# Patient Record
Sex: MALE | Race: WHITE | NOT HISPANIC OR LATINO | URBAN - METROPOLITAN AREA
[De-identification: names, ages, dates, MRNs, and addresses within clinical notes are randomized per-mention and may not be internally consistent; named-entity substitution may affect disease eponyms.]

---

## 2017-01-19 ENCOUNTER — TELEPHONE (OUTPATIENT)
Dept: NEUROLOGY | Facility: MEDICAL CENTER | Age: 77
End: 2017-01-19

## 2017-01-19 ENCOUNTER — PATIENT MESSAGE (OUTPATIENT)
Dept: NEUROLOGY | Facility: MEDICAL CENTER | Age: 77
End: 2017-01-19

## 2017-01-19 NOTE — TELEPHONE ENCOUNTER
Pt is sending this message via [employer] e-mail. Please advise. Thank you. CHRISTIN Ferguson - You have mentioned that a deep brain stimulation procedure might improve my decreased mobility due to Parkinsons. I know that [facility] does this procedure.  Do I need a referral? If so, would you provide that? If I don't need one, who should I contact?  Thank you.

## 2017-01-19 NOTE — TELEPHONE ENCOUNTER
We could discuss this in next visit   Deep brain stimulator is good for sheainosn   But typically, we prefer to perform DBS on patients who have no major memory problems . YP    E-mail sent to velia WALLER

## 2017-01-31 DIAGNOSIS — G20.A1 PARKINSON'S DISEASE: ICD-10-CM

## 2017-01-31 RX ORDER — CARBIDOPA AND LEVODOPA 50; 200 MG/1; MG/1
1 TABLET, EXTENDED RELEASE ORAL
Qty: 60 TAB | Refills: 5 | Status: SHIPPED | OUTPATIENT
Start: 2017-01-31 | End: 2018-01-26 | Stop reason: SDUPTHER

## 2017-02-01 ENCOUNTER — OFFICE VISIT (OUTPATIENT)
Dept: NEUROLOGY | Facility: MEDICAL CENTER | Age: 77
End: 2017-02-01
Payer: MEDICARE

## 2017-02-01 VITALS
BODY MASS INDEX: 24.12 KG/M2 | TEMPERATURE: 98.4 F | OXYGEN SATURATION: 96 % | DIASTOLIC BLOOD PRESSURE: 74 MMHG | SYSTOLIC BLOOD PRESSURE: 132 MMHG | HEART RATE: 74 BPM | HEIGHT: 75 IN | RESPIRATION RATE: 18 BRPM | WEIGHT: 194 LBS

## 2017-02-01 DIAGNOSIS — D46.9 MYELODYSPLASTIC SYNDROME (HCC): ICD-10-CM

## 2017-02-01 DIAGNOSIS — R53.83 LACK OF ENERGY: ICD-10-CM

## 2017-02-01 DIAGNOSIS — G20.A1 PARKINSON'S DISEASE: ICD-10-CM

## 2017-02-01 PROCEDURE — 0518F FALL PLAN OF CARE DOCD: CPT | Mod: 8P | Performed by: PSYCHIATRY & NEUROLOGY

## 2017-02-01 PROCEDURE — 1036F TOBACCO NON-USER: CPT | Performed by: PSYCHIATRY & NEUROLOGY

## 2017-02-01 PROCEDURE — 4040F PNEUMOC VAC/ADMIN/RCVD: CPT | Mod: 8P | Performed by: PSYCHIATRY & NEUROLOGY

## 2017-02-01 PROCEDURE — 1100F PTFALLS ASSESS-DOCD GE2>/YR: CPT | Performed by: PSYCHIATRY & NEUROLOGY

## 2017-02-01 PROCEDURE — G8420 CALC BMI NORM PARAMETERS: HCPCS | Performed by: PSYCHIATRY & NEUROLOGY

## 2017-02-01 PROCEDURE — 99214 OFFICE O/P EST MOD 30 MIN: CPT | Performed by: PSYCHIATRY & NEUROLOGY

## 2017-02-01 PROCEDURE — G8432 DEP SCR NOT DOC, RNG: HCPCS | Performed by: PSYCHIATRY & NEUROLOGY

## 2017-02-01 PROCEDURE — 3288F FALL RISK ASSESSMENT DOCD: CPT | Performed by: PSYCHIATRY & NEUROLOGY

## 2017-02-01 PROCEDURE — G8482 FLU IMMUNIZE ORDER/ADMIN: HCPCS | Performed by: PSYCHIATRY & NEUROLOGY

## 2017-02-01 RX ORDER — LEVETIRACETAM 250 MG/1
125 TABLET ORAL 2 TIMES DAILY
Qty: 30 TAB | Refills: 4 | Status: SHIPPED | OUTPATIENT
Start: 2017-02-01 | End: 2017-06-13

## 2017-02-01 NOTE — PATIENT INSTRUCTIONS
IMPRESSION:    1. Parkinson Disease Stage III-- treated in [facility]  2. Dementia-- deterioration   3. Myelodysplastic syndrome  4. Lack of energy for 3 years-- good days and bad days-- worsened in the last year-- in fact, patient developed one episode of lack of energy in front of me    PLAN/RECOMMENDATIONS:        ________________________________________________________________________        Continue  Namenda 5mg two times per day for memory problems      ________________________________________________________________________    Regarding medication for idiopathic lack of energy  The following are some medicine that could be used   1. Coenzyme Q10   2. Amantadine   3. Provigil   4. Medication for ADHD(  I do not recommend though)       However, lack of energy could be due to many other causes-- such as sleep apnea, sleep deprivation, subtle seizures, multiple sclerosis, depression, heart failure etc ( parkinson disease does not commonly causes pronounced lack of energy though)     It is fine to try Keppra 125mg before sleep for one week, then 125mg two times per day  Subtle seizures could produce lack of energy, bad days and good days  If any side effects, please call us and stop Keppra      For reduction of inflammation , the followings are fine with me  ________________________________________________________________________    Fish Oil -- Omega 3 1000mg 3# daily   Daily Probiotic too  Continue Vit D-3 2000 unit daily or above  ________________________________________________________________________     Expand All Collapse All    We could discuss this in next visit   Deep brain stimulator is good for parksinosn   But typically, we prefer to perform DBS on patients who have no major memory problems . YP    E-mail sent to pt. CHRISTIN          ________________________________________________________________________  RERERENCE    Seizures, Early Alzheimer's May Go Hand in Hand  Seizure disorders of all etiologies  "increase in the second half of life, beginning around the age of 50. The incidence ratio of epilepsy in individuals diagnosed with AD is elevated nearly 87 fold at this age. Seizure incidence at ages beyond 70 remains elevated three-fold over patients of a similar age without AD. Modified from Tammy et al (2006), and Savlador et al (2007)    A Perfect Storm: Converging Paths of Epilepsy and Alzheimer's Dementia Intersect in the Hippocampal Formation  Cornelio Douglas MD, PhD  http://www.ncbi.nlm.nih.gov/pmc/articles/UKO8041615/  Http://www.medpagetoday.com/theguptaguide/neurology/22002    ________________________________________________________________________      ________________________________________________________________________      MRI of brain         SIGNATURE:  Louie Cox      CC:  MERI Casarez    INTERPRETATION:  This EEG denotes of potential focal cortical irritability    more over the left frontal temporal region with potential focal irritability    over the left hemisphere as well.       ____________________________________     LOUIE COX MD    YP / NTS    DD:  12/07/2016 09:49:54  DT:  12/07/2016 10:01:27    D#:  [id number]  Job#:  [id number]        ________________________________________________________________________    Guidelines for Referring Neurologists: Deep Brain Stimulation for Parkinson's Disease    Who is a good candidate for deep brain stimulation for Parkinson's Disease? Guidelines for referring neurologists:     The criteria that we use for offering patients DBS surgery for Parkinson's disease are as follows:    1.      Clear diagnosis of idiopathic PD. Patients with atypical parkinsonism or \"parkinson's plus\" syndromes do not respond to DBS. If there are features in the history and physical that are suggestive of atypical parkinsonism (such as very rapid progression of symptoms, autonomic failure or postural instability as early features of the disease, signs of cerebellar " "or pyramidal dysfunction) or an MRI suggesting an atypical syndrome, surgery is contraindicated.     2.      Intact cognitive function. A good screening test is the mini-mental status test. A score of >26 is ideal, < 24 an absolute contraindication. Patients with cognitive dysfunction have difficulty tolerating awake surgery, may have permanent worsening of cognitive function postoperatively, deal poorly with the intrinsic complexity of DBS therapy, and realize little overall functional gain even if motor performance is improved. In borderline cases, we obtain formal neuropsychological evaluation.     3.     Clear evidence of motor improvement with sinemet, with good motor function in the best on-medication state. A good screening test is the Unified Parkinson's Disease Rating Scale (UPDRS) part III, performed in 12 hours off of medication and repeated following a supratherapeutic sinemet dose. We require at least a 30% improvement in this score with sinemet. The patient should be ambulatory in the best on state without much assistance. In general surgery makes the \"off\" states more like the \"on\" states but rarely does better than the best \"on\" state, so a patient with poor function in best \"on\" (for example, nonambulatory in best \"on\") is a poor surgical candidate. Patients who fluctuate between good motor function while \"on\" and poor motor function while \"off\" are usually good surgical candidates.      4.     Lack of comorbidity. Serious cardiac disease, uncontrolled hypertension, or any major other chronic systemic illness increases the risk and decreases the benefit of surgery.      5.     Realistic expectations. People who expect a sudden miracle are disappointed with the results, and become frustrated with the complexity of the therapy.     6.     Patient age. The benefits of DBS for PD decline with advancing age, and the risks go up. Patients over 75 are informed that their benefits are likely to be modest. " "We have rarely implanted PD patients who are over 80.     7.     Screening MRI of the brain should be free of severe vascular disease, atrophy that is out of proportion to age, or signs of atypical parkinsonism.     8.     Degree of disability. DBS is a poor procedure to rescue someone with end stage PD, although these are the most desperate patients. It is also not appropriate for early PD when the symptoms are very well controlled on medical therapy. Patients should have an off-medication UPDRS-III score of > 25. The best time to intervene surgically is when the patient is just beginning to lose the ability to perform activities meaningful to him/her, in spite of optimal medical therapy. Often, this is associated with the development of significant motor fluctuations, dyskinesias, or both. In a patient who is still working, the time to intervene is before the patient is forced to retire on disability.      9.     Ability to remain calm and cooperative during awake neurosurgery lasting about 2-3 hours per side of brain. A helpful \"screening test\" for this is how well the patient tolerates an MRI scan. For patients who are otherwise excellent candidates but could not tolerate being awake for part of the surgery, it is possible to have the DBS implantation under general anesthesia in our interventional MRI suite.      10.  Willingness and ability to be seen for follow-up visits. Programming the DBS to find the optimal stimulation settings is very much a trial-and-error process, and the patient will need to be seen approximately once a month for at least the first few months after surgery.      [email or web address]  ________________________________________________________________________      "

## 2017-02-01 NOTE — PROGRESS NOTES
NEUROLOGY NOTE    Referring Physician  Wild Sherman      CHIEF COMPLAINT:  Retired at the age of 63YO  Noticed to have cognitive problems for the 5 years  Chief Complaint   Patient presents with   • Follow-Up     parkinson's       PRESENT ILLNESS:   Retired at the age of 63YO  Noticed to have cognitive problems for the 5 years    Used to teach in college     He was diagnosed as parkinson disease in [facility]-- diagnosed 5 years ago-- initially in [place]--- was put on Sinemet for 5 years      PAST MEDICAL HISTORY:  No past medical history on file.    PAST SURGICAL HISTORY:  Past Surgical History   Procedure Laterality Date   • Splenectomy     • Knee arthroscopy     • Prostatectomy, radial       subtotal   • Rotator cuff repair       right   • Cataract extraction with iol         FAMILY HISTORY:  Family History   Problem Relation Age of Onset   • Heart Disease Mother    • Lung Disease Mother    • Cancer Father      pancreatic       SOCIAL HISTORY:  Social History     Social History   • Marital Status: Unknown     Spouse Name: N/A   • Number of Children: N/A   • Years of Education: N/A     Occupational History   • Not on file.     Social History Main Topics   • Smoking status: Never Smoker    • Smokeless tobacco: Never Used   • Alcohol Use: Yes      Comment: rare   • Drug Use: No   • Sexual Activity: Not on file     Other Topics Concern   • Not on file     Social History Narrative     ALLERGIES:  No Known Allergies  TOBHX  History   Smoking status   • Never Smoker    Smokeless tobacco   • Never Used     ALCHX  History   Alcohol Use   • Yes     Comment: rare     DRUGHX  History   Drug Use No           MEDICATIONS:  Current Outpatient Prescriptions   Medication   • carbidopa-levodopa SR (SINEMET CR)  MG per tablet   • Carbidopa-Levodopa ER 36. MG Cap CR   • memantine (NAMENDA) 5 MG Tab   • levetiracetam (KEPPRA) 250 MG tablet   • triazolam (HALCION) 0.125 MG tablet   • Rasagiline Mesylate (AZILECT) 0.5 MG  "Tab   • citalopram (CELEXA) 20 MG Tab     No current facility-administered medications for this visit.       REVIEW OF SYSTEM:    Constitutional: Denies fevers, Denies weight changes   Eyes: Denies changes in vision, no eye pain   Ears/Nose/Throat/Mouth: Denies nasal congestion or sore throat   Cardiovascular: Denies chest pain or palpitations   Respiratory: Denies SOB.   Gastrointestinal/Hepatic: Denies abdominal pain, nausea, vomiting, diarrhea, constipation or GI bleeding   Genitourinary: Denies bladder dysfunction, dysuria or frequency   Musculoskeletal/Rheum: Denies joint pain and swelling   Skin/Breast: Denies rash, denies breast lumps or discharge   Neurological: Parkinson Disease, Dementia  Psychiatric: denies mood disorder   Endocrine: denies hx of diabetes or thyroid dysfunction   Heme/Oncology/Lymph Nodes: Denies enlarged lymph nodes, denies brusing or known bleeding disorder   Allergic/Immunologic: Denies hx of allergies         PHYSICAL AND NEUROLOGICAL EXMAINATIONS:  VITAL SIGNS: /74 mmHg  Pulse 74  Temp(Src) 36.9 °C (98.4 °F)  Resp 18  Ht 1.905 m (6' 3\")  Wt 87.998 kg (194 lb)  BMI 24.25 kg/m2  SpO2 96%  CURRENT WEIGHT:   BMI: Body mass index is 24.25 kg/(m^2).  PREVIOUS WEIGHTS:  Wt Readings from Last 25 Encounters:   02/01/17 87.998 kg (194 lb)   12/14/16 89.177 kg (196 lb 9.6 oz)   09/28/16 88.905 kg (196 lb)   07/20/16 85.73 kg (189 lb)       General appearance of patient: WDWN(+) NAD(+)    EYES  o Fundus : Papilledem(-) Exudates(-) Hemorrhage(-)  Nervous System  Orientation to time, place and person(+)  Memory normal(-)  Language: aphasia(-)  Knowledge: past(+) Current(+)  Attention(+)  Cranial Nerves  • Nerve 2: intact  • Nerve 3,4,6: intact  • Nerve 5 : intact  • Nerve 7: intact  • Nerve 8: intact  • Nerve 9 & 10: intact  • Nerve 11: intact  • Nerve 12: intact  Muscle Power and muscle tone: symmetric, normal in upper and lower  Sensory System: Pin sensation intact(+)  Reflexes: " symmetric throughout  Cerebellar Function FNP normal   Gait : Steady(-) walked with cane  Heart and Vascular  Peripheral Vasucular system : Edema (-) Swelling(-)  RHB, Breathing sound clear  abdomen bowel sound normoactive  Extremities freely moveable  Joints no contracture       NEUROIMAGING: I reviewed the MRI/CT of brain     Registration 3/3  Recall 3/3       Pt is sending this message via [employer] e-mail. Please advise. Thank you. KA      Dr. Ferguson - I have been taking the increased strength of Rytary since you prescribed it but feel worse than ever. I have no energy and my balance is not good. I think I should revert to the original strength but the literature from the pharmacy indicates that lowering the dosage should be done slowly. What do you recommend?  I've discontinued Azilect as it doesn't seem to help at all.     Jamie Barneyer              CARBIDOPA-LEVODOPA ER 48. MG PO CPCR three times per day did not make him feel good  Now he is on Carbidopa-Levodopa ER 36. MG Cap CR    Recall 2/3    IMPRESSION:    1. Parkinson Disease Stage III-- treated in [facility]  2. Dementia-- deterioration   3. Myelodysplastic syndrome  4. Lack of energy for 3 years-- good days and bad days-- worsened in the last year-- in fact, patient developed one episode of lack of energy in front of me    PLAN/RECOMMENDATIONS:        ________________________________________________________________________        Continue  Namenda 5mg two times per day for memory problems      ________________________________________________________________________    Regarding medication for idiopathic lack of energy  The following are some medicine that could be used   1. Coenzyme Q10   2. Amantadine   3. Provigil   4. Medication for ADHD(  I do not recommend though)       However, lack of energy could be due to many other causes-- such as sleep apnea, sleep deprivation, subtle seizures, multiple sclerosis, depression, heart failure etc (  parkinson disease does not commonly causes pronounced lack of energy though)     It is fine to try Keppra 125mg before sleep for one week, then 125mg two times per day  Subtle seizures could produce lack of energy, bad days and good days  If any side effects, please call us and stop Keppra      For reduction of inflammation , the followings are fine with me  ________________________________________________________________________    Fish Oil -- Omega 3 1000mg 3# daily   Daily Probiotic too  Continue Vit D-3 2000 unit daily or above  ________________________________________________________________________     Expand All Collapse All    We could discuss this in next visit   Deep brain stimulator is good for parksinosn   But typically, we prefer to perform DBS on patients who have no major memory problems . YP    E-mail sent to velia WALLER          ________________________________________________________________________  RERERENCE    Seizures, Early Alzheimer's May Go Hand in Hand  Seizure disorders of all etiologies increase in the second half of life, beginning around the age of 50. The incidence ratio of epilepsy in individuals diagnosed with AD is elevated nearly 87 fold at this age. Seizure incidence at ages beyond 70 remains elevated three-fold over patients of a similar age without AD. Modified from Tammy et al (2006), and Salvador et al (2007)    A Perfect Storm: Converging Paths of Epilepsy and Alzheimer's Dementia Intersect in the Hippocampal Formation  Cornelio Douglas MD, PhD  http://www.ncbi.nlm.nih.gov/pmc/articles/YLD7000018/  Http://www.medpagetoday.com/theemerald/neurology/54967    ________________________________________________________________________      ________________________________________________________________________      MRI of brain         SIGNATURE:  Popeye Ferguson      CC:  MERI Casarez    INTERPRETATION:  This EEG denotes of potential focal cortical irritability    more over  "the left frontal temporal region with potential focal irritability    over the left hemisphere as well.       ____________________________________     MD FER STUBBS / NTS    DD:  12/07/2016 09:49:54  DT:  12/07/2016 10:01:27    D#:  [id number]  Job#:  [id number]        ________________________________________________________________________    Guidelines for Referring Neurologists: Deep Brain Stimulation for Parkinson's Disease    Who is a good candidate for deep brain stimulation for Parkinson's Disease? Guidelines for referring neurologists:     The criteria that we use for offering patients DBS surgery for Parkinson's disease are as follows:    1.      Clear diagnosis of idiopathic PD. Patients with atypical parkinsonism or \"parkinson's plus\" syndromes do not respond to DBS. If there are features in the history and physical that are suggestive of atypical parkinsonism (such as very rapid progression of symptoms, autonomic failure or postural instability as early features of the disease, signs of cerebellar or pyramidal dysfunction) or an MRI suggesting an atypical syndrome, surgery is contraindicated.     2.      Intact cognitive function. A good screening test is the mini-mental status test. A score of >26 is ideal, < 24 an absolute contraindication. Patients with cognitive dysfunction have difficulty tolerating awake surgery, may have permanent worsening of cognitive function postoperatively, deal poorly with the intrinsic complexity of DBS therapy, and realize little overall functional gain even if motor performance is improved. In borderline cases, we obtain formal neuropsychological evaluation.     3.     Clear evidence of motor improvement with sinemet, with good motor function in the best on-medication state. A good screening test is the Unified Parkinson's Disease Rating Scale (UPDRS) part III, performed in 12 hours off of medication and repeated following a supratherapeutic sinemet dose. We require at " "least a 30% improvement in this score with sinemet. The patient should be ambulatory in the best on state without much assistance. In general surgery makes the \"off\" states more like the \"on\" states but rarely does better than the best \"on\" state, so a patient with poor function in best \"on\" (for example, nonambulatory in best \"on\") is a poor surgical candidate. Patients who fluctuate between good motor function while \"on\" and poor motor function while \"off\" are usually good surgical candidates.      4.     Lack of comorbidity. Serious cardiac disease, uncontrolled hypertension, or any major other chronic systemic illness increases the risk and decreases the benefit of surgery.      5.     Realistic expectations. People who expect a sudden miracle are disappointed with the results, and become frustrated with the complexity of the therapy.     6.     Patient age. The benefits of DBS for PD decline with advancing age, and the risks go up. Patients over 75 are informed that their benefits are likely to be modest. We have rarely implanted PD patients who are over 80.     7.     Screening MRI of the brain should be free of severe vascular disease, atrophy that is out of proportion to age, or signs of atypical parkinsonism.     8.     Degree of disability. DBS is a poor procedure to rescue someone with end stage PD, although these are the most desperate patients. It is also not appropriate for early PD when the symptoms are very well controlled on medical therapy. Patients should have an off-medication UPDRS-III score of > 25. The best time to intervene surgically is when the patient is just beginning to lose the ability to perform activities meaningful to him/her, in spite of optimal medical therapy. Often, this is associated with the development of significant motor fluctuations, dyskinesias, or both. In a patient who is still working, the time to intervene is before the patient is forced to retire on disability.    " "  9.     Ability to remain calm and cooperative during awake neurosurgery lasting about 2-3 hours per side of brain. A helpful \"screening test\" for this is how well the patient tolerates an MRI scan. For patients who are otherwise excellent candidates but could not tolerate being awake for part of the surgery, it is possible to have the DBS implantation under general anesthesia in our interventional MRI suite.      10.  Willingness and ability to be seen for follow-up visits. Programming the DBS to find the optimal stimulation settings is very much a trial-and-error process, and the patient will need to be seen approximately once a month for at least the first few months after surgery.      [email or web address]  ________________________________________________________________________            "

## 2017-02-01 NOTE — MR AVS SNAPSHOT
"        Rafael Felix   2017 11:00 AM   Office Visit   MRN: [id number]    Department:  [facility]   Dept Phone:  [phone]    Description:  Male : 1940   Provider:  Popeye Ferguson M.D.           Reason for Visit     Follow-Up parkinson's      Allergies as of 2017     No Known Allergies      You were diagnosed with     Parkinson's disease (CMS-HCC)   [2287022]       Myelodysplastic syndrome (CMS-HCC)   [510883]       Lack of energy   [655761]       Spells   [663307]         Vital Signs     Blood Pressure Pulse Temperature Respirations Height Weight    132/74 mmHg 74 36.9 °C (98.4 °F) 18 1.905 m (6' 3\") 87.998 kg (194 lb)    Body Mass Index Oxygen Saturation Smoking Status             24.25 kg/m2 96% Never Smoker          Basic Information     Date Of Birth Sex Race Ethnicity Preferred Language    1940 Male Unknown Unknown English      Problem List              ICD-10-CM Priority Class Noted - Resolved    Parkinson's disease (CMS-HCC) G20   2016 - Present    Myelodysplastic syndrome (CMS-HCC) D46.9   2016 - Present    Insomnia G47.00   2016 - Present    Depression F32.9   2016 - Present    S/P splenectomy Z90.81   2016 - Present    Lack of energy R53.83   2016 - Present    Spells R68.89   2016 - Present      Health Maintenance        Date Due Completion Dates    IMM DTaP/Tdap/Td Vaccine (1 - Tdap) 10/21/1958 ---    IMM ZOSTER VACCINE 10/21/1999 ---    IMM PNEUMOCOCCAL 65+ (ADULT) HIGH/HIGHEST RISK SERIES (1 of 2 - PCV13) 10/21/2004 ---    COLONOSCOPY 2015 (Done)    Override on 2005: Done            Current Immunizations     Influenza Vaccine Adult HD 2016      Below and/or attached are the medications your provider expects you to take. Review all of your home medications and newly ordered medications with your provider and/or pharmacist. Follow medication instructions as directed by your provider and/or pharmacist. Please keep " your medication list with you and share with your provider. Update the information when medications are discontinued, doses are changed, or new medications (including over-the-counter products) are added; and carry medication information at all times in the event of emergency situations     Allergies:  No Known Allergies          Medications  Valid as of: February 01, 2017 - 11:59 AM    Generic Name Brand Name Tablet Size Instructions for use    Carbidopa-Levodopa (Cap CR) Carbidopa-Levodopa ER 36. MG Take 1 Tab by mouth 3 times a day.        Carbidopa-Levodopa (Tab CR) SINEMET CR  MG Take 1 Tab by mouth every bedtime.        Carbidopa-Levodopa (Cap CR) Carbidopa-Levodopa ER 48. MG Take 1 Tab by mouth 3 times a day.        Citalopram Hydrobromide (Tab) CELEXA 20 MG Take 20 mg by mouth every day.        LevETIRAcetam (Tab) KEPPRA 250 MG Take 0.5 Tabs by mouth 2 times a day.        Memantine HCl (Tab) NAMENDA 5 MG Take 1 Tab by mouth 2 times a day.        Triazolam (Tab) HALCION 0.125 MG Take 1 Tab by mouth at bedtime as needed.        .                 Medicines prescribed today were sent to:     [facility] - [place], [place] - [address]    [address] [facility] [address]    Phone: [phone] Fax: [phone]    Open 24 Hours?: No      Medication refill instructions:       If your prescription bottle indicates you have medication refills left, it is not necessary to call your provider’s office. Please contact your pharmacy and they will refill your medication.    If your prescription bottle indicates you do not have any refills left, you may request refills at any time through one of the following ways: The online [employer] system (except Urgent Care), by calling your provider’s office, or by asking your pharmacy to contact your provider’s office with a refill request. Medication refills are processed only during regular business hours and may not be available until the next business day.  Your provider may request additional information or to have a follow-up visit with you prior to refilling your medication.   *Please Note: Medication refills are assigned a new Rx number when refilled electronically. Your pharmacy may indicate that no refills were authorized even though a new prescription for the same medication is available at the pharmacy. Please request the medicine by name with the pharmacy before contacting your provider for a refill.        Instructions        IMPRESSION:    1. Parkinson Disease Stage III-- treated in [facility]  2. Dementia-- deterioration   3. Myelodysplastic syndrome  4. Lack of energy for 3 years-- good days and bad days-- worsened in the last year-- in fact, patient developed one episode of lack of energy in front of me    PLAN/RECOMMENDATIONS:        ________________________________________________________________________        Continue  Namenda 5mg two times per day for memory problems      ________________________________________________________________________    Regarding medication for idiopathic lack of energy  The following are some medicine that could be used   1. Coenzyme Q10   2. Amantadine   3. Provigil   4. Medication for ADHD(  I do not recommend though)       However, lack of energy could be due to many other causes-- such as sleep apnea, sleep deprivation, subtle seizures, multiple sclerosis, depression, heart failure etc ( parkinson disease does not commonly causes pronounced lack of energy though)     It is fine to try Keppra 125mg before sleep for one week, then 125mg two times per day  Subtle seizures could produce lack of energy, bad days and good days  If any side effects, please call us and stop Keppra      For reduction of inflammation , the followings are fine with me  ________________________________________________________________________    Fish Oil -- Omega 3 1000mg 3# daily   Daily Probiotic too  Continue Vit D-3 2000 unit daily or  above  ________________________________________________________________________     Expand All Collapse All    We could discuss this in next visit   Deep brain stimulator is good for parksinosn   But typically, we prefer to perform DBS on patients who have no major memory problems . YP    E-mail sent to pt. KA          ________________________________________________________________________  RERERENCE    Seizures, Early Alzheimer's May Go Hand in Hand  Seizure disorders of all etiologies increase in the second half of life, beginning around the age of 50. The incidence ratio of epilepsy in individuals diagnosed with AD is elevated nearly 87 fold at this age. Seizure incidence at ages beyond 70 remains elevated three-fold over patients of a similar age without AD. Modified from Tammy et al (2006), and Salvador et al (2007)    A Perfect Storm: Converging Paths of Epilepsy and Alzheimer's Dementia Intersect in the Hippocampal Formation  Cornelio Douglas MD, PhD  http://www.ncbi.nlm.nih.gov/pmc/articles/NHO5148294/  Http://www.medpagetoday.com/theguphuonggusandeep/neurology/38375    ________________________________________________________________________      ________________________________________________________________________      MRI of brain         SIGNATURE:  Popeye Ferguson      CC:  MERI Casarez    INTERPRETATION:  This EEG denotes of potential focal cortical irritability    more over the left frontal temporal region with potential focal irritability    over the left hemisphere as well.       ____________________________________     MD FER [employer]UBBS / ABISAI    DD:  12/07/2016 09:49:54  DT:  12/07/2016 10:01:27    D#:  [id number]  Job#:  [id number]        ________________________________________________________________________    Guidelines for Referring Neurologists: Deep Brain Stimulation for Parkinson's Disease    Who is a good candidate for deep brain stimulation for Parkinson's Disease? Guidelines for  "referring neurologists:     The criteria that we use for offering patients DBS surgery for Parkinson's disease are as follows:    1.      Clear diagnosis of idiopathic PD. Patients with atypical parkinsonism or \"parkinson's plus\" syndromes do not respond to DBS. If there are features in the history and physical that are suggestive of atypical parkinsonism (such as very rapid progression of symptoms, autonomic failure or postural instability as early features of the disease, signs of cerebellar or pyramidal dysfunction) or an MRI suggesting an atypical syndrome, surgery is contraindicated.     2.      Intact cognitive function. A good screening test is the mini-mental status test. A score of >26 is ideal, < 24 an absolute contraindication. Patients with cognitive dysfunction have difficulty tolerating awake surgery, may have permanent worsening of cognitive function postoperatively, deal poorly with the intrinsic complexity of DBS therapy, and realize little overall functional gain even if motor performance is improved. In borderline cases, we obtain formal neuropsychological evaluation.     3.     Clear evidence of motor improvement with sinemet, with good motor function in the best on-medication state. A good screening test is the Unified Parkinson's Disease Rating Scale (UPDRS) part III, performed in 12 hours off of medication and repeated following a supratherapeutic sinemet dose. We require at least a 30% improvement in this score with sinemet. The patient should be ambulatory in the best on state without much assistance. In general surgery makes the \"off\" states more like the \"on\" states but rarely does better than the best \"on\" state, so a patient with poor function in best \"on\" (for example, nonambulatory in best \"on\") is a poor surgical candidate. Patients who fluctuate between good motor function while \"on\" and poor motor function while \"off\" are usually good surgical candidates.      4.     Lack of " "comorbidity. Serious cardiac disease, uncontrolled hypertension, or any major other chronic systemic illness increases the risk and decreases the benefit of surgery.      5.     Realistic expectations. People who expect a sudden miracle are disappointed with the results, and become frustrated with the complexity of the therapy.     6.     Patient age. The benefits of DBS for PD decline with advancing age, and the risks go up. Patients over 75 are informed that their benefits are likely to be modest. We have rarely implanted PD patients who are over 80.     7.     Screening MRI of the brain should be free of severe vascular disease, atrophy that is out of proportion to age, or signs of atypical parkinsonism.     8.     Degree of disability. DBS is a poor procedure to rescue someone with end stage PD, although these are the most desperate patients. It is also not appropriate for early PD when the symptoms are very well controlled on medical therapy. Patients should have an off-medication UPDRS-III score of > 25. The best time to intervene surgically is when the patient is just beginning to lose the ability to perform activities meaningful to him/her, in spite of optimal medical therapy. Often, this is associated with the development of significant motor fluctuations, dyskinesias, or both. In a patient who is still working, the time to intervene is before the patient is forced to retire on disability.      9.     Ability to remain calm and cooperative during awake neurosurgery lasting about 2-3 hours per side of brain. A helpful \"screening test\" for this is how well the patient tolerates an MRI scan. For patients who are otherwise excellent candidates but could not tolerate being awake for part of the surgery, it is possible to have the DBS implantation under general anesthesia in our interventional MRI suite.      10.  Willingness and ability to be seen for follow-up visits. Programming the DBS to find the optimal " stimulation settings is very much a trial-and-error process, and the patient will need to be seen approximately once a month for at least the first few months after surgery.      [email or web address].html  ________________________________________________________________________              MyChart Access Code: Activation code not generated  Current MyChart Status: Active

## 2017-02-15 ENCOUNTER — RX ONLY (OUTPATIENT)
Age: 77
Setting detail: RX ONLY
End: 2017-02-15

## 2017-02-15 PROBLEM — D49.2 NEOPLASM OF UNSPECIFIED BEHAVIOR OF BONE, SOFT TISSUE, AND SKIN: Status: ACTIVE | Noted: 2017-02-15

## 2017-02-15 PROBLEM — C44.319 BASAL CELL CARCINOMA OF SKIN OF OTHER PARTS OF FACE: Status: ACTIVE | Noted: 2017-02-15

## 2017-04-15 DIAGNOSIS — F02.80 DEMENTIA ASSOCIATED WITH OTHER UNDERLYING DISEASE WITHOUT BEHAVIORAL DISTURBANCE (HCC): ICD-10-CM

## 2017-04-17 DIAGNOSIS — F02.80 DEMENTIA ASSOCIATED WITH OTHER UNDERLYING DISEASE WITHOUT BEHAVIORAL DISTURBANCE (HCC): ICD-10-CM

## 2017-04-17 RX ORDER — MEMANTINE HYDROCHLORIDE 5 MG/1
TABLET ORAL
Qty: 60 TAB | Refills: 3 | Status: SHIPPED | OUTPATIENT
Start: 2017-04-17 | End: 2017-06-13

## 2017-04-18 RX ORDER — MEMANTINE HYDROCHLORIDE 5 MG/1
TABLET ORAL
Qty: 180 TAB | Refills: 1 | Status: SHIPPED | OUTPATIENT
Start: 2017-04-18 | End: 2017-06-13

## 2017-05-02 ENCOUNTER — TELEPHONE (OUTPATIENT)
Dept: MEDICAL GROUP | Facility: MEDICAL CENTER | Age: 77
End: 2017-05-02

## 2017-05-02 NOTE — TELEPHONE ENCOUNTER
Future Appointments       Provider Department Center    5/3/2017 1:00 PM Popeye Ferguson M.D. [facility] Neurology     5/3/2017 3:00 PM MERI Casarez [facility] [address]        ESTABLISHED PATIENT PRE-VISIT PLANNING     Note: Patient will not be contacted if there is no indication to call.     1.  Reviewed note from last office visit with PCP and/or other med group provider: Yes    2.  If any orders were placed at last visit, do we have Results/Consult Notes?        •  Labs - Labs were not ordered at last office visit.       •  Imaging - Imaging was not ordered at last office visit.       •  Referrals - No referrals were ordered at last office visit.    3.  Immunizations were updated in [facility] using [employer]?: Yes       •  [employer] Recommendations: HEPATITIS A  HEPATITIS B PREVNAR (PCV13)  TDAP ZOSTAVAX (Shingles)    4.  Patient is due for the following Health Maintenance Topics:   Health Maintenance Due   Topic Date Due   • IMM DTaP/Tdap/Td Vaccine (1 - Tdap) 10/21/1958   • IMM ZOSTER VACCINE  10/21/1999   • IMM PNEUMOCOCCAL 65+ (ADULT) HIGH/HIGHEST RISK SERIES (1 of 2 - PCV13) 10/21/2004   • COLONOSCOPY  02/17/2015           5.  Patient was not informed to arrive 15 min prior to their scheduled appointment and bring in their medication bottles.

## 2017-05-03 ENCOUNTER — OFFICE VISIT (OUTPATIENT)
Dept: MEDICAL GROUP | Facility: MEDICAL CENTER | Age: 77
End: 2017-05-03
Payer: MEDICARE

## 2017-05-03 ENCOUNTER — APPOINTMENT (OUTPATIENT)
Dept: NEUROLOGY | Facility: MEDICAL CENTER | Age: 77
End: 2017-05-03
Payer: MEDICARE

## 2017-05-03 ENCOUNTER — APPOINTMENT (OUTPATIENT)
Dept: MEDICAL GROUP | Facility: MEDICAL CENTER | Age: 77
End: 2017-05-03
Payer: MEDICARE

## 2017-05-03 VITALS
OXYGEN SATURATION: 98 % | BODY MASS INDEX: 24.12 KG/M2 | DIASTOLIC BLOOD PRESSURE: 78 MMHG | WEIGHT: 194 LBS | HEIGHT: 75 IN | TEMPERATURE: 98.2 F | RESPIRATION RATE: 16 BRPM | SYSTOLIC BLOOD PRESSURE: 132 MMHG | HEART RATE: 77 BPM

## 2017-05-03 DIAGNOSIS — E55.9 VITAMIN D DEFICIENCY DISEASE: ICD-10-CM

## 2017-05-03 DIAGNOSIS — F33.42 RECURRENT MAJOR DEPRESSIVE DISORDER, IN FULL REMISSION (HCC): ICD-10-CM

## 2017-05-03 DIAGNOSIS — G20.A1 PARKINSON'S DISEASE: ICD-10-CM

## 2017-05-03 DIAGNOSIS — R53.83 LACK OF ENERGY: ICD-10-CM

## 2017-05-03 DIAGNOSIS — S22.000S COMPRESSION FRACTURE OF THORACIC VERTEBRA, SEQUELA: ICD-10-CM

## 2017-05-03 DIAGNOSIS — G47.00 INSOMNIA, UNSPECIFIED TYPE: ICD-10-CM

## 2017-05-03 DIAGNOSIS — Z12.11 SCREEN FOR COLON CANCER: ICD-10-CM

## 2017-05-03 DIAGNOSIS — D46.9 MYELODYSPLASTIC SYNDROME (HCC): ICD-10-CM

## 2017-05-03 PROBLEM — S22.000A COMPRESSION FRACTURE OF THORACIC VERTEBRA (HCC): Status: ACTIVE | Noted: 2017-05-03

## 2017-05-03 PROCEDURE — 1100F PTFALLS ASSESS-DOCD GE2>/YR: CPT | Performed by: NURSE PRACTITIONER

## 2017-05-03 PROCEDURE — G8432 DEP SCR NOT DOC, RNG: HCPCS | Performed by: NURSE PRACTITIONER

## 2017-05-03 PROCEDURE — 99214 OFFICE O/P EST MOD 30 MIN: CPT | Performed by: NURSE PRACTITIONER

## 2017-05-03 PROCEDURE — G8420 CALC BMI NORM PARAMETERS: HCPCS | Performed by: NURSE PRACTITIONER

## 2017-05-03 PROCEDURE — 1036F TOBACCO NON-USER: CPT | Performed by: NURSE PRACTITIONER

## 2017-05-03 PROCEDURE — 3288F FALL RISK ASSESSMENT DOCD: CPT | Performed by: NURSE PRACTITIONER

## 2017-05-03 PROCEDURE — 0518F FALL PLAN OF CARE DOCD: CPT | Mod: 8P | Performed by: NURSE PRACTITIONER

## 2017-05-03 PROCEDURE — 4040F PNEUMOC VAC/ADMIN/RCVD: CPT | Mod: 8P | Performed by: NURSE PRACTITIONER

## 2017-05-03 RX ORDER — CITALOPRAM 20 MG/1
20 TABLET ORAL DAILY
Qty: 30 TAB | Refills: 11 | Status: SHIPPED | OUTPATIENT
Start: 2017-05-03 | End: 2018-08-17 | Stop reason: SDUPTHER

## 2017-05-03 RX ORDER — TRIAZOLAM 0.12 MG/1
0.12 TABLET ORAL NIGHTLY PRN
Qty: 30 TAB | Refills: 5 | Status: SHIPPED | OUTPATIENT
Start: 2017-05-03 | End: 2018-01-26 | Stop reason: SDUPTHER

## 2017-05-03 ASSESSMENT — ENCOUNTER SYMPTOMS: BACK PAIN: 1

## 2017-05-03 NOTE — MR AVS SNAPSHOT
"        Rafael Felix   5/3/2017 1:40 PM   Office Visit   MRN: [id number]    Department:  [address] [place] [address]   Dept Phone:  [phone]    Description:  Male : 1940   Provider:  MERI Casarez           Reason for Visit     Follow-Up recent fall    Medication Management           Allergies as of 5/3/2017     No Known Allergies      You were diagnosed with     Compression fracture of thoracic vertebra, sequela   [328162]       Vitamin D deficiency disease   [900410]       Insomnia, unspecified type   [9461397]       Recurrent major depressive disorder, in full remission (CMS-HCC)   [5707512]       Parkinson's disease (CMS-HCC)   [7409231]       Myelodysplastic syndrome (CMS-HCC)   [851760]       Lack of energy   [564864]       Screen for colon cancer   [218462]         Vital Signs     Blood Pressure Pulse Temperature Respirations Height Weight    132/78 mmHg 77 36.8 °C (98.2 °F) 16 1.905 m (6' 3\") 87.998 kg (194 lb)    Body Mass Index Oxygen Saturation Smoking Status             24.25 kg/m2 98% Never Smoker          Basic Information     Date Of Birth Sex Race Ethnicity Preferred Language    1940 Male White Non- English      Your appointments     May 08, 2017  7:15 PM   MR SP WO 30 with [address] [facility] [address]   [facility] - MRI - [address]    [address] [facility] [id number] [phone]            2017 12:00 PM   Follow Up Visit with Popeye Ferguson M.D.   [facility] (--)    [address] [facility]  [facility] [address] [phone]           You will be receiving a confirmation call a few days before your appointment from our automated call confirmation system.            2017  2:20 PM   Established Patient with MERI Casarez   [facility] [address]    [address] [facility]  [address]  [facility] [id number] [phone]           You will be receiving a confirmation call a few days before your appointment from " our automated call confirmation system.              Problem List              ICD-10-CM Priority Class Noted - Resolved    Parkinson's disease (CMS-HCC) G20   2/24/2016 - Present    Myelodysplastic syndrome (CMS-HCC) D46.9   2/24/2016 - Present    Insomnia G47.00   2/24/2016 - Present    S/P splenectomy Z90.81   2/24/2016 - Present    Lack of energy R53.83   12/14/2016 - Present    Compression fracture of thoracic vertebra (CMS-HCC) S22.000A   5/3/2017 - Present    Vitamin D deficiency disease E55.9   5/3/2017 - Present    Recurrent major depressive disorder, in full remission (CMS-HCC) F33.42   5/3/2017 - Present      Health Maintenance        Date Due Completion Dates    IMM DTaP/Tdap/Td Vaccine (1 - Tdap) 10/21/1958 ---    IMM PNEUMOCOCCAL 65+ (ADULT) HIGH/HIGHEST RISK SERIES (1 of 2 - PCV13) 10/21/2004 ---    IMM ZOSTER VACCINE 5/27/2020 (Originally 10/21/1999) ---            Current Immunizations     Influenza Vaccine Adult HD 11/1/2016      Below and/or attached are the medications your provider expects you to take. Review all of your home medications and newly ordered medications with your provider and/or pharmacist. Follow medication instructions as directed by your provider and/or pharmacist. Please keep your medication list with you and share with your provider. Update the information when medications are discontinued, doses are changed, or new medications (including over-the-counter products) are added; and carry medication information at all times in the event of emergency situations     Allergies:  No Known Allergies          Medications  Valid as of: May 03, 2017 -  2:04 PM    Generic Name Brand Name Tablet Size Instructions for use    Carbidopa-Levodopa (Cap CR) Carbidopa-Levodopa ER 36. MG Take 1 Tab by mouth 3 times a day.        Carbidopa-Levodopa (Tab CR) SINEMET CR  MG Take 1 Tab by mouth every bedtime.        Carbidopa-Levodopa (Cap CR) Carbidopa-Levodopa ER 48. MG Take 1 Tab  by mouth 3 times a day.        Citalopram Hydrobromide (Tab) CELEXA 20 MG Take 1 Tab by mouth every day.        LevETIRAcetam (Tab) KEPPRA 250 MG Take 0.5 Tabs by mouth 2 times a day.        Memantine HCl (Tab) NAMENDA 5 MG Take 1 Tab by mouth 2 times a day.        Memantine HCl (Tab) NAMENDA 5 MG TAKE 1 TAB BY MOUTH 2 TIMES A DAY.        Memantine HCl (Tab) NAMENDA 5 MG TAKE 1 TAB BY MOUTH 2 TIMES A DAY.        Triazolam (Tab) HALCION 0.125 MG Take 1 Tab by mouth at bedtime as needed.        .                 Medicines prescribed today were sent to:     [facility] - [place], [place] - [address]    [address] [facility] [facility] [place] [address]    Phone: [phone] Fax: [phone]    Open 24 Hours?: No      Medication refill instructions:       If your prescription bottle indicates you have medication refills left, it is not necessary to call your provider’s office. Please contact your pharmacy and they will refill your medication.    If your prescription bottle indicates you do not have any refills left, you may request refills at any time through one of the following ways: The online [employer] system (except Urgent Care), by calling your provider’s office, or by asking your pharmacy to contact your provider’s office with a refill request. Medication refills are processed only during regular business hours and may not be available until the next business day. Your provider may request additional information or to have a follow-up visit with you prior to refilling your medication.   *Please Note: Medication refills are assigned a new Rx number when refilled electronically. Your pharmacy may indicate that no refills were authorized even though a new prescription for the same medication is available at the pharmacy. Please request the medicine by name with the pharmacy before contacting your provider for a refill.        Your To Do List     Future Labs/Procedures Complete By Expires    COMP METABOLIC PANEL  As directed  5/4/2018    OCCULT BLOOD FECES IMMUNOASSAY  As directed 5/3/2018    TSH  As directed 5/4/2018    VITAMIN 1,25 DIHYDROXY  As directed 5/4/2018         MyChart Access Code: Activation code not generated  Current VideoNot.es Status: Active

## 2017-05-03 NOTE — PROGRESS NOTES
Subjective:      Rafael Felix is a 76 y.o. male who presents with Follow-Up and Medication Management            HPI Rafael Felix is brought in today by his wife after a fall in January as well as numerous other issues.      1. Compression fracture of thoracic vertebra, sequela  Patient had a fall in January for which he went to a hospital in [place] and they do bring records showing severe compression fracture of T11 and moderate fracture of T12. These were considered stable as compared to previous exams. He recently saw Dr. Lezama, local pain management, for this and apparently a MRI of the spine has been ordered and they will be following back with him for treatment. He is not complaining of back pain in the office but apparently it occurs mostly with bending and prolonged sitting.    2. Vitamin D deficiency disease  Patient has history of vitamin D deficiency for which he is on over-the-counter vitamin D.    3. Insomnia, unspecified type  Patient and his wife are requesting refills on Halcion which he has been using for years for sleep. He cannot sleep without the medicine and does not want to wean off it.    4. Recurrent major depressive disorder, in full remission (CMS-HCC)  Patient is currently on Celexa through his previous PCP and now needs a refill from me. His wife feels it helps keep his mood stable.    5. Parkinson's disease (CMS-[facility])  Patient follows with neurology who has been providing his Sinemet.    6. Myelodysplastic syndrome (CMS-[facility])  Patient continues to follow with neurology.    7. Lack of energy  This is a recurring complaint of patient and he had an echocardiogram last year which showed no decreased ejection fraction or severe heart disease. Carotid ultrasound showed no blockage in flow. His most recent TSH and CMP was normal. His wife does not think he has sleep apnea symptoms.        Current Outpatient Prescriptions   Medication Sig Dispense Refill   • triazolam (HALCION) 0.125 MG  "tablet Take 1 Tab by mouth at bedtime as needed. 30 Tab 5   • citalopram (CELEXA) 20 MG Tab Take 1 Tab by mouth every day. 30 Tab 11   • memantine (NAMENDA) 5 MG Tab TAKE 1 TAB BY MOUTH 2 TIMES A DAY. 180 Tab 1   • memantine (NAMENDA) 5 MG Tab TAKE 1 TAB BY MOUTH 2 TIMES A DAY. 60 Tab 3   • Carbidopa-Levodopa ER 48. MG Cap CR Take 1 Tab by mouth 3 times a day. 90 Cap 6   • levetiracetam (KEPPRA) 250 MG tablet Take 0.5 Tabs by mouth 2 times a day. 30 Tab 4   • carbidopa-levodopa SR (SINEMET CR)  MG per tablet Take 1 Tab by mouth every bedtime. 60 Tab 5   • Carbidopa-Levodopa ER 36. MG Cap CR Take 1 Tab by mouth 3 times a day. 90 Cap 6   • memantine (NAMENDA) 5 MG Tab Take 1 Tab by mouth 2 times a day. 60 Tab 6     No current facility-administered medications for this visit.     Social History   Substance Use Topics   • Smoking status: Never Smoker    • Smokeless tobacco: Never Used   • Alcohol Use: Yes      Comment: rare     Family History   Problem Relation Age of Onset   • Heart Disease Mother    • Lung Disease Mother    • Cancer Father      pancreatic   History reviewed. No pertinent past medical history.    Review of Systems   Constitutional: Positive for malaise/fatigue.   Musculoskeletal: Positive for back pain.   All other systems reviewed and are negative.         Objective:     /78 mmHg  Pulse 77  Temp(Src) 36.8 °C (98.2 °F)  Resp 16  Ht 1.905 m (6' 3\")  Wt 87.998 kg (194 lb)  BMI 24.25 kg/m2  SpO2 98%     Physical Exam   Constitutional: He is oriented to person, place, and time. He appears well-developed and well-nourished. No distress.   HENT:   Head: Normocephalic and atraumatic.   Right Ear: External ear normal.   Left Ear: External ear normal.   Nose: Nose normal.   Mouth/Throat: Oropharynx is clear and moist.   Eyes: Conjunctivae are normal. Right eye exhibits no discharge. Left eye exhibits no discharge.   Neck: Normal range of motion. Neck supple. No tracheal deviation " present. No thyromegaly present.   Cardiovascular: Normal rate, regular rhythm and normal heart sounds.    No murmur heard.  Pulmonary/Chest: Effort normal and breath sounds normal. No respiratory distress. He has no wheezes. He has no rales.   Musculoskeletal:   Patient is stiff with standing and walking and uses a quad cane for this.   Lymphadenopathy:     He has no cervical adenopathy.   Neurological: He is alert and oriented to person, place, and time. Coordination normal.   Skin: Skin is warm and dry. No rash noted. He is not diaphoretic. No erythema.   Psychiatric: He has a normal mood and affect. His behavior is normal. Judgment and thought content normal.   Patient's wife is providing most of the information in the office today because of patient's decreased memory   Nursing note and vitals reviewed.              Assessment/Plan:     1. Compression fracture of thoracic vertebra, sequela  Patient will be following with Dr. Lezama after he does his MRI of the spine and I requested that she have him send me copies of consult notes. We also are trying to get his records from his previous PCP.    2. Vitamin D deficiency disease  I will check to see if he is getting adequate vitamin D replacement. Previous B12 levels were good.  - VITAMIN 1,25 DIHYDROXY; Future    3. Insomnia, unspecified type  Patient and his wife wish him to continue on this medication and do not wish to wean off it.  - triazolam (HALCION) 0.125 MG tablet; Take 1 Tab by mouth at bedtime as needed.  Dispense: 30 Tab; Refill: 5    4. Recurrent major depressive disorder, in full remission (CMS-HCC)  I will take over prescribing his Celexa.  - citalopram (CELEXA) 20 MG Tab; Take 1 Tab by mouth every day.  Dispense: 30 Tab; Refill: 11    5. Parkinson's disease (CMS-[facility])  Patient will continue to follow with neurology.    6. Myelodysplastic syndrome (CMS-[facility])  Patient follows with neurology.    7. Lack of energy  Patient does not want to do a sleep  study and I will again check for hypothyroidism, diabetes, anemia, kidney or liver disease.  - TSH; Future  - COMP METABOLIC PANEL; Future  - CBC WITHOUT DIFFERENTIAL    8. Screen for colon cancer    - OCCULT BLOOD FECES IMMUNOASSAY; Future

## 2017-05-03 NOTE — Clinical Note
[facility]  MERI Casarez  [address] [facility] [address]  [place] [address]  Fax: [phone]   Authorization for Release/Disclosure of   Protected Health Information   Name: RAFAEL GAUTAM : 1940 SSN: [id number]   Address: [place] [address] Phone:    [phone] (home)    I authorize the entity listed below to release/disclose the PHI below to:   [facility]/MERI Casarez and MERI Casarez   Provider or Entity Name:     Address   City, State, [facility]   Phone:      Fax:     Reason for request: continuity of care   Information to be released:    [  ] LAST COLONOSCOPY,  including any PATH REPORT and follow-up  [  ] LAST FIT/COLOGUARD RESULT [  ] LAST DEXA  [  ] LAST MAMMOGRAM  [  ] LAST PAP  [  ] LAST LABS [  ] RETINA EXAM REPORT  [  ] IMMUNIZATION RECORDS  [  ] Release all info      [  ] Check here and initial the line next to each item to release ALL health information INCLUDING  _____ Care and treatment for drug and / or alcohol abuse  _____ HIV testing, infection status, or AIDS  _____ Genetic Testing    DATES OF SERVICE OR TIME PERIOD TO BE DISCLOSED: _____________  I understand and acknowledge that:  * This Authorization may be revoked at any time by you in writing, except if your health information has already been used or disclosed.  * Your health information that will be used or disclosed as a result of you signing this authorization could be re-disclosed by the recipient. If this occurs, your re-disclosed health information may no longer be protected by State or Federal laws.  * You may refuse to sign this Authorization. Your refusal will not affect your ability to obtain treatment.  * This Authorization becomes effective upon signing and will  on (date) __________.      If no date is indicated, this Authorization will  one (1) year from the signature date.    Name: Rafael Gautam    Signature:   Date:     5/3/2017       PLEASE FAX REQUESTED RECORDS  BACK TO: [phone]

## 2017-05-08 ENCOUNTER — TELEPHONE (OUTPATIENT)
Dept: MEDICAL GROUP | Age: 77
End: 2017-05-08

## 2017-05-08 ENCOUNTER — HOSPITAL ENCOUNTER (OUTPATIENT)
Dept: LAB | Facility: MEDICAL CENTER | Age: 77
End: 2017-05-08
Attending: NURSE PRACTITIONER
Payer: MEDICARE

## 2017-05-08 ENCOUNTER — HOSPITAL ENCOUNTER (OUTPATIENT)
Dept: RADIOLOGY | Facility: MEDICAL CENTER | Age: 77
End: 2017-05-08
Attending: SPECIALIST
Payer: MEDICARE

## 2017-05-08 DIAGNOSIS — G20.A1 PARKINSON'S DISEASE: ICD-10-CM

## 2017-05-08 DIAGNOSIS — E55.9 VITAMIN D DEFICIENCY DISEASE: ICD-10-CM

## 2017-05-08 DIAGNOSIS — R53.83 LACK OF ENERGY: ICD-10-CM

## 2017-05-08 DIAGNOSIS — D46.9 MYELODYSPLASTIC SYNDROME (HCC): ICD-10-CM

## 2017-05-08 DIAGNOSIS — M48.56XA COLLAPSE OF LUMBAR VERTEBRA, INITIAL ENCOUNTER (HCC): ICD-10-CM

## 2017-05-08 LAB
ALBUMIN SERPL BCP-MCNC: 4.6 G/DL (ref 3.2–4.9)
ALBUMIN/GLOB SERPL: 1.4 G/DL
ALP SERPL-CCNC: 77 U/L (ref 30–99)
ALT SERPL-CCNC: 7 U/L (ref 2–50)
ANION GAP SERPL CALC-SCNC: 7 MMOL/L (ref 0–11.9)
AST SERPL-CCNC: 23 U/L (ref 12–45)
BASOPHILS # BLD AUTO: 1.5 % (ref 0–1.8)
BASOPHILS # BLD: 0.05 K/UL (ref 0–0.12)
BILIRUB SERPL-MCNC: 0.8 MG/DL (ref 0.1–1.5)
BUN SERPL-MCNC: 27 MG/DL (ref 8–22)
CALCIUM SERPL-MCNC: 10.2 MG/DL (ref 8.5–10.5)
CHLORIDE SERPL-SCNC: 106 MMOL/L (ref 96–112)
CO2 SERPL-SCNC: 28 MMOL/L (ref 20–33)
CREAT SERPL-MCNC: 0.99 MG/DL (ref 0.5–1.4)
EOSINOPHIL # BLD AUTO: 0.05 K/UL (ref 0–0.51)
EOSINOPHIL NFR BLD: 1.5 % (ref 0–6.9)
ERYTHROCYTE [DISTWIDTH] IN BLOOD BY AUTOMATED COUNT: 47.4 FL (ref 35.9–50)
GFR SERPL CREATININE-BSD FRML MDRD: >60 ML/MIN/1.73 M 2
GLOBULIN SER CALC-MCNC: 3.4 G/DL (ref 1.9–3.5)
GLUCOSE SERPL-MCNC: 127 MG/DL (ref 65–99)
HCT VFR BLD AUTO: 46.4 % (ref 42–52)
HGB BLD-MCNC: 15.3 G/DL (ref 14–18)
IMM GRANULOCYTES # BLD AUTO: 0.01 K/UL (ref 0–0.11)
IMM GRANULOCYTES NFR BLD AUTO: 0.3 % (ref 0–0.9)
LYMPHOCYTES # BLD AUTO: 1.05 K/UL (ref 1–4.8)
LYMPHOCYTES NFR BLD: 32.2 % (ref 22–41)
MCH RBC QN AUTO: 28.5 PG (ref 27–33)
MCHC RBC AUTO-ENTMCNC: 33 G/DL (ref 33.7–35.3)
MCV RBC AUTO: 86.4 FL (ref 81.4–97.8)
MONOCYTES # BLD AUTO: 0.76 K/UL (ref 0–0.85)
MONOCYTES NFR BLD AUTO: 23.3 % (ref 0–13.4)
NEUTROPHILS # BLD AUTO: 1.34 K/UL (ref 1.82–7.42)
NEUTROPHILS NFR BLD: 41.2 % (ref 44–72)
NRBC # BLD AUTO: 0.02 K/UL
NRBC BLD AUTO-RTO: 0.6 /100 WBC
PLATELET # BLD AUTO: 48 K/UL (ref 164–446)
POTASSIUM SERPL-SCNC: 4.2 MMOL/L (ref 3.6–5.5)
PROT SERPL-MCNC: 8 G/DL (ref 6–8.2)
RBC # BLD AUTO: 5.37 M/UL (ref 4.7–6.1)
SODIUM SERPL-SCNC: 141 MMOL/L (ref 135–145)
TSH SERPL DL<=0.005 MIU/L-ACNC: 2.59 UIU/ML (ref 0.3–3.7)
VIT B12 SERPL-MCNC: >1500 PG/ML (ref 211–911)
WBC # BLD AUTO: 3.3 K/UL (ref 4.8–10.8)

## 2017-05-08 PROCEDURE — 72148 MRI LUMBAR SPINE W/O DYE: CPT

## 2017-05-09 NOTE — TELEPHONE ENCOUNTER
Received critical call from lab as patient's platelet count was 48 that decreased from 72 on 8/1/16. He has known myelodysplastic syndrome. I called and discussed with patient and wife regarding critical lab. Patient has severe hearing loss. So patient requested to talk to his wife for his medical condition. He is not having any active bleeding currently. Patient follows with Dr Jacobsen, hematologist-oncologist for that. He has last visit with Dr Jacobsen in December 2016. Patient's wife stated that patient has appointment with dermatologist next week for basal cell remove. I advised patient and wife to follow up with Dr. Jacobsen for myelodysplastic syndrome and possible treatment option with platelet count drop from baseline. They are advised to call PCP office to repeat CBC in 2 weeks for follow up on platelet count. It is advised to reschedule basal cell removal with dermatologist until platelet count improve and steady around 50 K or above. He is okay to do minial procedure if platelet count does not drop. Patient and wife agree with the plan.    Richelle Adamson M.D.

## 2017-05-10 LAB — 1,25(OH)2D3 SERPL-MCNC: 59.5 PG/ML (ref 19.9–79.3)

## 2017-06-13 ENCOUNTER — OFFICE VISIT (OUTPATIENT)
Dept: NEUROLOGY | Facility: MEDICAL CENTER | Age: 77
End: 2017-06-13
Payer: MEDICARE

## 2017-06-13 ENCOUNTER — HOSPITAL ENCOUNTER (OUTPATIENT)
Dept: LAB | Facility: MEDICAL CENTER | Age: 77
End: 2017-06-13
Attending: NURSE PRACTITIONER
Payer: MEDICARE

## 2017-06-13 VITALS
OXYGEN SATURATION: 97 % | DIASTOLIC BLOOD PRESSURE: 72 MMHG | WEIGHT: 191 LBS | HEIGHT: 75 IN | BODY MASS INDEX: 23.75 KG/M2 | TEMPERATURE: 98.1 F | SYSTOLIC BLOOD PRESSURE: 114 MMHG | HEART RATE: 71 BPM

## 2017-06-13 DIAGNOSIS — D46.9 MYELODYSPLASTIC SYNDROME (HCC): ICD-10-CM

## 2017-06-13 DIAGNOSIS — R53.83 LACK OF ENERGY: ICD-10-CM

## 2017-06-13 DIAGNOSIS — G20.A1 PARKINSON'S DISEASE: ICD-10-CM

## 2017-06-13 DIAGNOSIS — F02.80 DEMENTIA ASSOCIATED WITH OTHER UNDERLYING DISEASE WITHOUT BEHAVIORAL DISTURBANCE (HCC): ICD-10-CM

## 2017-06-13 LAB
BASOPHILS # BLD AUTO: 0.5 % (ref 0–1.8)
BASOPHILS # BLD: 0.03 K/UL (ref 0–0.12)
COMMENT 1642: NORMAL
EOSINOPHIL # BLD AUTO: 0.12 K/UL (ref 0–0.51)
EOSINOPHIL NFR BLD: 2 % (ref 0–6.9)
ERYTHROCYTE [DISTWIDTH] IN BLOOD BY AUTOMATED COUNT: 47.7 FL (ref 35.9–50)
GIANT PLATELETS BLD QL SMEAR: NORMAL
HCT VFR BLD AUTO: 44.6 % (ref 42–52)
HGB BLD-MCNC: 14.6 G/DL (ref 14–18)
HYPOCHROMIA BLD QL SMEAR: ABNORMAL
IMM GRANULOCYTES # BLD AUTO: 0.03 K/UL (ref 0–0.11)
IMM GRANULOCYTES NFR BLD AUTO: 0.5 % (ref 0–0.9)
LYMPHOCYTES # BLD AUTO: 0.98 K/UL (ref 1–4.8)
LYMPHOCYTES NFR BLD: 16.1 % (ref 22–41)
MCH RBC QN AUTO: 28.1 PG (ref 27–33)
MCHC RBC AUTO-ENTMCNC: 32.7 G/DL (ref 33.7–35.3)
MCV RBC AUTO: 85.8 FL (ref 81.4–97.8)
MONOCYTES # BLD AUTO: 1.36 K/UL (ref 0–0.85)
MONOCYTES NFR BLD AUTO: 22.4 % (ref 0–13.4)
MORPHOLOGY BLD-IMP: NORMAL
NEUTROPHILS # BLD AUTO: 3.55 K/UL (ref 1.82–7.42)
NEUTROPHILS NFR BLD: 58.5 % (ref 44–72)
NRBC # BLD AUTO: 0 K/UL
NRBC BLD AUTO-RTO: 0 /100 WBC
PLATELET # BLD AUTO: 61 K/UL (ref 164–446)
PLATELET BLD QL SMEAR: NORMAL
POIKILOCYTOSIS BLD QL SMEAR: NORMAL
RBC # BLD AUTO: 5.2 M/UL (ref 4.7–6.1)
RBC BLD AUTO: PRESENT
SCHISTOCYTES BLD QL SMEAR: NORMAL
WBC # BLD AUTO: 6.1 K/UL (ref 4.8–10.8)

## 2017-06-13 PROCEDURE — 36415 COLL VENOUS BLD VENIPUNCTURE: CPT

## 2017-06-13 PROCEDURE — 99214 OFFICE O/P EST MOD 30 MIN: CPT | Performed by: PSYCHIATRY & NEUROLOGY

## 2017-06-13 PROCEDURE — 85025 COMPLETE CBC W/AUTO DIFF WBC: CPT

## 2017-06-13 RX ORDER — MODAFINIL 100 MG/1
100 TABLET ORAL DAILY
Qty: 30 TAB | Refills: 2 | Status: SHIPPED | OUTPATIENT
Start: 2017-06-13 | End: 2017-07-05 | Stop reason: SDUPTHER

## 2017-06-13 RX ORDER — MEMANTINE HYDROCHLORIDE 10 MG/1
10 TABLET ORAL 2 TIMES DAILY
Qty: 60 TAB | Refills: 6 | Status: SHIPPED | OUTPATIENT
Start: 2017-06-13 | End: 2018-01-26 | Stop reason: SDUPTHER

## 2017-06-13 RX ORDER — AMANTADINE HYDROCHLORIDE 100 MG/1
100 CAPSULE, GELATIN COATED ORAL 2 TIMES DAILY
Qty: 60 CAP | Refills: 3 | Status: SHIPPED | OUTPATIENT
Start: 2017-06-13 | End: 2018-01-26 | Stop reason: SDUPTHER

## 2017-06-13 NOTE — PATIENT INSTRUCTIONS
IMPRESSION:    1. Parkinson Disease Stage III-- treated in [facility]  2. Dementia-- deterioration   3. Myelodysplastic syndrome  4. Lack of energy for 3 years-- good days and bad days-- worsened in the last year-- in fact, patient developed one episode of lack of energy in front of me    PLAN/RECOMMENDATIONS:        ________________________________________________________________________        Will up Namenda 10mg two times per day for memory problems  Will up Carbidopa tp  CARBIDOPA-LEVODOPA ER 61. MG PO CPCR         ________________________________________________________________________    Regarding medication for idiopathic lack of energy  The following are some medicine that could be used   1. Coenzyme Q10   2. Amantadine   3. Provigil   4. Medication for ADHD(  I do not recommend though)       This time, we will give prescription for Amantadine 100mg two times per day, Provigil 100mg daily ( please try these separately)    However, lack of energy could be due to many other causes-- such as sleep apnea, sleep deprivation, subtle seizures, multiple sclerosis, depression, heart failure etc ( parkinson disease does not commonly causes pronounced lack of energy though)       Subtle seizures could produce lack of energy, bad days and good days        For reduction of inflammation , the followings are fine with me  ________________________________________________________________________    Fish Oil -- Omega 3 1000mg 3# daily   Daily Probiotic too  Continue Vit D-3 4000 unit daily or above  ________________________________________________________________________     Expand All Collapse All    We could discuss this in next visit   Deep brain stimulator is good for parksinosn   But typically, we prefer to perform DBS on patients who have no major memory problems . YP    E-mail sent to pt. CHRISTIN        ________________________________________________________________________

## 2017-06-13 NOTE — MR AVS SNAPSHOT
"        Rafael Felix   2017 12:00 PM   Office Visit   MRN: [id number]    Department:  Neurology [facility]   Dept Phone:  [phone]    Description:  Male : 1940   Provider:  Popeye Ferguson M.D.           Reason for Visit     Follow-Up Parkinson's      Allergies as of 2017     No Known Allergies      You were diagnosed with     Dementia associated with other underlying disease without behavioral disturbance   [9622830]       Parkinson's disease (CMS-HCC)   [8994243]       Lack of energy   [450586]         Vital Signs     Blood Pressure Pulse Temperature Height Weight Body Mass Index    114/72 mmHg 71 36.7 °C (98.1 °F) 1.905 m (6' 3\") 86.637 kg (191 lb) 23.87 kg/m2    Oxygen Saturation Smoking Status                97% Never Smoker           Basic Information     Date Of Birth Sex Race Ethnicity Preferred Language    1940 Male White Non- English      Your appointments     Oct 31, 2017 11:40 AM   Follow Up Visit with Popeye Ferguson M.D.   [facility] (--)    [address], [address]  [facility] [id number] [phone]           You will be receiving a confirmation call a few days before your appointment from our automated call confirmation system.            2017  2:20 PM   Established Patient with MERI Casarez   [facility] [address]    [address] [facility]  [address]  [facility] [id number] [phone]           You will be receiving a confirmation call a few days before your appointment from our automated call confirmation system.              Problem List              ICD-10-CM Priority Class Noted - Resolved    Parkinson's disease (CMS-HCC) G20   2016 - Present    Myelodysplastic syndrome (CMS-HCC) D46.9   2016 - Present    Insomnia G47.00   2016 - Present    S/P splenectomy Z90.81   2016 - Present    Lack of energy R53.83   2016 - Present    Compression fracture of thoracic vertebra (CMS-[facility]) S22.000A   " 5/3/2017 - Present    Vitamin D deficiency disease E55.9   5/3/2017 - Present    Recurrent major depressive disorder, in full remission (CMS-[facility]) F33.42   5/3/2017 - Present      Health Maintenance        Date Due Completion Dates    IMM DTaP/Tdap/Td Vaccine (1 - Tdap) 10/21/1958 ---    IMM PNEUMOCOCCAL 65+ (ADULT) HIGH/HIGHEST RISK SERIES (1 of 2 - PCV13) 10/21/2004 ---    IMM ZOSTER VACCINE 5/27/2020 (Originally 10/21/1999) ---            Current Immunizations     Influenza Vaccine Adult HD 11/1/2016      Below and/or attached are the medications your provider expects you to take. Review all of your home medications and newly ordered medications with your provider and/or pharmacist. Follow medication instructions as directed by your provider and/or pharmacist. Please keep your medication list with you and share with your provider. Update the information when medications are discontinued, doses are changed, or new medications (including over-the-counter products) are added; and carry medication information at all times in the event of emergency situations     Allergies:  No Known Allergies          Medications  Valid as of: June 13, 2017 - 12:33 PM    Generic Name Brand Name Tablet Size Instructions for use    Amantadine HCl (Cap) SYMMETREL 100 MG Take 1 Cap by mouth 2 times a day.        Carbidopa-Levodopa (Tab CR) SINEMET CR  MG Take 1 Tab by mouth every bedtime.        Carbidopa-Levodopa (Cap CR) Carbidopa-Levodopa ER 48. MG Take 1 Tab by mouth 3 times a day.        Carbidopa-Levodopa (Cap CR) Carbidopa-Levodopa ER 61. MG Take 1 Tab by mouth 3 times a day.        Citalopram Hydrobromide (Tab) CELEXA 20 MG Take 1 Tab by mouth every day.        Memantine HCl (Tab) NAMENDA 5 MG Take 1 Tab by mouth 2 times a day.        Memantine HCl (Tab) NAMENDA 10 MG Take 1 Tab by mouth 2 times a day.        Modafinil (Tab) PROVIGIL 100 MG Take 1 Tab by mouth every day.        Triazolam (Tab) HALCION 0.125 MG  Take 1 Tab by mouth at bedtime as needed.        .                 Medicines prescribed today were sent to:     [id number] - [place], [place] - [address]    [address] [place] [address]    Phone: [phone] Fax: [phone]    Open 24 Hours?: No      Medication refill instructions:       If your prescription bottle indicates you have medication refills left, it is not necessary to call your provider’s office. Please contact your pharmacy and they will refill your medication.    If your prescription bottle indicates you do not have any refills left, you may request refills at any time through one of the following ways: The online [employer] system (except Urgent Care), by calling your provider’s office, or by asking your pharmacy to contact your provider’s office with a refill request. Medication refills are processed only during regular business hours and may not be available until the next business day. Your provider may request additional information or to have a follow-up visit with you prior to refilling your medication.   *Please Note: Medication refills are assigned a new Rx number when refilled electronically. Your pharmacy may indicate that no refills were authorized even though a new prescription for the same medication is available at the pharmacy. Please request the medicine by name with the pharmacy before contacting your provider for a refill.        Instructions      IMPRESSION:    1. Parkinson Disease Stage III-- treated in [facility]  2. Dementia-- deterioration   3. Myelodysplastic syndrome  4. Lack of energy for 3 years-- good days and bad days-- worsened in the last year-- in fact, patient developed one episode of lack of energy in front of me    PLAN/RECOMMENDATIONS:        ________________________________________________________________________        Will up Namenda 10mg two times per day for memory problems  Will up Carbidopa tp  CARBIDOPA-LEVODOPA ER 61. MG PO CPCR                  ________________________________________________________________________    Regarding medication for idiopathic lack of energy  The following are some medicine that could be used   1. Coenzyme Q10   2. Amantadine   3. Provigil   4. Medication for ADHD(  I do not recommend though)       This time, we will give prescription for Amantadine 100mg two times per day, Provigil 100mg daily ( please try these separately)    However, lack of energy could be due to many other causes-- such as sleep apnea, sleep deprivation, subtle seizures, multiple sclerosis, depression, heart failure etc ( parkinson disease does not commonly causes pronounced lack of energy though)       Subtle seizures could produce lack of energy, bad days and good days        For reduction of inflammation , the followings are fine with me  ________________________________________________________________________    Fish Oil -- Omega 3 1000mg 3# daily   Daily Probiotic too  Continue Vit D-3 4000 unit daily or above  ________________________________________________________________________     Expand All Collapse All    We could discuss this in next visit   Deep brain stimulator is good for parksinosn   But typically, we prefer to perform DBS on patients who have no major memory problems . YP    E-mail sent to velia WALLER        ________________________________________________________________________            MyChart Access Code: Activation code not generated  Current Backup Circlet Status: Active

## 2017-06-13 NOTE — PROGRESS NOTES
NEUROLOGY NOTE    Referring Physician  Wild Sherman      CHIEF COMPLAINT:  Retired at the age of 63YO  Noticed to have cognitive problems for the 5 years  Chief Complaint   Patient presents with   • Follow-Up     Parkinson's       PRESENT ILLNESS:   Retired at the age of 63YO  Noticed to have cognitive problems for the 5 years    Used to teach in college     He was diagnosed as parkinson disease in [facility]-- diagnosed 5 years ago-- initially in [place]--- was put on Sinemet for 5 years      PAST MEDICAL HISTORY:  No past medical history on file.    PAST SURGICAL HISTORY:  Past Surgical History   Procedure Laterality Date   • Splenectomy     • Knee arthroscopy     • Prostatectomy, radial       subtotal   • Rotator cuff repair       right   • Cataract extraction with iol         FAMILY HISTORY:  Family History   Problem Relation Age of Onset   • Heart Disease Mother    • Lung Disease Mother    • Cancer Father      pancreatic       SOCIAL HISTORY:  Social History     Social History   • Marital Status:      Spouse Name: N/A   • Number of Children: N/A   • Years of Education: N/A     Occupational History   • Not on file.     Social History Main Topics   • Smoking status: Never Smoker    • Smokeless tobacco: Never Used   • Alcohol Use: Yes      Comment: rare   • Drug Use: No   • Sexual Activity: Not on file     Other Topics Concern   • Not on file     Social History Narrative     ALLERGIES:  No Known Allergies  TOBHX  History   Smoking status   • Never Smoker    Smokeless tobacco   • Never Used     ALCHX  History   Alcohol Use   • Yes     Comment: rare     DRUGHX  History   Drug Use No           MEDICATIONS:  Current Outpatient Prescriptions   Medication   • triazolam (HALCION) 0.125 MG tablet   • citalopram (CELEXA) 20 MG Tab   • memantine (NAMENDA) 5 MG Tab   • memantine (NAMENDA) 5 MG Tab   • Carbidopa-Levodopa ER 48. MG Cap CR   • levetiracetam (KEPPRA) 250 MG tablet   • carbidopa-levodopa SR  "(SINEMET CR)  MG per tablet   • Carbidopa-Levodopa ER 36. MG Cap CR   • memantine (NAMENDA) 5 MG Tab     No current facility-administered medications for this visit.       REVIEW OF SYSTEM:    Constitutional: Denies fevers, Denies weight changes   Eyes: Denies changes in vision, no eye pain   Ears/Nose/Throat/Mouth: Denies nasal congestion or sore throat   Cardiovascular: Denies chest pain or palpitations   Respiratory: Denies SOB.   Gastrointestinal/Hepatic: Denies abdominal pain, nausea, vomiting, diarrhea, constipation or GI bleeding   Genitourinary: Denies bladder dysfunction, dysuria or frequency   Musculoskeletal/Rheum: Denies joint pain and swelling   Skin/Breast: Denies rash, denies breast lumps or discharge   Neurological: Parkinson Disease, Dementia  Psychiatric: denies mood disorder   Endocrine: denies hx of diabetes or thyroid dysfunction   Heme/Oncology/Lymph Nodes: Denies enlarged lymph nodes, denies brusing or known bleeding disorder   Allergic/Immunologic: Denies hx of allergies         PHYSICAL AND NEUROLOGICAL EXMAINATIONS:  VITAL SIGNS: Ht 1.905 m (6' 3\")  CURRENT WEIGHT:   BMI: There is no weight on file to calculate BMI.  PREVIOUS WEIGHTS:  Wt Readings from Last 25 Encounters:   05/03/17 87.998 kg (194 lb)   02/01/17 87.998 kg (194 lb)   12/14/16 89.177 kg (196 lb 9.6 oz)   09/28/16 88.905 kg (196 lb)   07/20/16 85.73 kg (189 lb)       General appearance of patient: WDWN(+) NAD(+)    EYES  o Fundus : Papilledem(-) Exudates(-) Hemorrhage(-)  Nervous System  Orientation to time, place and person(+)  Memory normal(-)  Language: aphasia(-)  Knowledge: past(+) Current(+)  Attention(+)  Cranial Nerves  • Nerve 2: intact  • Nerve 3,4,6: intact  • Nerve 5 : intact  • Nerve 7: intact  • Nerve 8: intact  • Nerve 9 & 10: intact  • Nerve 11: intact  • Nerve 12: intact  Muscle Power and muscle tone: symmetric, normal in upper and lower  Sensory System: Pin sensation intact(+)  Reflexes: symmetric " throughout  Cerebellar Function FNP normal   Gait : Steady(-) walked with cane  Heart and Vascular  Peripheral Vasucular system : Edema (-) Swelling(-)  RHB, Breathing sound clear  abdomen bowel sound normoactive  Extremities freely moveable  Joints no contracture       NEUROIMAGING: I reviewed the MRI/CT of brain     Registration 3/3  Recall 3/3       Pt is sending this message via [facility] e-mail. Please advise. Thank you. KA      Dr. Ferguson - I have been taking the increased strength of Rytary since you prescribed it but feel worse than ever. I have no energy and my balance is not good. I think I should revert to the original strength but the literature from the pharmacy indicates that lowering the dosage should be done slowly. What do you recommend?  I've discontinued Azilect as it doesn't seem to help at all.     Jamie Felix              In the past, CARBIDOPA-LEVODOPA ER 48. MG PO CPCR three times per day did not make him feel good  Now he is on Carbidopa-Levodopa ER 36. MG Cap CR  But now he is OK with Carbidopa 48.75    Recall 2/3    Could not tolerate low dose Keppra   Keppra 125mg before sleep for one week, then 125mg two times per day    IMPRESSION:    1. Parkinson Disease Stage III-- treated in [facility]  2. Dementia-- deterioration   3. Myelodysplastic syndrome  4. Lack of energy for 3 years-- good days and bad days-- worsened in the last year-- in fact, patient developed one episode of lack of energy in front of me    PLAN/RECOMMENDATIONS:        ________________________________________________________________________        Will up Namenda 10mg two times per day for memory problems  Will up Carbidopa tp  CARBIDOPA-LEVODOPA ER 61. MG PO CPCR         ________________________________________________________________________    Regarding medication for idiopathic lack of energy  The following are some medicine that could be used   1. Coenzyme Q10   2. Amantadine   3. Provigil   4. Medication  for ADHD(  I do not recommend though)       This time, we will give prescription for Amantadine 100mg two times per day, Provigil 100mg daily ( please try these separately)    However, lack of energy could be due to many other causes-- such as sleep apnea, sleep deprivation, subtle seizures, multiple sclerosis, depression, heart failure etc ( parkinson disease does not commonly causes pronounced lack of energy though)       Subtle seizures could produce lack of energy, bad days and good days        For reduction of inflammation , the followings are fine with me  ________________________________________________________________________    Fish Oil -- Omega 3 1000mg 3# daily   Daily Probiotic too  Continue Vit D-3 4000 unit daily or above  ________________________________________________________________________     Expand All Collapse All    We could discuss this in next visit   Deep brain stimulator is good for parksinosn   But typically, we prefer to perform DBS on patients who have no major memory problems . [facility]    E-mail sent to velia WALLER        ________________________________________________________________________      MRI of brain         SIGNATURE:  Popeye Ferguson      CC:  Wild Sherman, A.P.N.    INTERPRETATION:  This EEG denotes of potential focal cortical irritability    more over the left frontal temporal region with potential focal irritability    over the left hemisphere as well.       ____________________________________     MD [facility] STUBBS / ABISAI    DD:  12/07/2016 09:49:54  DT:  12/07/2016 10:01:27    D#:  [id number]  Job#:  [id number]        ________________________________________________________________________    Guidelines for Referring Neurologists: Deep Brain Stimulation for Parkinson's Disease    Who is a good candidate for deep brain stimulation for Parkinson's Disease? Guidelines for referring neurologists:     The criteria that we use for offering patients DBS surgery for Parkinson's disease are as  "follows:    1.      Clear diagnosis of idiopathic PD. Patients with atypical parkinsonism or \"parkinson's plus\" syndromes do not respond to DBS. If there are features in the history and physical that are suggestive of atypical parkinsonism (such as very rapid progression of symptoms, autonomic failure or postural instability as early features of the disease, signs of cerebellar or pyramidal dysfunction) or an MRI suggesting an atypical syndrome, surgery is contraindicated.     2.      Intact cognitive function. A good screening test is the mini-mental status test. A score of >26 is ideal, < 24 an absolute contraindication. Patients with cognitive dysfunction have difficulty tolerating awake surgery, may have permanent worsening of cognitive function postoperatively, deal poorly with the intrinsic complexity of DBS therapy, and realize little overall functional gain even if motor performance is improved. In borderline cases, we obtain formal neuropsychological evaluation.     3.     Clear evidence of motor improvement with sinemet, with good motor function in the best on-medication state. A good screening test is the Unified Parkinson's Disease Rating Scale (UPDRS) part III, performed in 12 hours off of medication and repeated following a supratherapeutic sinemet dose. We require at least a 30% improvement in this score with sinemet. The patient should be ambulatory in the best on state without much assistance. In general surgery makes the \"off\" states more like the \"on\" states but rarely does better than the best \"on\" state, so a patient with poor function in best \"on\" (for example, nonambulatory in best \"on\") is a poor surgical candidate. Patients who fluctuate between good motor function while \"on\" and poor motor function while \"off\" are usually good surgical candidates.      4.     Lack of comorbidity. Serious cardiac disease, uncontrolled hypertension, or any major other chronic systemic illness increases the " "risk and decreases the benefit of surgery.      5.     Realistic expectations. People who expect a sudden miracle are disappointed with the results, and become frustrated with the complexity of the therapy.     6.     Patient age. The benefits of DBS for PD decline with advancing age, and the risks go up. Patients over 75 are informed that their benefits are likely to be modest. We have rarely implanted PD patients who are over 80.     7.     Screening MRI of the brain should be free of severe vascular disease, atrophy that is out of proportion to age, or signs of atypical parkinsonism.     8.     Degree of disability. DBS is a poor procedure to rescue someone with end stage PD, although these are the most desperate patients. It is also not appropriate for early PD when the symptoms are very well controlled on medical therapy. Patients should have an off-medication UPDRS-III score of > 25. The best time to intervene surgically is when the patient is just beginning to lose the ability to perform activities meaningful to him/her, in spite of optimal medical therapy. Often, this is associated with the development of significant motor fluctuations, dyskinesias, or both. In a patient who is still working, the time to intervene is before the patient is forced to retire on disability.      9.     Ability to remain calm and cooperative during awake neurosurgery lasting about 2-3 hours per side of brain. A helpful \"screening test\" for this is how well the patient tolerates an MRI scan. For patients who are otherwise excellent candidates but could not tolerate being awake for part of the surgery, it is possible to have the DBS implantation under general anesthesia in our interventional MRI suite.      10.  Willingness and ability to be seen for follow-up visits. Programming the DBS to find the optimal stimulation settings is very much a trial-and-error process, and the patient will need to be seen approximately once a month " for at least the first few months after surgery.      [email or web address]  ________________________________________________________________________

## 2017-06-14 ENCOUNTER — TELEPHONE (OUTPATIENT)
Dept: HEMATOLOGY ONCOLOGY | Facility: MEDICAL CENTER | Age: 77
End: 2017-06-14

## 2017-06-14 NOTE — TELEPHONE ENCOUNTER
Spoke to wife and she will call back to schedule for a day they were in [place]. Oncology: ref by Wild Sherman, Dx: Myelodysplastic Syndrome

## 2017-06-28 ENCOUNTER — TELEPHONE (OUTPATIENT)
Dept: NEUROLOGY | Facility: MEDICAL CENTER | Age: 77
End: 2017-06-28

## 2017-07-05 ENCOUNTER — TELEPHONE (OUTPATIENT)
Dept: NEUROLOGY | Facility: MEDICAL CENTER | Age: 77
End: 2017-07-05

## 2017-07-05 DIAGNOSIS — F02.80 DEMENTIA ASSOCIATED WITH OTHER UNDERLYING DISEASE WITHOUT BEHAVIORAL DISTURBANCE (HCC): ICD-10-CM

## 2017-07-05 DIAGNOSIS — G20.A1 PARKINSON'S DISEASE: ICD-10-CM

## 2017-07-05 DIAGNOSIS — R53.83 LACK OF ENERGY: ICD-10-CM

## 2017-07-05 RX ORDER — MODAFINIL 200 MG/1
200 TABLET ORAL DAILY
Qty: 30 TAB | Refills: 5 | Status: SHIPPED | OUTPATIENT
Start: 2017-07-05 | End: 2018-01-26 | Stop reason: SDUPTHER

## 2017-07-05 NOTE — TELEPHONE ENCOUNTER
Pt is sending this message via [employer] e-mail. Please advise. Thank you.    Prescription Question        From     Rafael Felix      To     Neurology [facility]      Sent     7/5/2017  9:50 AM            Dr. Ferguson - I have been taking Modafinil 100 mg, which you prescribed for me last month, and find it has helped my level of energy. However, the effect of it subsides after about four hours. I am only taking it once a day. Can I take it twice a day, or should the strength of it be increased? ThanksJamie

## 2017-07-05 NOTE — TELEPHONE ENCOUNTER
Popeye Ferguson M.D.  Rona Nicholas, Med Ass't        Caller: Unspecified (Today, 9:58 AM)                     Ok, up to 200mg daily provigil

## 2017-07-06 ENCOUNTER — HOSPITAL ENCOUNTER (OUTPATIENT)
Facility: MEDICAL CENTER | Age: 77
End: 2017-07-06
Attending: NURSE PRACTITIONER
Payer: MEDICARE

## 2017-07-06 PROCEDURE — 82274 ASSAY TEST FOR BLOOD FECAL: CPT

## 2017-07-07 DIAGNOSIS — Z12.11 SCREEN FOR COLON CANCER: ICD-10-CM

## 2017-07-07 LAB — HEMOCCULT STL QL IA: NEGATIVE

## 2017-08-06 ENCOUNTER — APPOINTMENT (OUTPATIENT)
Dept: RADIOLOGY | Facility: MEDICAL CENTER | Age: 77
DRG: 908 | End: 2017-08-06
Attending: INTERNAL MEDICINE
Payer: MEDICARE

## 2017-08-06 ENCOUNTER — HOSPITAL ENCOUNTER (INPATIENT)
Facility: MEDICAL CENTER | Age: 77
LOS: 3 days | DRG: 908 | End: 2017-08-11
Attending: PHYSICAL MEDICINE & REHABILITATION | Admitting: PHYSICAL MEDICINE & REHABILITATION
Payer: MEDICARE

## 2017-08-06 ENCOUNTER — APPOINTMENT (OUTPATIENT)
Dept: RADIOLOGY | Facility: MEDICAL CENTER | Age: 77
DRG: 908 | End: 2017-08-06
Attending: PHYSICAL MEDICINE & REHABILITATION
Payer: MEDICARE

## 2017-08-06 ENCOUNTER — RESOLUTE PROFESSIONAL BILLING HOSPITAL PROF FEE (OUTPATIENT)
Dept: HOSPITALIST | Facility: MEDICAL CENTER | Age: 77
End: 2017-08-06
Payer: MEDICARE

## 2017-08-06 DIAGNOSIS — R53.81 DEBILITY: ICD-10-CM

## 2017-08-06 DIAGNOSIS — G20.A1 PARKINSON'S DISEASE: ICD-10-CM

## 2017-08-06 DIAGNOSIS — C79.9 METASTATIC CANCER (HCC): ICD-10-CM

## 2017-08-06 DIAGNOSIS — D46.9 MYELODYSPLASTIC SYNDROME (HCC): ICD-10-CM

## 2017-08-06 DIAGNOSIS — S22.000S COMPRESSION FRACTURE OF THORACIC VERTEBRA, SEQUELA: ICD-10-CM

## 2017-08-06 PROBLEM — S32.000A COMPRESSION OF LUMBAR VERTEBRA (HCC): Status: ACTIVE | Noted: 2017-08-06

## 2017-08-06 LAB
ALBUMIN SERPL BCP-MCNC: 3.6 G/DL (ref 3.2–4.9)
ALBUMIN/GLOB SERPL: 1.1 G/DL
ALP SERPL-CCNC: 80 U/L (ref 30–99)
ALT SERPL-CCNC: 5 U/L (ref 2–50)
ANION GAP SERPL CALC-SCNC: 7 MMOL/L (ref 0–11.9)
AST SERPL-CCNC: 30 U/L (ref 12–45)
BASOPHILS # BLD AUTO: 0.2 % (ref 0–1.8)
BASOPHILS # BLD: 0.02 K/UL (ref 0–0.12)
BILIRUB SERPL-MCNC: 1 MG/DL (ref 0.1–1.5)
BUN SERPL-MCNC: 18 MG/DL (ref 8–22)
CALCIUM SERPL-MCNC: 8.9 MG/DL (ref 8.5–10.5)
CHLORIDE SERPL-SCNC: 105 MMOL/L (ref 96–112)
CO2 SERPL-SCNC: 25 MMOL/L (ref 20–33)
CREAT SERPL-MCNC: 0.76 MG/DL (ref 0.5–1.4)
EOSINOPHIL # BLD AUTO: 0.02 K/UL (ref 0–0.51)
EOSINOPHIL NFR BLD: 0.2 % (ref 0–6.9)
ERYTHROCYTE [DISTWIDTH] IN BLOOD BY AUTOMATED COUNT: 51.1 FL (ref 35.9–50)
GFR SERPL CREATININE-BSD FRML MDRD: >60 ML/MIN/1.73 M 2
GLOBULIN SER CALC-MCNC: 3.4 G/DL (ref 1.9–3.5)
GLUCOSE SERPL-MCNC: 130 MG/DL (ref 65–99)
HCT VFR BLD AUTO: 41.1 % (ref 42–52)
HGB BLD-MCNC: 13.7 G/DL (ref 14–18)
IMM GRANULOCYTES # BLD AUTO: 0.2 K/UL (ref 0–0.11)
IMM GRANULOCYTES NFR BLD AUTO: 1.7 % (ref 0–0.9)
LYMPHOCYTES # BLD AUTO: 0.31 K/UL (ref 1–4.8)
LYMPHOCYTES NFR BLD: 2.6 % (ref 22–41)
MCH RBC QN AUTO: 29 PG (ref 27–33)
MCHC RBC AUTO-ENTMCNC: 33.3 G/DL (ref 33.7–35.3)
MCV RBC AUTO: 87.1 FL (ref 81.4–97.8)
MONOCYTES # BLD AUTO: 0.88 K/UL (ref 0–0.85)
MONOCYTES NFR BLD AUTO: 7.4 % (ref 0–13.4)
NEUTROPHILS # BLD AUTO: 10.42 K/UL (ref 1.82–7.42)
NEUTROPHILS NFR BLD: 87.9 % (ref 44–72)
NRBC # BLD AUTO: 0 K/UL
NRBC BLD AUTO-RTO: 0 /100 WBC
PLATELET # BLD AUTO: 76 K/UL (ref 164–446)
PMV BLD AUTO: 13.6 FL (ref 9–12.9)
POTASSIUM SERPL-SCNC: 4.5 MMOL/L (ref 3.6–5.5)
PROT SERPL-MCNC: 7 G/DL (ref 6–8.2)
RBC # BLD AUTO: 4.72 M/UL (ref 4.7–6.1)
SODIUM SERPL-SCNC: 137 MMOL/L (ref 135–145)
TSH SERPL DL<=0.005 MIU/L-ACNC: 1.07 UIU/ML (ref 0.3–3.7)
WBC # BLD AUTO: 11.9 K/UL (ref 4.8–10.8)

## 2017-08-06 PROCEDURE — G0378 HOSPITAL OBSERVATION PER HR: HCPCS

## 2017-08-06 PROCEDURE — 0QU03JZ SUPPLEMENT LUMBAR VERTEBRA WITH SYNTHETIC SUBSTITUTE, PERCUTANEOUS APPROACH: ICD-10-PCS | Performed by: PHYSICAL MEDICINE & REHABILITATION

## 2017-08-06 PROCEDURE — 160028 HCHG SURGERY MINUTES - 1ST 30 MINS LEVEL 3: Performed by: PHYSICAL MEDICINE & REHABILITATION

## 2017-08-06 PROCEDURE — 72080 X-RAY EXAM THORACOLMB 2/> VW: CPT

## 2017-08-06 PROCEDURE — 160039 HCHG SURGERY MINUTES - EA ADDL 1 MIN LEVEL 3: Performed by: PHYSICAL MEDICINE & REHABILITATION

## 2017-08-06 PROCEDURE — 500448 HCHG DRESSING, TELFA 3X4: Performed by: PHYSICAL MEDICINE & REHABILITATION

## 2017-08-06 PROCEDURE — 160035 HCHG PACU - 1ST 60 MINS PHASE I: Performed by: PHYSICAL MEDICINE & REHABILITATION

## 2017-08-06 PROCEDURE — 85025 COMPLETE CBC W/AUTO DIFF WBC: CPT

## 2017-08-06 PROCEDURE — 84443 ASSAY THYROID STIM HORMONE: CPT

## 2017-08-06 PROCEDURE — 0PS43ZZ REPOSITION THORACIC VERTEBRA, PERCUTANEOUS APPROACH: ICD-10-PCS | Performed by: PHYSICAL MEDICINE & REHABILITATION

## 2017-08-06 PROCEDURE — 160036 HCHG PACU - EA ADDL 30 MINS PHASE I: Performed by: PHYSICAL MEDICINE & REHABILITATION

## 2017-08-06 PROCEDURE — 502240 HCHG MISC OR SUPPLY RC 0272: Performed by: PHYSICAL MEDICINE & REHABILITATION

## 2017-08-06 PROCEDURE — 500864 HCHG NEEDLE, SPINAL 18G: Performed by: PHYSICAL MEDICINE & REHABILITATION

## 2017-08-06 PROCEDURE — 88307 TISSUE EXAM BY PATHOLOGIST: CPT

## 2017-08-06 PROCEDURE — 500092 HCHG BONE CEMENT & MIXER FOR KYPHO: Performed by: PHYSICAL MEDICINE & REHABILITATION

## 2017-08-06 PROCEDURE — 88311 DECALCIFY TISSUE: CPT | Mod: 91

## 2017-08-06 PROCEDURE — 88305 TISSUE EXAM BY PATHOLOGIST: CPT

## 2017-08-06 PROCEDURE — 700101 HCHG RX REV CODE 250

## 2017-08-06 PROCEDURE — A9270 NON-COVERED ITEM OR SERVICE: HCPCS | Performed by: INTERNAL MEDICINE

## 2017-08-06 PROCEDURE — 501528 HCHG SYRINGE, INFLATION FOR KYPHO: Performed by: PHYSICAL MEDICINE & REHABILITATION

## 2017-08-06 PROCEDURE — 160002 HCHG RECOVERY MINUTES (STAT): Performed by: PHYSICAL MEDICINE & REHABILITATION

## 2017-08-06 PROCEDURE — 99220 PR INITIAL OBSERVATION CARE,LEVL III: CPT | Performed by: INTERNAL MEDICINE

## 2017-08-06 PROCEDURE — 500091 HCHG BONE BIOPSY DEVICE: Performed by: PHYSICAL MEDICINE & REHABILITATION

## 2017-08-06 PROCEDURE — 80053 COMPREHEN METABOLIC PANEL: CPT

## 2017-08-06 PROCEDURE — 700111 HCHG RX REV CODE 636 W/ 250 OVERRIDE (IP)

## 2017-08-06 PROCEDURE — 700105 HCHG RX REV CODE 258

## 2017-08-06 PROCEDURE — 500768 HCHG KIT, FIRST FX: Performed by: PHYSICAL MEDICINE & REHABILITATION

## 2017-08-06 PROCEDURE — 160009 HCHG ANES TIME/MIN: Performed by: PHYSICAL MEDICINE & REHABILITATION

## 2017-08-06 PROCEDURE — 71260 CT THORAX DX C+: CPT

## 2017-08-06 PROCEDURE — 36415 COLL VENOUS BLD VENIPUNCTURE: CPT

## 2017-08-06 PROCEDURE — 700102 HCHG RX REV CODE 250 W/ 637 OVERRIDE(OP): Performed by: INTERNAL MEDICINE

## 2017-08-06 PROCEDURE — 0PU43JZ SUPPLEMENT THORACIC VERTEBRA WITH SYNTHETIC SUBSTITUTE, PERCUTANEOUS APPROACH: ICD-10-PCS | Performed by: PHYSICAL MEDICINE & REHABILITATION

## 2017-08-06 PROCEDURE — 0QS03ZZ REPOSITION LUMBAR VERTEBRA, PERCUTANEOUS APPROACH: ICD-10-PCS | Performed by: PHYSICAL MEDICINE & REHABILITATION

## 2017-08-06 PROCEDURE — 160048 HCHG OR STATISTICAL LEVEL 1-5: Performed by: PHYSICAL MEDICINE & REHABILITATION

## 2017-08-06 DEVICE — BONE CEMENT & MIXER FOR KYPHO: Type: IMPLANTABLE DEVICE | Site: SPINE LUMBAR | Status: FUNCTIONAL

## 2017-08-06 RX ORDER — AMANTADINE HYDROCHLORIDE 100 MG/1
100 CAPSULE, GELATIN COATED ORAL 2 TIMES DAILY
Status: DISCONTINUED | OUTPATIENT
Start: 2017-08-06 | End: 2017-08-11 | Stop reason: HOSPADM

## 2017-08-06 RX ORDER — OXYCODONE HYDROCHLORIDE 5 MG/1
5 TABLET ORAL
Status: DISCONTINUED | OUTPATIENT
Start: 2017-08-06 | End: 2017-08-10

## 2017-08-06 RX ORDER — SODIUM CHLORIDE 9 MG/ML
INJECTION, SOLUTION INTRAVENOUS
Status: COMPLETED
Start: 2017-08-06 | End: 2017-08-06

## 2017-08-06 RX ORDER — ONDANSETRON 4 MG/1
4 TABLET, ORALLY DISINTEGRATING ORAL EVERY 4 HOURS PRN
Status: DISCONTINUED | OUTPATIENT
Start: 2017-08-06 | End: 2017-08-11 | Stop reason: HOSPADM

## 2017-08-06 RX ORDER — MAGNESIUM HYDROXIDE 1200 MG/15ML
LIQUID ORAL
Status: DISCONTINUED | OUTPATIENT
Start: 2017-08-06 | End: 2017-08-06 | Stop reason: HOSPADM

## 2017-08-06 RX ORDER — MORPHINE SULFATE 4 MG/ML
4 INJECTION, SOLUTION INTRAMUSCULAR; INTRAVENOUS
Status: DISCONTINUED | OUTPATIENT
Start: 2017-08-06 | End: 2017-08-11 | Stop reason: HOSPADM

## 2017-08-06 RX ORDER — SODIUM CHLORIDE, SODIUM LACTATE, POTASSIUM CHLORIDE, CALCIUM CHLORIDE 600; 310; 30; 20 MG/100ML; MG/100ML; MG/100ML; MG/100ML
1000 INJECTION, SOLUTION INTRAVENOUS
Status: COMPLETED | OUTPATIENT
Start: 2017-08-06 | End: 2017-08-06

## 2017-08-06 RX ORDER — CARBIDOPA AND LEVODOPA 50; 200 MG/1; MG/1
1 TABLET, EXTENDED RELEASE ORAL 3 TIMES DAILY
Status: DISCONTINUED | OUTPATIENT
Start: 2017-08-06 | End: 2017-08-11 | Stop reason: HOSPADM

## 2017-08-06 RX ORDER — OXYCODONE HYDROCHLORIDE 10 MG/1
10 TABLET ORAL
Status: DISCONTINUED | OUTPATIENT
Start: 2017-08-06 | End: 2017-08-11 | Stop reason: HOSPADM

## 2017-08-06 RX ORDER — LIDOCAINE HYDROCHLORIDE 10 MG/ML
0.5 INJECTION, SOLUTION INFILTRATION; PERINEURAL
Status: DISCONTINUED | OUTPATIENT
Start: 2017-08-06 | End: 2017-08-11 | Stop reason: HOSPADM

## 2017-08-06 RX ORDER — SODIUM CHLORIDE 9 MG/ML
INJECTION, SOLUTION INTRAVENOUS CONTINUOUS
Status: DISCONTINUED | OUTPATIENT
Start: 2017-08-06 | End: 2017-08-11 | Stop reason: HOSPADM

## 2017-08-06 RX ORDER — LIDOCAINE HYDROCHLORIDE 10 MG/ML
INJECTION, SOLUTION INFILTRATION; PERINEURAL
Status: DISCONTINUED | OUTPATIENT
Start: 2017-08-06 | End: 2017-08-06 | Stop reason: HOSPADM

## 2017-08-06 RX ORDER — MODAFINIL 100 MG/1
200 TABLET ORAL DAILY
Status: DISCONTINUED | OUTPATIENT
Start: 2017-08-07 | End: 2017-08-11 | Stop reason: HOSPADM

## 2017-08-06 RX ORDER — HYDRALAZINE HYDROCHLORIDE 20 MG/ML
INJECTION INTRAMUSCULAR; INTRAVENOUS
Status: DISPENSED
Start: 2017-08-06 | End: 2017-08-07

## 2017-08-06 RX ORDER — BUPIVACAINE HYDROCHLORIDE AND EPINEPHRINE 5; 5 MG/ML; UG/ML
INJECTION, SOLUTION EPIDURAL; INTRACAUDAL; PERINEURAL
Status: DISCONTINUED | OUTPATIENT
Start: 2017-08-06 | End: 2017-08-06 | Stop reason: HOSPADM

## 2017-08-06 RX ORDER — LABETALOL HYDROCHLORIDE 5 MG/ML
10 INJECTION, SOLUTION INTRAVENOUS EVERY 4 HOURS PRN
Status: DISCONTINUED | OUTPATIENT
Start: 2017-08-06 | End: 2017-08-11 | Stop reason: HOSPADM

## 2017-08-06 RX ORDER — POLYETHYLENE GLYCOL 3350 17 G/17G
1 POWDER, FOR SOLUTION ORAL
Status: DISCONTINUED | OUTPATIENT
Start: 2017-08-06 | End: 2017-08-11 | Stop reason: HOSPADM

## 2017-08-06 RX ORDER — BISACODYL 10 MG
10 SUPPOSITORY, RECTAL RECTAL
Status: DISCONTINUED | OUTPATIENT
Start: 2017-08-06 | End: 2017-08-11 | Stop reason: HOSPADM

## 2017-08-06 RX ORDER — LIDOCAINE AND PRILOCAINE 25; 25 MG/G; MG/G
1 CREAM TOPICAL
Status: DISCONTINUED | OUTPATIENT
Start: 2017-08-06 | End: 2017-08-11 | Stop reason: HOSPADM

## 2017-08-06 RX ORDER — MEMANTINE HYDROCHLORIDE 10 MG/1
10 TABLET ORAL 2 TIMES DAILY
Status: DISCONTINUED | OUTPATIENT
Start: 2017-08-06 | End: 2017-08-11 | Stop reason: HOSPADM

## 2017-08-06 RX ORDER — ACETAMINOPHEN 325 MG/1
650 TABLET ORAL EVERY 6 HOURS PRN
Status: DISCONTINUED | OUTPATIENT
Start: 2017-08-06 | End: 2017-08-10

## 2017-08-06 RX ORDER — AMOXICILLIN 250 MG
2 CAPSULE ORAL 2 TIMES DAILY
Status: DISCONTINUED | OUTPATIENT
Start: 2017-08-06 | End: 2017-08-11 | Stop reason: HOSPADM

## 2017-08-06 RX ORDER — ONDANSETRON 2 MG/ML
4 INJECTION INTRAMUSCULAR; INTRAVENOUS EVERY 4 HOURS PRN
Status: DISCONTINUED | OUTPATIENT
Start: 2017-08-06 | End: 2017-08-11 | Stop reason: HOSPADM

## 2017-08-06 RX ORDER — CITALOPRAM 20 MG/1
20 TABLET ORAL DAILY
Status: DISCONTINUED | OUTPATIENT
Start: 2017-08-06 | End: 2017-08-11 | Stop reason: HOSPADM

## 2017-08-06 RX ADMIN — AMANTADINE HYDROCHLORIDE 100 MG: 100 CAPSULE ORAL at 22:38

## 2017-08-06 RX ADMIN — MEMANTINE HYDROCHLORIDE 10 MG: 10 TABLET, FILM COATED ORAL at 21:46

## 2017-08-06 RX ADMIN — CITALOPRAM HYDROBROMIDE 20 MG: 20 TABLET ORAL at 18:20

## 2017-08-06 RX ADMIN — STANDARDIZED SENNA CONCENTRATE AND DOCUSATE SODIUM 2 TABLET: 8.6; 5 TABLET, FILM COATED ORAL at 21:46

## 2017-08-06 RX ADMIN — SODIUM CHLORIDE, SODIUM LACTATE, POTASSIUM CHLORIDE, CALCIUM CHLORIDE 1000 ML: 600; 310; 30; 20 INJECTION, SOLUTION INTRAVENOUS at 09:53

## 2017-08-06 RX ADMIN — SODIUM CHLORIDE: 9 INJECTION, SOLUTION INTRAVENOUS at 16:30

## 2017-08-06 RX ADMIN — CARBIDOPA AND LEVODOPA 1 TABLET: 50; 200 TABLET, EXTENDED RELEASE ORAL at 21:46

## 2017-08-06 ASSESSMENT — PAIN SCALES - GENERAL
PAINLEVEL_OUTOF10: 0
PAINLEVEL_OUTOF10: 3
PAINLEVEL_OUTOF10: 0

## 2017-08-06 ASSESSMENT — ENCOUNTER SYMPTOMS
FOCAL WEAKNESS: 0
FLANK PAIN: 0
TREMORS: 1
COUGH: 0
NAUSEA: 0
FEVER: 0
SENSORY CHANGE: 0
MEMORY LOSS: 0
DEPRESSION: 0
WEAKNESS: 1
VOMITING: 0
PALPITATIONS: 0
CHILLS: 0
ABDOMINAL PAIN: 0
BACK PAIN: 1
NERVOUS/ANXIOUS: 0
DIZZINESS: 0
SPEECH CHANGE: 0
MYALGIAS: 1
SHORTNESS OF BREATH: 0
HEADACHES: 0

## 2017-08-06 ASSESSMENT — LIFESTYLE VARIABLES
EVER_SMOKED: NEVER
ALCOHOL_USE: YES
CONSUMPTION TOTAL: INCOMPLETE
TOTAL SCORE: 0
HAVE YOU EVER FELT YOU SHOULD CUT DOWN ON YOUR DRINKING: NO
EVER HAD A DRINK FIRST THING IN THE MORNING TO STEADY YOUR NERVES TO GET RID OF A HANGOVER: NO
EVER_SMOKED: NEVER
TOTAL SCORE: 0
HOW MANY TIMES IN THE PAST YEAR HAVE YOU HAD 5 OR MORE DRINKS IN A DAY: 0
HAVE PEOPLE ANNOYED YOU BY CRITICIZING YOUR DRINKING: NO
EVER FELT BAD OR GUILTY ABOUT YOUR DRINKING: NO
TOTAL SCORE: 0

## 2017-08-06 ASSESSMENT — COPD QUESTIONNAIRES
DO YOU EVER COUGH UP ANY MUCUS OR PHLEGM?: NO/ONLY WITH OCCASIONAL COLDS OR INFECTIONS
DURING THE PAST 4 WEEKS HOW MUCH DID YOU FEEL SHORT OF BREATH: NONE/LITTLE OF THE TIME
COPD SCREENING SCORE: 3
HAVE YOU SMOKED AT LEAST 100 CIGARETTES IN YOUR ENTIRE LIFE: NO/DON'T KNOW

## 2017-08-06 NOTE — ASSESSMENT & PLAN NOTE
Status post kyphoplasty of T11, T12 and L1.  Continue with pain management  Started with physical therapy and occupational therapy today.   Will need skilled placement.

## 2017-08-06 NOTE — ASSESSMENT & PLAN NOTE
Patient's platelet counts were low at 48.   Patient continued to have bleeding post procedure.  S/p transfusion of plt.  Plt count currently normalized.  Cont to monitor.  Heme-onc was consulted. F/u as outpt.

## 2017-08-06 NOTE — IP AVS SNAPSHOT
" Home Care Instructions                                                                                                                  Name:Rafael Felix  Medical Record Number:[id number]  CSN: [id number]    YOB: 1940   Age: 76 y.o.  Sex: male  HT:1.88 m (6' 2\") WT: 87.2 kg (192 lb 3.9 oz)          Admit Date: 8/6/2017     Discharge Date:   Today's Date: 8/11/2017  Attending Doctor:  Keyla Mehta D.O.                  Allergies:  Review of patient's allergies indicates no known allergies.            Discharge Instructions       Discharge Instructions    Discharged to other by medical transportation with escort. Discharged via wheelchair, hospital escort: Yes.  Special equipment needed: Not Applicable    Be sure to schedule a follow-up appointment with your primary care doctor or any specialists as instructed.     Discharge Plan:   Diet Plan: Discussed  Activity Level: Discussed  Confirmed Follow up Appointment: Patient to Call and Schedule Appointment  Confirmed Symptoms Management: Discussed  Medication Reconciliation Updated: Yes  Influenza Vaccine Indication: Indicated: Not available from distributor/    I understand that a diet low in cholesterol, fat, and sodium is recommended for good health. Unless I have been given specific instructions below for another diet, I accept this instruction as my diet prescription.   Other diet: 2 gram sodium     Special Instructions: None    · Is patient discharged on Warfarin / Coumadin?   No     · Is patient Post Blood Transfusion?  Yes  POST BLOOD TRANSFUSION INFORMATION (ADULT)    The purpose of blood transfusion is to correct anemia, low levels of blood clotting factors or to correct acute blood loss.   Blood transfusion is very safe but occasionally unexpected adverse reactions do occur. Most adverse reactions occur during transfusion, within one to two days following transfusion or one to two weeks following transfusion. Some adverse " reactions can occur one to six months after transfusion and some even years later.             If any of the symptoms listed below happen to you during your transfusion,                                 please notify your nurse immediately.   · Fever or Chills  · Flushing of the Face  · Hives, rash or itching  · Difficulty in breathing or shortness of breath  · Pain or oozing of blood from the IV needle site  · Low back pain  · Nausea or vomiting  · Weakness or fainting  · Chest pain  · Blood in the urine  · Decreased frequency of urination    The above symptoms may also occur within 24 to 48 after transfusion; if so, notify your physician.     · Yellowing of the skin    This can happen one to six months after transfusion; if so, notify your physician    Depression / Suicide Risk    As you are discharged from this [facility] facility, it is important to learn how to keep safe from harming yourself.    Recognize the warning signs:  · Abrupt changes in personality, positive or negative- including increase in energy   · Giving away possessions  · Change in eating patterns- significant weight changes-  positive or negative  · Change in sleeping patterns- unable to sleep or sleeping all the time   · Unwillingness or inability to communicate  · Depression  · Unusual sadness, discouragement and loneliness  · Talk of wanting to die  · Neglect of personal appearance   · Rebelliousness- reckless behavior  · Withdrawal from people/activities they love  · Confusion- inability to concentrate     If you or a loved one observes any of these behaviors or has concerns about self-harm, here's what you can do:  · Talk about it- your feelings and reasons for harming yourself  · Remove any means that you might use to hurt yourself (examples: pills, rope, extension cords, firearm)  · Get professional help from the community (Mental Health, Substance Abuse, psychological counseling)  · Do not be alone:Call your Safe Contact- someone  whom you trust who will be there for you.  · Call your local CRISIS HOTLINE 033-9881 or 605-867-1443  · Call your local Children's Mobile Crisis Response Team [place] (601) 202-7217 or www.Kno  · Call the toll free National Suicide Prevention Hotlines   · National Suicide Prevention Lifeline 830-974-FMBN (2982)  · [place] [facility] Line Network 800-SUICIDE (339-4856)        Your appointments     Oct 31, 2017 11:40 AM   Follow Up Visit with Popeye Ferguson M.D.   [facility] Neurology (--)    [address], [address]  [facility] [address] [phone]           You will be receiving a confirmation call a few days before your appointment from our automated call confirmation system.            Nov 03, 2017  2:20 PM   Established Patient with MERI Casarez   [facility] [address]    [address] [facility]  [address]  [facility] [address] [phone]           You will be receiving a confirmation call a few days before your appointment from our automated call confirmation system.              Follow-up Information     1. Follow up with [facility] DP[facility] ([facility] POS).    Specialty:  Skilled Nursing Facility    Contact information    [address] [place] [address]  [phone]        2. Follow up with MERI Casarez In 1 week.    Specialty:  Family Medicine    Contact information    [address] [facility] [place]  [address]  [facility] [address] [phone]          3. Follow up with [facility], Emergency Dept.    Specialty:  Emergency Medicine    Why:  If symptoms worsen    Contact information    [address] [facility] [address] [phone]        4. Schedule an appointment as soon as possible for a visit with SEAN Ribeiro M.D..    Specialty:  Oncology    Contact information    [address] [facility] [address] [phone]           Discharge Medication Instructions:    Below are the medications your physician  expects you to take upon discharge:    Review all your home medications and newly ordered medications with your doctor and/or pharmacist. Follow medication instructions as directed by your doctor and/or pharmacist.    Please keep your medication list with you and share with your physician.               Medication List      START taking these medications        Instructions    Morning Afternoon Evening Bedtime    tramadol 50 MG Tabs   Last time this was given:  50 mg on 8/11/2017  7:46 AM   Commonly known as:  ULTRAM        Take 1 Tab by mouth every 6 hours as needed for Moderate Pain or Severe Pain.   Dose:  50 mg                          CHANGE how you take these medications        Instructions    Morning Afternoon Evening Bedtime    carbidopa-levodopa SR  MG per tablet   What changed:  when to take this   Last time this was given:  1 Tab on 8/11/2017  7:54 AM   Commonly known as:  SINEMET CR        Take 1 Tab by mouth every bedtime.   Dose:  1 Tab                          CONTINUE taking these medications        Instructions    Morning Afternoon Evening Bedtime    amantadine 100 MG Caps   Last time this was given:  100 mg on 8/11/2017  7:54 AM   Commonly known as:  SYMMETREL        Take 1 Cap by mouth 2 times a day.   Dose:  100 mg                        citalopram 20 MG Tabs   Last time this was given:  20 mg on 8/11/2017  7:54 AM   Commonly known as:  CELEXA        Take 1 Tab by mouth every day.   Dose:  20 mg                        memantine 10 MG Tabs   Last time this was given:  10 mg on 8/11/2017  7:54 AM   Commonly known as:  NAMENDA        Take 1 Tab by mouth 2 times a day.   Dose:  10 mg                        modafinil 200 MG Tabs   Last time this was given:  200 mg on 8/11/2017  7:54 AM   Commonly known as:  PROVIGIL        Take 1 Tab by mouth every day.   Dose:  200 mg                        triazolam 0.125 MG tablet   Commonly known as:  HALCION        Take 1 Tab by mouth at bedtime as needed.     Dose:  0.125 mg                             Where to Get Your Medications      Information about where to get these medications is not yet available     ! Ask your nurse or doctor about these medications    - tramadol 50 MG Tabs            Orders for after discharge     REFERRAL TO SKILLED NURSING FACILITY    Complete by:  As directed    Wife prefers [facility] if patient eligible.  Family knows SNF director Lary Mcclendon at [phone] there.             Instructions           Diet / Nutrition:    Follow any diet instructions given to you by your doctor or the dietician, including how much salt (sodium) you are allowed each day.    If you are overweight, talk to your doctor about a weight reduction plan.    Activity:    Remain physically active following your doctor's instructions about exercise and activity.    Rest often.     Any time you become even a little tired or short of breath, SIT DOWN and rest.    Worsening Symptoms:    Report any of the following signs and symptoms to the doctor's office immediately:    *Pain of jaw, arm, or neck  *Chest pain not relieved by medication                               *Dizziness or loss of consciousness  *Difficulty breathing even when at rest   *More tired than usual                                       *Bleeding drainage or swelling of surgical site  *Swelling of feet, ankles, legs or stomach                 *Fever (>100ºF)  *Pink or blood tinged sputum  *Weight gain (3lbs/day or 5lbs /week)           *Shock from internal defibrillator (if applicable)  *Palpitations or irregular heartbeats                *Cool and/or numb extremities    Stroke Awareness    Common Risk Factors for Stroke include:    Age  Atrial Fibrillation  Carotid Artery Stenosis  Diabetes Mellitus  Excessive alcohol consumption  High blood pressure  Overweight   Physical inactivity  Smoking    Warning signs and symptoms of a stroke include:    *Sudden numbness or weakness of the face, arm or leg  (especially on one side of the body).  *Sudden confusion, trouble speaking or understanding.  *Sudden trouble seeing in one or both eyes.  *Sudden trouble walking, dizziness, loss of balance or coordination.Sudden severe headache with no known cause.    It is very important to get treatment quickly when a stroke occurs. If you experience any of the above warning signs, call 911 immediately.                   Disclaimer         Quit Smoking / Tobacco Use:    I understand the use of any tobacco products increases my chance of suffering from future heart disease or stroke and could cause other illnesses which may shorten my life. Quitting the use of tobacco products is the single most important thing I can do to improve my health. For further information on smoking / tobacco cessation call a Toll Free Quit Line at 1-948.497.3906 (*National Cancer [place]) or 1-974.473.2489 (American Lung Association) or you can access the web based program at www.lungusa.org.    [place] Tobacco Users Help Line:  (315) 803-8882       Toll Free: 1-824.767.8269  Quit Tobacco Program [facility] (158)172-7119    Crisis Hotline:    [place] Crisis Hotline:  1-903-CDFZNZI or 1-917.729.1800    [place] Crisis Hotline:    1-171.458.5494 or 605-573-9623    Discharge Survey:   Thank you for choosing [facility]. We hope we did everything we could to make your hospital stay a pleasant one. You may be receiving a phone survey and we would appreciate your time and participation in answering the questions. Your input is very valuable to us in our efforts to improve our service to our patients and their families.        My signature on this form indicates that:    1. I have reviewed and understand the above information.  2. My questions regarding this information have been answered to my satisfaction.  3. I have formulated a plan with my discharge nurse to obtain my prescribed medications for home.                  Disclaimer           __________________________________                     __________       ________                       Patient Signature                                                 Date                    Time

## 2017-08-06 NOTE — ASSESSMENT & PLAN NOTE
Medical management, continue with short-acting and long-acting Sinemet, continue with Namenda, continue with amantadine and Provigil.  Patient's Parkinson's disease is currently under control.  Confirmed dosing of carbi-leva with wife.

## 2017-08-06 NOTE — ASSESSMENT & PLAN NOTE
Status post L1 kyphoplasty continue with pain management.  All path report negative for acute leukemia, myeloma, lymphoma or  metastatic tumor  CT scan showed cystic lesion of the kidney and liver. Discussed with patient and wife.  Per oncology pt needs follow up imaging in 6 months.

## 2017-08-06 NOTE — IP AVS SNAPSHOT
" <p align=\"LEFT\"><IMG SRC=\"//EMRWB/blob$/Images/[email or web address]\" alt=\"Image\" WIDTH=\"50%\" HEIGHT=\"200\" BORDER=\"\"></p>                   Name:Rafael Felix  Medical Record Number:[id number]  CSN: [id number]    YOB: 1940   Age: 76 y.o.  Sex: male  HT:1.88 m (6' 2\") WT: 87.2 kg (192 lb 3.9 oz)          Admit Date: 8/6/2017     Discharge Date:   Today's Date: 8/11/2017  Attending Doctor:  Keyla Mehta D.O.                  Allergies:  Review of patient's allergies indicates no known allergies.          Your appointments     Oct 31, 2017 11:40 AM   Follow Up Visit with Popeye Ferguson M.D.   [facility] Neurology (--)    [address] [place] [address], [facility] [address]  [facility] [address] [phone]           You will be receiving a confirmation call a few days before your appointment from our automated call confirmation system.            Nov 03, 2017  2:20 PM   Established Patient with MERI Casarez   [address]    [address] [place] [address] [address] [place] [address] [phone]           You will be receiving a confirmation call a few days before your appointment from our automated call confirmation system.              Follow-up Information     1. Follow up with [facility] DP/SNF ([facility] POS).    Specialty:  Skilled Nursing Facility    Contact information    [address] [facility] [address]  [phone]        2. Follow up with MERI Casarez In 1 week.    Specialty:  Family Medicine    Contact information    [address] [place] [address] [phone]          3. Follow up with [facility], Emergency Dept.    Specialty:  Emergency Medicine    Why:  If symptoms worsen    Contact information    [address] [facility] [address] [phone]        4. Schedule an appointment as soon as possible for a visit with SEAN Ribeiro M.D..    Specialty:  Oncology    Contact information    [address] [facility] [place] [address]  [phone]           Medication List      Take these Medications        Instructions    amantadine 100 MG Caps   Commonly known as:  SYMMETREL    Take 1 Cap by mouth 2 times a day.   Dose:  100 mg       carbidopa-levodopa SR  MG per tablet   What changed:  when to take this   Commonly known as:  SINEMET CR    Take 1 Tab by mouth every bedtime.   Dose:  1 Tab       citalopram 20 MG Tabs   Commonly known as:  CELEXA    Take 1 Tab by mouth every day.   Dose:  20 mg       memantine 10 MG Tabs   Commonly known as:  NAMENDA    Take 1 Tab by mouth 2 times a day.   Dose:  10 mg       modafinil 200 MG Tabs   Commonly known as:  PROVIGIL    Take 1 Tab by mouth every day.   Dose:  200 mg       tramadol 50 MG Tabs   Commonly known as:  ULTRAM    Take 1 Tab by mouth every 6 hours as needed for Moderate Pain or Severe Pain.   Dose:  50 mg       triazolam 0.125 MG tablet   Commonly known as:  HALCION    Take 1 Tab by mouth at bedtime as needed.   Dose:  0.125 mg

## 2017-08-06 NOTE — IP AVS SNAPSHOT
8/11/2017    Rafael Felix  [address]  [place] [address]    Dear Rafael:    [facility] wants to ensure your discharge home is safe and you or your loved ones have had all of your questions answered regarding your care after you leave the hospital.    Below is a list of resources and contact information should you have any questions regarding your hospital stay, follow-up instructions, or active medical symptoms.    Questions or Concerns Regarding… Contact   Medical Questions Related to Your Discharge  (7 days a week, 8am-5pm) Contact a Nurse Care Coordinator   [phone]   Medical Questions Not Related to Your Discharge  (24 hours a day / 7 days a week)  Contact the Nurse Health Line   [phone]    Medications or Discharge Instructions Refer to your discharge packet   or contact your [facility] Primary Care Provider   [phone]   Follow-up Appointment(s) Schedule your appointment via Driverdo   or contact Scheduling [phone]   Billing Review your statement via Driverdo  or contact Billing [phone]   Medical Records Review your records via Driverdo   or contact Medical Records [phone]     You may receive a telephone call within two days of discharge. This call is to make certain you understand your discharge instructions and have the opportunity to have any questions answered. You can also easily access your medical information, test results and upcoming appointments via the Driverdo free online health management tool. You can learn more and sign up at [employer]q/Driverdo. For assistance setting up your Driverdo account, please call [phone].    Once again, we want to ensure your discharge home is safe and that you have a clear understanding of any next steps in your care. If you have any questions or concerns, please do not hesitate to contact us, we are here for you. Thank you for choosing [facility] for your healthcare needs.    Sincerely,    Your [facility] Healthcare Team

## 2017-08-06 NOTE — H&P
Hospital Medicine History and Physical    Date of Service  8/6/2017    Chief Complaint  No chief complaint on file.      History of Presenting Illness  76 y.o. male who presented 8/6/2017 with history of myelodysplastic syndrome Parkinson's disease, ambulates with a walker with history of falls presents for osteocool procedure with kyphoplasty with known history of lumbar and thoracic impression fracture.  Patient is debilitated at baseline and ambulates with a walker however has increasing weakness and falls associated with low back pain.    On my evaluation patient denies any pain no recent illnesses no chest pain shortness of breath fevers chills nausea vomiting. Negative diarrhea or dysuria.    Patient will be admitted to the oncology floor for further therapy evaluation and possible placement.  Concern is for possible metastatic disease will follow up biopsy.    Primary Care Physician  NOLA Casarez.    Consultants  IR Dr. Samuel CASTANO  oncology will consult in the a.m. as seen by Dr. Araujo as an outpatient    Code Status  Full    Review of Systems  Review of Systems   Constitutional: Negative for fever and chills.   HENT: Negative for congestion and hearing loss.    Respiratory: Negative for cough and shortness of breath.    Cardiovascular: Negative for chest pain, palpitations and leg swelling.   Gastrointestinal: Negative for nausea, vomiting and abdominal pain.   Genitourinary: Negative for dysuria and flank pain.   Musculoskeletal: Positive for myalgias and back pain. Negative for joint pain.   Neurological: Positive for tremors and weakness. Negative for dizziness, sensory change, speech change, focal weakness and headaches.   Psychiatric/Behavioral: Negative for depression and memory loss. The patient is not nervous/anxious.           Past Medical History  Past Medical History   Diagnosis Date   • Parkinson's disease (CMS-HCC)    • MDS (myelodysplastic syndrome) (CMS-[facility])    • Hemorrhagic  disorder (CMS-[facility]) 2017     bruises easily       Surgical History  Past Surgical History   Procedure Laterality Date   • Splenectomy     • Knee arthroscopy     • Prostatectomy, radial       subtotal   • Rotator cuff repair       right   • Cataract extraction with iol         Medications  No current facility-administered medications on file prior to encounter.     Current Outpatient Prescriptions on File Prior to Encounter   Medication Sig Dispense Refill   • modafinil (PROVIGIL) 200 MG Tab Take 1 Tab by mouth every day. 30 Tab 5   • memantine (NAMENDA) 10 MG Tab Take 1 Tab by mouth 2 times a day. 60 Tab 6   • amantadine (SYMMETREL) 100 MG Cap Take 1 Cap by mouth 2 times a day. 60 Cap 3   • Carbidopa-Levodopa ER (RYTARY) 61. MG Cap CR Take 1 Tab by mouth 3 times a day. (Patient taking differently: Take 1 Tab by mouth every day.) 90 Cap 6   • triazolam (HALCION) 0.125 MG tablet Take 1 Tab by mouth at bedtime as needed. 30 Tab 5   • citalopram (CELEXA) 20 MG Tab Take 1 Tab by mouth every day. 30 Tab 11   • carbidopa-levodopa SR (SINEMET CR)  MG per tablet Take 1 Tab by mouth every bedtime. (Patient taking differently: Take 1 Tab by mouth 3 times a day.) 60 Tab 5       Family History  Family History   Problem Relation Age of Onset   • Heart Disease Mother    • Lung Disease Mother    • Cancer Father      pancreatic       Social History  Social History   Substance Use Topics   • Smoking status: Never Smoker    • Smokeless tobacco: Never Used   • Alcohol Use: Yes      Comment: 1 per day       Allergies  No Known Allergies     Physical Exam  Laboratory   Hemodynamics  Temp (24hrs), Av.7 °C (98.1 °F), Min:36.7 °C (98 °F), Max:36.7 °C (98.1 °F)   Temperature: 36.7 °C (98 °F)  Pulse  Av.4  Min: 46  Max: 67 Heart Rate (Monitored): 63  Blood Pressure : 149/87 mmHg, NIBP: (!) 161/90 mmHg      Respiratory      Respiration: 13, Pulse Oximetry: 96 %             Physical Exam   Constitutional: He is oriented  to person, place, and time. He appears well-nourished. No distress.   Elderly, frail   HENT:   Head: Normocephalic and atraumatic.   Nose: Nose normal.   Eyes: EOM are normal. Pupils are equal, round, and reactive to light. Right eye exhibits no discharge. Left eye exhibits no discharge.   Neck: Neck supple. No JVD present. No thyromegaly present.   Cardiovascular: Normal rate and intact distal pulses.    No murmur heard.  Pulmonary/Chest: Effort normal and breath sounds normal. No respiratory distress. He has no wheezes.   Abdominal: Soft. Bowel sounds are normal. He exhibits no distension. There is no tenderness.   Musculoskeletal: He exhibits no edema or tenderness.   Neurological: He is alert and oriented to person, place, and time. No cranial nerve deficit. He exhibits normal muscle tone. Coordination normal.   Baseline tremors   Skin: Skin is warm and dry. No rash noted. He is not diaphoretic. No erythema.   Psychiatric: He has a normal mood and affect. His behavior is normal. Judgment and thought content normal.   Nursing note and vitals reviewed.              No results for input(s): ALTSGPT, ASTSGOT, ALKPHOSPHAT, TBILIRUBIN, DBILIRUBIN, GAMMAGT, AMYLASE, LIPASE, ALB, PREALBUMIN, GLUCOSE in the last 72 hours.              No results found for: TROPONINI    Imaging  None  CT chest abdomen pelvis pending    Assessment/Plan     I anticipate this patient is appropriate for observation status at this time.    * Compression of lumbar vertebra (CMS-HCC)  Assessment & Plan  S/p Osteocool procedure per IR. Dr. Gallardo, POD #0  Patient will be admitted to the oncology floor on observation status  PT OT evaluation  History of myelodysplastic syndrome with possible metastatic disease  Was seen by oncology Dr. Jacobsen over a year ago  We will consult oncology in the a.m.  We'll follow up with CT chest abdomen pelvis rule out metastatic disease  Pain control as needed   follow-up biopsy    Myelodysplastic syndrome  (CMS-HCC) (present on admission)  Assessment & Plan  History of being followed by wound, hematologist oncologist Dr. Jacobsen   last seen ×1 year ago  Follow-up labs    Compression fracture of thoracic vertebra (CMS-HCC)  Assessment & Plan  As above    Parkinson's disease (CMS-HCC)  Assessment & Plan  History of continue   home medications  Seen by neurologist Dr. Ferguson as an outpatient consult as needed    Debility  Assessment & Plan  Walker bound  at baseline  Lives with his wife  May need rehab        VTE prophylaxis: SCD .

## 2017-08-06 NOTE — OR NURSING
Dr Gallardo informed of bleeding from wound site at left lower portion, per MD this is anticipated and is not concerned.

## 2017-08-06 NOTE — IP AVS SNAPSHOT
[email or web address] Access Code: Activation code not generated  Current [email or web address] Status: Active    [employer]  A secure, online tool to manage your health information     [employer]’s Optimenga777® is a secure, online tool that connects you to your personalized health information from the privacy of your home -- day or night - making it very easy for you to manage your healthcare. Once the activation process is completed, you can even access your medical information using the [email or web address] medina, which is available for free in the [employer] Medina store or Google Play store.     [email or web address] provides the following levels of access (as shown below):   My Chart Features   [facility] Primary Care Doctor [facility]  Specialists [facility]  Urgent  Care Non-[facility]  Primary Care  Doctor   Email your healthcare team securely and privately 24/7 X X X X   Manage appointments: schedule your next appointment; view details of past/upcoming appointments X      Request prescription refills. X      View recent personal medical records, including lab and immunizations X X X X   View health record, including health history, allergies, medications X X X X   Read reports about your outpatient visits, procedures, consult and ER notes X X X X   See your discharge summary, which is a recap of your hospital and/or ER visit that includes your diagnosis, lab results, and care plan. X X       How to register for [email or web address]:  1. Go to  [email or web address].  2. Click on the Sign Up Now box, which takes you to the New Member Sign Up page. You will need to provide the following information:  a. Enter your [email or web address] Access Code exactly as it appears at the top of this page. (You will not need to use this code after you’ve completed the sign-up process. If you do not sign up before the expiration date, you must request a new code.)   b. Enter your date of birth.   c. Enter your home email address.   d. Click Submit, and follow the next screen’s instructions.  3. Create a [email or web address] ID. This will  be your [employer] login ID and cannot be changed, so think of one that is secure and easy to remember.  4. Create a [employer] password. You can change your password at any time.  5. Enter your Password Reset Question and Answer. This can be used at a later time if you forget your password.   6. Enter your e-mail address. This allows you to receive e-mail notifications when new information is available in [employer].  7. Click Sign Up. You can now view your health information.    For assistance activating your [employer] account, call [phone]

## 2017-08-06 NOTE — PROCEDURES
Date of Service: 8/6/2017    Physician/s: Niels Gallardo MD    Pre-operative Diagnosis: Myelodysplastic Syndrome, Thoracic and Lumbar compression fracture, severe back pain    Post-operative Diagnosis: Myelodysplastic Syndrome, Thoracic and Lumbar compression fracture, severe back pain     Procedure: T11, T12, L1 Osteocool RFN with Kyphoplasty with Medtronic Kyphon    Description of procedure:  The risks, benefits, and alternatives of the procedure were reviewed and discussed with the patient.  Written informed consent was freely obtained. A pre-procedural time-out was conducted by the physician verifying patient’s identity, procedure to be performed, procedure site and side, and allergy verification. Appropriate equipment was determined to be in place for the procedure.     Patient received general anesthesia per anesthesiology. An IV was placed peripherally, and Ancef IV was given for infection prophylaxis. The patient's vital signs were carefully monitored before, throughout, and after the procedure.     In the fluoroscopy OR suite the patient was placed in a prone position. The fluoroscope was placed over the THORACIC LUMBAR spine, obtaining a true AP view. Another fluoroscope was placed over the THORACIC LUMBAR spine in a true LATERAL view. The T11 T12 L1 pedicles were identified on both sides, and a marked was made at a good entry site. A 25g needle was placed into each of the markings, and approx 5cc of 1% Lidocaine mixed with Marcaine 0.5%/epinephrine was injected subcutaneously into the epidermal and dermal layers. An 18g needle was then advanced down to the level of the pedicle, and 5cc of 1% Lidocaine mixed with Marcaine 0.5%/epinephrine was used to anesthetize the track as the needle was retracted.     A scalpel was used to make a small incision over the entry sites. The vertebral body trocar was then inserted down to the level of the pedicle and slowly advanced into the vertebral body. The AP and  LATERAL views were used to help direct the angle of the trocar. A mallet was then used to slowly further advance the trocar into the vertebral body. Once in the appropriate location as viewed on the above fluoroscopy views, a drill trocar was then placed into the original trocar to create more space for entry into the vertebral body, and to help align the RFN probes. The drill was removed. Two 20mm RFN probes were placed into the trocars and advanced in as completely as possible. The positions were checked on AP and Lateral Fluoroscopy. Once in the correct locations, the cooled RFN machine was turned on for 15 minutes, reaching up to 70 degrees celsius.     Once RFN was completed, a tamper tool was then advanced through the original trocar, and then removed. A balloon catheter attached to a pressure gauge was then advanced into each of the trocars, and the balloons were opened slowly and visualized on the fluoroscopy screen.     A total of 4.0 mL of cavity space was then created with each trocar and balloon at the T11 vertebral body. A total of 5.0 mL of cavity space was then created with each trocar and balloon at the T12 vertebral body. A total of 5.0 mL of cavity space was then created with each trocar and balloon at the L1 vertebral body.  Once an appropriate cavity was created, the polymethylmethacrylate cement was made. The cement trocars were then placed into the cavity, and cement was slowly filled into the vertebral body cavity outlined by the balloon. A tamper was left in place into the trocar until the cement hardened, and then the original trocar and tamper were both removed.     T11 Total Cement placed into vertebral body: 3.5 mL placed on each side    T12 Total Cement placed into vertebral body: 5.5 mL placed on each side    L1 Total Cement placed into vertebral body: 4.5 mL placed on each side    The trocar sites were then cleansed with sterile normal saline, the area was dried, covered with 4x4  gauze, and a dressing was applied. There were no complications noted.     Niels Gallardo MD  PM&R/Pain Mgmt

## 2017-08-07 LAB
ABO GROUP BLD: NORMAL
ANION GAP SERPL CALC-SCNC: 7 MMOL/L (ref 0–11.9)
ANISOCYTOSIS BLD QL SMEAR: ABNORMAL
BARCODED ABORH UBTYP: 6200
BARCODED ABORH UBTYP: 6200
BARCODED PRD CODE UBPRD: NORMAL
BARCODED PRD CODE UBPRD: NORMAL
BARCODED UNIT NUM UBUNT: NORMAL
BARCODED UNIT NUM UBUNT: NORMAL
BASOPHILS # BLD AUTO: 0 % (ref 0–1.8)
BASOPHILS # BLD: 0 K/UL (ref 0–0.12)
BUN SERPL-MCNC: 19 MG/DL (ref 8–22)
CALCIUM SERPL-MCNC: 8.6 MG/DL (ref 8.5–10.5)
CFT BLD TEG: 4.9 MIN (ref 5–10)
CHLORIDE SERPL-SCNC: 105 MMOL/L (ref 96–112)
CLOT ANGLE BLD TEG: 69.8 DEGREES (ref 53–72)
CLOT LYSIS 30M P MA LENFR BLD TEG: 0 % (ref 0–8)
CO2 SERPL-SCNC: 25 MMOL/L (ref 20–33)
COMPONENT P 8504P: NORMAL
COMPONENT P 8504P: NORMAL
CREAT SERPL-MCNC: 0.66 MG/DL (ref 0.5–1.4)
CT.EXTRINSIC BLD ROTEM: 1.4 MIN (ref 1–3)
EOSINOPHIL # BLD AUTO: 0.09 K/UL (ref 0–0.51)
EOSINOPHIL NFR BLD: 0.9 % (ref 0–6.9)
ERYTHROCYTE [DISTWIDTH] IN BLOOD BY AUTOMATED COUNT: 51.1 FL (ref 35.9–50)
GFR SERPL CREATININE-BSD FRML MDRD: >60 ML/MIN/1.73 M 2
GIANT PLATELETS BLD QL SMEAR: NORMAL
GLUCOSE SERPL-MCNC: 125 MG/DL (ref 65–99)
HCT VFR BLD AUTO: 36.6 % (ref 42–52)
HGB BLD-MCNC: 12 G/DL (ref 14–18)
LYMPHOCYTES # BLD AUTO: 0.64 K/UL (ref 1–4.8)
LYMPHOCYTES NFR BLD: 6.3 % (ref 22–41)
MACROCYTES BLD QL SMEAR: ABNORMAL
MANUAL DIFF BLD: NORMAL
MCF BLD TEG: 70.1 MM (ref 50–70)
MCH RBC QN AUTO: 28.5 PG (ref 27–33)
MCHC RBC AUTO-ENTMCNC: 32.8 G/DL (ref 33.7–35.3)
MCV RBC AUTO: 86.9 FL (ref 81.4–97.8)
MONOCYTES # BLD AUTO: 0.72 K/UL (ref 0–0.85)
MONOCYTES NFR BLD AUTO: 7.1 % (ref 0–13.4)
MORPHOLOGY BLD-IMP: NORMAL
NEUTROPHILS # BLD AUTO: 8.74 K/UL (ref 1.82–7.42)
NEUTROPHILS NFR BLD: 85.7 % (ref 44–72)
NRBC # BLD AUTO: 0.02 K/UL
NRBC BLD AUTO-RTO: 0.2 /100 WBC
PA AA BLD-ACNC: 21.5 %
PA ADP BLD-ACNC: 66.1 %
PLATELET # BLD AUTO: 72 K/UL (ref 164–446)
PMV BLD AUTO: 14.4 FL (ref 9–12.9)
POIKILOCYTOSIS BLD QL SMEAR: NORMAL
POTASSIUM SERPL-SCNC: 4.4 MMOL/L (ref 3.6–5.5)
PRODUCT TYPE UPROD: NORMAL
PRODUCT TYPE UPROD: NORMAL
RBC # BLD AUTO: 4.21 M/UL (ref 4.7–6.1)
RBC BLD AUTO: PRESENT
RH BLD: NORMAL
SODIUM SERPL-SCNC: 137 MMOL/L (ref 135–145)
SPHEROCYTES BLD QL SMEAR: NORMAL
TEG ALGORITHM TGALG: ABNORMAL
UNIT STATUS USTAT: NORMAL
UNIT STATUS USTAT: NORMAL
WBC # BLD AUTO: 10.2 K/UL (ref 4.8–10.8)

## 2017-08-07 PROCEDURE — 85384 FIBRINOGEN ACTIVITY: CPT

## 2017-08-07 PROCEDURE — 36430 TRANSFUSION BLD/BLD COMPNT: CPT

## 2017-08-07 PROCEDURE — 30233R1 TRANSFUSION OF NONAUTOLOGOUS PLATELETS INTO PERIPHERAL VEIN, PERCUTANEOUS APPROACH: ICD-10-PCS | Performed by: HOSPITALIST

## 2017-08-07 PROCEDURE — G0378 HOSPITAL OBSERVATION PER HR: HCPCS

## 2017-08-07 PROCEDURE — 86900 BLOOD TYPING SEROLOGIC ABO: CPT

## 2017-08-07 PROCEDURE — A9270 NON-COVERED ITEM OR SERVICE: HCPCS | Performed by: INTERNAL MEDICINE

## 2017-08-07 PROCEDURE — 85576 BLOOD PLATELET AGGREGATION: CPT | Mod: 91

## 2017-08-07 PROCEDURE — 700102 HCHG RX REV CODE 250 W/ 637 OVERRIDE(OP): Performed by: INTERNAL MEDICINE

## 2017-08-07 PROCEDURE — 80048 BASIC METABOLIC PNL TOTAL CA: CPT

## 2017-08-07 PROCEDURE — 99226 PR SUBSEQUENT OBSERVATION CARE,LEVEL III: CPT | Performed by: HOSPITALIST

## 2017-08-07 PROCEDURE — 36415 COLL VENOUS BLD VENIPUNCTURE: CPT

## 2017-08-07 PROCEDURE — 85347 COAGULATION TIME ACTIVATED: CPT

## 2017-08-07 PROCEDURE — 85027 COMPLETE CBC AUTOMATED: CPT

## 2017-08-07 PROCEDURE — 700105 HCHG RX REV CODE 258: Performed by: INTERNAL MEDICINE

## 2017-08-07 PROCEDURE — 86901 BLOOD TYPING SEROLOGIC RH(D): CPT

## 2017-08-07 PROCEDURE — P9034 PLATELETS, PHERESIS: HCPCS

## 2017-08-07 PROCEDURE — 85007 BL SMEAR W/DIFF WBC COUNT: CPT

## 2017-08-07 RX ADMIN — CARBIDOPA AND LEVODOPA 1 TABLET: 50; 200 TABLET, EXTENDED RELEASE ORAL at 14:44

## 2017-08-07 RX ADMIN — MODAFINIL 200 MG: 100 TABLET ORAL at 08:33

## 2017-08-07 RX ADMIN — STANDARDIZED SENNA CONCENTRATE AND DOCUSATE SODIUM 2 TABLET: 8.6; 5 TABLET, FILM COATED ORAL at 08:33

## 2017-08-07 RX ADMIN — SODIUM CHLORIDE: 9 INJECTION, SOLUTION INTRAVENOUS at 14:44

## 2017-08-07 RX ADMIN — CARBIDOPA AND LEVODOPA 1 TABLET: 50; 200 TABLET, EXTENDED RELEASE ORAL at 08:33

## 2017-08-07 RX ADMIN — CITALOPRAM HYDROBROMIDE 20 MG: 20 TABLET ORAL at 08:33

## 2017-08-07 RX ADMIN — MEMANTINE HYDROCHLORIDE 10 MG: 10 TABLET, FILM COATED ORAL at 19:47

## 2017-08-07 RX ADMIN — SODIUM CHLORIDE: 9 INJECTION, SOLUTION INTRAVENOUS at 06:10

## 2017-08-07 RX ADMIN — CARBIDOPA AND LEVODOPA 1 TABLET: 50; 200 TABLET, EXTENDED RELEASE ORAL at 19:43

## 2017-08-07 RX ADMIN — MEMANTINE HYDROCHLORIDE 10 MG: 10 TABLET, FILM COATED ORAL at 08:33

## 2017-08-07 RX ADMIN — AMANTADINE HYDROCHLORIDE 100 MG: 100 CAPSULE ORAL at 08:33

## 2017-08-07 RX ADMIN — AMANTADINE HYDROCHLORIDE 100 MG: 100 CAPSULE ORAL at 19:42

## 2017-08-07 RX ADMIN — STANDARDIZED SENNA CONCENTRATE AND DOCUSATE SODIUM 2 TABLET: 8.6; 5 TABLET, FILM COATED ORAL at 19:48

## 2017-08-07 ASSESSMENT — ENCOUNTER SYMPTOMS
HEARTBURN: 0
FEVER: 0
FOCAL WEAKNESS: 0
VOMITING: 0
PSYCHIATRIC NEGATIVE: 1
NAUSEA: 0
BLOOD IN STOOL: 0
WHEEZING: 0
CHILLS: 0
DOUBLE VISION: 0
HEADACHES: 0
CONSTIPATION: 0
SEIZURES: 0
GASTROINTESTINAL NEGATIVE: 1
BACK PAIN: 1
PALPITATIONS: 0
RESPIRATORY NEGATIVE: 1
DIARRHEA: 0
LOSS OF CONSCIOUSNESS: 0
DEPRESSION: 0
WEAKNESS: 1
ABDOMINAL PAIN: 0
BRUISES/BLEEDS EASILY: 0
EYES NEGATIVE: 1
HEMOPTYSIS: 0
DIZZINESS: 0
DIAPHORESIS: 0
CARDIOVASCULAR NEGATIVE: 1
COUGH: 0

## 2017-08-07 ASSESSMENT — PAIN SCALES - GENERAL
PAINLEVEL_OUTOF10: 0

## 2017-08-07 ASSESSMENT — LIFESTYLE VARIABLES: SUBSTANCE_ABUSE: 0

## 2017-08-07 NOTE — CARE PLAN
Problem: Safety  Goal: Will remain free from falls  Room/floor clear. Non skid socks on. Proper signs up. Bed alarm on. Bed low and locked. Call light and belonging within reach. Hourly rounding in place to make sure needs are met.    Problem: Respiratory:  Goal: Respiratory status will improve  Patient initially was on 2L oxygen via NC. Weaning off the O2 throughout the shift. Lung sounds are clear to upper lobes and diminished to lower lobes. Encouraged pt to utilize IS x10 times per hour or while awake. Pt pulled between 500~800ml. Pt currently on RA, sating >92%. Respiration even and non labored. Denies SOB.

## 2017-08-07 NOTE — CARE PLAN
Problem: Communication  Goal: The ability to communicate needs accurately and effectively will improve  POc discussed w pt and wife, verbalize understanding    Problem: Safety  Goal: Will remain free from injury  Bed alarm on, treaded socks on, hourly rounding, call light within reach     Problem: Venous Thromboembolism (VTW)/Deep Vein Thrombosis (DVT) Prevention:  Goal: Patient will participate in Venous Thrombosis (VTE)/Deep Vein Thrombosis (DVT)Prevention Measures  SCDs in place

## 2017-08-07 NOTE — PROGRESS NOTES
[facility] Hospitalist Progress Note    Date of Service: 2017    Chief Complaint  76 y.o. male admitted 2017 with low back pain.    Interval Problem Update  Mr. Felix is a 76-year-old gentleman who has Parkinson's disease, osteoporosis, has suffered a T11-12 and lumbar 1 acute compression fracture. He has chronic MDS syndrome. And has now continued to bleed despite of our efforts to transfuse him with platelets initially he was thrombocytopenic. Hematology was consulted. He in the past did see Dr. Sierra of hematology Dr. Cordell Ribeiro has been counseled. Patient at this point we'll continue with physical therapy occupational therapy evaluation, he will be referred to skilled versus rehab. Continue with pain management.    Consultants/Specialty  Pain management, hematology.    Disposition  Skilled versus rehab.        Review of Systems   Constitutional: Negative for fever, chills and diaphoresis.   HENT: Negative.    Eyes: Negative.  Negative for double vision.   Respiratory: Negative.  Negative for cough, hemoptysis and wheezing.    Cardiovascular: Negative.  Negative for chest pain, palpitations and leg swelling.   Gastrointestinal: Negative.  Negative for heartburn, nausea, vomiting, abdominal pain, diarrhea, constipation and blood in stool.   Genitourinary: Negative.  Negative for urgency, frequency and hematuria.   Musculoskeletal: Positive for back pain (bleeding from the back where he had surgical site.). Negative for joint pain.   Skin: Negative.  Negative for itching and rash.   Neurological: Positive for weakness. Negative for dizziness, focal weakness, seizures, loss of consciousness and headaches.   Endo/Heme/Allergies: Negative.  Does not bruise/bleed easily.   Psychiatric/Behavioral: Negative.  Negative for depression, suicidal ideas and substance abuse.      Physical Exam  Laboratory/Imaging   Hemodynamics  Temp (24hrs), Av.6 °C (97.8 °F), Min:36.2 °C (97.1 °F), Max:37 °C (98.6 °F)    Temperature: 36.7 °C (98 °F)  Pulse  Av.2  Min: 46  Max: 71 Heart Rate (Monitored): 62  Blood Pressure : 105/64 mmHg, NIBP: 135/79 mmHg      Respiratory      Respiration: 16, Pulse Oximetry: 94 %     Work Of Breathing / Effort: Mild  RUL Breath Sounds: Clear, RML Breath Sounds: Diminished, RLL Breath Sounds: Diminished, AQUILINO Breath Sounds: Clear, LLL Breath Sounds: Diminished    Fluids    Intake/Output Summary (Last 24 hours) at 17 1453  Last data filed at 17 1430   Gross per 24 hour   Intake   2189 ml   Output   1120 ml   Net   1069 ml       Nutrition  Orders Placed This Encounter   Procedures   • Diet Order     Standing Status: Standing      Number of Occurrences: 1      Standing Expiration Date:      Order Specific Question:  Diet:     Answer:  2 Gram Sodium [7]     Physical Exam   Constitutional: He is oriented to person, place, and time. He appears well-developed and well-nourished.   HENT:   Head: Normocephalic and atraumatic.   Right Ear: External ear normal.   Left Ear: External ear normal.   Nose: Nose normal.   Mouth/Throat: Oropharynx is clear and moist.   Eyes: Conjunctivae and EOM are normal. Pupils are equal, round, and reactive to light.   Neck: Normal range of motion. Neck supple. No JVD present. No thyromegaly present.   Cardiovascular: Normal rate and regular rhythm.    No murmur heard.  Pulmonary/Chest: He has no wheezes. He has no rales. He exhibits no tenderness.   Abdominal: He exhibits no distension and no mass. There is no tenderness. There is no rebound and no guarding.   Genitourinary:   Hurst catheter   Musculoskeletal: Normal range of motion. He exhibits tenderness (mid back.). He exhibits no edema.   Lymphadenopathy:     He has no cervical adenopathy.   Neurological: He is alert and oriented to person, place, and time. No cranial nerve deficit. Coordination abnormal.   Skin: Skin is warm and dry. No rash noted. No erythema.   Patient continues to lose with blood from the  mid back. The blood has a hard time coagulating becomes very gelatinous. So far she's lost about 2 units.   Psychiatric: He has a normal mood and affect.   Nursing note and vitals reviewed.      Recent Labs      08/06/17   1616  08/07/17   0150   WBC  11.9*  10.2   RBC  4.72  4.21*   HEMOGLOBIN  13.7*  12.0*   HEMATOCRIT  41.1*  36.6*   MCV  87.1  86.9   MCH  29.0  28.5   MCHC  33.3*  32.8*   RDW  51.1*  51.1*   PLATELETCT  76*  72*   MPV  13.6*  14.4*     Recent Labs      08/06/17   1616  08/07/17   0150   SODIUM  137  137   POTASSIUM  4.5  4.4   CHLORIDE  105  105   CO2  25  25   GLUCOSE  130*  125*   BUN  18  19   CREATININE  0.76  0.66   CALCIUM  8.9  8.6                      Assessment/Plan     * Compression of lumbar vertebra (CMS-HCC)  Assessment & Plan  Status post L1 kyphoplasty continue with pain management continue with biopsy follow-ups.    Myelodysplastic syndrome (CMS-HCC) (present on admission)  Assessment & Plan  Patient's platelet counts were low at 48. And the patient continued to bleed those we had to give platelets this point to units of platelets were transfused subsequently counts have come up now to 72. Patient however continues to bleed. At this point patient will need surgical seal and wound care consult stat. I spoke with the surgeon he feels that if the bleeding cannot be controlled he will take the patient back to the OR.    Compression fracture of thoracic vertebra (CMS-HCC)  Assessment & Plan  Status post kyphoplasty of T11, T12 and L1.  Continue with pain management  Continue with physical therapy and occupational therapy.  Will need skilled placement.    Parkinson's disease (CMS-[facility])  Assessment & Plan  Medical management, continue with short-acting and long-acting Sinemet, continue with Namenda, continue with amantadine and Provigil.  Patient's Parkinson's disease is currently needs seems to be under control.    Debility  Assessment & Plan  PT OT, patient will need rehab evaluation  and skilled placement most likely. Currently patient may have a rehab old diagnosis well most acute rehab may benefit if he qualifies.    EKG reviewed, Radiology images reviewed, Labs reviewed and Medications reviewed  Hurst catheter: No Hurst      DVT Prophylaxis: Contraindicated - High bleeding risk    Ulcer prophylaxis: Not indicated    Assessed for rehab: Patient was assess for and/or received rehabilitation services during this hospitalization

## 2017-08-07 NOTE — PROGRESS NOTES
Noticed surgical dressing to lower back very saturated. Pt denies any dizziness or light headed. VSS. Called Dr. Gallardo and she told RN okay to change dressing with compressive tape. Dr. Gallardo told this RN no need stat CBC at this time. Routine CBC, BMP lab scheduled at 0300 am at 08/07/17. Will continue to monitor for any further  bleeding from the surgical site.

## 2017-08-07 NOTE — PROGRESS NOTES
Noticed very saturated lower back dressing again. Changed dressing with absorbent abdominal pad and compressive wrap. New abdominal binder ordered. Pt denies dizziness or light headed. VSS. Dr. Gallardo informed of continuous bleeding from surgical site. Dr. Gallardo told this RN to use the surgical cell dressing. Got the dressing from the [facility] OR. However, Dr Estrada was rounding and looked at the pt's surgical site and told day shift RN, Domenica, that it is okay to leave current dressing at this time. Left the surgical cell dressing at the pt's bedside for further bleeding.

## 2017-08-07 NOTE — PROGRESS NOTES
Dr. Estrada notified of another saturated back dressing with large jelly like clots. Per Dr. Estrada replace dressing with surgical cell dressing that Dr Gallardo requested and notify Dr. Gallardo. When dressing was changed, already became immediately saturated. Abdominal binder placed.  left for Dr. Gallardo's MA. Awaiting call back

## 2017-08-07 NOTE — PROGRESS NOTES
AAOX4, PEREZ   Denies N/T  Scab to REY Estrada showed dressing removed by night shift at 0600, orders received to keep dressing in place   Bed rest per hospitalist ROSA Merritt at this time until platelets transfused     Denies pain at this time  Pressure Dressing clean, dry and intact  Voiding without difficulty  Bed alarm on. Call light within reach. Treaded socks and SCDs in place.   Slurs at times, baseline, parkinsons

## 2017-08-07 NOTE — PROGRESS NOTES
Pt arrived onto unit at 1700. Pt A&O*4, denies pain. VSS. Assessment complete. Abdominal binder in place, dressing with moderate amount bleeding. Family at bedside. All needs met. Oriented to unit. Fall precautions in place, call light in reach, white board updated, hourly rounding in place. Will continue to monitor.

## 2017-08-08 LAB
APTT PPP: 35.9 SEC (ref 24.7–36)
BASOPHILS # BLD AUTO: 0 % (ref 0–1.8)
BASOPHILS # BLD: 0 K/UL (ref 0–0.12)
EOSINOPHIL # BLD AUTO: 0.29 K/UL (ref 0–0.51)
EOSINOPHIL NFR BLD: 2.7 % (ref 0–6.9)
ERYTHROCYTE [DISTWIDTH] IN BLOOD BY AUTOMATED COUNT: 50.4 FL (ref 35.9–50)
HCT VFR BLD AUTO: 30.4 % (ref 42–52)
HGB BLD-MCNC: 10 G/DL (ref 14–18)
INR PPP: 1.27 (ref 0.87–1.13)
LYMPHOCYTES # BLD AUTO: 0.98 K/UL (ref 1–4.8)
LYMPHOCYTES NFR BLD: 9.2 % (ref 22–41)
MANUAL DIFF BLD: NORMAL
MCH RBC QN AUTO: 28.2 PG (ref 27–33)
MCHC RBC AUTO-ENTMCNC: 32.9 G/DL (ref 33.7–35.3)
MCV RBC AUTO: 85.9 FL (ref 81.4–97.8)
MONOCYTES # BLD AUTO: 3.12 K/UL (ref 0–0.85)
MONOCYTES NFR BLD AUTO: 29.4 % (ref 0–13.4)
MORPHOLOGY BLD-IMP: NORMAL
NEUTROPHILS # BLD AUTO: 6.22 K/UL (ref 1.82–7.42)
NEUTROPHILS NFR BLD: 58.7 % (ref 44–72)
NRBC # BLD AUTO: 0 K/UL
NRBC BLD AUTO-RTO: 0 /100 WBC
PLATELET # BLD AUTO: 132 K/UL (ref 164–446)
PMV BLD AUTO: 11.8 FL (ref 9–12.9)
PROTHROMBIN TIME: 16.3 SEC (ref 12–14.6)
RBC # BLD AUTO: 3.54 M/UL (ref 4.7–6.1)
WBC # BLD AUTO: 10.6 K/UL (ref 4.8–10.8)

## 2017-08-08 PROCEDURE — G8979 MOBILITY GOAL STATUS: HCPCS | Mod: CI

## 2017-08-08 PROCEDURE — G8987 SELF CARE CURRENT STATUS: HCPCS | Mod: CI

## 2017-08-08 PROCEDURE — G8978 MOBILITY CURRENT STATUS: HCPCS | Mod: CJ

## 2017-08-08 PROCEDURE — 85027 COMPLETE CBC AUTOMATED: CPT

## 2017-08-08 PROCEDURE — 97162 PT EVAL MOD COMPLEX 30 MIN: CPT

## 2017-08-08 PROCEDURE — G8988 SELF CARE GOAL STATUS: HCPCS | Mod: CI

## 2017-08-08 PROCEDURE — 700105 HCHG RX REV CODE 258: Performed by: INTERNAL MEDICINE

## 2017-08-08 PROCEDURE — 85610 PROTHROMBIN TIME: CPT

## 2017-08-08 PROCEDURE — 36415 COLL VENOUS BLD VENIPUNCTURE: CPT

## 2017-08-08 PROCEDURE — 700101 HCHG RX REV CODE 250: Performed by: INTERNAL MEDICINE

## 2017-08-08 PROCEDURE — A9270 NON-COVERED ITEM OR SERVICE: HCPCS | Performed by: INTERNAL MEDICINE

## 2017-08-08 PROCEDURE — 85007 BL SMEAR W/DIFF WBC COUNT: CPT

## 2017-08-08 PROCEDURE — 700102 HCHG RX REV CODE 250 W/ 637 OVERRIDE(OP): Performed by: INTERNAL MEDICINE

## 2017-08-08 PROCEDURE — 97166 OT EVAL MOD COMPLEX 45 MIN: CPT

## 2017-08-08 PROCEDURE — 99232 SBSQ HOSP IP/OBS MODERATE 35: CPT | Performed by: INTERNAL MEDICINE

## 2017-08-08 PROCEDURE — 85730 THROMBOPLASTIN TIME PARTIAL: CPT

## 2017-08-08 PROCEDURE — 770001 HCHG ROOM/CARE - MED/SURG/GYN PRIV*

## 2017-08-08 RX ADMIN — STANDARDIZED SENNA CONCENTRATE AND DOCUSATE SODIUM 2 TABLET: 8.6; 5 TABLET, FILM COATED ORAL at 07:41

## 2017-08-08 RX ADMIN — CARBIDOPA AND LEVODOPA 1 TABLET: 50; 200 TABLET, EXTENDED RELEASE ORAL at 21:41

## 2017-08-08 RX ADMIN — MEMANTINE HYDROCHLORIDE 10 MG: 10 TABLET, FILM COATED ORAL at 21:41

## 2017-08-08 RX ADMIN — MEMANTINE HYDROCHLORIDE 10 MG: 10 TABLET, FILM COATED ORAL at 07:42

## 2017-08-08 RX ADMIN — LABETALOL HYDROCHLORIDE 10 MG: 5 INJECTION INTRAVENOUS at 05:34

## 2017-08-08 RX ADMIN — LABETALOL HYDROCHLORIDE 10 MG: 5 INJECTION INTRAVENOUS at 11:14

## 2017-08-08 RX ADMIN — MODAFINIL 200 MG: 100 TABLET ORAL at 07:42

## 2017-08-08 RX ADMIN — AMANTADINE HYDROCHLORIDE 100 MG: 100 CAPSULE ORAL at 07:41

## 2017-08-08 RX ADMIN — CARBIDOPA AND LEVODOPA 1 TABLET: 50; 200 TABLET, EXTENDED RELEASE ORAL at 07:41

## 2017-08-08 RX ADMIN — CARBIDOPA AND LEVODOPA 1 TABLET: 50; 200 TABLET, EXTENDED RELEASE ORAL at 15:56

## 2017-08-08 RX ADMIN — STANDARDIZED SENNA CONCENTRATE AND DOCUSATE SODIUM 2 TABLET: 8.6; 5 TABLET, FILM COATED ORAL at 21:41

## 2017-08-08 RX ADMIN — SODIUM CHLORIDE: 9 INJECTION, SOLUTION INTRAVENOUS at 04:33

## 2017-08-08 RX ADMIN — CITALOPRAM HYDROBROMIDE 20 MG: 20 TABLET ORAL at 07:42

## 2017-08-08 RX ADMIN — AMANTADINE HYDROCHLORIDE 100 MG: 100 CAPSULE ORAL at 21:41

## 2017-08-08 ASSESSMENT — ENCOUNTER SYMPTOMS
HEADACHES: 0
COUGH: 0
SEIZURES: 0
VOMITING: 0
RESPIRATORY NEGATIVE: 1
HEARTBURN: 0
LOSS OF CONSCIOUSNESS: 0
DOUBLE VISION: 0
CARDIOVASCULAR NEGATIVE: 1
NAUSEA: 0
BACK PAIN: 1
FEVER: 0
ABDOMINAL PAIN: 0
WEAKNESS: 1
HEMOPTYSIS: 0
CHILLS: 0
CONSTIPATION: 0
EYES NEGATIVE: 1
DIZZINESS: 0
PSYCHIATRIC NEGATIVE: 1
FOCAL WEAKNESS: 0
DIAPHORESIS: 0
BLOOD IN STOOL: 0
PALPITATIONS: 0
WHEEZING: 0
DIARRHEA: 0
GASTROINTESTINAL NEGATIVE: 1
BRUISES/BLEEDS EASILY: 0
DEPRESSION: 0

## 2017-08-08 ASSESSMENT — COGNITIVE AND FUNCTIONAL STATUS - GENERAL
SUGGESTED CMS G CODE MODIFIER MOBILITY: CK
CLIMB 3 TO 5 STEPS WITH RAILING: A LOT
MOVING TO AND FROM BED TO CHAIR: A LITTLE
EATING MEALS: A LITTLE
WALKING IN HOSPITAL ROOM: A LITTLE
PERSONAL GROOMING: A LITTLE
TOILETING: A LITTLE
TURNING FROM BACK TO SIDE WHILE IN FLAT BAD: A LOT
DRESSING REGULAR LOWER BODY CLOTHING: A LOT
HELP NEEDED FOR BATHING: A LOT
MOVING FROM LYING ON BACK TO SITTING ON SIDE OF FLAT BED: A LITTLE
MOBILITY SCORE: 16
STANDING UP FROM CHAIR USING ARMS: A LITTLE
DAILY ACTIVITIY SCORE: 16
DRESSING REGULAR UPPER BODY CLOTHING: A LITTLE
SUGGESTED CMS G CODE MODIFIER DAILY ACTIVITY: CK

## 2017-08-08 ASSESSMENT — PAIN SCALES - GENERAL
PAINLEVEL_OUTOF10: 0

## 2017-08-08 ASSESSMENT — GAIT ASSESSMENTS
GAIT LEVEL OF ASSIST: CONTACT GUARD ASSIST
DISTANCE (FEET): 150
ASSISTIVE DEVICE: FRONT WHEEL WALKER
DEVIATION: BRADYKINETIC;SHUFFLED GAIT;OTHER (COMMENT)

## 2017-08-08 ASSESSMENT — ACTIVITIES OF DAILY LIVING (ADL): TOILETING: INDEPENDENT

## 2017-08-08 ASSESSMENT — LIFESTYLE VARIABLES: SUBSTANCE_ABUSE: 0

## 2017-08-08 NOTE — WOUND TEAM
In to see pt for packing of hypoplasty wound with bleeding. With assistance of RN, pt rolled to L side lying, removed drsg, cleaned wound with NS, no bleeding or large clots present. Placed unsoiled pieces of surgicel over wound, covered with Gauze and secured with tensoblast, then hypafix tape stretched over wound for compression drsg. Abdominal binder replaced.

## 2017-08-08 NOTE — DISCHARGE PLANNING
Referral: SNF VS Rehab    Intervention: VALERIO spoke with ROSA Metzger in reference to obtaining Rehab Order.    Plan: SNF VS Rehab.

## 2017-08-08 NOTE — CARE PLAN
Problem: Safety  Goal: Will remain free from injury  Outcome: PROGRESSING AS EXPECTED  Pt on bedrest secondary to incisional bleeding. Pt awake alert and oriented, instructed to not get out of bed alone. Bed alarm on. Call bell in reach, abd binder on.

## 2017-08-08 NOTE — CONSULTS
DATE OF SERVICE:  08/07/2017    REFERRING PHYSICIAN:  Dr. Katarina Estrada.    HISTORY OF PRESENT ILLNESS:  The patient is a 76-year-old gentleman who has   been seen in the past by Dr. Jacobsen for a myelodysplastic syndrome.  The   patient has also had low platelets.  He has had at least 2 bone marrow   biopsies.  I do note a bone marrow biopsy in 2010 and from reading that was   somewhat hypercellular consistent with MDS.  He had another one in 2011 that   was also a hypercellular with hyperplastic hematopoiesis with no increase in   blasts and normal cytogenetics studies.  The patient has never had any   definitive treatment for his MDS.  Apparently, he has bad Parkinson's.  He   fell earlier this year and suffered a lower thoracic upper lumbar compression   fracture.  He has had pain.  Yesterday, the patient was taken to surgery by   Dr. Gallardo and had a T11, T12 L1 kyphoplasty.  I have been asked to see the   patient because last night and this morning, he had significant bleeding at   his wound sites.  The patient has been transfused platelets.  I see a platelet   count at 01:00 a.m. that was up to 72,000, white count was excellent at   10,000, hemoglobin was 12.  The patient is status post splenectomy, which   certainly does not hurt _____ are boosting up his blood counts.  I did talk to   the nurse, she tells me that with the most recent dressing change, the   patient's wound site looks much better.  He has a compression dressing.  He   has been seeing wound care.    PAST MEDICAL HISTORY:  Pertinent for Parkinson's disease and the   above-mentioned MDS.    PAST SURGICAL HISTORY:  The patient is status post the splenectomy and knee   arthroscopy, a subtotal prostatectomy, a rotator cuff repair and a cataract   extraction.    RISK FACTORS:  The patient does not smoke.  Alcohol intake is rare.    REVIEW OF SYSTEMS:  The patient has had no fevers or chills.  He does have   easy bruising.  He has had no recent  nosebleeds.  He has no shortness of   breath.  He has no chest pain, no abdominal pain.  He has had myalgias.  He   has had back pain.  He has had tremors.  He has had lower extremity weakness.    ALLERGIES:  None known.    MEDICATIONS:  Medicines have included carbidopa, levodopa, as well as Celexa,   Namenda, Symmetrel, and Provigil.    PHYSICAL EXAMINATION:  VITAL SIGNS:  Currently on physical, the patient's recent temperature is 36.5,   pulse 54, respirations 16, blood pressure 105/64, sats 94% on room air.  GENERAL:  The patient is awake and alert.  He is a little bit slow to respond,   which I think goes hand-in-hand with his Parkinson's.  HEENT:  He has no scleral icterus.  He has no oral lesions.  I did not attempt   to sit the patient up to listen to his lungs.   He has a compression dressing   across his abdomen around to the back, I did not want to disturb that and   this makes examining the abdomen impossible.  LOWER EXTREMITIES:  Benign.  The patient can move both lower extremities.  He   can move his upper extremities.    IMPRESSION:  Hopefully, the patient's bleeding has stopped, although the   patient had a TEG.  He has not had a conventional coags and I am going order   those.  We will make sure he has a CBC tomorrow.  I am hopeful that if we can   keep his platelets in the 70,000 range for a day or two, his bleeding will   stop.    I did point out to the patient's wife that a CAT scan obtained last evening of   the chest, abdomen and pelvis did demonstrate a 1.5 hypodense mass in the mid   right kidney, which is possibly just a complex cyst.  The same with a 1.7 cm   mass at the hepatic dome, also possibly a complex cyst.  These will need to be   followed up by the patient's primary care physician, in 6 months or so.       ____________________________________     F MD KATHERIN CARABALLO / ABISAI    DD:  08/07/2017 16:18:30  DT:  08/07/2017 17:52:32    D#:  [id number]  Job#:  [id number]

## 2017-08-08 NOTE — DISCHARGE PLANNING
Referral: Follow Up    Intervention: Per RN, MD provided SNF Order as pt is not a candidate for Acute Inpatient Rehab.  SW met with pt and Elizabeth, pt's wife.  Per Elizabeth's request, referral sent to [facility] in [place], [place].    Plan: SNF, pending acceptance and bed availability.

## 2017-08-08 NOTE — THERAPY
"Physical Therapy Evaluation completed.   Bed Mobility:  Supine to Sit: Moderate Assist  Transfers: Sit to Stand: Contact Guard Assist  Gait: Level Of Assist: Contact Guard Assist with Front-Wheel Walker       Plan of Care: Will benefit from Physical Therapy 4 times per week  Discharge Recommendations: Equipment: Will Continue to Assess for Equipment Needs. Post-acute therapy: see below.    Pt presented s/p T11-L1 kyphoplasty with chronic Parkinson's Disease and myelodysplastic syndrome. Pt ambulated ~150ft in unit with FWW with CGA, required mod A for bed mobility. Pt currently limited 2' to spinal precautions, deconditioning, and impaired motor control. Pt will benefit from continued skilled acute PT services. Pt's spouse expressed concern regarding ability to provide current level of physical assist required. Pt currently appears capable of tolerating 3 hours of therapy/day; recommend continued skilled PT services/placement (inpatient rehab or SNF) prior to returning home. Will continue to visit.    See \"Rehab Therapy-Acute\" Patient Summary Report for complete documentation.     "

## 2017-08-08 NOTE — PROGRESS NOTES
AAOX4, PEREZ   Denies N/T  Bed rest     Denies pain   Dressing to back clean, dry and intact, abd binder in place  Voiding without difficulty, LBM 8/5 POC discussed, stool softener given, will try laxative when mobilizing  Bed alarm on. Call light within reach. SCDs and treaded socks on. Q2H turns in place.

## 2017-08-08 NOTE — CARE PLAN
Problem: Safety  Goal: Will remain free from injury  Bed alarm on, treaded socks on, call light within reach, hourly rounding, family at bedside    Problem: Venous Thromboembolism (VTW)/Deep Vein Thrombosis (DVT) Prevention:  Goal: Patient will participate in Venous Thrombosis (VTE)/Deep Vein Thrombosis (DVT)Prevention Measures  SCDs in place

## 2017-08-08 NOTE — PROGRESS NOTES
Spoke w Dr. Mehta regarding pt's bedrest order post kyphoplasty and difficulty getting bleeding to stop 8/7. Orders received to DC bed rest. Rosi peguero OT notified, will be in to work w pt

## 2017-08-08 NOTE — PROGRESS NOTES
Attempted 2 IV sticks on pt, charge RN attempted 1. Pt's wife requesting we take a break and attempt later. Will pass on to night shift.

## 2017-08-08 NOTE — DISCHARGE PLANNING
Received choice form from [employer]). Referral sent to [facility]. SNF order pending.

## 2017-08-08 NOTE — THERAPY
"Occupational Therapy Evaluation completed.   Functional Status: Min/mod A with ADLs and txfs  Plan of Care: Will benefit from Occupational Therapy 4 times per week  Discharge Recommendations:  Equipment: Will Continue to Assess for Equipment Needs. Post-acute therapy Discharge to a transitional care facility for continued skilled therapy services.    See \"Rehab Therapy-Acute\" Patient Summary Report for complete documentation.    "

## 2017-08-08 NOTE — PROGRESS NOTES
[facility] Hospitalist Progress Note    Date of Service: 2017    Chief Complaint  76 y.o. male admitted 2017 with low back pain.    Interval Problem Update  Mr. Felix is a 76-year-old gentleman who has Parkinson's disease, osteoporosis, has suffered a T11-12 and lumbar 1 acute compression fracture. He has chronic MDS syndrome. Experienced bleeding post kypho requiring plt transfusion. Hematology was consulted. He has previously seen Dr. Sierra with hematology. Dr. Cordell Ribeiro was consulted.   He is no longer bleeding. He would like to get out of bed and attempt to walk.   Spoke to wife at bedside who prefers for patient to go to SNF at [facility] as it's closer to where they live.   Also, discussed CT scan results with her.      Consultants/Specialty  Pain management, hematology.    Disposition  Likely SNF, pending PT recommendation.         Review of Systems   Constitutional: Negative for fever, chills and diaphoresis.   HENT: Negative.    Eyes: Negative.  Negative for double vision.   Respiratory: Negative.  Negative for cough, hemoptysis and wheezing.    Cardiovascular: Negative.  Negative for chest pain, palpitations and leg swelling.   Gastrointestinal: Negative.  Negative for heartburn, nausea, vomiting, abdominal pain, diarrhea, constipation and blood in stool.   Genitourinary: Negative.  Negative for urgency, frequency and hematuria.   Musculoskeletal: Positive for back pain (bleeding from the back where he had surgical site.). Negative for joint pain.   Skin: Negative.  Negative for itching and rash.   Neurological: Positive for weakness. Negative for dizziness, focal weakness, seizures, loss of consciousness and headaches.   Endo/Heme/Allergies: Negative.  Does not bruise/bleed easily.   Psychiatric/Behavioral: Negative.  Negative for depression, suicidal ideas and substance abuse.      Physical Exam  Laboratory/Imaging   Hemodynamics  Temp (24hrs), Av.7 °C (98.1 °F), Min:36.5 °C (97.7 °F),  Max:36.9 °C (98.5 °F)   Temperature: 36.8 °C (98.2 °F)  Pulse  Av.3  Min: 46  Max: 71    Blood Pressure : 130/60 mmHg      Respiratory      Respiration: 17, Pulse Oximetry: 96 %     Work Of Breathing / Effort: Mild  RUL Breath Sounds: Clear, RML Breath Sounds: Clear, RLL Breath Sounds: Diminished, AQUILINO Breath Sounds: Clear, LLL Breath Sounds: Diminished    Fluids    Intake/Output Summary (Last 24 hours) at 17 1428  Last data filed at 17 1430   Gross per 24 hour   Intake      0 ml   Output      0 ml   Net      0 ml       Nutrition  Orders Placed This Encounter   Procedures   • Diet Order     Standing Status: Standing      Number of Occurrences: 1      Standing Expiration Date:      Order Specific Question:  Diet:     Answer:  2 Gram Sodium [7]     Physical Exam   Constitutional: He is oriented to person, place, and time. He appears well-developed and well-nourished.   HENT:   Head: Normocephalic and atraumatic.   Right Ear: External ear normal.   Left Ear: External ear normal.   Nose: Nose normal.   Mouth/Throat: Oropharynx is clear and moist.   Eyes: Conjunctivae and EOM are normal. Pupils are equal, round, and reactive to light.   Neck: Normal range of motion. Neck supple. No JVD present. No thyromegaly present.   Cardiovascular: Normal rate and regular rhythm.    No murmur heard.  Pulmonary/Chest: He has no wheezes. He has no rales. He exhibits no tenderness.   Abdominal: He exhibits no distension and no mass. There is no tenderness. There is no rebound and no guarding.   Musculoskeletal: Normal range of motion. He exhibits tenderness (mid back.). He exhibits no edema.   Lymphadenopathy:     He has no cervical adenopathy.   Neurological: He is alert and oriented to person, place, and time. No cranial nerve deficit. Coordination abnormal.   Skin: Skin is warm and dry. No rash noted. No erythema.   Dressing over the lumbar spine appears to be dry and intact     Psychiatric: He has a normal mood and  affect.   Nursing note and vitals reviewed.      Recent Labs      08/06/17   1616  08/07/17   0150  08/08/17   0306   WBC  11.9*  10.2  10.6   RBC  4.72  4.21*  3.54*   HEMOGLOBIN  13.7*  12.0*  10.0*   HEMATOCRIT  41.1*  36.6*  30.4*   MCV  87.1  86.9  85.9   MCH  29.0  28.5  28.2   MCHC  33.3*  32.8*  32.9*   RDW  51.1*  51.1*  50.4*   PLATELETCT  76*  72*  132*   MPV  13.6*  14.4*  11.8     Recent Labs      08/06/17   1616  08/07/17   0150   SODIUM  137  137   POTASSIUM  4.5  4.4   CHLORIDE  105  105   CO2  25  25   GLUCOSE  130*  125*   BUN  18  19   CREATININE  0.76  0.66   CALCIUM  8.9  8.6     Recent Labs      08/08/17   0306   APTT  35.9   INR  1.27*                  Assessment/Plan     * Compression of lumbar vertebra (CMS-HCC)  Assessment & Plan  Status post L1 kyphoplasty continue with pain management.  Pending pathology report.   CT scan showed cystic lesion of the kidney and liver. Discussed with patient and wife.  Per oncology pt needs follow up imaging in 6 months.       Myelodysplastic syndrome (CMS-HCC) (present on admission)  Assessment & Plan  Patient's platelet counts were low at 48.   Patient continued to have bleeding post procedure.  S/p transfusion of plt.  Plt 132 today and bleeding has stopped.  Cont to monitor.  Heme-onc was consulted.       Compression fracture of thoracic vertebra (CMS-HCC)  Assessment & Plan  Status post kyphoplasty of T11, T12 and L1.  Continue with pain management  Started with physical therapy and occupational therapy today.   Will need skilled placement.    Parkinson's disease (CMS-HCC)  Assessment & Plan  Medical management, continue with short-acting and long-acting Sinemet, continue with Namenda, continue with amantadine and Provigil.  Patient's Parkinson's disease is currently under control.  Confirmed dosing of carbi-leva with wife.    Debility  Assessment & Plan  PT OT, patient will need rehab evaluation and skilled placement most likely.       EKG reviewed,  Radiology images reviewed, Labs reviewed and Medications reviewed  Hurst catheter: No Hurst      DVT Prophylaxis: Contraindicated - High bleeding risk    Ulcer prophylaxis: Not indicated    Assessed for rehab: Patient was assess for and/or received rehabilitation services during this hospitalization

## 2017-08-09 ENCOUNTER — APPOINTMENT (OUTPATIENT)
Dept: RADIOLOGY | Facility: MEDICAL CENTER | Age: 77
DRG: 908 | End: 2017-08-09
Attending: INTERNAL MEDICINE
Payer: MEDICARE

## 2017-08-09 LAB
ALBUMIN SERPL BCP-MCNC: 3.3 G/DL (ref 3.2–4.9)
BASOPHILS # BLD AUTO: 0.9 % (ref 0–1.8)
BASOPHILS # BLD: 0.08 K/UL (ref 0–0.12)
BUN SERPL-MCNC: 12 MG/DL (ref 8–22)
CALCIUM SERPL-MCNC: 8.8 MG/DL (ref 8.5–10.5)
CHLORIDE SERPL-SCNC: 106 MMOL/L (ref 96–112)
CO2 SERPL-SCNC: 25 MMOL/L (ref 20–33)
CREAT SERPL-MCNC: 0.7 MG/DL (ref 0.5–1.4)
EOSINOPHIL # BLD AUTO: 0 K/UL (ref 0–0.51)
EOSINOPHIL NFR BLD: 0 % (ref 0–6.9)
ERYTHROCYTE [DISTWIDTH] IN BLOOD BY AUTOMATED COUNT: 49.9 FL (ref 35.9–50)
GFR SERPL CREATININE-BSD FRML MDRD: >60 ML/MIN/1.73 M 2
GIANT PLATELETS BLD QL SMEAR: NORMAL
GLUCOSE SERPL-MCNC: 96 MG/DL (ref 65–99)
HCT VFR BLD AUTO: 30.2 % (ref 42–52)
HGB BLD-MCNC: 9.9 G/DL (ref 14–18)
LYMPHOCYTES # BLD AUTO: 0.8 K/UL (ref 1–4.8)
LYMPHOCYTES NFR BLD: 9.1 % (ref 22–41)
MANUAL DIFF BLD: NORMAL
MCH RBC QN AUTO: 28.3 PG (ref 27–33)
MCHC RBC AUTO-ENTMCNC: 32.8 G/DL (ref 33.7–35.3)
MCV RBC AUTO: 86.3 FL (ref 81.4–97.8)
MONOCYTES # BLD AUTO: 1.68 K/UL (ref 0–0.85)
MONOCYTES NFR BLD AUTO: 19.1 % (ref 0–13.4)
MORPHOLOGY BLD-IMP: NORMAL
NEUTROPHILS # BLD AUTO: 6.24 K/UL (ref 1.82–7.42)
NEUTROPHILS NFR BLD: 70.9 % (ref 44–72)
NRBC # BLD AUTO: 0.02 K/UL
NRBC BLD AUTO-RTO: 0.2 /100 WBC
PHOSPHATE SERPL-MCNC: 2.7 MG/DL (ref 2.5–4.5)
PLATELET # BLD AUTO: 159 K/UL (ref 164–446)
PLATELET BLD QL SMEAR: NORMAL
PMV BLD AUTO: 12.2 FL (ref 9–12.9)
POIKILOCYTOSIS BLD QL SMEAR: NORMAL
POTASSIUM SERPL-SCNC: 3.7 MMOL/L (ref 3.6–5.5)
RBC # BLD AUTO: 3.5 M/UL (ref 4.7–6.1)
RBC BLD AUTO: PRESENT
SODIUM SERPL-SCNC: 136 MMOL/L (ref 135–145)
SPHEROCYTES BLD QL SMEAR: NORMAL
WBC # BLD AUTO: 8.8 K/UL (ref 4.8–10.8)

## 2017-08-09 PROCEDURE — 85007 BL SMEAR W/DIFF WBC COUNT: CPT

## 2017-08-09 PROCEDURE — 99232 SBSQ HOSP IP/OBS MODERATE 35: CPT | Performed by: INTERNAL MEDICINE

## 2017-08-09 PROCEDURE — 71010 DX-CHEST-PORTABLE (1 VIEW): CPT

## 2017-08-09 PROCEDURE — 80069 RENAL FUNCTION PANEL: CPT

## 2017-08-09 PROCEDURE — 700102 HCHG RX REV CODE 250 W/ 637 OVERRIDE(OP): Performed by: INTERNAL MEDICINE

## 2017-08-09 PROCEDURE — 770001 HCHG ROOM/CARE - MED/SURG/GYN PRIV*

## 2017-08-09 PROCEDURE — A9270 NON-COVERED ITEM OR SERVICE: HCPCS | Performed by: INTERNAL MEDICINE

## 2017-08-09 PROCEDURE — 97112 NEUROMUSCULAR REEDUCATION: CPT

## 2017-08-09 PROCEDURE — 97116 GAIT TRAINING THERAPY: CPT

## 2017-08-09 PROCEDURE — 85027 COMPLETE CBC AUTOMATED: CPT

## 2017-08-09 PROCEDURE — 36415 COLL VENOUS BLD VENIPUNCTURE: CPT

## 2017-08-09 PROCEDURE — 97530 THERAPEUTIC ACTIVITIES: CPT

## 2017-08-09 PROCEDURE — 97535 SELF CARE MNGMENT TRAINING: CPT

## 2017-08-09 RX ORDER — METHOCARBAMOL 500 MG/1
500 TABLET, FILM COATED ORAL 3 TIMES DAILY PRN
Status: DISCONTINUED | OUTPATIENT
Start: 2017-08-09 | End: 2017-08-09 | Stop reason: CLARIF

## 2017-08-09 RX ADMIN — CARBIDOPA AND LEVODOPA 1 TABLET: 50; 200 TABLET, EXTENDED RELEASE ORAL at 14:57

## 2017-08-09 RX ADMIN — MEMANTINE HYDROCHLORIDE 10 MG: 10 TABLET, FILM COATED ORAL at 09:26

## 2017-08-09 RX ADMIN — CARBIDOPA AND LEVODOPA 1 TABLET: 50; 200 TABLET, EXTENDED RELEASE ORAL at 09:26

## 2017-08-09 RX ADMIN — AMANTADINE HYDROCHLORIDE 100 MG: 100 CAPSULE ORAL at 09:26

## 2017-08-09 RX ADMIN — MEMANTINE HYDROCHLORIDE 10 MG: 10 TABLET, FILM COATED ORAL at 20:43

## 2017-08-09 RX ADMIN — STANDARDIZED SENNA CONCENTRATE AND DOCUSATE SODIUM 2 TABLET: 8.6; 5 TABLET, FILM COATED ORAL at 09:26

## 2017-08-09 RX ADMIN — CARBIDOPA AND LEVODOPA 1 TABLET: 50; 200 TABLET, EXTENDED RELEASE ORAL at 20:43

## 2017-08-09 RX ADMIN — AMANTADINE HYDROCHLORIDE 100 MG: 100 CAPSULE ORAL at 20:43

## 2017-08-09 RX ADMIN — MODAFINIL 200 MG: 100 TABLET ORAL at 09:26

## 2017-08-09 RX ADMIN — CITALOPRAM HYDROBROMIDE 20 MG: 20 TABLET ORAL at 09:26

## 2017-08-09 ASSESSMENT — COGNITIVE AND FUNCTIONAL STATUS - GENERAL
CLIMB 3 TO 5 STEPS WITH RAILING: A LOT
MOVING TO AND FROM BED TO CHAIR: A LOT
DRESSING REGULAR LOWER BODY CLOTHING: A LOT
TOILETING: A LOT
MOVING FROM LYING ON BACK TO SITTING ON SIDE OF FLAT BED: A LITTLE
PERSONAL GROOMING: A LOT
EATING MEALS: A LITTLE
SUGGESTED CMS G CODE MODIFIER MOBILITY: CK
HELP NEEDED FOR BATHING: A LOT
TURNING FROM BACK TO SIDE WHILE IN FLAT BAD: A LOT
SUGGESTED CMS G CODE MODIFIER DAILY ACTIVITY: CK
DRESSING REGULAR UPPER BODY CLOTHING: A LITTLE
WALKING IN HOSPITAL ROOM: A LITTLE
DAILY ACTIVITIY SCORE: 14
MOBILITY SCORE: 15
STANDING UP FROM CHAIR USING ARMS: A LITTLE

## 2017-08-09 ASSESSMENT — ENCOUNTER SYMPTOMS
EYES NEGATIVE: 1
COUGH: 0
WEAKNESS: 1
DOUBLE VISION: 0
DIZZINESS: 0
FOCAL WEAKNESS: 0
BLOOD IN STOOL: 0
ABDOMINAL PAIN: 0
HEMOPTYSIS: 0
PALPITATIONS: 0
WHEEZING: 0
FEVER: 0
CARDIOVASCULAR NEGATIVE: 1
HEARTBURN: 0
PSYCHIATRIC NEGATIVE: 1
DIARRHEA: 0
CHILLS: 0
SEIZURES: 0
NAUSEA: 0
DIAPHORESIS: 0
LOSS OF CONSCIOUSNESS: 0
DEPRESSION: 0
BACK PAIN: 1
BRUISES/BLEEDS EASILY: 0
HEADACHES: 0
RESPIRATORY NEGATIVE: 1
VOMITING: 0
GASTROINTESTINAL NEGATIVE: 1
CONSTIPATION: 0
WEIGHT LOSS: 1

## 2017-08-09 ASSESSMENT — LIFESTYLE VARIABLES: SUBSTANCE_ABUSE: 0

## 2017-08-09 ASSESSMENT — GAIT ASSESSMENTS
DEVIATION: BRADYKINETIC;SHUFFLED GAIT
ASSISTIVE DEVICE: FRONT WHEEL WALKER
GAIT LEVEL OF ASSIST: CONTACT GUARD ASSIST
DISTANCE (FEET): 20

## 2017-08-09 ASSESSMENT — PAIN SCALES - GENERAL
PAINLEVEL_OUTOF10: 0
PAINLEVEL_OUTOF10: 0

## 2017-08-09 NOTE — THERAPY
"Physical Therapy Treatment completed.   Bed Mobility:  Supine to Sit: Moderate Assist  Transfers: Sit to Stand: Contact Guard Assist  Gait: Level Of Assist: Contact Guard Assist with Front-Wheel Walker       Plan of Care: Will benefit from Physical Therapy 4 times per week  Discharge Recommendations: Equipment: Will Continue to Assess for Equipment Needs. Post-acute therapy Discharge to a transitional care facility for continued skilled therapy services.     See \"Rehab Therapy-Acute\" Patient Summary Report for complete documentation.     Pt requiring a lot of cues for initation w/ functional activities. Needing assist w/ sequencing for bed mobility. Very slow gait pattern w/ pt fatiguing quickly. Attempted to find bedside commode but pt unable to hold it so pt requiring Elizabeth for toilet transfer to help w/ descent on very low toilet seat. Pt would benefit from post acute SNF to work on endurance and sequencing w/ functional activites for safe discharge to home w/ wife.  "

## 2017-08-09 NOTE — PROGRESS NOTES
"Pt refused pain medication all shift. Reported \"discomfort only, not pain\".  Wife stated that he could not take Oxycodone as he hallucinates with it.  Tylenol offered, pt declined. Wife requested that he be allowed to sleep overnight with minimal disruptions.   "

## 2017-08-09 NOTE — CARE PLAN
Problem: Safety  Goal: Will remain free from injury  Outcome: PROGRESSING AS EXPECTED  Pt wearing treaded slipper socks, bed alarm on, bed in low, locked position, and call light within reach. Hourly rounding in place.     Problem: Skin Integrity  Goal: Risk for impaired skin integrity will decrease  Outcome: PROGRESSING AS EXPECTED  Pt repositioned for comfort and to protect skin integrity. Assessed skin on pt's back and it is intact.

## 2017-08-09 NOTE — PROGRESS NOTES
Received report from night shift RN. Assumed care of patient. Pt assessed and stable. VSS. Patient reports 0/10 pain at this time. Discussed plan of care for day with patient and received verbal understanding. Wife at bedside. Call light within reach, strip alarm active, bed in low position.

## 2017-08-09 NOTE — DISCHARGE PLANNING
Received call from María Bhakta at  [facility] [phone], states they will not be able to accept patient until August 17,2017 as the will not have a MD to admit until then.  Dr. Grey  Is only there 1x per month.  Message left for COY [place].

## 2017-08-09 NOTE — DISCHARGE PLANNING
Medical Social Work     SW spoke with pt and wife at bedside, and pt is willing to do SNF choice forms for [facility] if [facility] in [place], [place] does not accept the pt. SW requested choice form from pt and wife. Wife would like to wait to do choice from for [facility] when they here back from [facility].

## 2017-08-09 NOTE — THERAPY
"Occupational Therapy Treatment completed with focus on ADLs, ADL transfers, patient education and caregiver training.  Functional Status:  Pt seen for OT tx. Pt up in BR at beginning of session. Pt required cueing and education about hand placement for toilet transfer using grab bars. Pt was min A for toilet transfer w/ low height of seat. Pt required max A for pericare. Pt and spouse asking about seated sponge bath since pt is unable to get incision wet. Pt setup w/ seated sponge bath. Pt required mod A w/ washing, Min A transfer from chair BTB. Min A for log roll back to supine. Pt and spouse pleasant and accepting to education during session.   Plan of Care: Will benefit from Occupational Therapy 3 times per week  Discharge Recommendations:  Equipment Will Continue to Assess for Equipment Needs. Post-acute therapy Discharge to a transitional care facility for continued skilled therapy services.    See \"Rehab Therapy-Acute\" Patient Summary Report for complete documentation.   "

## 2017-08-10 LAB
ALBUMIN SERPL BCP-MCNC: 3.1 G/DL (ref 3.2–4.9)
ANISOCYTOSIS BLD QL SMEAR: ABNORMAL
BASOPHILS # BLD AUTO: 2.8 % (ref 0–1.8)
BASOPHILS # BLD: 0.23 K/UL (ref 0–0.12)
BUN SERPL-MCNC: 14 MG/DL (ref 8–22)
BURR CELLS BLD QL SMEAR: NORMAL
CALCIUM SERPL-MCNC: 8.8 MG/DL (ref 8.5–10.5)
CHLORIDE SERPL-SCNC: 106 MMOL/L (ref 96–112)
CO2 SERPL-SCNC: 24 MMOL/L (ref 20–33)
CREAT SERPL-MCNC: 0.77 MG/DL (ref 0.5–1.4)
EOSINOPHIL # BLD AUTO: 0.07 K/UL (ref 0–0.51)
EOSINOPHIL NFR BLD: 0.9 % (ref 0–6.9)
ERYTHROCYTE [DISTWIDTH] IN BLOOD BY AUTOMATED COUNT: 50.2 FL (ref 35.9–50)
GFR SERPL CREATININE-BSD FRML MDRD: >60 ML/MIN/1.73 M 2
GIANT PLATELETS BLD QL SMEAR: NORMAL
GLUCOSE SERPL-MCNC: 98 MG/DL (ref 65–99)
HCT VFR BLD AUTO: 30.1 % (ref 42–52)
HGB BLD-MCNC: 9.9 G/DL (ref 14–18)
LYMPHOCYTES # BLD AUTO: 0.92 K/UL (ref 1–4.8)
LYMPHOCYTES NFR BLD: 11.1 % (ref 22–41)
MACROCYTES BLD QL SMEAR: ABNORMAL
MANUAL DIFF BLD: NORMAL
MCH RBC QN AUTO: 28.7 PG (ref 27–33)
MCHC RBC AUTO-ENTMCNC: 32.9 G/DL (ref 33.7–35.3)
MCV RBC AUTO: 87.2 FL (ref 81.4–97.8)
MONOCYTES # BLD AUTO: 1.69 K/UL (ref 0–0.85)
MONOCYTES NFR BLD AUTO: 20.4 % (ref 0–13.4)
MORPHOLOGY BLD-IMP: NORMAL
NEUTROPHILS # BLD AUTO: 5.38 K/UL (ref 1.82–7.42)
NEUTROPHILS NFR BLD: 64.8 % (ref 44–72)
NRBC # BLD AUTO: 0.02 K/UL
NRBC BLD AUTO-RTO: 0.2 /100 WBC
PHOSPHATE SERPL-MCNC: 2.9 MG/DL (ref 2.5–4.5)
PLATELET # BLD AUTO: 176 K/UL (ref 164–446)
PMV BLD AUTO: 12.7 FL (ref 9–12.9)
POIKILOCYTOSIS BLD QL SMEAR: NORMAL
POTASSIUM SERPL-SCNC: 3.8 MMOL/L (ref 3.6–5.5)
RBC # BLD AUTO: 3.45 M/UL (ref 4.7–6.1)
RBC BLD AUTO: PRESENT
SCHISTOCYTES BLD QL SMEAR: NORMAL
SODIUM SERPL-SCNC: 137 MMOL/L (ref 135–145)
WBC # BLD AUTO: 8.3 K/UL (ref 4.8–10.8)

## 2017-08-10 PROCEDURE — 85027 COMPLETE CBC AUTOMATED: CPT

## 2017-08-10 PROCEDURE — 700102 HCHG RX REV CODE 250 W/ 637 OVERRIDE(OP): Performed by: INTERNAL MEDICINE

## 2017-08-10 PROCEDURE — 99232 SBSQ HOSP IP/OBS MODERATE 35: CPT | Performed by: INTERNAL MEDICINE

## 2017-08-10 PROCEDURE — 36415 COLL VENOUS BLD VENIPUNCTURE: CPT

## 2017-08-10 PROCEDURE — A9270 NON-COVERED ITEM OR SERVICE: HCPCS | Performed by: INTERNAL MEDICINE

## 2017-08-10 PROCEDURE — 97116 GAIT TRAINING THERAPY: CPT

## 2017-08-10 PROCEDURE — 80069 RENAL FUNCTION PANEL: CPT

## 2017-08-10 PROCEDURE — 770001 HCHG ROOM/CARE - MED/SURG/GYN PRIV*

## 2017-08-10 PROCEDURE — 85007 BL SMEAR W/DIFF WBC COUNT: CPT

## 2017-08-10 PROCEDURE — 97530 THERAPEUTIC ACTIVITIES: CPT

## 2017-08-10 RX ORDER — ACETAMINOPHEN 325 MG/1
325 TABLET ORAL EVERY 6 HOURS PRN
Status: DISCONTINUED | OUTPATIENT
Start: 2017-08-10 | End: 2017-08-11 | Stop reason: HOSPADM

## 2017-08-10 RX ORDER — TRAMADOL HYDROCHLORIDE 50 MG/1
50 TABLET ORAL EVERY 6 HOURS PRN
Status: DISCONTINUED | OUTPATIENT
Start: 2017-08-10 | End: 2017-08-11 | Stop reason: HOSPADM

## 2017-08-10 RX ADMIN — MEMANTINE HYDROCHLORIDE 10 MG: 10 TABLET, FILM COATED ORAL at 09:04

## 2017-08-10 RX ADMIN — CITALOPRAM HYDROBROMIDE 20 MG: 20 TABLET ORAL at 09:04

## 2017-08-10 RX ADMIN — MEMANTINE HYDROCHLORIDE 10 MG: 10 TABLET, FILM COATED ORAL at 22:03

## 2017-08-10 RX ADMIN — TRAMADOL HYDROCHLORIDE 50 MG: 50 TABLET, COATED ORAL at 13:06

## 2017-08-10 RX ADMIN — CARBIDOPA AND LEVODOPA 1 TABLET: 50; 200 TABLET, EXTENDED RELEASE ORAL at 22:03

## 2017-08-10 RX ADMIN — MODAFINIL 200 MG: 100 TABLET ORAL at 09:04

## 2017-08-10 RX ADMIN — CARBIDOPA AND LEVODOPA 1 TABLET: 50; 200 TABLET, EXTENDED RELEASE ORAL at 16:20

## 2017-08-10 RX ADMIN — AMANTADINE HYDROCHLORIDE 100 MG: 100 CAPSULE ORAL at 22:03

## 2017-08-10 RX ADMIN — AMANTADINE HYDROCHLORIDE 100 MG: 100 CAPSULE ORAL at 09:31

## 2017-08-10 RX ADMIN — CARBIDOPA AND LEVODOPA 1 TABLET: 50; 200 TABLET, EXTENDED RELEASE ORAL at 09:31

## 2017-08-10 RX ADMIN — STANDARDIZED SENNA CONCENTRATE AND DOCUSATE SODIUM 2 TABLET: 8.6; 5 TABLET, FILM COATED ORAL at 22:03

## 2017-08-10 ASSESSMENT — COGNITIVE AND FUNCTIONAL STATUS - GENERAL
MOVING FROM LYING ON BACK TO SITTING ON SIDE OF FLAT BED: A LITTLE
STANDING UP FROM CHAIR USING ARMS: A LITTLE
WALKING IN HOSPITAL ROOM: A LITTLE
MOVING TO AND FROM BED TO CHAIR: A LOT
TURNING FROM BACK TO SIDE WHILE IN FLAT BAD: A LOT
CLIMB 3 TO 5 STEPS WITH RAILING: A LOT
SUGGESTED CMS G CODE MODIFIER MOBILITY: CK
MOBILITY SCORE: 15

## 2017-08-10 ASSESSMENT — ENCOUNTER SYMPTOMS
FEVER: 0
HEMOPTYSIS: 0
FOCAL WEAKNESS: 0
CONSTIPATION: 0
BACK PAIN: 1
PALPITATIONS: 0
WHEEZING: 0
BLOOD IN STOOL: 0
NAUSEA: 0
WEAKNESS: 1
WEIGHT LOSS: 1
BRUISES/BLEEDS EASILY: 0
HEADACHES: 0
DIARRHEA: 0
DIAPHORESIS: 0
PSYCHIATRIC NEGATIVE: 1
CHILLS: 0
EYES NEGATIVE: 1
DEPRESSION: 0
VOMITING: 0
HEARTBURN: 0
RESPIRATORY NEGATIVE: 1
LOSS OF CONSCIOUSNESS: 0
GASTROINTESTINAL NEGATIVE: 1
ABDOMINAL PAIN: 0
COUGH: 0
DIZZINESS: 0
DOUBLE VISION: 0
CARDIOVASCULAR NEGATIVE: 1
SEIZURES: 0

## 2017-08-10 ASSESSMENT — GAIT ASSESSMENTS
DEVIATION: BRADYKINETIC;OTHER (COMMENT)
ASSISTIVE DEVICE: FRONT WHEEL WALKER
GAIT LEVEL OF ASSIST: MINIMAL ASSIST
DISTANCE (FEET): 10

## 2017-08-10 ASSESSMENT — PAIN SCALES - GENERAL
PAINLEVEL_OUTOF10: 0
PAINLEVEL_OUTOF10: 7

## 2017-08-10 ASSESSMENT — LIFESTYLE VARIABLES: SUBSTANCE_ABUSE: 0

## 2017-08-10 NOTE — CARE PLAN
Problem: Venous Thromboembolism (VTW)/Deep Vein Thrombosis (DVT) Prevention:  Goal: Patient will participate in Venous Thrombosis (VTE)/Deep Vein Thrombosis (DVT)Prevention Measures  Outcome: PROGRESSING AS EXPECTED  Pt encouraged to wear SCDs and perform range of motion exercises.     Problem: Skin Integrity  Goal: Risk for impaired skin integrity will decrease  Outcome: PROGRESSING AS EXPECTED  Pt encouraged to change position and making hourly rounds on pt to encourage movement and change of position.

## 2017-08-10 NOTE — DISCHARGE PLANNING
Referral: SNF    Intervention: Per [facility], [facility]F in [place] has agree to admit pt.  RN indicates pt is able to transfer via Wheelchair Van, transfer form provided to [facility]. Per [facility], transfer to [place] is scheduled for tomorrow 8/11/17 at 10:00am.  SW notified RN and Elizabeth, pt's wife via voicemail.    Plan: SNF, pending discharge summary.

## 2017-08-10 NOTE — PROGRESS NOTES
[facility] Hospitalist Progress Note    Date of Service: 8/10/2017    Chief Complaint  76 y.o. male admitted 2017 with low back pain.    Interval Problem Update  Mr. Felix is a 76-year-old gentleman who has Parkinson's disease, osteoporosis, has suffered a T11-12 and lumbar 1 acute compression fracture. He has chronic MDS syndrome. Experienced bleeding post kypho requiring plt transfusion. Hematology was consulted. He has previously seen Dr. Sierra with hematology. Dr. Cordell Ribeiro was consulted.     Patient is sitting up in the chair this am. He states that  he is feeling better today. C/o mild back pain.      Consultants/Specialty  Pain management, hematology.    Disposition  Pending [facility] approval         Review of Systems   Constitutional: Positive for weight loss and malaise/fatigue. Negative for fever, chills and diaphoresis.        Dec appetite   HENT: Negative.    Eyes: Negative.  Negative for double vision.   Respiratory: Negative.  Negative for cough, hemoptysis and wheezing.    Cardiovascular: Negative.  Negative for chest pain, palpitations and leg swelling.   Gastrointestinal: Negative.  Negative for heartburn, nausea, vomiting, abdominal pain, diarrhea, constipation and blood in stool.   Genitourinary: Negative.  Negative for urgency, frequency and hematuria.   Musculoskeletal: Positive for back pain (bleeding from the back where he had surgical site.). Negative for joint pain.   Skin: Negative.  Negative for itching and rash.   Neurological: Positive for weakness. Negative for dizziness, focal weakness, seizures, loss of consciousness and headaches.   Endo/Heme/Allergies: Negative.  Does not bruise/bleed easily.   Psychiatric/Behavioral: Negative.  Negative for depression, suicidal ideas and substance abuse.      Physical Exam  Laboratory/Imaging   Hemodynamics  Temp (24hrs), Av.6 °C (97.9 °F), Min:36 °C (96.8 °F), Max:37 °C (98.6 °F)   Temperature: 36.4 °C (97.5 °F)  Pulse  Av.8  Min: 46   Max: 71    Blood Pressure : 152/88 mmHg      Respiratory      Respiration: 16, Pulse Oximetry: 92 %     Work Of Breathing / Effort: Mild  RUL Breath Sounds: Clear, RML Breath Sounds: Clear, RLL Breath Sounds: Diminished, AQUILINO Breath Sounds: Clear, LLL Breath Sounds: Diminished    Fluids  No intake or output data in the 24 hours ending 08/10/17 1310    Nutrition  Orders Placed This Encounter   Procedures   • Diet Order     Standing Status: Standing      Number of Occurrences: 1      Standing Expiration Date:      Order Specific Question:  Diet:     Answer:  2 Gram Sodium [7]     Physical Exam   Constitutional: He is oriented to person, place, and time. He appears well-developed and well-nourished.   Appears frail and weak.    HENT:   Head: Normocephalic and atraumatic.   Right Ear: External ear normal.   Left Ear: External ear normal.   Nose: Nose normal.   Mouth/Throat: Oropharynx is clear and moist.   Eyes: Conjunctivae and EOM are normal. Pupils are equal, round, and reactive to light.   Neck: Normal range of motion. Neck supple. No JVD present. No thyromegaly present.   Cardiovascular: Normal rate and regular rhythm.    No murmur heard.  Pulmonary/Chest: He has no wheezes. He has no rales. He exhibits no tenderness.   Abdominal: He exhibits no distension and no mass. There is no tenderness. There is no rebound and no guarding.   Musculoskeletal: Normal range of motion. He exhibits tenderness (mid back.). He exhibits no edema.   Lymphadenopathy:     He has no cervical adenopathy.   Neurological: He is alert and oriented to person, place, and time. No cranial nerve deficit. Coordination abnormal.   Skin: Skin is warm and dry. No rash noted. No erythema.   Dressing over the lumbar spine appears to be dry and intact     Psychiatric: He has a normal mood and affect.   Nursing note and vitals reviewed.      Recent Labs      08/08/17   0306  08/09/17   0245  08/10/17   0219   WBC  10.6  8.8  8.3   RBC  3.54*  3.50*   3.45*   HEMOGLOBIN  10.0*  9.9*  9.9*   HEMATOCRIT  30.4*  30.2*  30.1*   MCV  85.9  86.3  87.2   MCH  28.2  28.3  28.7   MCHC  32.9*  32.8*  32.9*   RDW  50.4*  49.9  50.2*   PLATELETCT  132*  159*  176   MPV  11.8  12.2  12.7     Recent Labs      08/09/17   0245  08/10/17   0219   SODIUM  136  137   POTASSIUM  3.7  3.8   CHLORIDE  106  106   CO2  25  24   GLUCOSE  96  98   BUN  12  14   CREATININE  0.70  0.77   CALCIUM  8.8  8.8     Recent Labs      08/08/17   0306   APTT  35.9   INR  1.27*                  Assessment/Plan     * Compression of lumbar vertebra (CMS-HCC)  Assessment & Plan  Status post L1 kyphoplasty continue with pain management.  All path report negative for acute leukemia, myeloma, lymphoma or  metastatic tumor  CT scan showed cystic lesion of the kidney and liver. Discussed with patient and wife.  Per oncology pt needs follow up imaging in 6 months.       Myelodysplastic syndrome (CMS-HCC) (present on admission)  Assessment & Plan  Patient's platelet counts were low at 48.   Patient continued to have bleeding post procedure.  S/p transfusion of plt.  Plt count currently normalized.  Cont to monitor.  Heme-onc was consulted. F/u as outpt.      Compression fracture of thoracic vertebra (CMS-HCC)  Assessment & Plan  Status post kyphoplasty of T11, T12 and L1.  Continue with pain management  Started with physical therapy and occupational therapy today.   Will need skilled placement.    Parkinson's disease (CMS-[facility])  Assessment & Plan  Medical management, continue with short-acting and long-acting Sinemet, continue with Namenda, continue with amantadine and Provigil.  Patient's Parkinson's disease is currently under control.  Confirmed dosing of carbi-leva with wife.    Debility  Assessment & Plan  PT OT, patient will need rehab evaluation and skilled placement most likely.       Radiology images reviewed, Labs reviewed and Medications reviewed  Hurst catheter: No Hurst      DVT Prophylaxis:  Contraindicated - High bleeding risk    Ulcer prophylaxis: Not indicated

## 2017-08-10 NOTE — CARE PLAN
Problem: Communication  Goal: The ability to communicate needs accurately and effectively will improve  Intervention: Reorient patient to environment as needed  Pt. And pt.'s wife reoriented to the call light      Problem: Venous Thromboembolism (VTW)/Deep Vein Thrombosis (DVT) Prevention:  Goal: Patient will participate in Venous Thrombosis (VTE)/Deep Vein Thrombosis (DVT)Prevention Measures  Intervention: Ensure patient wears graduated elastic stockings (NIKOLAY hose) and/or SCDs, if ordered, when in bed or chair (Remove at least once per shift for skin check)  Pt. Agreeable to wearing SCD's

## 2017-08-10 NOTE — THERAPY
"Physical Therapy Treatment completed.   Bed Mobility:  Supine to Sit: Minimal Assist  Transfers: Sit to Stand: Minimal Assist  Gait: Level Of Assist: Minimal Assist with Front-Wheel Walker       Plan of Care: Will benefit from Physical Therapy 4 times per week  Discharge Recommendations: Equipment: Will Continue to Assess for Equipment Needs. Post-acute therapy Discharge to a transitional care facility for continued skilled therapy services.     See \"Rehab Therapy-Acute\" Patient Summary Report for complete documentation.     Pt more fatigued today w/ increased tremors from yesterday. Pt requiring extra time for all activities w/ complaints of pain in the left lower quadrant. Pt demonstrating increased parkinsonian gait today w/ more frequent rest breaks required. Pt needing max cues for safety w/ transfers, reinforcement needed. Spoke w/ pt about getting up and walking to the BR now as well as meals in the chair. Will continue to follow pt while in acute care hospital w/ the recommendation of post acute rehab to help w/ functional defecits.  "

## 2017-08-10 NOTE — DISCHARGE PLANNING
Current documentation reveals a potential for acute inpatient rehabilitation, please consider a rehab consult to assist with discharge planning. Charo, VALERIO 2403, is aware.

## 2017-08-10 NOTE — PROGRESS NOTES
Received report from ROSA Youssef. Pt sleeping at this time. Pain is 0/10. Wife at bedside. Pt medicated per MAR and does not have any needs at this time. Bed in low, locked position. Clean treaded slipper socks provided, and SCDs on.

## 2017-08-10 NOTE — PROGRESS NOTES
Pt. Is AAOx4  Pt. Moves all extremeties,  Denies numbness and tingling  Denies pain but reports pain when attempting to mobilize  Bed alarm on  SCD's on  Treaded socks in place  Call light within reach  Pt.'s wife at bedside

## 2017-08-10 NOTE — PROGRESS NOTES
[facility] Hospitalist Progress Note    Date of Service: 2017    Chief Complaint  76 y.o. male admitted 2017 with low back pain.    Interval Problem Update  Mr. Felix is a 76-year-old gentleman who has Parkinson's disease, osteoporosis, has suffered a T11-12 and lumbar 1 acute compression fracture. He has chronic MDS syndrome. Experienced bleeding post kypho requiring plt transfusion. Hematology was consulted. He has previously seen Dr. Sierra with hematology. Dr. Cordell Ribeiro was consulted.   Today, patient more lethargic than usual. Wife states that patient has been coughing. She appears to worry about him and would like for him to stay one more day.      Consultants/Specialty  Pain management, hematology.    Disposition  Pending [facility] approval         Review of Systems   Constitutional: Positive for weight loss and malaise/fatigue. Negative for fever, chills and diaphoresis.        Dec appetite   HENT: Negative.    Eyes: Negative.  Negative for double vision.   Respiratory: Negative.  Negative for cough, hemoptysis and wheezing.    Cardiovascular: Negative.  Negative for chest pain, palpitations and leg swelling.   Gastrointestinal: Negative.  Negative for heartburn, nausea, vomiting, abdominal pain, diarrhea, constipation and blood in stool.   Genitourinary: Negative.  Negative for urgency, frequency and hematuria.   Musculoskeletal: Positive for back pain (bleeding from the back where he had surgical site.). Negative for joint pain.   Skin: Negative.  Negative for itching and rash.   Neurological: Positive for weakness. Negative for dizziness, focal weakness, seizures, loss of consciousness and headaches.   Endo/Heme/Allergies: Negative.  Does not bruise/bleed easily.   Psychiatric/Behavioral: Negative.  Negative for depression, suicidal ideas and substance abuse.      Physical Exam  Laboratory/Imaging   Hemodynamics  Temp (24hrs), Av.4 °C (97.5 °F), Min:36 °C (96.8 °F), Max:36.8 °C (98.2 °F)    Temperature: 36 °C (96.8 °F)  Pulse  Av.7  Min: 46  Max: 71    Blood Pressure : 138/67 mmHg      Respiratory      Respiration: 18, Pulse Oximetry: 92 %     Work Of Breathing / Effort: Mild  RUL Breath Sounds: Clear, RML Breath Sounds: Clear, RLL Breath Sounds: Diminished, AQUILINO Breath Sounds: Clear, LLL Breath Sounds: Diminished    Fluids  No intake or output data in the 24 hours ending 17    Nutrition  Orders Placed This Encounter   Procedures   • Diet Order     Standing Status: Standing      Number of Occurrences: 1      Standing Expiration Date:      Order Specific Question:  Diet:     Answer:  2 Gram Sodium [7]     Physical Exam   Constitutional: He is oriented to person, place, and time. He appears well-developed and well-nourished.   HENT:   Head: Normocephalic and atraumatic.   Right Ear: External ear normal.   Left Ear: External ear normal.   Nose: Nose normal.   Mouth/Throat: Oropharynx is clear and moist.   Eyes: Conjunctivae and EOM are normal. Pupils are equal, round, and reactive to light.   Neck: Normal range of motion. Neck supple. No JVD present. No thyromegaly present.   Cardiovascular: Normal rate and regular rhythm.    No murmur heard.  Pulmonary/Chest: He has no wheezes. He has no rales. He exhibits no tenderness.   Abdominal: He exhibits no distension and no mass. There is no tenderness. There is no rebound and no guarding.   Musculoskeletal: Normal range of motion. He exhibits tenderness (mid back.). He exhibits no edema.   Lymphadenopathy:     He has no cervical adenopathy.   Neurological: He is alert and oriented to person, place, and time. No cranial nerve deficit. Coordination abnormal.   Skin: Skin is warm and dry. No rash noted. No erythema.   Dressing over the lumbar spine appears to be dry and intact     Psychiatric: He has a normal mood and affect.   Nursing note and vitals reviewed.      Recent Labs      17   0150  17   0306  17   0245   WBC  10.2  10.6   8.8   RBC  4.21*  3.54*  3.50*   HEMOGLOBIN  12.0*  10.0*  9.9*   HEMATOCRIT  36.6*  30.4*  30.2*   MCV  86.9  85.9  86.3   MCH  28.5  28.2  28.3   MCHC  32.8*  32.9*  32.8*   RDW  51.1*  50.4*  49.9   PLATELETCT  72*  132*  159*   MPV  14.4*  11.8  12.2     Recent Labs      08/07/17   0150  08/09/17   0245   SODIUM  137  136   POTASSIUM  4.4  3.7   CHLORIDE  105  106   CO2  25  25   GLUCOSE  125*  96   BUN  19  12   CREATININE  0.66  0.70   CALCIUM  8.6  8.8     Recent Labs      08/08/17   0306   APTT  35.9   INR  1.27*                  Assessment/Plan     * Compression of lumbar vertebra (CMS-HCC)  Assessment & Plan  Status post L1 kyphoplasty continue with pain management.  Pending pathology report.   CT scan showed cystic lesion of the kidney and liver. Discussed with patient and wife.  Per oncology pt needs follow up imaging in 6 months.       Myelodysplastic syndrome (CMS-HCC) (present on admission)  Assessment & Plan  Patient's platelet counts were low at 48.   Patient continued to have bleeding post procedure.  S/p transfusion of plt.  Plt 132 today and bleeding has stopped.  Cont to monitor.  Heme-onc was consulted.       Compression fracture of thoracic vertebra (CMS-HCC)  Assessment & Plan  Status post kyphoplasty of T11, T12 and L1.  Continue with pain management  Started with physical therapy and occupational therapy today.   Will need skilled placement.    Parkinson's disease (CMS-HCC)  Assessment & Plan  Medical management, continue with short-acting and long-acting Sinemet, continue with Namenda, continue with amantadine and Provigil.  Patient's Parkinson's disease is currently under control.  Confirmed dosing of carbi-leva with wife.    Debility  Assessment & Plan  PT OT, patient will need rehab evaluation and skilled placement most likely.       Radiology images reviewed, Labs reviewed and Medications reviewed  Hurst catheter: No Hurst      DVT Prophylaxis: Contraindicated - High bleeding  risk    Ulcer prophylaxis: Not indicated

## 2017-08-10 NOTE — DISCHARGE PLANNING
Followed up with [facility]. Spoke to Morena who confirmed they can accept patient tomorrow. Received transport form from Charo(VALERIO). Arranged patient's transport to [facility] tomorrow, 8/11 at 1000 via [facility]. Charo(VALERIO) notified of transport time. Morena([facility]) notified of transport time via phone.

## 2017-08-10 NOTE — DIETARY
Nutrition Services: Consult for Poor PO  76 year old male with admit dx of wedge compression fracture, compression fracture of thoracic vertebra, compression of lumbar vertebra, metastatic cancer, Parkinson's disease, debility  Past Pertinent Med Hx: Parkinson's disease, MDS (myelopdysplastic syndrome), hemorrhagic disorder    Pt states his appetite has been pretty good. Pt denies wt loss and reports his UBW in the last year was 190-195 lbs (86.4-88.6 kg). Current wt stable with UBW. Pt requested snacks, will add per pt preferences. Per chart pt PO % of meals documented.     Ht: 188 cm  Wt 8/6: 87.2 kg via stand up scale   BMI: 24.68  Diet: 2 Gram Sodium, Boost Plus TID  Pertinent Labs: Reviewed.   Pertinent Meds: symmetrel, sinemet cr, celexa, namenda, provigil, pericolace, ultram (prn)  Fluids: NS infusion @ 75 ml/hr  Skin: surgical incision back  GI: Last BM 8/10    PLAN/RECOMMEND -   1) Nutrition rep to see patient daily for meal and snack preferences.  2) Encourage PO  3) Weekly weights to monitor fluid and nutrition status  4) Please document PO intake for each meal as percentage of meals consumed    RD following

## 2017-08-11 VITALS
HEIGHT: 74 IN | WEIGHT: 192.24 LBS | HEART RATE: 73 BPM | BODY MASS INDEX: 24.67 KG/M2 | RESPIRATION RATE: 18 BRPM | TEMPERATURE: 98.8 F | SYSTOLIC BLOOD PRESSURE: 132 MMHG | DIASTOLIC BLOOD PRESSURE: 61 MMHG | OXYGEN SATURATION: 92 %

## 2017-08-11 LAB
BASOPHILS # BLD AUTO: 0 % (ref 0–1.8)
BASOPHILS # BLD: 0 K/UL (ref 0–0.12)
EOSINOPHIL # BLD AUTO: 0 K/UL (ref 0–0.51)
EOSINOPHIL NFR BLD: 0 % (ref 0–6.9)
ERYTHROCYTE [DISTWIDTH] IN BLOOD BY AUTOMATED COUNT: 50.1 FL (ref 35.9–50)
GIANT PLATELETS BLD QL SMEAR: NORMAL
HCT VFR BLD AUTO: 31.6 % (ref 42–52)
HGB BLD-MCNC: 10.3 G/DL (ref 14–18)
LYMPHOCYTES # BLD AUTO: 1.05 K/UL (ref 1–4.8)
LYMPHOCYTES NFR BLD: 11.5 % (ref 22–41)
MANUAL DIFF BLD: NORMAL
MCH RBC QN AUTO: 28.2 PG (ref 27–33)
MCHC RBC AUTO-ENTMCNC: 32.6 G/DL (ref 33.7–35.3)
MCV RBC AUTO: 86.6 FL (ref 81.4–97.8)
MONOCYTES # BLD AUTO: 3.22 K/UL (ref 0–0.85)
MONOCYTES NFR BLD AUTO: 35.4 % (ref 0–13.4)
MORPHOLOGY BLD-IMP: NORMAL
NEUTROPHILS # BLD AUTO: 4.83 K/UL (ref 1.82–7.42)
NEUTROPHILS NFR BLD: 53.1 % (ref 44–72)
NRBC # BLD AUTO: 0.02 K/UL
NRBC BLD AUTO-RTO: 0.2 /100 WBC
PATH REV: NORMAL
PATH REV: NORMAL
PLATELET # BLD AUTO: 181 K/UL (ref 164–446)
PLATELET BLD QL SMEAR: NORMAL
PMV BLD AUTO: 12.5 FL (ref 9–12.9)
RBC # BLD AUTO: 3.65 M/UL (ref 4.7–6.1)
RBC BLD AUTO: PRESENT
WBC # BLD AUTO: 9.1 K/UL (ref 4.8–10.8)

## 2017-08-11 PROCEDURE — 80500 HCHG CLINICAL PATH CONSULT-LIMITED: CPT

## 2017-08-11 PROCEDURE — 700102 HCHG RX REV CODE 250 W/ 637 OVERRIDE(OP): Performed by: INTERNAL MEDICINE

## 2017-08-11 PROCEDURE — A9270 NON-COVERED ITEM OR SERVICE: HCPCS | Performed by: INTERNAL MEDICINE

## 2017-08-11 PROCEDURE — 99239 HOSP IP/OBS DSCHRG MGMT >30: CPT | Performed by: INTERNAL MEDICINE

## 2017-08-11 PROCEDURE — 36415 COLL VENOUS BLD VENIPUNCTURE: CPT

## 2017-08-11 PROCEDURE — 85027 COMPLETE CBC AUTOMATED: CPT

## 2017-08-11 PROCEDURE — 85007 BL SMEAR W/DIFF WBC COUNT: CPT

## 2017-08-11 RX ORDER — TRAMADOL HYDROCHLORIDE 50 MG/1
50 TABLET ORAL EVERY 6 HOURS PRN
Qty: 30 TAB | Refills: 0
Start: 2017-08-11 | End: 2018-01-26

## 2017-08-11 RX ADMIN — CARBIDOPA AND LEVODOPA 1 TABLET: 50; 200 TABLET, EXTENDED RELEASE ORAL at 07:54

## 2017-08-11 RX ADMIN — AMANTADINE HYDROCHLORIDE 100 MG: 100 CAPSULE ORAL at 07:54

## 2017-08-11 RX ADMIN — TRAMADOL HYDROCHLORIDE 50 MG: 50 TABLET, COATED ORAL at 07:46

## 2017-08-11 RX ADMIN — STANDARDIZED SENNA CONCENTRATE AND DOCUSATE SODIUM 2 TABLET: 8.6; 5 TABLET, FILM COATED ORAL at 07:53

## 2017-08-11 RX ADMIN — MODAFINIL 200 MG: 100 TABLET ORAL at 07:54

## 2017-08-11 RX ADMIN — MEMANTINE HYDROCHLORIDE 10 MG: 10 TABLET, FILM COATED ORAL at 07:54

## 2017-08-11 RX ADMIN — CITALOPRAM HYDROBROMIDE 20 MG: 20 TABLET ORAL at 07:54

## 2017-08-11 ASSESSMENT — LIFESTYLE VARIABLES: EVER_SMOKED: NEVER

## 2017-08-11 ASSESSMENT — PAIN SCALES - GENERAL: PAINLEVEL_OUTOF10: 7

## 2017-08-11 NOTE — PROGRESS NOTES
Pt. Is AAOx4  Pt. Moves all extremeties,  Denies numbness and tingling  Pt. Is denying pain   Bed alarm on  SCD's on  Treaded socks in place  Call light within reach  Wife at bedside

## 2017-08-11 NOTE — DISCHARGE SUMMARY
CHIEF COMPLAINT ON ADMISSION  Compression fracture    CODE STATUS  Full Code    HPI & HOSPITAL COURSE  Mr. Felix is a 76-year-old gentleman who has Parkinson's disease, osteoporosis, and chronic MDS has suffered a T11-12 and lumbar 1 acute compression fracture. S/p Kyphoplasty on 08/06/2017. Experienced bleeding post kypho. Hematology Dr. Cordell Ribeiro was consulted. Patient was transfused with platelets. CT of the chest and abdomen showed a 1.7 cm hypodense mass in the hepatic dome is indeterminant, but may represent a hemangioma or complex cyst. Also, a 1.5 cm hypodense mass in the mid right kidney, likely a complex cyst. Follow-up ultrasound or renal protocol CT or MRI was recommended by radiology for both findings. Hematology recommended 6 months outpatient follow up for these findings.   Pathology result of T11, 12 and L1 biopsy returned as negative for any leukemia , lymphoma, myeloma or metastatic tumor.     Eventually, bleeding subsided post transfusion and platelets uptrend to normal levels of 181 and H&H up trended to 10.3.    He was able to move out of bed and work with physical therapy and occupational therapy. He appeared to be weak but able to ambulate with walker and assistant. Skilled nursing services was recommended by PT. In addition, as they were concerns for weight loss and decreased PO intake, nutrition was consulted and he was started on nutritional supplements(Boost).  He was complaining of cough with clear phlegm production. CXR negative for any PNA and likely LL atelectasis. His symptoms improved after he was given an incentive spirometer.   As a note, RN reported that patient was complaining of pain in his RLE this am. Patient has occasionally been complaining of pain in his abd and lower back. He has been on mechanical DVT prophylaxis with SCD post-op given anticoagulation was contraindicated due to bleed. Based on my evaluation I do not appreciate any erythema or pain to palpation. He  has negative Arnie's sign as well. If this pain and discomfort persists consider an US of LE.     On the day of discharge patient was seen and examined by me. Patient is stable with stable vitals. Patient denies any symptoms of abdominal pain, nausea, vomiting, HA or dizziness. Patient ambulating with walker and assistant. Patient eating, drinking, urinating, and having normal bowel movements. Patient agrees to go to Skilled nursing facility.    Therefore, he is discharged in fair and stable condition with close outpatient follow-up.    SPECIFIC OUTPATIENT FOLLOW-UP  -Follow up with primary care doctor as soon as able to make an appointment after discharge from [facility].  -Follow up with oncology, Dr. Ribeiro or his own oncologist Dr. Sierra when able to make an appointment.   -He needs a follow up CT/MRI within 6 months to evaluate for the findings on the CT chest and abdomen.   -Return to the Emergency Department if any of the symptoms worsens or any new symptoms.       DISCHARGE PROBLEM LIST  Principal Problem:    Compression of lumbar vertebra (CMS-HCC) POA: Unknown  Active Problems:    Myelodysplastic syndrome (CMS-HCC) POA: Yes    Compression fracture of thoracic vertebra (CMS-HCC) POA: Unknown    Parkinson's disease (CMS-HCC) POA: Unknown    Debility POA: Unknown  Resolved Problems:    * No resolved hospital problems. *      FOLLOW UP  Future Appointments  Date Time Provider Department Center   10/31/2017 11:40 AM Popeye Ferguson M.D. [facility] None   11/3/2017 2:20 PM Wild Sherman A.P.N. [facility] DP/SNF ([facility] POS)  [address]  [facility] [address]  [phone]        SHERRIE Casarez.P.N.  [address]  [facility] [address]  [phone]    In 1 week      [facility], Emergency Dept  [address] [facility] [address] [phone]    If symptoms worsen    SEAN Ribeiro M.D.  [address]  [address]  [phone]    Schedule an appointment as soon as possible for a visit        MEDICATIONS ON DISCHARGE   Rafael Felix   Home Medication Instructions EAMON:[id number]    Printed on:08/11/17 0841   Medication Information                      amantadine (SYMMETREL) 100 MG Cap  Take 1 Cap by mouth 2 times a day.             carbidopa-levodopa SR (SINEMET CR)  MG per tablet  Take 1 Tab by mouth every bedtime.             citalopram (CELEXA) 20 MG Tab  Take 1 Tab by mouth every day.             memantine (NAMENDA) 10 MG Tab  Take 1 Tab by mouth 2 times a day.             modafinil (PROVIGIL) 200 MG Tab  Take 1 Tab by mouth every day.             tramadol (ULTRAM) 50 MG Tab  Take 1 Tab by mouth every 6 hours as needed for Moderate Pain or Severe Pain.             triazolam (HALCION) 0.125 MG tablet  Take 1 Tab by mouth at bedtime as needed.                 DIET  Orders Placed This Encounter   Procedures   • Diet Order     Standing Status: Standing      Number of Occurrences: 1      Standing Expiration Date:      Order Specific Question:  Diet:     Answer:  2 Gram Sodium [7]       ACTIVITY  As tolerated and directed by skilled nursing.  Weight bearing as tolerated      CONSULTATIONS  Interventional radiology  Dr. [facility]iro[facility]-oncology  Physical therapy  Occupational therapy  Nutrition     PROCEDURES  T11, T12, L1 Osteocool RFN with Kyphoplasty with Medtr[employer] Kyphon on 08/06/2017.     PATHOLOGY  FINAL DIAGNOSIS:  A. T11 bone biopsy:         Hypercellular bone marrow (cellularity about 80-90%) with          granulocytic hyperplasia, megakaryocytic hyperplasia and          megakaryocytic dyspoiesis, see comment         There is no evidence of acute leukemia, myeloma, lymphoma or          metastatic tumor.  B. T12 bone biopsy:         Hypercellular bone marrow (cellularity about 80-90%) with          granulocytic hyperplasia, megakaryocytic hyperplasia and          megakaryocytic dyspoiesis, see  comment         There is no evidence of acute leukemia, myeloma, lymphoma or          metastatic tumor  C. L1 aspirate:         Bone marrow elements are present; no acute leukemia, myeloma,          lymphoma or metastatic tumor seen.    Comment: Apparently the patient has a prior history of myelodysplastic  syndrome (according the computerized record). The bone marrow findings  in the current case including marked hypercellularity, megakaryocytic  hyperplasia, granulocytic hyperplasia and megakaryocytic dyspoiesis are  suggestive of a primary bone marrow hematopoietic disorders such as a  myelodysplastic or myeloproliferative disorder although a bone marrow  procedure including Bertrand-Giemsa stained aspirate smears would be  required to further categorize the bone marrow findings.    LABORATORY  Lab Results   Component Value Date/Time    SODIUM 137 08/10/2017 02:19 AM    POTASSIUM 3.8 08/10/2017 02:19 AM    CHLORIDE 106 08/10/2017 02:19 AM    CO2 24 08/10/2017 02:19 AM    GLUCOSE 98 08/10/2017 02:19 AM    BUN 14 08/10/2017 02:19 AM    CREATININE 0.77 08/10/2017 02:19 AM        Lab Results   Component Value Date/Time    WBC 9.1 08/11/2017 02:43 AM    HEMOGLOBIN 10.3* 08/11/2017 02:43 AM    HEMATOCRIT 31.6* 08/11/2017 02:43 AM    PLATELET COUNT 181 08/11/2017 02:43 AM      IMAGING:  CT CHEST/ABD 08/06/2017:  1.  1.7 cm hypodense mass in the hepatic dome is indeterminant, but may represent a hemangioma or complex cyst. Nonemergent hepatic protocol MRI could be performed for further evaluation.  2.  1.5 cm hypodense mass in the mid right kidney, likely a complex cyst. Follow-up ultrasound or renal protocol CT or MRI could be performed for confirmation.  3.  Multiple splenules in the abdomen.  4.  Cholelithiasis  5.  Spinal compression deformities status post vertebral augmentation at T11, T12, and L1.    Total time of the discharge process exceeds 31 minutes.    At the time of discharge, patient is alert and oriented to  self and place. He has a reasonable understanding of diagnosis and plan of care. Above was also discussed with wife who is a care giver. She is able to repeat the instructions that was given to her back to me. All questions were answered.

## 2017-08-11 NOTE — PROGRESS NOTES
Discharge instructions given to pt and wife. All questions answered. Pt left via wheelchair with hospital escort (Ashtyn Rivas from [employer]) and wife. Ashtyn's identity was confirmed with [facility] badge. All belongings taken.

## 2017-08-11 NOTE — CARE PLAN
Problem: Communication  Goal: The ability to communicate needs accurately and effectively will improve  Intervention: Use communication aids and/or /Language Line as appropriate  Hearing aids assist in communication with patient      Problem: Skin Integrity  Goal: Risk for impaired skin integrity will decrease  Intervention: Implement precautions to protect skin integrity in collaboration with the interdisciplinary team  Pt.'s linens were changed and the pt. Was given a partial bed bath.      Problem: Pain Management  Goal: Pain level will decrease to patient’s comfort goal  Intervention: Follow pain managment plan developed in collaboration with patient and Interdisciplinary Team  Pt. Reports that his pain levels are acceptable when not mobilizing.

## 2017-08-11 NOTE — DISCHARGE INSTRUCTIONS
Discharge Instructions    Discharged to other by medical transportation with escort. Discharged via wheelchair, hospital escort: Yes.  Special equipment needed: Not Applicable    Be sure to schedule a follow-up appointment with your primary care doctor or any specialists as instructed.     Discharge Plan:   Diet Plan: Discussed  Activity Level: Discussed  Confirmed Follow up Appointment: Patient to Call and Schedule Appointment  Confirmed Symptoms Management: Discussed  Medication Reconciliation Updated: Yes  Influenza Vaccine Indication: Indicated: Not available from distributor/    I understand that a diet low in cholesterol, fat, and sodium is recommended for good health. Unless I have been given specific instructions below for another diet, I accept this instruction as my diet prescription.   Other diet: 2 gram sodium     Special Instructions:     -Follow up with primary care doctor as soon as able to make an appointment after discharge from SNF.  -Follow up with oncology, Dr. Ribeiro or his own oncologist Dr. Sierra when able to make an appointment.    -He needs a follow up CT/MRI within 6 months to evaluate for the findings on the CT chest and abdomen.    -Return to the Emergency Department if any of the symptoms worsens or any new symptoms.     · Is patient discharged on Warfarin / Coumadin?   No     · Is patient Post Blood Transfusion?  Yes  POST BLOOD TRANSFUSION INFORMATION (ADULT)    The purpose of blood transfusion is to correct anemia, low levels of blood clotting factors or to correct acute blood loss.   Blood transfusion is very safe but occasionally unexpected adverse reactions do occur. Most adverse reactions occur during transfusion, within one to two days following transfusion or one to two weeks following transfusion. Some adverse reactions can occur one to six months after transfusion and some even years later.             If any of the symptoms listed below happen to you during your  transfusion,                                 please notify your nurse immediately.   · Fever or Chills  · Flushing of the Face  · Hives, rash or itching  · Difficulty in breathing or shortness of breath  · Pain or oozing of blood from the IV needle site  · Low back pain  · Nausea or vomiting  · Weakness or fainting  · Chest pain  · Blood in the urine  · Decreased frequency of urination    The above symptoms may also occur within 24 to 48 after transfusion; if so, notify your physician.     · Yellowing of the skin    This can happen one to six months after transfusion; if so, notify your physician    Depression / Suicide Risk    As you are discharged from this [facility], it is important to learn how to keep safe from harming yourself.    Recognize the warning signs:  · Abrupt changes in personality, positive or negative- including increase in energy   · Giving away possessions  · Change in eating patterns- significant weight changes-  positive or negative  · Change in sleeping patterns- unable to sleep or sleeping all the time   · Unwillingness or inability to communicate  · Depression  · Unusual sadness, discouragement and loneliness  · Talk of wanting to die  · Neglect of personal appearance   · Rebelliousness- reckless behavior  · Withdrawal from people/activities they love  · Confusion- inability to concentrate     If you or a loved one observes any of these behaviors or has concerns about self-harm, here's what you can do:  · Talk about it- your feelings and reasons for harming yourself  · Remove any means that you might use to hurt yourself (examples: pills, rope, extension cords, firearm)  · Get professional help from the community (Mental Health, Substance Abuse, psychological counseling)  · Do not be alone:Call your Safe Contact- someone whom you trust who will be there for you.  · Call your local CRISIS HOTLINE 378-0702 or 353-812-4315  · Call your local Children's Mobile Crisis Response Team  [facility] (221) 060-8232 or www.[place].co.Interface21  · Call the toll free National Suicide Prevention Hotlines   · National Suicide Prevention Lifeline 177-633-KFOU (6263)  · National Hope Line Network 800-SUICIDE (203-9503)

## 2017-08-11 NOTE — CARE PLAN
Problem: Safety  Goal: Will remain free from injury  Outcome: PROGRESSING AS EXPECTED  Pt resting in bed with bed alarm on, treaded slipper socks in place, and bed in low, locked position. Call light within reach.     Problem: Skin Integrity  Goal: Risk for impaired skin integrity will decrease  Outcome: PROGRESSING AS EXPECTED    Problem: Mobility  Goal: Risk for activity intolerance will decrease  Outcome: PROGRESSING SLOWER THAN EXPECTED  Encouraged pt to sit up for meals in chair and to use IS.

## 2017-08-12 ENCOUNTER — RESOLUTE PROFESSIONAL BILLING HOSPITAL PROF FEE (OUTPATIENT)
Dept: HOSPITALIST | Facility: MEDICAL CENTER | Age: 77
End: 2017-08-12
Payer: MEDICARE

## 2017-08-12 ENCOUNTER — APPOINTMENT (OUTPATIENT)
Dept: RADIOLOGY | Facility: MEDICAL CENTER | Age: 77
DRG: 853 | End: 2017-08-12
Attending: EMERGENCY MEDICINE
Payer: MEDICARE

## 2017-08-12 ENCOUNTER — HOSPITAL ENCOUNTER (INPATIENT)
Facility: MEDICAL CENTER | Age: 77
LOS: 18 days | DRG: 853 | End: 2017-08-30
Attending: EMERGENCY MEDICINE | Admitting: FAMILY MEDICINE
Payer: MEDICARE

## 2017-08-12 DIAGNOSIS — Z90.81 S/P SPLENECTOMY: ICD-10-CM

## 2017-08-12 DIAGNOSIS — D46.9 MYELODYSPLASTIC SYNDROME (HCC): ICD-10-CM

## 2017-08-12 DIAGNOSIS — M46.24 ACUTE OSTEOMYELITIS OF THORACIC SPINE (HCC): ICD-10-CM

## 2017-08-12 DIAGNOSIS — R50.9 FEVER, UNSPECIFIED FEVER CAUSE: ICD-10-CM

## 2017-08-12 DIAGNOSIS — M54.50 ACUTE BILATERAL LOW BACK PAIN WITHOUT SCIATICA: ICD-10-CM

## 2017-08-12 DIAGNOSIS — A41.9 SEPSIS, DUE TO UNSPECIFIED ORGANISM: ICD-10-CM

## 2017-08-12 DIAGNOSIS — G20.A1 PARKINSON'S DISEASE: Primary | ICD-10-CM

## 2017-08-12 DIAGNOSIS — R41.82 ALTERED MENTAL STATUS, UNSPECIFIED ALTERED MENTAL STATUS TYPE: ICD-10-CM

## 2017-08-12 DIAGNOSIS — R41.0 ACUTE DELIRIUM: ICD-10-CM

## 2017-08-12 DIAGNOSIS — R31.9 HEMATURIA: ICD-10-CM

## 2017-08-12 PROBLEM — J18.9 PNEUMONIA: Status: ACTIVE | Noted: 2017-08-12

## 2017-08-12 LAB
ALBUMIN SERPL BCP-MCNC: 3.3 G/DL (ref 3.2–4.9)
ALBUMIN/GLOB SERPL: 1 G/DL
ALP SERPL-CCNC: 83 U/L (ref 30–99)
ALT SERPL-CCNC: 5 U/L (ref 2–50)
AMORPH CRY #/AREA URNS HPF: PRESENT /HPF
ANION GAP SERPL CALC-SCNC: 6 MMOL/L (ref 0–11.9)
ANISOCYTOSIS BLD QL SMEAR: ABNORMAL
APPEARANCE UR: ABNORMAL
APTT PPP: 41.1 SEC (ref 24.7–36)
AST SERPL-CCNC: 18 U/L (ref 12–45)
BACTERIA #/AREA URNS HPF: ABNORMAL /HPF
BASOPHILS # BLD AUTO: 0.9 % (ref 0–1.8)
BASOPHILS # BLD: 0.15 K/UL (ref 0–0.12)
BILIRUB SERPL-MCNC: 1 MG/DL (ref 0.1–1.5)
BILIRUB UR QL STRIP.AUTO: NEGATIVE
BNP SERPL-MCNC: 36 PG/ML (ref 0–100)
BUN SERPL-MCNC: 19 MG/DL (ref 8–22)
CALCIUM SERPL-MCNC: 8.8 MG/DL (ref 8.5–10.5)
CHLORIDE SERPL-SCNC: 103 MMOL/L (ref 96–112)
CO2 SERPL-SCNC: 24 MMOL/L (ref 20–33)
COLOR UR: ABNORMAL
CREAT SERPL-MCNC: 0.69 MG/DL (ref 0.5–1.4)
EOSINOPHIL # BLD AUTO: 0.15 K/UL (ref 0–0.51)
EOSINOPHIL NFR BLD: 0.9 % (ref 0–6.9)
ERYTHROCYTE [DISTWIDTH] IN BLOOD BY AUTOMATED COUNT: 50.6 FL (ref 35.9–50)
GFR SERPL CREATININE-BSD FRML MDRD: >60 ML/MIN/1.73 M 2
GLOBULIN SER CALC-MCNC: 3.4 G/DL (ref 1.9–3.5)
GLUCOSE SERPL-MCNC: 125 MG/DL (ref 65–99)
GLUCOSE UR STRIP.AUTO-MCNC: NEGATIVE MG/DL
HCT VFR BLD AUTO: 31.2 % (ref 42–52)
HGB BLD-MCNC: 10.3 G/DL (ref 14–18)
INR PPP: 1.31 (ref 0.87–1.13)
KETONES UR STRIP.AUTO-MCNC: NEGATIVE MG/DL
LACTATE BLD-SCNC: 1 MMOL/L (ref 0.5–2)
LACTATE BLD-SCNC: 1.5 MMOL/L (ref 0.5–2)
LEUKOCYTE ESTERASE UR QL STRIP.AUTO: NEGATIVE
LG PLATELETS BLD QL SMEAR: NORMAL
LYMPHOCYTES # BLD AUTO: 1.65 K/UL (ref 1–4.8)
LYMPHOCYTES NFR BLD: 9.9 % (ref 22–41)
MANUAL DIFF BLD: NORMAL
MCH RBC QN AUTO: 28.5 PG (ref 27–33)
MCHC RBC AUTO-ENTMCNC: 33 G/DL (ref 33.7–35.3)
MCV RBC AUTO: 86.2 FL (ref 81.4–97.8)
MICRO URNS: ABNORMAL
MONOCYTES # BLD AUTO: 4.36 K/UL (ref 0–0.85)
MONOCYTES NFR BLD AUTO: 26.1 % (ref 0–13.4)
MORPHOLOGY BLD-IMP: NORMAL
NEUTROPHILS # BLD AUTO: 10.39 K/UL (ref 1.82–7.42)
NEUTROPHILS NFR BLD: 62.2 % (ref 44–72)
NITRITE UR QL STRIP.AUTO: NEGATIVE
NRBC # BLD AUTO: 0 K/UL
NRBC BLD AUTO-RTO: 0 /100 WBC
PH UR STRIP.AUTO: 6 [PH]
PLATELET # BLD AUTO: 207 K/UL (ref 164–446)
PLATELET BLD QL SMEAR: NORMAL
PMV BLD AUTO: 12.4 FL (ref 9–12.9)
POTASSIUM SERPL-SCNC: 4.1 MMOL/L (ref 3.6–5.5)
PROT SERPL-MCNC: 6.7 G/DL (ref 6–8.2)
PROT UR QL STRIP: 100 MG/DL
PROTHROMBIN TIME: 16.7 SEC (ref 12–14.6)
RBC # BLD AUTO: 3.62 M/UL (ref 4.7–6.1)
RBC # URNS HPF: ABNORMAL /HPF
RBC BLD AUTO: PRESENT
RBC UR QL AUTO: ABNORMAL
SODIUM SERPL-SCNC: 133 MMOL/L (ref 135–145)
SP GR UR STRIP.AUTO: 1.02
TROPONIN I SERPL-MCNC: <0.01 NG/ML (ref 0–0.04)
TSH SERPL DL<=0.005 MIU/L-ACNC: 1.71 UIU/ML (ref 0.3–3.7)
WBC # BLD AUTO: 16.7 K/UL (ref 4.8–10.8)

## 2017-08-12 PROCEDURE — 80053 COMPREHEN METABOLIC PANEL: CPT

## 2017-08-12 PROCEDURE — A9270 NON-COVERED ITEM OR SERVICE: HCPCS | Performed by: FAMILY MEDICINE

## 2017-08-12 PROCEDURE — 96367 TX/PROPH/DG ADDL SEQ IV INF: CPT

## 2017-08-12 PROCEDURE — 87086 URINE CULTURE/COLONY COUNT: CPT

## 2017-08-12 PROCEDURE — 81001 URINALYSIS AUTO W/SCOPE: CPT

## 2017-08-12 PROCEDURE — 99223 1ST HOSP IP/OBS HIGH 75: CPT | Performed by: FAMILY MEDICINE

## 2017-08-12 PROCEDURE — 303105 HCHG CATHETER EXTRA

## 2017-08-12 PROCEDURE — 51702 INSERT TEMP BLADDER CATH: CPT

## 2017-08-12 PROCEDURE — 700102 HCHG RX REV CODE 250 W/ 637 OVERRIDE(OP): Performed by: FAMILY MEDICINE

## 2017-08-12 PROCEDURE — 85027 COMPLETE CBC AUTOMATED: CPT

## 2017-08-12 PROCEDURE — 700105 HCHG RX REV CODE 258: Performed by: EMERGENCY MEDICINE

## 2017-08-12 PROCEDURE — 302128 INFUSION PUMP: Performed by: EMERGENCY MEDICINE

## 2017-08-12 PROCEDURE — 700111 HCHG RX REV CODE 636 W/ 250 OVERRIDE (IP): Performed by: FAMILY MEDICINE

## 2017-08-12 PROCEDURE — 96365 THER/PROPH/DIAG IV INF INIT: CPT

## 2017-08-12 PROCEDURE — 87040 BLOOD CULTURE FOR BACTERIA: CPT | Mod: 91

## 2017-08-12 PROCEDURE — 700101 HCHG RX REV CODE 250: Performed by: EMERGENCY MEDICINE

## 2017-08-12 PROCEDURE — 99285 EMERGENCY DEPT VISIT HI MDM: CPT

## 2017-08-12 PROCEDURE — 72128 CT CHEST SPINE W/O DYE: CPT

## 2017-08-12 PROCEDURE — 85730 THROMBOPLASTIN TIME PARTIAL: CPT

## 2017-08-12 PROCEDURE — 83605 ASSAY OF LACTIC ACID: CPT | Mod: 91

## 2017-08-12 PROCEDURE — 700105 HCHG RX REV CODE 258: Performed by: FAMILY MEDICINE

## 2017-08-12 PROCEDURE — 83880 ASSAY OF NATRIURETIC PEPTIDE: CPT

## 2017-08-12 PROCEDURE — 700111 HCHG RX REV CODE 636 W/ 250 OVERRIDE (IP): Performed by: EMERGENCY MEDICINE

## 2017-08-12 PROCEDURE — 36415 COLL VENOUS BLD VENIPUNCTURE: CPT

## 2017-08-12 PROCEDURE — 71010 DX-CHEST-PORTABLE (1 VIEW): CPT

## 2017-08-12 PROCEDURE — 84443 ASSAY THYROID STIM HORMONE: CPT

## 2017-08-12 PROCEDURE — 70450 CT HEAD/BRAIN W/O DYE: CPT

## 2017-08-12 PROCEDURE — 96361 HYDRATE IV INFUSION ADD-ON: CPT

## 2017-08-12 PROCEDURE — 85007 BL SMEAR W/DIFF WBC COUNT: CPT

## 2017-08-12 PROCEDURE — 96366 THER/PROPH/DIAG IV INF ADDON: CPT

## 2017-08-12 PROCEDURE — 84484 ASSAY OF TROPONIN QUANT: CPT

## 2017-08-12 PROCEDURE — A9270 NON-COVERED ITEM OR SERVICE: HCPCS | Performed by: EMERGENCY MEDICINE

## 2017-08-12 PROCEDURE — 85610 PROTHROMBIN TIME: CPT

## 2017-08-12 PROCEDURE — 700102 HCHG RX REV CODE 250 W/ 637 OVERRIDE(OP): Performed by: EMERGENCY MEDICINE

## 2017-08-12 PROCEDURE — 770020 HCHG ROOM/CARE - TELE (206)

## 2017-08-12 RX ORDER — SODIUM CHLORIDE 9 MG/ML
30 INJECTION, SOLUTION INTRAVENOUS
Status: DISCONTINUED | OUTPATIENT
Start: 2017-08-12 | End: 2017-08-12

## 2017-08-12 RX ORDER — BISACODYL 10 MG
10 SUPPOSITORY, RECTAL RECTAL
Status: DISCONTINUED | OUTPATIENT
Start: 2017-08-12 | End: 2017-08-30 | Stop reason: HOSPADM

## 2017-08-12 RX ORDER — ONDANSETRON 2 MG/ML
4 INJECTION INTRAMUSCULAR; INTRAVENOUS EVERY 4 HOURS PRN
Status: DISCONTINUED | OUTPATIENT
Start: 2017-08-12 | End: 2017-08-30 | Stop reason: HOSPADM

## 2017-08-12 RX ORDER — AMOXICILLIN 250 MG
2 CAPSULE ORAL 2 TIMES DAILY
Status: DISCONTINUED | OUTPATIENT
Start: 2017-08-13 | End: 2017-08-30 | Stop reason: HOSPADM

## 2017-08-12 RX ORDER — AMANTADINE HYDROCHLORIDE 100 MG/1
100 CAPSULE, GELATIN COATED ORAL 2 TIMES DAILY
Status: DISCONTINUED | OUTPATIENT
Start: 2017-08-13 | End: 2017-08-21

## 2017-08-12 RX ORDER — SODIUM CHLORIDE 9 MG/ML
30 INJECTION, SOLUTION INTRAVENOUS ONCE
Status: COMPLETED | OUTPATIENT
Start: 2017-08-12 | End: 2017-08-12

## 2017-08-12 RX ORDER — HEPARIN SODIUM 5000 [USP'U]/ML
5000 INJECTION, SOLUTION INTRAVENOUS; SUBCUTANEOUS EVERY 8 HOURS
Status: DISCONTINUED | OUTPATIENT
Start: 2017-08-12 | End: 2017-08-12

## 2017-08-12 RX ORDER — SODIUM CHLORIDE 9 MG/ML
INJECTION, SOLUTION INTRAVENOUS CONTINUOUS
Status: DISCONTINUED | OUTPATIENT
Start: 2017-08-12 | End: 2017-08-17

## 2017-08-12 RX ORDER — CARBIDOPA AND LEVODOPA 50; 200 MG/1; MG/1
1 TABLET, EXTENDED RELEASE ORAL
Status: DISCONTINUED | OUTPATIENT
Start: 2017-08-12 | End: 2017-08-15

## 2017-08-12 RX ORDER — ONDANSETRON 4 MG/1
4 TABLET, ORALLY DISINTEGRATING ORAL EVERY 4 HOURS PRN
Status: DISCONTINUED | OUTPATIENT
Start: 2017-08-12 | End: 2017-08-30 | Stop reason: HOSPADM

## 2017-08-12 RX ORDER — ACETAMINOPHEN 650 MG/1
650 SUPPOSITORY RECTAL ONCE
Status: COMPLETED | OUTPATIENT
Start: 2017-08-12 | End: 2017-08-12

## 2017-08-12 RX ORDER — TRAMADOL HYDROCHLORIDE 50 MG/1
50 TABLET ORAL EVERY 6 HOURS PRN
Status: DISCONTINUED | OUTPATIENT
Start: 2017-08-12 | End: 2017-08-24

## 2017-08-12 RX ORDER — MODAFINIL 100 MG/1
200 TABLET ORAL DAILY
Status: DISCONTINUED | OUTPATIENT
Start: 2017-08-13 | End: 2017-08-30 | Stop reason: HOSPADM

## 2017-08-12 RX ORDER — SODIUM CHLORIDE 9 MG/ML
500 INJECTION, SOLUTION INTRAVENOUS
Status: DISCONTINUED | OUTPATIENT
Start: 2017-08-12 | End: 2017-08-30 | Stop reason: HOSPADM

## 2017-08-12 RX ORDER — CITALOPRAM 20 MG/1
20 TABLET ORAL DAILY
Status: DISCONTINUED | OUTPATIENT
Start: 2017-08-13 | End: 2017-08-30 | Stop reason: HOSPADM

## 2017-08-12 RX ORDER — POLYETHYLENE GLYCOL 3350 17 G/17G
1 POWDER, FOR SOLUTION ORAL
Status: DISCONTINUED | OUTPATIENT
Start: 2017-08-12 | End: 2017-08-30 | Stop reason: HOSPADM

## 2017-08-12 RX ORDER — MEMANTINE HYDROCHLORIDE 10 MG/1
10 TABLET ORAL 2 TIMES DAILY
Status: DISCONTINUED | OUTPATIENT
Start: 2017-08-13 | End: 2017-08-30 | Stop reason: HOSPADM

## 2017-08-12 RX ORDER — ACETAMINOPHEN 325 MG/1
650 TABLET ORAL EVERY 6 HOURS PRN
Status: DISCONTINUED | OUTPATIENT
Start: 2017-08-12 | End: 2017-08-30

## 2017-08-12 RX ORDER — TRAMADOL HYDROCHLORIDE 50 MG/1
50 TABLET ORAL ONCE
Status: DISCONTINUED | OUTPATIENT
Start: 2017-08-12 | End: 2017-08-12

## 2017-08-12 RX ADMIN — VANCOMYCIN HYDROCHLORIDE 2200 MG: 100 INJECTION, POWDER, LYOPHILIZED, FOR SOLUTION INTRAVENOUS at 19:45

## 2017-08-12 RX ADMIN — CARBIDOPA AND LEVODOPA 1 TABLET: 50; 200 TABLET, EXTENDED RELEASE ORAL at 23:52

## 2017-08-12 RX ADMIN — TRAMADOL HYDROCHLORIDE 50 MG: 50 TABLET, COATED ORAL at 22:08

## 2017-08-12 RX ADMIN — PIPERACILLIN SODIUM AND TAZOBACTAM SODIUM 3.38 G: 3; .375 INJECTION, POWDER, FOR SOLUTION INTRAVENOUS at 23:52

## 2017-08-12 RX ADMIN — SODIUM CHLORIDE 2613 ML: 9 INJECTION, SOLUTION INTRAVENOUS at 19:09

## 2017-08-12 RX ADMIN — SODIUM CHLORIDE: 9 INJECTION, SOLUTION INTRAVENOUS at 23:30

## 2017-08-12 RX ADMIN — ACETAMINOPHEN 650 MG: 650 SUPPOSITORY RECTAL at 19:13

## 2017-08-12 RX ADMIN — PIPERACILLIN SODIUM AND TAZOBACTAM SODIUM 4.5 G: 4; .5 INJECTION, POWDER, FOR SOLUTION INTRAVENOUS at 19:42

## 2017-08-12 RX ADMIN — LIDOCAINE HYDROCHLORIDE 5 ML: 20 JELLY TOPICAL at 21:55

## 2017-08-12 ASSESSMENT — COGNITIVE AND FUNCTIONAL STATUS - GENERAL
TOILETING: A LOT
HELP NEEDED FOR BATHING: A LOT
MOBILITY SCORE: 12
SUGGESTED CMS G CODE MODIFIER MOBILITY: CL
DAILY ACTIVITIY SCORE: 12
PERSONAL GROOMING: A LOT
CLIMB 3 TO 5 STEPS WITH RAILING: A LOT
TURNING FROM BACK TO SIDE WHILE IN FLAT BAD: A LOT
SUGGESTED CMS G CODE MODIFIER DAILY ACTIVITY: CL
MOVING FROM LYING ON BACK TO SITTING ON SIDE OF FLAT BED: A LOT
STANDING UP FROM CHAIR USING ARMS: A LOT
EATING MEALS: A LOT
DRESSING REGULAR LOWER BODY CLOTHING: A LOT
WALKING IN HOSPITAL ROOM: A LOT
MOVING TO AND FROM BED TO CHAIR: A LOT
DRESSING REGULAR UPPER BODY CLOTHING: A LOT

## 2017-08-12 ASSESSMENT — LIFESTYLE VARIABLES
TOTAL SCORE: 0
CONSUMPTION TOTAL: NEGATIVE
EVER_SMOKED: NEVER
HAVE YOU EVER FELT YOU SHOULD CUT DOWN ON YOUR DRINKING: NO
AVERAGE NUMBER OF DAYS PER WEEK YOU HAVE A DRINK CONTAINING ALCOHOL: 4
HOW MANY TIMES IN THE PAST YEAR HAVE YOU HAD 5 OR MORE DRINKS IN A DAY: 0
EVER FELT BAD OR GUILTY ABOUT YOUR DRINKING: NO
ALCOHOL_USE: YES
HAVE PEOPLE ANNOYED YOU BY CRITICIZING YOUR DRINKING: NO
TOTAL SCORE: 0
EVER HAD A DRINK FIRST THING IN THE MORNING TO STEADY YOUR NERVES TO GET RID OF A HANGOVER: NO
TOTAL SCORE: 0
ON A TYPICAL DAY WHEN YOU DRINK ALCOHOL HOW MANY DRINKS DO YOU HAVE: 1

## 2017-08-12 ASSESSMENT — PATIENT HEALTH QUESTIONNAIRE - PHQ9
2. FEELING DOWN, DEPRESSED, IRRITABLE, OR HOPELESS: NOT AT ALL
SUM OF ALL RESPONSES TO PHQ QUESTIONS 1-9: 0
1. LITTLE INTEREST OR PLEASURE IN DOING THINGS: NOT AT ALL
SUM OF ALL RESPONSES TO PHQ9 QUESTIONS 1 AND 2: 0

## 2017-08-12 ASSESSMENT — PAIN SCALES - GENERAL: PAINLEVEL_OUTOF10: 0

## 2017-08-13 ENCOUNTER — APPOINTMENT (OUTPATIENT)
Dept: RADIOLOGY | Facility: MEDICAL CENTER | Age: 77
DRG: 853 | End: 2017-08-13
Attending: HOSPITALIST
Payer: MEDICARE

## 2017-08-13 PROBLEM — R31.9 HEMATURIA: Status: ACTIVE | Noted: 2017-08-13

## 2017-08-13 PROBLEM — R41.0 ACUTE DELIRIUM: Status: ACTIVE | Noted: 2017-08-13

## 2017-08-13 PROBLEM — Z66 DNR (DO NOT RESUSCITATE): Status: ACTIVE | Noted: 2017-08-13

## 2017-08-13 LAB
ANION GAP SERPL CALC-SCNC: 7 MMOL/L (ref 0–11.9)
ANISOCYTOSIS BLD QL SMEAR: ABNORMAL
APPEARANCE UR: ABNORMAL
BACTERIA #/AREA URNS HPF: ABNORMAL /HPF
BASOPHILS # BLD AUTO: 0.9 % (ref 0–1.8)
BASOPHILS # BLD: 0.18 K/UL (ref 0–0.12)
BILIRUB UR QL STRIP.AUTO: NEGATIVE
BUN SERPL-MCNC: 16 MG/DL (ref 8–22)
CALCIUM SERPL-MCNC: 8 MG/DL (ref 8.5–10.5)
CHLORIDE SERPL-SCNC: 106 MMOL/L (ref 96–112)
CO2 SERPL-SCNC: 23 MMOL/L (ref 20–33)
COLOR UR: ABNORMAL
CREAT SERPL-MCNC: 0.63 MG/DL (ref 0.5–1.4)
DEPRECATED S PYO AG THROAT QL EIA: NORMAL
EOSINOPHIL # BLD AUTO: 0 K/UL (ref 0–0.51)
EOSINOPHIL NFR BLD: 0 % (ref 0–6.9)
EPI CELLS #/AREA URNS HPF: ABNORMAL /HPF
ERYTHROCYTE [DISTWIDTH] IN BLOOD BY AUTOMATED COUNT: 50.5 FL (ref 35.9–50)
GFR SERPL CREATININE-BSD FRML MDRD: >60 ML/MIN/1.73 M 2
GIANT PLATELETS BLD QL SMEAR: NORMAL
GLUCOSE SERPL-MCNC: 98 MG/DL (ref 65–99)
GLUCOSE UR STRIP.AUTO-MCNC: NEGATIVE MG/DL
GRAN CASTS #/AREA URNS LPF: ABNORMAL /LPF
HCT VFR BLD AUTO: 30.1 % (ref 42–52)
HGB BLD-MCNC: 10 G/DL (ref 14–18)
HYALINE CASTS #/AREA URNS LPF: ABNORMAL /LPF
KETONES UR STRIP.AUTO-MCNC: NEGATIVE MG/DL
LACTATE BLD-SCNC: 0.9 MMOL/L (ref 0.5–2)
LACTATE BLD-SCNC: 1 MMOL/L (ref 0.5–2)
LACTATE BLD-SCNC: 1 MMOL/L (ref 0.5–2)
LEUKOCYTE ESTERASE UR QL STRIP.AUTO: ABNORMAL
LYMPHOCYTES # BLD AUTO: 1.1 K/UL (ref 1–4.8)
LYMPHOCYTES NFR BLD: 5.5 % (ref 22–41)
MACROCYTES BLD QL SMEAR: ABNORMAL
MANUAL DIFF BLD: NORMAL
MCH RBC QN AUTO: 28.8 PG (ref 27–33)
MCHC RBC AUTO-ENTMCNC: 33.2 G/DL (ref 33.7–35.3)
MCV RBC AUTO: 86.7 FL (ref 81.4–97.8)
MICRO URNS: ABNORMAL
MICROCYTES BLD QL SMEAR: ABNORMAL
MONOCYTES # BLD AUTO: 6.6 K/UL (ref 0–0.85)
MONOCYTES NFR BLD AUTO: 33 % (ref 0–13.4)
MORPHOLOGY BLD-IMP: NORMAL
MUCOUS THREADS #/AREA URNS HPF: ABNORMAL /HPF
NEUTROPHILS # BLD AUTO: 12.12 K/UL (ref 1.82–7.42)
NEUTROPHILS NFR BLD: 59.7 % (ref 44–72)
NEUTS BAND NFR BLD MANUAL: 0.9 % (ref 0–10)
NITRITE UR QL STRIP.AUTO: NEGATIVE
NRBC # BLD AUTO: 0 K/UL
NRBC BLD AUTO-RTO: 0 /100 WBC
PH UR STRIP.AUTO: 5 [PH]
PLATELET # BLD AUTO: 203 K/UL (ref 164–446)
PLATELET BLD QL SMEAR: NORMAL
PMV BLD AUTO: 12.3 FL (ref 9–12.9)
POLYCHROMASIA BLD QL SMEAR: NORMAL
POTASSIUM SERPL-SCNC: 3.6 MMOL/L (ref 3.6–5.5)
PROT UR QL STRIP: >=300 MG/DL
RBC # BLD AUTO: 3.47 M/UL (ref 4.7–6.1)
RBC # URNS HPF: ABNORMAL /HPF
RBC BLD AUTO: PRESENT
RBC UR QL AUTO: ABNORMAL
SIGNIFICANT IND 70042: NORMAL
SITE SITE: NORMAL
SODIUM SERPL-SCNC: 136 MMOL/L (ref 135–145)
SOURCE SOURCE: NORMAL
SP GR UR STRIP.AUTO: 1.01
TRANS CELLS #/AREA URNS HPF: ABNORMAL /HPF
WBC # BLD AUTO: 20 K/UL (ref 4.8–10.8)
WBC #/AREA URNS HPF: ABNORMAL /HPF
YEAST BUDDING URNS QL: PRESENT /HPF

## 2017-08-13 PROCEDURE — 81001 URINALYSIS AUTO W/SCOPE: CPT

## 2017-08-13 PROCEDURE — 85027 COMPLETE CBC AUTOMATED: CPT

## 2017-08-13 PROCEDURE — 87880 STREP A ASSAY W/OPTIC: CPT

## 2017-08-13 PROCEDURE — 99233 SBSQ HOSP IP/OBS HIGH 50: CPT | Performed by: HOSPITALIST

## 2017-08-13 PROCEDURE — 700111 HCHG RX REV CODE 636 W/ 250 OVERRIDE (IP): Performed by: HOSPITALIST

## 2017-08-13 PROCEDURE — 83605 ASSAY OF LACTIC ACID: CPT

## 2017-08-13 PROCEDURE — 700102 HCHG RX REV CODE 250 W/ 637 OVERRIDE(OP): Performed by: HOSPITALIST

## 2017-08-13 PROCEDURE — 87081 CULTURE SCREEN ONLY: CPT

## 2017-08-13 PROCEDURE — G8996 SWALLOW CURRENT STATUS: HCPCS | Mod: CL

## 2017-08-13 PROCEDURE — 36415 COLL VENOUS BLD VENIPUNCTURE: CPT

## 2017-08-13 PROCEDURE — G8997 SWALLOW GOAL STATUS: HCPCS | Mod: CK

## 2017-08-13 PROCEDURE — 71010 DX-CHEST-PORTABLE (1 VIEW): CPT

## 2017-08-13 PROCEDURE — 92610 EVALUATE SWALLOWING FUNCTION: CPT

## 2017-08-13 PROCEDURE — 80048 BASIC METABOLIC PNL TOTAL CA: CPT

## 2017-08-13 PROCEDURE — 770020 HCHG ROOM/CARE - TELE (206)

## 2017-08-13 PROCEDURE — 94760 N-INVAS EAR/PLS OXIMETRY 1: CPT

## 2017-08-13 PROCEDURE — A9270 NON-COVERED ITEM OR SERVICE: HCPCS | Performed by: HOSPITALIST

## 2017-08-13 PROCEDURE — 85007 BL SMEAR W/DIFF WBC COUNT: CPT

## 2017-08-13 PROCEDURE — 700105 HCHG RX REV CODE 258: Performed by: FAMILY MEDICINE

## 2017-08-13 PROCEDURE — 700111 HCHG RX REV CODE 636 W/ 250 OVERRIDE (IP): Performed by: FAMILY MEDICINE

## 2017-08-13 RX ORDER — MORPHINE SULFATE 4 MG/ML
2 INJECTION, SOLUTION INTRAMUSCULAR; INTRAVENOUS
Status: DISCONTINUED | OUTPATIENT
Start: 2017-08-13 | End: 2017-08-24

## 2017-08-13 RX ORDER — BACITRACIN ZINC 500 [USP'U]/G
OINTMENT TOPICAL 2 TIMES DAILY
Status: DISCONTINUED | OUTPATIENT
Start: 2017-08-13 | End: 2017-08-30 | Stop reason: HOSPADM

## 2017-08-13 RX ADMIN — SODIUM CHLORIDE: 9 INJECTION, SOLUTION INTRAVENOUS at 20:02

## 2017-08-13 RX ADMIN — PIPERACILLIN SODIUM AND TAZOBACTAM SODIUM 3.38 G: 3; .375 INJECTION, POWDER, FOR SOLUTION INTRAVENOUS at 18:00

## 2017-08-13 RX ADMIN — MORPHINE SULFATE 2 MG: 4 INJECTION INTRAVENOUS at 21:07

## 2017-08-13 RX ADMIN — BACITRACIN ZINC: 500 OINTMENT TOPICAL at 21:26

## 2017-08-13 RX ADMIN — BACITRACIN ZINC: 500 OINTMENT TOPICAL at 18:24

## 2017-08-13 RX ADMIN — MORPHINE SULFATE 2 MG: 4 INJECTION INTRAVENOUS at 17:15

## 2017-08-13 RX ADMIN — MORPHINE SULFATE 2 MG: 4 INJECTION INTRAVENOUS at 23:38

## 2017-08-13 RX ADMIN — PIPERACILLIN SODIUM AND TAZOBACTAM SODIUM 3.38 G: 3; .375 INJECTION, POWDER, FOR SOLUTION INTRAVENOUS at 06:00

## 2017-08-13 RX ADMIN — MORPHINE SULFATE 2 MG: 4 INJECTION INTRAVENOUS at 13:18

## 2017-08-13 RX ADMIN — PIPERACILLIN SODIUM AND TAZOBACTAM SODIUM 3.38 G: 3; .375 INJECTION, POWDER, FOR SOLUTION INTRAVENOUS at 12:00

## 2017-08-13 RX ADMIN — PIPERACILLIN SODIUM AND TAZOBACTAM SODIUM 3.38 G: 3; .375 INJECTION, POWDER, FOR SOLUTION INTRAVENOUS at 23:38

## 2017-08-13 ASSESSMENT — COGNITIVE AND FUNCTIONAL STATUS - GENERAL
TOILETING: A LOT
MOVING TO AND FROM BED TO CHAIR: A LOT
CLIMB 3 TO 5 STEPS WITH RAILING: A LOT
MOBILITY SCORE: 12
MOVING FROM LYING ON BACK TO SITTING ON SIDE OF FLAT BED: A LOT
PERSONAL GROOMING: A LOT
SUGGESTED CMS G CODE MODIFIER DAILY ACTIVITY: CL
HELP NEEDED FOR BATHING: A LOT
STANDING UP FROM CHAIR USING ARMS: A LOT
DRESSING REGULAR UPPER BODY CLOTHING: A LOT
EATING MEALS: A LOT
SUGGESTED CMS G CODE MODIFIER MOBILITY: CL
WALKING IN HOSPITAL ROOM: A LOT
DRESSING REGULAR LOWER BODY CLOTHING: A LOT
TURNING FROM BACK TO SIDE WHILE IN FLAT BAD: A LOT
DAILY ACTIVITIY SCORE: 12

## 2017-08-13 ASSESSMENT — PAIN SCALES - GENERAL
PAINLEVEL_OUTOF10: 0
PAINLEVEL_OUTOF10: ASSUMED PAIN PRESENT
PAINLEVEL_OUTOF10: 0

## 2017-08-13 ASSESSMENT — COPD QUESTIONNAIRES
DO YOU EVER COUGH UP ANY MUCUS OR PHLEGM?: YES, EVERY DAY
COPD SCREENING SCORE: 6
HAVE YOU SMOKED AT LEAST 100 CIGARETTES IN YOUR ENTIRE LIFE: NO/DON'T KNOW
DURING THE PAST 4 WEEKS HOW MUCH DID YOU FEEL SHORT OF BREATH: SOME OF THE TIME

## 2017-08-13 ASSESSMENT — LIFESTYLE VARIABLES: EVER_SMOKED: NEVER

## 2017-08-13 NOTE — ED NOTES
IV abx infusing.  Patient resting on gurney, cool cloth to forehead, tremors noted in hands.  Wife at bedside.

## 2017-08-13 NOTE — CARE PLAN
Problem: Safety  Goal: Will remain free from injury  Outcome: PROGRESSING AS EXPECTED  Bed strip alarm in place. Call light with in reach. Bed low and locked. Hourly rounding in place.    Problem: Skin Integrity  Goal: Risk for impaired skin integrity will decrease  Outcome: PROGRESSING AS EXPECTED  Every 2 hours turns in place. Skin kept clean warm and dry

## 2017-08-13 NOTE — PROGRESS NOTES
Amara Pascal Fall Risk Assessment:     Last Known Fall: Within the last year  Mobility: Use of assistive device/requires assist of two people  Medications: No meds  Mental Status/LOC/Awareness: Awake, alert, and oriented to date, place, and person  Toileting Needs: Use of catheters or diversion devices  Volume/Electrolyte Status: No problems  Communication/Sensory: Visual (Glasses)/hearing deficit  Behavior: Appropriate behavior  Amaar Pascal Fall Risk Total: 9  Fall Risk Level: LOW RISK    Universal Fall Precautions:  call light/belongings in reach    Fall Risk Level Interventions:   TRIAL (TELE 8, NEURO, MED ZAKIYA 5) Low Fall Risk Interventions  Place yellow fall risk ID band on patient: verified  Provide patient/family education based on risk assessment: verified  Educate patient/family to call staff for assistance when getting out of bed: verified  Place fall precaution signage outside patient door: verified      Patient Specific Interventions:     Medication: review medications with patient and family  Mental Status/LOC/Awareness: reorient patient  Toileting: provide frquent toileting  Volume/Electrolyte Status: ensure patient remains hydrated  Communication/Sensory: update plan of care on whiteboard  Behavioral: administer medication as ordered  Mobility: utilize bed/chair fall alarm

## 2017-08-13 NOTE — ASSESSMENT & PLAN NOTE
- Much improved.  Alert an oriented.  - fluctuates, likely multi-factorial, infection, prolonged hospitalization, parkinson's

## 2017-08-13 NOTE — PROGRESS NOTES
Pt arrived to floor. Slid to bed via slide board. Admit profile compete via SO. Pt kept NPO based RN judgement. IV fluids running at 100. Hurst in place draining yellow urine with red flecks. HOB elevated,  in place, satting in 90s ORA. Q2h turning in place to maintain integrity of pt's skin. SO will bring in list of meds for med rec in AM. A+Ox4 with episodes of confusion. All questions answered regarding POC.

## 2017-08-13 NOTE — ED NOTES
Attempted to place rinaldi catheter, unable to get urine return, patient in pain, rinaldi pulled, ERP notified, new order received.

## 2017-08-13 NOTE — ED PROVIDER NOTES
ED Provider Note    CHIEF COMPLAINT  Chief Complaint   Patient presents with   • ALOC     Pt transferred from [facility] for further treatment and evaluation.  Per report pt has been having fever, cough, decreased mentation.  Pt had recent kyphoplasty.    • Fever     T102.2F       HPI  Rafael Felix is a 76 y.o. male who presents to the ER complaining of altered mental status and fever.  The patient was recently in the hospital for lumbar fractures and kyphoplasty for therapeutic intervention.  Patient apparently did fairly well postoperatively, and ultimately was discharged back to a skilled nursing facility.  Lites status.  He was not quite himself.  When he left.  Seems a little confused since being at the nursing home.  He has had fevers.  He is been worked up.  They've not really been able to find the cause of the fever.  This questionable UTI.  He said 2 doses of Keflex.  He is not really had a cough.  He is altered and confused.  I guess he was having some difficulty urinating.  Return if was a fully resolve the urinary output.  Patient is confused.  I'm not able to obtain any history from him.    REVIEW OF SYSTEMS  See HPI for further details.  Unable to obtain review of systems secondary to mental status.    PAST MEDICAL HISTORY  Past Medical History   Diagnosis Date   • Parkinson's disease (CMS-HCC)    • MDS (myelodysplastic syndrome) (CMS-HCC)    • Hemorrhagic disorder (CMS-HCC) 7-     bruises easily       FAMILY HISTORY  Family History   Problem Relation Age of Onset   • Heart Disease Mother    • Lung Disease Mother    • Cancer Father      pancreatic       SOCIAL HISTORY  Social History     Social History   • Marital Status:      Spouse Name: N/A   • Number of Children: N/A   • Years of Education: N/A     Social History Main Topics   • Smoking status: Never Smoker    • Smokeless tobacco: Never Used   • Alcohol Use: Yes      Comment: 1 per day   • Drug Use: No   • Sexual  "Activity: Not Asked     Other Topics Concern   • None     Social History Narrative       SURGICAL HISTORY  Past Surgical History   Procedure Laterality Date   • Splenectomy     • Knee arthroscopy     • Prostatectomy, radial       subtotal   • Rotator cuff repair       right   • Cataract extraction with iol     • Kyphoplasty  8/6/2017     Procedure: KYPHOPLASTY T11, T12, L1 ;  Surgeon: Niels Gallardo M.D.;  Location: SURGERY [facility];  Service:        CURRENT MEDICATIONS  Home Medications     Reviewed by Leola Moreira, Pharmacy Int (Pharmacy Intern) on 08/12/17 at 1932  Med List Status: Complete    Medication Last Dose Status    amantadine (SYMMETREL) 100 MG Cap 8/12/2017 Active    Carbidopa-Levodopa ER (RYTARY) 61. MG Cap CR 8/12/2017 Active    carbidopa-levodopa SR (SINEMET CR)  MG per tablet 8/11/2017 Active    citalopram (CELEXA) 20 MG Tab 8/12/2017 Active    memantine (NAMENDA) 10 MG Tab 8/12/2017 Active    modafinil (PROVIGIL) 200 MG Tab 8/12/2017 Active    tramadol (ULTRAM) 50 MG Tab 8/11/2017 Active    triazolam (HALCION) 0.125 MG tablet > week Active                ALLERGIES  No Known Allergies    PHYSICAL EXAM  VITAL SIGNS: /62 mmHg  Pulse 78  Temp(Src) 39 °C (102.2 °F)  Resp 22  Ht 1.88 m (6' 2.02\")  Wt 87.091 kg (192 lb)  BMI 24.64 kg/m2   Constitutional: Awake, ill-appearing, tremulous, confused  HENT: Normocephalic, Atraumatic, Bilateral external ears normal, Oropharynx moist, No oral exudates, Nose normal.   Eyes: PERRL, EOMI, Conjunctiva normal, No discharge.   Neck: Normal range of motion, No tenderness, Supple, No stridor.   Cardiovascular: Normal heart rate, Normal rhythm, No murmurs, No rubs, No gallops.   Thorax & Lungs: Normal breath sounds, No respiratory distress, No wheezing,   Abdomen: Bowel sounds normal, Soft, No tenderness,   : Normal.   Skin: Warm, Dry, No erythema, No rash.  Surgical site is clean, dry and intact with puncture wounds without " hematoma.  Back: No tenderness, No CVA tenderness.  Musculoskeletal: Good range of motion in all major joints.   Neurologic: Alert, but confused, No focal deficits noted.   Psychiatric: Affect anxious    EKG  EKG Interpretation    Interpreted by me    Rhythm: normal sinus   Rate: normal  Axis: Left axis deviation  Ectopy: none  Conduction: normal  ST Segments: no acute change  T Waves: no acute change  Q Waves: Anterior Q waves    Clinical Impression: normal sinus rhythm with anterior Q waves and left axis deviation, no acute change.      Results for orders placed or performed during the hospital encounter of 08/12/17   Lactic acid (lactate)   Result Value Ref Range    Lactic Acid 1.5 0.5 - 2.0 mmol/L   Lactic acid (lactate)   Result Value Ref Range    Lactic Acid 1.0 0.5 - 2.0 mmol/L   CBC WITH DIFFERENTIAL   Result Value Ref Range    WBC 16.7 (H) 4.8 - 10.8 K/uL    RBC 3.62 (L) 4.70 - 6.10 M/uL    Hemoglobin 10.3 (L) 14.0 - 18.0 g/dL    Hematocrit 31.2 (L) 42.0 - 52.0 %    MCV 86.2 81.4 - 97.8 fL    MCH 28.5 27.0 - 33.0 pg    MCHC 33.0 (L) 33.7 - 35.3 g/dL    RDW 50.6 (H) 35.9 - 50.0 fL    Platelet Count 207 164 - 446 K/uL    MPV 12.4 9.0 - 12.9 fL    Nucleated RBC 0.00 /100 WBC    NRBC (Absolute) 0.00 K/uL    Neutrophils-Polys 62.20 44.00 - 72.00 %    Lymphocytes 9.90 (L) 22.00 - 41.00 %    Monocytes 26.10 (H) 0.00 - 13.40 %    Eosinophils 0.90 0.00 - 6.90 %    Basophils 0.90 0.00 - 1.80 %    Neutrophils (Absolute) 10.39 (H) 1.82 - 7.42 K/uL    Lymphs (Absolute) 1.65 1.00 - 4.80 K/uL    Monos (Absolute) 4.36 (H) 0.00 - 0.85 K/uL    Eos (Absolute) 0.15 0.00 - 0.51 K/uL    Baso (Absolute) 0.15 (H) 0.00 - 0.12 K/uL    Anisocytosis 1+    COMP METABOLIC PANEL   Result Value Ref Range    Sodium 133 (L) 135 - 145 mmol/L    Potassium 4.1 3.6 - 5.5 mmol/L    Chloride 103 96 - 112 mmol/L    Co2 24 20 - 33 mmol/L    Anion Gap 6.0 0.0 - 11.9    Glucose 125 (H) 65 - 99 mg/dL    Bun 19 8 - 22 mg/dL    Creatinine 0.69 0.50 -  1.40 mg/dL    Calcium 8.8 8.5 - 10.5 mg/dL    AST(SGOT) 18 12 - 45 U/L    ALT(SGPT) 5 2 - 50 U/L    Alkaline Phosphatase 83 30 - 99 U/L    Total Bilirubin 1.0 0.1 - 1.5 mg/dL    Albumin 3.3 3.2 - 4.9 g/dL    Total Protein 6.7 6.0 - 8.2 g/dL    Globulin 3.4 1.9 - 3.5 g/dL    A-G Ratio 1.0 g/dL   URINALYSIS   Result Value Ref Range    Micro Urine Req Microscopic     Color Kina     Character Sl Cloudy (A)     Specific Gravity 1.025 <1.035    Ph 6.0 5.0-8.0    Glucose Negative Negative mg/dL    Ketones Negative Negative mg/dL    Protein 100 (A) Negative mg/dL    Bilirubin Negative Negative    Nitrite Negative Negative    Leukocyte Esterase Negative Negative    Occult Blood Large (A) Negative   BTYPE NATRIURETIC PEPTIDE   Result Value Ref Range    B Natriuretic Peptide 36 0 - 100 pg/mL   DIFFERENTIAL MANUAL   Result Value Ref Range    Manual Diff Status PERFORMED    PERIPHERAL SMEAR REVIEW   Result Value Ref Range    Peripheral Smear Review see below    PLATELET ESTIMATE   Result Value Ref Range    Plt Estimation Normal    MORPHOLOGY   Result Value Ref Range    RBC Morphology Present     Large Platelets 2+    ESTIMATED GFR   Result Value Ref Range    GFR If African American >60 >60 mL/min/1.73 m 2    GFR If Non African American >60 >60 mL/min/1.73 m 2   TROPONIN   Result Value Ref Range    Troponin I <0.01 0.00 - 0.04 ng/mL   Prothrombin time (INR)   Result Value Ref Range    PT 16.7 (H) 12.0 - 14.6 sec    INR 1.31 (H) 0.87 - 1.13   APTT   Result Value Ref Range    APTT 41.1 (H) 24.7 - 36.0 sec   TSH (Thyroid Stimulating Hormone)   Result Value Ref Range    TSH 1.710 0.300 - 3.700 uIU/mL   URINE MICROSCOPIC (W/UA)   Result Value Ref Range    -150 (A) /hpf    Bacteria Few (A) None /hpf    Amorphous Crystal Present /hpf      RADIOLOGY/PROCEDURES  CT-TSPINE W/O PLUS RECONS   Final Result      1.  Status post vertebral augmentation at T11, T12, L1. Small amount of ventral epidural cement extravasation at the T11  level.   2.  Mild superior endplate compression deformities again seen at T2, T3, T4. No change from 8/6/2017.   3.  Bibasilar subsegmental atelectatic changes most prominent at the left lower lobe posterior basal segment. Little or no change since recent exam 8/6/2017.      CT-HEAD W/O   Final Result      1.  Mild-moderate cerebral atrophy.   2.  Opacification of a few clustered air cells of the left mastoid tip which may represent chronic or active postinflammatory changes.   3.  Otherwise, no acute findings and no appreciable change compared to MRI study 11/1/2016.      DX-CHEST-PORTABLE (1 VIEW)   Final Result      Stable prominence of the cardiomediastinal silhouette.      Atherosclerotic plaque.      Mild left basilar opacity may represent atelectasis.             COURSE & MEDICAL DECISION MAKING  Pertinent Labs & Imaging studies reviewed. (See chart for details)  The patient presents with fever and altered mentation.  He is not hypotensive.  He is given fluids and antipyretics and started on the septic protocol.    The patient's records from the transferring hospital.  Pulses recent hospitalist were reviewed for baseline labs consultations competition's physician's notes and procedures.    Patient started on vancomycin and Zosyn for hospital-acquired infection because of his recent instrumentation and possible hospital associated infection.    The patient has a mild left basal atelectasis.  This might be pneumonia or this might be aspiration.  The patient had urinary retention.  A Hurst catheter.  They have is not draining.  I did a bedside ultrasound identified.  Urine in the bladder.  A Hurst is replaced by the nursing staff.  Urine is sent.  CT scan of the back is nondiagnostic.  His etiology infection.  Skin on his back looks normal without any signs of infection.  He could have a deep-seated infection.  He does not have any signs of neurologic Rise at this time may require an MRI.    The patient will be  admitted to the hospital.  She is received fluids he's been normotensive.  He started on antibiotics and admitted in critical condition.    FINAL IMPRESSION  1. Altered mental status, unspecified altered mental status type    2. Fever, unspecified fever cause    3. Sepsis, due to unspecified organism (CMS-[facility])    4. Critical care time of 40 minutes not including procedure             Electronically signed by: James Garcia, 8/12/2017 7:18 PM

## 2017-08-13 NOTE — THERAPY
Speech Language Therapy Clinical Swallow Evaluation completed.  Functional Status: Patient seen this date for CSE in the setting of PNA. Patient's wife reports coughing with eating, per admit note. Patient has Parkinson's disease and was noted to have tremor or arms and hands today. Oral motor exam with limited range of motion, poor coordination, and markedly reduced phonation time. Rapid speech noted. Patient unable to blow up cheeks and hold air. Strength jaw WFL. Patient also noted to have edema and redness of the soft palate, showed to RN and shared with MD's RN for further investigation. PO trials consisted of ice chips, thin water, nectar thick juice, purees, and soft mechanical. Patient with no over signs of aspiartion on ice chips, but palpation of the larynx revealed weak laryngeal elevation. Trials of thin and thick water by spoon and sips from a cup were similar in that patient appeared to be uncomfortable and had a change in breathing, but he did not cough or clear his throat. Volitional cough was very weak and non-productive. Patient was then offered both thin and thick liquids by straw, which both resulted in wet vocal quality, but patient still did not cough or clear his throat independently. Again, was encouraged to cough, which was weak and non-productive. Trials of solids with no overt signs of difficulty. Per report from RN, effects of Parkinson's appears to be changing. This can have an effect on the patients ability to swallow safely. Additionally, swelling or lesion on velum may also impact swallowing safety. Patient is at high risk for aspiration, but today's swallow study was not conclusive for recommendation of a safe diet. Recommend FEES for further investigation into possible aspiration. Discussed with RN and Dr. Solano's RN. Will schedule FEES for tomorrow.     Recommendations - Diet: Diet / Liquid Recommendation: NPO                                                  Medication  "Administration: Medication Administration : Other Per MD.     Plan of Care: Will benefit from Speech Therapy 3 times per week    Post-Acute Therapy: Discharge to a transitional care facility for continued skilled therapy services or Discharge to home with outpatient or home health for additional skilled therapy services.      See \"Rehab Therapy-Acute\" Patient Summary Report for complete documentation.   "

## 2017-08-13 NOTE — H&P
DOS: 8/12/2017    PATIENT ID:  NAME:  Rafael Felix  MRN:               [id number]  YOB: 1940    PMD: MERI Casraez    CC: Fever    HPI: Rafael Felix is a 76 y.o. male who presents with fever, cough which started yesterday. Patient was recently discharged after undergoing kyphoplasty for compression fracture of the thoracic spine. History taken mostly from his spouse was at bedside. She states that the cough started while he was here but he had no fever then. He was discharged to skilled nursing facility yesterday and they noted fever today. His spouse also noted that he was less conversant, mildly confused. His white count here is elevated and chest x-ray does show pneumonia. She admits that he has had some recent trouble or difficulty swallowing.                REVIEW OF SYSTEMS  A full review of systems was completed and all pertinent positives and negatives are included in the HPI above by AMA/CMS criteria.    PAST MEDICAL HISTORY  Past Medical History   Diagnosis Date   • Parkinson's disease (CMS-[facility])    • MDS (myelodysplastic syndrome) (CMS-[facility])    • Hemorrhagic disorder (CMS-[facility]) 7-     bruises easily   • Compression fracture of spine (CMS-[facility])        PAST SURGICAL HISTORY  Past Surgical History   Procedure Laterality Date   • Splenectomy     • Knee arthroscopy     • Prostatectomy, radial       subtotal   • Rotator cuff repair       right   • Cataract extraction with iol     • Kyphoplasty  8/6/2017     Procedure: KYPHOPLASTY T11, T12, L1 ;  Surgeon: Niels Gallardo M.D.;  Location: SURGERY [facility];  Service:        FAMILY HISTORY  Family History   Problem Relation Age of Onset   • Heart Disease Mother    • Lung Disease Mother    • Cancer Father      pancreatic       SOCIAL HISTORY  Social History   Substance Use Topics   • Smoking status: Never Smoker    • Smokeless tobacco: Never Used   • Alcohol Use: Yes      Comment: 1 per day       ALLERGIES  Allergies as of  "08/12/2017   • (No Known Allergies)       OUTPATIENT MEDICATIONS     Medication List      ASK your doctor about these medications       Instructions    amantadine 100 MG Caps   Commonly known as:  SYMMETREL    Take 1 Cap by mouth 2 times a day.   Dose:  100 mg       * RYTARY 61. MG Cpcr   Generic drug:  Carbidopa-Levodopa ER    Take 1 Cap by mouth 3 times a day.   Dose:  1 Cap       * carbidopa-levodopa SR  MG per tablet   Last time this was given:  1 Tab on 8/12/2017 11:52 PM   Commonly known as:  SINEMET CR    Take 1 Tab by mouth every bedtime.   Dose:  1 Tab       citalopram 20 MG Tabs   Commonly known as:  CELEXA    Take 1 Tab by mouth every day.   Dose:  20 mg       memantine 10 MG Tabs   Commonly known as:  NAMENDA    Take 1 Tab by mouth 2 times a day.   Dose:  10 mg       modafinil 200 MG Tabs   Commonly known as:  PROVIGIL    Take 1 Tab by mouth every day.   Dose:  200 mg       tramadol 50 MG Tabs   Last time this was given:  50 mg on 8/12/2017 10:08 PM   Commonly known as:  ULTRAM    Take 1 Tab by mouth every 6 hours as needed for Moderate Pain or Severe Pain.   Dose:  50 mg       triazolam 0.125 MG tablet   Commonly known as:  HALCION    Take 1 Tab by mouth at bedtime as needed.   Dose:  0.125 mg       * Notice:  This list has 2 medication(s) that are the same as other medications prescribed for you. Read the directions carefully, and ask your doctor or other care provider to review them with you.          PHYSICAL EXAM:  Blood pressure 114/97, pulse 81, temperature 37.6 °C (99.7 °F), resp. rate 20, height 1.88 m (6' 2.02\"), weight 87.091 kg (192 lb), SpO2 100 %.  Oxygen: Pulse Oximetry: 100 %, O2 (LPM): 0, O2 Delivery: None (Room Air)    Gen: NAD, comfortable  HEENT: NCAT, PERRL, EOMI, Oropharynx moist and clear, no LAD, no JVD, neck supple  Chest: no accessory muscle use, rales  CV: RRR, S1 and S2 distinct, no murmur  GI: +BS, soft, NT, ND, no rebound/guarding, no " hepatosplenomegaly  Extremities: Warm, well-perfused, no cyanosis/clubbing, no peripheral edema, distal pulses are intact  Neuro: AO x 2, CN II-XII grossly intact, MMT BUE 5/5 with tremors BLE 4/5 with tremors, no sensory deficits    LAB TESTS and IMAGES:   Recent Labs      08/10/17   0219  08/11/17   0243  08/12/17 1855   WBC  8.3  9.1  16.7*   RBC  3.45*  3.65*  3.62*   HEMOGLOBIN  9.9*  10.3*  10.3*   HEMATOCRIT  30.1*  31.6*  31.2*   MCV  87.2  86.6  86.2   MCH  28.7  28.2  28.5   RDW  50.2*  50.1*  50.6*   PLATELETCT  176  181  207   MPV  12.7  12.5  12.4   NEUTSPOLYS  64.80  53.10  62.20   LYMPHOCYTES  11.10*  11.50*  9.90*   MONOCYTES  20.40*  35.40*  26.10*   EOSINOPHILS  0.90  0.00  0.90   BASOPHILS  2.80*  0.00  0.90   RBCMORPHOLO  Present  Present  Present     Recent Labs      08/12/17 1855   TROPONINI  <0.01   BNPBTYPENAT  36     Recent Labs      08/12/17 1855   APTT  41.1*   INR  1.31*     Recent Labs      08/10/17   0219  08/12/17   1855   SODIUM  137  133*   POTASSIUM  3.8  4.1   CHLORIDE  106  103   CO2  24  24   BUN  14  19   CREATININE  0.77  0.69   CALCIUM  8.8  8.8   PHOSPHORUS  2.9   --    ALBUMIN  3.1*  3.3     Estimated GFR/CRCL = Estimated Creatinine Clearance: 104.2 mL/min (by C-G formula based on Cr of 0.69).  Recent Labs      08/10/17   0219  08/12/17   1855   GLUCOSE  98  125*     FREE T-4   Date/Time Value Ref Range Status   09/28/2016 01:53 PM 0.83 0.53 - 1.43 ng/dL Final     Recent Labs      08/12/17 1855   ASTSGOT  18   ALTSGPT  5   TBILIRUBIN  1.0   ALKPHOSPHAT  83   GLOBULIN  3.4   INR  1.31*           Invalid input(s): [id number]  VITAMIN B12 -TRUE COBALAMIN   Date/Time Value Ref Range Status   05/08/2017 06:01 PM >1500* 211 - 911 pg/mL Final   09/28/2016 01:53 PM 1136* 211 - 911 pg/mL Final     IRON   Date/Time Value Ref Range Status   09/28/2016 01:53 PM 85 50 - 180 ug/dL Final     TOTAL IRON BINDING   Date/Time Value Ref Range Status   09/28/2016 01:53  250 -  450 ug/dL Final     Ct-chest,abdomen,pelvis With    8/6/2017 8/6/2017 6:58 PM HISTORY/REASON FOR EXAM:  Myelodysplastic syndrome. History of compression deformities in the spine. TECHNIQUE/EXAM DESCRIPTION: CT scan of the chest, abdomen and pelvis with contrast. Thin-section helical scanning was obtained with intravenous contrast from the lung apices through the pubic symphysis to include the chest, abdomen and pelvis. 100 mL of Omnipaque 350 nonionic contrast was administered intravenously without complication. Low dose optimization technique was utilized for this CT exam including automated exposure control and adjustment of the mA and/or kV according to patient size. COMPARISON: MRI lumbar spine 5/8/2017 and x-ray from the same day FINDINGS: CT Chest: There is mild bilateral posterior atelectasis. No pleural fluid is identified. There are bilateral thyroid nodules. There are scattered arterial calcifications. No significant pericardial effusion. No adenopathy is identified. There is thoracic kyphosis with mild compression deformities in the upper lumbar spine. CT Abdomen: Irregular hypodense mass in the hepatic dome measures approximately 1.7 cm. There are multiple splenules in the left upper quadrant. Soft tissue mass at the tip of the liver likely represents an additional splenule. The adrenal glands, pancreas, and left kidney are unremarkable. A 1.5 cm mass in the mid right kidney is indeterminant, but likely represents a small cyst. There is a 2 mm nonobstructing calculus in the lower pole of the right kidney. The kidneys enhance symmetrically. There is a gallbladder calculus without wall thickening or pericholecystic fluid. There is a moderate amount of colonic stool. There are scattered arterial calcifications. CT Pelvis: The bladder is unremarkable. No significant free fluid or adenopathy. Degenerative changes are in the spine. Vertebral augmentation has been performed at T11, T12, and L1.     8/6/2017   1.  1.7 cm hypodense mass in the hepatic dome is indeterminant, but may represent a hemangioma or complex cyst. Nonemergent hepatic protocol MRI could be performed for further evaluation. 2.  1.5 cm hypodense mass in the mid right kidney, likely a complex cyst. Follow-up ultrasound or renal protocol CT or MRI could be performed for confirmation. 3.  Multiple splenules in the abdomen. 4.  Cholelithiasis 5.  Spinal compression deformities status post vertebral augmentation at T11, T12, and L1.    Ct-head W/o    8/12/2017 8/12/2017 8:27 PM HISTORY/REASON FOR EXAM:  Altered Mental Status. Fever, cough, decreased mentation. TECHNIQUE/EXAM DESCRIPTION AND NUMBER OF VIEWS: CT of the head without contrast. The study was performed on a helical multidetector CT scanner. Contiguous 2.5 mm axial sections were obtained from the skull base through the vertex. Up to date radiation dose reduction adjustments have been utilized to meet ALARA standards for radiation dose reduction. COMPARISON:  MRI of the brain 11/1/2016 FINDINGS: The calvariae and skull base are unremarkable. There are no extraaxial fluid collections. There is a pattern of cerebral atrophy manifest as enlargement of sulcal markings and ventricular prominence. The ventricular system and basal cisterns are otherwise unremarkable. There are no areas of abnormal density in the brain substance. There are no hemorrhagic lesions. There are no mass effects or shift of midline structures. The brainstem and posterior fossa structures are unremarkable. Paranasal sinuses show some minor scattered mucosal thickening among ethmoid air cells no air-fluid levels are evident. Mastoids show opacification of clustered air cells of the left mastoid tip. This may be chronic as similar findings were noted on the MRI.     8/12/2017  1.  Mild-moderate cerebral atrophy. 2.  Opacification of a few clustered air cells of the left mastoid tip which may represent chronic or active  postinflammatory changes. 3.  Otherwise, no acute findings and no appreciable change compared to MRI study 11/1/2016.    Ct-tspine W/o Plus Recons    8/12/2017 8/12/2017 8:27 PM HISTORY/REASON FOR EXAM:  Pain Following Trauma. Altered level of consciousness. Fever. Cough. Decreased mentation. History of recent kyphoplasty. TECHNIQUE/EXAM DESCRIPTION AND NUMBER OF VIEWS: CT thoracic spine without contrast, with reconstructions. The study was performed on a GE CT scanner. Thin-section helical scanning was performed from C7-T1 through T12-L1.  Sagittal and coronal multiplanar reconstructions were generated from the axial images. Low dose optimization technique was utilized for this CT exam including automated exposure control and adjustment of the mA and/or kV according to patient size. COMPARISON: Recent CT of the chest, abdomen, pelvis 8/6/2017 FINDINGS:  Alignment in the thoracic spine shows no subluxation. Again noted, there are variable compression fractures at T11, T12, and L1 which have been treated with vertebral augmentation. At the T11 level, there is a small amount of extravasated epidural cement (axial image 88, series 6). Again noted are mild superior endplate compression deformities at T2, T3, T4 with no change from the prior exam. No acute fractures are evident. There is some anterior marginal spurring in the midthoracic spine. The prevertebral and paraspinous soft tissues show no abnormal paravertebral fluid collection. There is some subsegmental atelectatic infiltrate in the posterior lung zones, most notable in the left posterior basal segment adjacent to the descending aorta. Similar appearance on the recent exam.     8/12/2017  1.  Status post vertebral augmentation at T11, T12, L1. Small amount of ventral epidural cement extravasation at the T11 level. 2.  Mild superior endplate compression deformities again seen at T2, T3, T4. No change from 8/6/2017. 3.  Bibasilar subsegmental atelectatic  changes most prominent at the left lower lobe posterior basal segment. Little or no change since recent exam 8/6/2017.    Dx-chest-portable (1 View)    8/12/2017 8/12/2017 7:17 PM HISTORY/REASON FOR EXAM:  Possible sepsis. TECHNIQUE/EXAM DESCRIPTION AND NUMBER OF VIEWS: Single portable view of the chest. COMPARISON: 8/9/2017 FINDINGS: The cardiomediastinal silhouette is enlarged. Atherosclerotic calcification is seen. No pleural effusion or pneumothorax is seen. Skinfold is noted on the right. Mild left basilar opacity may represent atelectasis.     8/12/2017  Stable prominence of the cardiomediastinal silhouette. Atherosclerotic plaque. Mild left basilar opacity may represent atelectasis.    Dx-chest-portable (1 View)    8/9/2017 8/9/2017 5:57 PM HISTORY/REASON FOR EXAM:  Congestion for 2 months. TECHNIQUE/EXAM DESCRIPTION AND NUMBER OF VIEWS: Single portable view of the chest. COMPARISON: None FINDINGS: Heart size is enlarged. Pulmonary vessels are normal in size. Streaky linear opacities are present in the left lower lobe which may represent atelectasis or scar. The upper lungs are clear. No pleural abnormalities are noted.     8/9/2017  Streaky linear opacities in the left lower lobe which may represent subsegmental atelectasis or scar. Mild cardiomegaly.    Dx-thoracolumbar Spine-2 Views    8/6/2017 8/6/2017 11:50 AM HISTORY/REASON FOR EXAM: Back pain. TECHNIQUE/EXAM DESCRIPTION AND NUMBER OF VIEWS:  Thoracic spine, 2 views. COMPARISON:  None. FINDINGS: 2 intraoperative views of the thoracic spine obtained following multilevel kyphoplasty demonstrate kyphoplasty cement within the partially collapsed vertebral bodies at approximately the T11-L1 levels.     8/6/2017  Intraoperative visualization of 3 level kyphoplasty at approximately the T11-L1 levels.    Dx-portable Fluoro > 1 Hour    8/7/2017 8/6/2017 11:50 AM HISTORY/REASON FOR EXAM:  Kyphoplasty T11, T12, L1, Main OR TECHNIQUE/EXAM DESCRIPTION AND NUMBER  OF VIEWS: Portable fluoroscopy for greater than one hour FINDINGS:      The portable fluoroscopy unit was obligated to the procedure for greater than one hour.   Actual fluoro time was 6 minutes 12 seconds.     8/7/2017  Portable fluoroscopy utilized for 6 minutes 12 seconds.      ASSESSMENT/PLAN:     1.  Pneumonia. IV Zosyn as this is likely secondary to aspiration  2.  Sepsis. Protocol started  3.  Dysphagia. Keep nothing by mouth, check swallow evaluation  4.  Parkinson's disease. Continue amantadine, Sinemet  5.  Encephalopathy. Multifactorial, frequent orientation, avoid benzodiazepine  6.  Myelodysplastic syndrome. Follow CBC  7.  Compression fracture the spine. Pain control      PPX: SCD    CODE STATUS: DNR verified and clarified with his spouse    Anticipate that patient will need more than 2 midnights for management of the above discussed medical issues.    This dictation was created using voice recognition software. The accuracy of the dictation is limited to the abilities of the software. I expect there may be some errors of grammar and possibly content.

## 2017-08-13 NOTE — ED NOTES
Floor called and notified of transport, report to RN.  Rafael Felix transported to [facility] via [facility] with tranport. All personal belongings in possession.  NAD noted.

## 2017-08-13 NOTE — ED NOTES
Med rec complete per pt S/O at bedside  NKDA  Pt given Keflex last night at other hospital for one dose before arrival

## 2017-08-13 NOTE — ASSESSMENT & PLAN NOTE
- Speech and mentation improving.   - Dysphagia improving.  Transitioned to oral modified diet.    - Continue Sinemet and Symmetrel  - Follows Dr. Ferguson as outpatient

## 2017-08-13 NOTE — PROGRESS NOTES
Spoke with SLP therapy and FEES for tomorrow. Marcia doe speech therapy reports that it will be able to happen tomorrow.

## 2017-08-13 NOTE — CARE PLAN
Problem: Communication  Goal: The ability to communicate needs accurately and effectively will improve  Provided pt with call light, assured pt the nurses station was close by.    Problem: Skin Integrity  Goal: Risk for impaired skin integrity will decrease  Changed pt out of dirty gown, changed adult diaper.

## 2017-08-13 NOTE — ED NOTES
Rinaldi catheter placed, with lidocaine jelly, patient tolerated well.  200 ml zeyad urine with sediment emptied from rinaldi.    Wife at bedside.

## 2017-08-13 NOTE — PROGRESS NOTES
Spoke with wife, Elizabeth about placing cortrak for medication and nutrition route and discussed plan for FEES tomorrow and that he is not safely able to swallow anything at this point. Elizabeth adamantly refuses the cortrak until FEES is preformed due to his confusion and that she has had one prior and wants to hold off for now.

## 2017-08-13 NOTE — PROGRESS NOTES
[facility] Hospitalist Progress Note    Date of Service: 2017    Chief Complaint  76 y.o. male admitted 2017 with fever, elevated WBC count, recent kyphoplasty, confusion.    Interval Problem Update  He is confused but is able to say that his right knee hurts, wife is at bedside and says this has been long standing. She says he has not had any diarrhea or headaches. Parkinson disease has been chiefly characterized by bradykinesia and not tremors but has developed a tremor in the past few weeks.      Consultants/Specialty  None.    Disposition  TBD.        Review of Systems   Unable to perform ROS: acuity of condition      Physical Exam  Laboratory/Imaging   Hemodynamics  Temp (24hrs), Av.6 °C (99.6 °F), Min:36.8 °C (98.3 °F), Max:39 °C (102.2 °F)   Temperature: 36.8 °C (98.3 °F)  Pulse  Av.6  Min: 72  Max: 82 Heart Rate (Monitored): 81  Blood Pressure : 129/79 mmHg, NIBP: 148/63 mmHg      Respiratory      Respiration: 19, Pulse Oximetry: 93 %, O2 Daily Delivery Respiratory : Room Air with O2 Available        RUL Breath Sounds: Diminished, RML Breath Sounds: Diminished, RLL Breath Sounds: Diminished, AQUILINO Breath Sounds: Diminished, LLL Breath Sounds: Diminished    Fluids    Intake/Output Summary (Last 24 hours) at 17 1432  Last data filed at 17 0600   Gross per 24 hour   Intake     50 ml   Output   1350 ml   Net  -1300 ml       Nutrition  Orders Placed This Encounter   Procedures   • Diet NPO     Standing Status: Standing      Number of Occurrences: 1      Standing Expiration Date:      Order Specific Question:  Restrict to:     Answer:  Sips with Medications [3]     Physical Exam   Constitutional: He appears well-developed and well-nourished. No distress.   HENT:   Nose: Nose normal.   Mouth/Throat: Oropharynx is clear and moist. No oropharyngeal exudate.   Eyes: Conjunctivae are normal. Right eye exhibits no discharge. Left eye exhibits no discharge. No scleral icterus.   Neck: Normal  range of motion. Neck supple. No JVD present. No tracheal deviation present. No Brudzinski's sign and no Kernig's sign noted.   Cardiovascular: Normal rate, regular rhythm and normal heart sounds.    Pulmonary/Chest: Effort normal and breath sounds normal. No stridor. No respiratory distress. He has no wheezes. He has no rales. He exhibits no tenderness.   Abdominal: Soft. Bowel sounds are normal. He exhibits no distension. There is no tenderness.   Musculoskeletal: He exhibits no edema or tenderness.   Neurological: He is alert. He displays tremor (resting). No cranial nerve deficit. He exhibits normal muscle tone.   Skin: Skin is warm and dry. He is not diaphoretic. No pallor.   Psychiatric: He has a normal mood and affect. His speech is delayed. Cognition and memory are impaired. He expresses impulsivity. He is inattentive.   Nursing note and vitals reviewed.      Recent Labs      08/11/17   0243  08/12/17   1855  08/13/17   0111   WBC  9.1  16.7*  20.0*   RBC  3.65*  3.62*  3.47*   HEMOGLOBIN  10.3*  10.3*  10.0*   HEMATOCRIT  31.6*  31.2*  30.1*   MCV  86.6  86.2  86.7   MCH  28.2  28.5  28.8   MCHC  32.6*  33.0*  33.2*   RDW  50.1*  50.6*  50.5*   PLATELETCT  181  207  203   MPV  12.5  12.4  12.3     Recent Labs      08/12/17   1855  08/13/17   0111   SODIUM  133*  136   POTASSIUM  4.1  3.6   CHLORIDE  103  106   CO2  24  23   GLUCOSE  125*  98   BUN  19  16   CREATININE  0.69  0.63   CALCIUM  8.8  8.0*     Recent Labs      08/12/17   1855   APTT  41.1*   INR  1.31*     Recent Labs      08/12/17   1855   BNPBTYPENAT  36              Assessment/Plan     Myelodysplastic syndrome (CMS-HCC) (present on admission)  Assessment & Plan  Seen by Dr. Ribeiro on the last admit  Platelet dysfunction present per prior TEG and evaluation  Hold lovenox  SCDs only for DVT prophylaxis    Parkinson's disease (CMS-HCC) (present on admission)  Assessment & Plan  Can't take oral meds right now due to dysphagia  Wife is  aware  Resume when able    Pneumonia (present on admission)  Assessment & Plan  CXR equivocal for infiltrate on my read, repeat xray today  Elevated wbc count and cough  Continue zosyn, follow cultures    Hematuria (present on admission)  Assessment & Plan  Appears due to trauma while inserting catheter; coude had to be used  Urinary retention present  Hold lovenox  Continue fluids, if clots occur then may need three way rinaldi try to avoid this due to having more trauma from putting in 3 way catheter which does not have a coude tip.    Acute delirium (present on admission)  Assessment & Plan  Related to acute infection and illness  Consider possibility of infection from kyphoplasty though site looks good  Unable to swallow failed eval; FEES tomorrow  Discussed with wife; she refuses coretrack stating this would be agains the patient's wishes, if he does not improved and remains unable to swallow she will consider palliative care.    DNR (do not resuscitate) (present on admission)  Assessment & Plan  Confirmed with the patient's wife on admission.    Radiology images reviewed, Labs reviewed and Medications reviewed  Rinaldi catheter: Gross Hematuria with Clots and Urinary Tract Retention or Urinary Tract Obstruction      DVT Prophylaxis: Contraindicated - High bleeding risk  DVT prophylaxis - mechanical: SCDs

## 2017-08-13 NOTE — ED NOTES
Chief Complaint   Patient presents with   • ALOC     Pt transferred from [facility] for further treatment and evaluation.  Per report pt has been having fever, cough, decreased mentation.  Pt had recent kyphoplasty.    • Fever     T102.2F   Pt Sepsis score 4.  Pt arrives with Hurst in place.

## 2017-08-13 NOTE — ASSESSMENT & PLAN NOTE
-no AC  8/27:  Emergency TURP by DR. Almeida, 3 way CBI in place for hematuria induced acute urinary retention  UC sent

## 2017-08-14 ENCOUNTER — APPOINTMENT (OUTPATIENT)
Dept: RADIOLOGY | Facility: MEDICAL CENTER | Age: 77
DRG: 853 | End: 2017-08-14
Attending: HOSPITALIST
Payer: MEDICARE

## 2017-08-14 ENCOUNTER — TELEPHONE (OUTPATIENT)
Dept: NEUROLOGY | Facility: MEDICAL CENTER | Age: 77
End: 2017-08-14

## 2017-08-14 ENCOUNTER — TELEPHONE (OUTPATIENT)
Dept: MEDICAL GROUP | Facility: MEDICAL CENTER | Age: 77
End: 2017-08-14

## 2017-08-14 PROBLEM — M25.472 LEFT ANKLE SWELLING: Status: ACTIVE | Noted: 2017-08-14

## 2017-08-14 LAB
ANION GAP SERPL CALC-SCNC: 9 MMOL/L (ref 0–11.9)
BACTERIA UR CULT: NORMAL
BASOPHILS # BLD AUTO: 2.7 % (ref 0–1.8)
BASOPHILS # BLD: 0.47 K/UL (ref 0–0.12)
BUN SERPL-MCNC: 12 MG/DL (ref 8–22)
CALCIUM SERPL-MCNC: 7.8 MG/DL (ref 8.5–10.5)
CHLORIDE SERPL-SCNC: 105 MMOL/L (ref 96–112)
CO2 SERPL-SCNC: 23 MMOL/L (ref 20–33)
CREAT SERPL-MCNC: 0.67 MG/DL (ref 0.5–1.4)
EOSINOPHIL # BLD AUTO: 0 K/UL (ref 0–0.51)
EOSINOPHIL NFR BLD: 0 % (ref 0–6.9)
ERYTHROCYTE [DISTWIDTH] IN BLOOD BY AUTOMATED COUNT: 48.8 FL (ref 35.9–50)
GFR SERPL CREATININE-BSD FRML MDRD: >60 ML/MIN/1.73 M 2
GLUCOSE SERPL-MCNC: 101 MG/DL (ref 65–99)
HCT VFR BLD AUTO: 27.2 % (ref 42–52)
HGB BLD-MCNC: 9.1 G/DL (ref 14–18)
LG PLATELETS BLD QL SMEAR: NORMAL
LYMPHOCYTES # BLD AUTO: 0.49 K/UL (ref 1–4.8)
LYMPHOCYTES NFR BLD: 2.8 % (ref 22–41)
MANUAL DIFF BLD: NORMAL
MCH RBC QN AUTO: 28.7 PG (ref 27–33)
MCHC RBC AUTO-ENTMCNC: 33.5 G/DL (ref 33.7–35.3)
MCV RBC AUTO: 85.8 FL (ref 81.4–97.8)
MONOCYTES # BLD AUTO: 3.05 K/UL (ref 0–0.85)
MONOCYTES NFR BLD AUTO: 17.4 % (ref 0–13.4)
MORPHOLOGY BLD-IMP: NORMAL
NEUTROPHILS # BLD AUTO: 13.49 K/UL (ref 1.82–7.42)
NEUTROPHILS NFR BLD: 77.1 % (ref 44–72)
NRBC # BLD AUTO: 0 K/UL
NRBC BLD AUTO-RTO: 0 /100 WBC
PLATELET # BLD AUTO: 230 K/UL (ref 164–446)
PLATELET BLD QL SMEAR: NORMAL
PMV BLD AUTO: 12.6 FL (ref 9–12.9)
POTASSIUM SERPL-SCNC: 3.4 MMOL/L (ref 3.6–5.5)
RBC # BLD AUTO: 3.17 M/UL (ref 4.7–6.1)
RBC BLD AUTO: PRESENT
SIGNIFICANT IND 70042: NORMAL
SITE SITE: NORMAL
SODIUM SERPL-SCNC: 137 MMOL/L (ref 135–145)
SOURCE SOURCE: NORMAL
WBC # BLD AUTO: 17.5 K/UL (ref 4.8–10.8)

## 2017-08-14 PROCEDURE — 99233 SBSQ HOSP IP/OBS HIGH 50: CPT | Performed by: HOSPITALIST

## 2017-08-14 PROCEDURE — 770020 HCHG ROOM/CARE - TELE (206)

## 2017-08-14 PROCEDURE — 85007 BL SMEAR W/DIFF WBC COUNT: CPT

## 2017-08-14 PROCEDURE — 92612 ENDOSCOPY SWALLOW (FEES) VID: CPT

## 2017-08-14 PROCEDURE — 80048 BASIC METABOLIC PNL TOTAL CA: CPT

## 2017-08-14 PROCEDURE — G8996 SWALLOW CURRENT STATUS: HCPCS | Mod: CM

## 2017-08-14 PROCEDURE — 92526 ORAL FUNCTION THERAPY: CPT

## 2017-08-14 PROCEDURE — 700111 HCHG RX REV CODE 636 W/ 250 OVERRIDE (IP): Performed by: HOSPITALIST

## 2017-08-14 PROCEDURE — G8997 SWALLOW GOAL STATUS: HCPCS | Mod: CK

## 2017-08-14 PROCEDURE — 36415 COLL VENOUS BLD VENIPUNCTURE: CPT

## 2017-08-14 PROCEDURE — 85027 COMPLETE CBC AUTOMATED: CPT

## 2017-08-14 PROCEDURE — 700111 HCHG RX REV CODE 636 W/ 250 OVERRIDE (IP): Performed by: FAMILY MEDICINE

## 2017-08-14 PROCEDURE — 700105 HCHG RX REV CODE 258: Performed by: FAMILY MEDICINE

## 2017-08-14 RX ORDER — LORAZEPAM 2 MG/ML
1 INJECTION INTRAMUSCULAR ONCE
Status: COMPLETED | OUTPATIENT
Start: 2017-08-14 | End: 2017-08-14

## 2017-08-14 RX ADMIN — PIPERACILLIN SODIUM AND TAZOBACTAM SODIUM 3.38 G: 3; .375 INJECTION, POWDER, FOR SOLUTION INTRAVENOUS at 11:36

## 2017-08-14 RX ADMIN — PIPERACILLIN SODIUM AND TAZOBACTAM SODIUM 3.38 G: 3; .375 INJECTION, POWDER, FOR SOLUTION INTRAVENOUS at 23:13

## 2017-08-14 RX ADMIN — LORAZEPAM 1 MG: 2 INJECTION INTRAMUSCULAR; INTRAVENOUS at 20:11

## 2017-08-14 RX ADMIN — PIPERACILLIN SODIUM AND TAZOBACTAM SODIUM 3.38 G: 3; .375 INJECTION, POWDER, FOR SOLUTION INTRAVENOUS at 06:19

## 2017-08-14 RX ADMIN — MORPHINE SULFATE 2 MG: 4 INJECTION INTRAVENOUS at 17:44

## 2017-08-14 RX ADMIN — SODIUM CHLORIDE: 9 INJECTION, SOLUTION INTRAVENOUS at 04:04

## 2017-08-14 RX ADMIN — SODIUM CHLORIDE: 9 INJECTION, SOLUTION INTRAVENOUS at 17:42

## 2017-08-14 RX ADMIN — BACITRACIN ZINC: 500 OINTMENT TOPICAL at 11:36

## 2017-08-14 RX ADMIN — MORPHINE SULFATE 2 MG: 4 INJECTION INTRAVENOUS at 15:17

## 2017-08-14 RX ADMIN — BACITRACIN ZINC: 500 OINTMENT TOPICAL at 23:13

## 2017-08-14 RX ADMIN — PIPERACILLIN SODIUM AND TAZOBACTAM SODIUM 3.38 G: 3; .375 INJECTION, POWDER, FOR SOLUTION INTRAVENOUS at 17:42

## 2017-08-14 ASSESSMENT — PAIN SCALES - GENERAL
PAINLEVEL_OUTOF10: 4
PAINLEVEL_OUTOF10: 8
PAINLEVEL_OUTOF10: 0
PAINLEVEL_OUTOF10: 2
PAINLEVEL_OUTOF10: 0
PAINLEVEL_OUTOF10: 2
PAINLEVEL_OUTOF10: 7

## 2017-08-14 NOTE — PROGRESS NOTES
Pt. AOx3. Pt. Left ankle swollen, warm, pink, and tender, notified Dr. Solano. Speech to follow up with FEEs today. Holding PO pills until results are back. Fall precautions in place, pt wife at bedside.

## 2017-08-14 NOTE — PROGRESS NOTES
Monitor Summary: SR 70-83, VT .18, QRS .16, QT .44 with occasional PAC,rare PVC per strip from monitor room

## 2017-08-14 NOTE — ASSESSMENT & PLAN NOTE
- Bilateral x-rays of the knees and ankles show very small fluid collection therefore arthrocentesis is not indicated at this time but need to watch closely  - unchanged today  - U/S negative for DVT/SVT, monitor, no e/o infection at this time  - Continue to monitor.

## 2017-08-14 NOTE — CARE PLAN
Problem: Fluid Volume:  Goal: Will maintain balanced intake and output  Outcome: PROGRESSING AS EXPECTED  IVF for hydration   Pt remains NPO   Monitoring UO from rinaldi   UO ranging from hazy straw to cloudy brown     Problem: Pain Management  Goal: Pain level will decrease to patient’s comfort goal  Outcome: PROGRESSING AS EXPECTED  Medicating with morphine for pain

## 2017-08-14 NOTE — PROGRESS NOTES
Pt had cortrack placed, pt continually attempts to pull it out. Paged Dr. Solano concerning possible need for restraints.

## 2017-08-14 NOTE — THERAPY
"  Speech Language Therapy FEES completed.  Functional Status: Pt presenting with moderate-severe dysphagia. He had penetration to vocal folds with all textures (ice, ntl, purees, thins) and likely trace aspiration with thin liquids given amount of blue on vocal folds and dripping residue from interarytenoid space. Pt had no response to material in airway throughout session.   Recommendations: NPO. Pt very confused and does not demo understanding of medical situation and education given. He does state is agreeable to a cortrak. Wife refusing yesterday but after visual evidence of dysphagia she is also agreeable.   Plan of Care: Will benefit from Speech Therapy 3 times per week  Post-Acute Therapy: Discharge to a transitional care facility for continued skilled therapy services.    See \"Rehab Therapy-Acute\" Patient Summary Report for complete documentation.   "

## 2017-08-14 NOTE — TELEPHONE ENCOUNTER
From: Rafael Felix      Sent: 8/13/2017   6:11 PM        To: Neurology Mas   Subject: Procedure Question                               Dr Ferguson - I am Rafael Cervantes wife, Elizabeth. Jamie is back in the hospital ([place] [address]) and is having a great deal of trouble. I want to talk to you about his dopamine. He's not able to swallow and it was suggested that he have a tube put down his throat to administer it. I don't want them to do that as he been traumatized enough with the tubes already in him. He's very disoriented, in pain from surgery,  and has pneumonia. I need your advice. Please call me at [phone]. Thank you.

## 2017-08-14 NOTE — DIETARY
"Nutrition Support Assessment - Male    Rafael Felix is a 76 y.o. male with admitting DX of Pneumonia, acute delerium and hematuria. PMH significant for Parkinson's and myelodysplastic syndrome.  NKFA    Tube feeding r/t dysphagia and failure of FEES testing per SLP     Pertinent Labs:  K+ 3.4, glucose 101  Pertinent Medications: Symmetrel, carbidopa-levodopa, celexa, ativan, namenda, provigil, zosyn, senna-docusate   Pertinent Fluids: per MD - NS at 100 mL/hr   Skin: no pressure areas documented at this time   Edema: 2+ RLE   Last BM: 17    Height: 188 cm (6' 2.02\")  Weight: 91.9 kg (202 lb 9.6 oz)  Weight to Use in Calculations: 91.9 kg (202 lb 9.6 oz)  Ideal Body Weight: 86.36 kg (190 lb 6.2 oz)  Percent Ideal Body Weight: 106.4  Body mass index is 26 kg/(m^2).   After speaking with wife, it turns out the patient has not lost weight, but has gained weight (likely r/t edema). His UBW is 185 lbs. This is a 9.19% gain x 1 month, which is significant   Appearance is well-developed and well-hydrated. Pt seems confused and is quite talkative.     Estimated Needs:  Total Calories / day: 2058.8 (MSJ x 1.2)    Kcal needs: 0118-2005 (Calories / k.4 - 26)  Total Grams Protein / day: 101 - 119  (Grams Protein / k.1 - 1.3)  Total Fluids ml / day: 2301.6 ml         Assessment / Evaluation:     Spoke with patient's wife regarding need for nutrition support and she agreed. Will provide a standard tube feeding with fiber that is adequate to meet needs.     Plan / Recommendation:    Consider replacing K+ as necessary     Fluids per MD     Recommend: Once cortrak is placed and verified, fibersource at goal rate of 80mL/hr to provide 2304 Kcal, 104 g protein and 1555 mL free water     RD following   "

## 2017-08-14 NOTE — TELEPHONE ENCOUNTER
1. Caller Name: Elizabeth patient wife                                         Call Back Number: [phone] (home)         Patient approves a detailed voicemail message: N\A    Elizabeth called for the patient she said that her  was readmitted back into the hospital 24 hours after being released the first time, Elizabeth said she was upset with the hospitalitis care that the patient is receiving since being readmitted to the hospital.

## 2017-08-14 NOTE — PROGRESS NOTES
Cortrak Placement    Tube Team verified patient name and medical record number prior to tube placement.  Cortrak tube (43 inches, 10 Canadian) placed at 75 cm in left nare.  Per Cortrak picture, tube appears to be in the stomach.  Nursing Instructions: Awaiting KUB to confirm placement before use for medications or feeding. Once placement confirmed, flush tube with 30 ml of water, and then remove and save stylet, in patient medication drawer.

## 2017-08-14 NOTE — PROGRESS NOTES
[facility] Hospitalist Progress Note    Date of Service: 2017    Chief Complaint  76 y.o. male admitted 2017 with fever, elevated WBC count, recent kyphoplasty, confusion.    Interval Problem Update  More alert and talking  Repeat cxr and ua negative  Has a sore throat, uvula swollen, strep negative  Now left ankle is painful      Consultants/Specialty  None.    Disposition  TBD.        Review of Systems   Unable to perform ROS: acuity of condition      Physical Exam  Laboratory/Imaging   Hemodynamics  Temp (24hrs), Av.1 °C (98.7 °F), Min:36.7 °C (98.1 °F), Max:37.4 °C (99.3 °F)   Temperature: 37.1 °C (98.7 °F)  Pulse  Av.6  Min: 72  Max: 85    Blood Pressure : 139/52 mmHg      Respiratory      Respiration: 17, Pulse Oximetry: 96 %        RUL Breath Sounds: Crackles, RML Breath Sounds: Diminished, RLL Breath Sounds: Diminished, AQUILINO Breath Sounds: Diminished, LLL Breath Sounds: Diminished    Fluids    Intake/Output Summary (Last 24 hours) at 17 1408  Last data filed at 17 0600   Gross per 24 hour   Intake   1400 ml   Output   2100 ml   Net   -700 ml       Nutrition  Orders Placed This Encounter   Procedures   • Diet NPO     Standing Status: Standing      Number of Occurrences: 1      Standing Expiration Date:      Order Specific Question:  Restrict to:     Answer:  Sips with Medications [3]     Physical Exam   Constitutional: He appears well-developed and well-nourished. No distress.   HENT:   Mouth/Throat: No oropharyngeal exudate (uvula swollen and slightly red).   Eyes: Conjunctivae are normal. Right eye exhibits no discharge. Left eye exhibits no discharge. No scleral icterus.   Neck: Normal range of motion. Neck supple. No JVD present. No tracheal deviation present. No Brudzinski's sign and no Kernig's sign noted.   Cardiovascular: Normal rate, regular rhythm and normal heart sounds.    Pulmonary/Chest: Effort normal and breath sounds normal. No stridor. No respiratory distress. He has  no wheezes. He has no rales.   Abdominal: Soft. Bowel sounds are normal. He exhibits no distension. There is no tenderness.   Musculoskeletal: He exhibits edema and tenderness.   Lateral portion of left ankle swollen   Neurological: He is alert. He displays tremor (resting). No cranial nerve deficit. He exhibits normal muscle tone.   Skin: Skin is warm and dry. He is not diaphoretic. No pallor.   Psychiatric: He has a normal mood and affect. His speech is delayed. Cognition and memory are impaired. He expresses impulsivity. He is inattentive.   Nursing note and vitals reviewed.      Recent Labs      08/12/17 1855 08/13/17   0111  08/14/17   0340   WBC  16.7*  20.0*  17.5*   RBC  3.62*  3.47*  3.17*   HEMOGLOBIN  10.3*  10.0*  9.1*   HEMATOCRIT  31.2*  30.1*  27.2*   MCV  86.2  86.7  85.8   MCH  28.5  28.8  28.7   MCHC  33.0*  33.2*  33.5*   RDW  50.6*  50.5*  48.8   PLATELETCT  207  203  230   MPV  12.4  12.3  12.6     Recent Labs      08/12/17 1855 08/13/17   0111  08/14/17   0340   SODIUM  133*  136  137   POTASSIUM  4.1  3.6  3.4*   CHLORIDE  103  106  105   CO2  24  23  23   GLUCOSE  125*  98  101*   BUN  19  16  12   CREATININE  0.69  0.63  0.67   CALCIUM  8.8  8.0*  7.8*     Recent Labs      08/12/17 1855   APTT  41.1*   INR  1.31*     Recent Labs      08/12/17   1855   BNPBTYPENAT  36              Assessment/Plan     Myelodysplastic syndrome (CMS-HCC) (present on admission)  Assessment & Plan  Seen by Dr. Ribeiro on the last admit  Platelet dysfunction present per prior TEG and evaluation  Hold lovenox  SCDs only for DVT prophylaxis    Parkinson's disease (CMS-HCC) (present on admission)  Assessment & Plan  Can't take oral meds right now due to dysphagia  Wife is aware  Resume when coretrack in place    Pneumonia (present on admission)  Assessment & Plan  RULED OUT on chest xray  Elevated wbc count and cough  Continue zosyn, follow cultures    Hematuria (present on admission)  Assessment &  Plan  Appears due to trauma while inserting catheter; coude had to be used  improving  Urinary retention present  Continue to Hold lovenox  Continue fluids    Acute delirium (present on admission)  Assessment & Plan  Related to acute infection and illness  Consider possibility of infection from kyphoplasty though site looks good  Unable to swallow failed eval; FEES shows aspiration  Wife agreeable to coretrack  Discussed with Dr. Ferguson, get MRI of entire spine to evaluate for epidural abscess given recent kyphoplasty and no other source of infection clear. Continue zosyn pending cultures and MRI; CT of spine ok    DNR (do not resuscitate) (present on admission)  Assessment & Plan  Confirmed with the patient's wife on admission.    Left ankle swelling  Assessment & Plan  New finding check plain films  Consider arthrocentesis if xray abnormal but very small amount of fluid  Continue zosyn and observe      Radiology images reviewed, Labs reviewed and Medications reviewed  Hurst catheter: Gross Hematuria with Clots and Urinary Tract Retention or Urinary Tract Obstruction      DVT Prophylaxis: Contraindicated - High bleeding risk  DVT prophylaxis - mechanical: SCDs

## 2017-08-14 NOTE — PROGRESS NOTES
Amara Pascal Fall Risk Assessment:     Last Known Fall: Within the last year  Mobility: Use of assistive device/requires assist of two people  Medications: Cardiovascular or central nervous system meds  Mental Status/LOC/Awareness: Memory loss/confusion and requires reorienting  Toileting Needs: Use of catheters or diversion devices  Volume/Electrolyte Status: NPO greater than 24 hours, Use of IV fluids/tube feeds  Communication/Sensory: Visual (Glasses)/hearing deficit  Behavior: Behavioral noncompliance with instruction  Amara Pascal Fall Risk Total: 16  Fall Risk Level: HIGH RISK    Universal Fall Precautions:  call light/belongings in reach, bed in low position and locked, siderails up x 2, use non-slip footwear, adequate lighting, clutter free and spill free environment, educate to call for assistance, educate on level of risk, wheelchairs and assistive devices out of sight    Fall Risk Level Interventions:   TRIAL ([employer], NEURO, MED ZAKIYA 5) Low Fall Risk Interventions  Place yellow fall risk ID band on patient: completed  Provide patient/family education based on risk assessment: completed  Educate patient/family to call staff for assistance when getting out of bed: completed  Place fall precaution signage outside patient door: completed TRIAL ([employer], NEURO, MED ZAKIYA 5) High Fall Risk Interventions  Place yellow fall risk ID band on patient: completed  Provide patient/family education based on risk assessment: completed  Educate patient/family to call staff for assistance when getting out of bed: completed  Place fall precaution signage outside patient door: completed  Place patient in room close to nursing station: completed  Utilize bed/chair fall alarm: completed  Notify charge of high risk for huddle: completed    Patient Specific Interventions:     Medication: review medications with patient and family and limit combination of prn medications  Mental Status/LOC/Awareness: reorient patient, reinforce  falls education, encourage family to stay with patient, check on patient hourly, utilize bed/chair fall alarm, reinforce the use of call light and provide activity  Toileting: provide frquent toileting and monitor intake and output/use of appropriate interventions  Volume/Electrolyte Status: ensure patient remains hydrated, advance diet as tolerated, administer medications as ordered for nausea and vomiting, monitor abnormal lab values and ensure IV fluids are appropriate  Communication/Sensory: update plan of care on whiteboard, provide communication alternatives/ and ensure proper positioning when transferrng/ambulating  Behavioral: encourage patient to voice feelings, engage patient in daily activities, administer medication as ordered, instruct/reinforce fall program rationale, encourage family to stay with impulsive patients and use appropriate de-escalation techniques  Mobility: schedule physical activity throughout the day, provide comfort measures during transport, utilize bed/chair fall alarm, ensure bed is locked and in lowest position, provide appropriate assistive device and instruct patient to exit bed on their strongest side

## 2017-08-14 NOTE — PROGRESS NOTES
"Pt confused, restless, does not follow commands, c/o pain to his shoulders and head, medicating with morphine with + results. PEREZ, tremors to BUE. Hurst in place, draining urine ranging from straw and hazy to brown and cloudy, +incontinent BM. Wife Elizabeth at bedside, anxious, tearful, states \"he keeps calling for help and I don't know what to do\". Emotional support and reassurance provided. Elizabeth concerned pt is not receiving his Parkinson's medications, stating \"just put it in his mouth and let it sit there\", educated Elizabeth risks of aspiration and pt not following commands, Elizabeth agreeable to keep pt NPO.    "

## 2017-08-15 ENCOUNTER — APPOINTMENT (OUTPATIENT)
Dept: RADIOLOGY | Facility: MEDICAL CENTER | Age: 77
DRG: 853 | End: 2017-08-15
Attending: HOSPITALIST
Payer: MEDICARE

## 2017-08-15 ENCOUNTER — TELEPHONE (OUTPATIENT)
Dept: NEUROLOGY | Facility: MEDICAL CENTER | Age: 77
End: 2017-08-15

## 2017-08-15 LAB
ANION GAP SERPL CALC-SCNC: 10 MMOL/L (ref 0–11.9)
BASOPHILS # BLD AUTO: 0.9 % (ref 0–1.8)
BASOPHILS # BLD: 0.17 K/UL (ref 0–0.12)
BUN SERPL-MCNC: 12 MG/DL (ref 8–22)
CALCIUM SERPL-MCNC: 8 MG/DL (ref 8.5–10.5)
CHLORIDE SERPL-SCNC: 104 MMOL/L (ref 96–112)
CO2 SERPL-SCNC: 22 MMOL/L (ref 20–33)
CREAT SERPL-MCNC: 0.61 MG/DL (ref 0.5–1.4)
EOSINOPHIL # BLD AUTO: 0.34 K/UL (ref 0–0.51)
EOSINOPHIL NFR BLD: 1.8 % (ref 0–6.9)
ERYTHROCYTE [DISTWIDTH] IN BLOOD BY AUTOMATED COUNT: 48 FL (ref 35.9–50)
GFR SERPL CREATININE-BSD FRML MDRD: >60 ML/MIN/1.73 M 2
GIANT PLATELETS BLD QL SMEAR: NORMAL
GLUCOSE SERPL-MCNC: 100 MG/DL (ref 65–99)
HCT VFR BLD AUTO: 29.4 % (ref 42–52)
HGB BLD-MCNC: 9.5 G/DL (ref 14–18)
LYMPHOCYTES # BLD AUTO: 0.49 K/UL (ref 1–4.8)
LYMPHOCYTES NFR BLD: 2.6 % (ref 22–41)
MANUAL DIFF BLD: NORMAL
MCH RBC QN AUTO: 27.6 PG (ref 27–33)
MCHC RBC AUTO-ENTMCNC: 32.3 G/DL (ref 33.7–35.3)
MCV RBC AUTO: 85.5 FL (ref 81.4–97.8)
MONOCYTES # BLD AUTO: 2.95 K/UL (ref 0–0.85)
MONOCYTES NFR BLD AUTO: 15.8 % (ref 0–13.4)
MORPHOLOGY BLD-IMP: NORMAL
NEUTROPHILS # BLD AUTO: 14.75 K/UL (ref 1.82–7.42)
NEUTROPHILS NFR BLD: 78.9 % (ref 44–72)
NRBC # BLD AUTO: 0.02 K/UL
NRBC BLD AUTO-RTO: 0.1 /100 WBC
PLATELET # BLD AUTO: 295 K/UL (ref 164–446)
PLATELET BLD QL SMEAR: NORMAL
PMV BLD AUTO: 12.5 FL (ref 9–12.9)
POTASSIUM SERPL-SCNC: 3.2 MMOL/L (ref 3.6–5.5)
RBC # BLD AUTO: 3.44 M/UL (ref 4.7–6.1)
RBC BLD AUTO: PRESENT
S PYO SPEC QL CULT: NORMAL
SIGNIFICANT IND 70042: NORMAL
SITE SITE: NORMAL
SODIUM SERPL-SCNC: 136 MMOL/L (ref 135–145)
SOURCE SOURCE: NORMAL
WBC # BLD AUTO: 18.7 K/UL (ref 4.8–10.8)

## 2017-08-15 PROCEDURE — 85027 COMPLETE CBC AUTOMATED: CPT

## 2017-08-15 PROCEDURE — 700111 HCHG RX REV CODE 636 W/ 250 OVERRIDE (IP): Performed by: HOSPITALIST

## 2017-08-15 PROCEDURE — 700105 HCHG RX REV CODE 258: Performed by: FAMILY MEDICINE

## 2017-08-15 PROCEDURE — 700102 HCHG RX REV CODE 250 W/ 637 OVERRIDE(OP): Performed by: INTERNAL MEDICINE

## 2017-08-15 PROCEDURE — 99232 SBSQ HOSP IP/OBS MODERATE 35: CPT | Performed by: INTERNAL MEDICINE

## 2017-08-15 PROCEDURE — 73560 X-RAY EXAM OF KNEE 1 OR 2: CPT | Mod: RT

## 2017-08-15 PROCEDURE — A9270 NON-COVERED ITEM OR SERVICE: HCPCS | Performed by: INTERNAL MEDICINE

## 2017-08-15 PROCEDURE — 80048 BASIC METABOLIC PNL TOTAL CA: CPT

## 2017-08-15 PROCEDURE — 770020 HCHG ROOM/CARE - TELE (206)

## 2017-08-15 PROCEDURE — A9270 NON-COVERED ITEM OR SERVICE: HCPCS | Performed by: FAMILY MEDICINE

## 2017-08-15 PROCEDURE — 700111 HCHG RX REV CODE 636 W/ 250 OVERRIDE (IP): Performed by: FAMILY MEDICINE

## 2017-08-15 PROCEDURE — 85007 BL SMEAR W/DIFF WBC COUNT: CPT

## 2017-08-15 PROCEDURE — 700102 HCHG RX REV CODE 250 W/ 637 OVERRIDE(OP): Performed by: FAMILY MEDICINE

## 2017-08-15 PROCEDURE — 73560 X-RAY EXAM OF KNEE 1 OR 2: CPT | Mod: LT

## 2017-08-15 PROCEDURE — 36415 COLL VENOUS BLD VENIPUNCTURE: CPT

## 2017-08-15 PROCEDURE — 92526 ORAL FUNCTION THERAPY: CPT

## 2017-08-15 PROCEDURE — 73600 X-RAY EXAM OF ANKLE: CPT | Mod: LT

## 2017-08-15 RX ORDER — CARBIDOPA/LEVODOPA 25MG-250MG
1 TABLET ORAL EVERY 8 HOURS
Status: DISCONTINUED | OUTPATIENT
Start: 2017-08-15 | End: 2017-08-15

## 2017-08-15 RX ORDER — CARBIDOPA/LEVODOPA 25MG-250MG
1 TABLET ORAL EVERY 8 HOURS
Status: DISCONTINUED | OUTPATIENT
Start: 2017-08-15 | End: 2017-08-30 | Stop reason: HOSPADM

## 2017-08-15 RX ORDER — POTASSIUM CHLORIDE 20 MEQ/1
20 TABLET, EXTENDED RELEASE ORAL 2 TIMES DAILY
Status: DISCONTINUED | OUTPATIENT
Start: 2017-08-15 | End: 2017-08-16

## 2017-08-15 RX ADMIN — AMANTADINE HYDROCHLORIDE 100 MG: 100 CAPSULE ORAL at 17:37

## 2017-08-15 RX ADMIN — MEMANTINE HYDROCHLORIDE 10 MG: 10 TABLET, FILM COATED ORAL at 08:05

## 2017-08-15 RX ADMIN — BACITRACIN ZINC: 500 OINTMENT TOPICAL at 21:02

## 2017-08-15 RX ADMIN — MEMANTINE HYDROCHLORIDE 10 MG: 10 TABLET, FILM COATED ORAL at 21:02

## 2017-08-15 RX ADMIN — BACITRACIN ZINC: 500 OINTMENT TOPICAL at 08:05

## 2017-08-15 RX ADMIN — PIPERACILLIN SODIUM AND TAZOBACTAM SODIUM 3.38 G: 3; .375 INJECTION, POWDER, FOR SOLUTION INTRAVENOUS at 12:23

## 2017-08-15 RX ADMIN — POTASSIUM CHLORIDE 20 MEQ: 1500 TABLET, EXTENDED RELEASE ORAL at 12:24

## 2017-08-15 RX ADMIN — CARBIDOPA AND LEVODOPA 1 TABLET: 10; 100 TABLET ORAL at 21:03

## 2017-08-15 RX ADMIN — CITALOPRAM HYDROBROMIDE 20 MG: 20 TABLET ORAL at 08:04

## 2017-08-15 RX ADMIN — SODIUM CHLORIDE: 9 INJECTION, SOLUTION INTRAVENOUS at 17:44

## 2017-08-15 RX ADMIN — PIPERACILLIN SODIUM AND TAZOBACTAM SODIUM 3.38 G: 3; .375 INJECTION, POWDER, FOR SOLUTION INTRAVENOUS at 06:09

## 2017-08-15 RX ADMIN — PIPERACILLIN SODIUM AND TAZOBACTAM SODIUM 3.38 G: 3; .375 INJECTION, POWDER, FOR SOLUTION INTRAVENOUS at 17:41

## 2017-08-15 RX ADMIN — CARBIDOPA AND LEVODOPA 2 TABLET: 25; 100 TABLET ORAL at 21:03

## 2017-08-15 RX ADMIN — STANDARDIZED SENNA CONCENTRATE AND DOCUSATE SODIUM 2 TABLET: 8.6; 5 TABLET, FILM COATED ORAL at 21:03

## 2017-08-15 RX ADMIN — STANDARDIZED SENNA CONCENTRATE AND DOCUSATE SODIUM 2 TABLET: 8.6; 5 TABLET, FILM COATED ORAL at 08:04

## 2017-08-15 RX ADMIN — AMANTADINE HYDROCHLORIDE 100 MG: 100 CAPSULE ORAL at 06:09

## 2017-08-15 RX ADMIN — POTASSIUM CHLORIDE 20 MEQ: 1500 TABLET, EXTENDED RELEASE ORAL at 21:02

## 2017-08-15 RX ADMIN — MORPHINE SULFATE 2 MG: 4 INJECTION INTRAVENOUS at 12:24

## 2017-08-15 ASSESSMENT — PAIN SCALES - GENERAL
PAINLEVEL_OUTOF10: 6
PAINLEVEL_OUTOF10: 0
PAINLEVEL_OUTOF10: 0
PAINLEVEL_OUTOF10: 3

## 2017-08-15 NOTE — CARE PLAN
Problem: Infection  Goal: Will remain free from infection  Outcome: PROGRESSING SLOWER THAN EXPECTED  Pt. WBC still trending high. On IV abx. MRI ordered to see if any infection in back.    Problem: Mobility  Goal: Risk for activity intolerance will decrease  Outcome: PROGRESSING AS EXPECTED  Up to chair with assist X 2. MD waiting until after MRI to order PT/OT.

## 2017-08-15 NOTE — PROGRESS NOTES
Discussed plan for MRI with pt wife. She would like to speak with anesthesia before signing the consent. Notified her that pt will go at 0830. She states she will be here.

## 2017-08-15 NOTE — PROGRESS NOTES
Amara Pascal Fall Risk Assessment:     Last Known Fall: Within the last year  Mobility: Use of assistive device/requires assist of two people  Medications: Cardiovascular or central nervous system meds  Mental Status/LOC/Awareness: Memory loss/confusion and requires reorienting  Toileting Needs: Use of catheters or diversion devices  Volume/Electrolyte Status: Use of IV fluids/tube feeds  Communication/Sensory: Visual (Glasses)/hearing deficit  Behavior: Behavioral noncompliance with instruction  Amara Pascal Fall Risk Total: 17  Fall Risk Level: HIGH RISK    Universal Fall Precautions:  call light/belongings in reach, bed in low position and locked, wheelchairs and assistive devices out of sight, siderails up x 2, use non-slip footwear, adequate lighting, clutter free and spill free environment, educate on level of risk, educate to call for assistance    Fall Risk Level Interventions:   TRIAL ([employer], NEURO, MED ZAKIYA 5) Low Fall Risk Interventions  Place yellow fall risk ID band on patient: completed  Provide patient/family education based on risk assessment: completed  Educate patient/family to call staff for assistance when getting out of bed: completed  Place fall precaution signage outside patient door: completed TRIAL ([employer], NEURO, [place] ZAKIYA 5) High Fall Risk Interventions  Place yellow fall risk ID band on patient: verified  Provide patient/family education based on risk assessment: completed  Educate patient/family to call staff for assistance when getting out of bed: completed  Place fall precaution signage outside patient door: verified  Place patient in room close to nursing station: verified  Utilize bed/chair fall alarm: verified  Notify charge of high risk for huddle: verified    Patient Specific Interventions:     Medication: review medications with patient and family  Mental Status/LOC/Awareness: reorient patient, reinforce falls education, encourage family to stay with patient, check on patient  hourly, utilize bed/chair fall alarm, reinforce the use of call light and provide activity  Toileting: provide frquent toileting  Volume/Electrolyte Status: ensure patient remains hydrated, advance diet as tolerated and ensure IV fluids are appropriate  Communication/Sensory: update plan of care on whiteboard, provide communication alternatives/, collaborate with doctor for possible speech therapy consult and ensure proper positioning when transferrng/ambulating  Behavioral: encourage patient to voice feelings and engage patient in daily activities  Mobility: schedule physical activity throughout the day, provide comfort measures during transport, utilize bed/chair fall alarm, ensure bed is locked and in lowest position and collaborate with doctor for possible PT/OT consult

## 2017-08-15 NOTE — CARE PLAN
Problem: Infection  Goal: Will remain free from infection  Outcome: PROGRESSING AS EXPECTED  Continue to monitor WBC and vitals. Pt on IV abx. MD thinks it may be viral though.     Problem: Pain Management  Goal: Pain level will decrease to patient’s comfort goal  Outcome: PROGRESSING AS EXPECTED  PRN morphine. Pt. C/o pain in bilateral knees and left ankle. MD aware, Following up with x-rays.

## 2017-08-15 NOTE — THERAPY
"Speech Language Therapy dysphagia treatment completed.   Functional Status:  Pt lethargic and requiring consistent cues to keep eyes open. AOxself only. Initiated training on dysphagia exercises. Pt with severely reduced lingual ROM as well as tremulousness. He had poor accuracy for lingual and labial exercises with max cues. Written exercises left at bedside.    Recommendations: Continue NPO/cortrak.   Plan of Care: Will benefit from Speech Therapy 3 times per week  Post-Acute Therapy: Discharge to a transitional care facility for continued skilled therapy services.    See \"Rehab Therapy-Acute\" Patient Summary Report for complete documentation.     "

## 2017-08-15 NOTE — PROGRESS NOTES
Monitor Summary: SR 70-80, MA .18, QRS .16, QT .44 with PACs, PVCs and BBB per strip from monitor room

## 2017-08-15 NOTE — PROGRESS NOTES
0830: Pt. Sitting up in bed, wife at bedside. Pt AOX2. Verified cortrack placement on x-ray. Started pt on Fibersource tube feed at 25ml/hr. Fall precautions in place. Set up oral suction at bedside. Pt coughing up thick secretions, suctioned. Bilateral wrist restraints in place, released for care and then place back.   1000: Notified  That pt did not tolerated MRI with ativan and they recommended MRI with anesthesia, new orders received.   1020: Spoke with MRI, they are aware of new order for MRI with anesthesia. They state will most likely be tomorrow for MRI as they have to arrange it with anesthesia and interventional RN.

## 2017-08-15 NOTE — PROGRESS NOTES
Amara Pascal Fall Risk Assessment:     Last Known Fall: Within the last year  Mobility: Use of assistive device/requires assist of two people  Medications: Cardiovascular or central nervous system meds  Mental Status/LOC/Awareness: Memory loss/confusion and requires reorienting  Toileting Needs: Use of catheters or diversion devices  Volume/Electrolyte Status: Use of IV fluids/tube feeds  Communication/Sensory: Visual (Glasses)/hearing deficit  Behavior: Behavioral noncompliance with instruction  Amara Pascal Fall Risk Total: 17  Fall Risk Level: HIGH RISK    Universal Fall Precautions:  call light/belongings in reach, bed in low position and locked, siderails up x 2, use non-slip footwear, adequate lighting, clutter free and spill free environment, educate to call for assistance, educate on level of risk, wheelchairs and assistive devices out of sight    Fall Risk Level Interventions:   TRIAL ([employer], NEURO, MED ZAKIYA 5) Low Fall Risk Interventions  Place yellow fall risk ID band on patient: completed  Provide patient/family education based on risk assessment: completed  Educate patient/family to call staff for assistance when getting out of bed: completed  Place fall precaution signage outside patient door: completed TRIAL ([employer], NEURO, [place] ZAKIYA 5) High Fall Risk Interventions  Place yellow fall risk ID band on patient: verified  Provide patient/family education based on risk assessment: completed  Educate patient/family to call staff for assistance when getting out of bed: completed  Place fall precaution signage outside patient door: verified  Place patient in room close to nursing station: verified  Utilize bed/chair fall alarm: verified  Notify charge of high risk for huddle: verified    Patient Specific Interventions:     Medication: review medications with patient and family  Mental Status/LOC/Awareness: reorient patient, reinforce falls education, encourage family to stay with patient, check on patient  hourly, utilize bed/chair fall alarm, reinforce the use of call light and provide activity  Toileting: provide frquent toileting  Volume/Electrolyte Status: ensure patient remains hydrated and ensure IV fluids are appropriate  Communication/Sensory: update plan of care on whiteboard  Behavioral: encourage patient to voice feelings and engage patient in daily activities  Mobility: schedule physical activity throughout the day, provide comfort measures during transport, utilize bed/chair fall alarm, ensure bed is locked and in lowest position and collaborate with doctor for possible PT/OT consult

## 2017-08-16 ENCOUNTER — APPOINTMENT (OUTPATIENT)
Dept: RADIOLOGY | Facility: MEDICAL CENTER | Age: 77
DRG: 853 | End: 2017-08-16
Attending: INTERNAL MEDICINE
Payer: MEDICARE

## 2017-08-16 ENCOUNTER — TELEPHONE (OUTPATIENT)
Dept: MEDICAL GROUP | Facility: MEDICAL CENTER | Age: 77
End: 2017-08-16

## 2017-08-16 LAB
ANION GAP SERPL CALC-SCNC: 6 MMOL/L (ref 0–11.9)
ANISOCYTOSIS BLD QL SMEAR: ABNORMAL
BASOPHILS # BLD AUTO: 0 % (ref 0–1.8)
BASOPHILS # BLD: 0 K/UL (ref 0–0.12)
BUN SERPL-MCNC: 17 MG/DL (ref 8–22)
CALCIUM SERPL-MCNC: 8 MG/DL (ref 8.5–10.5)
CHLORIDE SERPL-SCNC: 107 MMOL/L (ref 96–112)
CO2 SERPL-SCNC: 24 MMOL/L (ref 20–33)
CREAT SERPL-MCNC: 0.65 MG/DL (ref 0.5–1.4)
CRP SERPL HS-MCNC: 23.39 MG/DL (ref 0–0.75)
EOSINOPHIL # BLD AUTO: 0 K/UL (ref 0–0.51)
EOSINOPHIL NFR BLD: 0 % (ref 0–6.9)
ERYTHROCYTE [DISTWIDTH] IN BLOOD BY AUTOMATED COUNT: 48.8 FL (ref 35.9–50)
ERYTHROCYTE [SEDIMENTATION RATE] IN BLOOD BY WESTERGREN METHOD: 62 MM/HOUR (ref 0–20)
GFR SERPL CREATININE-BSD FRML MDRD: >60 ML/MIN/1.73 M 2
GIANT PLATELETS BLD QL SMEAR: NORMAL
GLUCOSE SERPL-MCNC: 169 MG/DL (ref 65–99)
HCT VFR BLD AUTO: 26.5 % (ref 42–52)
HGB BLD-MCNC: 8.8 G/DL (ref 14–18)
LYMPHOCYTES # BLD AUTO: 0.24 K/UL (ref 1–4.8)
LYMPHOCYTES NFR BLD: 1.8 % (ref 22–41)
MANUAL DIFF BLD: NORMAL
MCH RBC QN AUTO: 28.3 PG (ref 27–33)
MCHC RBC AUTO-ENTMCNC: 33.2 G/DL (ref 33.7–35.3)
MCV RBC AUTO: 85.2 FL (ref 81.4–97.8)
MONOCYTES # BLD AUTO: 1.78 K/UL (ref 0–0.85)
MONOCYTES NFR BLD AUTO: 13.5 % (ref 0–13.4)
MORPHOLOGY BLD-IMP: NORMAL
NEUTROPHILS # BLD AUTO: 11.18 K/UL (ref 1.82–7.42)
NEUTROPHILS NFR BLD: 84.7 % (ref 44–72)
NRBC # BLD AUTO: 0.04 K/UL
NRBC BLD AUTO-RTO: 0.3 /100 WBC
PLATELET # BLD AUTO: 318 K/UL (ref 164–446)
PLATELET BLD QL SMEAR: NORMAL
PMV BLD AUTO: 12.6 FL (ref 9–12.9)
POTASSIUM SERPL-SCNC: 3.2 MMOL/L (ref 3.6–5.5)
RBC # BLD AUTO: 3.11 M/UL (ref 4.7–6.1)
RBC BLD AUTO: PRESENT
SODIUM SERPL-SCNC: 137 MMOL/L (ref 135–145)
WBC # BLD AUTO: 13.2 K/UL (ref 4.8–10.8)

## 2017-08-16 PROCEDURE — 700111 HCHG RX REV CODE 636 W/ 250 OVERRIDE (IP): Performed by: FAMILY MEDICINE

## 2017-08-16 PROCEDURE — 160002 HCHG RECOVERY MINUTES (STAT)

## 2017-08-16 PROCEDURE — 770020 HCHG ROOM/CARE - TELE (206)

## 2017-08-16 PROCEDURE — 72156 MRI NECK SPINE W/O & W/DYE: CPT

## 2017-08-16 PROCEDURE — 700105 HCHG RX REV CODE 258: Performed by: FAMILY MEDICINE

## 2017-08-16 PROCEDURE — 86140 C-REACTIVE PROTEIN: CPT

## 2017-08-16 PROCEDURE — 72158 MRI LUMBAR SPINE W/O & W/DYE: CPT

## 2017-08-16 PROCEDURE — 80048 BASIC METABOLIC PNL TOTAL CA: CPT

## 2017-08-16 PROCEDURE — 700102 HCHG RX REV CODE 250 W/ 637 OVERRIDE(OP): Performed by: INTERNAL MEDICINE

## 2017-08-16 PROCEDURE — 700111 HCHG RX REV CODE 636 W/ 250 OVERRIDE (IP): Performed by: HOSPITALIST

## 2017-08-16 PROCEDURE — 700102 HCHG RX REV CODE 250 W/ 637 OVERRIDE(OP): Performed by: FAMILY MEDICINE

## 2017-08-16 PROCEDURE — 85007 BL SMEAR W/DIFF WBC COUNT: CPT

## 2017-08-16 PROCEDURE — 72157 MRI CHEST SPINE W/O & W/DYE: CPT

## 2017-08-16 PROCEDURE — 92526 ORAL FUNCTION THERAPY: CPT

## 2017-08-16 PROCEDURE — 85027 COMPLETE CBC AUTOMATED: CPT

## 2017-08-16 PROCEDURE — 700101 HCHG RX REV CODE 250

## 2017-08-16 PROCEDURE — 99232 SBSQ HOSP IP/OBS MODERATE 35: CPT | Performed by: INTERNAL MEDICINE

## 2017-08-16 PROCEDURE — A9270 NON-COVERED ITEM OR SERVICE: HCPCS | Performed by: FAMILY MEDICINE

## 2017-08-16 PROCEDURE — 85652 RBC SED RATE AUTOMATED: CPT

## 2017-08-16 PROCEDURE — 700105 HCHG RX REV CODE 258: Performed by: INTERNAL MEDICINE

## 2017-08-16 PROCEDURE — 700111 HCHG RX REV CODE 636 W/ 250 OVERRIDE (IP)

## 2017-08-16 PROCEDURE — A9270 NON-COVERED ITEM OR SERVICE: HCPCS | Performed by: INTERNAL MEDICINE

## 2017-08-16 PROCEDURE — 700117 HCHG RX CONTRAST REV CODE 255: Performed by: INTERNAL MEDICINE

## 2017-08-16 PROCEDURE — 36415 COLL VENOUS BLD VENIPUNCTURE: CPT

## 2017-08-16 PROCEDURE — A9579 GAD-BASE MR CONTRAST NOS,1ML: HCPCS | Performed by: INTERNAL MEDICINE

## 2017-08-16 RX ORDER — MIDAZOLAM HYDROCHLORIDE 1 MG/ML
INJECTION INTRAMUSCULAR; INTRAVENOUS
Status: DISPENSED
Start: 2017-08-16 | End: 2017-08-17

## 2017-08-16 RX ORDER — POTASSIUM CHLORIDE 20 MEQ/1
20 TABLET, EXTENDED RELEASE ORAL 4 TIMES DAILY
Status: DISCONTINUED | OUTPATIENT
Start: 2017-08-16 | End: 2017-08-17

## 2017-08-16 RX ORDER — ROCURONIUM BROMIDE 10 MG/ML
INJECTION, SOLUTION INTRAVENOUS
Status: COMPLETED
Start: 2017-08-16 | End: 2017-08-16

## 2017-08-16 RX ADMIN — PIPERACILLIN SODIUM AND TAZOBACTAM SODIUM 3.38 G: 3; .375 INJECTION, POWDER, FOR SOLUTION INTRAVENOUS at 00:00

## 2017-08-16 RX ADMIN — SODIUM CHLORIDE: 9 INJECTION, SOLUTION INTRAVENOUS at 11:35

## 2017-08-16 RX ADMIN — POTASSIUM CHLORIDE 20 MEQ: 1500 TABLET, EXTENDED RELEASE ORAL at 20:54

## 2017-08-16 RX ADMIN — MORPHINE SULFATE 2 MG: 4 INJECTION INTRAVENOUS at 14:54

## 2017-08-16 RX ADMIN — CARBIDOPA AND LEVODOPA 1 TABLET: 10; 100 TABLET ORAL at 20:54

## 2017-08-16 RX ADMIN — AMANTADINE HYDROCHLORIDE 100 MG: 100 CAPSULE ORAL at 06:18

## 2017-08-16 RX ADMIN — MEMANTINE HYDROCHLORIDE 10 MG: 10 TABLET, FILM COATED ORAL at 20:54

## 2017-08-16 RX ADMIN — AMANTADINE HYDROCHLORIDE 100 MG: 100 CAPSULE ORAL at 21:00

## 2017-08-16 RX ADMIN — PIPERACILLIN SODIUM AND TAZOBACTAM SODIUM 3.38 G: 3; .375 INJECTION, POWDER, FOR SOLUTION INTRAVENOUS at 06:18

## 2017-08-16 RX ADMIN — CARBIDOPA AND LEVODOPA 1 TABLET: 10; 100 TABLET ORAL at 06:18

## 2017-08-16 RX ADMIN — GADODIAMIDE 20 ML: 287 INJECTION INTRAVENOUS at 18:00

## 2017-08-16 RX ADMIN — STANDARDIZED SENNA CONCENTRATE AND DOCUSATE SODIUM 2 TABLET: 8.6; 5 TABLET, FILM COATED ORAL at 20:54

## 2017-08-16 RX ADMIN — CARBIDOPA AND LEVODOPA 2 TABLET: 25; 100 TABLET ORAL at 20:53

## 2017-08-16 RX ADMIN — BACITRACIN ZINC: 500 OINTMENT TOPICAL at 11:34

## 2017-08-16 RX ADMIN — PIPERACILLIN SODIUM AND TAZOBACTAM SODIUM 3.38 G: 3; .375 INJECTION, POWDER, FOR SOLUTION INTRAVENOUS at 11:34

## 2017-08-16 RX ADMIN — BACITRACIN ZINC: 500 OINTMENT TOPICAL at 20:53

## 2017-08-16 RX ADMIN — PIPERACILLIN SODIUM AND TAZOBACTAM SODIUM 3.38 G: 3; .375 INJECTION, POWDER, FOR SOLUTION INTRAVENOUS at 20:53

## 2017-08-16 ASSESSMENT — PAIN SCALES - GENERAL
PAINLEVEL_OUTOF10: 0
PAINLEVEL_OUTOF10: ASSUMED PAIN PRESENT
PAINLEVEL_OUTOF10: 0

## 2017-08-16 NOTE — TELEPHONE ENCOUNTER
1. Caller Name: yiemi                      Call Back Number: [phone] (home)       2. Message: yeimi would like to speak with you regarding Rafael she also sent you a mychart message.    3. Patient approves office to leave a detailed voicemail/MyChart message: N\A

## 2017-08-16 NOTE — THERAPY
"Speech Language Therapy dysphagia treatment completed.   Functional Status: Pt lethargic and with worsening mentation compared to yesterday. Speech nearly completely incomprehensible. He was able to briefly and intermittently follow simple lingual and labial ROM exercises with poor accuracy given max cues.    Recommendations: Encourage pt to complete simple dysphagia exercises left at bedside when awake as best as possible. Currently NPO with dhruv.    Plan of Care: Will benefit from Speech Therapy 3 times per week  Post-Acute Therapy: Discharge to a transitional care facility for continued skilled therapy services.    See \"Rehab Therapy-Acute\" Patient Summary Report for complete documentation.     "

## 2017-08-16 NOTE — PROGRESS NOTES
0700: Pt. Tube feed was not held at midnight, called and notified MRI. MRI tech states that MRI will have to be re-scheduled and will call back to let RN know when. Will keep pt NPO until MRI tech gives new time.   1030: MRI scheduled for 1500. Notified wife. MRI notified that she wants to speak with anesthesia first, she will go down with pt.   1615: Pt. To MRI, wife and son with him.

## 2017-08-16 NOTE — TELEPHONE ENCOUNTER
I spoke with patients wife regarding her concerns and reassured her the staff is very competent and he is receving very good care from review of notes.

## 2017-08-16 NOTE — PROGRESS NOTES
Monitor Summary: SR 72-91, NM .19, QRS .16, QT .46 with occasional PVC per strip from monitor room

## 2017-08-16 NOTE — PROGRESS NOTES
Amara Pascal Fall Risk Assessment:     Last Known Fall: Within the last year  Mobility: Use of assistive device/requires assist of two people  Medications: Cardiovascular or central nervous system meds  Mental Status/LOC/Awareness: Memory loss/confusion and requires reorienting  Toileting Needs: Use of catheters or diversion devices  Volume/Electrolyte Status: Use of IV fluids/tube feeds  Communication/Sensory: Visual (Glasses)/hearing deficit  Behavior: Behavioral noncompliance with instruction  Amara Pascal Fall Risk Total: 17  Fall Risk Level: HIGH RISK    Universal Fall Precautions:  call light/belongings in reach, bed in low position and locked, wheelchairs and assistive devices out of sight, siderails up x 2, use non-slip footwear, adequate lighting, clutter free and spill free environment, educate on level of risk, educate to call for assistance    Fall Risk Level Interventions:   TRIAL ([employer], NEURO, MED ZAKIYA 5) Low Fall Risk Interventions  Place yellow fall risk ID band on patient: completed  Provide patient/family education based on risk assessment: completed  Educate patient/family to call staff for assistance when getting out of bed: completed  Place fall precaution signage outside patient door: completed TRIAL ([employer] 8, NEURO, [employer] ZAKIYA 5) High Fall Risk Interventions  Place yellow fall risk ID band on patient: verified  Provide patient/family education based on risk assessment: completed  Educate patient/family to call staff for assistance when getting out of bed: completed  Place fall precaution signage outside patient door: verified  Place patient in room close to nursing station: verified  Utilize bed/chair fall alarm: verified  Notify charge of high risk for huddle: verified    Patient Specific Interventions:     Medication: review medications with patient and family  Mental Status/LOC/Awareness: reorient patient, reinforce falls education, encourage family to stay with patient, check on patient  hourly, utilize bed/chair fall alarm and reinforce the use of call light  Toileting: provide frquent toileting and monitor intake and output/use of appropriate interventions  Volume/Electrolyte Status: ensure patient remains hydrated and ensure IV fluids are appropriate  Communication/Sensory: update plan of care on whiteboard  Behavioral: encourage patient to voice feelings and administer medication as ordered  Mobility: utilize bed/chair fall alarm and ensure bed is locked and in lowest position

## 2017-08-16 NOTE — CONSULTS
Reason for PC Consult: Advance Care Planning    Consulted by:   Dr. Cross    Assessment:  General:   76 year old male admitted for pneumonia on 8/12/17. Pt has a history of Parkinson's disease, mild dementia, and MDS. On 8/6/17 pt had a kyphoplasty for compression fracture of thoracic spine. Discharged 8/11/17 to SNF in [place], [place]. Once there pt had a fever, SNF immediately sent him to [facility] for AMS, fevers and cough. Upon arrival pt was found to have pneumonia. Pt was using a cane then transitioned to a walker prior to kyphoplasty.     Dyspnea: No, 92% on RA  Last BM: 08/16/17  Pain: Yes, Pt has chronic back pain  Depression: Mood appropriate for situation      Spiritual:  Is Anabaptist or spirituality important for coping with this illness? No    Has a  or spiritual provider visit been requested? No    Palliative Performance Scale: 30%    Advance Directive: Advance Directive    DPOA: Yes, Elizabeth Felix ([phone]) and  ALT Filippo Felix ([phone]).  POLST: None on File    Code Status: DNR      Outcome:  PC RN introduced self and role of PC. Spoke with wife in dayroom, discussed her understanding of clinical picture. Elizabeth verbalized understanding, is confused why pt would suddenly not be able to swallow. Elizabeth would like to see the result of the MRI and get a better picture of the cause of pt's AMS. She feels that there might be an infection in the surgical site that traveled to his brain, which would then be reversible. PC RN discussed pt's history of dementia, per Elizabeth it was mild, he only forgot little things like misplaced object and such. PC RN discussed pt's failed swallow eval, Elizabeth doesn't understand why. She feels that he would improve with therapies, therapies that the SNF in [place] did not provide. PC RN discussed looking at SNF here in the [place] area, Elizabeth agrees that if pt would need SNF for therapies then she would be OK with that. PC RN discussed long term  tube feedings and long term nursing home placement if pt doesn't improve and what the pt would want. Elizabeth feels that the pt would not want TF or long term placement because eating is a huge part of pt's life, and he would never want to be put in a home. However, Elizabeth does want to know more before she would make that sort of decision. PC RN discussed hospice in detail, answered questions. Elizabeth verbalized understanding but again, she wants to know more about pt's clinical status before that sort of decision is made. Elizabeth would also like for PC RN to discuss all this with son, Filippo Felix, she will call back to schedule a time for when he will be in the hospital.     Provided contact cards and encouraged to call with any questions or concerns.    Empathetic listening, emotional support, and utilized therapeutic touch throughout encounter.     Updated:   Dr. Cross    Plan:   Continue to support pt and family, awaiting results of MRI and other studies.     Thank you for allowing Palliative Care to participate in this patient's care. Please feel free to call [phone] with any questions or concerns.

## 2017-08-16 NOTE — CARE PLAN
Problem: Communication  Goal: The ability to communicate needs accurately and effectively will improve  Outcome: PROGRESSING SLOWER THAN EXPECTED  Pt. Harder to understand, speech working with him.     Problem: Mobility  Goal: Risk for activity intolerance will decrease  Outcome: PROGRESSING SLOWER THAN EXPECTED  Pt. Unable to move himself much. Turning Q2h and encouraging pt to help as much as he can.

## 2017-08-16 NOTE — PALLIATIVE CARE
Spiritual Care Note           Patient's Name: Rafael Felix   MRN: [id number]    Age and Gender: 76 y.o. male   YOB: 1940   Place of Residence: [place], [place]   Family/Friends/Others Present: wife  son   Unit: Neurosciences   Room (and Bed): [place] [address]   Bed 2   Ethnicity or Nationality: white   Primary Language: English   Medical Diagnosis/Procedure: myelodysplastic syndrome   Parkinson's disease  pneumonia   hematuria   acute delirium  left ankle swelling   Worship Affiliation: Hindu   Code Status: DNR    Date of Admission: 8/12/2017    Length of Stay: 4 days   Date of Visit: 08/16/2017       Situation/Reason for Visit:  Patient followed by palliative care.    Background:  (see above diagnoses)    Receptivity to Visit:  Patient unable to communicate effectively.  Family receptive to a spiritual care visit.    Observations:  Patient laying in bed, appeared to be sleeping or sedated.  Family at bedside.    Summary of Conversation/Interaction with Patient and/or Family/Friends/Others:  Patient's wife shared some of patient's medical history.  She said she was awaiting the results of some tests.  She indicated that she would like me to pray with them.    Assessment of Cultural/Social/Emotional/Spiritual Issues:  Family facing uncertainty, seeking spiritual support through prayer.    Interventions:  Compassionate presence, reflective listening, emotional support, prayer.    Outcomes:  Spiritual comfort, progress with trust.    Consultations/Referrals:  none    Requests/Recommendations:  none    Continuing Care:  Will continue to follow.      Contact Information:  Chaplain ALE Stone  [phone]   [email or web address]

## 2017-08-16 NOTE — CARE PLAN
Problem: Nutritional:  Goal: Nutrition support tolerated and meeting greater than 85% of estimated needs  Outcome: PROGRESSING AS EXPECTED  TF temporarily on hold for MRI. [employer]ersource HN was running @ 50 ml/hr, not yet reached goal of 80 ml/hr. RD following.

## 2017-08-16 NOTE — PROGRESS NOTES
[facility] Hospitalist Progress Note    Date of Service: 8/15/2017    Chief Complaint  76 y.o. male admitted 2017 with fever, elevated WBC count, recent kyphoplasty, confusion.    Interval Problem Update  -Confusion-per patient's wife he is much more alert and interactive today and is able to open his eyes. Patient communicates quite small to me but the wife is very pleasant.    Back pain-patient could not tolerate MRI without and therefore will need with anesthesia. Back pain remains but does get some relief of IV pain meds here.    Dysphagia-cor track is an appropriate place and tube feeds been initiated some medications will need to be adjusted.    White blood cell count-remains elevated but afebrile last night.      Consultants/Specialty  None.    Disposition  TBD.        Review of Systems   Unable to perform ROS: acuity of condition      Physical Exam  Laboratory/Imaging   Hemodynamics  Temp (24hrs), Av.2 °C (98.9 °F), Min:36.6 °C (97.9 °F), Max:37.6 °C (99.6 °F)   Temperature: 37.6 °C (99.6 °F)  Pulse  Av.6  Min: 67  Max: 85    Blood Pressure : 125/62 mmHg      Respiratory      Respiration: 18, Pulse Oximetry: 92 %        RUL Breath Sounds: Clear, RML Breath Sounds: Diminished, RLL Breath Sounds: Diminished, AQUILINO Breath Sounds: Clear, LLL Breath Sounds: Diminished    Fluids    Intake/Output Summary (Last 24 hours) at 08/15/17 1812  Last data filed at 08/15/17 1700   Gross per 24 hour   Intake    878 ml   Output   1250 ml   Net   -372 ml       Nutrition  Orders Placed This Encounter   Procedures   • Diet NPO     Standing Status: Standing      Number of Occurrences: 1      Standing Expiration Date:      Order Specific Question:  Restrict to:     Answer:  Sips with Medications [3]     Physical Exam   Constitutional: He appears well-developed and well-nourished. No distress.   Sitting upright in a cardiac chair   HENT:   Mouth/Throat: No oropharyngeal exudate (uvula swollen and slightly red).   Cor tract  and right naris   Eyes: Conjunctivae are normal. Right eye exhibits no discharge. Left eye exhibits no discharge.   Neck: Normal range of motion. Neck supple. No tracheal deviation present. No Brudzinski's sign and no Kernig's sign noted.   Cardiovascular: Normal rate, regular rhythm and normal heart sounds.    Pulmonary/Chest: Effort normal and breath sounds normal. No stridor. No respiratory distress.   Abdominal: Soft. Bowel sounds are normal. There is no tenderness.   Musculoskeletal: He exhibits edema and tenderness.   Lateral portion of left ankle swollen improved    In the lower lumbar and sacral region cannot appreciate any fluctuance, mass effect tenderness either vertebral or paravertebral no obvious skin changes overlying this region   Neurological: He is alert. He displays tremor (resting). No cranial nerve deficit. He exhibits normal muscle tone.   Skin: Skin is warm and dry. He is not diaphoretic. No pallor.   Psychiatric: He has a normal mood and affect. His speech is delayed. Cognition and memory are impaired. He expresses impulsivity. He is inattentive.   Nursing note and vitals reviewed.      Recent Labs      08/13/17   0111  08/14/17   0340  08/15/17   0147   WBC  20.0*  17.5*  18.7*   RBC  3.47*  3.17*  3.44*   HEMOGLOBIN  10.0*  9.1*  9.5*   HEMATOCRIT  30.1*  27.2*  29.4*   MCV  86.7  85.8  85.5   MCH  28.8  28.7  27.6   MCHC  33.2*  33.5*  32.3*   RDW  50.5*  48.8  48.0   PLATELETCT  203  230  295   MPV  12.3  12.6  12.5     Recent Labs      08/13/17 0111 08/14/17 0340  08/15/17   0147   SODIUM  136  137  136   POTASSIUM  3.6  3.4*  3.2*   CHLORIDE  106  105  104   CO2  23  23  22   GLUCOSE  98  101*  100*   BUN  16  12  12   CREATININE  0.63  0.67  0.61   CALCIUM  8.0*  7.8*  8.0*     Recent Labs      08/12/17 1855   APTT  41.1*   INR  1.31*     Recent Labs      08/12/17 1855   BNPBTYPENAT  36              Assessment/Plan     Myelodysplastic syndrome (CMS-HCC) (present on  admission)  Assessment & Plan  Seen by Dr. Ribeiro on the last admit  Platelet dysfunction present per prior TEG and evaluation  Hold lovenox  SCDs only for DVT prophylaxis    Parkinson's disease (CMS-HCC) (present on admission)  Assessment & Plan  -Cor track in place  -Cannot do sustained released Sinemet given a cannot be crushed  -We'll convert to short acting and try to adjust as needed to control his parkinsonian symptoms    Pneumonia (present on admission)  Assessment & Plan  RULED OUT on chest xray  Elevated wbc count and cough  Continue zosyn, follow cultures    Hematuria (present on admission)  Assessment & Plan  Appears due to trauma while inserting catheter; coude had to be used  improving  Urinary retention present  Continue to Hold lovenox, appears to be improving  Continue fluids    Acute delirium (present on admission)  Assessment & Plan  Related to acute infection and illness  Consider possibility of infection from kyphoplasty though site looks good  -Cor track is in place and tube feeds are not at goal but he is tolerating current rate  -Discussed with Dr. COX will attempt to get MRI of the C-spine and T-spine L spine with anesthesia given patient can't tolerate it without  -Check ESR and noncardiac CRP  -Appears to be hypoactive at this time and delirium is a bit improved today  -Continue empiric IV Zosyn which would cover possible pneumonia as well    DNR (do not resuscitate) (present on admission)  Assessment & Plan  Confirmed with the patient's wife on admission.    Left ankle swelling  Assessment & Plan  -Bilateral x-rays of the knees and ankles show very small fluid collection therefore arthrocentesis is not indicated at this time but need to watch closely      Radiology images reviewed, Labs reviewed and Medications reviewed  Hurst catheter: Gross Hematuria with Clots and Urinary Tract Retention or Urinary Tract Obstruction      DVT Prophylaxis: Contraindicated - High bleeding risk  DVT  prophylaxis - mechanical: SCDs

## 2017-08-17 PROBLEM — M54.50 ACUTE BILATERAL LOW BACK PAIN WITHOUT SCIATICA: Status: ACTIVE | Noted: 2017-08-17

## 2017-08-17 PROBLEM — E87.6 HYPOKALEMIA: Status: ACTIVE | Noted: 2017-08-17

## 2017-08-17 LAB
ANION GAP SERPL CALC-SCNC: 7 MMOL/L (ref 0–11.9)
BACTERIA BLD CULT: NORMAL
BACTERIA BLD CULT: NORMAL
BASOPHILS # BLD AUTO: 1.8 % (ref 0–1.8)
BASOPHILS # BLD: 0.2 K/UL (ref 0–0.12)
BUN SERPL-MCNC: 12 MG/DL (ref 8–22)
CALCIUM SERPL-MCNC: 7.5 MG/DL (ref 8.5–10.5)
CHLORIDE SERPL-SCNC: 110 MMOL/L (ref 96–112)
CO2 SERPL-SCNC: 25 MMOL/L (ref 20–33)
CREAT SERPL-MCNC: 0.52 MG/DL (ref 0.5–1.4)
EOSINOPHIL # BLD AUTO: 0.2 K/UL (ref 0–0.51)
EOSINOPHIL NFR BLD: 1.8 % (ref 0–6.9)
ERYTHROCYTE [DISTWIDTH] IN BLOOD BY AUTOMATED COUNT: 50.1 FL (ref 35.9–50)
GFR SERPL CREATININE-BSD FRML MDRD: >60 ML/MIN/1.73 M 2
GLUCOSE SERPL-MCNC: 124 MG/DL (ref 65–99)
HCT VFR BLD AUTO: 25.9 % (ref 42–52)
HGB BLD-MCNC: 8.5 G/DL (ref 14–18)
LG PLATELETS BLD QL SMEAR: NORMAL
LYMPHOCYTES # BLD AUTO: 0.29 K/UL (ref 1–4.8)
LYMPHOCYTES NFR BLD: 2.6 % (ref 22–41)
MAGNESIUM SERPL-MCNC: 1.7 MG/DL (ref 1.5–2.5)
MANUAL DIFF BLD: NORMAL
MCH RBC QN AUTO: 28.2 PG (ref 27–33)
MCHC RBC AUTO-ENTMCNC: 32.8 G/DL (ref 33.7–35.3)
MCV RBC AUTO: 86 FL (ref 81.4–97.8)
METAMYELOCYTES NFR BLD MANUAL: 0.9 %
MONOCYTES # BLD AUTO: 1.78 K/UL (ref 0–0.85)
MONOCYTES NFR BLD AUTO: 15.9 % (ref 0–13.4)
MORPHOLOGY BLD-IMP: NORMAL
NEUTROPHILS # BLD AUTO: 8.62 K/UL (ref 1.82–7.42)
NEUTROPHILS NFR BLD: 76.1 % (ref 44–72)
NEUTS BAND NFR BLD MANUAL: 0.9 % (ref 0–10)
NRBC # BLD AUTO: 0.02 K/UL
NRBC BLD AUTO-RTO: 0.2 /100 WBC
PLATELET # BLD AUTO: 340 K/UL (ref 164–446)
PLATELET BLD QL SMEAR: NORMAL
PMV BLD AUTO: 12.4 FL (ref 9–12.9)
POIKILOCYTOSIS BLD QL SMEAR: NORMAL
POTASSIUM SERPL-SCNC: 3.1 MMOL/L (ref 3.6–5.5)
PROCALCITONIN SERPL-MCNC: 0.08 NG/ML
RBC # BLD AUTO: 3.01 M/UL (ref 4.7–6.1)
RBC BLD AUTO: PRESENT
SIGNIFICANT IND 70042: NORMAL
SIGNIFICANT IND 70042: NORMAL
SITE SITE: NORMAL
SITE SITE: NORMAL
SODIUM SERPL-SCNC: 142 MMOL/L (ref 135–145)
SOURCE SOURCE: NORMAL
SOURCE SOURCE: NORMAL
TARGETS BLD QL SMEAR: NORMAL
WBC # BLD AUTO: 11.2 K/UL (ref 4.8–10.8)

## 2017-08-17 PROCEDURE — 770020 HCHG ROOM/CARE - TELE (206)

## 2017-08-17 PROCEDURE — 36415 COLL VENOUS BLD VENIPUNCTURE: CPT

## 2017-08-17 PROCEDURE — 85007 BL SMEAR W/DIFF WBC COUNT: CPT

## 2017-08-17 PROCEDURE — 700101 HCHG RX REV CODE 250: Performed by: INTERNAL MEDICINE

## 2017-08-17 PROCEDURE — 99232 SBSQ HOSP IP/OBS MODERATE 35: CPT | Performed by: INTERNAL MEDICINE

## 2017-08-17 PROCEDURE — 700102 HCHG RX REV CODE 250 W/ 637 OVERRIDE(OP): Performed by: INTERNAL MEDICINE

## 2017-08-17 PROCEDURE — 85027 COMPLETE CBC AUTOMATED: CPT

## 2017-08-17 PROCEDURE — A9270 NON-COVERED ITEM OR SERVICE: HCPCS | Performed by: FAMILY MEDICINE

## 2017-08-17 PROCEDURE — 700111 HCHG RX REV CODE 636 W/ 250 OVERRIDE (IP): Performed by: FAMILY MEDICINE

## 2017-08-17 PROCEDURE — 84145 PROCALCITONIN (PCT): CPT

## 2017-08-17 PROCEDURE — 83735 ASSAY OF MAGNESIUM: CPT

## 2017-08-17 PROCEDURE — 700105 HCHG RX REV CODE 258: Performed by: FAMILY MEDICINE

## 2017-08-17 PROCEDURE — A9270 NON-COVERED ITEM OR SERVICE: HCPCS | Performed by: INTERNAL MEDICINE

## 2017-08-17 PROCEDURE — 700102 HCHG RX REV CODE 250 W/ 637 OVERRIDE(OP): Performed by: FAMILY MEDICINE

## 2017-08-17 PROCEDURE — 80048 BASIC METABOLIC PNL TOTAL CA: CPT

## 2017-08-17 RX ORDER — SODIUM CHLORIDE AND POTASSIUM CHLORIDE 150; 900 MG/100ML; MG/100ML
INJECTION, SOLUTION INTRAVENOUS CONTINUOUS
Status: DISCONTINUED | OUTPATIENT
Start: 2017-08-17 | End: 2017-08-17

## 2017-08-17 RX ORDER — POTASSIUM CHLORIDE 20 MEQ/1
30 TABLET, EXTENDED RELEASE ORAL 4 TIMES DAILY
Status: DISCONTINUED | OUTPATIENT
Start: 2017-08-17 | End: 2017-08-19

## 2017-08-17 RX ADMIN — CARBIDOPA AND LEVODOPA 2 TABLET: 25; 100 TABLET ORAL at 20:20

## 2017-08-17 RX ADMIN — POTASSIUM CHLORIDE AND SODIUM CHLORIDE: 900; 150 INJECTION, SOLUTION INTRAVENOUS at 09:41

## 2017-08-17 RX ADMIN — PIPERACILLIN SODIUM AND TAZOBACTAM SODIUM 3.38 G: 3; .375 INJECTION, POWDER, FOR SOLUTION INTRAVENOUS at 01:44

## 2017-08-17 RX ADMIN — MEMANTINE HYDROCHLORIDE 10 MG: 10 TABLET, FILM COATED ORAL at 08:36

## 2017-08-17 RX ADMIN — MEMANTINE HYDROCHLORIDE 10 MG: 10 TABLET, FILM COATED ORAL at 20:20

## 2017-08-17 RX ADMIN — PIPERACILLIN SODIUM AND TAZOBACTAM SODIUM 3.38 G: 3; .375 INJECTION, POWDER, FOR SOLUTION INTRAVENOUS at 11:29

## 2017-08-17 RX ADMIN — CARBIDOPA AND LEVODOPA 1 TABLET: 10; 100 TABLET ORAL at 05:09

## 2017-08-17 RX ADMIN — BACITRACIN ZINC: 500 OINTMENT TOPICAL at 20:19

## 2017-08-17 RX ADMIN — MODAFINIL 200 MG: 100 TABLET ORAL at 09:41

## 2017-08-17 RX ADMIN — PIPERACILLIN SODIUM AND TAZOBACTAM SODIUM 3.38 G: 3; .375 INJECTION, POWDER, FOR SOLUTION INTRAVENOUS at 18:35

## 2017-08-17 RX ADMIN — POTASSIUM CHLORIDE 30 MEQ: 1500 TABLET, EXTENDED RELEASE ORAL at 20:19

## 2017-08-17 RX ADMIN — POTASSIUM CHLORIDE 20 MEQ: 1500 TABLET, EXTENDED RELEASE ORAL at 16:57

## 2017-08-17 RX ADMIN — CITALOPRAM HYDROBROMIDE 20 MG: 20 TABLET ORAL at 08:36

## 2017-08-17 RX ADMIN — POTASSIUM CHLORIDE 20 MEQ: 1500 TABLET, EXTENDED RELEASE ORAL at 08:36

## 2017-08-17 RX ADMIN — POTASSIUM CHLORIDE 20 MEQ: 1500 TABLET, EXTENDED RELEASE ORAL at 16:55

## 2017-08-17 RX ADMIN — AMANTADINE HYDROCHLORIDE 100 MG: 100 CAPSULE ORAL at 05:09

## 2017-08-17 RX ADMIN — AMANTADINE HYDROCHLORIDE 100 MG: 100 CAPSULE ORAL at 16:56

## 2017-08-17 RX ADMIN — PIPERACILLIN SODIUM AND TAZOBACTAM SODIUM 3.38 G: 3; .375 INJECTION, POWDER, FOR SOLUTION INTRAVENOUS at 05:09

## 2017-08-17 RX ADMIN — STANDARDIZED SENNA CONCENTRATE AND DOCUSATE SODIUM 2 TABLET: 8.6; 5 TABLET, FILM COATED ORAL at 08:36

## 2017-08-17 RX ADMIN — CARBIDOPA AND LEVODOPA 1 TABLET: 10; 100 TABLET ORAL at 20:19

## 2017-08-17 RX ADMIN — CARBIDOPA AND LEVODOPA 1 TABLET: 10; 100 TABLET ORAL at 16:56

## 2017-08-17 ASSESSMENT — ENCOUNTER SYMPTOMS
ROS GI COMMENTS: HUNGRY
NAUSEA: 0
VOMITING: 0
ABDOMINAL PAIN: 0

## 2017-08-17 ASSESSMENT — PAIN SCALES - GENERAL: PAINLEVEL_OUTOF10: 0

## 2017-08-17 NOTE — CARE PLAN
Problem: Nutritional:  Goal: Nutrition support tolerated and meeting greater than 85% of estimated needs  Outcome: MET Date Met:  08/17/17  TF running @ goal

## 2017-08-17 NOTE — PROGRESS NOTES
Report received from Asael in MRI. Patient returned to floor in a resting state. He is sleeping, showing no signs of distress. Per MD, tube feed initiated at 25ml/hour, will titrate up to 80 this evening. Patient notably sedated under anesthesia at time of arrival. O2 sats remain in high nineties (97-99) on 3L NC. Lung sounds minimally congested. Medications missed during NPO period given to patient via coretrak. IV abx given additionally. Son at bedside. Patient cleaned from BM at this time as well. Patient positioned comfortably in bed. Bed is locked and in lowest position. Will continue to monitor patient needs and safety.

## 2017-08-17 NOTE — PROGRESS NOTES
Patient underwent an MRI with anesthesia by Dr. Lara.  Patient tolerated procedure well.  Patient transported to [facility] with anesthesia and RN with LMA in place, O2 and monitor.  Report given to Truong at bedside. Called patients son Filippo to update with patients status.

## 2017-08-17 NOTE — PALLIATIVE CARE
Palliative Care follow-up  Called Filippo ([phone]), no answer left msg to return call.       Thank you for allowing Palliative Care to participate in this patient's care. Please feel free to call [phone] with any questions or concerns.

## 2017-08-17 NOTE — OR NURSING
1843 patient arrived from I/R s/p MRI with anesthesia, vss, patient non verbal baseline per anesthesiologist, pt lethargic not  Responsive to stimuli.  1853 patient continues to be lethargic but able to open eyes on request, vss, not in respiratory distress.   1900 criteria met respiration  Adequate, vss, pt not in resp distress   1913 report given to morelia LEE S185 bed 2 all questions answered

## 2017-08-17 NOTE — CARE PLAN
Problem: Discharge Barriers/Planning  Goal: Patient’s continuum of care needs will be met  Intervention: Assess potential discharge barriers on admission and throughout hospital stay  MRI results pending to determine course of action.

## 2017-08-17 NOTE — PROGRESS NOTES
[facility] Hospitalist Progress Note    Date of Service: 2017    Chief Complaint  76 y.o. male admitted 2017 with fever, elevated WBC count, recent kyphoplasty, confusion.    Interval Problem Update  -About the same today, quite lethargic.    Back pain- no change in back pain, but his use of narcotics no change in sensation or neurological issues with his legs.    Dysphagia- tolerating cor track okay, no high residuals with tube feeds.    White blood cell count-finally came down afebrile last night. Tolerating IV Zosyn without issue      Consultants/Specialty  None.    Disposition  TBD.        Review of Systems   Unable to perform ROS: acuity of condition      Physical Exam  Laboratory/Imaging   Hemodynamics  Temp (24hrs), Av.7 °C (98.1 °F), Min:36.5 °C (97.7 °F), Max:36.9 °C (98.5 °F)   Temperature:  (MRI)  Pulse  Av.5  Min: 61  Max: 85    Blood Pressure : 141/67 mmHg      Respiratory      Respiration: 17, Pulse Oximetry: 92 %        RUL Breath Sounds: Crackles, RML Breath Sounds: Diminished, RLL Breath Sounds: Diminished, AQUILINO Breath Sounds: Clear, LLL Breath Sounds: Diminished    Fluids    Intake/Output Summary (Last 24 hours) at 17 1802  Last data filed at 17 1300   Gross per 24 hour   Intake    980 ml   Output    950 ml   Net     30 ml       Nutrition  Orders Placed This Encounter   Procedures   • Diet NPO     Standing Status: Standing      Number of Occurrences: 1      Standing Expiration Date:      Order Specific Question:  Restrict to:     Answer:  Sips with Medications [3]     Physical Exam   Constitutional: He appears well-developed and well-nourished. No distress.   Laying in bed somewhat more lethargic today   HENT:   Mouth/Throat: No oropharyngeal exudate (uvula swollen and slightly red).   Cor tract and right naris, no erosion noted   Eyes: Conjunctivae are normal. No scleral icterus.   Neck: Normal range of motion. Neck supple. No tracheal deviation present. No Brudzinski's  sign and no Kernig's sign noted.   Cardiovascular: Normal rate, regular rhythm and normal heart sounds.    Pulmonary/Chest: Effort normal and breath sounds normal. No stridor. He has no wheezes.   Abdominal: Soft. Bowel sounds are normal. He exhibits no distension.   Musculoskeletal: He exhibits edema and tenderness.   Lateral portion of left ankle swollen as resolved    In the lower lumbar and sacral region cannot appreciate any fluctuance, mass effect tenderness either vertebral or paravertebral no obvious skin changes overlying this region, no changes today   Neurological: He is alert. He displays tremor (resting). No cranial nerve deficit. He exhibits normal muscle tone.   Skin: Skin is warm and dry. No rash noted. He is not diaphoretic. No erythema.   Psychiatric: He has a normal mood and affect. His speech is delayed. Cognition and memory are impaired. He expresses impulsivity. He is inattentive.   Nursing note and vitals reviewed.      Recent Labs      08/14/17   0340  08/15/17   0147  08/16/17   0250   WBC  17.5*  18.7*  13.2*   RBC  3.17*  3.44*  3.11*   HEMOGLOBIN  9.1*  9.5*  8.8*   HEMATOCRIT  27.2*  29.4*  26.5*   MCV  85.8  85.5  85.2   MCH  28.7  27.6  28.3   MCHC  33.5*  32.3*  33.2*   RDW  48.8  48.0  48.8   PLATELETCT  230  295  318   MPV  12.6  12.5  12.6     Recent Labs      08/14/17   0340  08/15/17   0147  08/16/17   0250   SODIUM  137  136  137   POTASSIUM  3.4*  3.2*  3.2*   CHLORIDE  105  104  107   CO2  23  22  24   GLUCOSE  101*  100*  169*   BUN  12  12  17   CREATININE  0.67  0.61  0.65   CALCIUM  7.8*  8.0*  8.0*                      Assessment/Plan     Myelodysplastic syndrome (CMS-HCC) (present on admission)  Assessment & Plan  Seen by Dr. Ribeiro on the last admit  Platelet dysfunction present per prior TEG and evaluation  Hold lovenox  SCDs only for DVT prophylaxis    Parkinson's disease (CMS-HCC) (present on admission)  Assessment & Plan  -Cor track in place  -Cannot do sustained  released Sinemet given a cannot be crushed  -We'll convert to short acting and try to adjust as needed to control his parkinsonian symptoms, continue short acting meds  -Palliative care spoke with patient's wife and she understands that if clinical situation doesn't improve my consider comfort care    Pneumonia (present on admission)  Assessment & Plan  RULED OUT on chest xray  Elevated wbc count and cough  Continue zosyn, follow cultures, remained negative to this point    Hematuria (present on admission)  Assessment & Plan  Appears due to trauma while inserting catheter; coude had to be used  improving  Urinary retention present  Continue to Hold lovenox, no change today  Continue fluids    Acute delirium (present on admission)  Assessment & Plan  Related to acute infection and illness  Consider possibility of infection from kyphoplasty though site looks good  -Cor track is in place and tube feeds are not at goal but he is tolerating current rate  -Discussed with Dr. COX will attempt to get MRI of the C-spine and T-spine L spine with anesthesia will hopefully be done later today.  -ESR and CRP are grossly elevated however nonspecific  -Appears to be hypoactive at this time and delirium is a bit improved today  -Continue empiric IV Zosyn which would cover possible pneumonia as well, no changes today, leukocytosis finally improving  -If MRIs are negative will consider ID consult    DNR (do not resuscitate) (present on admission)  Assessment & Plan  Confirmed with the patient's wife on admission.    Left ankle swelling  Assessment & Plan  -Bilateral x-rays of the knees and ankles show very small fluid collection therefore arthrocentesis is not indicated at this time but need to watch closely      Radiology images reviewed, Labs reviewed and Medications reviewed  Hurst catheter: Gross Hematuria with Clots and Urinary Tract Retention or Urinary Tract Obstruction      DVT Prophylaxis: Contraindicated - High bleeding  risk  DVT prophylaxis - mechanical: SCDs             This dictation was created using voice recognition software. The accuracy of the dictation is limited to the abilities of the software. I expect there may be some errors of grammar and possibly content.

## 2017-08-18 ENCOUNTER — TELEPHONE (OUTPATIENT)
Dept: NEUROLOGY | Facility: MEDICAL CENTER | Age: 77
End: 2017-08-18

## 2017-08-18 LAB
ANION GAP SERPL CALC-SCNC: 6 MMOL/L (ref 0–11.9)
ANISOCYTOSIS BLD QL SMEAR: ABNORMAL
BASOPHILS # BLD AUTO: 0 % (ref 0–1.8)
BASOPHILS # BLD: 0 K/UL (ref 0–0.12)
BUN SERPL-MCNC: 13 MG/DL (ref 8–22)
CALCIUM SERPL-MCNC: 7.6 MG/DL (ref 8.5–10.5)
CHLORIDE SERPL-SCNC: 110 MMOL/L (ref 96–112)
CO2 SERPL-SCNC: 25 MMOL/L (ref 20–33)
CREAT SERPL-MCNC: 0.49 MG/DL (ref 0.5–1.4)
EOSINOPHIL # BLD AUTO: 0.18 K/UL (ref 0–0.51)
EOSINOPHIL NFR BLD: 1.8 % (ref 0–6.9)
ERYTHROCYTE [DISTWIDTH] IN BLOOD BY AUTOMATED COUNT: 49.8 FL (ref 35.9–50)
GFR SERPL CREATININE-BSD FRML MDRD: >60 ML/MIN/1.73 M 2
GLUCOSE SERPL-MCNC: 146 MG/DL (ref 65–99)
HCT VFR BLD AUTO: 27.2 % (ref 42–52)
HGB BLD-MCNC: 8.9 G/DL (ref 14–18)
LG PLATELETS BLD QL SMEAR: NORMAL
LYMPHOCYTES # BLD AUTO: 0.45 K/UL (ref 1–4.8)
LYMPHOCYTES NFR BLD: 4.5 % (ref 22–41)
MAGNESIUM SERPL-MCNC: 1.7 MG/DL (ref 1.5–2.5)
MANUAL DIFF BLD: NORMAL
MCH RBC QN AUTO: 28.2 PG (ref 27–33)
MCHC RBC AUTO-ENTMCNC: 32.7 G/DL (ref 33.7–35.3)
MCV RBC AUTO: 86.1 FL (ref 81.4–97.8)
MONOCYTES # BLD AUTO: 1.24 K/UL (ref 0–0.85)
MONOCYTES NFR BLD AUTO: 12.5 % (ref 0–13.4)
MORPHOLOGY BLD-IMP: NORMAL
NEUTROPHILS # BLD AUTO: 8.04 K/UL (ref 1.82–7.42)
NEUTROPHILS NFR BLD: 81.2 % (ref 44–72)
NRBC # BLD AUTO: 0.03 K/UL
NRBC BLD AUTO-RTO: 0.3 /100 WBC
PLATELET # BLD AUTO: 366 K/UL (ref 164–446)
PLATELET BLD QL SMEAR: NORMAL
PMV BLD AUTO: 12.7 FL (ref 9–12.9)
POTASSIUM SERPL-SCNC: 3.7 MMOL/L (ref 3.6–5.5)
RBC # BLD AUTO: 3.16 M/UL (ref 4.7–6.1)
RBC BLD AUTO: PRESENT
SODIUM SERPL-SCNC: 141 MMOL/L (ref 135–145)
WBC # BLD AUTO: 9.9 K/UL (ref 4.8–10.8)

## 2017-08-18 PROCEDURE — A9270 NON-COVERED ITEM OR SERVICE: HCPCS | Performed by: FAMILY MEDICINE

## 2017-08-18 PROCEDURE — 700102 HCHG RX REV CODE 250 W/ 637 OVERRIDE(OP): Performed by: INTERNAL MEDICINE

## 2017-08-18 PROCEDURE — 700102 HCHG RX REV CODE 250 W/ 637 OVERRIDE(OP): Performed by: FAMILY MEDICINE

## 2017-08-18 PROCEDURE — 80048 BASIC METABOLIC PNL TOTAL CA: CPT

## 2017-08-18 PROCEDURE — 83735 ASSAY OF MAGNESIUM: CPT

## 2017-08-18 PROCEDURE — 770020 HCHG ROOM/CARE - TELE (206)

## 2017-08-18 PROCEDURE — 700111 HCHG RX REV CODE 636 W/ 250 OVERRIDE (IP): Performed by: INTERNAL MEDICINE

## 2017-08-18 PROCEDURE — 85027 COMPLETE CBC AUTOMATED: CPT

## 2017-08-18 PROCEDURE — 36415 COLL VENOUS BLD VENIPUNCTURE: CPT

## 2017-08-18 PROCEDURE — 700105 HCHG RX REV CODE 258: Performed by: FAMILY MEDICINE

## 2017-08-18 PROCEDURE — 99232 SBSQ HOSP IP/OBS MODERATE 35: CPT | Performed by: INTERNAL MEDICINE

## 2017-08-18 PROCEDURE — 85007 BL SMEAR W/DIFF WBC COUNT: CPT

## 2017-08-18 PROCEDURE — 93971 EXTREMITY STUDY: CPT

## 2017-08-18 PROCEDURE — 93971 EXTREMITY STUDY: CPT | Mod: 26 | Performed by: SURGERY

## 2017-08-18 PROCEDURE — 700111 HCHG RX REV CODE 636 W/ 250 OVERRIDE (IP): Performed by: FAMILY MEDICINE

## 2017-08-18 PROCEDURE — 306595 SYSTEM,FEED TUBE CLOG ZAPPER: Performed by: INTERNAL MEDICINE

## 2017-08-18 PROCEDURE — 700105 HCHG RX REV CODE 258: Performed by: INTERNAL MEDICINE

## 2017-08-18 PROCEDURE — A9270 NON-COVERED ITEM OR SERVICE: HCPCS | Performed by: INTERNAL MEDICINE

## 2017-08-18 RX ADMIN — POTASSIUM CHLORIDE 30 MEQ: 1500 TABLET, EXTENDED RELEASE ORAL at 08:18

## 2017-08-18 RX ADMIN — PIPERACILLIN SODIUM AND TAZOBACTAM SODIUM 3.38 G: 3; .375 INJECTION, POWDER, FOR SOLUTION INTRAVENOUS at 13:08

## 2017-08-18 RX ADMIN — CARBIDOPA AND LEVODOPA 1 TABLET: 10; 100 TABLET ORAL at 05:26

## 2017-08-18 RX ADMIN — AMANTADINE HYDROCHLORIDE 100 MG: 100 CAPSULE ORAL at 05:24

## 2017-08-18 RX ADMIN — BACITRACIN ZINC: 500 OINTMENT TOPICAL at 08:18

## 2017-08-18 RX ADMIN — POTASSIUM CHLORIDE 30 MEQ: 1500 TABLET, EXTENDED RELEASE ORAL at 13:15

## 2017-08-18 RX ADMIN — CITALOPRAM HYDROBROMIDE 20 MG: 20 TABLET ORAL at 08:18

## 2017-08-18 RX ADMIN — MODAFINIL 200 MG: 100 TABLET ORAL at 08:22

## 2017-08-18 RX ADMIN — MEMANTINE HYDROCHLORIDE 10 MG: 10 TABLET, FILM COATED ORAL at 08:18

## 2017-08-18 RX ADMIN — CARBIDOPA AND LEVODOPA 1 TABLET: 10; 100 TABLET ORAL at 13:15

## 2017-08-18 RX ADMIN — PIPERACILLIN SODIUM AND TAZOBACTAM SODIUM 3.38 G: 3; .375 INJECTION, POWDER, FOR SOLUTION INTRAVENOUS at 18:30

## 2017-08-18 RX ADMIN — PIPERACILLIN SODIUM AND TAZOBACTAM SODIUM 3.38 G: 3; .375 INJECTION, POWDER, FOR SOLUTION INTRAVENOUS at 05:24

## 2017-08-18 RX ADMIN — STANDARDIZED SENNA CONCENTRATE AND DOCUSATE SODIUM 2 TABLET: 8.6; 5 TABLET, FILM COATED ORAL at 08:18

## 2017-08-18 RX ADMIN — PIPERACILLIN SODIUM AND TAZOBACTAM SODIUM 3.38 G: 3; .375 INJECTION, POWDER, FOR SOLUTION INTRAVENOUS at 00:48

## 2017-08-18 ASSESSMENT — ENCOUNTER SYMPTOMS
FEVER: 0
VOMITING: 0
FOCAL WEAKNESS: 0
PALPITATIONS: 0
LOSS OF CONSCIOUSNESS: 0
ABDOMINAL PAIN: 0
HEADACHES: 0
CHILLS: 0
COUGH: 0
NAUSEA: 0
WEAKNESS: 1
DEPRESSION: 0
DIZZINESS: 0
TREMORS: 1
BLURRED VISION: 0
MYALGIAS: 0

## 2017-08-18 ASSESSMENT — PAIN SCALES - GENERAL
PAINLEVEL_OUTOF10: 0
PAINLEVEL_OUTOF10: 1

## 2017-08-18 NOTE — PROGRESS NOTES
Pt is AOx4, appears to be having memory issues as he is continuously asking what time it is, pt is on RA, denies pain/discomfort, lung sounds clear/diminished, active bowel sounds present, bed alarm active, call light within reach, hourly rounding in practice.

## 2017-08-18 NOTE — PROGRESS NOTES
Assumed care of patient at 0700.  Received report from night RN.  Pt alert and oriented x 3. Pt resting comfortably in bed. Wrist restraints on and cortrak intact with feeding at goal rate.

## 2017-08-18 NOTE — PROGRESS NOTES
Monitor summary: SB 51-SR 63, IL .18, QRS .14, QT .40, tal down to 46 non sustaining per strip from monitor room.

## 2017-08-18 NOTE — PROGRESS NOTES
Amara Pascal Fall Risk Assessment:     Last Known Fall: Within the last year  Mobility: Use of assistive device/requires assist of two people  Medications: Cardiovascular or central nervous system meds  Mental Status/LOC/Awareness: Memory loss/confusion and requires reorienting  Toileting Needs: Use of catheters or diversion devices  Volume/Electrolyte Status: Use of IV fluids/tube feeds  Communication/Sensory: Visual (Glasses)/hearing deficit  Behavior: Behavioral noncompliance with instruction  Amara Pascal Fall Risk Total: 17  Fall Risk Level: HIGH RISK    Universal Fall Precautions:  call light/belongings in reach    Fall Risk Level Interventions:   TRIAL ([employer] 8, NEURO, MED ZAKIYA 5) Low Fall Risk Interventions  Place yellow fall risk ID band on patient: verified  Provide patient/family education based on risk assessment: verified  Educate patient/family to call staff for assistance when getting out of bed: verified  Place fall precaution signage outside patient door: verified TRIAL ([employer] 8, NEURO, MED ZAKIYA 5) High Fall Risk Interventions  Place yellow fall risk ID band on patient: verified  Provide patient/family education based on risk assessment: verified  Educate patient/family to call staff for assistance when getting out of bed: verified  Place fall precaution signage outside patient door: verified  Place patient in room close to nursing station: verified  Utilize bed/chair fall alarm: verified  Notify charge of high risk for huddle: verified    Patient Specific Interventions:     Medication: review medications with patient and family  Mental Status/LOC/Awareness: check on patient hourly and utilize bed/chair fall alarm  Toileting: consider obtaining elevated toilet seat or bedside commode (BSC)  Volume/Electrolyte Status: ensure patient remains hydrated  Communication/Sensory: update plan of care on whiteboard  Behavioral: engage patient in daily activities  Mobility: schedule physical activity  throughout the day

## 2017-08-18 NOTE — DOCUMENTATION QUERY
DOCUMENTATION QUERY    PROVIDERS: Please select “Cosign w/ note”to reply to query.    Dr. Cross,    To better represent the severity of illness of your patient, please review the following information and exercise your independent professional judgment in responding to this query.     Sepsis is documented in the History and Physical. Then this diagnosis is subsequently dropped in the following Progress Notes. Based upon the clinical findings, risk factors, and treatment, can this diagnosis be ruled in or ruled out?    • Sepsis has been ruled in  • Sepsis has been ruled out  • Other explanation of clinical findings, please explain  • Unable to determine        The medical record reflects the following:   Clinical Findings Documented sepsis diagnosis in the ER Note & H&P, then subsequently dropped in the Progress Notes.    WBC's on admit: 16.7  Lactic acid this admit: 0.9 - 1.5    VS on admit: T 102.2, P 78, RR 22, /62   Treatment Labs, imaging, cx's, abx   Risk Factors Age, myelodysplastic syndrome, Parkinson's disease, acute delirium, & possible pna   Location within medical record History and Physical     Thank you,   Rebeca Stokes RN  Clinical   Phone # [phone]

## 2017-08-18 NOTE — PROGRESS NOTES
Monitor summary: SR 60-65, CT .20, QRS .12, QT .44 with rare PVCs, rare couplets, and PACs per strip from monitor room.

## 2017-08-18 NOTE — PALLIATIVE CARE
Palliative Care follow-up  Received a call from Filippo, he will be at the bedside to discuss GOC in 10-15 min.          Thank you for allowing Palliative Care to participate in this patient's care. Please feel free to call [phone] with any questions or concerns.

## 2017-08-18 NOTE — ASSESSMENT & PLAN NOTE
- S/P recent kyphoplasty.  - MRI findings negative for any acute abnormality.  - Continue PT/OT.  - Continue conservative pain control.

## 2017-08-18 NOTE — PROGRESS NOTES
[facility] Hospitalist Progress Note    Date of Service: 2017    Chief Complaint  76 y.o. male admitted 2017 with fever, elevated WBC count, recent kyphoplasty, confusion.    Interval Problem Update  -Parkinson's-patient is much better today. Speech is much more more coherent and I was able to have a much more involved conversation with the patient today.    Back pain-denies any new pain. No fevers.    Dysphagia- tolerating cor track okay, no high residuals with tube feeds.    White blood cell count-continues a downtrend, no fevers.      Consultants/Specialty  None.    Disposition  TBD.        Review of Systems   Cardiovascular: Negative for chest pain.   Gastrointestinal: Negative for nausea, vomiting and abdominal pain.        Hungry    still somewhat limited    Physical Exam  Laboratory/Imaging   Hemodynamics  Temp (24hrs), Av.7 °C (98 °F), Min:36.2 °C (97.2 °F), Max:37.2 °C (99 °F)   Temperature: 37.2 °C (99 °F)  Pulse  Av.7  Min: 61  Max: 85 Heart Rate (Monitored): 76  Blood Pressure : 158/75 mmHg, NIBP: 158/77 mmHg      Respiratory      Respiration: 19, Pulse Oximetry: 96 %        RUL Breath Sounds: Clear, RML Breath Sounds: Clear, RLL Breath Sounds: Clear, AQUILINO Breath Sounds: Clear, LLL Breath Sounds: Clear    Fluids    Intake/Output Summary (Last 24 hours) at 17 1823  Last data filed at 17 0950   Gross per 24 hour   Intake    260 ml   Output   1000 ml   Net   -740 ml       Nutrition  Orders Placed This Encounter   Procedures   • Diet NPO     Standing Status: Standing      Number of Occurrences: 1      Standing Expiration Date:      Order Specific Question:  Restrict to:     Answer:  Sips with Medications [3]     Physical Exam   Constitutional: He appears well-developed and well-nourished. No distress.   Much more interactive and energetic today   HENT:   Mouth/Throat: No oropharyngeal exudate (uvula swollen and slightly red).   Cor tract and right naris, no erosion noted   Eyes:  Conjunctivae are normal. Right eye exhibits no discharge. Left eye exhibits no discharge.   Neck: Normal range of motion. Neck supple. No Brudzinski's sign and no Kernig's sign noted.   Cardiovascular: Normal rate, regular rhythm and normal heart sounds.    Pulmonary/Chest: Effort normal and breath sounds normal. No stridor. He has no rales.   Abdominal: Soft. Bowel sounds are normal. There is no tenderness. There is no rebound.   Musculoskeletal: He exhibits edema and tenderness.   Lateral portion of left ankle swollen is a bit worse today, no obvious erythema    In the lower lumbar and sacral region cannot appreciate any fluctuance, mass effect tenderness either vertebral or paravertebral no obvious skin changes overlying this region, remains the same   Neurological: He is alert. He displays tremor (resting). No cranial nerve deficit. He exhibits normal muscle tone.   Skin: Skin is warm and dry. No rash noted. He is not diaphoretic. No erythema.   Psychiatric: He has a normal mood and affect. His speech is delayed. Cognition and memory are impaired. He expresses impulsivity. He is inattentive.   Nursing note and vitals reviewed.      Recent Labs      08/15/17   0147  08/16/17   0250  08/17/17   0318   WBC  18.7*  13.2*  11.2*   RBC  3.44*  3.11*  3.01*   HEMOGLOBIN  9.5*  8.8*  8.5*   HEMATOCRIT  29.4*  26.5*  25.9*   MCV  85.5  85.2  86.0   MCH  27.6  28.3  28.2   MCHC  32.3*  33.2*  32.8*   RDW  48.0  48.8  50.1*   PLATELETCT  295  318  340   MPV  12.5  12.6  12.4     Recent Labs      08/15/17   0147  08/16/17   0250  08/17/17   0318   SODIUM  136  137  142   POTASSIUM  3.2*  3.2*  3.1*   CHLORIDE  104  107  110   CO2  22  24  25   GLUCOSE  100*  169*  124*   BUN  12  17  12   CREATININE  0.61  0.65  0.52   CALCIUM  8.0*  8.0*  7.5*                      Assessment/Plan     Myelodysplastic syndrome (CMS-HCC) (present on admission)  Assessment & Plan  Seen by Dr. Ribeiro on the last admit  Platelet dysfunction  present per prior TEG and evaluation  Hold lovenox  SCDs only for DVT prophylaxis    Parkinson's disease (CMS-HCC) (present on admission)  Assessment & Plan  -Cor track in place  -Cannot do sustained released Sinemet given a cannot be crushed  -Continue short acting Sinemet while cor track is in place  -Symptoms are much better today, says much improved    Pneumonia (present on admission)  Assessment & Plan  -Possible pneumonia, given no clear infection of the spine on MRI and persisting cough and elevated white was gone responding to IV Zosyn, weakly appears to have pneumonia  -Continue D5 7 of Zosyn, all cultures are negative at this time    Hematuria (present on admission)  Assessment & Plan  Appears due to trauma while inserting catheter; coude had to be used  improving  Urinary retention present  Continue to Hold lovenox, hold IV fluids giving swollen and legs  -Urine appears a bit clear and collecting bag    Acute delirium (present on admission)  Assessment & Plan  Related to acute infection and illness  Consider possibility of infection from kyphoplasty though site looks good  -Cor track is in place and tube feeds are not at goal but he is tolerating current rate  -Discussed with Dr. COX will attempt to get MRI of the C-spine and T-spine L spine with anesthesia will hopefully be done later today.  -ESR and CRP are grossly elevated however nonspecific  -Much improved today  -C back pain    DNR (do not resuscitate) (present on admission)  Assessment & Plan  Confirmed with the patient's wife on admission.    Left ankle swelling  Assessment & Plan  -Bilateral x-rays of the knees and ankles show very small fluid collection therefore arthrocentesis is not indicated at this time but need to watch closely  -Appears to be moving proximally all on the leg, minimal erythema  -Get lower extremity venous duplex    Acute bilateral low back pain without sciatica (present on admission)  Assessment & Plan  -MRI of cervical  thoracic and lumbar spine shows no evidence of clear epidural or prevertebral abscess. Findings do show some hyper enhanced bone marrow edema which was there on prior MRIs  -Given patient continues to have improvement in symptoms and no increased back pain we'll continue IV Zosyn day 5 of 7 now  -All cultures remain negative  -Mental status is much improved  - cautious use of IV pain meds  -No neurosurgery consult is required at this time        Radiology images reviewed, Labs reviewed and Medications reviewed  Hurst catheter: Gross Hematuria with Clots and Urinary Tract Retention or Urinary Tract Obstruction      DVT Prophylaxis: Contraindicated - High bleeding risk  DVT prophylaxis - mechanical: SCDs             This dictation was created using voice recognition software. The accuracy of the dictation is limited to the abilities of the software. I expect there may be some errors of grammar and possibly content.

## 2017-08-18 NOTE — PROGRESS NOTES
Amara Pascal Fall Risk Assessment:     Last Known Fall: Within the last year  Mobility: Use of assistive device/requires assist of two people  Medications: Cardiovascular or central nervous system meds  Mental Status/LOC/Awareness: Memory loss/confusion and requires reorienting  Toileting Needs: Use of catheters or diversion devices  Volume/Electrolyte Status: Use of IV fluids/tube feeds  Communication/Sensory: Visual (Glasses)/hearing deficit  Behavior: Behavioral noncompliance with instruction  Amara Pascal Fall Risk Total: 17  Fall Risk Level: HIGH RISK    Universal Fall Precautions:  call light/belongings in reach, bed in low position and locked, wheelchairs and assistive devices out of sight, siderails up x 2, use non-slip footwear, adequate lighting, clutter free and spill free environment, educate on level of risk, educate to call for assistance    Fall Risk Level Interventions:   TRIAL ([employer], NEURO, MED ZKAIYA 5) Low Fall Risk Interventions  Place yellow fall risk ID band on patient: verified  Provide patient/family education based on risk assessment: verified  Educate patient/family to call staff for assistance when getting out of bed: verified  Place fall precaution signage outside patient door: verified TRIAL ([employer] 8, NEURO, [employer] ZAKIYA 5) High Fall Risk Interventions  Place yellow fall risk ID band on patient: verified  Provide patient/family education based on risk assessment: verified  Educate patient/family to call staff for assistance when getting out of bed: verified  Place fall precaution signage outside patient door: verified  Place patient in room close to nursing station: verified  Utilize bed/chair fall alarm: verified  Notify charge of high risk for huddle: verified    Patient Specific Interventions:     Medication: review medications with patient and family  Mental Status/LOC/Awareness: reorient patient, reinforce falls education, encourage family to stay with patient, check on patient  hourly, utilize bed/chair fall alarm and reinforce the use of call light  Toileting: provide frquent toileting  Volume/Electrolyte Status: ensure patient remains hydrated  Communication/Sensory: update plan of care on whiteboard  Behavioral: encourage patient to voice feelings, engage patient in daily activities and administer medication as ordered  Mobility: schedule physical activity throughout the day, provide comfort measures during transport, utilize bed/chair fall alarm, ensure bed is locked and in lowest position and provide appropriate assistive device

## 2017-08-18 NOTE — DISCHARGE PLANNING
PMR order received from Dr. Cross.  Would appreciate PT/OT evals.  Restraints are a barrier to RIRF.  This referral will not be forwarded to Physiatry.  Will monitor for TX evals to further assess Rafael.  I appreciate the referral.

## 2017-08-18 NOTE — PROGRESS NOTES
[facility] Hospitalist Progress Note    Date of Service: 2017    Chief Complaint  76 y.o. male admitted 2017 with fever, elevated WBC count, recent kyphoplasty, confusion.    Interval Problem Update  -Parkinson's-remains in good spirits. Even better speech today. SLP eval to happen today. Tolerating his current sinemet dosing quite well.    Back pain-denies any new pain. No fevers. No changes today.    Dysphagia- tolerating cor track okay, no high residuals with tube feeds. Really wants it to be out.    White blood cell count-has resolved. Afebrile. See above    LLE swelling-no pain with movement. A bit worse than in the past. No obvious trauma.   U/S is negative for DVT.    Consultants/Specialty  None.    Disposition  TBD.        Review of Systems   Constitutional: Negative for fever and chills.   Eyes: Negative for blurred vision.   Respiratory: Negative for cough.    Cardiovascular: Positive for leg swelling (LLE). Negative for chest pain and palpitations.   Gastrointestinal: Negative for nausea, vomiting and abdominal pain.        Hungry, wants the Cortrak out   Genitourinary: Negative for dysuria.   Musculoskeletal: Negative for myalgias.   Skin: Negative for itching.   Neurological: Positive for tremors (mild and near his baseline) and weakness. Negative for dizziness, focal weakness, loss of consciousness and headaches.   Psychiatric/Behavioral: Negative for depression.       Physical Exam  Laboratory/Imaging   Hemodynamics  Temp (24hrs), Av.7 °C (98.1 °F), Min:36.4 °C (97.6 °F), Max:37.2 °C (99 °F)   Temperature: 36.6 °C (97.9 °F)  Pulse  Av.5  Min: 61  Max: 85    Blood Pressure : 125/78 mmHg      Respiratory      Respiration: 18, Pulse Oximetry: 95 %        RUL Breath Sounds: Clear, RML Breath Sounds: Clear, RLL Breath Sounds: Diminished, AQUILINO Breath Sounds: Clear, LLL Breath Sounds: Diminished    Fluids    Intake/Output Summary (Last 24 hours) at 17 1600  Last data filed at 17  0600   Gross per 24 hour   Intake      0 ml   Output    800 ml   Net   -800 ml       Nutrition  Orders Placed This Encounter   Procedures   • Diet NPO     Standing Status: Standing      Number of Occurrences: 1      Standing Expiration Date:      Order Specific Question:  Restrict to:     Answer:  Sips with Medications [3]     Physical Exam   Constitutional: He appears well-developed and well-nourished. No distress.   Remains very interactive with good strength in his voice   HENT:   Mouth/Throat: No oropharyngeal exudate (uvula swollen and slightly red).   Cor tract and right naris, no erosion noted   Eyes: Conjunctivae are normal. No scleral icterus.   Neck: Normal range of motion. Neck supple. No Brudzinski's sign and no Kernig's sign noted.   Cardiovascular: Normal rate, regular rhythm and normal heart sounds.    Pulmonary/Chest: Effort normal and breath sounds normal. No stridor. No respiratory distress.   Abdominal: Soft. Bowel sounds are normal. He exhibits no distension. There is no tenderness.   Musculoskeletal: He exhibits edema and tenderness.   Lateral portion of left ankle swollen with non pitting edema to the distal LLE, no erythema    Could not assess the back today   Neurological: He is alert. He displays tremor (resting). He displays normal reflexes.   Skin: Skin is warm and dry. No rash noted. He is not diaphoretic. No erythema.   Psychiatric: He has a normal mood and affect. His behavior is normal. Thought content normal.   Nursing note and vitals reviewed.      Recent Labs      08/16/17   0250  08/17/17   0318  08/18/17   0206   WBC  13.2*  11.2*  9.9   RBC  3.11*  3.01*  3.16*   HEMOGLOBIN  8.8*  8.5*  8.9*   HEMATOCRIT  26.5*  25.9*  27.2*   MCV  85.2  86.0  86.1   MCH  28.3  28.2  28.2   MCHC  33.2*  32.8*  32.7*   RDW  48.8  50.1*  49.8   PLATELETCT  318  340  366   MPV  12.6  12.4  12.7     Recent Labs      08/16/17   0250  08/17/17   0318  08/18/17   0206   SODIUM  137  142  141   POTASSIUM   3.2*  3.1*  3.7   CHLORIDE  107  110  110   CO2  24  25  25   GLUCOSE  169*  124*  146*   BUN  17  12  13   CREATININE  0.65  0.52  0.49*   CALCIUM  8.0*  7.5*  7.6*                      Assessment/Plan     Myelodysplastic syndrome (CMS-[facility]) (present on admission)  Assessment & Plan  Seen by Dr. Ribeiro on the last admit  Platelet dysfunction present per prior TEG and evaluation  Hold lovenox  SCDs only for DVT prophylaxis  -CBC remains stable    Parkinson's disease (CMS-[facility]) (present on admission)  Assessment & Plan  -Cor track in place  -Cannot do sustained released Sinemet given a cannot be crushed  -Continue short acting Sinemet while cor track is in place  -Symptoms are much better today, says much improved    Pneumonia (present on admission)  Assessment & Plan  -Possible pneumonia, given no clear infection of the spine on MRI and persisting cough and elevated white was gone responding to IV Zosyn, weakly appears to have pneumonia  -day 7/7 zosyn, WBC has normalized completely now    Hematuria (present on admission)  Assessment & Plan  Appears due to trauma while inserting catheter; coude had to be used  improving  Urinary retention present  Continue to Hold lovenox, hold IV fluids giving swollen and legs  -Urine continues to improve    Acute delirium (present on admission)  Assessment & Plan  -continues to be improved, likely multi-factorial, infection, prolonged hospitalization, parkinson's  -continue current treatment plan  -C back pain    DNR (do not resuscitate) (present on admission)  Assessment & Plan  Confirmed with the patient's wife on admission.    Left ankle swelling  Assessment & Plan  -Bilateral x-rays of the knees and ankles show very small fluid collection therefore arthrocentesis is not indicated at this time but need to watch closely  -unchanged today  -U/S negative for DVT/SVT, monitor, no e/o infection at this time  -consider compression stocking, has good ROM the L ankle on dorsi/plantar  flexion    Acute bilateral low back pain without sciatica (present on admission)  Assessment & Plan  -MRI of cervical thoracic and lumbar spine shows no evidence of clear epidural or prevertebral abscess. Findings do show some hyper enhanced bone marrow edema which was there on prior MRIs  -Day 7/7 zosyn  -All cultures remain negative  -Mental status remains excellent  - cautious use of IV pain meds  -No neurosurgery consult is required at this time        Radiology images reviewed, Labs reviewed and Medications reviewed  Hurst catheter: Gross Hematuria with Clots and Urinary Tract Retention or Urinary Tract Obstruction      DVT Prophylaxis: Contraindicated - High bleeding risk  DVT prophylaxis - mechanical: SCDs             This dictation was created using voice recognition software. The accuracy of the dictation is limited to the abilities of the software. I expect there may be some errors of grammar and possibly content.

## 2017-08-18 NOTE — PROGRESS NOTES
Pt AOx3, confused to situation, son at bedside, TF running at goal, BL soft restraints in place, lung sounds diminished, active bowel sounds present, bed alarm active, call light within reach, hourly rounding.

## 2017-08-18 NOTE — TELEPHONE ENCOUNTER
*-*-*This message has not been handled.*-*-*     Dr. Ferguson - This is Elizabeth Felix, wife of Rafael Barneyer. I understand that you are planning to see my  this afternoon in his hospital room. I cannot be there today but would greatly appreciate knowing what you have learned from viewing the spinal MRI he had yesterday, and any other observations or opinions you have on his condition. If you could, please call me. My cell number is [phone]. His son, Filippo, should be in the room and you could use his cellphone if you'd like. Thanks       Sent via [employer]

## 2017-08-19 ENCOUNTER — APPOINTMENT (OUTPATIENT)
Dept: RADIOLOGY | Facility: MEDICAL CENTER | Age: 77
DRG: 853 | End: 2017-08-19
Attending: INTERNAL MEDICINE
Payer: MEDICARE

## 2017-08-19 LAB
ANION GAP SERPL CALC-SCNC: 9 MMOL/L (ref 0–11.9)
BUN SERPL-MCNC: 9 MG/DL (ref 8–22)
CALCIUM SERPL-MCNC: 8.1 MG/DL (ref 8.5–10.5)
CHLORIDE SERPL-SCNC: 109 MMOL/L (ref 96–112)
CO2 SERPL-SCNC: 24 MMOL/L (ref 20–33)
CREAT SERPL-MCNC: 0.54 MG/DL (ref 0.5–1.4)
GFR SERPL CREATININE-BSD FRML MDRD: >60 ML/MIN/1.73 M 2
GLUCOSE SERPL-MCNC: 95 MG/DL (ref 65–99)
MAGNESIUM SERPL-MCNC: 1.8 MG/DL (ref 1.5–2.5)
POTASSIUM SERPL-SCNC: 4 MMOL/L (ref 3.6–5.5)
SODIUM SERPL-SCNC: 142 MMOL/L (ref 135–145)

## 2017-08-19 PROCEDURE — 700102 HCHG RX REV CODE 250 W/ 637 OVERRIDE(OP): Performed by: FAMILY MEDICINE

## 2017-08-19 PROCEDURE — 36415 COLL VENOUS BLD VENIPUNCTURE: CPT

## 2017-08-19 PROCEDURE — A9270 NON-COVERED ITEM OR SERVICE: HCPCS | Performed by: INTERNAL MEDICINE

## 2017-08-19 PROCEDURE — 700111 HCHG RX REV CODE 636 W/ 250 OVERRIDE (IP): Performed by: INTERNAL MEDICINE

## 2017-08-19 PROCEDURE — 700111 HCHG RX REV CODE 636 W/ 250 OVERRIDE (IP): Performed by: HOSPITALIST

## 2017-08-19 PROCEDURE — 700105 HCHG RX REV CODE 258: Performed by: INTERNAL MEDICINE

## 2017-08-19 PROCEDURE — A9270 NON-COVERED ITEM OR SERVICE: HCPCS | Performed by: FAMILY MEDICINE

## 2017-08-19 PROCEDURE — 92526 ORAL FUNCTION THERAPY: CPT

## 2017-08-19 PROCEDURE — 99232 SBSQ HOSP IP/OBS MODERATE 35: CPT | Performed by: INTERNAL MEDICINE

## 2017-08-19 PROCEDURE — 80048 BASIC METABOLIC PNL TOTAL CA: CPT

## 2017-08-19 PROCEDURE — 770020 HCHG ROOM/CARE - TELE (206)

## 2017-08-19 PROCEDURE — 83735 ASSAY OF MAGNESIUM: CPT

## 2017-08-19 PROCEDURE — 700102 HCHG RX REV CODE 250 W/ 637 OVERRIDE(OP): Performed by: INTERNAL MEDICINE

## 2017-08-19 RX ORDER — SODIUM CHLORIDE 9 MG/ML
INJECTION, SOLUTION INTRAVENOUS CONTINUOUS
Status: ACTIVE | OUTPATIENT
Start: 2017-08-19 | End: 2017-08-19

## 2017-08-19 RX ORDER — HALOPERIDOL 5 MG/ML
0.5 INJECTION INTRAMUSCULAR ONCE
Status: COMPLETED | OUTPATIENT
Start: 2017-08-19 | End: 2017-08-19

## 2017-08-19 RX ORDER — POTASSIUM CHLORIDE 20 MEQ/1
30 TABLET, EXTENDED RELEASE ORAL 2 TIMES DAILY
Status: DISCONTINUED | OUTPATIENT
Start: 2017-08-19 | End: 2017-08-26

## 2017-08-19 RX ADMIN — BACITRACIN ZINC: 500 OINTMENT TOPICAL at 20:14

## 2017-08-19 RX ADMIN — POTASSIUM CHLORIDE 30 MEQ: 1500 TABLET, EXTENDED RELEASE ORAL at 20:13

## 2017-08-19 RX ADMIN — MEMANTINE HYDROCHLORIDE 10 MG: 10 TABLET, FILM COATED ORAL at 20:13

## 2017-08-19 RX ADMIN — BACITRACIN ZINC: 500 OINTMENT TOPICAL at 17:18

## 2017-08-19 RX ADMIN — CITALOPRAM HYDROBROMIDE 20 MG: 20 TABLET ORAL at 17:17

## 2017-08-19 RX ADMIN — MORPHINE SULFATE 2 MG: 4 INJECTION INTRAVENOUS at 04:40

## 2017-08-19 RX ADMIN — MODAFINIL 200 MG: 100 TABLET ORAL at 09:00

## 2017-08-19 RX ADMIN — SODIUM CHLORIDE: 9 INJECTION, SOLUTION INTRAVENOUS at 15:00

## 2017-08-19 RX ADMIN — CARBIDOPA AND LEVODOPA 1 TABLET: 10; 100 TABLET ORAL at 20:16

## 2017-08-19 RX ADMIN — MORPHINE SULFATE 2 MG: 4 INJECTION INTRAVENOUS at 00:37

## 2017-08-19 RX ADMIN — HALOPERIDOL LACTATE 0.5 MG: 5 INJECTION, SOLUTION INTRAMUSCULAR at 11:07

## 2017-08-19 RX ADMIN — MORPHINE SULFATE 2 MG: 4 INJECTION INTRAVENOUS at 23:17

## 2017-08-19 RX ADMIN — STANDARDIZED SENNA CONCENTRATE AND DOCUSATE SODIUM 2 TABLET: 8.6; 5 TABLET, FILM COATED ORAL at 20:14

## 2017-08-19 RX ADMIN — CARBIDOPA AND LEVODOPA 2 TABLET: 25; 100 TABLET ORAL at 20:13

## 2017-08-19 ASSESSMENT — ENCOUNTER SYMPTOMS
SHORTNESS OF BREATH: 0
TREMORS: 1
FEVER: 0
MYALGIAS: 0
DIZZINESS: 0
PALPITATIONS: 0
WEAKNESS: 1
ABDOMINAL PAIN: 0
LOSS OF CONSCIOUSNESS: 0
DEPRESSION: 0
HEADACHES: 0
FOCAL WEAKNESS: 0
BLURRED VISION: 0

## 2017-08-19 ASSESSMENT — PAIN SCALES - GENERAL
PAINLEVEL_OUTOF10: 0
PAINLEVEL_OUTOF10: 0
PAINLEVEL_OUTOF10: 6
PAINLEVEL_OUTOF10: 4

## 2017-08-19 NOTE — PROGRESS NOTES
Amara Pascal Fall Risk Assessment:     Last Known Fall: Within the last year  Mobility: Use of assistive device/requires assist of two people  Medications: Cardiovascular or central nervous system meds  Mental Status/LOC/Awareness: Memory loss/confusion and requires reorienting  Toileting Needs: Use of catheters or diversion devices  Volume/Electrolyte Status: Use of IV fluids/tube feeds  Communication/Sensory: Visual (Glasses)/hearing deficit  Behavior: Appropriate behavior  Amara Pascal Fall Risk Total: 15  Fall Risk Level: HIGH RISK    Universal Fall Precautions:  siderails up x 2, call light/belongings in reach, use non-slip footwear, adequate lighting    Fall Risk Level Interventions:   TRIAL ([employer] 8, NEURO, MED ZAKIYA 5) Low Fall Risk Interventions  Place yellow fall risk ID band on patient: verified  Provide patient/family education based on risk assessment: verified  Educate patient/family to call staff for assistance when getting out of bed: verified  Place fall precaution signage outside patient door: verified TRIAL ([employer], NEURO, [employer] ZAKIYA 5) High Fall Risk Interventions  Place yellow fall risk ID band on patient: verified  Provide patient/family education based on risk assessment: verified  Educate patient/family to call staff for assistance when getting out of bed: verified  Place fall precaution signage outside patient door: verified  Place patient in room close to nursing station: verified  Utilize bed/chair fall alarm: verified  Notify charge of high risk for huddle: verified    Patient Specific Interventions:     Medication: review medications with patient and family  Mental Status/LOC/Awareness: utilize bed/chair fall alarm  Toileting: provide frquent toileting  Volume/Electrolyte Status: advance diet as tolerated  Communication/Sensory: update plan of care on whiteboard  Behavioral: engage patient in daily activities  Mobility: utilize bed/chair fall alarm

## 2017-08-19 NOTE — PROGRESS NOTES
Patient's HR appears to be responding to pain medication, but SBP still elevated in 170s.  APN Marcia Ortega paged and notified of above.  Per APN, no PRN needed at this time, and to continue monitoring patient, will discuss above with AM care team.

## 2017-08-19 NOTE — CARE PLAN
Problem: Bowel/Gastric:  Goal: Normal bowel function is maintained or improved  Outcome: PROGRESSING AS EXPECTED    Problem: Skin Integrity  Goal: Risk for impaired skin integrity will decrease  Outcome: PROGRESSING AS EXPECTED

## 2017-08-19 NOTE — PROGRESS NOTES
Cortrak Placement    Tube Team verified patient name and medical record number prior to tube placement.  Cortrak tube (43 inches, 10 [place]) placed at 66 cm in right nare.  Per Cortrak picture, tube appears to be in the stomach.

## 2017-08-19 NOTE — PROGRESS NOTES
Amara Pascal Fall Risk Assessment:     Last Known Fall: Within the last year  Mobility: Use of assistive device/requires assist of two people  Medications: Cardiovascular or central nervous system meds  Mental Status/LOC/Awareness: Memory loss/confusion and requires reorienting  Toileting Needs: Use of catheters or diversion devices  Volume/Electrolyte Status: Use of IV fluids/tube feeds  Communication/Sensory: Visual (Glasses)/hearing deficit  Behavior: Behavioral noncompliance with instruction  Amara Pascal Fall Risk Total: 17  Fall Risk Level: HIGH RISK    Universal Fall Precautions:  call light/belongings in reach, bed in low position and locked, wheelchairs and assistive devices out of sight, siderails up x 2, use non-slip footwear, adequate lighting, clutter free and spill free environment, educate on level of risk, educate to call for assistance    Fall Risk Level Interventions:   TRIAL ([place], NEURO, MED ZAKIYA 5) Low Fall Risk Interventions  Place yellow fall risk ID band on patient: verified  Provide patient/family education based on risk assessment: verified  Educate patient/family to call staff for assistance when getting out of bed: verified  Place fall precaution signage outside patient door: verified TRIAL ([employer], NEURO, [employer] ZAKIYA 5) High Fall Risk Interventions  Place yellow fall risk ID band on patient: verified  Provide patient/family education based on risk assessment: verified  Educate patient/family to call staff for assistance when getting out of bed: verified  Place fall precaution signage outside patient door: verified  Place patient in room close to nursing station: verified  Utilize bed/chair fall alarm: verified  Notify charge of high risk for huddle: verified    Patient Specific Interventions:     Medication: review medications with patient and family  Mental Status/LOC/Awareness: reorient patient, reinforce falls education, encourage family to stay with patient, check on patient  hourly, utilize bed/chair fall alarm and reinforce the use of call light  Toileting: provide frquent toileting  Volume/Electrolyte Status: ensure patient remains hydrated  Communication/Sensory: update plan of care on whiteboard  Behavioral: encourage patient to voice feelings, engage patient in daily activities and administer medication as ordered  Mobility: schedule physical activity throughout the day, provide comfort measures during transport, utilize bed/chair fall alarm, ensure bed is locked and in lowest position and provide appropriate assistive device

## 2017-08-19 NOTE — PROGRESS NOTES
Assumed care of patient at 0700.  Received report from night RN.  Pt alert and oriented x1, has no complaints of pain. Pt resting comfortably in bed. Cortrak still clogged, wife wants to wait for speech to evaluate his swallowing to replace the cortrak.

## 2017-08-19 NOTE — THERAPY
"Speech Language Therapy dysphagia treatment completed.   Functional Status:  Pt awake but with fluctuating alertness and lethargy. Oriented to self only. Still high risk for aspiration given mentation and silent aspiration on FEES earlier this week.   Recommendations: Continue NPO/cortrak at this time. OK to start 5 ice chips per hour with nursing supervision. Pt will benefit from repeat FEES next week if mentation and speech continue to improve.    Plan of Care: Will benefit from Speech Therapy 3 times per week  Post-Acute Therapy: Discharge to a transitional care facility for continued skilled therapy services.    See \"Rehab Therapy-Acute\" Patient Summary Report for complete documentation.     "

## 2017-08-19 NOTE — PROGRESS NOTES
Monitor summary: SR 69-75, TN .20, QRS .14, QT .44 with rare PVC, rare couplet, and bundle branch block  per strip from monitor room.

## 2017-08-19 NOTE — PROGRESS NOTES
[facility] Hospitalist Progress Note    Date of Service: 2017    Chief Complaint  76 y.o. male admitted 2017 with fever, elevated WBC count, recent kyphoplasty, confusion.    Interval Problem Update  -Parkinson's-a bit worse mental state today.    Back pain-denies any new pain. No fevers. Remains unchanged.     Dysphagia- Cortrak is clogged, replaced again. SLP evaluated the patient again and he failed.    White blood cell count-has resolved. Afebrile. See above    LLE swelling- No obvious trauma.   U/S is negative for DVT. Remains about the same today. Denies any new redness.    Consultants/Specialty  None.    Disposition  TBD.        Review of Systems   Constitutional: Negative for fever.   Eyes: Negative for blurred vision.   Respiratory: Negative for shortness of breath.    Cardiovascular: Positive for leg swelling (LLE, unchanged). Negative for chest pain and palpitations.   Gastrointestinal: Negative for abdominal pain.        Frustrated about not eating   Genitourinary: Negative for dysuria.   Musculoskeletal: Negative for myalgias.   Skin: Negative for itching.   Neurological: Positive for tremors (mild and near his baseline) and weakness (about the same). Negative for dizziness, focal weakness, loss of consciousness and headaches.        No changes   Psychiatric/Behavioral: Negative for depression.       Physical Exam  Laboratory/Imaging   Hemodynamics  Temp (24hrs), Av.6 °C (97.9 °F), Min:36.2 °C (97.2 °F), Max:37 °C (98.6 °F)   Temperature: 37 °C (98.6 °F)  Pulse  Av.6  Min: 61  Max: 99    Blood Pressure : 156/90 mmHg      Respiratory      Respiration: 18, Pulse Oximetry: 93 %        RUL Breath Sounds: Clear, RML Breath Sounds: Clear, RLL Breath Sounds: Diminished, AQUILINO Breath Sounds: Clear, LLL Breath Sounds: Diminished    Fluids    Intake/Output Summary (Last 24 hours) at 17 1413  Last data filed at 17 0420   Gross per 24 hour   Intake      0 ml   Output   4250 ml   Net  -4250  ml       Nutrition  Orders Placed This Encounter   Procedures   • Diet NPO     Standing Status: Standing      Number of Occurrences: 1      Standing Expiration Date:      Order Specific Question:  Restrict to:     Answer:  Sips with Medications [3]     Physical Exam   Constitutional: He appears well-developed and well-nourished. No distress.   Little bit more sluggish today, but still quite interactive   HENT:   Mouth/Throat: No oropharyngeal exudate (uvula swollen and slightly red).   Cor tract and right naris, no erosion noted   Eyes: Conjunctivae are normal. No scleral icterus.   Neck: Normal range of motion. Neck supple. No Brudzinski's sign and no Kernig's sign noted.   Cardiovascular: Normal rate, regular rhythm and normal heart sounds.    Pulmonary/Chest: Effort normal and breath sounds normal. No stridor. No respiratory distress.   Abdominal: Soft. Bowel sounds are normal. There is no tenderness.   Musculoskeletal: He exhibits edema and tenderness.   Lateral portion of left ankle swollen with non pitting edema to the distal LLE, no erythema, unchanged today    Back appears ok at this time   Neurological: He is alert. He displays tremor (resting). He displays normal reflexes.   Little bit slower in answering questions   Skin: Skin is warm and dry. No rash noted. He is not diaphoretic.   Psychiatric: He has a normal mood and affect. His behavior is normal. Thought content normal.   Nursing note and vitals reviewed.      Recent Labs      08/17/17 0318 08/18/17   0206   WBC  11.2*  9.9   RBC  3.01*  3.16*   HEMOGLOBIN  8.5*  8.9*   HEMATOCRIT  25.9*  27.2*   MCV  86.0  86.1   MCH  28.2  28.2   MCHC  32.8*  32.7*   RDW  50.1*  49.8   PLATELETCT  340  366   MPV  12.4  12.7     Recent Labs      08/17/17 0318 08/18/17   0206  08/19/17   0233   SODIUM  142  141  142   POTASSIUM  3.1*  3.7  4.0   CHLORIDE  110  110  109   CO2  25  25  24   GLUCOSE  124*  146*  95   BUN  12  13  9   CREATININE  0.52  0.49*  0.54    CALCIUM  7.5*  7.6*  8.1*                      Assessment/Plan     Myelodysplastic syndrome (CMS-[facility]) (present on admission)  Assessment & Plan  Seen by Dr. Ribeiro on the last admit  Platelet dysfunction present per prior TEG and evaluation  Hold lovenox  SCDs only for DVT prophylaxis  -CBC remains stable    Parkinson's disease (CMS-[facility]) (present on admission)  Assessment & Plan  -Cor track in place, replace today since clogged  -Cannot do sustained released Sinemet given a cannot be crushed  -Continue short acting Sinemet while cor track is in place  -Symptoms are about the same, failed swallow eval again, consider G tube maybe in a few days if no further improvement    Pneumonia (present on admission)  Assessment & Plan  -Possible pneumonia, given no clear infection of the spine on MRI and persisting cough and elevated white was gone responding to IV Zosyn, weakly appears to have pneumonia  -day 7/7 zosyn, WBC has normalized completely now    Hematuria (present on admission)  Assessment & Plan  -resolved, hold AC, monitor closely    Acute delirium (present on admission)  Assessment & Plan  -a bit worse today, fluctuates, likely multi-factorial, infection, prolonged hospitalization, parkinson's  -continue current treatment plan  -C back pain    DNR (do not resuscitate) (present on admission)  Assessment & Plan  Confirmed with the patient's wife on admission.    Left ankle swelling  Assessment & Plan  -Bilateral x-rays of the knees and ankles show very small fluid collection therefore arthrocentesis is not indicated at this time but need to watch closely  -unchanged today  -U/S negative for DVT/SVT, monitor, no e/o infection at this time  -consider compression stocking, has good ROM the L ankle on dorsi/plantar flexion, no changes today    Acute bilateral low back pain without sciatica (present on admission)  Assessment & Plan  -MRI of cervical thoracic and lumbar spine shows no evidence of clear epidural or  prevertebral abscess. Findings do show some hyper enhanced bone marrow edema which was there on prior MRIs  -Finished 7 days of IV zosyn  -All cultures remain negative  -Mental status remains excellent  - cautious use of IV pain meds  -No neurosurgery consult is required at this time  -PT today, get patient OOB into a chair      Radiology images reviewed, Labs reviewed and Medications reviewed  Hurst catheter: Gross Hematuria with Clots and Urinary Tract Retention or Urinary Tract Obstruction      DVT Prophylaxis: Contraindicated - High bleeding risk  DVT prophylaxis - mechanical: SCDs             This dictation was created using voice recognition software. The accuracy of the dictation is limited to the abilities of the software. I expect there may be some errors of grammar and possibly content.

## 2017-08-19 NOTE — PROGRESS NOTES
Patient's AM BP is elevated, patient has been agitated and upset about restraint use, provided therapeutic communication, reposition, medicating for assumed presence of pain, no BP PRN or MD notification parameter present at this time.  Discussed above with charge RN.  Will trend BP and monitor patient.  Will discuss with care team about getting PRN antihypertensive on board.

## 2017-08-19 NOTE — PROGRESS NOTES
Pt AOx3, making multiple attempts to unclog cortrak but unsuccessful, per pt wife's wish will hold off on replacing cortrak until swallow evaluation tomorrow.  Will hold PO meds for now.  ADALBERTON Marcia Ortega updated about the situation.  Will medicate PRNs via IV.  Otherwise, no other acute change noted on patient compared to last evening.  Continuing with current plan of care.

## 2017-08-20 LAB
ANION GAP SERPL CALC-SCNC: 7 MMOL/L (ref 0–11.9)
BUN SERPL-MCNC: 13 MG/DL (ref 8–22)
CALCIUM SERPL-MCNC: 8.3 MG/DL (ref 8.5–10.5)
CHLORIDE SERPL-SCNC: 107 MMOL/L (ref 96–112)
CO2 SERPL-SCNC: 26 MMOL/L (ref 20–33)
CREAT SERPL-MCNC: 0.58 MG/DL (ref 0.5–1.4)
GFR SERPL CREATININE-BSD FRML MDRD: >60 ML/MIN/1.73 M 2
GLUCOSE SERPL-MCNC: 106 MG/DL (ref 65–99)
POTASSIUM SERPL-SCNC: 3.8 MMOL/L (ref 3.6–5.5)
SODIUM SERPL-SCNC: 140 MMOL/L (ref 135–145)

## 2017-08-20 PROCEDURE — 770020 HCHG ROOM/CARE - TELE (206)

## 2017-08-20 PROCEDURE — G8979 MOBILITY GOAL STATUS: HCPCS | Mod: CJ

## 2017-08-20 PROCEDURE — 700102 HCHG RX REV CODE 250 W/ 637 OVERRIDE(OP): Performed by: FAMILY MEDICINE

## 2017-08-20 PROCEDURE — 700102 HCHG RX REV CODE 250 W/ 637 OVERRIDE(OP): Performed by: INTERNAL MEDICINE

## 2017-08-20 PROCEDURE — 97162 PT EVAL MOD COMPLEX 30 MIN: CPT

## 2017-08-20 PROCEDURE — 51798 US URINE CAPACITY MEASURE: CPT

## 2017-08-20 PROCEDURE — 80048 BASIC METABOLIC PNL TOTAL CA: CPT

## 2017-08-20 PROCEDURE — A9270 NON-COVERED ITEM OR SERVICE: HCPCS | Performed by: FAMILY MEDICINE

## 2017-08-20 PROCEDURE — 99232 SBSQ HOSP IP/OBS MODERATE 35: CPT | Performed by: INTERNAL MEDICINE

## 2017-08-20 PROCEDURE — A9270 NON-COVERED ITEM OR SERVICE: HCPCS | Performed by: INTERNAL MEDICINE

## 2017-08-20 PROCEDURE — G8978 MOBILITY CURRENT STATUS: HCPCS | Mod: CL

## 2017-08-20 PROCEDURE — 36415 COLL VENOUS BLD VENIPUNCTURE: CPT

## 2017-08-20 RX ORDER — FINASTERIDE 5 MG/1
5 TABLET, FILM COATED ORAL DAILY
Status: DISCONTINUED | OUTPATIENT
Start: 2017-08-20 | End: 2017-08-20

## 2017-08-20 RX ORDER — TAMSULOSIN HYDROCHLORIDE 0.4 MG/1
0.4 CAPSULE ORAL
Status: DISCONTINUED | OUTPATIENT
Start: 2017-08-20 | End: 2017-08-20

## 2017-08-20 RX ADMIN — BACITRACIN ZINC: 500 OINTMENT TOPICAL at 21:00

## 2017-08-20 RX ADMIN — AMANTADINE HYDROCHLORIDE 100 MG: 100 CAPSULE ORAL at 05:23

## 2017-08-20 RX ADMIN — CARBIDOPA AND LEVODOPA 1 TABLET: 10; 100 TABLET ORAL at 16:09

## 2017-08-20 RX ADMIN — STANDARDIZED SENNA CONCENTRATE AND DOCUSATE SODIUM 2 TABLET: 8.6; 5 TABLET, FILM COATED ORAL at 10:20

## 2017-08-20 RX ADMIN — MEMANTINE HYDROCHLORIDE 10 MG: 10 TABLET, FILM COATED ORAL at 21:23

## 2017-08-20 RX ADMIN — AMANTADINE HYDROCHLORIDE 100 MG: 100 CAPSULE ORAL at 16:09

## 2017-08-20 RX ADMIN — CARBIDOPA AND LEVODOPA 2 TABLET: 25; 100 TABLET ORAL at 21:22

## 2017-08-20 RX ADMIN — MEMANTINE HYDROCHLORIDE 10 MG: 10 TABLET, FILM COATED ORAL at 10:18

## 2017-08-20 RX ADMIN — BACITRACIN ZINC: 500 OINTMENT TOPICAL at 10:21

## 2017-08-20 RX ADMIN — CARBIDOPA AND LEVODOPA 1 TABLET: 10; 100 TABLET ORAL at 21:22

## 2017-08-20 RX ADMIN — CITALOPRAM HYDROBROMIDE 20 MG: 20 TABLET ORAL at 10:21

## 2017-08-20 RX ADMIN — POTASSIUM CHLORIDE 30 MEQ: 1500 TABLET, EXTENDED RELEASE ORAL at 21:22

## 2017-08-20 RX ADMIN — POTASSIUM CHLORIDE 30 MEQ: 1500 TABLET, EXTENDED RELEASE ORAL at 10:18

## 2017-08-20 RX ADMIN — CARBIDOPA AND LEVODOPA 1 TABLET: 10; 100 TABLET ORAL at 05:23

## 2017-08-20 RX ADMIN — MODAFINIL 200 MG: 100 TABLET ORAL at 10:19

## 2017-08-20 ASSESSMENT — COGNITIVE AND FUNCTIONAL STATUS - GENERAL
MOVING TO AND FROM BED TO CHAIR: A LOT
CLIMB 3 TO 5 STEPS WITH RAILING: TOTAL
MOVING FROM LYING ON BACK TO SITTING ON SIDE OF FLAT BED: UNABLE
STANDING UP FROM CHAIR USING ARMS: TOTAL
SUGGESTED CMS G CODE MODIFIER MOBILITY: CM
MOBILITY SCORE: 8
WALKING IN HOSPITAL ROOM: TOTAL
TURNING FROM BACK TO SIDE WHILE IN FLAT BAD: A LOT

## 2017-08-20 ASSESSMENT — ENCOUNTER SYMPTOMS
MYALGIAS: 0
DOUBLE VISION: 0
NAUSEA: 0
VOMITING: 0
FOCAL WEAKNESS: 0
DIZZINESS: 0
TREMORS: 1
WEAKNESS: 1
HEADACHES: 0
SPEECH CHANGE: 1
PALPITATIONS: 0
SHORTNESS OF BREATH: 0
DEPRESSION: 0
CHILLS: 0
LOSS OF CONSCIOUSNESS: 0

## 2017-08-20 ASSESSMENT — PAIN SCALES - GENERAL
PAINLEVEL_OUTOF10: 0
PAINLEVEL_OUTOF10: 0

## 2017-08-20 ASSESSMENT — GAIT ASSESSMENTS: GAIT LEVEL OF ASSIST: UNABLE TO PARTICIPATE

## 2017-08-20 NOTE — PROGRESS NOTES
6 hrs post rinaldi removal, no urine in condom cath bag, bladder scan result 600cc.  Will page APN.    Obtained order from APN to straight cath, if unsuccessful to replace coude catheter.    Able to straight cath with U/O of 700cc.  Replaced condom cath.  Will recheck in 6 hours.      0600:  Bladder Scan result 120cc

## 2017-08-20 NOTE — THERAPY
"77 y/o male adm for fever, confusion, recent Kyphoplasty. Dx: myelodysplastic syndrome, PNA. Pt with NGT. Able to answer PLOF questions. BLE with generalized weakness, equal bialterally. Unable to stand at this time. Acute PT toa ddress these problems for pt to achieve higher level of function.    Physical Therapy Evaluation completed.   Bed Mobility:  Supine to Sit: Maximal Assist  Transfers: Sit to Stand: Unable to Participate  Gait: Level Of Assist: Unable to Participate with Front-Wheel Walker       Plan of Care: Will benefit from Physical Therapy 3 times per week  Discharge Recommendations: Equipment: Will Continue to Assess for Equipment Needs. Post-acute therapy Discharge to a transitional care facility for continued skilled therapy services.    See \"Rehab Therapy-Acute\" Patient Summary Report for complete documentation.     "

## 2017-08-20 NOTE — PROGRESS NOTES
No acute change noted on patient, cortrak replaced, TF running at goal, wife at bedside, BL soft restraints in use, call light within reach, bed alarm active, hourly rounding.

## 2017-08-20 NOTE — PROGRESS NOTES
[facility] Hospitalist Progress Note    Date of Service: 2017    Chief Complaint  76 y.o. male admitted 2017 with fever, elevated WBC count, recent kyphoplasty, confusion.    Interval Problem Update  -Parkinson's-mental status is better today, but is having a hard time forming some words given very dry mouth again.    Back pain-does not endorse any pain currently.     Dysphagia- new cortrak is working well. Having some hard time clearing respiratory secretions.    LLE swelling- No obvious trauma. Swelling continues to improve. NO new pains.    Consultants/Specialty  None.    Disposition  TBD.        Review of Systems   Constitutional: Negative for chills.   Eyes: Negative for double vision.   Respiratory: Negative for shortness of breath.    Cardiovascular: Positive for leg swelling (LLE, improvement noted today). Negative for chest pain and palpitations.   Gastrointestinal: Negative for nausea and vomiting.        Endorses very dry mouth   Genitourinary: Negative for urgency and frequency.   Musculoskeletal: Negative for myalgias.   Skin: Negative for rash.   Neurological: Positive for tremors (mild and near his baseline), speech change (hard to form words 2/2 dry mouth) and weakness (no appreciable change noted today). Negative for dizziness, focal weakness, loss of consciousness and headaches.        No changes   Psychiatric/Behavioral: Negative for depression.       Physical Exam  Laboratory/Imaging   Hemodynamics  Temp (24hrs), Av.7 °C (98 °F), Min:36.4 °C (97.5 °F), Max:36.8 °C (98.3 °F)   Temperature: 36.4 °C (97.5 °F)  Pulse  Av.8  Min: 61  Max: 99    Blood Pressure : 133/69 mmHg      Respiratory      Respiration: 16, Pulse Oximetry: 92 %        RUL Breath Sounds: Rhonchi (set up suction, oral suction PRN), RML Breath Sounds: Clear;Diminished, RLL Breath Sounds: Diminished, AQUILINO Breath Sounds: Rhonchi, LLL Breath Sounds: Clear;Diminished    Fluids    Intake/Output Summary (Last 24 hours) at  08/20/17 1205  Last data filed at 08/20/17 0000   Gross per 24 hour   Intake      0 ml   Output   2300 ml   Net  -2300 ml       Nutrition  Orders Placed This Encounter   Procedures   • Diet NPO     Standing Status: Standing      Number of Occurrences: 1      Standing Expiration Date:      Order Specific Question:  Restrict to:     Answer:  Sips with Medications [3]     Physical Exam   Constitutional: He appears well-developed and well-nourished.   More energetic, but hard to understand with his words this AM   HENT:   Mouth/Throat: No oropharyngeal exudate (uvula swollen and slightly red).   Cor tract and right naris, no erosion noted   Eyes: Conjunctivae are normal. Right eye exhibits no discharge. Left eye exhibits no discharge.   Neck: Normal range of motion. Neck supple. No Brudzinski's sign and no Kernig's sign noted.   Cardiovascular: Normal rate, regular rhythm and normal heart sounds.    Pulmonary/Chest: Effort normal and breath sounds normal. He has no wheezes. He has no rales.   Abdominal: Soft. Bowel sounds are normal. He exhibits no distension. There is no tenderness.   Musculoskeletal: He exhibits edema and tenderness.   Lateral portion of left ankle swollen with non pitting edema to the distal LLE, reduction in amount of edema today    Back appears ok at this time   Neurological: He is alert. He displays tremor (resting). He displays normal reflexes.   Continues to be somewhat slow in answering his questions today   Skin: Skin is warm and dry. No rash noted.   Psychiatric: He has a normal mood and affect. His behavior is normal. Thought content normal.   Nursing note and vitals reviewed.      Recent Labs      08/18/17   0206   WBC  9.9   RBC  3.16*   HEMOGLOBIN  8.9*   HEMATOCRIT  27.2*   MCV  86.1   MCH  28.2   MCHC  32.7*   RDW  49.8   PLATELETCT  366   MPV  12.7     Recent Labs      08/18/17   0206  08/19/17   0233  08/20/17   0213   SODIUM  141  142  140   POTASSIUM  3.7  4.0  3.8   CHLORIDE  110   109  107   CO2  25  24  26   GLUCOSE  146*  95  106*   BUN  13  9  13   CREATININE  0.49*  0.54  0.58   CALCIUM  7.6*  8.1*  8.3*                      Assessment/Plan     Myelodysplastic syndrome (CMS-HCC) (present on admission)  Assessment & Plan  Seen by Dr. Ribeiro on the last admit  Platelet dysfunction present per prior TEG and evaluation  Hold lovenox  SCDs only for DVT prophylaxis  -CBC remains stable    Parkinson's disease (CMS-[facility]) (present on admission)  Assessment & Plan  -Cor track in place  -Cannot do sustained released Sinemet given a cannot be crushed  -Continue short acting Sinemet while cor track is in place  -Symptoms are about the same, failed swallow eval again, consider G tube maybe in a few days if no further improvement, re-discussed this matter with both patient and his wife    Pneumonia (present on admission)  Assessment & Plan  -Possible pneumonia, given no clear infection of the spine on MRI and persisting cough and elevated white was gone responding to IV Zosyn, weakly appears to have pneumonia  -day 7/7 zosyn, WBC has normalized completely now    Hematuria (present on admission)  Assessment & Plan  -resolved, hold AC, monitor closely    Acute delirium (present on admission)  Assessment & Plan  -fluctuates, likely multi-factorial, infection, prolonged hospitalization, parkinson's  -continue current treatment plan    DNR (do not resuscitate) (present on admission)  Assessment & Plan  Confirmed with the patient's wife on admission.    Left ankle swelling  Assessment & Plan  -Bilateral x-rays of the knees and ankles show very small fluid collection therefore arthrocentesis is not indicated at this time but need to watch closely  -unchanged today  -U/S negative for DVT/SVT, monitor, no e/o infection at this time  -consider compression stocking, has good ROM the L ankle on dorsi/plantar flexion, quite marked improvement noted today    Acute bilateral low back pain without sciatica (present on  admission)  Assessment & Plan  -MRI of cervical thoracic and lumbar spine shows no evidence of clear epidural or prevertebral abscess. Findings do show some hyper enhanced bone marrow edema which was there on prior MRIs  -Finished 7 days of IV zosyn  -All cultures remain negative  -Mental status fluctuates  - cautious use of IV pain meds  -No neurosurgery consult is required at this time  -PT today, is maximal assist just for bed mobility, could not do any transfers      Radiology images reviewed, Labs reviewed and Medications reviewed  Hurst catheter: Gross Hematuria with Clots and Urinary Tract Retention or Urinary Tract Obstruction      DVT Prophylaxis: Contraindicated - High bleeding risk  DVT prophylaxis - mechanical: SCDs             This dictation was created using voice recognition software. The accuracy of the dictation is limited to the abilities of the software. I expect there may be some errors of grammar and possibly content.

## 2017-08-20 NOTE — PROGRESS NOTES
Pt cortrak intact, and placement confirmed in correct spot by xray.  Fibersource started at starting rate and will be increased per protocol.  Hurst removed.

## 2017-08-21 LAB
CRP SERPL HS-MCNC: 6.24 MG/DL (ref 0–0.75)
PREALB SERPL-MCNC: 7 MG/DL (ref 18–38)

## 2017-08-21 PROCEDURE — 700102 HCHG RX REV CODE 250 W/ 637 OVERRIDE(OP): Performed by: INTERNAL MEDICINE

## 2017-08-21 PROCEDURE — A9270 NON-COVERED ITEM OR SERVICE: HCPCS | Performed by: FAMILY MEDICINE

## 2017-08-21 PROCEDURE — 86140 C-REACTIVE PROTEIN: CPT

## 2017-08-21 PROCEDURE — 36415 COLL VENOUS BLD VENIPUNCTURE: CPT

## 2017-08-21 PROCEDURE — 700102 HCHG RX REV CODE 250 W/ 637 OVERRIDE(OP): Performed by: FAMILY MEDICINE

## 2017-08-21 PROCEDURE — 84134 ASSAY OF PREALBUMIN: CPT

## 2017-08-21 PROCEDURE — G8988 SELF CARE GOAL STATUS: HCPCS | Mod: CJ

## 2017-08-21 PROCEDURE — A9270 NON-COVERED ITEM OR SERVICE: HCPCS | Performed by: INTERNAL MEDICINE

## 2017-08-21 PROCEDURE — G8987 SELF CARE CURRENT STATUS: HCPCS | Mod: CL

## 2017-08-21 PROCEDURE — 51798 US URINE CAPACITY MEASURE: CPT

## 2017-08-21 PROCEDURE — 700111 HCHG RX REV CODE 636 W/ 250 OVERRIDE (IP): Performed by: HOSPITALIST

## 2017-08-21 PROCEDURE — 99232 SBSQ HOSP IP/OBS MODERATE 35: CPT | Performed by: INTERNAL MEDICINE

## 2017-08-21 PROCEDURE — 770006 HCHG ROOM/CARE - MED/SURG/GYN SEMI*

## 2017-08-21 PROCEDURE — 97166 OT EVAL MOD COMPLEX 45 MIN: CPT

## 2017-08-21 RX ADMIN — POTASSIUM CHLORIDE 30 MEQ: 1500 TABLET, EXTENDED RELEASE ORAL at 21:46

## 2017-08-21 RX ADMIN — MEMANTINE HYDROCHLORIDE 10 MG: 10 TABLET, FILM COATED ORAL at 21:45

## 2017-08-21 RX ADMIN — POTASSIUM CHLORIDE 30 MEQ: 1500 TABLET, EXTENDED RELEASE ORAL at 10:06

## 2017-08-21 RX ADMIN — MODAFINIL 200 MG: 100 TABLET ORAL at 10:07

## 2017-08-21 RX ADMIN — BACITRACIN ZINC: 500 OINTMENT TOPICAL at 21:46

## 2017-08-21 RX ADMIN — MEMANTINE HYDROCHLORIDE 10 MG: 10 TABLET, FILM COATED ORAL at 10:07

## 2017-08-21 RX ADMIN — CARBIDOPA AND LEVODOPA 2 TABLET: 25; 100 TABLET ORAL at 21:46

## 2017-08-21 RX ADMIN — STANDARDIZED SENNA CONCENTRATE AND DOCUSATE SODIUM 2 TABLET: 8.6; 5 TABLET, FILM COATED ORAL at 21:45

## 2017-08-21 RX ADMIN — MORPHINE SULFATE 2 MG: 4 INJECTION INTRAVENOUS at 23:14

## 2017-08-21 RX ADMIN — AMANTADINE HYDROCHLORIDE 100 MG: 50 SOLUTION ORAL at 21:00

## 2017-08-21 RX ADMIN — AMANTADINE HYDROCHLORIDE 100 MG: 100 CAPSULE ORAL at 05:21

## 2017-08-21 RX ADMIN — STANDARDIZED SENNA CONCENTRATE AND DOCUSATE SODIUM 2 TABLET: 8.6; 5 TABLET, FILM COATED ORAL at 10:08

## 2017-08-21 RX ADMIN — BACITRACIN ZINC: 500 OINTMENT TOPICAL at 10:08

## 2017-08-21 RX ADMIN — CITALOPRAM HYDROBROMIDE 20 MG: 20 TABLET ORAL at 10:07

## 2017-08-21 RX ADMIN — CARBIDOPA AND LEVODOPA 1 TABLET: 10; 100 TABLET ORAL at 05:21

## 2017-08-21 RX ADMIN — CARBIDOPA AND LEVODOPA 1 TABLET: 10; 100 TABLET ORAL at 21:46

## 2017-08-21 RX ADMIN — CARBIDOPA AND LEVODOPA 1 TABLET: 10; 100 TABLET ORAL at 13:51

## 2017-08-21 ASSESSMENT — ENCOUNTER SYMPTOMS
HEADACHES: 0
SPEECH CHANGE: 1
FEVER: 0
DEPRESSION: 0
FOCAL WEAKNESS: 0
BACK PAIN: 1
TREMORS: 1
LOSS OF CONSCIOUSNESS: 0
DIZZINESS: 0
BLURRED VISION: 0
ABDOMINAL PAIN: 0
NECK PAIN: 0
WEAKNESS: 1
SHORTNESS OF BREATH: 0

## 2017-08-21 ASSESSMENT — COGNITIVE AND FUNCTIONAL STATUS - GENERAL
EATING MEALS: A LOT
DAILY ACTIVITIY SCORE: 12
TOILETING: A LOT
HELP NEEDED FOR BATHING: A LOT
DRESSING REGULAR UPPER BODY CLOTHING: A LOT
EATING MEALS: A LOT
SUGGESTED CMS G CODE MODIFIER DAILY ACTIVITY: CL
DRESSING REGULAR LOWER BODY CLOTHING: A LOT
HELP NEEDED FOR BATHING: A LOT
DAILY ACTIVITIY SCORE: 12
PERSONAL GROOMING: A LOT
TOILETING: A LOT
DRESSING REGULAR LOWER BODY CLOTHING: A LOT
PERSONAL GROOMING: A LOT
SUGGESTED CMS G CODE MODIFIER DAILY ACTIVITY: CL
DRESSING REGULAR UPPER BODY CLOTHING: A LOT

## 2017-08-21 ASSESSMENT — PAIN SCALES - GENERAL
PAINLEVEL_OUTOF10: 0

## 2017-08-21 ASSESSMENT — ACTIVITIES OF DAILY LIVING (ADL): TOILETING: INDEPENDENT

## 2017-08-21 NOTE — PROGRESS NOTES
Pt unable to void during night. Wife asked that rinaldi be replaced if RN staff needs to straight cath him, rather than have pt be catheterized twice. Rinaldi placed with Coude catheter.

## 2017-08-21 NOTE — DOCUMENTATION QUERY
DOCUMENTATION QUERY    PROVIDERS: Please select “Cosign w/ note”to reply to query.    Dr. Cross,    To better represent the severity of illness of your patient, please review the following information and exercise your independent professional judgment in responding to this query.     K of 3.1 is noted in the Lab Results . Based upon the clinical findings, risk factors, and treatment, can a diagnosis be provided to support this finding?    • Hypokalemia  • Other explanation of clinical findings, please explain  • Findings of no clinical significance  • Unable to determine        The medical record reflects the following:   Clinical Findings K of 3.1 noted in the lab results.   Treatment Kdur tabs   Risk Factors Possible pna, myelodysplastic syndrome, Parkinson's disease, dysphagia   Location within medical record Lab Results      Thank you,   Rebeca Stokes RN  Clinical   Phone # [phone]

## 2017-08-21 NOTE — PROGRESS NOTES
Amara Pascal Fall Risk Assessment:     Last Known Fall: Within the last year  Mobility: Use of assistive device/requires assist of two people  Medications: Cardiovascular or central nervous system meds  Mental Status/LOC/Awareness: Memory loss/confusion and requires reorienting  Toileting Needs: Use of catheters or diversion devices  Volume/Electrolyte Status: Use of IV fluids/tube feeds  Communication/Sensory: Visual (Glasses)/hearing deficit  Behavior: Appropriate behavior  Amara Pascal Fall Risk Total: 15  Fall Risk Level: HIGH RISK    Universal Fall Precautions:  call light/belongings in reach, bed in low position and locked, wheelchairs and assistive devices out of sight, siderails up x 2, use non-slip footwear, adequate lighting, clutter free and spill free environment, educate on level of risk, educate to call for assistance    Fall Risk Level Interventions:   TRIAL ([employer] 8, NEURO, MED ZAKIYA 5) Low Fall Risk Interventions  Place yellow fall risk ID band on patient: verified  Provide patient/family education based on risk assessment: verified  Educate patient/family to call staff for assistance when getting out of bed: verified  Place fall precaution signage outside patient door: verified TRIAL ([employer] 8, NEURO, [employer] ZAKIYA 5) High Fall Risk Interventions  Place yellow fall risk ID band on patient: verified  Provide patient/family education based on risk assessment: verified  Educate patient/family to call staff for assistance when getting out of bed: verified  Place fall precaution signage outside patient door: verified  Place patient in room close to nursing station: verified  Utilize bed/chair fall alarm: verified  Notify charge of high risk for huddle: verified    Patient Specific Interventions:     Medication: review medications with patient and family and limit combination of prn medications  Mental Status/LOC/Awareness: reorient patient, reinforce falls education, encourage family to stay with patient,  check on patient hourly, utilize bed/chair fall alarm and reinforce the use of call light  Toileting: provide frquent toileting, instruct patient/family on the use of grab bars and instruct patient/family on the need to call for assistance when toileting  Volume/Electrolyte Status: ensure patient remains hydrated, administer medications as ordered for nausea and vomiting and monitor abnormal lab values  Communication/Sensory: update plan of care on whiteboard, ensure proper positioning when transferrng/ambulating and ensure patient has glasses/contacts and hearing aids/dentures  Behavioral: encourage patient to voice feelings, engage patient in daily activities, administer medication as ordered, instruct/reinforce fall program rationale and encourage family to stay with impulsive patients  Mobility: dangle prior to standing, utilize bed/chair fall alarm, ensure bed is locked and in lowest position and provide appropriate assistive device

## 2017-08-21 NOTE — DISCHARGE PLANNING
Follow up for rehab chart per therapy note limited tolerance to IRF level of care anticipate skilled nursing for post acute services. No physiatry consult ordered per protocol.   In the event of interval improvement with tolerance to and participation with therapy prior to being medically cleared, will reconsider.

## 2017-08-21 NOTE — PROGRESS NOTES
Pt AAOx3-4, RA, transfers with 2 person assist. Coretrak in right nares running [employer] at 80 mL/hr. Expressive aphasia, when allowed time pt can typically get his point across. Denies numbness or tingling. Denies new onset of SOB or chest pain. Denies pain. Will continue hourly rounding.

## 2017-08-21 NOTE — DIETARY
WEEKLY TF UPDATE - TF via Cortrak: 80mL/hr to provide 2304 Kcal, 104 g protein and 1555 mL free water   Pertinent Labs: glucose 106. K+ WNL   Pertinent Meds: symmetrel, sinemet, celexa, namenda, provigil, Kdur, senna-docusate   Fluids: per MD  GI: Last BM 8/21  WT: 97.5kg, 8/16  SKIN: No pressure areas documented at this time.  Edema to BL LE per MD notes, which may have contributed to Pt's 5.6kg weight gain since 8/12    PLAN/RECOMMEND     Fluids per MD - consider adding free water flushes to meet needs.     Recommend continuing current TF regimen to meet needs.    RD following

## 2017-08-21 NOTE — THERAPY
"Occupational Therapy Evaluation completed.   Functional Status:  Max A with ADLs and txfs  Plan of Care: Will benefit from Occupational Therapy 4 times per week  Discharge Recommendations:  Equipment: Will Continue to Assess for Equipment Needs. Post-acute therapy Discharge to a transitional care facility for continued skilled therapy services.    See \"Rehab Therapy-Acute\" Patient Summary Report for complete documentation.    "

## 2017-08-21 NOTE — PROGRESS NOTES
Monitor Summary: SR 62-78, RI .18, QRS .14, QT .46 with rare PAC,rare PVC per strip from monitor room

## 2017-08-21 NOTE — PROGRESS NOTES
[facility] Hospitalist Progress Note    Date of Service: 2017    Chief Complaint  76 y.o. male admitted 2017 with fever, elevated WBC count, recent kyphoplasty, confusion.    Interval Problem Update  -Parkinson's-mental status is better again today. Speech pattern is much stronger today as well. Working with PT sitting up at the edge of the bed, doing leg exercises.    Back pain-per patient, appears to be back at his baseline.     Dysphagia- new cortrak continues to work well. No high residuals noted.    LLE swelling- No obvious trauma. More mild improvement noted today. Denies any new redness of his skin.     Consultants/Specialty  None.    Disposition  TBD.        Review of Systems   Constitutional: Negative for fever.   Eyes: Negative for blurred vision.   Respiratory: Negative for shortness of breath.    Cardiovascular: Positive for leg swelling (LLE, improvement noted today). Negative for chest pain.   Gastrointestinal: Negative for abdominal pain.        Mouth remains quite dry   Genitourinary: Negative for dysuria.   Musculoskeletal: Positive for back pain. Negative for neck pain.   Skin: Negative for rash.   Neurological: Positive for tremors (mild and near his baseline), speech change (feels that his words are stronger today) and weakness (slightly stronger today). Negative for dizziness, focal weakness, loss of consciousness and headaches.        No changes   Psychiatric/Behavioral: Negative for depression.       Physical Exam  Laboratory/Imaging   Hemodynamics  Temp (24hrs), Av.6 °C (97.9 °F), Min:36.4 °C (97.5 °F), Max:37 °C (98.6 °F)   Temperature: 36.7 °C (98 °F)  Pulse  Av.2  Min: 61  Max: 99    Blood Pressure : 102/66 mmHg      Respiratory      Respiration: (!) 22, Pulse Oximetry: 92 %        RUL Breath Sounds: Clear, RML Breath Sounds: Clear, RLL Breath Sounds: Diminished, AQUILINO Breath Sounds: Clear, LLL Breath Sounds: Clear;Diminished    Fluids    Intake/Output Summary (Last 24 hours)  at 08/21/17 1643  Last data filed at 08/21/17 1200   Gross per 24 hour   Intake   1130 ml   Output   1650 ml   Net   -520 ml       Nutrition  Orders Placed This Encounter   Procedures   • Diet NPO     Standing Status: Standing      Number of Occurrences: 1      Standing Expiration Date:      Order Specific Question:  Restrict to:     Answer:  Sips with Medications [3]     Physical Exam   Constitutional: He appears well-developed and well-nourished.   More energetic, but hard to understand with his words this AM   HENT:   Mouth/Throat: No oropharyngeal exudate (uvula swollen and slightly red).   Cor tract and right naris, no erosion noted   Eyes: Conjunctivae are normal. No scleral icterus.   Neck: Normal range of motion. Neck supple. No Brudzinski's sign and no Kernig's sign noted.   Cardiovascular: Normal rate, regular rhythm and normal heart sounds.    Pulmonary/Chest: Effort normal and breath sounds normal. No stridor. No respiratory distress.   Abdominal: Soft. Bowel sounds are normal. There is no tenderness.   Musculoskeletal: He exhibits edema and tenderness.   Lateral portion of left ankle swollen with non pitting edema to the distal LLE, further reduction in edema    Back appears ok at this time, unchanged again today   Neurological: He is alert. He displays tremor (resting). He displays normal reflexes.   Much quicker in his answers today, stronger diction and enunciation    Skin: Skin is warm and dry. No rash noted.   Psychiatric: He has a normal mood and affect. His behavior is normal. Thought content normal.   Nursing note and vitals reviewed.          Recent Labs      08/19/17   0233  08/20/17   0213   SODIUM  142  140   POTASSIUM  4.0  3.8   CHLORIDE  109  107   CO2  24  26   GLUCOSE  95  106*   BUN  9  13   CREATININE  0.54  0.58   CALCIUM  8.1*  8.3*                      Assessment/Plan     Myelodysplastic syndrome (CMS-HCC) (present on admission)  Assessment & Plan  Seen by Dr. Ribeiro on the last  admit  Platelet dysfunction present per prior TEG and evaluation  Hold lovenox  SCDs only for DVT prophylaxis  -CBC remains stable    Parkinson's disease (CMS-HCC) (present on admission)  Assessment & Plan  -Cor track in place  -Cannot do sustained released Sinemet given a cannot be crushed  -Continue short acting Sinemet while cor track is in place  -symptoms are better today, but continues to have somewhat wild daily fluctuations, possible G tube placement during this admission, need to have ongoing discussions with patient and his wife/family    Pneumonia (present on admission)  Assessment & Plan  -Possible pneumonia, given no clear infection of the spine on MRI and persisting cough and elevated white was gone responding to IV Zosyn, weakly appears to have pneumonia  -day 7/7 zosyn, WBC has normalized completely now    Hematuria (present on admission)  Assessment & Plan  -resolved, hold AC, monitor closely    Acute delirium (present on admission)  Assessment & Plan  -fluctuates, likely multi-factorial, infection, prolonged hospitalization, parkinson's  -continue current treatment plan    DNR (do not resuscitate) (present on admission)  Assessment & Plan  Confirmed with the patient's wife on admission.    Left ankle swelling  Assessment & Plan  -Bilateral x-rays of the knees and ankles show very small fluid collection therefore arthrocentesis is not indicated at this time but need to watch closely  -unchanged today  -U/S negative for DVT/SVT, monitor, no e/o infection at this time  -consider compression stocking, has good ROM the L ankle on dorsi/plantar flexion, continue to see daily improvement    Acute bilateral low back pain without sciatica (present on admission)  Assessment & Plan  -MRI of cervical thoracic and lumbar spine shows no evidence of clear epidural or prevertebral abscess. Findings do show some hyper enhanced bone marrow edema which was there on prior MRIs  -Finished 7 days of IV zosyn  -All  cultures remain negative  -Mental status fluctuates  - cautious use of IV pain meds  -No neurosurgery consult is required at this time  -worked more with PT, sitting up at the bedside, afebrile, will need SNF versus REHAB      Radiology images reviewed, Labs reviewed and Medications reviewed  Hurst catheter: Gross Hematuria with Clots and Urinary Tract Retention or Urinary Tract Obstruction      DVT Prophylaxis: Contraindicated - High bleeding risk  DVT prophylaxis - mechanical: SCDs             This dictation was created using voice recognition software. The accuracy of the dictation is limited to the abilities of the software. I expect there may be some errors of grammar and possibly content.

## 2017-08-21 NOTE — PROGRESS NOTES
Assumed care of pt from day RN. Pt in bed with strip alarm in place. Call light within reach.  Denies pain or needs at this time.  Will monitor, assist and medicate per MAR for duration of shift.  Hourly rounding implemented.      Wife at bedside. Waffle overlay on bed, q2 turns in place. Tube feeding advanced per protocol. HOB>30.   Teeth brushed with oral suction.  PT coughing, unproductive during night.

## 2017-08-21 NOTE — PROGRESS NOTES
Amara Pascal Fall Risk Assessment:     Last Known Fall: Within the last year  Mobility: Use of assistive device/requires assist of two people  Medications: Cardiovascular or central nervous system meds  Mental Status/LOC/Awareness: Memory loss/confusion and requires reorienting  Toileting Needs: Use of catheters or diversion devices  Volume/Electrolyte Status: Use of IV fluids/tube feeds  Communication/Sensory: Visual (Glasses)/hearing deficit  Behavior: Appropriate behavior  Amara Pascal Fall Risk Total: 15  Fall Risk Level: HIGH RISK    Universal Fall Precautions:  call light/belongings in reach, bed in low position and locked, siderails up x 2, use non-slip footwear, adequate lighting    Fall Risk Level Interventions:   TRIAL ([employer] 8, NEURO, MED ZAKIYA 5) Low Fall Risk Interventions  Place yellow fall risk ID band on patient: verified  Provide patient/family education based on risk assessment: verified  Educate patient/family to call staff for assistance when getting out of bed: verified  Place fall precaution signage outside patient door: verified TRIAL ([employer] 8, NEURO, [employer] ZAKIYA 5) High Fall Risk Interventions  Place yellow fall risk ID band on patient: completed  Provide patient/family education based on risk assessment: verified  Educate patient/family to call staff for assistance when getting out of bed: verified  Place fall precaution signage outside patient door: verified  Place patient in room close to nursing station: verified  Utilize bed/chair fall alarm: completed  Notify charge of high risk for huddle: verified    Patient Specific Interventions:     Medication: review medications with patient and family  Mental Status/LOC/Awareness: reinforce falls education, check on patient hourly, utilize bed/chair fall alarm and reinforce the use of call light  Toileting: monitor intake and output/use of appropriate interventions  Volume/Electrolyte Status: ensure patient remains hydrated and advance diet as  tolerated  Communication/Sensory: update plan of care on whiteboard and ensure proper positioning when transferrng/ambulating  Behavioral: engage patient in daily activities and administer medication as ordered  Mobility: utilize bed/chair fall alarm, ensure bed is locked and in lowest position, provide appropriate assistive device and instruct patient to exit bed on their strongest side

## 2017-08-21 NOTE — PROGRESS NOTES
Monitor summary: SR 57-72, NE 0.18, QRS 0.14, QT 0.44, bundle branch block, with rare PACs and rare PVCs per strip from monitor room.

## 2017-08-21 NOTE — CARE PLAN
Problem: Safety  Goal: Will remain free from falls  Outcome: PROGRESSING AS EXPECTED    Problem: Skin Integrity  Goal: Risk for impaired skin integrity will decrease  Outcome: PROGRESSING AS EXPECTED  Waffle overlay, q2 turns, hourly rounding.  Intervention: Assess risk factors for impaired skin integrity and/or pressure ulcers  Risk factors for impaired skin integrity and/or pressure ulcers assessed.      Intervention: Assess and monitor skin integrity, appearance and/or temperature  Skin integrity assessed and monitored per protocol.

## 2017-08-21 NOTE — DISCHARGE PLANNING
Per rounds, Dr. Cross will speak with pt's wife regarding g-tube as the pt has not improved and failed a swallow eval.  Pt is currently in restraints.  SW will continue to follow.

## 2017-08-22 PROBLEM — R33.8 ACUTE URINARY RETENTION: Status: ACTIVE | Noted: 2017-08-22

## 2017-08-22 PROBLEM — R13.12 OROPHARYNGEAL DYSPHAGIA: Status: ACTIVE | Noted: 2017-08-22

## 2017-08-22 PROBLEM — M46.24 ACUTE OSTEOMYELITIS OF THORACIC SPINE (HCC): Status: ACTIVE | Noted: 2017-08-22

## 2017-08-22 LAB
ANION GAP SERPL CALC-SCNC: 7 MMOL/L (ref 0–11.9)
ANISOCYTOSIS BLD QL SMEAR: ABNORMAL
BASOPHILS # BLD AUTO: 1.8 % (ref 0–1.8)
BASOPHILS # BLD: 0.26 K/UL (ref 0–0.12)
BUN SERPL-MCNC: 15 MG/DL (ref 8–22)
CALCIUM SERPL-MCNC: 8.8 MG/DL (ref 8.5–10.5)
CHLORIDE SERPL-SCNC: 104 MMOL/L (ref 96–112)
CO2 SERPL-SCNC: 25 MMOL/L (ref 20–33)
CREAT SERPL-MCNC: 0.66 MG/DL (ref 0.5–1.4)
EOSINOPHIL # BLD AUTO: 0.13 K/UL (ref 0–0.51)
EOSINOPHIL NFR BLD: 0.9 % (ref 0–6.9)
ERYTHROCYTE [DISTWIDTH] IN BLOOD BY AUTOMATED COUNT: 50.7 FL (ref 35.9–50)
ERYTHROCYTE [SEDIMENTATION RATE] IN BLOOD BY WESTERGREN METHOD: 60 MM/HOUR (ref 0–20)
GFR SERPL CREATININE-BSD FRML MDRD: >60 ML/MIN/1.73 M 2
GLUCOSE SERPL-MCNC: 132 MG/DL (ref 65–99)
HCT VFR BLD AUTO: 32.1 % (ref 42–52)
HGB BLD-MCNC: 10.4 G/DL (ref 14–18)
LG PLATELETS BLD QL SMEAR: NORMAL
LYMPHOCYTES # BLD AUTO: 0.76 K/UL (ref 1–4.8)
LYMPHOCYTES NFR BLD: 5.3 % (ref 22–41)
MAGNESIUM SERPL-MCNC: 1.8 MG/DL (ref 1.5–2.5)
MANUAL DIFF BLD: NORMAL
MCH RBC QN AUTO: 27.9 PG (ref 27–33)
MCHC RBC AUTO-ENTMCNC: 32.4 G/DL (ref 33.7–35.3)
MCV RBC AUTO: 86.1 FL (ref 81.4–97.8)
MONOCYTES # BLD AUTO: 1.14 K/UL (ref 0–0.85)
MONOCYTES NFR BLD AUTO: 7.9 % (ref 0–13.4)
MORPHOLOGY BLD-IMP: NORMAL
NEUTROPHILS # BLD AUTO: 12.11 K/UL (ref 1.82–7.42)
NEUTROPHILS NFR BLD: 84.1 % (ref 44–72)
NRBC # BLD AUTO: 0 K/UL
NRBC BLD AUTO-RTO: 0 /100 WBC
PLATELET # BLD AUTO: 408 K/UL (ref 164–446)
PLATELET BLD QL SMEAR: NORMAL
PMV BLD AUTO: 11.9 FL (ref 9–12.9)
POTASSIUM SERPL-SCNC: 4.2 MMOL/L (ref 3.6–5.5)
RBC # BLD AUTO: 3.73 M/UL (ref 4.7–6.1)
RBC BLD AUTO: PRESENT
SODIUM SERPL-SCNC: 136 MMOL/L (ref 135–145)
TOXIC GRANULES BLD QL SMEAR: SLIGHT
WBC # BLD AUTO: 14.4 K/UL (ref 4.8–10.8)

## 2017-08-22 PROCEDURE — 770006 HCHG ROOM/CARE - MED/SURG/GYN SEMI*

## 2017-08-22 PROCEDURE — 97530 THERAPEUTIC ACTIVITIES: CPT

## 2017-08-22 PROCEDURE — 51798 US URINE CAPACITY MEASURE: CPT

## 2017-08-22 PROCEDURE — 83735 ASSAY OF MAGNESIUM: CPT

## 2017-08-22 PROCEDURE — 85027 COMPLETE CBC AUTOMATED: CPT

## 2017-08-22 PROCEDURE — 97112 NEUROMUSCULAR REEDUCATION: CPT

## 2017-08-22 PROCEDURE — 85652 RBC SED RATE AUTOMATED: CPT

## 2017-08-22 PROCEDURE — 85007 BL SMEAR W/DIFF WBC COUNT: CPT

## 2017-08-22 PROCEDURE — 700102 HCHG RX REV CODE 250 W/ 637 OVERRIDE(OP): Performed by: FAMILY MEDICINE

## 2017-08-22 PROCEDURE — 700102 HCHG RX REV CODE 250 W/ 637 OVERRIDE(OP): Performed by: INTERNAL MEDICINE

## 2017-08-22 PROCEDURE — 36415 COLL VENOUS BLD VENIPUNCTURE: CPT

## 2017-08-22 PROCEDURE — 92526 ORAL FUNCTION THERAPY: CPT

## 2017-08-22 PROCEDURE — 99232 SBSQ HOSP IP/OBS MODERATE 35: CPT | Performed by: HOSPITALIST

## 2017-08-22 PROCEDURE — A9270 NON-COVERED ITEM OR SERVICE: HCPCS | Performed by: FAMILY MEDICINE

## 2017-08-22 PROCEDURE — A9270 NON-COVERED ITEM OR SERVICE: HCPCS | Performed by: INTERNAL MEDICINE

## 2017-08-22 PROCEDURE — 80048 BASIC METABOLIC PNL TOTAL CA: CPT

## 2017-08-22 RX ADMIN — CARBIDOPA AND LEVODOPA 1 TABLET: 10; 100 TABLET ORAL at 04:51

## 2017-08-22 RX ADMIN — CARBIDOPA AND LEVODOPA 1 TABLET: 10; 100 TABLET ORAL at 21:59

## 2017-08-22 RX ADMIN — MODAFINIL 200 MG: 100 TABLET ORAL at 08:15

## 2017-08-22 RX ADMIN — BACITRACIN ZINC: 500 OINTMENT TOPICAL at 08:16

## 2017-08-22 RX ADMIN — MEMANTINE HYDROCHLORIDE 10 MG: 10 TABLET, FILM COATED ORAL at 21:59

## 2017-08-22 RX ADMIN — CARBIDOPA AND LEVODOPA 2 TABLET: 25; 100 TABLET ORAL at 21:59

## 2017-08-22 RX ADMIN — TRAMADOL HYDROCHLORIDE 50 MG: 50 TABLET, COATED ORAL at 07:15

## 2017-08-22 RX ADMIN — CARBIDOPA AND LEVODOPA 1 TABLET: 10; 100 TABLET ORAL at 14:41

## 2017-08-22 RX ADMIN — POTASSIUM CHLORIDE 30 MEQ: 1500 TABLET, EXTENDED RELEASE ORAL at 21:59

## 2017-08-22 RX ADMIN — BACITRACIN ZINC: 500 OINTMENT TOPICAL at 21:59

## 2017-08-22 RX ADMIN — AMANTADINE HYDROCHLORIDE 100 MG: 50 SOLUTION ORAL at 08:15

## 2017-08-22 RX ADMIN — CITALOPRAM HYDROBROMIDE 20 MG: 20 TABLET ORAL at 08:15

## 2017-08-22 RX ADMIN — TRIAZOLAM 0.12 MG: 0.25 TABLET ORAL at 23:24

## 2017-08-22 RX ADMIN — AMANTADINE HYDROCHLORIDE 100 MG: 50 SOLUTION ORAL at 22:01

## 2017-08-22 RX ADMIN — STANDARDIZED SENNA CONCENTRATE AND DOCUSATE SODIUM 2 TABLET: 8.6; 5 TABLET, FILM COATED ORAL at 21:59

## 2017-08-22 RX ADMIN — MEMANTINE HYDROCHLORIDE 10 MG: 10 TABLET, FILM COATED ORAL at 08:15

## 2017-08-22 RX ADMIN — POTASSIUM CHLORIDE 30 MEQ: 1500 TABLET, EXTENDED RELEASE ORAL at 08:15

## 2017-08-22 ASSESSMENT — COGNITIVE AND FUNCTIONAL STATUS - GENERAL
SUGGESTED CMS G CODE MODIFIER MOBILITY: CL
CLIMB 3 TO 5 STEPS WITH RAILING: TOTAL
MOVING TO AND FROM BED TO CHAIR: A LOT
STANDING UP FROM CHAIR USING ARMS: A LOT
TURNING FROM BACK TO SIDE WHILE IN FLAT BAD: A LOT
MOBILITY SCORE: 10
MOVING FROM LYING ON BACK TO SITTING ON SIDE OF FLAT BED: A LOT
WALKING IN HOSPITAL ROOM: TOTAL

## 2017-08-22 ASSESSMENT — ENCOUNTER SYMPTOMS
FOCAL WEAKNESS: 0
NECK PAIN: 0
ABDOMINAL PAIN: 0
FEVER: 0
SPEECH CHANGE: 1
BLURRED VISION: 0
DIZZINESS: 0
LOSS OF CONSCIOUSNESS: 0
WEAKNESS: 1
SHORTNESS OF BREATH: 0
HEADACHES: 0
DEPRESSION: 0
TREMORS: 1
BACK PAIN: 1

## 2017-08-22 ASSESSMENT — PAIN SCALES - GENERAL
PAINLEVEL_OUTOF10: 4
PAINLEVEL_OUTOF10: 0

## 2017-08-22 ASSESSMENT — PAIN SCALES - WONG BAKER
WONGBAKER_NUMERICALRESPONSE: DOESN'T HURT AT ALL
WONGBAKER_NUMERICALRESPONSE: DOESN'T HURT AT ALL

## 2017-08-22 ASSESSMENT — GAIT ASSESSMENTS: GAIT LEVEL OF ASSIST: UNABLE TO PARTICIPATE

## 2017-08-22 NOTE — PROGRESS NOTES
16F coude placed at 0000. Blood clots noted in urine. Patient experienced pain with both advancement and balloon inflation. Patient required catheter to be advanced quite far in urethra before urine was noted. 3 RN's required to assist in placing coude. Patient pre-medicated, and previously stated, with 2mg morphine IV. Patient is now resting in bed comfortably. Urine is noted to be flowing freely into rinaldi bag. Wife remains at bed side. Will continue to monitor patient and wife's needs and safety.

## 2017-08-22 NOTE — PROGRESS NOTES
Patient states he is in extreme pain this morning in his penile region. Patient told he pulled at his catheter last night when trying to get out of bed. Patient states staff is lying. Patient called wife, who then spoke with the RN and was informed of last nights events. Wife reassured coretrak and rinaldi remain in place. Oncoming RN will medicate patient appropriately- narcotics seem to heavily confuse patient. Patient resting in bed at this time. Tramadol to be given PRN. Will continue to monitor patient needs and safety.

## 2017-08-22 NOTE — PALLIATIVE CARE
Palliative Care follow-up  PC RN visited pt, he was visiting with a friend. Pt states his wife is coming in around 1500 today. PC RN noticed pt had disconnected rinaldi bag from catheter. Notified Bedside Rn.      Updated:   Bedside RN    Plan:   Return to meet with Wife when she is at bedside.     Thank you for allowing Palliative Care to participate in this patient's care. Please feel free to call [phone] with any questions or concerns.

## 2017-08-22 NOTE — THERAPY
"Physical Therapy Treatment completed.   Bed Mobility:  Supine to Sit: Moderate Assist  Transfers: Sit to Stand: Maximal Assist  Gait: Level Of Assist: Unable to Participate      Plan of Care: Will benefit from Physical Therapy 3 times per week  Discharge Recommendations: Equipment: Will Continue to Assess for Equipment Needs. Post-acute therapy Discharge to a transitional care facility for continued skilled therapy services.     See \"Rehab Therapy-Acute\" Patient Summary Report for complete documentation.     Pt presenting more alert today and able to follow commands. Pt needing ModA for bed mobility to assist w/ LE's. Pt able to perform STS w/ FWW at MaxA, needing facilitation of glutes and posture muscles as pt very hunched over. Pt able to start weight shifting and lift LE's but fatiguing quickly. Pt will benefit from post acute SNF to address his functional limitations.  "

## 2017-08-22 NOTE — PROGRESS NOTES
Per patient's wife, patient pulled out coude catheter at this time. Upon initial assessment, patient in no distress, catheter completely removed from urethra with no inflation noted to balloon. Serosanguinous fluid noted to urinary meatus. MD Ortega notified of current situation, MD asked for additional Coude to be placed. Coude ordered through charge RN. Awaiting arrival. Patient states it is extremely painful when catheters are being placed, per active order, patient to be premedicated with IV morphine prior to catheter insertion. Patient resting in bed comfortably. Patient cleaned and repositioned in bed. Call bell on and within reach. Bed is locked and in lowest position. Bed alarm on and functional. Wife remains at bedside. Will continue to monitor patient needs and safety.

## 2017-08-22 NOTE — PROGRESS NOTES
Patient found to be sitting at bedside at this time. Patient disconnected tube feed from coretrak and states that 'it is time for exercise.' Patient attempted to be reoriented to time and place.  Patient also tugged at his rinaldi catheter to the point of jose armando red blood noted to urinary meatus. Patient able to realize it is 2 in the morning. Patient instructed exercise will take place tomorrow. Patient assisted back to bed, repositioned, and TF reinitiated. Rinaldi and penis cleaned thoroughly. Patient fell asleep almost immediately. Bed alarm placed on and is currently functional. Call bell on and within reach. Bed locked and in lowest position. Will continue to monitor patient needs and safety.

## 2017-08-22 NOTE — PROGRESS NOTES
Pt A&O with periodic confusion. VSS. Medication given for pain as charted, pt verbalizes relief. Tolerating tube feeding. Stood at side of bed with 2 person assist. Hurst in place with dark urine. SCDs on. Pt stable and resting in room with call light in reach and bed alarm in use.

## 2017-08-22 NOTE — PROGRESS NOTES
Amara Pascal Fall Risk Assessment:     Last Known Fall: Within the last year  Mobility: Use of assistive device/requires assist of two people  Medications: Cardiovascular or central nervous system meds  Mental Status/LOC/Awareness: Memory loss/confusion and requires reorienting  Toileting Needs: Use of catheters or diversion devices  Volume/Electrolyte Status: Use of IV fluids/tube feeds  Communication/Sensory: Visual (Glasses)/hearing deficit  Behavior: Appropriate behavior  Amara Pascal Fall Risk Total: 15  Fall Risk Level: HIGH RISK    Universal Fall Precautions:  call light/belongings in reach, bed in low position and locked, siderails up x 2, use non-slip footwear, adequate lighting    Fall Risk Level Interventions:   TRIAL ([employer] 8, NEURO, MED ZAKIYA 5) Low Fall Risk Interventions  Place yellow fall risk ID band on patient: verified  Provide patient/family education based on risk assessment: verified  Educate patient/family to call staff for assistance when getting out of bed: verified  Place fall precaution signage outside patient door: verified TRIAL ([employer] 8, NEURO, MED ZAKIYA 5) High Fall Risk Interventions  Place yellow fall risk ID band on patient: completed  Provide patient/family education based on risk assessment: verified  Educate patient/family to call staff for assistance when getting out of bed: verified  Place fall precaution signage outside patient door: verified  Place patient in room close to nursing station: verified  Utilize bed/chair fall alarm: completed  Notify charge of high risk for huddle: verified    Patient Specific Interventions:     Medication: limit combination of prn medications  Mental Status/LOC/Awareness: reorient patient, reinforce falls education, encourage family to stay with patient, check on patient hourly, utilize bed/chair fall alarm and reinforce the use of call light  Toileting: instruct patient/family on the use of grab bars and instruct patient/family on the need to  call for assistance when toileting  Volume/Electrolyte Status: monitor abnormal lab values  Communication/Sensory: ensure proper positioning when transferrng/ambulating, ensure patient has glasses/contacts and hearing aids/dentures and have patients with hearing deficit repeat information back to you to ensure proper understanding  Behavioral: instruct/reinforce fall program rationale  Mobility: utilize bed/chair fall alarm, ensure bed is locked and in lowest position and provide appropriate assistive device

## 2017-08-22 NOTE — CARE PLAN
Problem: Skin Integrity  Goal: Risk for impaired skin integrity will decrease  Intervention: Assess risk factors for impaired skin integrity and/or pressure ulcers  Patient cleaned this shift. Patient turned and repositioned Q2. No skin break down noted.

## 2017-08-22 NOTE — THERAPY
"Speech Language Therapy dysphagia treatment completed.   Functional Status:  Pt with improved mentation compared to previous sessions. He is still very confused with rushed speech but he is more alert than he has been in past sessions. Initially no overt s/sx of aspiration on trials of ntl and purees but as he ate more he had wet gurgly voice concerning for silent aspiration, especially given previous FEES results.    Recommendations: Repeat FEES tomorrow to r/o aspiration and determine POC.  Plan of Care: Will benefit from Speech Therapy 3 times per week  Post-Acute Therapy: Discharge to a transitional care facility for continued skilled therapy services.    See \"Rehab Therapy-Acute\" Patient Summary Report for complete documentation.     "

## 2017-08-23 ENCOUNTER — HOSPITAL ENCOUNTER (INPATIENT)
Facility: REHABILITATION | Age: 77
End: 2017-08-23
Attending: PHYSICAL MEDICINE & REHABILITATION | Admitting: PHYSICAL MEDICINE & REHABILITATION
Payer: MEDICARE

## 2017-08-23 PROCEDURE — A9270 NON-COVERED ITEM OR SERVICE: HCPCS | Performed by: INTERNAL MEDICINE

## 2017-08-23 PROCEDURE — G8997 SWALLOW GOAL STATUS: HCPCS | Mod: CJ

## 2017-08-23 PROCEDURE — A9270 NON-COVERED ITEM OR SERVICE: HCPCS | Performed by: HOSPITALIST

## 2017-08-23 PROCEDURE — 99232 SBSQ HOSP IP/OBS MODERATE 35: CPT | Performed by: HOSPITALIST

## 2017-08-23 PROCEDURE — G8996 SWALLOW CURRENT STATUS: HCPCS | Mod: CK

## 2017-08-23 PROCEDURE — 700102 HCHG RX REV CODE 250 W/ 637 OVERRIDE(OP): Performed by: FAMILY MEDICINE

## 2017-08-23 PROCEDURE — 700102 HCHG RX REV CODE 250 W/ 637 OVERRIDE(OP): Performed by: INTERNAL MEDICINE

## 2017-08-23 PROCEDURE — 700102 HCHG RX REV CODE 250 W/ 637 OVERRIDE(OP): Performed by: HOSPITALIST

## 2017-08-23 PROCEDURE — 92526 ORAL FUNCTION THERAPY: CPT

## 2017-08-23 PROCEDURE — 92612 ENDOSCOPY SWALLOW (FEES) VID: CPT

## 2017-08-23 PROCEDURE — A9270 NON-COVERED ITEM OR SERVICE: HCPCS | Performed by: FAMILY MEDICINE

## 2017-08-23 PROCEDURE — 770006 HCHG ROOM/CARE - MED/SURG/GYN SEMI*

## 2017-08-23 RX ORDER — FLUCONAZOLE 100 MG/1
200 TABLET ORAL DAILY
Status: DISCONTINUED | OUTPATIENT
Start: 2017-08-23 | End: 2017-08-26

## 2017-08-23 RX ORDER — DOXAZOSIN MESYLATE 1 MG/1
1 TABLET ORAL
Status: DISCONTINUED | OUTPATIENT
Start: 2017-08-23 | End: 2017-08-30 | Stop reason: HOSPADM

## 2017-08-23 RX ORDER — TAMSULOSIN HYDROCHLORIDE 0.4 MG/1
0.4 CAPSULE ORAL
Status: DISCONTINUED | OUTPATIENT
Start: 2017-08-23 | End: 2017-08-23

## 2017-08-23 RX ADMIN — CITALOPRAM HYDROBROMIDE 20 MG: 20 TABLET ORAL at 07:53

## 2017-08-23 RX ADMIN — FLUCONAZOLE 200 MG: 100 TABLET ORAL at 17:23

## 2017-08-23 RX ADMIN — CARBIDOPA AND LEVODOPA 1 TABLET: 10; 100 TABLET ORAL at 13:47

## 2017-08-23 RX ADMIN — MEMANTINE HYDROCHLORIDE 10 MG: 10 TABLET, FILM COATED ORAL at 07:53

## 2017-08-23 RX ADMIN — BACITRACIN ZINC: 500 OINTMENT TOPICAL at 07:54

## 2017-08-23 RX ADMIN — BACITRACIN ZINC: 500 OINTMENT TOPICAL at 21:17

## 2017-08-23 RX ADMIN — POTASSIUM CHLORIDE 30 MEQ: 1500 TABLET, EXTENDED RELEASE ORAL at 07:53

## 2017-08-23 RX ADMIN — CARBIDOPA AND LEVODOPA 2 TABLET: 25; 100 TABLET ORAL at 21:17

## 2017-08-23 RX ADMIN — AMANTADINE HYDROCHLORIDE 100 MG: 50 SOLUTION ORAL at 09:00

## 2017-08-23 RX ADMIN — AMANTADINE HYDROCHLORIDE 100 MG: 50 SOLUTION ORAL at 21:00

## 2017-08-23 RX ADMIN — CARBIDOPA AND LEVODOPA 1 TABLET: 10; 100 TABLET ORAL at 04:55

## 2017-08-23 RX ADMIN — CARBIDOPA AND LEVODOPA 1 TABLET: 10; 100 TABLET ORAL at 21:17

## 2017-08-23 RX ADMIN — DOXAZOSIN MESYLATE 1 MG: 1 TABLET ORAL at 21:15

## 2017-08-23 RX ADMIN — POTASSIUM CHLORIDE 30 MEQ: 1500 TABLET, EXTENDED RELEASE ORAL at 21:16

## 2017-08-23 RX ADMIN — MODAFINIL 200 MG: 100 TABLET ORAL at 07:53

## 2017-08-23 RX ADMIN — MEMANTINE HYDROCHLORIDE 10 MG: 10 TABLET, FILM COATED ORAL at 21:15

## 2017-08-23 RX ADMIN — TRIAZOLAM 0.12 MG: 0.25 TABLET ORAL at 21:15

## 2017-08-23 ASSESSMENT — ENCOUNTER SYMPTOMS
ABDOMINAL PAIN: 0
BACK PAIN: 1
NECK PAIN: 0
HEADACHES: 0
LOSS OF CONSCIOUSNESS: 0
SPEECH CHANGE: 1
SHORTNESS OF BREATH: 0
DIZZINESS: 0
FEVER: 0
BLURRED VISION: 0
FOCAL WEAKNESS: 0
WEAKNESS: 1
DEPRESSION: 0
TREMORS: 1

## 2017-08-23 ASSESSMENT — PAIN SCALES - GENERAL
PAINLEVEL_OUTOF10: 0
PAINLEVEL_OUTOF10: 0

## 2017-08-23 NOTE — PROGRESS NOTES
Amara Pascal Fall Risk Assessment:     Last Known Fall: Within the last year  Mobility: Use of assistive device/requires assist of two people  Medications: Cardiovascular or central nervous system meds  Mental Status/LOC/Awareness: Memory loss/confusion and requires reorienting  Toileting Needs: Use of catheters or diversion devices  Volume/Electrolyte Status: Use of IV fluids/tube feeds  Communication/Sensory: Visual (Glasses)/hearing deficit  Behavior: Appropriate behavior  Amara Pascal Fall Risk Total: 15  Fall Risk Level: HIGH RISK    Universal Fall Precautions:  call light/belongings in reach, bed in low position and locked, siderails up x 2, use non-slip footwear, adequate lighting    Fall Risk Level Interventions:   TRIAL ([facility], NEURO, MED ZAKIYA 5) Low Fall Risk Interventions  Place yellow fall risk ID band on patient: verified  Provide patient/family education based on risk assessment: verified  Educate patient/family to call staff for assistance when getting out of bed: verified  Place fall precaution signage outside patient door: verified TRIAL ([place] [facility], NEURO, MED ZAKIYA 5) High Fall Risk Interventions  Place yellow fall risk ID band on patient: completed  Provide patient/family education based on risk assessment: verified  Educate patient/family to call staff for assistance when getting out of bed: verified  Place fall precaution signage outside patient door: verified  Place patient in room close to nursing station: verified  Utilize bed/chair fall alarm: completed  Notify charge of high risk for huddle: verified    Patient Specific Interventions:     Medication: not applicable  Mental Status/LOC/Awareness: reorient patient, reinforce falls education, encourage family to stay with patient, check on patient hourly, utilize bed/chair fall alarm and reinforce the use of call light  Toileting: not applicable  Volume/Electrolyte Status: advance diet as tolerated  Communication/Sensory: ensure proper  positioning when transferrng/ambulating  Behavioral: encourage family to stay with impulsive patients  Mobility: utilize bed/chair fall alarm and ensure bed is locked and in lowest position

## 2017-08-23 NOTE — THERAPY
"  Speech Language Therapy FEES completed.    Functional Status: Improving swallow function compared to previous FEES thought not quite at the level to begin oral diet safely.     Recommendations: Recommend continue single ice chips prn and dysphagia exercises targeting pharyngeal squeeze and laryngeal elevation. Anticipate that if pt continues to improve he will be able to safely eat at least a highly modified diet in the next few weeks. PEG tube not indicated at this time. Continue NPO/cortrak     Plan of Care: Will benefit from Speech Therapy 3 times per week    Post-Acute Therapy: Discharge to a transitional care facility for continued skilled therapy services.    See \"Rehab Therapy-Acute\" Patient Summary Report for complete documentation.   "

## 2017-08-23 NOTE — PROGRESS NOTES
Trilazopam sleeding medicaition working effectively. Patient is not pulling at lines or tubes. VSS.

## 2017-08-23 NOTE — CONSULTS
ADULT INFECTIOUS DISEASE CONSULT     Date of Service:8/23/17    Consult Requested By: Elizabeth Hernandez M.D.    Reason for Consultation: Abnormal MRI     History of Present Illness:   Rafael Felix is a 76 y.o.with h/o MDS , Parkinson's disease, status post splenectomy underwent T11, T12-L1 kyphoplasty on 8/6/2017. He came back into the emergency room on 8/12/2017 with altered mental status and fevers.He had a temp up to 102.2 in the ER. There was a question of UTI since he had a grossly abnormal UA. He got apparently 2 doses of Keflex. He also was having some cough. In the ER he had a WBC count of 16.7. His cultures were negative.  the MRI of his LS-spine shows T11, T12 and L1 vertebral bodies enhancement- edema/inflammation due to procedure but could not exclude the possibility of ostium myelitis. Abnormal disc enhancement involving L1-L2 and L4-L5. Early discitis versus degenerative change - since these were present on MRI on 5/8/2017.Also his WBC count continues to remain elevated and patient's remaining lethargic. In view of all these issues infectious disease consultant Was called.     Review Of Systems:  Gen.-Complains of fevers on admission. Denies any chills  HEENT- denies any sore throat  Pulmonary- denies any cough, shortness of breath  Cardiovascular- denies any chest pain, leg swelling.   GI- denies any nausea vomiting diarrhea or abdominal pain. Has had difficulty swallowing  Musculoskeletal- denies any joint pains or swelling  Neuro- generalized weakness  Psych- denies any depression or suicidal ideation  Genitourinary- had difficulty urinating. His catheter has been bothering him a lot. Most of the review of systems was obtained from patient's wife    PMH:   Past Medical History   Diagnosis Date   • Parkinson's disease (CMS-HCC)    • MDS (myelodysplastic syndrome) (CMS-HCC)    • Hemorrhagic disorder (CMS-HCC) 7-     bruises easily   • Compression fracture of spine (CMS-[facility])           PSH:  Past Surgical History   Procedure Laterality Date   • Splenectomy     • Knee arthroscopy     • Prostatectomy, radial       subtotal   • Rotator cuff repair       right   • Cataract extraction with iol     • Kyphoplasty  8/6/2017     Procedure: KYPHOPLASTY T11, T12, L1 ;  Surgeon: Niels Gallardo M.D.;  Location: SURGERY [facility];  Service:        FAMILY HX:  Family History   Problem Relation Age of Onset   • Heart Disease Mother    • Lung Disease Mother    • Cancer Father      pancreatic       SOCIAL HX:  Social History     Social History   • Marital Status:      Spouse Name: N/A   • Number of Children: N/A   • Years of Education: N/A     Occupational History   • Not on file.     Social History Main Topics   • Smoking status: Never Smoker    • Smokeless tobacco: Never Used   • Alcohol Use: Yes      Comment: 1 per day   • Drug Use: No   • Sexual Activity: Not on file     Other Topics Concern   • Not on file     Social History Narrative     History   Smoking status   • Never Smoker    Smokeless tobacco   • Never Used     History   Alcohol Use   • Yes     Comment: 1 per day       Allergies/Intolerances:  No Known Allergies    History reviewed with the patient    Other Current Medications:    Current facility-administered medications:   •  amantadine (SYMMETREL) 50 MG/5ML syrup 100 mg, 100 mg, Per NG Tube, BID, Richi Beckham M.D., 100 mg at 08/23/17 0900  •  potassium chloride SA (Kdur) tablet 30 mEq, 30 mEq, Per NG Tube, BID, Shahid Cross M.D., 30 mEq at 08/23/17 0753  •  carbidopa-levodopa (SINEMET)  MG tablet 2 Tab, 2 Tab, Oral, Q EVENING, Shahid Cross M.D., 2 Tab at 08/22/17 6439  •  carbidopa-levodopa (SINEMET)  MG tablet 1 Tab, 1 Tab, Oral, Q8HRS, Shahid Cross M.D., 1 Tab at 08/23/17 6155  •  morphine (pf) 4 mg/ml injection 2 mg, 2 mg, Intravenous, Q2HRS PRN, Ligia Solano M.D., 2 mg at 08/21/17 0584  •  bacitracin ointment, , Topical, BID, Haven  "SUSHMA Solano M.D.  •  citalopram (CELEXA) tablet 20 mg, 20 mg, Oral, DAILY, Richi Beckham M.D., 20 mg at 17 0753  •  memantine (NAMENDA) tablet 10 mg, 10 mg, Oral, BID, Richi Beckham M.D., 10 mg at 17 0753  •  modafinil (PROVIGIL) tablet 200 mg, 200 mg, Oral, DAILY, Richi Beckham M.D., 200 mg at 17 0753  •  tramadol (ULTRAM) 50 MG tablet 50 mg, 50 mg, Oral, Q6HRS PRN, Richi Beckham M.D., 50 mg at 17 0715  •  triazolam (HALCION) tablet 0.125 mg, 0.125 mg, Oral, HS PRN, Richi Beckham M.D., 0.125 mg at 17 2324  •  senna-docusate (PERICOLACE or SENOKOT S) 8.6-50 MG per tablet 2 Tab, 2 Tab, Oral, BID, Stopped at 17 0900 **AND** polyethylene glycol/lytes (MIRALAX) PACKET 1 Packet, 1 Packet, Oral, QDAY PRN **AND** magnesium hydroxide (MILK OF MAGNESIA) suspension 30 mL, 30 mL, Oral, QDAY PRN **AND** bisacodyl (DULCOLAX) suppository 10 mg, 10 mg, Rectal, QDAY PRN, Richi Beckham M.D.  •  Respiratory Care per Protocol, , Nebulization, Continuous RT, Richi Beckham M.D.  •  NS (BOLUS) infusion 500 mL, 500 mL, Intravenous, Once PRN, Richi Beckham M.D.  •  acetaminophen (TYLENOL) tablet 650 mg, 650 mg, Oral, Q6HRS PRN, Richi Beckham M.D., 650 mg at 17 0820  •  ondansetron (ZOFRAN) syringe/vial injection 4 mg, 4 mg, Intravenous, Q4HRS PRN, Richi Beckham M.D.  •  ondansetron (ZOFRAN ODT) dispertab 4 mg, 4 mg, Oral, Q4HRS PRN, Richi Beckham M.D.  [unfilled]    Most Recent Vital Signs:  /71 mmHg  Pulse 62  Temp(Src) 36.3 °C (97.3 °F)  Resp 16  Ht 1.88 m (6' 2.02\")  Wt 97.5 kg (214 lb 15.2 oz)  BMI 27.59 kg/m2  SpO2 92%  Temp  Av.8 °C (98.3 °F)  Min: 36.2 °C (97.2 °F)  Max: 39 °C (102.2 °F)    Physical Exam:  General: Looks chronically ill  HEENT: sclera anicteric, PERRL, cortrak present  Neck: supple, no lymphadenopathy  Chest: CTAB, no r/r/w, normal work of breathing.  Cardiac: Regular, no murmurs no " "gallops heard  Abdomen: + bowel sounds, soft, non-tender, non-distended, no HSM  Extremities: No edema. No joint swelling. Back incision is clean  Skin: no rashes or erythema  Neuro: Slightly lethargic but easily arousable.    Pertinent Lab Results:  Recent Labs      08/22/17   1000   WBC  14.4*      Recent Labs      08/22/17   1000   HEMOGLOBIN  10.4*   HEMATOCRIT  32.1*   MCV  86.1   MCH  27.9   ANISOCYTOSIS  1+   PLATELETCT  408         Recent Labs      08/22/17   0155   SODIUM  136   POTASSIUM  4.2   CHLORIDE  104   CO2  25   CREATININE  0.66        No results for input(s): ALBUMIN in the last 72 hours.    Invalid input(s): AST, ALT, ALKPHOS, BILITOT, TOTALBILIRUB, BILIRUBINTOT, BILIRUBINDIR, BILIRUBININD, ALKALINEPHOS     Pertinent Micro:  Results     Procedure Component Value Units Date/Time    BLOOD CULTURE [[id number]] Collected:  08/12/17 1855    Order Status:  Completed Specimen Information:  Blood from Peripheral Updated:  08/17/17 2100     Significant Indicator NEG      Source BLD      Site PERIPHERAL      Blood Culture No growth after 5 days of incubation.     Narrative:      Per Hospital Policy: Only change Specimen Src: to \"Line\" if  specified by physician order.    BLOOD CULTURE [[id number]] Collected:  08/12/17 1900    Order Status:  Completed Specimen Information:  Blood from Peripheral Updated:  08/17/17 2100     Significant Indicator NEG      Source BLD      Site PERIPHERAL      Blood Culture No growth after 5 days of incubation.     Narrative:      Per Hospital Policy: Only change Specimen Src: to \"Line\" if  specified by physician order.        BLOOD CULTURE   Date Value Ref Range Status   08/12/2017 No growth after 5 days of incubation.  Final        Studies:  Ct-chest,abdomen,pelvis With    8/6/2017 8/6/2017 6:58 PM HISTORY/REASON FOR EXAM:  Myelodysplastic syndrome. History of compression deformities in the spine. TECHNIQUE/EXAM DESCRIPTION: CT scan of the chest, abdomen and pelvis with " contrast. Thin-section helical scanning was obtained with intravenous contrast from the lung apices through the pubic symphysis to include the chest, abdomen and pelvis. 100 mL of Omnipaque 350 nonionic contrast was administered intravenously without complication. Low dose optimization technique was utilized for this CT exam including automated exposure control and adjustment of the mA and/or kV according to patient size. COMPARISON: MRI lumbar spine 5/8/2017 and x-ray from the same day FINDINGS: CT Chest: There is mild bilateral posterior atelectasis. No pleural fluid is identified. There are bilateral thyroid nodules. There are scattered arterial calcifications. No significant pericardial effusion. No adenopathy is identified. There is thoracic kyphosis with mild compression deformities in the upper lumbar spine. CT Abdomen: Irregular hypodense mass in the hepatic dome measures approximately 1.7 cm. There are multiple splenules in the left upper quadrant. Soft tissue mass at the tip of the liver likely represents an additional splenule. The adrenal glands, pancreas, and left kidney are unremarkable. A 1.5 cm mass in the mid right kidney is indeterminant, but likely represents a small cyst. There is a 2 mm nonobstructing calculus in the lower pole of the right kidney. The kidneys enhance symmetrically. There is a gallbladder calculus without wall thickening or pericholecystic fluid. There is a moderate amount of colonic stool. There are scattered arterial calcifications. CT Pelvis: The bladder is unremarkable. No significant free fluid or adenopathy. Degenerative changes are in the spine. Vertebral augmentation has been performed at T11, T12, and L1.     8/6/2017  1.  1.7 cm hypodense mass in the hepatic dome is indeterminant, but may represent a hemangioma or complex cyst. Nonemergent hepatic protocol MRI could be performed for further evaluation. 2.  1.5 cm hypodense mass in the mid right kidney, likely a complex  cyst. Follow-up ultrasound or renal protocol CT or MRI could be performed for confirmation. 3.  Multiple splenules in the abdomen. 4.  Cholelithiasis 5.  Spinal compression deformities status post vertebral augmentation at T11, T12, and L1.    Ct-head W/o    8/12/2017 8/12/2017 8:27 PM HISTORY/REASON FOR EXAM:  Altered Mental Status. Fever, cough, decreased mentation. TECHNIQUE/EXAM DESCRIPTION AND NUMBER OF VIEWS: CT of the head without contrast. The study was performed on a helical multidetector CT scanner. Contiguous 2.5 mm axial sections were obtained from the skull base through the vertex. Up to date radiation dose reduction adjustments have been utilized to meet ALARA standards for radiation dose reduction. COMPARISON:  MRI of the brain 11/1/2016 FINDINGS: The calvariae and skull base are unremarkable. There are no extraaxial fluid collections. There is a pattern of cerebral atrophy manifest as enlargement of sulcal markings and ventricular prominence. The ventricular system and basal cisterns are otherwise unremarkable. There are no areas of abnormal density in the brain substance. There are no hemorrhagic lesions. There are no mass effects or shift of midline structures. The brainstem and posterior fossa structures are unremarkable. Paranasal sinuses show some minor scattered mucosal thickening among ethmoid air cells no air-fluid levels are evident. Mastoids show opacification of clustered air cells of the left mastoid tip. This may be chronic as similar findings were noted on the MRI.     8/12/2017  1.  Mild-moderate cerebral atrophy. 2.  Opacification of a few clustered air cells of the left mastoid tip which may represent chronic or active postinflammatory changes. 3.  Otherwise, no acute findings and no appreciable change compared to MRI study 11/1/2016.    Ct-tspine W/o Plus Recons    8/12/2017 8/12/2017 8:27 PM HISTORY/REASON FOR EXAM:  Pain Following Trauma. Altered level of consciousness. Fever.  Cough. Decreased mentation. History of recent kyphoplasty. TECHNIQUE/EXAM DESCRIPTION AND NUMBER OF VIEWS: CT thoracic spine without contrast, with reconstructions. The study was performed on a GE CT scanner. Thin-section helical scanning was performed from C7-T1 through T12-L1.  Sagittal and coronal multiplanar reconstructions were generated from the axial images. Low dose optimization technique was utilized for this CT exam including automated exposure control and adjustment of the mA and/or kV according to patient size. COMPARISON: Recent CT of the chest, abdomen, pelvis 8/6/2017 FINDINGS:  Alignment in the thoracic spine shows no subluxation. Again noted, there are variable compression fractures at T11, T12, and L1 which have been treated with vertebral augmentation. At the T11 level, there is a small amount of extravasated epidural cement (axial image 88, series 6). Again noted are mild superior endplate compression deformities at T2, T3, T4 with no change from the prior exam. No acute fractures are evident. There is some anterior marginal spurring in the midthoracic spine. The prevertebral and paraspinous soft tissues show no abnormal paravertebral fluid collection. There is some subsegmental atelectatic infiltrate in the posterior lung zones, most notable in the left posterior basal segment adjacent to the descending aorta. Similar appearance on the recent exam.     8/12/2017  1.  Status post vertebral augmentation at T11, T12, L1. Small amount of ventral epidural cement extravasation at the T11 level. 2.  Mild superior endplate compression deformities again seen at T2, T3, T4. No change from 8/6/2017. 3.  Bibasilar subsegmental atelectatic changes most prominent at the left lower lobe posterior basal segment. Little or no change since recent exam 8/6/2017.    Dx-ankle 2- Views Left    8/15/2017  8/15/2017 4:31 AM HISTORY/REASON FOR EXAM:  Atraumatic Pain/Swelling/Deformity Left ankle pain. TECHNIQUE/EXAM  DESCRIPTION AND NUMBER OF VIEWS:  2 views of the LEFT ankle. COMPARISON: None. FINDINGS: Diffuse osseous demineralization, limiting evaluation for nondisplaced fracture. No definite acute fracture or dislocation. The ankle mortise is intact. Soft tissue swelling about the lateral malleolus. Mild osteoarthritis of the tibiotalar joint.     8/15/2017  Soft tissue swelling about the lateral malleolus. No definite acute osseous abnormality.    Dx-chest-portable (1 View)    8/13/2017 8/13/2017 4:25 PM HISTORY/REASON FOR EXAM:  Cough. TECHNIQUE/EXAM DESCRIPTION AND NUMBER OF VIEWS: Single portable view of the chest. COMPARISON: 8/12/2017 FINDINGS: Lower lung volumes were achieved on the current examination. Bibasilar opacities may represent hypoinflation atelectasis. No pleural effusion or pneumothorax is seen. The cardiomediastinal silhouette is stable. Atherosclerotic calcification is seen.     8/13/2017  Mild bibasilar atelectasis. Stable prominence of the cardiomediastinal silhouette. Atherosclerotic plaque.    Dx-chest-portable (1 View)    8/12/2017 8/12/2017 7:17 PM HISTORY/REASON FOR EXAM:  Possible sepsis. TECHNIQUE/EXAM DESCRIPTION AND NUMBER OF VIEWS: Single portable view of the chest. COMPARISON: 8/9/2017 FINDINGS: The cardiomediastinal silhouette is enlarged. Atherosclerotic calcification is seen. No pleural effusion or pneumothorax is seen. Skinfold is noted on the right. Mild left basilar opacity may represent atelectasis.     8/12/2017  Stable prominence of the cardiomediastinal silhouette. Atherosclerotic plaque. Mild left basilar opacity may represent atelectasis.    Dx-chest-portable (1 View)    8/9/2017 8/9/2017 5:57 PM HISTORY/REASON FOR EXAM:  Congestion for 2 months. TECHNIQUE/EXAM DESCRIPTION AND NUMBER OF VIEWS: Single portable view of the chest. COMPARISON: None FINDINGS: Heart size is enlarged. Pulmonary vessels are normal in size. Streaky linear opacities are present in the left lower lobe  which may represent atelectasis or scar. The upper lungs are clear. No pleural abnormalities are noted.     8/9/2017  Streaky linear opacities in the left lower lobe which may represent subsegmental atelectasis or scar. Mild cardiomegaly.    Dx-knee 2- Right    8/15/2017  8/15/2017 4:31 AM HISTORY/REASON FOR EXAM:  Atraumatic Pain/Swelling/Deformity TECHNIQUE/EXAM DESCRIPTION AND NUMBER OF VIEWS:  2 views of the RIGHT knee. COMPARISON: None FINDINGS: Diffuse osseous demineralization, limiting evaluation for nondisplaced fracture. No definite acute fracture or dislocation. Mild to moderate tricompartmental osteoarthritis, most in the medial compartment Moderate knee joint effusion.     8/15/2017  1. No definite acute osseous abnormality. 2. 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    Dx-knee 2- Left    8/15/2017  8/15/2017 4:31 AM HISTORY/REASON FOR EXAM:  Atraumatic Pain/Swelling/Deformity TECHNIQUE/EXAM DESCRIPTION AND NUMBER OF VIEWS:  2 views of the LEFT knee. COMPARISON: None FINDINGS: Diffuse osseous demineralization, limiting evaluation for nondisplaced fracture. No definite acute fracture or dislocation. Mild tricompartmental osteoarthritis. Small knee joint effusion     8/15/2017  1. No definite acute osseous abnormality. 2. Small knee joint effusion    Dx-thoracolumbar Spine-2 Views    8/6/2017 8/6/2017 11:50 AM HISTORY/REASON FOR EXAM: Back pain. TECHNIQUE/EXAM DESCRIPTION AND NUMBER OF VIEWS:  Thoracic spine, 2 views. COMPARISON:  None. FINDINGS: 2 intraoperative views of the thoracic spine obtained following multilevel kyphoplasty demonstrate kyphoplasty cement within the partially collapsed vertebral bodies at approximately the T11-L1 levels.     8/6/2017  Intraoperative visualization of 3 level kyphoplasty at approximately the T11-L1 levels.    Mr-cervical Spine-with & W/o    8/17/2017 8/16/2017 5:26 PM HISTORY/REASON FOR EXAM:  76-year-old man with lower  extremity pain and weakness with a leukocytosis and fever, clinical concern for abscess or infection; kyphoplasty performed on 8/6/2017. History of myelodysplastic syndrome. TECHNIQUE/EXAM DESCRIPTION: MRI of the cervical spine without and with contrast. The study was performed on a [employer] Sign[employer] 1.5 Griselda MRI scanner.  T1 sagittal, T2 fast spin-echo sagittal, T1 postcontrast fat-suppressed sagittal, and gradient-echo axial images were obtained of the cervical spine before contrast. Following the administration of intravenous contrast sagittal and optional axial T1 fat-suppressed images were obtained. 20 mL Omniscan contrast was administered intravenously. COMPARISON:  None. FINDINGS: There is no acute fracture or gross malalignment. There is 2 mm of degenerative retrolisthesis of C4 on C5. There is loss of intervertebral disc height throughout the cervical spine, most pronounced at C5-C6 and C6-C7. Marrow signal is diffusely low on T1 and T2, relatively sparing the dens. The cervical cord has a normal caliber course and signal intensity. No area of abnormal enhancement is seen. Findings specific to each level are described below: C2-3: Unremarkable C3-4:  Mild RIGHT facet arthropathy C4-5:  Posterior disc osteophyte complex extending to the BILATERAL foraminal zones. Mild RIGHT facet arthropathy. Mild central canal narrowing. Mild RIGHT neural foraminal narrowing. C5-6: Posterior disc osteophyte complex. Mild central canal narrowing. Moderate RIGHT and mild LEFT neural foraminal narrowing. C6-7: Posterior disc osteophyte complex extending to the RIGHT more than the LEFT neural foramen. Mild central canal narrowing. Mild RIGHT neural foraminal narrowing. C7-T1: Posterior disc osteophyte complex eccentric to the RIGHT more than the LEFT foraminal zone. Mild RIGHT neural foraminal narrowing. No soft tissue mass is evident.     8/17/2017  1.  No evidence of cervical discitis osteomyelitis or epidural abscess 2.  Diffuse  marrow infiltration or replacement 3.  Multilevel multifactorial degenerative changes with areas of central canal or neural foraminal narrowing as described above    Mr-lumbar Spine-with & W/o    8/17/2017 8/16/2017 5:26 PM HISTORY/REASON FOR EXAM:  Extremity pain and weakness, elevated WBC, fever and evaluate for abscess and infection. TECHNIQUE/EXAM DESCRIPTION: MRI of the lumbar spine without and with contrast. The study was performed on a [employer] Signa 1.5 Griselda MRI scanner. T1 sagittal, T2 fast spin-echo sagittal, and T2 axial images were obtained of the lumbar spine. T1 post-contrast fat-suppressed sagittal images were obtained. 20 mL Omniscan contrast was administered intravenously. COMPARISON:  5/8/2017 FINDINGS: There are changes secondary to the previous vertebral augmentation procedures at T11, T12 and L1 vertebral bodies. Bone marrow edema and contrast enhancement are noted within the remaining portions of the above-mentioned vertebral bodies. There is no evidence of abnormal epidural or prevertebral fluid collection. Mild prevertebral soft tissue contrast enhancement is seen. There is abnormal T2 hyperintensity at L1-2 disc with contrast enhancement. There is also abnormal contrast enhancement within the L3-4 disc. At the level of L5-S1, there is minimal disc bulge without significant spinal or neural foraminal stenosis. At the level of L4-5, there is mild right neural foraminal stenosis. There is no central canal stenosis. At the level of L3-4, there is abnormal T2 hyperintensity and contrast enhancement within the disc space. There is no abnormal epidural or prevertebral fluid collection. At the level of L2-3, there is no spinal or neural foraminal stenosis. At the level of L1-2, there is no spinal or neural foraminal stenosis. At the level of T12-L1, there is abnormal disc fluid with contrast enhancement. The conus terminates at the level of L1. A gallstone is seen.     8/17/2017  1.  There is no  evidence of epidural abscess. 2.  There is abnormal bone marrow edema and contrast enhancement within the T11, T12 and L1 vertebral bodies surrounding the vertebral cement. This finding likely represent edema/inflammation secondary to the recent procedure. However this imaging finding cannot completely exclude the possibility of osteomyelitis. 3.  There are abnormal disc T2 hyperintensity and enhancement noted involving L1-2 and L4-5 discs. This finding is suspicious for early discitis. However the previous MRI dated 5/8/2017 demonstrates disc T2 hyperintensity at the levels of L1-2 and L4-5. Advanced disc degeneration may have this appearance. 4.  There is no evidence of prevertebral abscess. 5.  Degenerative changes as described above.    Mr-thoracic Spine-with & W/o    8/17/2017 8/16/2017 5:26 PM HISTORY/REASON FOR EXAM:  76-year-old man with lower extremity pain and weakness with a leukocytosis and fever, clinical concern for abscess or infection; kyphoplasty performed on 8/6/2017. History of myelodysplastic syndrome. TECHNIQUE/EXAM DESCRIPTION: MRI of the thoracic spine without and with contrast. The study was performed on a [employer] 1.5 Griselda MRI scanner.  T1 sagittal, T1 postcontrast fat-suppressed sagittal, T2 sagittal, and T2 axial images were obtained of the thoracic spine.  20 mL Omniscan contrast were administered intravenously. COMPARISON:  Correlation is made with unenhanced MRI lumbar spine 5/8/2017 as well as a thoracic spine CT 8/12/2017 FINDINGS: There has been interval vertebral augmentation at T11, T12 and L1. There is no demonstrable change in the height loss at each of these levels. There is adjacent edema and faint enhancement about each augmentation cavity extending into the pedicles. There  is a small amount of signal void compatible with gas in the disc space anteriorly at the T12-L1 disc. No abnormal discal or soft tissue enhancement is seen. No additional fracture is seen. There is  mildly prominent hemangioma in the T7 vertebral body. The bone marrow is diffusely low in signal on T1 and T2 as previously noted. There is no gross malalignment. There is mild loss of intervertebral disc height throughout the thoracic spine. The thoracic cord has a normal caliber course and appearance. There is no evidence of significant disk extrusion, cord compression, or neural foraminal stenosis.     2017  1.  Prior spinal augmentation at the vertebral compression fractures sites of T11, T12 and L1 2.  Areas of enhancement in the T11, T12 and L1 vertebral bodies are in keeping with what would be expected for recent intervention though infection is not excluded 3.  Thoracic spondylosis 4.  Diffuse marrow infiltration or replacement    Dx-portable Fluoro > 1 Hour    2017 11:50 AM HISTORY/REASON FOR EXAM:  Kyphoplasty T11, T12, L1, Main OR TECHNIQUE/EXAM DESCRIPTION AND NUMBER OF VIEWS: Portable fluoroscopy for greater than one hour FINDINGS:      The portable fluoroscopy unit was obligated to the procedure for greater than one hour.   Actual fluoro time was 6 minutes 12 seconds.     2017  Portable fluoroscopy utilized for 6 minutes 12 seconds.    Le Venous Duplex    2017   Vascular Laboratory  CONCLUSIONS  No evidence of deep vein thrombosis or superficial thrombophlebitis in the  left lower extremity.  ANNA GAUTAM  Exam Date:     2017 09:05  Room #:     Inpatient  Priority:     Routine  Ht (in):             Wt (lb):  Ordering Physician:        RODRIGO CARTER  Referring Physician:       [id number] Owens  Sonographer:               MUSA Mays RVT, RDMS  Study Type:                Complete Unilateral  Technical Quality:         Adequate  Age:    76    Gender:     M  MRN:    [id number]  :    [id number]      BSA:  Indications:     Edema, unspecified  CPT Codes:       31200  ICD Codes:       R609  History:         Left ankle edema for last  several days.  Limitations:  PROCEDURES:  Left lower extremity venous duplex imaging.  The following venous structures were evaluated: common femoral, profunda  femoral, greater saphenous, femoral, popliteal , peroneal and posterior  tibial veins.  Serial compression, augmentation maneuvers,  color and spectral Doppler  flow evaluations were performed.  FINDINGS:  Doppler duplex and spectral analysis of the superficial and deep venous  systems of the left leg was performed.  No evidence of deep vein thrombosis or superficial thrombophlebitis in the  left lower extremity.  Contralateral flow was obtained in the right common femoral vein and  appears to be normal.  No prior studies available for comparison.  Juliano Cox MD  (Electronically Signed)  Final Date:      18 August 2017                   17:07    Dx-abdomen For Tube Placement    8/19/2017 8/19/2017 4:12 PM HISTORY/REASON FOR EXAM:  Line evaluation. TECHNIQUE/EXAM DESCRIPTION AND NUMBER OF VIEWS:  1 view(s) of the abdomen. COMPARISON:  None. FINDINGS: Enteric tube has been placed. The tip projects over the left upper quadrant. The bowel gas pattern is within normal limits.  There are multilevel changes of vertebral augmentation.     8/19/2017  1.  Feeding tube tip overlies the gastric body.    Dx-abdomen For Tube Placement    8/15/2017  8/15/2017 4:31 AM HISTORY/REASON FOR EXAM:  Tube evaluation. TECHNIQUE/EXAM DESCRIPTION AND NUMBER OF VIEWS:  1 view(s) of the abdomen. COMPARISON:  None. FINDINGS: Limited single view of the abdomen performed primarily to evaluate enteric tube position. The tip projects over the stomach fundus. Diffuse gas-filled small bowel and colon.     8/15/2017  Feeding tube with tip in the stomach fundus.  IMPRESSION:   Fevers-likely were due to UTI  Altered mental status-multifactorial.  Leukocytosis-multifactorial  Parkinson's disease  Status post splenectomy  Abnormal MRI- likely postsurgical changes      PLAN:   Rafael Ayala  Isidro is a 76 y.o. Male with a very complicated past medical history. He has history of MDS, Parkinson's and splenectomy at baseline.  Probably initial fevers were due to urinary tract infection  We'll give him a short course of Diflucan since the UA had shown some yeast  As far as the MRI changes are concerned I will not treat his discitis osteomyelitis since it is unclear whether it is a infection or postsurgical changes. It is less likely to cause sepsis-like picture. I would recommend repeating the MRI in a few weeks. Patient is denying any back pain.  Continue with the supportive measures  I have reviewed all the records    Discussed with IM. Will continue to follow    Magdalena Strong M.D.

## 2017-08-23 NOTE — DISCHARGE PLANNING
Medical Social Work     SW received a call form the RN Hospitalist stating pt may be appropriate for Rehab now. SW spoke with pt and wife to obtain SNF and Rehab choice. Pt and wife chose [facility]. Choice form faxed to [facility]. Pt wife will be wants to visit SNF before filling out choice form.     Plan: Follow up with pt and wife about SNF choices if pt does not qualify for Rehab.

## 2017-08-23 NOTE — PROGRESS NOTES
[facility]    Date of Service: 8/22/2017    Chief Complaint  76 y.o. male admitted 8/12/2017 with fever, elevated WBC count, recent kyphoplasty, confusion.    Interval Problem Update  -Parkinson's-mental status is better again today. Speech pattern is much stronger today as well. Working with PT sitting up at the edge of the bed, doing leg exercises.    Back pain-per patient, appears to be back at his baseline.     Dysphagia- new cortrak continues to work well. No high residuals noted.    LLE swelling- No obvious trauma. More mild improvement noted today. Denies any new redness of his skin.   8/22:  Poor historian due to Parkinson's dementia.  States no back pain, but pain thoracic spine with palpation on exam.  Repeat sed rate.  No source of elevated wbc and sed rate found upon review of chart:  UC negative, BCs x2 neg, throat culture neg CXR negative for infiltrate.  Off restraints.  ID consulted regarding likely osteomyelitis T11, T12, L1 on MRI with discitis.    Consultants/Specialty  8/22:  ID consult regarding osteomyelitis    Disposition  PT/OT rec SNF.  SNF ordered 8/18.  [place] [facility] will not take cortrak, may need to look at [place] [facility].  Patient unsure if wants PEG tube.        Review of Systems   Constitutional: Negative for fever.   Eyes: Negative for blurred vision.   Respiratory: Negative for shortness of breath.    Cardiovascular: Positive for leg swelling (LLE, improvement noted today). Negative for chest pain.   Gastrointestinal: Negative for abdominal pain.        Mouth remains quite dry   Genitourinary: Negative for dysuria.   Musculoskeletal: Positive for back pain. Negative for neck pain.   Skin: Negative for rash.   Neurological: Positive for tremors (mild and near his baseline), speech change (feels that his words are stronger today) and weakness (slightly stronger today). Negative for dizziness, focal weakness, loss of consciousness and headaches.        No changes    Psychiatric/Behavioral: Negative for depression.       Physical Exam  Laboratory/Imaging   Hemodynamics  Temp (24hrs), Av.7 °C (98 °F), Min:36.3 °C (97.4 °F), Max:37.1 °C (98.7 °F)   Temperature: 36.4 °C (97.6 °F)  Pulse  Av.1  Min: 61  Max: 99    Blood Pressure : 134/73 mmHg      Respiratory      Respiration: 16, Pulse Oximetry: 95 %        RUL Breath Sounds: Clear, RML Breath Sounds: Clear, RLL Breath Sounds: Diminished, AQUILINO Breath Sounds: Clear, LLL Breath Sounds: Diminished    Fluids    Intake/Output Summary (Last 24 hours) at 177  Last data filed at 17 1900   Gross per 24 hour   Intake   1880 ml   Output   1400 ml   Net    480 ml       Nutrition  Orders Placed This Encounter   Procedures   • Diet NPO     Standing Status: Standing      Number of Occurrences: 1      Standing Expiration Date:      Order Specific Question:  Restrict to:     Answer:  Sips with Medications [3]     Physical Exam   Constitutional: He appears well-developed and well-nourished.   More energetic, but hard to understand with his words this AM   HENT:   Mouth/Throat: No oropharyngeal exudate (uvula swollen and slightly red).   Cor tract and right naris, no erosion noted   Eyes: Conjunctivae are normal. No scleral icterus.   Neck: Normal range of motion. Neck supple. No Brudzinski's sign and no Kernig's sign noted.   Cardiovascular: Normal rate, regular rhythm and normal heart sounds.    Pulmonary/Chest: Effort normal and breath sounds normal. No stridor. No respiratory distress.   Abdominal: Soft. Bowel sounds are normal. There is no tenderness.   Genitourinary:   Hurst catheter in place   Musculoskeletal: He exhibits edema and tenderness.   Thoracic back pain with palpation of spine.   Neurological: He is alert. He displays tremor (resting). He displays normal reflexes.   Much quicker in his answers today, stronger diction and enunciation    Skin: Skin is warm and dry. No rash noted.   Psychiatric: He has a  normal mood and affect. His behavior is normal. Thought content normal.   Nursing note and vitals reviewed.      Recent Labs      08/22/17   1000   WBC  14.4*   RBC  3.73*   HEMOGLOBIN  10.4*   HEMATOCRIT  32.1*   MCV  86.1   MCH  27.9   MCHC  32.4*   RDW  50.7*   PLATELETCT  408   MPV  11.9     Recent Labs      08/20/17   0213  08/22/17   0155   SODIUM  140  136   POTASSIUM  3.8  4.2   CHLORIDE  107  104   CO2  26  25   GLUCOSE  106*  132*   BUN  13  15   CREATININE  0.58  0.66   CALCIUM  8.3*  8.8                      Assessment/Plan     * Acute osteomyelitis of thoracic spine (CMS-HCC) (present on admission)  Assessment & Plan  Ordered sed rate  BCs negatitve x 2 from 8/12  No other source of infection, persistent back pain, however poor historian  Prior vertebroplasty.  Review of MRI thoracic spine with T11, 12, L1 discitis, cannot rule out acute osteomyelitis  Improved with IV zosyn  Consulted ID.    Myelodysplastic syndrome (CMS-[facility]) (present on admission)  Assessment & Plan  Seen by Dr. Ribeiro on the last admit  Platelet dysfunction present per prior TEG and evaluation  Hold lovenox  SCDs only for DVT prophylaxis  -CBC remains stable    Parkinson's disease (CMS-[facility]) (present on admission)  Assessment & Plan  -Cor track in place  -Cannot do sustained released Sinemet given a cannot be crushed  -Continue short acting Sinemet while cor track is in place  -symptoms are better today, but continues to have somewhat wild daily fluctuations, possible G tube placement during this admission, need to have ongoing discussions with patient and his wife/family    Pneumonia (present on admission)  Assessment & Plan  -Possible pneumonia, given no clear infection of the spine on MRI and persisting cough and elevated white was gone responding to IV Zosyn, weakly appears to have pneumonia  -day 7/7 zosyn, WBC has normalized   8/22:  Reviewed all CXRs since admission:  No infiltrates seen on all 3.  No evidence of  pneumonia.    Hematuria (present on admission)  Assessment & Plan  -resolved, hold AC, monitor closely    Acute delirium (present on admission)  Assessment & Plan  -fluctuates, likely multi-factorial, infection, prolonged hospitalization, parkinson's  -continue current treatment plan    DNR (do not resuscitate) (present on admission)  Assessment & Plan  Confirmed with the patient's wife on admission.    Left ankle swelling  Assessment & Plan  -Bilateral x-rays of the knees and ankles show very small fluid collection therefore arthrocentesis is not indicated at this time but need to watch closely  -unchanged today  -U/S negative for DVT/SVT, monitor, no e/o infection at this time  -consider compression stocking, has good ROM the L ankle on dorsi/plantar flexion, continue to see daily improvement    Acute bilateral low back pain without sciatica (present on admission)  Assessment & Plan  -MRI of cervical thoracic and lumbar spine shows no evidence of clear epidural or prevertebral abscess. Findings do show some hyper enhanced bone marrow edema which was there on prior MRIs  -Finished 7 days of IV zosyn  -All cultures remain negative  -Mental status fluctuates  - cautious use of IV pain meds  -No neurosurgery consult is required at this time  -worked more with PT, sitting up at the bedside, afebrile, will need SNF versus REHAB    Oropharyngeal dysphagia (present on admission)  Assessment & Plan  cortrak tube feeds per SLP van.      Acute urinary retention  Assessment & Plan  Hurst catheter in place.  Start flomax? For BPH, unclear if longterm Hurst or new since admit.      Radiology images reviewed, Labs reviewed and Medications reviewed  Hurst catheter: Gross Hematuria with Clots and Urinary Tract Retention or Urinary Tract Obstruction      DVT Prophylaxis: Contraindicated - High bleeding risk  DVT prophylaxis - mechanical: SCDs             This dictation was created using voice recognition software. The accuracy of  the dictation is limited to the abilities of the software. I expect there may be some errors of grammar and possibly content.

## 2017-08-23 NOTE — DISCHARGE PLANNING
Thank you for the opportunity to assist with this patient as they transition to post acute services.  We are aware of the Rehab referral from Dr. Hernandez.  Dr. Yee to consult this referral. Our Transitional Case Coordinator will follow. At this time patient is showing to have Medicare as their coverage.   Please do not hesitate to call us if you require additional assistance my phone number is [phone] [place].

## 2017-08-23 NOTE — ASSESSMENT & PLAN NOTE
- S/P TURP on 8/27.  - CBI completed.  - Leave Hurst for 10 days.  - Continue Cardura.  - Follow with urology as outpatient.

## 2017-08-23 NOTE — PROGRESS NOTES
[facility] Hospitalist Progress Note    Date of Service: 8/23/2017    Chief Complaint  76 y.o. male admitted 8/12/2017 with fever, elevated WBC count, recent kyphoplasty, confusion.    Interval Problem Update  -Parkinson's-mental status is better again today. Speech pattern is much stronger today as well. Working with PT sitting up at the edge of the bed, doing leg exercises.    Back pain-per patient, appears to be back at his baseline.     Dysphagia- new cortrak continues to work well. No high residuals noted.    LLE swelling- No obvious trauma. More mild improvement noted today. Denies any new redness of his skin.   8/22:  Poor historian due to Parkinson's dementia.  States no back pain, but pain thoracic spine with palpation on exam.  Repeat sed rate.  No source of elevated wbc and sed rate found upon review of chart:  UC negative, BCs x2 neg, throat culture neg CXR negative for infiltrate.  Off restraints.  ID consulted regarding likely osteomyelitis T11, T12, L1 on MRI with discitis.  8/23:  Awaiting ID recs.  Wife at bedside, discussed all questions, concerns, plan of care.  She is concerned that he is not getting his home does of sinemet.  However, as close as can get given immediate release formulation available in hospital.  cortrak remains, Hurst catheter was placed upon transfer to [facility].  WBC increasing today since off zosyn IV, lungs CTA b/l.  Added cardura for BPH.  Remains off restraints.  Sed rate 60 wbc 9.9 to 14.    Consultants/Specialty  8/22:  ID consult regarding osteomyelitis    Disposition  PT/OT rec SNF.  SNF ordered 8/18.  [place] SN[facility] will not take cortrak, may need to look at [place] SNFs.  Patient unsure if wants PEG tube.        Review of Systems   Constitutional: Negative for fever.   Eyes: Negative for blurred vision.   Respiratory: Negative for shortness of breath.    Cardiovascular: Positive for leg swelling (LLE, improvement noted today). Negative for chest pain.    Gastrointestinal: Negative for abdominal pain.        Mouth remains quite dry   Genitourinary: Negative for dysuria.   Musculoskeletal: Positive for back pain. Negative for neck pain.   Skin: Negative for rash.   Neurological: Positive for tremors (mild and near his baseline), speech change (feels that his words are stronger today) and weakness (slightly stronger today). Negative for dizziness, focal weakness, loss of consciousness and headaches.        No changes   Psychiatric/Behavioral: Negative for depression.       Physical Exam  Laboratory/Imaging   Hemodynamics  Temp (24hrs), Av.4 °C (97.6 °F), Min:36.3 °C (97.3 °F), Max:36.8 °C (98.2 °F)   Temperature: 36.3 °C (97.3 °F)  Pulse  Av.7  Min: 61  Max: 99    Blood Pressure : 138/71 mmHg      Respiratory      Respiration: 16, Pulse Oximetry: 92 %     Work Of Breathing / Effort: Mild  RUL Breath Sounds: Clear, RML Breath Sounds: Clear, RLL Breath Sounds: Diminished, AQUILINO Breath Sounds: Clear, LLL Breath Sounds: Diminished    Fluids    Intake/Output Summary (Last 24 hours) at 17 1445  Last data filed at 17 1900   Gross per 24 hour   Intake    710 ml   Output   1100 ml   Net   -390 ml       Nutrition  Orders Placed This Encounter   Procedures   • Diet NPO     Standing Status: Standing      Number of Occurrences: 1      Standing Expiration Date:      Order Specific Question:  Restrict to:     Answer:  Sips with Medications [3]     Physical Exam   Constitutional: He appears well-developed and well-nourished.   More energetic, but hard to understand with his words this AM   HENT:   Mouth/Throat: No oropharyngeal exudate (uvula swollen and slightly red).   Cor tract and right naris, no erosion noted   Eyes: Conjunctivae are normal. No scleral icterus.   Neck: Normal range of motion. Neck supple. No Brudzinski's sign and no Kernig's sign noted.   Cardiovascular: Normal rate, regular rhythm and normal heart sounds.    Pulmonary/Chest: Effort normal  and breath sounds normal. No stridor. No respiratory distress.   Abdominal: Soft. Bowel sounds are normal. There is no tenderness.   Genitourinary:   Hurst catheter in place   Musculoskeletal: He exhibits edema and tenderness.   Thoracic back pain with palpation of spine.   Neurological: He is alert. He displays tremor (resting). He displays normal reflexes.   Much quicker in his answers today, stronger diction and enunciation    Skin: Skin is warm and dry. No rash noted.   Psychiatric: He has a normal mood and affect. His behavior is normal. Thought content normal.   Nursing note and vitals reviewed.      Recent Labs      08/22/17   1000   WBC  14.4*   RBC  3.73*   HEMOGLOBIN  10.4*   HEMATOCRIT  32.1*   MCV  86.1   MCH  27.9   MCHC  32.4*   RDW  50.7*   PLATELETCT  408   MPV  11.9     Recent Labs      08/22/17   0155   SODIUM  136   POTASSIUM  4.2   CHLORIDE  104   CO2  25   GLUCOSE  132*   BUN  15   CREATININE  0.66   CALCIUM  8.8                      Assessment/Plan     * Acute osteomyelitis of thoracic spine (CMS-[facility]) (present on admission)  Assessment & Plan  Ordered sed rate  BCs negatitve x 2 from 8/12  No other source of infection, persistent back pain, however poor historian  Prior vertebroplasty.  Review of MRI thoracic spine with T11, 12, L1 discitis, cannot rule out acute osteomyelitis  Improved with IV zosyn  Consulted ID.    Myelodysplastic syndrome (CMS-[facility]) (present on admission)  Assessment & Plan  Seen by Dr. Ribeiro on the last admit  Platelet dysfunction present per prior TEG and evaluation  Hold lovenox  SCDs only for DVT prophylaxis  -CBC remains stable    Parkinson's disease (CMS-[facility]) (present on admission)  Assessment & Plan  -Cor track in place  -Cannot do sustained released Sinemet given a cannot be crushed  -Continue short acting Sinemet while cor track is in place  -symptoms are better today, but continues to have somewhat wild daily fluctuations, possible G tube placement during this  admission, need to have ongoing discussions with patient and his wife/family    Pneumonia (present on admission)  Assessment & Plan  -Possible pneumonia, given no clear infection of the spine on MRI and persisting cough and elevated white was gone responding to IV Zosyn, weakly appears to have pneumonia  -day 7/7 zosyn, WBC has normalized   8/22:  Reviewed all CXRs since admission:  No infiltrates seen on all 3.  No evidence of pneumonia.    Hematuria (present on admission)  Assessment & Plan  -resolved, hold AC, monitor closely    Acute delirium (present on admission)  Assessment & Plan  -fluctuates, likely multi-factorial, infection, prolonged hospitalization, parkinson's  -continue current treatment plan    DNR (do not resuscitate) (present on admission)  Assessment & Plan  Confirmed with the patient's wife on admission.    Left ankle swelling  Assessment & Plan  -Bilateral x-rays of the knees and ankles show very small fluid collection therefore arthrocentesis is not indicated at this time but need to watch closely  -unchanged today  -U/S negative for DVT/SVT, monitor, no e/o infection at this time  -consider compression stocking, has good ROM the L ankle on dorsi/plantar flexion, continue to see daily improvement    Acute bilateral low back pain without sciatica (present on admission)  Assessment & Plan  -MRI of cervical thoracic and lumbar spine shows no evidence of clear epidural or prevertebral abscess. Findings do show some hyper enhanced bone marrow edema which was there on prior MRIs  -Finished 7 days of IV zosyn  -All cultures remain negative  -Mental status fluctuates  - cautious use of IV pain meds  -No neurosurgery consult is required at this time  -worked more with PT, sitting up at the bedside, afebrile, will need SNF versus REHAB    Oropharyngeal dysphagia (present on admission)  Assessment & Plan  cortrak tube feeds per SLP van.      Acute urinary retention (present on admission)  Assessment &  Plan  Hurst catheter in place.  Start cardura 8/23  H/o prostatectomy in past  Per wife:  Hurst catheter placed at [facility] at time of transfer with cudet catheter difficult placement  Continue Hurst at least 7 days from start of cardura.      Radiology images reviewed, Labs reviewed and Medications reviewed  Hurst catheter: Gross Hematuria with Clots and Urinary Tract Retention or Urinary Tract Obstruction      DVT Prophylaxis: Contraindicated - High bleeding risk  DVT prophylaxis - mechanical: SCDs             This dictation was created using voice recognition software. The accuracy of the dictation is limited to the abilities of the software. I expect there may be some errors of grammar and possibly content.

## 2017-08-23 NOTE — PROGRESS NOTES
Patient's wife heard yelling from room. 2 RN's came in to assess situation, patient sitting up at edge of bed once again stating he needs to 'exercise' (the same confused event happened the previous night). Patient disconnected his tube feed, coretrak remained intact.Patient once again pulled at his rinaldi, causing his uretha to bleed. Patient escorted back to bed, cleaned and repositioned. Discussion between this primary RN and wife lead to the decision not to restrain patient in order not to delay his discharge. PRN sleeping medication ordered on mar will be given tonight. Wife will remain at bedside until medication administered. Patient resting in bed comfortably, bed is locked and in lowest position. Bed alarm as well as pad alarm on and functional. Call bell on and within reach. Will continue to monitor patient needs and safety.

## 2017-08-23 NOTE — ASSESSMENT & PLAN NOTE
- BC negative.  - Afebrile.  - MRI shows areas of enhancement indicating normal post-procedural findings vs OM.  - ID following.  Unlikely OM.  - Follow up MRI in 4-6 weeks.

## 2017-08-23 NOTE — CARE PLAN
Problem: Mobility  Goal: Risk for activity intolerance will decrease  Intervention: Assess and monitor signs of activity intolerance  Patient requires maximal assist to stand. Patient continues to be Q2 turns.

## 2017-08-23 NOTE — PROGRESS NOTES
Pt is A&Ox4. VSS. No complaints of pain. Hurst in place. Tube Feeding continued. Bathed today. Turned and repositioned to maintain skin integrity. Educated on incentive spirometer, pt unable to perform adequately. Pt stable and resting in room with family at bedside and call light in place.

## 2017-08-24 PROBLEM — D72.829 LEUKOCYTOSIS: Status: ACTIVE | Noted: 2017-08-24

## 2017-08-24 PROBLEM — B37.49 CANDIDAL UTI (URINARY TRACT INFECTION): Status: ACTIVE | Noted: 2017-08-24

## 2017-08-24 LAB
ANION GAP SERPL CALC-SCNC: 7 MMOL/L (ref 0–11.9)
ANISOCYTOSIS BLD QL SMEAR: ABNORMAL
BASO STIPL BLD QL SMEAR: NORMAL
BASOPHILS # BLD AUTO: 0 % (ref 0–1.8)
BASOPHILS # BLD: 0 K/UL (ref 0–0.12)
BUN SERPL-MCNC: 18 MG/DL (ref 8–22)
CALCIUM SERPL-MCNC: 9 MG/DL (ref 8.5–10.5)
CHLORIDE SERPL-SCNC: 104 MMOL/L (ref 96–112)
CO2 SERPL-SCNC: 27 MMOL/L (ref 20–33)
CREAT SERPL-MCNC: 0.74 MG/DL (ref 0.5–1.4)
EOSINOPHIL # BLD AUTO: 0 K/UL (ref 0–0.51)
EOSINOPHIL NFR BLD: 0 % (ref 0–6.9)
ERYTHROCYTE [DISTWIDTH] IN BLOOD BY AUTOMATED COUNT: 52.9 FL (ref 35.9–50)
GFR SERPL CREATININE-BSD FRML MDRD: >60 ML/MIN/1.73 M 2
GLUCOSE SERPL-MCNC: 116 MG/DL (ref 65–99)
HCT VFR BLD AUTO: 29.8 % (ref 42–52)
HGB BLD-MCNC: 9.5 G/DL (ref 14–18)
IRON SATN MFR SERPL: 11 % (ref 15–55)
IRON SERPL-MCNC: 29 UG/DL (ref 50–180)
LG PLATELETS BLD QL SMEAR: NORMAL
LYMPHOCYTES # BLD AUTO: 0.69 K/UL (ref 1–4.8)
LYMPHOCYTES NFR BLD: 4.4 % (ref 22–41)
MANUAL DIFF BLD: NORMAL
MCH RBC QN AUTO: 27.8 PG (ref 27–33)
MCHC RBC AUTO-ENTMCNC: 31.9 G/DL (ref 33.7–35.3)
MCV RBC AUTO: 87.1 FL (ref 81.4–97.8)
METAMYELOCYTES NFR BLD MANUAL: 0.9 %
MICROCYTES BLD QL SMEAR: ABNORMAL
MONOCYTES # BLD AUTO: 1.5 K/UL (ref 0–0.85)
MONOCYTES NFR BLD AUTO: 9.6 % (ref 0–13.4)
MORPHOLOGY BLD-IMP: NORMAL
NEUTROPHILS # BLD AUTO: 13.28 K/UL (ref 1.82–7.42)
NEUTROPHILS NFR BLD: 85.1 % (ref 44–72)
NRBC # BLD AUTO: 0.03 K/UL
NRBC BLD AUTO-RTO: 0.2 /100 WBC
PLATELET # BLD AUTO: 334 K/UL (ref 164–446)
PLATELET BLD QL SMEAR: NORMAL
PMV BLD AUTO: 12.5 FL (ref 9–12.9)
POLYCHROMASIA BLD QL SMEAR: NORMAL
POTASSIUM SERPL-SCNC: 4.2 MMOL/L (ref 3.6–5.5)
RBC # BLD AUTO: 3.42 M/UL (ref 4.7–6.1)
RBC BLD AUTO: PRESENT
SODIUM SERPL-SCNC: 138 MMOL/L (ref 135–145)
TIBC SERPL-MCNC: 273 UG/DL (ref 250–450)
WBC # BLD AUTO: 15.6 K/UL (ref 4.8–10.8)

## 2017-08-24 PROCEDURE — 99232 SBSQ HOSP IP/OBS MODERATE 35: CPT | Performed by: HOSPITALIST

## 2017-08-24 PROCEDURE — 97530 THERAPEUTIC ACTIVITIES: CPT

## 2017-08-24 PROCEDURE — A9270 NON-COVERED ITEM OR SERVICE: HCPCS | Performed by: HOSPITALIST

## 2017-08-24 PROCEDURE — 97110 THERAPEUTIC EXERCISES: CPT

## 2017-08-24 PROCEDURE — 83540 ASSAY OF IRON: CPT

## 2017-08-24 PROCEDURE — A9270 NON-COVERED ITEM OR SERVICE: HCPCS | Performed by: INTERNAL MEDICINE

## 2017-08-24 PROCEDURE — 97116 GAIT TRAINING THERAPY: CPT

## 2017-08-24 PROCEDURE — 97112 NEUROMUSCULAR REEDUCATION: CPT

## 2017-08-24 PROCEDURE — 770006 HCHG ROOM/CARE - MED/SURG/GYN SEMI*

## 2017-08-24 PROCEDURE — 83550 IRON BINDING TEST: CPT

## 2017-08-24 PROCEDURE — 85007 BL SMEAR W/DIFF WBC COUNT: CPT

## 2017-08-24 PROCEDURE — 700102 HCHG RX REV CODE 250 W/ 637 OVERRIDE(OP): Performed by: FAMILY MEDICINE

## 2017-08-24 PROCEDURE — 36415 COLL VENOUS BLD VENIPUNCTURE: CPT

## 2017-08-24 PROCEDURE — 700102 HCHG RX REV CODE 250 W/ 637 OVERRIDE(OP): Performed by: INTERNAL MEDICINE

## 2017-08-24 PROCEDURE — 87040 BLOOD CULTURE FOR BACTERIA: CPT

## 2017-08-24 PROCEDURE — 700102 HCHG RX REV CODE 250 W/ 637 OVERRIDE(OP): Performed by: HOSPITALIST

## 2017-08-24 PROCEDURE — 85027 COMPLETE CBC AUTOMATED: CPT

## 2017-08-24 PROCEDURE — 80048 BASIC METABOLIC PNL TOTAL CA: CPT

## 2017-08-24 PROCEDURE — 92526 ORAL FUNCTION THERAPY: CPT

## 2017-08-24 PROCEDURE — A9270 NON-COVERED ITEM OR SERVICE: HCPCS | Performed by: FAMILY MEDICINE

## 2017-08-24 RX ORDER — ASCORBIC ACID 500 MG
500 TABLET ORAL DAILY
Status: DISCONTINUED | OUTPATIENT
Start: 2017-08-25 | End: 2017-08-30 | Stop reason: HOSPADM

## 2017-08-24 RX ADMIN — DOXAZOSIN MESYLATE 1 MG: 1 TABLET ORAL at 22:14

## 2017-08-24 RX ADMIN — BACITRACIN ZINC: 500 OINTMENT TOPICAL at 08:34

## 2017-08-24 RX ADMIN — CITALOPRAM HYDROBROMIDE 20 MG: 20 TABLET ORAL at 08:33

## 2017-08-24 RX ADMIN — MEMANTINE HYDROCHLORIDE 10 MG: 10 TABLET, FILM COATED ORAL at 08:33

## 2017-08-24 RX ADMIN — TRIAZOLAM 0.12 MG: 0.25 TABLET ORAL at 22:14

## 2017-08-24 RX ADMIN — MEMANTINE HYDROCHLORIDE 10 MG: 10 TABLET, FILM COATED ORAL at 22:14

## 2017-08-24 RX ADMIN — STANDARDIZED SENNA CONCENTRATE AND DOCUSATE SODIUM 2 TABLET: 8.6; 5 TABLET, FILM COATED ORAL at 08:33

## 2017-08-24 RX ADMIN — AMANTADINE HYDROCHLORIDE 100 MG: 50 SOLUTION ORAL at 08:34

## 2017-08-24 RX ADMIN — POTASSIUM CHLORIDE 30 MEQ: 1500 TABLET, EXTENDED RELEASE ORAL at 22:13

## 2017-08-24 RX ADMIN — BACITRACIN ZINC: 500 OINTMENT TOPICAL at 22:15

## 2017-08-24 RX ADMIN — CARBIDOPA AND LEVODOPA 1 TABLET: 10; 100 TABLET ORAL at 22:15

## 2017-08-24 RX ADMIN — CARBIDOPA AND LEVODOPA 1 TABLET: 10; 100 TABLET ORAL at 05:29

## 2017-08-24 RX ADMIN — AMANTADINE HYDROCHLORIDE 100 MG: 50 SOLUTION ORAL at 22:15

## 2017-08-24 RX ADMIN — POTASSIUM CHLORIDE 30 MEQ: 1500 TABLET, EXTENDED RELEASE ORAL at 08:32

## 2017-08-24 RX ADMIN — Medication 220 MG: at 17:18

## 2017-08-24 RX ADMIN — CARBIDOPA AND LEVODOPA 1 TABLET: 10; 100 TABLET ORAL at 13:30

## 2017-08-24 RX ADMIN — FLUCONAZOLE 200 MG: 100 TABLET ORAL at 08:33

## 2017-08-24 RX ADMIN — MODAFINIL 200 MG: 100 TABLET ORAL at 08:33

## 2017-08-24 RX ADMIN — CARBIDOPA AND LEVODOPA 2 TABLET: 25; 100 TABLET ORAL at 23:31

## 2017-08-24 ASSESSMENT — ENCOUNTER SYMPTOMS
NAUSEA: 0
FOCAL WEAKNESS: 1
LOSS OF CONSCIOUSNESS: 0
NECK PAIN: 0
DEPRESSION: 0
FEVER: 0
BACK PAIN: 1
SPEECH CHANGE: 1
FOCAL WEAKNESS: 0
COUGH: 0
BLURRED VISION: 0
ABDOMINAL PAIN: 0
VOMITING: 0
TREMORS: 1
WEAKNESS: 1
DIZZINESS: 0
SHORTNESS OF BREATH: 0
HEADACHES: 0

## 2017-08-24 ASSESSMENT — COGNITIVE AND FUNCTIONAL STATUS - GENERAL
CLIMB 3 TO 5 STEPS WITH RAILING: TOTAL
STANDING UP FROM CHAIR USING ARMS: A LOT
TOILETING: A LOT
SUGGESTED CMS G CODE MODIFIER DAILY ACTIVITY: CL
MOVING FROM LYING ON BACK TO SITTING ON SIDE OF FLAT BED: A LOT
DAILY ACTIVITIY SCORE: 12
TURNING FROM BACK TO SIDE WHILE IN FLAT BAD: A LOT
EATING MEALS: A LOT
PERSONAL GROOMING: A LOT
MOVING TO AND FROM BED TO CHAIR: A LOT
HELP NEEDED FOR BATHING: A LOT
SUGGESTED CMS G CODE MODIFIER MOBILITY: CL
DRESSING REGULAR UPPER BODY CLOTHING: A LOT
WALKING IN HOSPITAL ROOM: A LOT
DRESSING REGULAR LOWER BODY CLOTHING: A LOT
MOBILITY SCORE: 11

## 2017-08-24 ASSESSMENT — PAIN SCALES - GENERAL
PAINLEVEL_OUTOF10: 0

## 2017-08-24 ASSESSMENT — GAIT ASSESSMENTS
DEVIATION: BRADYKINETIC;OTHER (COMMENT)
GAIT LEVEL OF ASSIST: MAXIMAL ASSIST
ASSISTIVE DEVICE: FRONT WHEEL WALKER
DISTANCE (FEET): 5

## 2017-08-24 NOTE — CARE PLAN
Problem: Safety  Goal: Will remain free from falls  Outcome: PROGRESSING AS EXPECTED    Problem: Fluid Volume:  Goal: Will maintain balanced intake and output  Outcome: PROGRESSING AS EXPECTED

## 2017-08-24 NOTE — DISCHARGE PLANNING
Dr. Yee is recommending SNF @ this time D/T low functional level.. She spoke with wife regarding [facility] and that we can follow him over there to monitor for the possibility of a RIRF admission.  VM left for VALERIO Felix [id number].

## 2017-08-24 NOTE — PROGRESS NOTES
Assumed care of pt from day RN. Pt in cardiac chair with wife in hallway. Pt up at 90 degrees in cardiac chair for 90 minutes.   Denies pain or needs at this time. Teeth brushed with suction toothbrush.  Will monitor, assist and medicate per MAR for duration of shift.  Hourly rounding implemented.      Waffle overlay, 2q turns.

## 2017-08-24 NOTE — PROGRESS NOTES
Infectious Disease Progress Note    Author: Magdalena Strong M.D. Date & Time of service: 2017  11:41 AM    Chief Complaint:  Back infection    Interval History:  76-year-old male with Parkinson's, MDS, splenectomy was admitted with altered mental status and fevers. Likely due to UTI  17-MAXIMUM TEMPERATURE 98.2. WBC 15.6. Today awake and responsive  Labs Reviewed, Medications Reviewed and Radiology Reviewed.    Review of Systems:  Review of Systems   Constitutional: Positive for malaise/fatigue. Negative for fever.   Respiratory: Negative for cough and shortness of breath.    Cardiovascular: Negative for chest pain and leg swelling.   Gastrointestinal: Negative for nausea, vomiting and abdominal pain.   Genitourinary: Negative for dysuria.   Neurological: Positive for focal weakness and weakness. Negative for headaches.       Hemodynamics:  Temp (24hrs), Av.7 °C (98.1 °F), Min:36.6 °C (97.9 °F), Max:36.8 °C (98.2 °F)  Temperature: 36.8 °C (98.2 °F)  Pulse  Av.1  Min: 60  Max: 99   Blood Pressure : 136/72 mmHg       Physical Exam:  Physical Exam   Constitutional: No distress.   Looks chronically ill   HENT:   Mouth/Throat: No oropharyngeal exudate.   cortrak   Eyes: No scleral icterus.   Neck: Neck supple.   Cardiovascular: Regular rhythm.    No murmur heard.  Pulmonary/Chest: He has no wheezes. He has no rales.   Abdominal: Soft. There is no tenderness.   Musculoskeletal: He exhibits no edema.   Neurological: He is alert. He displays abnormal reflex. He exhibits abnormal muscle tone. Coordination abnormal.   Vitals reviewed.      Meds:    Current facility-administered medications:   •  fluconazole  •  doxazosin  •  amantadine  •  potassium chloride SA  •  carbidopa-levodopa  •  carbidopa-levodopa  •  morphine injection  •  bacitracin  •  citalopram  •  memantine  •  modafinil  •  tramadol  •  triazolam  •  senna-docusate **AND** polyethylene glycol/lytes **AND** magnesium hydroxide **AND**  bisacodyl  •  Respiratory Care per Protocol  •  NS  •  acetaminophen  •  ondansetron  •  ondansetron    Labs:  Recent Labs      08/22/17   1000  08/24/17   0324   WBC  14.4*  15.6*   RBC  3.73*  3.42*   HEMOGLOBIN  10.4*  9.5*   HEMATOCRIT  32.1*  29.8*   MCV  86.1  87.1   MCH  27.9  27.8   RDW  50.7*  52.9*   PLATELETCT  408  334   MPV  11.9  12.5   NEUTSPOLYS  84.10*  85.10*   LYMPHOCYTES  5.30*  4.40*   MONOCYTES  7.90  9.60   EOSINOPHILS  0.90  0.00   BASOPHILS  1.80  0.00   RBCMORPHOLO  Present  Present     Recent Labs      08/22/17   0155  08/24/17   0324   SODIUM  136  138   POTASSIUM  4.2  4.2   CHLORIDE  104  104   CO2  25  27   GLUCOSE  132*  116*   BUN  15  18     Recent Labs      08/22/17   0155  08/24/17   0324   CREATININE  0.66  0.74       Imaging:  Ct-chest,abdomen,pelvis With    8/6/2017 8/6/2017 6:58 PM HISTORY/REASON FOR EXAM:  Myelodysplastic syndrome. History of compression deformities in the spine. TECHNIQUE/EXAM DESCRIPTION: CT scan of the chest, abdomen and pelvis with contrast. Thin-section helical scanning was obtained with intravenous contrast from the lung apices through the pubic symphysis to include the chest, abdomen and pelvis. 100 mL of Omnipaque 350 nonionic contrast was administered intravenously without complication. Low dose optimization technique was utilized for this CT exam including automated exposure control and adjustment of the mA and/or kV according to patient size. COMPARISON: MRI lumbar spine 5/8/2017 and x-ray from the same day FINDINGS: CT Chest: There is mild bilateral posterior atelectasis. No pleural fluid is identified. There are bilateral thyroid nodules. There are scattered arterial calcifications. No significant pericardial effusion. No adenopathy is identified. There is thoracic kyphosis with mild compression deformities in the upper lumbar spine. CT Abdomen: Irregular hypodense mass in the hepatic dome measures approximately 1.7 cm. There are multiple  splenules in the left upper quadrant. Soft tissue mass at the tip of the liver likely represents an additional splenule. The adrenal glands, pancreas, and left kidney are unremarkable. A 1.5 cm mass in the mid right kidney is indeterminant, but likely represents a small cyst. There is a 2 mm nonobstructing calculus in the lower pole of the right kidney. The kidneys enhance symmetrically. There is a gallbladder calculus without wall thickening or pericholecystic fluid. There is a moderate amount of colonic stool. There are scattered arterial calcifications. CT Pelvis: The bladder is unremarkable. No significant free fluid or adenopathy. Degenerative changes are in the spine. Vertebral augmentation has been performed at T11, T12, and L1.     8/6/2017  1.  1.7 cm hypodense mass in the hepatic dome is indeterminant, but may represent a hemangioma or complex cyst. Nonemergent hepatic protocol MRI could be performed for further evaluation. 2.  1.5 cm hypodense mass in the mid right kidney, likely a complex cyst. Follow-up ultrasound or renal protocol CT or MRI could be performed for confirmation. 3.  Multiple splenules in the abdomen. 4.  Cholelithiasis 5.  Spinal compression deformities status post vertebral augmentation at T11, T12, and L1.    Ct-head W/o    8/12/2017 8/12/2017 8:27 PM HISTORY/REASON FOR EXAM:  Altered Mental Status. Fever, cough, decreased mentation. TECHNIQUE/EXAM DESCRIPTION AND NUMBER OF VIEWS: CT of the head without contrast. The study was performed on a helical multidetector CT scanner. Contiguous 2.5 mm axial sections were obtained from the skull base through the vertex. Up to date radiation dose reduction adjustments have been utilized to meet ALARA standards for radiation dose reduction. COMPARISON:  MRI of the brain 11/1/2016 FINDINGS: The calvariae and skull base are unremarkable. There are no extraaxial fluid collections. There is a pattern of cerebral atrophy manifest as enlargement of  sulcal markings and ventricular prominence. The ventricular system and basal cisterns are otherwise unremarkable. There are no areas of abnormal density in the brain substance. There are no hemorrhagic lesions. There are no mass effects or shift of midline structures. The brainstem and posterior fossa structures are unremarkable. Paranasal sinuses show some minor scattered mucosal thickening among ethmoid air cells no air-fluid levels are evident. Mastoids show opacification of clustered air cells of the left mastoid tip. This may be chronic as similar findings were noted on the MRI.     8/12/2017  1.  Mild-moderate cerebral atrophy. 2.  Opacification of a few clustered air cells of the left mastoid tip which may represent chronic or active postinflammatory changes. 3.  Otherwise, no acute findings and no appreciable change compared to MRI study 11/1/2016.    Ct-tspine W/o Plus Recons    8/12/2017 8/12/2017 8:27 PM HISTORY/REASON FOR EXAM:  Pain Following Trauma. Altered level of consciousness. Fever. Cough. Decreased mentation. History of recent kyphoplasty. TECHNIQUE/EXAM DESCRIPTION AND NUMBER OF VIEWS: CT thoracic spine without contrast, with reconstructions. The study was performed on a GE CT scanner. Thin-section helical scanning was performed from C7-T1 through T12-L1.  Sagittal and coronal multiplanar reconstructions were generated from the axial images. Low dose optimization technique was utilized for this CT exam including automated exposure control and adjustment of the mA and/or kV according to patient size. COMPARISON: Recent CT of the chest, abdomen, pelvis 8/6/2017 FINDINGS:  Alignment in the thoracic spine shows no subluxation. Again noted, there are variable compression fractures at T11, T12, and L1 which have been treated with vertebral augmentation. At the T11 level, there is a small amount of extravasated epidural cement (axial image 88, series 6). Again noted are mild superior endplate  compression deformities at T2, T3, T4 with no change from the prior exam. No acute fractures are evident. There is some anterior marginal spurring in the midthoracic spine. The prevertebral and paraspinous soft tissues show no abnormal paravertebral fluid collection. There is some subsegmental atelectatic infiltrate in the posterior lung zones, most notable in the left posterior basal segment adjacent to the descending aorta. Similar appearance on the recent exam.     8/12/2017  1.  Status post vertebral augmentation at T11, T12, L1. Small amount of ventral epidural cement extravasation at the T11 level. 2.  Mild superior endplate compression deformities again seen at T2, T3, T4. No change from 8/6/2017. 3.  Bibasilar subsegmental atelectatic changes most prominent at the left lower lobe posterior basal segment. Little or no change since recent exam 8/6/2017.    Dx-ankle 2- Views Left    8/15/2017  8/15/2017 4:31 AM HISTORY/REASON FOR EXAM:  Atraumatic Pain/Swelling/Deformity Left ankle pain. TECHNIQUE/EXAM DESCRIPTION AND NUMBER OF VIEWS:  2 views of the LEFT ankle. COMPARISON: None. FINDINGS: Diffuse osseous demineralization, limiting evaluation for nondisplaced fracture. No definite acute fracture or dislocation. The ankle mortise is intact. Soft tissue swelling about the lateral malleolus. Mild osteoarthritis of the tibiotalar joint.     8/15/2017  Soft tissue swelling about the lateral malleolus. No definite acute osseous abnormality.    Dx-chest-portable (1 View)    8/13/2017 8/13/2017 4:25 PM HISTORY/REASON FOR EXAM:  Cough. TECHNIQUE/EXAM DESCRIPTION AND NUMBER OF VIEWS: Single portable view of the chest. COMPARISON: 8/12/2017 FINDINGS: Lower lung volumes were achieved on the current examination. Bibasilar opacities may represent hypoinflation atelectasis. No pleural effusion or pneumothorax is seen. The cardiomediastinal silhouette is stable. Atherosclerotic calcification is seen.     8/13/2017  Mild  bibasilar atelectasis. Stable prominence of the cardiomediastinal silhouette. Atherosclerotic plaque.    Dx-chest-portable (1 View)    8/12/2017 8/12/2017 7:17 PM HISTORY/REASON FOR EXAM:  Possible sepsis. TECHNIQUE/EXAM DESCRIPTION AND NUMBER OF VIEWS: Single portable view of the chest. COMPARISON: 8/9/2017 FINDINGS: The cardiomediastinal silhouette is enlarged. Atherosclerotic calcification is seen. No pleural effusion or pneumothorax is seen. Skinfold is noted on the right. Mild left basilar opacity may represent atelectasis.     8/12/2017  Stable prominence of the cardiomediastinal silhouette. Atherosclerotic plaque. Mild left basilar opacity may represent atelectasis.    Dx-chest-portable (1 View)    8/9/2017 8/9/2017 5:57 PM HISTORY/REASON FOR EXAM:  Congestion for 2 months. TECHNIQUE/EXAM DESCRIPTION AND NUMBER OF VIEWS: Single portable view of the chest. COMPARISON: None FINDINGS: Heart size is enlarged. Pulmonary vessels are normal in size. Streaky linear opacities are present in the left lower lobe which may represent atelectasis or scar. The upper lungs are clear. No pleural abnormalities are noted.     8/9/2017  Streaky linear opacities in the left lower lobe which may represent subsegmental atelectasis or scar. Mild cardiomegaly.    Dx-knee 2- Right    8/15/2017  8/15/2017 4:31 AM HISTORY/REASON FOR EXAM:  Atraumatic Pain/Swelling/Deformity TECHNIQUE/EXAM DESCRIPTION AND NUMBER OF VIEWS:  2 views of the RIGHT knee. COMPARISON: None FINDINGS: Diffuse osseous demineralization, limiting evaluation for nondisplaced fracture. No definite acute fracture or dislocation. Mild to moderate tricompartmental osteoarthritis, most in the medial compartment Moderate knee joint effusion.     8/15/2017  1. No definite acute osseous abnormality. 2. 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    Dx-knee 2- Left    8/15/2017  8/15/2017 4:31 AM HISTORY/REASON FOR EXAM:  Atraumatic  Pain/Swelling/Deformity TECHNIQUE/EXAM DESCRIPTION AND NUMBER OF VIEWS:  2 views of the LEFT knee. COMPARISON: None FINDINGS: Diffuse osseous demineralization, limiting evaluation for nondisplaced fracture. No definite acute fracture or dislocation. Mild tricompartmental osteoarthritis. Small knee joint effusion     8/15/2017  1. No definite acute osseous abnormality. 2. Small knee joint effusion    Dx-thoracolumbar Spine-2 Views    8/6/2017 8/6/2017 11:50 AM HISTORY/REASON FOR EXAM: Back pain. TECHNIQUE/EXAM DESCRIPTION AND NUMBER OF VIEWS:  Thoracic spine, 2 views. COMPARISON:  None. FINDINGS: 2 intraoperative views of the thoracic spine obtained following multilevel kyphoplasty demonstrate kyphoplasty cement within the partially collapsed vertebral bodies at approximately the T11-L1 levels.     8/6/2017  Intraoperative visualization of 3 level kyphoplasty at approximately the T11-L1 levels.    Mr-cervical Spine-with & W/o    8/17/2017 8/16/2017 5:26 PM HISTORY/REASON FOR EXAM:  76-year-old man with lower extremity pain and weakness with a leukocytosis and fever, clinical concern for abscess or infection; kyphoplasty performed on 8/6/2017. History of myelodysplastic syndrome. TECHNIQUE/EXAM DESCRIPTION: MRI of the cervical spine without and with contrast. The study was performed on a [employer] 1.5 Griselda MRI scanner.  T1 sagittal, T2 fast spin-echo sagittal, T1 postcontrast fat-suppressed sagittal, and gradient-echo axial images were obtained of the cervical spine before contrast. Following the administration of intravenous contrast sagittal and optional axial T1 fat-suppressed images were obtained. 20 mL Omniscan contrast was administered intravenously. COMPARISON:  None. FINDINGS: There is no acute fracture or gross malalignment. There is 2 mm of degenerative retrolisthesis of C4 on C5. There is loss of intervertebral disc height throughout the cervical spine, most pronounced at C5-C6 and C6-C7. Marrow signal  is diffusely low on T1 and T2, relatively sparing the dens. The cervical cord has a normal caliber course and signal intensity. No area of abnormal enhancement is seen. Findings specific to each level are described below: C2-3: Unremarkable C3-4:  Mild RIGHT facet arthropathy C4-5:  Posterior disc osteophyte complex extending to the BILATERAL foraminal zones. Mild RIGHT facet arthropathy. Mild central canal narrowing. Mild RIGHT neural foraminal narrowing. C5-6: Posterior disc osteophyte complex. Mild central canal narrowing. Moderate RIGHT and mild LEFT neural foraminal narrowing. C6-7: Posterior disc osteophyte complex extending to the RIGHT more than the LEFT neural foramen. Mild central canal narrowing. Mild RIGHT neural foraminal narrowing. C7-T1: Posterior disc osteophyte complex eccentric to the RIGHT more than the LEFT foraminal zone. Mild RIGHT neural foraminal narrowing. No soft tissue mass is evident.     8/17/2017  1.  No evidence of cervical discitis osteomyelitis or epidural abscess 2.  Diffuse marrow infiltration or replacement 3.  Multilevel multifactorial degenerative changes with areas of central canal or neural foraminal narrowing as described above    Mr-lumbar Spine-with & W/o    8/17/2017 8/16/2017 5:26 PM HISTORY/REASON FOR EXAM:  Extremity pain and weakness, elevated WBC, fever and evaluate for abscess and infection. TECHNIQUE/EXAM DESCRIPTION: MRI of the lumbar spine without and with contrast. The study was performed on a [employer] 1.5 Griselda MRI scanner. T1 sagittal, T2 fast spin-echo sagittal, and T2 axial images were obtained of the lumbar spine. T1 post-contrast fat-suppressed sagittal images were obtained. 20 mL Omniscan contrast was administered intravenously. COMPARISON:  5/8/2017 FINDINGS: There are changes secondary to the previous vertebral augmentation procedures at T11, T12 and L1 vertebral bodies. Bone marrow edema and contrast enhancement are noted within the remaining  portions of the above-mentioned vertebral bodies. There is no evidence of abnormal epidural or prevertebral fluid collection. Mild prevertebral soft tissue contrast enhancement is seen. There is abnormal T2 hyperintensity at L1-2 disc with contrast enhancement. There is also abnormal contrast enhancement within the L3-4 disc. At the level of L5-S1, there is minimal disc bulge without significant spinal or neural foraminal stenosis. At the level of L4-5, there is mild right neural foraminal stenosis. There is no central canal stenosis. At the level of L3-4, there is abnormal T2 hyperintensity and contrast enhancement within the disc space. There is no abnormal epidural or prevertebral fluid collection. At the level of L2-3, there is no spinal or neural foraminal stenosis. At the level of L1-2, there is no spinal or neural foraminal stenosis. At the level of T12-L1, there is abnormal disc fluid with contrast enhancement. The conus terminates at the level of L1. A gallstone is seen.     8/17/2017  1.  There is no evidence of epidural abscess. 2.  There is abnormal bone marrow edema and contrast enhancement within the T11, T12 and L1 vertebral bodies surrounding the vertebral cement. This finding likely represent edema/inflammation secondary to the recent procedure. However this imaging finding cannot completely exclude the possibility of osteomyelitis. 3.  There are abnormal disc T2 hyperintensity and enhancement noted involving L1-2 and L4-5 discs. This finding is suspicious for early discitis. However the previous MRI dated 5/8/2017 demonstrates disc T2 hyperintensity at the levels of L1-2 and L4-5. Advanced disc degeneration may have this appearance. 4.  There is no evidence of prevertebral abscess. 5.  Degenerative changes as described above.    Mr-thoracic Spine-with & W/o    8/17/2017 8/16/2017 5:26 PM HISTORY/REASON FOR EXAM:  76-year-old man with lower extremity pain and weakness with a leukocytosis and fever,  clinical concern for abscess or infection; kyphoplasty performed on 8/6/2017. History of myelodysplastic syndrome. TECHNIQUE/EXAM DESCRIPTION: MRI of the thoracic spine without and with contrast. The study was performed on a [employer] 1.5 Griselda MRI scanner.  T1 sagittal, T1 postcontrast fat-suppressed sagittal, T2 sagittal, and T2 axial images were obtained of the thoracic spine.  20 mL Omniscan contrast were administered intravenously. COMPARISON:  Correlation is made with unenhanced MRI lumbar spine 5/8/2017 as well as a thoracic spine CT 8/12/2017 FINDINGS: There has been interval vertebral augmentation at T11, T12 and L1. There is no demonstrable change in the height loss at each of these levels. There is adjacent edema and faint enhancement about each augmentation cavity extending into the pedicles. There  is a small amount of signal void compatible with gas in the disc space anteriorly at the T12-L1 disc. No abnormal discal or soft tissue enhancement is seen. No additional fracture is seen. There is mildly prominent hemangioma in the T7 vertebral body. The bone marrow is diffusely low in signal on T1 and T2 as previously noted. There is no gross malalignment. There is mild loss of intervertebral disc height throughout the thoracic spine. The thoracic cord has a normal caliber course and appearance. There is no evidence of significant disk extrusion, cord compression, or neural foraminal stenosis.     8/17/2017  1.  Prior spinal augmentation at the vertebral compression fractures sites of T11, T12 and L1 2.  Areas of enhancement in the T11, T12 and L1 vertebral bodies are in keeping with what would be expected for recent intervention though infection is not excluded 3.  Thoracic spondylosis 4.  Diffuse marrow infiltration or replacement    Dx-portable Fluoro > 1 Hour    8/7/2017 8/6/2017 11:50 AM HISTORY/REASON FOR EXAM:  Kyphoplasty T11, T12, L1, Main OR TECHNIQUE/EXAM DESCRIPTION AND NUMBER OF VIEWS:  Portable fluoroscopy for greater than one hour FINDINGS:      The portable fluoroscopy unit was obligated to the procedure for greater than one hour.   Actual fluoro time was 6 minutes 12 seconds.     2017  Portable fluoroscopy utilized for 6 minutes 12 seconds.    Le Venous Duplex    2017   Vascular Laboratory  CONCLUSIONS  No evidence of deep vein thrombosis or superficial thrombophlebitis in the  left lower extremity.  ANNA GAUTAM  Exam Date:     2017 09:05  Room #:     Inpatient  Priority:     Routine  Ht (in):             Wt (lb):  Ordering Physician:        RODRIGO CARTER  Referring Physician:       EMMA Rice  Sonographer:               MUSA Mays RVT, BERNADETTE  Study Type:                Complete Unilateral  Technical Quality:         Adequate  Age:    76    Gender:     M  MRN:    [id number]  :    1940      BSA:  Indications:     Edema, unspecified  CPT Codes:       15579  ICD Codes:       R609  History:         Left ankle edema for last several days.  Limitations:  PROCEDURES:  Left lower extremity venous duplex imaging.  The following venous structures were evaluated: common femoral, profunda  femoral, greater saphenous, femoral, popliteal , peroneal and posterior  tibial veins.  Serial compression, augmentation maneuvers,  color and spectral Doppler  flow evaluations were performed.  FINDINGS:  Doppler duplex and spectral analysis of the superficial and deep venous  systems of the left leg was performed.  No evidence of deep vein thrombosis or superficial thrombophlebitis in the  left lower extremity.  Contralateral flow was obtained in the right common femoral vein and  appears to be normal.  No prior studies available for comparison.  Juliano Cox MD  (Electronically Signed)  Final Date:      2017                   17:07    Dx-abdomen For Tube Placement    2017 4:12 PM HISTORY/REASON FOR EXAM:  Line  "evaluation. TECHNIQUE/EXAM DESCRIPTION AND NUMBER OF VIEWS:  1 view(s) of the abdomen. COMPARISON:  None. FINDINGS: Enteric tube has been placed. The tip projects over the left upper quadrant. The bowel gas pattern is within normal limits.  There are multilevel changes of vertebral augmentation.     8/19/2017  1.  Feeding tube tip overlies the gastric body.    Dx-abdomen For Tube Placement    8/15/2017  8/15/2017 4:31 AM HISTORY/REASON FOR EXAM:  Tube evaluation. TECHNIQUE/EXAM DESCRIPTION AND NUMBER OF VIEWS:  1 view(s) of the abdomen. COMPARISON:  None. FINDINGS: Limited single view of the abdomen performed primarily to evaluate enteric tube position. The tip projects over the stomach fundus. Diffuse gas-filled small bowel and colon.     8/15/2017  Feeding tube with tip in the stomach fundus.      Micro:  Results     Procedure Component Value Units Date/Time    BLOOD CULTURE [[id number]]     Order Status:  Sent Specimen Information:  Blood from Peripheral     BLOOD CULTURE [[id number]]     Order Status:  Sent Specimen Information:  Blood from Peripheral     BLOOD CULTURE [[id number]] Collected:  08/12/17 1855    Order Status:  Completed Specimen Information:  Blood from Peripheral Updated:  08/17/17 2100     Significant Indicator NEG      Source BLD      Site PERIPHERAL      Blood Culture No growth after 5 days of incubation.     Narrative:      Per Hospital Policy: Only change Specimen Src: to \"Line\" if  specified by physician order.    BLOOD CULTURE [[id number]] Collected:  08/12/17 1900    Order Status:  Completed Specimen Information:  Blood from Peripheral Updated:  08/17/17 2100     Significant Indicator NEG      Source BLD      Site PERIPHERAL      Blood Culture No growth after 5 days of incubation.     Narrative:      Per Hospital Policy: Only change Specimen Src: to \"Line\" if  specified by physician order.          Assessment:  Active Hospital Problems    Diagnosis       • Myelodysplastic syndrome (CMS-HCC) " [D46.9]   • Parkinson's disease (CMS-HCC) [G20]   • Oropharyngeal dysphagia [R13.12]   • Acute urinary retention [R33.8]   • Hypokalemia [E87.6]   • Acute bilateral low back pain without sciatica [M54.5]   • Left ankle swelling [M25.472]   • Hematuria [R31.9]   • Acute delirium [R41.0]   • DNR (do not resuscitate) [Z66]   • Pneumonia [J18.9]       Plan:  Abnormal MRI of the back  Most likely these are postop change  I would not recommend treating them at this time  Repeat the MRI in in 2-3 months  Patient has now increased back symptoms    Admission fevers  Likely were due to UTI  Urine had shown yeast  Give him 5 days of Diflucan    Leukocytosis  Clinical patient does not look septic  Today he is sitting up and having a conversation  Patient has underlying MDS. Hence the WBC count may not be a good marker for him for infection. Continue to monitor clinically    Splenectomy  Agent is at higher risk for infections    Parkinson's disease    Prognosis  Guarded    Discussed with internal medicine.

## 2017-08-24 NOTE — ASSESSMENT & PLAN NOTE
- Improves with antibx.  - Likely multifactorial  - Continue cefepime for UTI.  - Afebrile.  - History of splenectomy.  - May need follow up with Dr. Ribeiro.

## 2017-08-24 NOTE — ASSESSMENT & PLAN NOTE
- Urine cx positive for E. Coli.  - Afebrile.  - Leukocytosis improved.  - Continue cefepime.  - ID following.

## 2017-08-24 NOTE — THERAPY
"Occupational Therapy Treatment completed with focus on ADLs, ADL transfers, patient education and caregiver training.  Functional Status:  Generous Mod A STS and Max A SPT with posterior lean and partial stand.   Plan of Care: Will benefit from Occupational Therapy 4 times per week  Discharge Recommendations:  Equipment Will Continue to Assess for Equipment Needs. Post-acute therapy Discharge to a transitional care facility for continued skilled therapy services.    Patient seen for OT treat focused on chair to bed transfer and seated therex. Generous Mod A STS and Max A SPT with posterior lean and partial stand. Patient improved performance with direct therapist assist rather than previously noted FWW with SPT likely 2/2 fatigue from sitting in chair. Patient and wife educated regarding community program designed to assist people with parkinsons. Patient would continue to benefit from skilled OT in this setting followed by rehab prior to DC home with services.     See \"Rehab Therapy-Acute\" Patient Summary Report for complete documentation.   "

## 2017-08-24 NOTE — THERAPY
"Physical Therapy Treatment completed.   Bed Mobility:  Supine to Sit: Moderate Assist  Transfers: Sit to Stand: Moderate Assist  Gait: Level Of Assist: Maximal Assist with Front-Wheel Walker       Plan of Care: Will benefit from Physical Therapy 3 times per week  Discharge Recommendations: Equipment: Will Continue to Assess for Equipment Needs. Post-acute therapy Discharge to a transitional care facility for continued skilled therapy services.     See \"Rehab Therapy-Acute\" Patient Summary Report for complete documentation.     Pt presenting w/ increased strength for functional mobility. Pt mainly requiring cues for sequencing w/ bed mobility, able to demonstrate after instruction. Pt endurance increasing for functional acitivity, not needing as many breaks w/ standing exercise and able to tolerate an hour of PT today. Pt w/ heavy posterior lean in standing and w/ transfers. SPT transfer to cardiac chair requiring MaxA for FWW management to keep on floor to compensate for pts posterior lean. Pt will benefit from post acute rehab or SNF to address functional limitations.  "

## 2017-08-24 NOTE — CONSULTS
Physical Medicine and Rehabilitation Consultation    Date of Consultation: 8/23/2017  Consulting provider: Elizabeth Hernandez M.D.  Reason for consultation: assess for acute inpatient rehab appropriateness  Chief complaint: back discomfort    HPI: The patient is a 76 y.o. male with a past medical history of parkinson's disease, splenectomy, and myelodysplastic disorder, admitted on 8/12/2017 as a transfer from outside hospital with complaints of altered mental status and fever after a recent lumbar fracture and kyphoplasty on 8/6/2017 By Dr Colin after which he was discharged to SNF. Wife reports he had to stay in the hosptial for 5 days after the kyphplasty due to bleeding, but then transferred to a SNF, developed a fever and was sent back to [facility].    He was treated with sepsis protocol, and a rinaldi catheter was placed for urinary retention in the ED. ID was consulted regarding possible osteomyelitis at T11, T12, L1 on MRI with early discitis versus degenerative changes. He was treated with diflucan for urinary yeast. He was not treated for possible discitis, with plans for follow up MRI in a few weeks. He has had a cortrak placed for dysphagia, and does not want PEG tube placement. He required some restraints for agitation. Current lab abnormalities include worsening leukocytosis, anemia, low pre-albumin, elevated CRP (though improved). Patient with indwelling rinaldi catheter.    Wife reports he was diagnosed with PD in 2012 and is followed by Dr. Ferguson. He had a fall in early August that resulted in compression fractures on 8/3/2017. He suffered with severe pain from these fractures, and stopped being able to ambulate around the home with his walker which was his baseline. He was in so much pain he didn't want to live and therefore decided to do the kyphoplasty. Fortunately he no longer has any back pain, though hasn't been able to become any more mobile. At the SNF he was started on antibiotics for early  pneumonia. Wife reports he wasn't having any trouble with swallowing prior. He currently has NG tube for tube feeds. Cognition seems to be about baseline. Wife reports she would be fine with wheelchair mobility in the house and the entry is in the process of being ramped.    The patient currently reports intermittent mild discomfort in his back, but no jose armando pain. He had an episode of bowel incontinence today due to inability to get to the commode on time. No other complaints.    ROS:  no F/C, N/V, HA, vision changes/dizziness, CP/SOB, abd pain/constipation, diarrhea, dysuria/frequency/urgency, bowel/bladder incontinence, calf pain/swelling.    PMH:  Past Medical History   Diagnosis Date   • Parkinson's disease (CMS-HCC)    • MDS (myelodysplastic syndrome) (CMS-HCC)    • Hemorrhagic disorder (CMS-HCC) 7-     bruises easily   • Compression fracture of spine (CMS-HCC)        PSH:  Past Surgical History   Procedure Laterality Date   • Splenectomy     • Knee arthroscopy     • Prostatectomy, radial       subtotal   • Rotator cuff repair       right   • Cataract extraction with iol     • Kyphoplasty  8/6/2017     Procedure: KYPHOPLASTY T11, T12, L1 ;  Surgeon: Niels Gallardo M.D.;  Location: SURGERY [facility];  Service:        FHX:  Family History   Problem Relation Age of Onset   • Heart Disease Mother    • Lung Disease Mother    • Cancer Father      pancreatic       Medications:  Current Facility-Administered Medications   Medication Dose   • fluconazole (DIFLUCAN) tablet 200 mg  200 mg   • doxazosin (CARDURA) tablet 1 mg  1 mg   • amantadine (SYMMETREL) 50 MG/5ML syrup 100 mg  100 mg   • potassium chloride SA (Kdur) tablet 30 mEq  30 mEq   • carbidopa-levodopa (SINEMET)  MG tablet 2 Tab  2 Tab   • carbidopa-levodopa (SINEMET)  MG tablet 1 Tab  1 Tab   • morphine (pf) 4 mg/ml injection 2 mg  2 mg   • bacitracin ointment     • citalopram (CELEXA) tablet 20 mg  20 mg   • memantine (NAMENDA)  "tablet 10 mg  10 mg   • modafinil (PROVIGIL) tablet 200 mg  200 mg   • tramadol (ULTRAM) 50 MG tablet 50 mg  50 mg   • triazolam (HALCION) tablet 0.125 mg  0.125 mg   • senna-docusate (PERICOLACE or SENOKOT S) 8.6-50 MG per tablet 2 Tab  2 Tab    And   • polyethylene glycol/lytes (MIRALAX) PACKET 1 Packet  1 Packet    And   • magnesium hydroxide (MILK OF MAGNESIA) suspension 30 mL  30 mL    And   • bisacodyl (DULCOLAX) suppository 10 mg  10 mg   • Respiratory Care per Protocol     • NS (BOLUS) infusion 500 mL  500 mL   • acetaminophen (TYLENOL) tablet 650 mg  650 mg   • ondansetron (ZOFRAN) syringe/vial injection 4 mg  4 mg   • ondansetron (ZOFRAN ODT) dispertab 4 mg  4 mg       Allergies:  No Known Allergies    Social Hx:  Pre-morbidly, this patient lived in a two level home (first floor set-up available) with Two  steps to enter, with spouse.   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     Physical Exam:  Vitals: Blood pressure 159/91, pulse 60, temperature 36.6 °C (97.9 °F), resp. rate 18, height 1.88 m (6' 2.02\"), weight 97.5 kg (214 lb 15.2 oz), SpO2 95 %.  Gen: NAD, frail elderly male  HEENT: NC/AT, PERRLA, dry mucous membranes, temporal wasting  Cardio: RRR, no mumurs  Pulm: CTAB, with normal respiratory effort  Abd: Soft NTND, active bowel sounds  Ext: No peripheral edema. No calf tenderness.    Mental status: alert, cognitive impairments    Motor:  Generally 4+/5 " throughout UE and LE  Increased tone and rigidity at elbows and knees  Mild intention tremor of hands  Generalized muscle wasting throughout UE and LE    Sensory: intact to light touch through out    DTRs: 2+ in bilateral biceps, triceps, brachioradialis, 0 in bilateral patellar and achilles tendons  Labs:  Recent Labs      08/22/17   1000   RBC  3.73*   HEMOGLOBIN  10.4*   HEMATOCRIT  32.1*   PLATELETCT  408     Recent Labs      08/22/17   0155   SODIUM  136   POTASSIUM  4.2   CHLORIDE  104   CO2  25   GLUCOSE  132*   BUN  15   CREATININE  0.66   CALCIUM  8.8     No results found for this or any previous visit (from the past 24 hour(s)).    ASSESSMENT:    Patient is a 76 y.o. year-old male with a history of Parkinson's disease admitted with fever/sepsis with pneumonia, after a recent kyphoplasty for lumbar compression fracture.    Patient with multiple co-morbidities(including but not limited to leukocytosis, anemia, myelodysplastic disorder, urinary retention, dysphagia); with cognitive deficits and functional deficits in mobility/self-care/swallowing/speech, and Severe de-conditioning.     Pre-morbidly, this patient lived in a two level home with Two  steps to enter, with spouse. The patient was evaluated by acute care Physical Therapy, Occupational Therapy and Speech Language Pathology; currently requiring maximal assistance for mobility and maximal assistance for ADLs, also with ongoing cognitive and swallowing deficits.     It is unclear whether this functional decline in the last 3 weeks (apparently was walking with a walker prior to his fall on 8/3, and didn't have any swallowing difficulties per wife, but did have pneumonia at SNF/treated here) is just from his lack of mobility from his compression fracture or if this is all a sharp decline from progressive Parkinson's disease. Wife realizes that ambulation may not be a goal, and she would be fine with wheelchair level of mobility.    Given that he is  currently a max assist for mobility and ADLs, I recommend SNF first for several weeks and pending his progress at SNF, bring him into acute inpatient rehabilitation for a 2 week course of intensive therapies prior to discharge home. Explained this plan to wife who is in agreement.    If while continuing his medical workup/treatment (still with leukocytosis) his function improves (can stand and take a few steps) then he may be ready to come to acute rehabilitation directly, though I strongly suspect he will need SNF first.    Thank you for this consult.    Devorah Yee M.D.  Physical Medicine and Rehabilitation

## 2017-08-24 NOTE — THERAPY
"Speech Language Therapy dysphagia treatment completed.   Functional Status:  Trained pt in dysphagia exercises however performance was poor d/t confusion and lethargy.   Recommendations: Cont NPO/TF.    Plan of Care: Will benefit from Speech Therapy 3 times per week  Post-Acute Therapy: Discharge to a transitional care facility for continued skilled therapy services.    See \"Rehab Therapy-Acute\" Patient Summary Report for complete documentation.     "

## 2017-08-24 NOTE — PROGRESS NOTES
[facility] Hospitalist Progress Note    Date of Service: 8/24/2017    Chief Complaint  76 y.o. male admitted 8/12/2017 with fever, elevated WBC count, recent kyphoplasty, confusion.    Interval Problem Update  -Parkinson's-mental status is better again today. Speech pattern is much stronger today as well. Working with PT sitting up at the edge of the bed, doing leg exercises.    Back pain-per patient, appears to be back at his baseline.     Dysphagia- new cortrak continues to work well. No high residuals noted.    LLE swelling- No obvious trauma. More mild improvement noted today. Denies any new redness of his skin.   8/22:  Poor historian due to Parkinson's dementia.  States no back pain, but pain thoracic spine with palpation on exam.  Repeat sed rate.  No source of elevated wbc and sed rate found upon review of chart:  UC negative, BCs x2 neg, throat culture neg CXR negative for infiltrate.  Off restraints.  ID consulted regarding likely osteomyelitis T11, T12, L1 on MRI with discitis.  8/23:  Awaiting ID recs.  Wife at bedside, discussed all questions, concerns, plan of care.  She is concerned that he is not getting his home does of sinemet.  However, as close as can get given immediate release formulation available in hospital.  cortrak remains, Rinaldi catheter was placed upon transfer to [facility].  WBC increasing today since off zosyn IV, lungs CTA b/l.  Added cardura for BPH.  Remains off restraints.  Sed rate 60 wbc 9.9 to 14.  8/24:  Wbc continues to increase off abx.  Repeated BCx2, patient denies back pain, cortrak and rinaldi remain.  ID recommend to repeat thoracic MRI in 2 weeks.    Consultants/Specialty  8/22:  ID consult regarding osteomyelitis    Disposition  PT/OT rec SNF.  SNF ordered 8/18.  [facility] will not take cortrak, may need to look at [place] [facility]Fs.  Patient unsure if wants PEG tube.        Review of Systems   Constitutional: Negative for fever.   Eyes: Negative for blurred vision.    Respiratory: Negative for shortness of breath.    Cardiovascular: Positive for leg swelling (LLE, improvement noted today). Negative for chest pain.   Gastrointestinal: Negative for abdominal pain.        Mouth remains quite dry   Genitourinary: Negative for dysuria.   Musculoskeletal: Positive for back pain. Negative for neck pain.   Skin: Negative for rash.   Neurological: Positive for tremors (mild and near his baseline), speech change (feels that his words are stronger today) and weakness (slightly stronger today). Negative for dizziness, focal weakness, loss of consciousness and headaches.        No changes   Psychiatric/Behavioral: Negative for depression.       Physical Exam  Laboratory/Imaging   Hemodynamics  Temp (24hrs), Av.7 °C (98.1 °F), Min:36.6 °C (97.9 °F), Max:36.8 °C (98.2 °F)   Temperature: 36.8 °C (98.2 °F)  Pulse  Av.1  Min: 60  Max: 99    Blood Pressure : 136/72 mmHg      Respiratory      Respiration: 16, Pulse Oximetry: 90 %     Work Of Breathing / Effort: Mild  RUL Breath Sounds: Clear, RML Breath Sounds: Clear, RLL Breath Sounds: Diminished, AQUILINO Breath Sounds: Clear, LLL Breath Sounds: Diminished    Fluids    Intake/Output Summary (Last 24 hours) at 17 1530  Last data filed at 17 0600   Gross per 24 hour   Intake   1790 ml   Output   1000 ml   Net    790 ml       Nutrition  Orders Placed This Encounter   Procedures   • Diet NPO     Standing Status: Standing      Number of Occurrences: 1      Standing Expiration Date:      Order Specific Question:  Restrict to:     Answer:  Sips with Medications [3]     Physical Exam   Constitutional: He appears well-developed and well-nourished.   More energetic, but hard to understand with his words this AM   HENT:   Mouth/Throat: No oropharyngeal exudate (uvula swollen and slightly red).   Cor tract and right naris, no erosion noted   Eyes: Conjunctivae are normal. No scleral icterus.   Neck: Normal range of motion. Neck supple. No  Brudzinski's sign and no Kernig's sign noted.   Cardiovascular: Normal rate, regular rhythm and normal heart sounds.    Pulmonary/Chest: Effort normal and breath sounds normal. No stridor. No respiratory distress.   Abdominal: Soft. Bowel sounds are normal. There is no tenderness.   Genitourinary:   Hurst catheter in place   Musculoskeletal: He exhibits edema and tenderness.   Thoracic back pain with palpation of spine.   Neurological: He is alert. He displays tremor (resting). He displays normal reflexes.   Much quicker in his answers today, stronger diction and enunciation    Skin: Skin is warm and dry. No rash noted.   Psychiatric: He has a normal mood and affect. His behavior is normal. Thought content normal.   Nursing note and vitals reviewed.      Recent Labs      08/22/17   1000  08/24/17   0324   WBC  14.4*  15.6*   RBC  3.73*  3.42*   HEMOGLOBIN  10.4*  9.5*   HEMATOCRIT  32.1*  29.8*   MCV  86.1  87.1   MCH  27.9  27.8   MCHC  32.4*  31.9*   RDW  50.7*  52.9*   PLATELETCT  408  334   MPV  11.9  12.5     Recent Labs      08/22/17   0155  08/24/17   0324   SODIUM  136  138   POTASSIUM  4.2  4.2   CHLORIDE  104  104   CO2  25  27   GLUCOSE  132*  116*   BUN  15  18   CREATININE  0.66  0.74   CALCIUM  8.8  9.0                      Assessment/Plan     * Acute osteomyelitis of thoracic spine (CMS-HCC) (present on admission)  Assessment & Plan  Ordered sed rate  BCs negatitve x 2 from 8/12  No other source of infection, persistent back pain, however poor historian  Prior vertebroplasty.  Review of MRI thoracic spine with T11, 12, L1 discitis, cannot rule out acute osteomyelitis  Improved with IV zosyn, now wbc rising off zosyn.  Consulted ID:  Does not believe is acute osteomyelitis:  Will repeat MRI in 2 weeks.  8/24 repeat BCsx2.  Elevated sed rate 60 8/16, repeat 62 on 8/22.    Myelodysplastic syndrome (CMS-HCC) (present on admission)  Assessment & Plan  Seen by Dr. Ribeiro on the last admit  Platelet  dysfunction present per prior TEG and evaluation  Hold lovenox  SCDs only for DVT prophylaxis    Parkinson's disease (CMS-HCC) (present on admission)  Assessment & Plan  -Cor track in place  -Cannot do sustained released Sinemet given a cannot be crushed  -Continue short acting Sinemet while cor track is in place  -symptoms are better today, but continues to have somewhat wild daily fluctuations, possible G tube placement during this admission, need to have ongoing discussions with patient and his wife/family    Pneumonia (present on admission)  Assessment & Plan  -Possible pneumonia, given no clear infection of the spine on MRI and persisting cough and elevated white was gone responding to IV Zosyn, weakly appears to have pneumonia  -day 7/7 zosyn, WBC has normalized   8/22:  Reviewed all CXRs since admission:  No infiltrates seen on all 3.  No evidence of pneumonia.    Hematuria (present on admission)  Assessment & Plan  -resolved, hold AC, monitor closely    Acute delirium (present on admission)  Assessment & Plan  -fluctuates, likely multi-factorial, infection, prolonged hospitalization, parkinson's  -continue current treatment plan    DNR (do not resuscitate) (present on admission)  Assessment & Plan  Confirmed with the patient's wife on admission.    Left ankle swelling  Assessment & Plan  -Bilateral x-rays of the knees and ankles show very small fluid collection therefore arthrocentesis is not indicated at this time but need to watch closely  -unchanged today  -U/S negative for DVT/SVT, monitor, no e/o infection at this time  -consider compression stocking, has good ROM the L ankle on dorsi/plantar flexion, continue to see daily improvement    Acute bilateral low back pain without sciatica (present on admission)  Assessment & Plan  -MRI of cervical thoracic and lumbar spine shows no evidence of clear epidural or prevertebral abscess. Findings do show some hyper enhanced bone marrow edema which was there on  prior MRIs  -Finished 7 days of IV zosyn  -All cultures remain negative  -Mental status fluctuates  - cautious use of IV pain meds  -No neurosurgery consult is required at this time  -worked more with PT, sitting up at the bedside, afebrile, will need SNF versus REHAB    Oropharyngeal dysphagia (present on admission)  Assessment & Plan  cortrak tube feeds per SLP eval.      Acute urinary retention (present on admission)  Assessment & Plan  Hurst catheter in place.  Start cardura 8/23  H/o prostatectomy in past  Per wife:  Hurst catheter placed at [facility] at time of transfer with cudet catheter difficult placement  Continue Hurst at least 7 days from start of cardura.    Candidal UTI (urinary tract infection) (present on admission)  Assessment & Plan  Treat with diflucan  Sediment seen    Leukocytosis (present on admission)  Assessment & Plan  Slow climb from 9.9 to 14 to 15.6 off zosyn.  Unclear source.  MDS usually gives pancytopenia picture.  8/24 repeat BCs x2.      Radiology images reviewed, Labs reviewed and Medications reviewed  Hurst catheter: Gross Hematuria with Clots and Urinary Tract Retention or Urinary Tract Obstruction      DVT Prophylaxis: Contraindicated - High bleeding risk  DVT prophylaxis - mechanical: SCDs             This dictation was created using voice recognition software. The accuracy of the dictation is limited to the abilities of the software. I expect there may be some errors of grammar and possibly content.

## 2017-08-24 NOTE — PROGRESS NOTES
Amara Pascal Fall Risk Assessment:     Last Known Fall: Within the last year  Mobility: Use of assistive device/requires assist of two people  Medications: Cardiovascular or central nervous system meds  Mental Status/LOC/Awareness: Awake, alert, and oriented to date, place, and person  Toileting Needs: Use of catheters or diversion devices  Volume/Electrolyte Status: Use of IV fluids/tube feeds  Communication/Sensory: Visual (Glasses)/hearing deficit  Behavior: Appropriate behavior  Amara Pascal Fall Risk Total: 12  Fall Risk Level: MODERATE RISK    Universal Fall Precautions:  call light/belongings in reach, siderails up x 2    Fall Risk Level Interventions:   TRIAL (TELE 8, NEURO, MED ZAKIYA 5) Low Fall Risk Interventions  Place yellow fall risk ID band on patient: verified  Provide patient/family education based on risk assessment: verified  Educate patient/family to call staff for assistance when getting out of bed: verified  Place fall precaution signage outside patient door: verifiedTRIAL (TELE 8, NEURO, MED ZAKIYA 5) Moderate Fall Risk Interventions  Place yellow fall risk ID band on patient: verified  Provide patient/family education based on risk assessment : completed  Educate patient/family to call staff for assistance when getting out of bed: completed  Place fall precaution signage outside patient door: verified  Utilize bed/chair fall alarm: completedTRIAL (TELE 8, NEURO, [employer] ZAKIYA 5) High Fall Risk Interventions  Place yellow fall risk ID band on patient: verified  Provide patient/family education based on risk assessment: verified  Educate patient/family to call staff for assistance when getting out of bed: verified  Place fall precaution signage outside patient door: verified  Place patient in room close to nursing station: verified  Utilize bed/chair fall alarm: verified  Notify charge of high risk for huddle: verified    Patient Specific Interventions:     Medication: review medications with  patient and family  Mental Status/LOC/Awareness: check on patient hourly  Toileting: provide frquent toileting  Volume/Electrolyte Status: ensure patient remains hydrated and ensure IV fluids are appropriate  Communication/Sensory: update plan of care on whiteboard  Behavioral: encourage patient to voice feelings and engage patient in daily activities  Mobility: schedule physical activity throughout the day and utilize bed/chair fall alarm

## 2017-08-24 NOTE — PROGRESS NOTES
Amara Pascal Fall Risk Assessment:     Last Known Fall: Within the last year  Mobility: Use of assistive device/requires assist of two people  Medications: Cardiovascular or central nervous system meds  Mental Status/LOC/Awareness: Memory loss/confusion and requires reorienting  Toileting Needs: Use of catheters or diversion devices  Volume/Electrolyte Status: Use of IV fluids/tube feeds  Communication/Sensory: Visual (Glasses)/hearing deficit  Behavior: Appropriate behavior  Amara Pascal Fall Risk Total: 15  Fall Risk Level: HIGH RISK    Universal Fall Precautions:  call light/belongings in reach, bed in low position and locked, wheelchairs and assistive devices out of sight, siderails up x 2, use non-slip footwear, adequate lighting, clutter free and spill free environment, educate on level of risk, educate to call for assistance    Fall Risk Level Interventions:   TRIAL ([place] [employer] 8, NEURO, MED ZAKIYA 5) Low Fall Risk Interventions  Place yellow fall risk ID band on patient: verified  Provide patient/family education based on risk assessment: verified  Educate patient/family to call staff for assistance when getting out of bed: verified  Place fall precaution signage outside patient door: verified TRIAL ([employer] 8, NEURO, [employer] ZAKIYA 5) High Fall Risk Interventions  Place yellow fall risk ID band on patient: verified  Provide patient/family education based on risk assessment: verified  Educate patient/family to call staff for assistance when getting out of bed: verified  Place fall precaution signage outside patient door: verified  Place patient in room close to nursing station: verified  Utilize bed/chair fall alarm: verified  Notify charge of high risk for huddle: verified    Patient Specific Interventions:     Medication: review medications with patient and family and limit combination of prn medications  Mental Status/LOC/Awareness: reorient patient, reinforce falls education, encourage family to stay with patient,  check on patient hourly, utilize bed/chair fall alarm, reinforce the use of call light and provide activity  Toileting: monitor intake and output/use of appropriate interventions  Volume/Electrolyte Status: ensure patient remains hydrated and monitor abnormal lab values  Communication/Sensory: update plan of care on whiteboard, ensure proper positioning when transferrng/ambulating and ensure patient has glasses/contacts and hearing aids/dentures  Behavioral: encourage patient to voice feelings, engage patient in daily activities, administer medication as ordered, instruct/reinforce fall program rationale, encourage family to stay with impulsive patients and use appropriate de-escalation techniques  Mobility: schedule physical activity throughout the day, provide comfort measures during transport, utilize bed/chair fall alarm, ensure bed is locked and in lowest position and provide appropriate assistive device

## 2017-08-24 NOTE — PROGRESS NOTES
Assumed care of patient at 0700.  Received report from night RN.  Pt alert and oriented x 4, has no complaints of pain. Wife at bedside. Pt resting comfortably in bed.

## 2017-08-25 LAB
ANION GAP SERPL CALC-SCNC: 6 MMOL/L (ref 0–11.9)
ANISOCYTOSIS BLD QL SMEAR: ABNORMAL
BASOPHILS # BLD AUTO: 0 % (ref 0–1.8)
BASOPHILS # BLD: 0 K/UL (ref 0–0.12)
BUN SERPL-MCNC: 20 MG/DL (ref 8–22)
CALCIUM SERPL-MCNC: 9 MG/DL (ref 8.5–10.5)
CHLORIDE SERPL-SCNC: 102 MMOL/L (ref 96–112)
CO2 SERPL-SCNC: 28 MMOL/L (ref 20–33)
CREAT SERPL-MCNC: 0.77 MG/DL (ref 0.5–1.4)
EOSINOPHIL # BLD AUTO: 0 K/UL (ref 0–0.51)
EOSINOPHIL NFR BLD: 0 % (ref 0–6.9)
ERYTHROCYTE [DISTWIDTH] IN BLOOD BY AUTOMATED COUNT: 55.2 FL (ref 35.9–50)
GFR SERPL CREATININE-BSD FRML MDRD: >60 ML/MIN/1.73 M 2
GIANT PLATELETS BLD QL SMEAR: NORMAL
GLUCOSE SERPL-MCNC: 123 MG/DL (ref 65–99)
HCT VFR BLD AUTO: 30.5 % (ref 42–52)
HGB BLD-MCNC: 9.8 G/DL (ref 14–18)
LG PLATELETS BLD QL SMEAR: NORMAL
LYMPHOCYTES # BLD AUTO: 0.37 K/UL (ref 1–4.8)
LYMPHOCYTES NFR BLD: 1.8 % (ref 22–41)
MACROCYTES BLD QL SMEAR: ABNORMAL
MANUAL DIFF BLD: NORMAL
MCH RBC QN AUTO: 28.3 PG (ref 27–33)
MCHC RBC AUTO-ENTMCNC: 32.1 G/DL (ref 33.7–35.3)
MCV RBC AUTO: 88.2 FL (ref 81.4–97.8)
MONOCYTES # BLD AUTO: 1.9 K/UL (ref 0–0.85)
MONOCYTES NFR BLD AUTO: 9.3 % (ref 0–13.4)
MORPHOLOGY BLD-IMP: NORMAL
MYELOCYTES NFR BLD MANUAL: 2.8 %
NEUTROPHILS # BLD AUTO: 17.56 K/UL (ref 1.82–7.42)
NEUTROPHILS NFR BLD: 85.2 % (ref 44–72)
NEUTS BAND NFR BLD MANUAL: 0.9 % (ref 0–10)
NRBC # BLD AUTO: 0.03 K/UL
NRBC BLD AUTO-RTO: 0.1 /100 WBC
PLATELET # BLD AUTO: 313 K/UL (ref 164–446)
PLATELET BLD QL SMEAR: NORMAL
PMV BLD AUTO: 12.2 FL (ref 9–12.9)
POTASSIUM SERPL-SCNC: 4.3 MMOL/L (ref 3.6–5.5)
RBC # BLD AUTO: 3.46 M/UL (ref 4.7–6.1)
RBC BLD AUTO: PRESENT
SODIUM SERPL-SCNC: 136 MMOL/L (ref 135–145)
WBC # BLD AUTO: 20.4 K/UL (ref 4.8–10.8)

## 2017-08-25 PROCEDURE — 99232 SBSQ HOSP IP/OBS MODERATE 35: CPT | Performed by: HOSPITALIST

## 2017-08-25 PROCEDURE — 700102 HCHG RX REV CODE 250 W/ 637 OVERRIDE(OP): Performed by: INTERNAL MEDICINE

## 2017-08-25 PROCEDURE — 97110 THERAPEUTIC EXERCISES: CPT

## 2017-08-25 PROCEDURE — A9270 NON-COVERED ITEM OR SERVICE: HCPCS | Performed by: HOSPITALIST

## 2017-08-25 PROCEDURE — 97116 GAIT TRAINING THERAPY: CPT

## 2017-08-25 PROCEDURE — 80048 BASIC METABOLIC PNL TOTAL CA: CPT

## 2017-08-25 PROCEDURE — G8997 SWALLOW GOAL STATUS: HCPCS | Mod: CK

## 2017-08-25 PROCEDURE — G8996 SWALLOW CURRENT STATUS: HCPCS | Mod: CK

## 2017-08-25 PROCEDURE — A9270 NON-COVERED ITEM OR SERVICE: HCPCS | Performed by: FAMILY MEDICINE

## 2017-08-25 PROCEDURE — 97530 THERAPEUTIC ACTIVITIES: CPT

## 2017-08-25 PROCEDURE — 36415 COLL VENOUS BLD VENIPUNCTURE: CPT

## 2017-08-25 PROCEDURE — 85007 BL SMEAR W/DIFF WBC COUNT: CPT

## 2017-08-25 PROCEDURE — 700111 HCHG RX REV CODE 636 W/ 250 OVERRIDE (IP): Performed by: NURSE PRACTITIONER

## 2017-08-25 PROCEDURE — 92526 ORAL FUNCTION THERAPY: CPT

## 2017-08-25 PROCEDURE — 85027 COMPLETE CBC AUTOMATED: CPT

## 2017-08-25 PROCEDURE — 770006 HCHG ROOM/CARE - MED/SURG/GYN SEMI*

## 2017-08-25 PROCEDURE — 700102 HCHG RX REV CODE 250 W/ 637 OVERRIDE(OP): Performed by: HOSPITALIST

## 2017-08-25 PROCEDURE — A9270 NON-COVERED ITEM OR SERVICE: HCPCS | Performed by: INTERNAL MEDICINE

## 2017-08-25 PROCEDURE — 700102 HCHG RX REV CODE 250 W/ 637 OVERRIDE(OP): Performed by: FAMILY MEDICINE

## 2017-08-25 RX ORDER — LORAZEPAM 2 MG/ML
1 INJECTION INTRAMUSCULAR ONCE
Status: COMPLETED | OUTPATIENT
Start: 2017-08-25 | End: 2017-08-25

## 2017-08-25 RX ADMIN — TRIAZOLAM 0.12 MG: 0.25 TABLET ORAL at 20:47

## 2017-08-25 RX ADMIN — MEMANTINE HYDROCHLORIDE 10 MG: 10 TABLET, FILM COATED ORAL at 08:03

## 2017-08-25 RX ADMIN — CITALOPRAM HYDROBROMIDE 20 MG: 20 TABLET ORAL at 08:03

## 2017-08-25 RX ADMIN — CARBIDOPA AND LEVODOPA 1 TABLET: 10; 100 TABLET ORAL at 20:51

## 2017-08-25 RX ADMIN — CARBIDOPA AND LEVODOPA 1 TABLET: 10; 100 TABLET ORAL at 14:31

## 2017-08-25 RX ADMIN — FLUCONAZOLE 200 MG: 100 TABLET ORAL at 08:03

## 2017-08-25 RX ADMIN — OXYCODONE HYDROCHLORIDE AND ACETAMINOPHEN 500 MG: 500 TABLET ORAL at 08:03

## 2017-08-25 RX ADMIN — BACITRACIN ZINC: 500 OINTMENT TOPICAL at 21:13

## 2017-08-25 RX ADMIN — Medication 220 MG: at 14:31

## 2017-08-25 RX ADMIN — AMANTADINE HYDROCHLORIDE 100 MG: 50 SOLUTION ORAL at 20:51

## 2017-08-25 RX ADMIN — MODAFINIL 200 MG: 100 TABLET ORAL at 08:07

## 2017-08-25 RX ADMIN — AMANTADINE HYDROCHLORIDE 100 MG: 50 SOLUTION ORAL at 08:02

## 2017-08-25 RX ADMIN — ACETAMINOPHEN 650 MG: 325 TABLET, FILM COATED ORAL at 23:45

## 2017-08-25 RX ADMIN — POTASSIUM CHLORIDE 30 MEQ: 1500 TABLET, EXTENDED RELEASE ORAL at 08:03

## 2017-08-25 RX ADMIN — MEMANTINE HYDROCHLORIDE 10 MG: 10 TABLET, FILM COATED ORAL at 20:50

## 2017-08-25 RX ADMIN — CARBIDOPA AND LEVODOPA 2 TABLET: 25; 100 TABLET ORAL at 20:51

## 2017-08-25 RX ADMIN — CARBIDOPA AND LEVODOPA 1 TABLET: 10; 100 TABLET ORAL at 05:26

## 2017-08-25 RX ADMIN — BACITRACIN ZINC: 500 OINTMENT TOPICAL at 08:02

## 2017-08-25 RX ADMIN — LORAZEPAM 1 MG: 2 INJECTION INTRAMUSCULAR; INTRAVENOUS at 21:37

## 2017-08-25 RX ADMIN — POTASSIUM CHLORIDE 30 MEQ: 1500 TABLET, EXTENDED RELEASE ORAL at 20:50

## 2017-08-25 RX ADMIN — DOXAZOSIN MESYLATE 1 MG: 1 TABLET ORAL at 20:50

## 2017-08-25 ASSESSMENT — COGNITIVE AND FUNCTIONAL STATUS - GENERAL
TURNING FROM BACK TO SIDE WHILE IN FLAT BAD: A LOT
MOVING FROM LYING ON BACK TO SITTING ON SIDE OF FLAT BED: A LOT
SUGGESTED CMS G CODE MODIFIER MOBILITY: CL
MOBILITY SCORE: 11
MOVING TO AND FROM BED TO CHAIR: A LOT
WALKING IN HOSPITAL ROOM: A LOT
STANDING UP FROM CHAIR USING ARMS: A LOT
CLIMB 3 TO 5 STEPS WITH RAILING: TOTAL

## 2017-08-25 ASSESSMENT — ENCOUNTER SYMPTOMS
ABDOMINAL PAIN: 0
COUGH: 0
SPEECH CHANGE: 1
TREMORS: 1
FEVER: 0
WEAKNESS: 1
NECK PAIN: 0
HEADACHES: 0
BACK PAIN: 1
DEPRESSION: 0
SHORTNESS OF BREATH: 0
LOSS OF CONSCIOUSNESS: 0
FOCAL WEAKNESS: 0
NAUSEA: 0
BLURRED VISION: 0
VOMITING: 0
FOCAL WEAKNESS: 1
DIZZINESS: 0

## 2017-08-25 ASSESSMENT — PAIN SCALES - GENERAL
PAINLEVEL_OUTOF10: 0

## 2017-08-25 ASSESSMENT — GAIT ASSESSMENTS
GAIT LEVEL OF ASSIST: MAXIMAL ASSIST
ASSISTIVE DEVICE: FRONT WHEEL WALKER
DEVIATION: BRADYKINETIC;OTHER (COMMENT)
DISTANCE (FEET): 3

## 2017-08-25 NOTE — PROGRESS NOTES
Assumed care of patient at 0700.  Received report from night RN.  Pt alert and oriented x4, has no complaints of pain. Pt resting comfortably in bed. Cortrak intact with feedings going at goal rate.  Uhrst intact.

## 2017-08-25 NOTE — CARE PLAN
Problem: Safety  Goal: Will remain free from falls  Outcome: PROGRESSING AS EXPECTED    Problem: Infection  Goal: Will remain free from infection  Outcome: PROGRESSING AS EXPECTED

## 2017-08-25 NOTE — PROGRESS NOTES
Assumed care of pt from day RN. Pt in bed with strip alarm in place. Call light within reach.  Denies pain or needs at this time.  Will monitor, assist and medicate per MAR for duration of shift.  Hourly rounding implemented.      VSS. Q2 turns. Natali with active order. Waffle overlay on bed.

## 2017-08-25 NOTE — PROGRESS NOTES
Amara Pascal Fall Risk Assessment:     Last Known Fall: Within the last year  Mobility: Use of assistive device/requires assist of two people  Medications: Cardiovascular or central nervous system meds  Mental Status/LOC/Awareness: Awake, alert, and oriented to date, place, and person  Toileting Needs: Use of catheters or diversion devices  Volume/Electrolyte Status: Use of IV fluids/tube feeds  Communication/Sensory: Visual (Glasses)/hearing deficit  Behavior: Appropriate behavior  Amara Pascal Fall Risk Total: 12  Fall Risk Level: MODERATE RISK    Universal Fall Precautions:  call light/belongings in reach, siderails up x 2    Fall Risk Level Interventions:   TRIAL (TELE 8, NEURO, MED ZAKIYA 5) Low Fall Risk Interventions  Place yellow fall risk ID band on patient: verified  Provide patient/family education based on risk assessment: verified  Educate patient/family to call staff for assistance when getting out of bed: verified  Place fall precaution signage outside patient door: verifiedTRIAL (TELE 8, NEURO, MED ZAKIYA 5) Moderate Fall Risk Interventions  Place yellow fall risk ID band on patient: verified  Provide patient/family education based on risk assessment : completed  Educate patient/family to call staff for assistance when getting out of bed: completed  Place fall precaution signage outside patient door: verified  Utilize bed/chair fall alarm: completedTRIAL (TELE 8, NEURO, [employer] ZAKIYA 5) High Fall Risk Interventions  Place yellow fall risk ID band on patient: verified  Provide patient/family education based on risk assessment: verified  Educate patient/family to call staff for assistance when getting out of bed: verified  Place fall precaution signage outside patient door: verified  Place patient in room close to nursing station: verified  Utilize bed/chair fall alarm: verified  Notify charge of high risk for huddle: verified    Patient Specific Interventions:     Medication: review medications with  patient and family  Mental Status/LOC/Awareness: check on patient hourly  Toileting: provide frquent toileting  Volume/Electrolyte Status: ensure patient remains hydrated and advance diet as tolerated  Communication/Sensory: update plan of care on whiteboard  Behavioral: encourage patient to voice feelings and engage patient in daily activities  Mobility: schedule physical activity throughout the day

## 2017-08-25 NOTE — THERAPY
"Physical Therapy Treatment completed.   Bed Mobility:  Supine to Sit: Minimal Assist  Transfers: Sit to Stand: Moderate Assist  Gait: Level Of Assist: Maximal Assist with Front-Wheel Walker       Plan of Care: Will benefit from Physical Therapy 3 times per week  Discharge Recommendations: Equipment: Will Continue to Assess for Equipment Needs. Post-acute therapy Discharge to a transitional care facility for continued skilled therapy services.     See \"Rehab Therapy-Acute\" Patient Summary Report for complete documentation.     Pt presenting w/ increased fatigue and decreased coordination. Pt having difficulty following 2 step commands, requiring physical assist to complete. Difficulty w/ standing today d/t increased rigidity causing difficulty standing up and keeping feet on the ground. Rhthmic repetition helped to decrease tone before standing but tone quickly returned. Pt showing good kinesthetic and proprioceptive awareness of LE's. Pt will benefit from post acute SNF to work on endurance and functional mobility.  "

## 2017-08-25 NOTE — THERAPY
"Speech Language Therapy dysphagia treatment completed.   Functional Status:  Pt lethargic and wife reporting just working with PT 1 hour ago \"wiped him out.\" He is less intelligible than yesterday. Able to follow simple lingual and labial exercises with fair accuracy given max cues. He was unable to trigger a swallow spontaneously despite max cues. Educ wife extensively re dysphagia exercises and SLP POC. Many questions answered.  Recommendations:  Pt would benefit from post acute SLP. Continue NPO/TF.    Plan of Care: Will benefit from Speech Therapy 3 times per week  Post-Acute Therapy: Discharge to a transitional care facility for continued skilled therapy services.    See \"Rehab Therapy-Acute\" Patient Summary Report for complete documentation.     "

## 2017-08-25 NOTE — PROGRESS NOTES
Amara Pascal Fall Risk Assessment:     Last Known Fall: Within the last year  Mobility: Use of assistive device/requires assist of two people  Medications: Cardiovascular or central nervous system meds  Mental Status/LOC/Awareness: Awake, alert, and oriented to date, place, and person  Toileting Needs: Use of catheters or diversion devices  Volume/Electrolyte Status: Use of IV fluids/tube feeds  Communication/Sensory: Visual (Glasses)/hearing deficit  Behavior: Appropriate behavior  Amara Pascal Fall Risk Total: 12  Fall Risk Level: MODERATE RISK    Universal Fall Precautions:  call light/belongings in reach, bed in low position and locked, wheelchairs and assistive devices out of sight, siderails up x 2, use non-slip footwear, adequate lighting, clutter free and spill free environment, educate on level of risk, educate to call for assistance    Fall Risk Level Interventions:   TRIAL (TELE 8, NEURO, MED ZAKIYA 5) Low Fall Risk Interventions  Place yellow fall risk ID band on patient: verified  Provide patient/family education based on risk assessment: verified  Educate patient/family to call staff for assistance when getting out of bed: verified  Place fall precaution signage outside patient door: verifiedTRIAL (TELE 8, NEURO, MED AZKIYA 5) Moderate Fall Risk Interventions  Place yellow fall risk ID band on patient: verified  Provide patient/family education based on risk assessment : completed  Educate patient/family to call staff for assistance when getting out of bed: completed  Place fall precaution signage outside patient door: verified  Utilize bed/chair fall alarm: completedTRIAL (TELE 8, NEURO, [employer] ZAKIYA 5) High Fall Risk Interventions  Place yellow fall risk ID band on patient: verified  Provide patient/family education based on risk assessment: verified  Educate patient/family to call staff for assistance when getting out of bed: verified  Place fall precaution signage outside patient door:  verified  Place patient in room close to nursing station: verified  Utilize bed/chair fall alarm: verified  Notify charge of high risk for huddle: verified    Patient Specific Interventions:     Medication: review medications with patient and family and limit combination of prn medications  Mental Status/LOC/Awareness: reorient patient, reinforce falls education, encourage family to stay with patient, check on patient hourly, utilize bed/chair fall alarm and reinforce the use of call light  Toileting: provide frquent toileting  Volume/Electrolyte Status: ensure patient remains hydrated and monitor abnormal lab values  Communication/Sensory: update plan of care on whiteboard, ensure proper positioning when transferrng/ambulating and ensure patient has glasses/contacts and hearing aids/dentures  Behavioral: encourage patient to voice feelings, engage patient in daily activities, administer medication as ordered and instruct/reinforce fall program rationale  Mobility: schedule physical activity throughout the day, provide comfort measures during transport, utilize bed/chair fall alarm and ensure bed is locked and in lowest position

## 2017-08-25 NOTE — PROGRESS NOTES
Infectious Disease Progress Note    Author: Magdalena Strong M.D. Date & Time of service: 2017  12:57 PM    Chief Complaint:  Back infection    Interval History:  76-year-old male with Parkinson's, MDS, splenectomy was admitted with altered mental status and fevers. Likely due to UTI  17-MAXIMUM TEMPERATURE 98.2. WBC 15.6. Today awake and responsive  17-MAXIMUM TEMPERATURE 98.1. WBC 20. Denies any increased back pain.   Labs Reviewed, Medications Reviewed and Radiology Reviewed.    Review of Systems:  Review of Systems   Constitutional: Positive for malaise/fatigue. Negative for fever.   Respiratory: Negative for cough and shortness of breath.    Cardiovascular: Negative for chest pain and leg swelling.   Gastrointestinal: Negative for nausea, vomiting and abdominal pain.   Genitourinary: Negative for dysuria.   Neurological: Positive for focal weakness and weakness. Negative for headaches.       Hemodynamics:  Temp (24hrs), Av.5 °C (97.7 °F), Min:36.1 °C (96.9 °F), Max:36.7 °C (98.1 °F)  Temperature: 36.1 °C (96.9 °F)  Pulse  Av.9  Min: 60  Max: 99   Blood Pressure : 130/60 mmHg       Physical Exam:  Physical Exam   Constitutional: No distress.   Looks chronically ill   HENT:   Mouth/Throat: No oropharyngeal exudate.   cortrak   Eyes: No scleral icterus.   Neck: Neck supple.   Cardiovascular: Regular rhythm.    No murmur heard.  Pulmonary/Chest: He has no wheezes. He has no rales.   Abdominal: Soft. There is no tenderness.   Musculoskeletal: He exhibits no edema.   Neurological: He is alert. He displays abnormal reflex. He exhibits abnormal muscle tone. Coordination abnormal.   Vitals reviewed.      Meds:    Current facility-administered medications:   •  ferrous sulfate  •  ascorbic acid  •  fluconazole  •  doxazosin  •  amantadine  •  potassium chloride SA  •  carbidopa-levodopa  •  carbidopa-levodopa  •  bacitracin  •  citalopram  •  memantine  •  modafinil  •  triazolam  •   senna-docusate **AND** polyethylene glycol/lytes **AND** magnesium hydroxide **AND** bisacodyl  •  Respiratory Care per Protocol  •  NS  •  acetaminophen  •  ondansetron  •  ondansetron    Labs:  Recent Labs      08/24/17 0324 08/25/17   0324   WBC  15.6*  20.4*   RBC  3.42*  3.46*   HEMOGLOBIN  9.5*  9.8*   HEMATOCRIT  29.8*  30.5*   MCV  87.1  88.2   MCH  27.8  28.3   RDW  52.9*  55.2*   PLATELETCT  334  313   MPV  12.5  12.2   NEUTSPOLYS  85.10*  85.20*   LYMPHOCYTES  4.40*  1.80*   MONOCYTES  9.60  9.30   EOSINOPHILS  0.00  0.00   BASOPHILS  0.00  0.00   RBCMORPHOLO  Present  Present     Recent Labs      08/24/17 0324 08/25/17 0324   SODIUM  138  136   POTASSIUM  4.2  4.3   CHLORIDE  104  102   CO2  27  28   GLUCOSE  116*  123*   BUN  18  20     Recent Labs      08/24/17 0324 08/25/17 0324   CREATININE  0.74  0.77       Imaging:  Ct-chest,abdomen,pelvis With    8/6/2017 8/6/2017 6:58 PM HISTORY/REASON FOR EXAM:  Myelodysplastic syndrome. History of compression deformities in the spine. TECHNIQUE/EXAM DESCRIPTION: CT scan of the chest, abdomen and pelvis with contrast. Thin-section helical scanning was obtained with intravenous contrast from the lung apices through the pubic symphysis to include the chest, abdomen and pelvis. 100 mL of Omnipaque 350 nonionic contrast was administered intravenously without complication. Low dose optimization technique was utilized for this CT exam including automated exposure control and adjustment of the mA and/or kV according to patient size. COMPARISON: MRI lumbar spine 5/8/2017 and x-ray from the same day FINDINGS: CT Chest: There is mild bilateral posterior atelectasis. No pleural fluid is identified. There are bilateral thyroid nodules. There are scattered arterial calcifications. No significant pericardial effusion. No adenopathy is identified. There is thoracic kyphosis with mild compression deformities in the upper lumbar spine. CT Abdomen: Irregular  hypodense mass in the hepatic dome measures approximately 1.7 cm. There are multiple splenules in the left upper quadrant. Soft tissue mass at the tip of the liver likely represents an additional splenule. The adrenal glands, pancreas, and left kidney are unremarkable. A 1.5 cm mass in the mid right kidney is indeterminant, but likely represents a small cyst. There is a 2 mm nonobstructing calculus in the lower pole of the right kidney. The kidneys enhance symmetrically. There is a gallbladder calculus without wall thickening or pericholecystic fluid. There is a moderate amount of colonic stool. There are scattered arterial calcifications. CT Pelvis: The bladder is unremarkable. No significant free fluid or adenopathy. Degenerative changes are in the spine. Vertebral augmentation has been performed at T11, T12, and L1.     8/6/2017  1.  1.7 cm hypodense mass in the hepatic dome is indeterminant, but may represent a hemangioma or complex cyst. Nonemergent hepatic protocol MRI could be performed for further evaluation. 2.  1.5 cm hypodense mass in the mid right kidney, likely a complex cyst. Follow-up ultrasound or renal protocol CT or MRI could be performed for confirmation. 3.  Multiple splenules in the abdomen. 4.  Cholelithiasis 5.  Spinal compression deformities status post vertebral augmentation at T11, T12, and L1.    Ct-head W/o    8/12/2017 8/12/2017 8:27 PM HISTORY/REASON FOR EXAM:  Altered Mental Status. Fever, cough, decreased mentation. TECHNIQUE/EXAM DESCRIPTION AND NUMBER OF VIEWS: CT of the head without contrast. The study was performed on a helical multidetector CT scanner. Contiguous 2.5 mm axial sections were obtained from the skull base through the vertex. Up to date radiation dose reduction adjustments have been utilized to meet ALARA standards for radiation dose reduction. COMPARISON:  MRI of the brain 11/1/2016 FINDINGS: The calvariae and skull base are unremarkable. There are no extraaxial  fluid collections. There is a pattern of cerebral atrophy manifest as enlargement of sulcal markings and ventricular prominence. The ventricular system and basal cisterns are otherwise unremarkable. There are no areas of abnormal density in the brain substance. There are no hemorrhagic lesions. There are no mass effects or shift of midline structures. The brainstem and posterior fossa structures are unremarkable. Paranasal sinuses show some minor scattered mucosal thickening among ethmoid air cells no air-fluid levels are evident. Mastoids show opacification of clustered air cells of the left mastoid tip. This may be chronic as similar findings were noted on the MRI.     8/12/2017  1.  Mild-moderate cerebral atrophy. 2.  Opacification of a few clustered air cells of the left mastoid tip which may represent chronic or active postinflammatory changes. 3.  Otherwise, no acute findings and no appreciable change compared to MRI study 11/1/2016.    Ct-tspine W/o Plus Recons    8/12/2017 8/12/2017 8:27 PM HISTORY/REASON FOR EXAM:  Pain Following Trauma. Altered level of consciousness. Fever. Cough. Decreased mentation. History of recent kyphoplasty. TECHNIQUE/EXAM DESCRIPTION AND NUMBER OF VIEWS: CT thoracic spine without contrast, with reconstructions. The study was performed on a GE CT scanner. Thin-section helical scanning was performed from C7-T1 through T12-L1.  Sagittal and coronal multiplanar reconstructions were generated from the axial images. Low dose optimization technique was utilized for this CT exam including automated exposure control and adjustment of the mA and/or kV according to patient size. COMPARISON: Recent CT of the chest, abdomen, pelvis 8/6/2017 FINDINGS:  Alignment in the thoracic spine shows no subluxation. Again noted, there are variable compression fractures at T11, T12, and L1 which have been treated with vertebral augmentation. At the T11 level, there is a small amount of extravasated  epidural cement (axial image 88, series 6). Again noted are mild superior endplate compression deformities at T2, T3, T4 with no change from the prior exam. No acute fractures are evident. There is some anterior marginal spurring in the midthoracic spine. The prevertebral and paraspinous soft tissues show no abnormal paravertebral fluid collection. There is some subsegmental atelectatic infiltrate in the posterior lung zones, most notable in the left posterior basal segment adjacent to the descending aorta. Similar appearance on the recent exam.     8/12/2017  1.  Status post vertebral augmentation at T11, T12, L1. Small amount of ventral epidural cement extravasation at the T11 level. 2.  Mild superior endplate compression deformities again seen at T2, T3, T4. No change from 8/6/2017. 3.  Bibasilar subsegmental atelectatic changes most prominent at the left lower lobe posterior basal segment. Little or no change since recent exam 8/6/2017.    Dx-ankle 2- Views Left    8/15/2017  8/15/2017 4:31 AM HISTORY/REASON FOR EXAM:  Atraumatic Pain/Swelling/Deformity Left ankle pain. TECHNIQUE/EXAM DESCRIPTION AND NUMBER OF VIEWS:  2 views of the LEFT ankle. COMPARISON: None. FINDINGS: Diffuse osseous demineralization, limiting evaluation for nondisplaced fracture. No definite acute fracture or dislocation. The ankle mortise is intact. Soft tissue swelling about the lateral malleolus. Mild osteoarthritis of the tibiotalar joint.     8/15/2017  Soft tissue swelling about the lateral malleolus. No definite acute osseous abnormality.    Dx-chest-portable (1 View)    8/13/2017 8/13/2017 4:25 PM HISTORY/REASON FOR EXAM:  Cough. TECHNIQUE/EXAM DESCRIPTION AND NUMBER OF VIEWS: Single portable view of the chest. COMPARISON: 8/12/2017 FINDINGS: Lower lung volumes were achieved on the current examination. Bibasilar opacities may represent hypoinflation atelectasis. No pleural effusion or pneumothorax is seen. The cardiomediastinal  silhouette is stable. Atherosclerotic calcification is seen.     8/13/2017  Mild bibasilar atelectasis. Stable prominence of the cardiomediastinal silhouette. Atherosclerotic plaque.    Dx-chest-portable (1 View)    8/12/2017 8/12/2017 7:17 PM HISTORY/REASON FOR EXAM:  Possible sepsis. TECHNIQUE/EXAM DESCRIPTION AND NUMBER OF VIEWS: Single portable view of the chest. COMPARISON: 8/9/2017 FINDINGS: The cardiomediastinal silhouette is enlarged. Atherosclerotic calcification is seen. No pleural effusion or pneumothorax is seen. Skinfold is noted on the right. Mild left basilar opacity may represent atelectasis.     8/12/2017  Stable prominence of the cardiomediastinal silhouette. Atherosclerotic plaque. Mild left basilar opacity may represent atelectasis.    Dx-chest-portable (1 View)    8/9/2017 8/9/2017 5:57 PM HISTORY/REASON FOR EXAM:  Congestion for 2 months. TECHNIQUE/EXAM DESCRIPTION AND NUMBER OF VIEWS: Single portable view of the chest. COMPARISON: None FINDINGS: Heart size is enlarged. Pulmonary vessels are normal in size. Streaky linear opacities are present in the left lower lobe which may represent atelectasis or scar. The upper lungs are clear. No pleural abnormalities are noted.     8/9/2017  Streaky linear opacities in the left lower lobe which may represent subsegmental atelectasis or scar. Mild cardiomegaly.    Dx-knee 2- Right    8/15/2017  8/15/2017 4:31 AM HISTORY/REASON FOR EXAM:  Atraumatic Pain/Swelling/Deformity TECHNIQUE/EXAM DESCRIPTION AND NUMBER OF VIEWS:  2 views of the RIGHT knee. COMPARISON: None FINDINGS: Diffuse osseous demineralization, limiting evaluation for nondisplaced fracture. No definite acute fracture or dislocation. Mild to moderate tricompartmental osteoarthritis, most in the medial compartment Moderate knee joint effusion.     8/15/2017  1. No definite acute osseous abnormality. 2. Mild to moderate tricompartmental osteoarthritis and moderate knee joint  effusion.    Dx-knee 2- Left    8/15/2017  8/15/2017 4:31 AM HISTORY/REASON FOR EXAM:  Atraumatic Pain/Swelling/Deformity TECHNIQUE/EXAM DESCRIPTION AND NUMBER OF VIEWS:  2 views of the LEFT knee. COMPARISON: None FINDINGS: Diffuse osseous demineralization, limiting evaluation for nondisplaced fracture. No definite acute fracture or dislocation. Mild tricompartmental osteoarthritis. Small knee joint effusion     8/15/2017  1. No definite acute osseous abnormality. 2. Small knee joint effusion    Dx-thoracolumbar Spine-2 Views    8/6/2017 8/6/2017 11:50 AM HISTORY/REASON FOR EXAM: Back pain. TECHNIQUE/EXAM DESCRIPTION AND NUMBER OF VIEWS:  Thoracic spine, 2 views. COMPARISON:  None. FINDINGS: 2 intraoperative views of the thoracic spine obtained following multilevel kyphoplasty demonstrate kyphoplasty cement within the partially collapsed vertebral bodies at approximately the T11-L1 levels.     8/6/2017  Intraoperative visualization of 3 level kyphoplasty at approximately the T11-L1 levels.    Mr-cervical Spine-with & W/o    8/17/2017 8/16/2017 5:26 PM HISTORY/REASON FOR EXAM:  76-year-old man with lower extremity pain and weakness with a leukocytosis and fever, clinical concern for abscess or infection; kyphoplasty performed on 8/6/2017. History of myelodysplastic syndrome. TECHNIQUE/EXAM DESCRIPTION: MRI of the cervical spine without and with contrast. The study was performed on a [employer] 1.5 Griselda MRI scanner.  T1 sagittal, T2 fast spin-echo sagittal, T1 postcontrast fat-suppressed sagittal, and gradient-echo axial images were obtained of the cervical spine before contrast. Following the administration of intravenous contrast sagittal and optional axial T1 fat-suppressed images were obtained. 20 mL Omniscan contrast was administered intravenously. COMPARISON:  None. FINDINGS: There is no acute fracture or gross malalignment. There is 2 mm of degenerative retrolisthesis of C4 on C5. There is loss of  intervertebral disc height throughout the cervical spine, most pronounced at C5-C6 and C6-C7. Marrow signal is diffusely low on T1 and T2, relatively sparing the dens. The cervical cord has a normal caliber course and signal intensity. No area of abnormal enhancement is seen. Findings specific to each level are described below: C2-3: Unremarkable C3-4:  Mild RIGHT facet arthropathy C4-5:  Posterior disc osteophyte complex extending to the BILATERAL foraminal zones. Mild RIGHT facet arthropathy. Mild central canal narrowing. Mild RIGHT neural foraminal narrowing. C5-6: Posterior disc osteophyte complex. Mild central canal narrowing. Moderate RIGHT and mild LEFT neural foraminal narrowing. C6-7: Posterior disc osteophyte complex extending to the RIGHT more than the LEFT neural foramen. Mild central canal narrowing. Mild RIGHT neural foraminal narrowing. C7-T1: Posterior disc osteophyte complex eccentric to the RIGHT more than the LEFT foraminal zone. Mild RIGHT neural foraminal narrowing. No soft tissue mass is evident.     8/17/2017  1.  No evidence of cervical discitis osteomyelitis or epidural abscess 2.  Diffuse marrow infiltration or replacement 3.  Multilevel multifactorial degenerative changes with areas of central canal or neural foraminal narrowing as described above    Mr-lumbar Spine-with & W/o    8/17/2017 8/16/2017 5:26 PM HISTORY/REASON FOR EXAM:  Extremity pain and weakness, elevated WBC, fever and evaluate for abscess and infection. TECHNIQUE/EXAM DESCRIPTION: MRI of the lumbar spine without and with contrast. The study was performed on a [employer] 1.5 Griselda MRI scanner. T1 sagittal, T2 fast spin-echo sagittal, and T2 axial images were obtained of the lumbar spine. T1 post-contrast fat-suppressed sagittal images were obtained. 20 mL Omniscan contrast was administered intravenously. COMPARISON:  5/8/2017 FINDINGS: There are changes secondary to the previous vertebral augmentation procedures at T11,  T12 and L1 vertebral bodies. Bone marrow edema and contrast enhancement are noted within the remaining portions of the above-mentioned vertebral bodies. There is no evidence of abnormal epidural or prevertebral fluid collection. Mild prevertebral soft tissue contrast enhancement is seen. There is abnormal T2 hyperintensity at L1-2 disc with contrast enhancement. There is also abnormal contrast enhancement within the L3-4 disc. At the level of L5-S1, there is minimal disc bulge without significant spinal or neural foraminal stenosis. At the level of L4-5, there is mild right neural foraminal stenosis. There is no central canal stenosis. At the level of L3-4, there is abnormal T2 hyperintensity and contrast enhancement within the disc space. There is no abnormal epidural or prevertebral fluid collection. At the level of L2-3, there is no spinal or neural foraminal stenosis. At the level of L1-2, there is no spinal or neural foraminal stenosis. At the level of T12-L1, there is abnormal disc fluid with contrast enhancement. The conus terminates at the level of L1. A gallstone is seen.     8/17/2017  1.  There is no evidence of epidural abscess. 2.  There is abnormal bone marrow edema and contrast enhancement within the T11, T12 and L1 vertebral bodies surrounding the vertebral cement. This finding likely represent edema/inflammation secondary to the recent procedure. However this imaging finding cannot completely exclude the possibility of osteomyelitis. 3.  There are abnormal disc T2 hyperintensity and enhancement noted involving L1-2 and L4-5 discs. This finding is suspicious for early discitis. However the previous MRI dated 5/8/2017 demonstrates disc T2 hyperintensity at the levels of L1-2 and L4-5. Advanced disc degeneration may have this appearance. 4.  There is no evidence of prevertebral abscess. 5.  Degenerative changes as described above.    Mr-thoracic Spine-with & W/o    8/17/2017 8/16/2017 5:26 PM  HISTORY/REASON FOR EXAM:  76-year-old man with lower extremity pain and weakness with a leukocytosis and fever, clinical concern for abscess or infection; kyphoplasty performed on 8/6/2017. History of myelodysplastic syndrome. TECHNIQUE/EXAM DESCRIPTION: MRI of the thoracic spine without and with contrast. The study was performed on a [employer] 1.5 Griselda MRI scanner.  T1 sagittal, T1 postcontrast fat-suppressed sagittal, T2 sagittal, and T2 axial images were obtained of the thoracic spine.  20 mL Omniscan contrast were administered intravenously. COMPARISON:  Correlation is made with unenhanced MRI lumbar spine 5/8/2017 as well as a thoracic spine CT 8/12/2017 FINDINGS: There has been interval vertebral augmentation at T11, T12 and L1. There is no demonstrable change in the height loss at each of these levels. There is adjacent edema and faint enhancement about each augmentation cavity extending into the pedicles. There  is a small amount of signal void compatible with gas in the disc space anteriorly at the T12-L1 disc. No abnormal discal or soft tissue enhancement is seen. No additional fracture is seen. There is mildly prominent hemangioma in the T7 vertebral body. The bone marrow is diffusely low in signal on T1 and T2 as previously noted. There is no gross malalignment. There is mild loss of intervertebral disc height throughout the thoracic spine. The thoracic cord has a normal caliber course and appearance. There is no evidence of significant disk extrusion, cord compression, or neural foraminal stenosis.     8/17/2017  1.  Prior spinal augmentation at the vertebral compression fractures sites of T11, T12 and L1 2.  Areas of enhancement in the T11, T12 and L1 vertebral bodies are in keeping with what would be expected for recent intervention though infection is not excluded 3.  Thoracic spondylosis 4.  Diffuse marrow infiltration or replacement    Dx-portable Fluoro > 1 Hour    8/7/2017 8/6/2017 11:50 AM  HISTORY/REASON FOR EXAM:  Kyphoplasty T11, T12, L1, Main OR TECHNIQUE/EXAM DESCRIPTION AND NUMBER OF VIEWS: Portable fluoroscopy for greater than one hour FINDINGS:      The portable fluoroscopy unit was obligated to the procedure for greater than one hour.   Actual fluoro time was 6 minutes 12 seconds.     2017  Portable fluoroscopy utilized for 6 minutes 12 seconds.    Le Venous Duplex    2017   Vascular Laboratory  CONCLUSIONS  No evidence of deep vein thrombosis or superficial thrombophlebitis in the  left lower extremity.  ANNA GAUTAM  Exam Date:     2017 09:05  Room #:     Inpatient  Priority:     Routine  Ht (in):             Wt (lb):  Ordering Physician:        RODRIGO CARTER  Referring Physician:       EMMA Rice  Sonographer:               MUSA Mays RVT, BERNADETTE  Study Type:                Complete Unilateral  Technical Quality:         Adequate  Age:    76    Gender:     M  MRN:    [id number]  :    1940      BSA:  Indications:     Edema, unspecified  CPT Codes:       36841  ICD Codes:       R609  History:         Left ankle edema for last several days.  Limitations:  PROCEDURES:  Left lower extremity venous duplex imaging.  The following venous structures were evaluated: common femoral, profunda  femoral, greater saphenous, femoral, popliteal , peroneal and posterior  tibial veins.  Serial compression, augmentation maneuvers,  color and spectral Doppler  flow evaluations were performed.  FINDINGS:  Doppler duplex and spectral analysis of the superficial and deep venous  systems of the left leg was performed.  No evidence of deep vein thrombosis or superficial thrombophlebitis in the  left lower extremity.  Contralateral flow was obtained in the right common femoral vein and  appears to be normal.  No prior studies available for comparison.  Juliano Cox MD  (Electronically Signed)  Final Date:      2017                    "17:07    Dx-abdomen For Tube Placement    8/19/2017 8/19/2017 4:12 PM HISTORY/REASON FOR EXAM:  Line evaluation. TECHNIQUE/EXAM DESCRIPTION AND NUMBER OF VIEWS:  1 view(s) of the abdomen. COMPARISON:  None. FINDINGS: Enteric tube has been placed. The tip projects over the left upper quadrant. The bowel gas pattern is within normal limits.  There are multilevel changes of vertebral augmentation.     8/19/2017  1.  Feeding tube tip overlies the gastric body.    Dx-abdomen For Tube Placement    8/15/2017  8/15/2017 4:31 AM HISTORY/REASON FOR EXAM:  Tube evaluation. TECHNIQUE/EXAM DESCRIPTION AND NUMBER OF VIEWS:  1 view(s) of the abdomen. COMPARISON:  None. FINDINGS: Limited single view of the abdomen performed primarily to evaluate enteric tube position. The tip projects over the stomach fundus. Diffuse gas-filled small bowel and colon.     8/15/2017  Feeding tube with tip in the stomach fundus.      Micro:  Results     Procedure Component Value Units Date/Time    BLOOD CULTURE [[id number]] Collected:  08/24/17 1342    Order Status:  Completed Specimen Information:  Blood from Peripheral Updated:  08/25/17 0854     Significant Indicator NEG      Source BLD      Site PERIPHERAL      Blood Culture --      Result:        No Growth    Note: Blood cultures are incubated for 5 days and  are monitored continuously.Positive blood cultures  are called to the RN and reported as soon as  they are identified.      Narrative:      Per Hospital Policy: Only change Specimen Src: to \"Line\" if  specified by physician order.    BLOOD CULTURE [[id number]] Collected:  08/24/17 1335    Order Status:  Completed Specimen Information:  Blood from Peripheral Updated:  08/25/17 0854     Significant Indicator NEG      Source BLD      Site PERIPHERAL      Blood Culture --      Result:        No Growth    Note: Blood cultures are incubated for 5 days and  are monitored continuously.Positive blood cultures  are called to the RN and reported as soon " "as  they are identified.      Narrative:      Per Hospital Policy: Only change Specimen Src: to \"Line\" if  specified by physician order.          Assessment:  Active Hospital Problems    Diagnosis       • Myelodysplastic syndrome (CMS-HCC) [D46.9]   • Parkinson's disease (CMS-HCC) [G20]   • Oropharyngeal dysphagia [R13.12]   • Acute urinary retention [R33.8]   • Hypokalemia [E87.6]   • Acute bilateral low back pain without sciatica [M54.5]   • Left ankle swelling [M25.472]   • Hematuria [R31.9]   • Acute delirium [R41.0]   • DNR (do not resuscitate) [Z66]   • Pneumonia [J18.9]       Plan:  Abnormal MRI of the back  Most likely these are postop change  I would not recommend treating them at this time Since it's too early postop  Repeat the MRI in 4-6 weeks  There is a biopsy from this area on 8/6/17 as well  Only thing that would concern me regarding infection is his increased WBC count and increased sedimentation rate. But patient has underlying MDS.  I try to contact Dr. Gallardo- unable to reach him  Infectious disease and internal medicine will try again  Admission fevers  Likely were due to UTI  Urine had shown yeast  Give him 5 days of Diflucan    Leukocytosis  Clinical patient does not look septic  Today he is sitting up and having a conversation  Patient has underlying MDS. Hence the WBC count may not be a good marker for him for infection. Continue to monitor clinically  Consider oncology follow-up    Splenectomy  Agent is at higher risk for infections    Parkinson's disease    Prognosis  Guarded    Discussed with internal medicine.  "

## 2017-08-26 LAB
BASOPHILS # BLD AUTO: 1.9 % (ref 0–1.8)
BASOPHILS # BLD: 0.52 K/UL (ref 0–0.12)
EOSINOPHIL # BLD AUTO: 0.52 K/UL (ref 0–0.51)
EOSINOPHIL NFR BLD: 1.9 % (ref 0–6.9)
ERYTHROCYTE [DISTWIDTH] IN BLOOD BY AUTOMATED COUNT: 54.8 FL (ref 35.9–50)
GIANT PLATELETS BLD QL SMEAR: NORMAL
HCT VFR BLD AUTO: 28.5 % (ref 42–52)
HGB BLD-MCNC: 9.2 G/DL (ref 14–18)
LYMPHOCYTES # BLD AUTO: 0.77 K/UL (ref 1–4.8)
LYMPHOCYTES NFR BLD: 2.8 % (ref 22–41)
MANUAL DIFF BLD: NORMAL
MCH RBC QN AUTO: 28.2 PG (ref 27–33)
MCHC RBC AUTO-ENTMCNC: 32.3 G/DL (ref 33.7–35.3)
MCV RBC AUTO: 87.4 FL (ref 81.4–97.8)
METAMYELOCYTES NFR BLD MANUAL: 0.9 %
MONOCYTES # BLD AUTO: 2.84 K/UL (ref 0–0.85)
MONOCYTES NFR BLD AUTO: 10.3 % (ref 0–13.4)
MORPHOLOGY BLD-IMP: NORMAL
MYELOCYTES NFR BLD MANUAL: 0.9 %
NEUTROPHILS # BLD AUTO: 22.44 K/UL (ref 1.82–7.42)
NEUTROPHILS NFR BLD: 80.4 % (ref 44–72)
NEUTS BAND NFR BLD MANUAL: 0.9 % (ref 0–10)
NRBC # BLD AUTO: 0.06 K/UL
NRBC BLD AUTO-RTO: 0.2 /100 WBC
PLATELET # BLD AUTO: 268 K/UL (ref 164–446)
PLATELET BLD QL SMEAR: NORMAL
PMV BLD AUTO: 12.8 FL (ref 9–12.9)
POLYCHROMASIA BLD QL SMEAR: NORMAL
RBC # BLD AUTO: 3.26 M/UL (ref 4.7–6.1)
RBC BLD AUTO: PRESENT
WBC # BLD AUTO: 27.6 K/UL (ref 4.8–10.8)

## 2017-08-26 PROCEDURE — 85027 COMPLETE CBC AUTOMATED: CPT

## 2017-08-26 PROCEDURE — A9270 NON-COVERED ITEM OR SERVICE: HCPCS | Performed by: FAMILY MEDICINE

## 2017-08-26 PROCEDURE — 99232 SBSQ HOSP IP/OBS MODERATE 35: CPT | Performed by: HOSPITALIST

## 2017-08-26 PROCEDURE — 770006 HCHG ROOM/CARE - MED/SURG/GYN SEMI*

## 2017-08-26 PROCEDURE — 87040 BLOOD CULTURE FOR BACTERIA: CPT

## 2017-08-26 PROCEDURE — 700102 HCHG RX REV CODE 250 W/ 637 OVERRIDE(OP): Performed by: INTERNAL MEDICINE

## 2017-08-26 PROCEDURE — 700102 HCHG RX REV CODE 250 W/ 637 OVERRIDE(OP): Performed by: HOSPITALIST

## 2017-08-26 PROCEDURE — A9270 NON-COVERED ITEM OR SERVICE: HCPCS | Performed by: INTERNAL MEDICINE

## 2017-08-26 PROCEDURE — A9270 NON-COVERED ITEM OR SERVICE: HCPCS | Performed by: HOSPITALIST

## 2017-08-26 PROCEDURE — 85007 BL SMEAR W/DIFF WBC COUNT: CPT

## 2017-08-26 PROCEDURE — 36415 COLL VENOUS BLD VENIPUNCTURE: CPT

## 2017-08-26 PROCEDURE — 700102 HCHG RX REV CODE 250 W/ 637 OVERRIDE(OP): Performed by: FAMILY MEDICINE

## 2017-08-26 RX ADMIN — BACITRACIN ZINC: 500 OINTMENT TOPICAL at 20:29

## 2017-08-26 RX ADMIN — CARBIDOPA AND LEVODOPA 1 TABLET: 10; 100 TABLET ORAL at 13:14

## 2017-08-26 RX ADMIN — TRIAZOLAM 0.12 MG: 0.25 TABLET ORAL at 20:38

## 2017-08-26 RX ADMIN — CITALOPRAM HYDROBROMIDE 20 MG: 20 TABLET ORAL at 09:36

## 2017-08-26 RX ADMIN — CARBIDOPA AND LEVODOPA 1 TABLET: 10; 100 TABLET ORAL at 06:06

## 2017-08-26 RX ADMIN — MODAFINIL 200 MG: 100 TABLET ORAL at 09:42

## 2017-08-26 RX ADMIN — ACETAMINOPHEN 650 MG: 325 TABLET, FILM COATED ORAL at 18:07

## 2017-08-26 RX ADMIN — MEMANTINE HYDROCHLORIDE 10 MG: 10 TABLET, FILM COATED ORAL at 09:36

## 2017-08-26 RX ADMIN — CARBIDOPA AND LEVODOPA 2 TABLET: 25; 100 TABLET ORAL at 20:17

## 2017-08-26 RX ADMIN — CARBIDOPA AND LEVODOPA 1 TABLET: 10; 100 TABLET ORAL at 20:19

## 2017-08-26 RX ADMIN — ACETAMINOPHEN 650 MG: 325 TABLET, FILM COATED ORAL at 06:32

## 2017-08-26 RX ADMIN — AMANTADINE HYDROCHLORIDE 100 MG: 50 SOLUTION ORAL at 20:26

## 2017-08-26 RX ADMIN — STANDARDIZED SENNA CONCENTRATE AND DOCUSATE SODIUM 2 TABLET: 8.6; 5 TABLET, FILM COATED ORAL at 09:37

## 2017-08-26 RX ADMIN — BACITRACIN ZINC: 500 OINTMENT TOPICAL at 09:35

## 2017-08-26 RX ADMIN — AMANTADINE HYDROCHLORIDE 100 MG: 50 SOLUTION ORAL at 09:36

## 2017-08-26 RX ADMIN — STANDARDIZED SENNA CONCENTRATE AND DOCUSATE SODIUM 2 TABLET: 8.6; 5 TABLET, FILM COATED ORAL at 20:19

## 2017-08-26 RX ADMIN — Medication 220 MG: at 09:37

## 2017-08-26 RX ADMIN — OXYCODONE HYDROCHLORIDE AND ACETAMINOPHEN 500 MG: 500 TABLET ORAL at 09:36

## 2017-08-26 RX ADMIN — POTASSIUM CHLORIDE 30 MEQ: 1500 TABLET, EXTENDED RELEASE ORAL at 09:36

## 2017-08-26 RX ADMIN — MEMANTINE HYDROCHLORIDE 10 MG: 10 TABLET, FILM COATED ORAL at 20:16

## 2017-08-26 ASSESSMENT — ENCOUNTER SYMPTOMS
SHORTNESS OF BREATH: 0
WEAKNESS: 1
FOCAL WEAKNESS: 1
ABDOMINAL PAIN: 0
NAUSEA: 0
FEVER: 0
NECK PAIN: 0
FOCAL WEAKNESS: 0
LOSS OF CONSCIOUSNESS: 0
COUGH: 0
SPEECH CHANGE: 1
HEADACHES: 0
BACK PAIN: 1
BLURRED VISION: 0
VOMITING: 0
DEPRESSION: 0
TREMORS: 1
DIZZINESS: 0

## 2017-08-26 ASSESSMENT — PAIN SCALES - GENERAL
PAINLEVEL_OUTOF10: 6
PAINLEVEL_OUTOF10: 2
PAINLEVEL_OUTOF10: 0

## 2017-08-26 ASSESSMENT — PAIN SCALES - WONG BAKER: WONGBAKER_NUMERICALRESPONSE: DOESN'T HURT AT ALL

## 2017-08-26 NOTE — PROGRESS NOTES
Jose Luis clogged. Sruthig buster attempted without success. Called Hospitalist for order for 1x ativan for reinsertion.

## 2017-08-26 NOTE — PROGRESS NOTES
Infectious Disease Progress Note    Author: Jessy Flanagan M.D. Date & Time of service: 2017  2:35 PM    Chief Complaint:  Back infection    Interval History:  76-year-old male with Parkinson's, MDS, splenectomy was admitted with altered mental status and fevers. Likely due to UTI  17-MAXIMUM TEMPERATURE 98.2. WBC 15.6. Today awake and responsive  17-MAXIMUM TEMPERATURE 98.1. WBC 20. Denies any increased back pain.    AF (99.8) WBC 27.6 wife does not want him on any abx-thinks fluconazole made him worse  Labs Reviewed, Medications Reviewed and Radiology Reviewed.    Review of Systems:  Review of Systems   Constitutional: Positive for malaise/fatigue. Negative for fever.   Respiratory: Negative for cough and shortness of breath.    Cardiovascular: Negative for chest pain and leg swelling.   Gastrointestinal: Negative for abdominal pain, nausea and vomiting.   Genitourinary: Negative for dysuria.   Neurological: Positive for focal weakness and weakness. Negative for headaches.       Hemodynamics:  Temp (24hrs), Av.2 °C (99 °F), Min:36.8 °C (98.2 °F), Max:37.7 °C (99.8 °F)  Temperature: 37.7 °C (99.8 °F)  Pulse  Av.7  Min: 60  Max: 99   Blood Pressure : 122/60       Physical Exam:  Physical Exam   Constitutional: He appears well-developed. No distress.   Frail and chronically ill   HENT:   Head: Normocephalic and atraumatic.   Mouth/Throat: No oropharyngeal exudate.   cortrak   Eyes: No scleral icterus.   Neck: Neck supple.   Cardiovascular: Regular rhythm.    No murmur heard.  Pulmonary/Chest: He has no wheezes. He has no rales.   Abdominal: Soft. There is no tenderness.   Musculoskeletal: He exhibits no edema.   sarcopenia   Neurological: He displays abnormal reflex. He exhibits abnormal muscle tone. Coordination abnormal.   Somnolent  tremor   Nursing note and vitals reviewed.      Meds:    Current Facility-Administered Medications:   •  ferrous sulfate  •  ascorbic acid  •   doxazosin  •  amantadine  •  carbidopa-levodopa  •  carbidopa-levodopa  •  bacitracin  •  citalopram  •  memantine  •  modafinil  •  triazolam  •  senna-docusate **AND** polyethylene glycol/lytes **AND** magnesium hydroxide **AND** bisacodyl  •  Respiratory Care per Protocol  •  NS  •  acetaminophen  •  ondansetron  •  ondansetron    Labs:  Recent Labs      08/24/17   0324  08/25/17   0324  08/26/17   0250   WBC  15.6*  20.4*  27.6*   RBC  3.42*  3.46*  3.26*   HEMOGLOBIN  9.5*  9.8*  9.2*   HEMATOCRIT  29.8*  30.5*  28.5*   MCV  87.1  88.2  87.4   MCH  27.8  28.3  28.2   RDW  52.9*  55.2*  54.8*   PLATELETCT  334  313  268   MPV  12.5  12.2  12.8   NEUTSPOLYS  85.10*  85.20*  80.40*   LYMPHOCYTES  4.40*  1.80*  2.80*   MONOCYTES  9.60  9.30  10.30   EOSINOPHILS  0.00  0.00  1.90   BASOPHILS  0.00  0.00  1.90*   RBCMORPHOLO  Present  Present  Present     Recent Labs      08/24/17   0324  08/25/17   0324   SODIUM  138  136   POTASSIUM  4.2  4.3   CHLORIDE  104  102   CO2  27  28   GLUCOSE  116*  123*   BUN  18  20     Recent Labs      08/24/17   0324  08/25/17   0324   CREATININE  0.74  0.77       Imaging:  Ct-chest,abdomen,pelvis With   FINDINGS: CT Chest: There is mild bilateral posterior atelectasis. No pleural fluid is identified. There are bilateral thyroid nodules. There are scattered arterial calcifications. No significant pericardial effusion. No adenopathy is identified. There is thoracic kyphosis with mild compression deformities in the upper lumbar spine. CT Abdomen: Irregular hypodense mass in the hepatic dome measures approximately 1.7 cm. There are multiple splenules in the left upper quadrant. Soft tissue mass at the tip of the liver likely represents an additional splenule. The adrenal glands, pancreas, and left kidney are unremarkable. A 1.5 cm mass in the mid right kidney is indeterminant, but likely represents a small cyst. There is a 2 mm nonobstructing calculus in the lower pole of the right  kidney. The kidneys enhance symmetrically. There is a gallbladder calculus without wall thickening or pericholecystic fluid. There is a moderate amount of colonic stool. There are scattered arterial calcifications. CT Pelvis: The bladder is unremarkable. No significant free fluid or adenopathy. Degenerative changes are in the spine. Vertebral augmentation has been performed at T11, T12, and L1.     8/6/2017  1.  1.7 cm hypodense mass in the hepatic dome is indeterminant, but may represent a hemangioma or complex cyst. Nonemergent hepatic protocol MRI could be performed for further evaluation. 2.  1.5 cm hypodense mass in the mid right kidney, likely a complex cyst. Follow-up ultrasound or renal protocol CT or MRI could be performed for confirmation. 3.  Multiple splenules in the abdomen. 4.  Cholelithiasis 5.  Spinal compression deformities status post vertebral augmentation at T11, T12, and L1.    Ct-head W/o    8/12/2017 8/12/2017 8:27 PM HISTORY/REASON FOR EXAM:  Altered Mental Status. Fever, cough, decreased mentation. TECHNIQUE/EXAM DESCRIPTION AND NUMBER OF VIEWS: CT of the head without contrast. The study was performed on a helical multidetector CT scanner. Contiguous 2.5 mm axial sections were obtained from the skull base through the vertex. Up to date radiation dose reduction adjustments have been utilized to meet ALARA standards for radiation dose reduction. COMPARISON:  MRI of the brain 11/1/2016 FINDINGS: The calvariae and skull base are unremarkable. There are no extraaxial fluid collections. There is a pattern of cerebral atrophy manifest as enlargement of sulcal markings and ventricular prominence. The ventricular system and basal cisterns are otherwise unremarkable. There are no areas of abnormal density in the brain substance. There are no hemorrhagic lesions. There are no mass effects or shift of midline structures. The brainstem and posterior fossa structures are unremarkable. Paranasal sinuses  show some minor scattered mucosal thickening among ethmoid air cells no air-fluid levels are evident. Mastoids show opacification of clustered air cells of the left mastoid tip. This may be chronic as similar findings were noted on the MRI.     8/12/2017  1.  Mild-moderate cerebral atrophy. 2.  Opacification of a few clustered air cells of the left mastoid tip which may represent chronic or active postinflammatory changes. 3.  Otherwise, no acute findings and no appreciable change compared to MRI study 11/1/2016.    Ct-tspine W/o Plus Recons  8/12/2017  1.  Status post vertebral augmentation at T11, T12, L1. Small amount of ventral epidural cement extravasation at the T11 level. 2.  Mild superior endplate compression deformities again seen at T2, T3, T4. No change from 8/6/2017. 3.  Bibasilar subsegmental atelectatic changes most prominent at the left lower lobe posterior basal segment. Little or no change since recent exam 8/6/2017.    Dx-ankle 2- Views Left  8/15/2017  Soft tissue swelling about the lateral malleolus. No definite acute osseous abnormality.    Dx-knee 2- Right  8/15/2017  1. No definite acute osseous abnormality. 2. 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    Dx-knee 2- Left  8/15/2017  1. No definite acute osseous abnormality. 2. Small knee joint effusion    Dx-thoracolumbar Spine-2 Views    8/6/2017 8/6/2017 11:50 AM HISTORY/REASON FOR EXAM: Back pain. TECHNIQUE/EXAM DESCRIPTION AND NUMBER OF VIEWS:  Thoracic spine, 2 views. COMPARISON:  None. FINDINGS: 2 intraoperative views of the thoracic spine obtained following multilevel kyphoplasty demonstrate kyphoplasty cement within the partially collapsed vertebral bodies at approximately the T11-L1 levels.     8/6/2017  Intraoperative visualization of 3 level kyphoplasty at approximately the T11-L1 levels.    Mr-cervical Spine-with & W/o   FINDINGS: There is no acute fracture or gross malalignment. There is 2  mm of degenerative retrolisthesis of C4 on C5. There is loss of intervertebral disc height throughout the cervical spine, most pronounced at C5-C6 and C6-C7. Marrow signal is diffusely low on T1 and T2, relatively sparing the dens. The cervical cord has a normal caliber course and signal intensity. No area of abnormal enhancement is seen. Findings specific to each level are described below: C2-3: Unremarkable C3-4:  Mild RIGHT facet arthropathy C4-5:  Posterior disc osteophyte complex extending to the BILATERAL foraminal zones. Mild RIGHT facet arthropathy. Mild central canal narrowing. Mild RIGHT neural foraminal narrowing. C5-6: Posterior disc osteophyte complex. Mild central canal narrowing. Moderate RIGHT and mild LEFT neural foraminal narrowing. C6-7: Posterior disc osteophyte complex extending to the RIGHT more than the LEFT neural foramen. Mild central canal narrowing. Mild RIGHT neural foraminal narrowing. C7-T1: Posterior disc osteophyte complex eccentric to the RIGHT more than the LEFT foraminal zone. Mild RIGHT neural foraminal narrowing. No soft tissue mass is evident.     8/17/2017  1.  No evidence of cervical discitis osteomyelitis or epidural abscess 2.  Diffuse marrow infiltration or replacement 3.  Multilevel multifactorial degenerative changes with areas of central canal or neural foraminal narrowing as described above    Mr-lumbar Spine-with & W/o   FINDINGS: There are changes secondary to the previous vertebral augmentation procedures at T11, T12 and L1 vertebral bodies. Bone marrow edema and contrast enhancement are noted within the remaining portions of the above-mentioned vertebral bodies. There is no evidence of abnormal epidural or prevertebral fluid collection. Mild prevertebral soft tissue contrast enhancement is seen. There is abnormal T2 hyperintensity at L1-2 disc with contrast enhancement. There is also abnormal contrast enhancement within the L3-4 disc. At the level of L5-S1, there is  minimal disc bulge without significant spinal or neural foraminal stenosis. At the level of L4-5, there is mild right neural foraminal stenosis. There is no central canal stenosis. At the level of L3-4, there is abnormal T2 hyperintensity and contrast enhancement within the disc space. There is no abnormal epidural or prevertebral fluid collection. At the level of L2-3, there is no spinal or neural foraminal stenosis. At the level of L1-2, there is no spinal or neural foraminal stenosis. At the level of T12-L1, there is abnormal disc fluid with contrast enhancement. The conus terminates at the level of L1. A gallstone is seen.   8/17/2017  1.  There is no evidence of epidural abscess. 2.  There is abnormal bone marrow edema and contrast enhancement within the T11, T12 and L1 vertebral bodies surrounding the vertebral cement. This finding likely represent edema/inflammation secondary to the recent procedure. However this imaging finding cannot completely exclude the possibility of osteomyelitis. 3.  There are abnormal disc T2 hyperintensity and enhancement noted involving L1-2 and L4-5 discs. This finding is suspicious for early discitis. However the previous MRI dated 5/8/2017 demonstrates disc T2 hyperintensity at the levels of L1-2 and L4-5. Advanced disc degeneration may have this appearance. 4.  There is no evidence of prevertebral abscess. 5.  Degenerative changes as described above.    Mr-thoracic Spine-with & W/o  FINDINGS: There has been interval vertebral augmentation at T11, T12 and L1. There is no demonstrable change in the height loss at each of these levels. There is adjacent edema and faint enhancement about each augmentation cavity extending into the pedicles. There  is a small amount of signal void compatible with gas in the disc space anteriorly at the T12-L1 disc. No abnormal discal or soft tissue enhancement is seen. No additional fracture is seen. There is mildly prominent hemangioma in the T7  vertebral body. The bone marrow is diffusely low in signal on T1 and T2 as previously noted. There is no gross malalignment. There is mild loss of intervertebral disc height throughout the thoracic spine. The thoracic cord has a normal caliber course and appearance. There is no evidence of significant disk extrusion, cord compression, or neural foraminal stenosis.     2017  1.  Prior spinal augmentation at the vertebral compression fractures sites of T11, T12 and L1 2.  Areas of enhancement in the T11, T12 and L1 vertebral bodies are in keeping with what would be expected for recent intervention though infection is not excluded 3.  Thoracic spondylosis 4.  Diffuse marrow infiltration or replacement  e Venous Duplex    2017   Vascular Laboratory  CONCLUSIONS  No evidence of deep vein thrombosis or superficial thrombophlebitis in the  left lower extremity.  ANNA GAUTAM  Exam Date:     2017 09:05  Room #:     Inpatient  Priority:     Routine  Ht (in):             Wt (lb):  Ordering Physician:        RODRIGO CARTER  Referring Physician:       [id number] Owens  Sonographer:               MUSA Mays RVT, BERNADETTE  Study Type:                Complete Unilateral  Technical Quality:         Adequate  Age:    76    Gender:     M  MRN:    [id number]  :    [id number]      BSA:  Indications:     Edema, unspecified  CPT Codes:       49307  ICD Codes:       R609  History:         Left ankle edema for last several days.  Limitations:  PROCEDURES:  Left lower extremity venous duplex imaging.  The following venous structures were evaluated: common femoral, profunda  femoral, greater saphenous, femoral, popliteal , peroneal and posterior  tibial veins.  Serial compression, augmentation maneuvers,  color and spectral Doppler  flow evaluations were performed.  FINDINGS:  Doppler duplex and spectral analysis of the superficial and deep venous  systems of the left leg was performed.   "No evidence of deep vein thrombosis or superficial thrombophlebitis in the  left lower extremity.  Contralateral flow was obtained in the right common femoral vein and  appears to be normal.  No prior studies available for comparison.  Juliano Cox MD  (Electronically Signed)  Final Date:      18 August 2017                   17:07    Dx-abdomen For Tube Placement    8/19/2017 8/19/2017 4:12 PM HISTORY/REASON FOR EXAM:  Line evaluation. TECHNIQUE/EXAM DESCRIPTION AND NUMBER OF VIEWS:  1 view(s) of the abdomen. COMPARISON:  None. FINDINGS: Enteric tube has been placed. The tip projects over the left upper quadrant. The bowel gas pattern is within normal limits.  There are multilevel changes of vertebral augmentation.     8/19/2017  1.  Feeding tube tip overlies the gastric body.    Dx-abdomen For Tube Placement    8/15/2017  8/15/2017 4:31 AM HISTORY/REASON FOR EXAM:  Tube evaluation. TECHNIQUE/EXAM DESCRIPTION AND NUMBER OF VIEWS:  1 view(s) of the abdomen. COMPARISON:  None. FINDINGS: Limited single view of the abdomen performed primarily to evaluate enteric tube position. The tip projects over the stomach fundus. Diffuse gas-filled small bowel and colon.     8/15/2017  Feeding tube with tip in the stomach fundus.      Micro:  Results     Procedure Component Value Units Date/Time    BLOOD CULTURE [[id number]] Collected:  08/26/17 1330    Order Status:  Completed Specimen:  Blood from Peripheral Updated:  08/26/17 1343    Narrative:       Per Hospital Policy: Only change Specimen Src: to \"Line\" if  specified by physician order.    BLOOD CULTURE [[id number]] Collected:  08/26/17 1338    Order Status:  Completed Specimen:  Blood from Peripheral Updated:  08/26/17 1343    Narrative:       Per Hospital Policy: Only change Specimen Src: to \"Line\" if  specified by physician order.    BLOOD CULTURE [[id number]] Collected:  08/24/17 1342    Order Status:  Completed Specimen:  Blood from Peripheral Updated:  08/25/17 0854 " "    Significant Indicator NEG     Source BLD     Site PERIPHERAL     Blood Culture --     No Growth    Note: Blood cultures are incubated for 5 days and  are monitored continuously.Positive blood cultures  are called to the RN and reported as soon as  they are identified.      Narrative:       Per Hospital Policy: Only change Specimen Src: to \"Line\" if  specified by physician order.    BLOOD CULTURE [[id number]] Collected:  08/24/17 1335    Order Status:  Completed Specimen:  Blood from Peripheral Updated:  08/25/17 0854     Significant Indicator NEG     Source BLD     Site PERIPHERAL     Blood Culture --     No Growth    Note: Blood cultures are incubated for 5 days and  are monitored continuously.Positive blood cultures  are called to the RN and reported as soon as  they are identified.      Narrative:       Per Hospital Policy: Only change Specimen Src: to \"Line\" if  specified by physician order.          Assessment:  Active Hospital Problems    Diagnosis       • Myelodysplastic syndrome (CMS-HCC) [D46.9]   • Parkinson's disease (CMS-HCC) [G20]   • Oropharyngeal dysphagia [R13.12]   • Acute urinary retention [R33.8]   • Hypokalemia [E87.6]   • Acute bilateral low back pain without sciatica [M54.5]   • Left ankle swelling [M25.472]   • Hematuria [R31.9]   • Acute delirium [R41.0]   • DNR (do not resuscitate) [Z66]   • Pneumonia [J18.9]       Plan:  Abnormal MRI of the back  No definitive OM or discitis  No vertebral abscess  Most c/w postop change  Repeat the MRI in 4-6 weeks-sooner if clinically indicated  Biopsy from this area on 8/6/17 c/w MDS    Candiduria  Completed 5 days of Diflucan    Leukocytosis, persistent  Afebrile  Multifactorial incl MDS and post-splectomy  Concern for aspiration given PD  Blood cxs neg 8/24  Consider oncology follow-up    Splenectomy  Agent is at higher risk for infections    Parkinson's disease  Aspiration risk    Discussed with internal medicine.  "

## 2017-08-26 NOTE — PROGRESS NOTES
Pt oriented to self and place, speech a bit hard to understand. Per spouse at bedside, patient is less responsive than he normally is and is more agitated. Spouse believes that new medication diflucan could be responsible. Cortrak in place for feeding and medication. Hurst for retention. Q2 turns in place. Bed alarm on.

## 2017-08-26 NOTE — PROGRESS NOTES
[facility] Hospitalist Progress Note    Date of Service: 8/25/2017    Chief Complaint  76 y.o. male admitted 8/12/2017 with fever, elevated WBC count, recent kyphoplasty, confusion.    Interval Problem Update  -Parkinson's-mental status is better again today. Speech pattern is much stronger today as well. Working with PT sitting up at the edge of the bed, doing leg exercises.    Back pain-per patient, appears to be back at his baseline.     Dysphagia- new cortrak continues to work well. No high residuals noted.    LLE swelling- No obvious trauma. More mild improvement noted today. Denies any new redness of his skin.   8/22:  Poor historian due to Parkinson's dementia.  States no back pain, but pain thoracic spine with palpation on exam.  Repeat sed rate.  No source of elevated wbc and sed rate found upon review of chart:  UC negative, BCs x2 neg, throat culture neg CXR negative for infiltrate.  Off restraints.  ID consulted regarding likely osteomyelitis T11, T12, L1 on MRI with discitis.  8/23:  Awaiting ID recs.  Wife at bedside, discussed all questions, concerns, plan of care.  She is concerned that he is not getting his home does of sinemet.  However, as close as can get given immediate release formulation available in hospital.  cortrak remains, Rinaldi catheter was placed upon transfer to [facility].  WBC increasing today since off zosyn IV, lungs CTA b/l.  Added cardura for BPH.  Remains off restraints.  Sed rate 60 wbc 9.9 to 14.  8/24:  Wbc continues to increase off abx.  Repeated BCx2, patient denies back pain, cortrak and rinaldi remain.  ID recommend to repeat thoracic MRI in 2 weeks.  8/25:  Increasing wbc to 20, BCs negative for growth.  ID recommends to repeat MRI in 4-6 weeks.  ID reviewed MRIs:  Believe to be post op changes.    Consultants/Specialty  8/22:  ID consult regarding osteomyelitis    Disposition  PT/OT rec SNF.  SNF ordered 8/18.  [facility] will not take cortrak, may need to look at  [facility].  Patient unsure if wants PEG tube.        Review of Systems   Constitutional: Negative for fever.   Eyes: Negative for blurred vision.   Respiratory: Negative for shortness of breath.    Cardiovascular: Positive for leg swelling (LLE, improvement noted today). Negative for chest pain.   Gastrointestinal: Negative for abdominal pain.        Mouth remains quite dry   Genitourinary: Negative for dysuria.   Musculoskeletal: Positive for back pain. Negative for neck pain.   Skin: Negative for rash.   Neurological: Positive for tremors (mild and near his baseline), speech change (feels that his words are stronger today) and weakness (slightly stronger today). Negative for dizziness, focal weakness, loss of consciousness and headaches.        No changes   Psychiatric/Behavioral: Negative for depression.       Physical Exam  Laboratory/Imaging   Hemodynamics  Temp (24hrs), Av.6 °C (97.8 °F), Min:36.1 °C (96.9 °F), Max:36.9 °C (98.5 °F)   Temperature: 36.9 °C (98.5 °F)  Pulse  Av.9  Min: 60  Max: 99    Blood Pressure : 111/59 mmHg      Respiratory      Respiration: 18, Pulse Oximetry: 91 %     Work Of Breathing / Effort: Mild  RUL Breath Sounds: Clear, RML Breath Sounds: Clear, RLL Breath Sounds: Diminished, AQUILINO Breath Sounds: Clear, LLL Breath Sounds: Diminished    Fluids    Intake/Output Summary (Last 24 hours) at 17 1913  Last data filed at 17 1300   Gross per 24 hour   Intake    210 ml   Output   2550 ml   Net  -2340 ml       Nutrition  Orders Placed This Encounter   Procedures   • Diet NPO     Standing Status: Standing      Number of Occurrences: 1      Standing Expiration Date:      Order Specific Question:  Restrict to:     Answer:  Sips with Medications [3]     Physical Exam   Constitutional: He appears well-developed and well-nourished.   More energetic, but hard to understand with his words this AM   HENT:   Mouth/Throat: No oropharyngeal exudate (uvula swollen and slightly red).    Cor tract and right naris, no erosion noted   Eyes: Conjunctivae are normal. No scleral icterus.   Neck: Normal range of motion. Neck supple. No Brudzinski's sign and no Kernig's sign noted.   Cardiovascular: Normal rate, regular rhythm and normal heart sounds.    Pulmonary/Chest: Effort normal and breath sounds normal. No stridor. No respiratory distress.   Abdominal: Soft. Bowel sounds are normal. There is no tenderness.   Genitourinary:   Hurst catheter in place   Musculoskeletal: He exhibits edema and tenderness.   Thoracic back pain with palpation of spine.   Neurological: He is alert. He displays tremor (resting). He displays normal reflexes.   Much quicker in his answers today, stronger diction and enunciation    Skin: Skin is warm and dry. No rash noted.   Psychiatric: He has a normal mood and affect. His behavior is normal. Thought content normal.   Nursing note and vitals reviewed.      Recent Labs      08/24/17   0324  08/25/17   0324   WBC  15.6*  20.4*   RBC  3.42*  3.46*   HEMOGLOBIN  9.5*  9.8*   HEMATOCRIT  29.8*  30.5*   MCV  87.1  88.2   MCH  27.8  28.3   MCHC  31.9*  32.1*   RDW  52.9*  55.2*   PLATELETCT  334  313   MPV  12.5  12.2     Recent Labs      08/24/17   0324  08/25/17   0324   SODIUM  138  136   POTASSIUM  4.2  4.3   CHLORIDE  104  102   CO2  27  28   GLUCOSE  116*  123*   BUN  18  20   CREATININE  0.74  0.77   CALCIUM  9.0  9.0                      Assessment/Plan     * Acute osteomyelitis of thoracic spine (CMS-[facility]) (present on admission)  Assessment & Plan  Ordered sed rate  BCs negatitve x 2 from 8/12  No other source of infection, persistent back pain, however poor historian  Prior vertebroplasty.  Review of MRI thoracic spine with T11, 12, L1 discitis, cannot rule out acute osteomyelitis  Improved with IV zosyn, now wbc rising off zosyn.  Consulted ID:  Does not believe is acute osteomyelitis:  Will repeat MRI in 2 weeks.  8/24 repeat BCsx2.  Elevated sed rate 60 8/16, repeat 62  on 8/22.    Myelodysplastic syndrome (CMS-HCC) (present on admission)  Assessment & Plan  Seen by Dr. Ribeiro on the last admit  Platelet dysfunction present per prior TEG and evaluation  Hold lovenox  SCDs only for DVT prophylaxis    Parkinson's disease (CMS-HCC) (present on admission)  Assessment & Plan  -Cor track in place  -Cannot do sustained released Sinemet given a cannot be crushed  -Continue short acting Sinemet while cor track is in place  -symptoms are better today, but continues to have somewhat wild daily fluctuations, possible G tube placement during this admission, need to have ongoing discussions with patient and his wife/family    Pneumonia (present on admission)  Assessment & Plan  -Possible pneumonia, given no clear infection of the spine on MRI and persisting cough and elevated white was gone responding to IV Zosyn, weakly appears to have pneumonia  -day 7/7 zosyn, WBC has normalized   8/22:  Reviewed all CXRs since admission:  No infiltrates seen on all 3.  No evidence of pneumonia.    Hematuria (present on admission)  Assessment & Plan  -resolved, hold AC, monitor closely    Acute delirium (present on admission)  Assessment & Plan  -fluctuates, likely multi-factorial, infection, prolonged hospitalization, parkinson's  -continue current treatment plan    DNR (do not resuscitate) (present on admission)  Assessment & Plan  Confirmed with the patient's wife on admission.    Left ankle swelling  Assessment & Plan  -Bilateral x-rays of the knees and ankles show very small fluid collection therefore arthrocentesis is not indicated at this time but need to watch closely  -unchanged today  -U/S negative for DVT/SVT, monitor, no e/o infection at this time  -consider compression stocking, has good ROM the L ankle on dorsi/plantar flexion, continue to see daily improvement    Acute bilateral low back pain without sciatica (present on admission)  Assessment & Plan  -MRI of cervical thoracic and lumbar spine  shows no evidence of clear epidural or prevertebral abscess. Findings do show some hyper enhanced bone marrow edema which was there on prior MRIs  -Finished 7 days of IV zosyn  -All cultures remain negative  -Mental status fluctuates  - cautious use of IV pain meds  -No neurosurgery consult is required at this time  -worked more with PT, sitting up at the bedside, afebrile, will need SNF versus REHAB    Oropharyngeal dysphagia (present on admission)  Assessment & Plan  cortrak tube feeds per SLP eval.      Acute urinary retention (present on admission)  Assessment & Plan  Hurst catheter in place.  Start cardura 8/23  H/o prostatectomy in past  Per wife:  Hurst catheter placed at [facility] at time of transfer with cudet catheter difficult placement  Continue Hurst at least 7 days from start of cardura.    Candidal UTI (urinary tract infection) (present on admission)  Assessment & Plan  Treat with diflucan  Sediment seen    Leukocytosis (present on admission)  Assessment & Plan  Slow climb from 9.9 to 14 to 15.6 off zosyn.  Unclear source.  MDS usually gives pancytopenia picture.  8/24 repeat BCs x2.      Radiology images reviewed, Labs reviewed and Medications reviewed  Hurst catheter: Gross Hematuria with Clots and Urinary Tract Retention or Urinary Tract Obstruction      DVT Prophylaxis: Contraindicated - High bleeding risk  DVT prophylaxis - mechanical: SCDs             This dictation was created using voice recognition software. The accuracy of the dictation is limited to the abilities of the software. I expect there may be some errors of grammar and possibly content.

## 2017-08-26 NOTE — PROGRESS NOTES
[facility] Hospitalist Progress Note    Date of Service: 8/26/2017    Chief Complaint  76 y.o. male admitted 8/12/2017 with fever, elevated WBC count, recent kyphoplasty, confusion.    Interval Problem Update  -Parkinson's-mental status is better again today. Speech pattern is much stronger today as well. Working with PT sitting up at the edge of the bed, doing leg exercises.    Back pain-per patient, appears to be back at his baseline.     Dysphagia- new cortrak continues to work well. No high residuals noted.    LLE swelling- No obvious trauma. More mild improvement noted today. Denies any new redness of his skin.   8/22:  Poor historian due to Parkinson's dementia.  States no back pain, but pain thoracic spine with palpation on exam.  Repeat sed rate.  No source of elevated wbc and sed rate found upon review of chart:  UC negative, BCs x2 neg, throat culture neg CXR negative for infiltrate.  Off restraints.  ID consulted regarding likely osteomyelitis T11, T12, L1 on MRI with discitis.  8/23:  Awaiting ID recs.  Wife at bedside, discussed all questions, concerns, plan of care.  She is concerned that he is not getting his home does of sinemet.  However, as close as can get given immediate release formulation available in hospital.  cortrak remains, Rinaldi catheter was placed upon transfer to [facility].  WBC increasing today since off zosyn IV, lungs CTA b/l.  Added cardura for BPH.  Remains off restraints.  Sed rate 60 wbc 9.9 to 14.  8/24:  Wbc continues to increase off abx.  Repeated BCx2, patient denies back pain, cortrak and rinaldi remain.  ID recommend to repeat thoracic MRI in 2 weeks.  8/25:  Increasing wbc to 20, BCs negative for growth.  ID recommends to repeat MRI in 4-6 weeks.  ID reviewed MRIs:  Believe to be post op changes.  8/26:  Had diffulties per wife at bedside overnight with worsening mentation.  Wife thinks interaction with diflucan.  dc'd diflucan, had 5 doses.  Increasing wbc to 27.  Ordered  repeat BCs x2.  Bcs  negative.    Consultants/Specialty  :  ID consult regarding osteomyelitis    Disposition  PT/OT rec SNF.  SNF ordered .  [facility] will not take cortrak, may need to look at [facility].  Patient unsure if wants PEG tube.        Review of Systems   Constitutional: Negative for fever.   Eyes: Negative for blurred vision.   Respiratory: Negative for shortness of breath.    Cardiovascular: Positive for leg swelling (LLE, improvement noted today). Negative for chest pain.   Gastrointestinal: Negative for abdominal pain.        Mouth remains quite dry   Genitourinary: Negative for dysuria.   Musculoskeletal: Positive for back pain. Negative for neck pain.   Skin: Negative for rash.   Neurological: Positive for tremors (mild and near his baseline), speech change (feels that his words are stronger today) and weakness (slightly stronger today). Negative for dizziness, focal weakness, loss of consciousness and headaches.        No changes   Psychiatric/Behavioral: Negative for depression.       Physical Exam  Laboratory/Imaging   Hemodynamics  Temp (24hrs), Av.3 °C (99.1 °F), Min:36.8 °C (98.2 °F), Max:37.7 °C (99.8 °F)   Temperature: 37.7 °C (99.8 °F)  Pulse  Av.7  Min: 60  Max: 99    Blood Pressure : 122/60      Respiratory      Respiration: 18, Pulse Oximetry: 94 %     Work Of Breathing / Effort: Mild  RUL Breath Sounds: Clear, RML Breath Sounds: Clear, RLL Breath Sounds: Diminished, AQUILINO Breath Sounds: Clear, LLL Breath Sounds: Diminished    Fluids    Intake/Output Summary (Last 24 hours) at 17 1601  Last data filed at 17 1315   Gross per 24 hour   Intake             1560 ml   Output             1900 ml   Net             -340 ml       Nutrition  Orders Placed This Encounter   Procedures   • Diet NPO     Standing Status:   Standing     Number of Occurrences:   1     Order Specific Question:   Restrict to:     Answer:   Sips with Medications [3]     Physical Exam    Constitutional: He appears well-developed and well-nourished.    hard to understand with his words   HENT:   Mouth/Throat: No oropharyngeal exudate (uvula swollen and slightly red).   Cor tract and right naris, no erosion noted   Eyes: Conjunctivae are normal. No scleral icterus.   Neck: Normal range of motion. Neck supple. No Brudzinski's sign and no Kernig's sign noted.   Cardiovascular: Normal rate, regular rhythm and normal heart sounds.    Pulmonary/Chest: Effort normal and breath sounds normal. No stridor. No respiratory distress.   Abdominal: Soft. Bowel sounds are normal. There is no tenderness.   Genitourinary:   Genitourinary Comments: Hurst catheter in place   Musculoskeletal: He exhibits no edema or tenderness.   Thoracic back pain with palpation of spine.   Neurological: He is alert. He displays tremor (resting). He displays normal reflexes.   Much quicker in his answers today, stronger diction and enunciation    Skin: Skin is warm and dry. No rash noted.   Psychiatric: He has a normal mood and affect. His behavior is normal. Thought content normal.   Nursing note and vitals reviewed.      Recent Labs      08/24/17   0324  08/25/17   0324  08/26/17   0250   WBC  15.6*  20.4*  27.6*   RBC  3.42*  3.46*  3.26*   HEMOGLOBIN  9.5*  9.8*  9.2*   HEMATOCRIT  29.8*  30.5*  28.5*   MCV  87.1  88.2  87.4   MCH  27.8  28.3  28.2   MCHC  31.9*  32.1*  32.3*   RDW  52.9*  55.2*  54.8*   PLATELETCT  334  313  268   MPV  12.5  12.2  12.8     Recent Labs      08/24/17   0324  08/25/17   0324   SODIUM  138  136   POTASSIUM  4.2  4.3   CHLORIDE  104  102   CO2  27  28   GLUCOSE  116*  123*   BUN  18  20   CREATININE  0.74  0.77   CALCIUM  9.0  9.0                      Assessment/Plan     * Acute osteomyelitis of thoracic spine (CMS-HCC)- (present on admission)   Assessment & Plan    Ordered sed rate  BCs negatitve x 2 from 8/12  No other source of infection, persistent back pain, however poor historian  Prior  vertebroplasty.  Review of MRI thoracic spine with T11, 12, L1 discitis, cannot rule out acute osteomyelitis  Improved with IV zosyn, now wbc rising off zosyn.  Consulted ID:  Does not believe is acute osteomyelitis:  Will repeat MRI in 2 weeks.  8/24 repeat BCsx2.  Elevated sed rate 60 8/16, repeat 62 on 8/22.        Myelodysplastic syndrome (CMS-[facility])- (present on admission)   Assessment & Plan    Seen by Dr. Ribeiro on the last admit  Platelet dysfunction present per prior TEG and evaluation  Hold lovenox  SCDs only for DVT prophylaxis        Parkinson's disease (CMS-[facility])- (present on admission)   Assessment & Plan    -Cor track in place  -Cannot do sustained released Sinemet given a cannot be crushed  -Continue short acting Sinemet while cor track is in place  -symptoms are better today, but continues to have somewhat wild daily fluctuations, possible G tube placement during this admission, need to have ongoing discussions with patient and his wife/family        Leukocytosis- (present on admission)   Assessment & Plan    Slow climb from 9.9 to 14 to 15.6 off zosyn.  Unclear source.  MDS usually gives pancytopenia picture.  8/24 repeat BCs x2.        Candidal UTI (urinary tract infection)- (present on admission)   Assessment & Plan    Treat with diflucan  Sediment seen        Acute urinary retention- (present on admission)   Assessment & Plan    Hurst catheter in place.  Start cardura 8/23  H/o prostatectomy in past  Per wife:  Hurst catheter placed at [facility] at time of transfer with cudet catheter difficult placement  Continue Hurst at least 7 days from start of cardura.        Oropharyngeal dysphagia- (present on admission)   Assessment & Plan    cortrak tube feeds per SLP eval.          Acute bilateral low back pain without sciatica- (present on admission)   Assessment & Plan    -MRI of cervical thoracic and lumbar spine shows no evidence of clear epidural or prevertebral abscess. Findings do show some hyper  enhanced bone marrow edema which was there on prior MRIs  -Finished 7 days of IV zosyn  -All cultures remain negative  -Mental status fluctuates  - cautious use of IV pain meds  -No neurosurgery consult is required at this time  -worked more with PT, sitting up at the bedside, afebrile, will need SNF versus REHAB        Left ankle swelling   Assessment & Plan    -Bilateral x-rays of the knees and ankles show very small fluid collection therefore arthrocentesis is not indicated at this time but need to watch closely  -unchanged today  -U/S negative for DVT/SVT, monitor, no e/o infection at this time  -consider compression stocking, has good ROM the L ankle on dorsi/plantar flexion, continue to see daily improvement        DNR (do not resuscitate)- (present on admission)   Assessment & Plan    Confirmed with the patient's wife on admission.        Acute delirium- (present on admission)   Assessment & Plan    -fluctuates, likely multi-factorial, infection, prolonged hospitalization, parkinson's  -continue current treatment plan        Hematuria- (present on admission)   Assessment & Plan    -resolved, hold AC, monitor closely        Pneumonia- (present on admission)   Assessment & Plan    -Possible pneumonia, given no clear infection of the spine on MRI and persisting cough and elevated white was gone responding to IV Zosyn, weakly appears to have pneumonia  -day 7/7 zosyn, WBC has normalized   8/22:  Reviewed all CXRs since admission:  No infiltrates seen on all 3.  No evidence of pneumonia.            Hurst catheter: Urinary Tract Retention or Urinary Tract Obstruction      DVT Prophylaxis: Contraindicated - High bleeding risk  DVT prophylaxis - mechanical: SCDs  Ulcer prophylaxis: Not indicated    Assessed for rehab: Patient was assess for and/or received rehabilitation services during this hospitalization

## 2017-08-27 ENCOUNTER — APPOINTMENT (OUTPATIENT)
Dept: RADIOLOGY | Facility: MEDICAL CENTER | Age: 77
DRG: 853 | End: 2017-08-27
Attending: HOSPITALIST
Payer: MEDICARE

## 2017-08-27 LAB
ANION GAP SERPL CALC-SCNC: 7 MMOL/L (ref 0–11.9)
ANISOCYTOSIS BLD QL SMEAR: ABNORMAL
BASOPHILS # BLD AUTO: 0 % (ref 0–1.8)
BASOPHILS # BLD: 0 K/UL (ref 0–0.12)
BUN SERPL-MCNC: 24 MG/DL (ref 8–22)
CALCIUM SERPL-MCNC: 8.4 MG/DL (ref 8.5–10.5)
CHLORIDE SERPL-SCNC: 99 MMOL/L (ref 96–112)
CO2 SERPL-SCNC: 25 MMOL/L (ref 20–33)
CREAT SERPL-MCNC: 0.84 MG/DL (ref 0.5–1.4)
EOSINOPHIL # BLD AUTO: 1.51 K/UL (ref 0–0.51)
EOSINOPHIL NFR BLD: 4.2 % (ref 0–6.9)
ERYTHROCYTE [DISTWIDTH] IN BLOOD BY AUTOMATED COUNT: 54 FL (ref 35.9–50)
GFR SERPL CREATININE-BSD FRML MDRD: >60 ML/MIN/1.73 M 2
GIANT PLATELETS BLD QL SMEAR: NORMAL
GLUCOSE SERPL-MCNC: 122 MG/DL (ref 65–99)
HCT VFR BLD AUTO: 29.2 % (ref 42–52)
HGB BLD-MCNC: 9.7 G/DL (ref 14–18)
LYMPHOCYTES # BLD AUTO: 2.44 K/UL (ref 1–4.8)
LYMPHOCYTES NFR BLD: 6.8 % (ref 22–41)
MACROCYTES BLD QL SMEAR: ABNORMAL
MANUAL DIFF BLD: NORMAL
MCH RBC QN AUTO: 28.3 PG (ref 27–33)
MCHC RBC AUTO-ENTMCNC: 33.2 G/DL (ref 33.7–35.3)
MCV RBC AUTO: 85.1 FL (ref 81.4–97.8)
MICROCYTES BLD QL SMEAR: ABNORMAL
MONOCYTES # BLD AUTO: 3.34 K/UL (ref 0–0.85)
MONOCYTES NFR BLD AUTO: 9.3 % (ref 0–13.4)
MORPHOLOGY BLD-IMP: NORMAL
NEUTROPHILS # BLD AUTO: 28.61 K/UL (ref 1.82–7.42)
NEUTROPHILS NFR BLD: 78.8 % (ref 44–72)
NEUTS BAND NFR BLD MANUAL: 0.9 % (ref 0–10)
NRBC # BLD AUTO: 0.04 K/UL
NRBC BLD AUTO-RTO: 0.1 /100 WBC
OVALOCYTES BLD QL SMEAR: NORMAL
PLATELET # BLD AUTO: 255 K/UL (ref 164–446)
PLATELET BLD QL SMEAR: NORMAL
PMV BLD AUTO: 12.1 FL (ref 9–12.9)
POIKILOCYTOSIS BLD QL SMEAR: NORMAL
POLYCHROMASIA BLD QL SMEAR: NORMAL
POTASSIUM SERPL-SCNC: 4.8 MMOL/L (ref 3.6–5.5)
RBC # BLD AUTO: 3.43 M/UL (ref 4.7–6.1)
RBC BLD AUTO: PRESENT
SODIUM SERPL-SCNC: 131 MMOL/L (ref 135–145)
VARIANT LYMPHS BLD QL SMEAR: NORMAL
WBC # BLD AUTO: 35.9 K/UL (ref 4.8–10.8)

## 2017-08-27 PROCEDURE — 500506 HCHG ELECTRODE, KNIFE: Performed by: UROLOGY

## 2017-08-27 PROCEDURE — 87040 BLOOD CULTURE FOR BACTERIA: CPT

## 2017-08-27 PROCEDURE — 700102 HCHG RX REV CODE 250 W/ 637 OVERRIDE(OP): Performed by: HOSPITALIST

## 2017-08-27 PROCEDURE — 500354 HCHG CUTTING LOOP: Performed by: UROLOGY

## 2017-08-27 PROCEDURE — 99232 SBSQ HOSP IP/OBS MODERATE 35: CPT | Performed by: HOSPITALIST

## 2017-08-27 PROCEDURE — 700102 HCHG RX REV CODE 250 W/ 637 OVERRIDE(OP): Performed by: INTERNAL MEDICINE

## 2017-08-27 PROCEDURE — 0T9B8ZZ DRAINAGE OF BLADDER, VIA NATURAL OR ARTIFICIAL OPENING ENDOSCOPIC: ICD-10-PCS | Performed by: UROLOGY

## 2017-08-27 PROCEDURE — 160009 HCHG ANES TIME/MIN: Performed by: UROLOGY

## 2017-08-27 PROCEDURE — 700102 HCHG RX REV CODE 250 W/ 637 OVERRIDE(OP)

## 2017-08-27 PROCEDURE — 501329 HCHG SET, CYSTO IRRIG Y TUR: Performed by: UROLOGY

## 2017-08-27 PROCEDURE — 160036 HCHG PACU - EA ADDL 30 MINS PHASE I: Performed by: UROLOGY

## 2017-08-27 PROCEDURE — 700111 HCHG RX REV CODE 636 W/ 250 OVERRIDE (IP)

## 2017-08-27 PROCEDURE — A9270 NON-COVERED ITEM OR SERVICE: HCPCS

## 2017-08-27 PROCEDURE — 85007 BL SMEAR W/DIFF WBC COUNT: CPT

## 2017-08-27 PROCEDURE — 700101 HCHG RX REV CODE 250

## 2017-08-27 PROCEDURE — 160039 HCHG SURGERY MINUTES - EA ADDL 1 MIN LEVEL 3: Performed by: UROLOGY

## 2017-08-27 PROCEDURE — 160048 HCHG OR STATISTICAL LEVEL 1-5: Performed by: UROLOGY

## 2017-08-27 PROCEDURE — 80048 BASIC METABOLIC PNL TOTAL CA: CPT

## 2017-08-27 PROCEDURE — A4346 CATH INDW FOLEY 3 WAY: HCPCS | Performed by: UROLOGY

## 2017-08-27 PROCEDURE — A9270 NON-COVERED ITEM OR SERVICE: HCPCS | Performed by: INTERNAL MEDICINE

## 2017-08-27 PROCEDURE — 500879 HCHG PACK, CYSTO: Performed by: UROLOGY

## 2017-08-27 PROCEDURE — 0TCD8ZZ EXTIRPATION OF MATTER FROM URETHRA, VIA NATURAL OR ARTIFICIAL OPENING ENDOSCOPIC: ICD-10-PCS | Performed by: UROLOGY

## 2017-08-27 PROCEDURE — 160002 HCHG RECOVERY MINUTES (STAT): Performed by: UROLOGY

## 2017-08-27 PROCEDURE — 700102 HCHG RX REV CODE 250 W/ 637 OVERRIDE(OP): Performed by: FAMILY MEDICINE

## 2017-08-27 PROCEDURE — 770006 HCHG ROOM/CARE - MED/SURG/GYN SEMI*

## 2017-08-27 PROCEDURE — 85027 COMPLETE CBC AUTOMATED: CPT

## 2017-08-27 PROCEDURE — 0T7D8ZZ DILATION OF URETHRA, VIA NATURAL OR ARTIFICIAL OPENING ENDOSCOPIC: ICD-10-PCS | Performed by: UROLOGY

## 2017-08-27 PROCEDURE — 160028 HCHG SURGERY MINUTES - 1ST 30 MINS LEVEL 3: Performed by: UROLOGY

## 2017-08-27 PROCEDURE — C1769 GUIDE WIRE: HCPCS | Performed by: UROLOGY

## 2017-08-27 PROCEDURE — A9270 NON-COVERED ITEM OR SERVICE: HCPCS | Performed by: FAMILY MEDICINE

## 2017-08-27 PROCEDURE — 71010 DX-CHEST-PORTABLE (1 VIEW): CPT

## 2017-08-27 PROCEDURE — A4357 BEDSIDE DRAINAGE BAG: HCPCS | Performed by: UROLOGY

## 2017-08-27 PROCEDURE — 36415 COLL VENOUS BLD VENIPUNCTURE: CPT

## 2017-08-27 PROCEDURE — 160035 HCHG PACU - 1ST 60 MINS PHASE I: Performed by: UROLOGY

## 2017-08-27 PROCEDURE — 0T7B8DZ DILATION OF BLADDER WITH INTRALUMINAL DEVICE, VIA NATURAL OR ARTIFICIAL OPENING ENDOSCOPIC: ICD-10-PCS | Performed by: UROLOGY

## 2017-08-27 PROCEDURE — A9270 NON-COVERED ITEM OR SERVICE: HCPCS | Performed by: HOSPITALIST

## 2017-08-27 RX ORDER — TRAMADOL HYDROCHLORIDE 50 MG/1
50 TABLET ORAL EVERY 6 HOURS PRN
Status: DISCONTINUED | OUTPATIENT
Start: 2017-08-27 | End: 2017-08-30 | Stop reason: HOSPADM

## 2017-08-27 RX ORDER — TRAMADOL HYDROCHLORIDE 50 MG/1
50-100 TABLET ORAL EVERY 6 HOURS PRN
Status: DISCONTINUED | OUTPATIENT
Start: 2017-08-27 | End: 2017-08-27

## 2017-08-27 RX ORDER — PHENAZOPYRIDINE HYDROCHLORIDE 100 MG/1
100 TABLET, FILM COATED ORAL
Status: DISCONTINUED | OUTPATIENT
Start: 2017-08-27 | End: 2017-08-29

## 2017-08-27 RX ORDER — MAGNESIUM HYDROXIDE 1200 MG/15ML
LIQUID ORAL
Status: DISCONTINUED | OUTPATIENT
Start: 2017-08-27 | End: 2017-08-27 | Stop reason: HOSPADM

## 2017-08-27 RX ORDER — TRAMADOL HYDROCHLORIDE 50 MG/1
100 TABLET ORAL EVERY 6 HOURS PRN
Status: DISCONTINUED | OUTPATIENT
Start: 2017-08-27 | End: 2017-08-30 | Stop reason: HOSPADM

## 2017-08-27 RX ORDER — LABETALOL HYDROCHLORIDE 5 MG/ML
INJECTION, SOLUTION INTRAVENOUS
Status: COMPLETED
Start: 2017-08-27 | End: 2017-08-27

## 2017-08-27 RX ADMIN — STANDARDIZED SENNA CONCENTRATE AND DOCUSATE SODIUM 2 TABLET: 8.6; 5 TABLET, FILM COATED ORAL at 21:17

## 2017-08-27 RX ADMIN — CITALOPRAM HYDROBROMIDE 20 MG: 20 TABLET ORAL at 08:43

## 2017-08-27 RX ADMIN — AMANTADINE HYDROCHLORIDE 100 MG: 50 SOLUTION ORAL at 08:43

## 2017-08-27 RX ADMIN — MEMANTINE HYDROCHLORIDE 10 MG: 10 TABLET, FILM COATED ORAL at 21:17

## 2017-08-27 RX ADMIN — LABETALOL HYDROCHLORIDE: 5 INJECTION, SOLUTION INTRAVENOUS at 05:20

## 2017-08-27 RX ADMIN — Medication 220 MG: at 08:43

## 2017-08-27 RX ADMIN — FENTANYL CITRATE 25 MCG: 50 INJECTION, SOLUTION INTRAMUSCULAR; INTRAVENOUS at 04:52

## 2017-08-27 RX ADMIN — ACETAMINOPHEN 650 MG: 325 TABLET, FILM COATED ORAL at 17:08

## 2017-08-27 RX ADMIN — BACITRACIN ZINC: 500 OINTMENT TOPICAL at 08:43

## 2017-08-27 RX ADMIN — CARBIDOPA AND LEVODOPA 2 TABLET: 25; 100 TABLET ORAL at 21:17

## 2017-08-27 RX ADMIN — MEMANTINE HYDROCHLORIDE 10 MG: 10 TABLET, FILM COATED ORAL at 08:43

## 2017-08-27 RX ADMIN — AMANTADINE HYDROCHLORIDE 100 MG: 50 SOLUTION ORAL at 21:16

## 2017-08-27 RX ADMIN — STANDARDIZED SENNA CONCENTRATE AND DOCUSATE SODIUM 2 TABLET: 8.6; 5 TABLET, FILM COATED ORAL at 08:43

## 2017-08-27 RX ADMIN — CARBIDOPA AND LEVODOPA 1 TABLET: 10; 100 TABLET ORAL at 06:18

## 2017-08-27 RX ADMIN — BACITRACIN ZINC: 500 OINTMENT TOPICAL at 21:17

## 2017-08-27 RX ADMIN — CARBIDOPA AND LEVODOPA 1 TABLET: 10; 100 TABLET ORAL at 14:23

## 2017-08-27 RX ADMIN — TRIAZOLAM 0.12 MG: 0.25 TABLET ORAL at 22:31

## 2017-08-27 RX ADMIN — OXYCODONE HYDROCHLORIDE AND ACETAMINOPHEN 500 MG: 500 TABLET ORAL at 08:43

## 2017-08-27 RX ADMIN — ACETAMINOPHEN 650 MG: 325 TABLET, FILM COATED ORAL at 08:53

## 2017-08-27 RX ADMIN — CARBIDOPA AND LEVODOPA 1 TABLET: 10; 100 TABLET ORAL at 21:17

## 2017-08-27 ASSESSMENT — ENCOUNTER SYMPTOMS
BLURRED VISION: 0
NECK PAIN: 0
BACK PAIN: 1
ABDOMINAL PAIN: 0
SPEECH CHANGE: 1
DIZZINESS: 0
TREMORS: 1
HEADACHES: 0
LOSS OF CONSCIOUSNESS: 0
WEAKNESS: 1
FEVER: 0
DEPRESSION: 0
FOCAL WEAKNESS: 0
SHORTNESS OF BREATH: 0

## 2017-08-27 ASSESSMENT — PAIN SCALES - GENERAL
PAINLEVEL_OUTOF10: 0
PAINLEVEL_OUTOF10: ASSUMED PAIN PRESENT
PAINLEVEL_OUTOF10: ASSUMED PAIN PRESENT

## 2017-08-27 ASSESSMENT — PAIN SCALES - WONG BAKER: WONGBAKER_NUMERICALRESPONSE: DOESN'T HURT AT ALL

## 2017-08-27 NOTE — PROGRESS NOTES
ICU nurse attempted to insert coude catheter without success. Dr. Almeida from urology contacted this nurse and said patient will need possible surgical intervention for this issue.

## 2017-08-27 NOTE — H&P
REASON FOR CONSULTATION:  Consultation is requested by Dr. Poole for acute   urinary retention with hematuria and history of difficult catheterization.    CHIEF COMPLAINT:  This is a 76-year-old gentleman with multiple medical   problems including Parkinson's disease, history of myelodysplastic disorder   who has during a prolonged hospitalization at [facility],   had intermittent hematuria for 5 days with difficult catheterization and now   inability to place a catheter with a bladder scan showing a residual of over   1000 mL and the patient in moderate to acute distress due to urinary   retention.    HISTORY OF PRESENT ILLNESS:  The patient is presently transferred from [facility] due to a higher level of care after a kyphoplasty.  The   patient developed altered mental status, fever and acute urinary retention,   had an extremely difficult catheterization during the emergency room visit,   according to the wife he has been present at his bedside during most of his   care.  He was subsequently hospitalist [facility] is under the care of infectious   disease for osteomyelitis at T11, T12 and L1 on MRI and he has had a urinary   tract yeast infection, treated with Diflucan during this stay.  Apparently   about 5:00 in the afternoon, he began experiencing hematuria after he pulled   on the catheter and nurses attempted irrigation.  This failed, they attempted   to place different catheters, all of which failed and they certainly were   unable to pass any catheter of significant size to evacuate clots.  Upon my   arrival, patient is clearly in urinary retention with blood at the meatus and   is requesting anesthesia for further catheter as is his wife due to the   difficulty with catheterization.  Given the history and prior history of   prostate cancer he likely has a bladder neck contracture and now he has   hematuria, will likely require transurethral incision of bladder neck    contracture.    PAST MEDICAL HISTORY:  Noteworthy for Parkinson's disease, myelodysplastic   syndrome, compression fracture of the spine and prostate cancer.    PAST SURGICAL HISTORY:  He has had a splenectomy, knee arthroscopy, radical   prostatectomy, as well as a rotator cuff repair, cataract extractions in the   past and kyphoplasty by Dr. Niels Gallardo at [facility].    FAMILY HISTORY:  Noteworthy for heart disease in mother, lung disease in   mother and pancreatic cancer in the father.    MEDICATIONS:  Please see the chart.    ALLERGIES:  No known drug allergies.    SOCIAL HISTORY:  The patient lives at home prior to hospitalization.  He is a   nontobacco user, drinks alcohol rarely and does not use any drugs.    REVIEW OF SYSTEMS:  Difficult to obtain due to the patient's Parkinson's   disease and current condition.    PHYSICAL EXAMINATION:  GENERAL:  He is a well-developed, well-nourished male with a tremor, who is in   moderate distress due to urinary retention.  HEENT:  Normocephalic, atraumatic.  He has a CORTRAK in place.  NECK:  Supple.  CHEST:  Clear to auscultation bilaterally.  CARDIOVASCULAR:  Regular rate and rhythm.  I did not appreciate any murmurs.  ABDOMEN:  Soft, exquisitely tender and there is fullness to percussion at the   suprapubic area.  GENITALIA:  Showed testes descended.  There is blood at the meatus and digital   rectal examination deferred.  EXTREMITIES:  Without clubbing, cyanosis or edema.  Motor and sensory   examination shows decreased strength in the upper and lower extremities, 4/5.  NEUROLOGIC:  Cranial nerves II-XII intact.    LABORATORIES AND STUDIES:  The hemoglobin is 9.2.  The platelet count 268,000,   white count 27.6.  BUN and creatinine are normal at this point at 20 and 0.7.    ASSESSMENT:  1.  Acute urinary retention.  2.  History of prostatectomy radical for presumptive history of prostate   cancer.    PLAN AND RECOMMENDATIONS:  I explained to the patient  and more importantly his   wife who is really cognitively doing much better, they need to establish   drainage.  I discussed the option of placing a small catheter to temporary   relieve the urinary retention and the wife is refusing because she prefers   anesthetic, which is certainly reasonable given his history of difficult   catheterization.  She states she has had 6 or 7 catheters throughout this   hospitalization.  Each time placing on him has been exceedingly difficult.  I   explained to her my goal tonight to establish drainage and I would perform a   cystoscopy general anesthesia so that it is easier to evaluate more   thoroughly.  I would also in size any bladder neck contracture or stricture   and then positioned a larger catheter with the hematuria.  One of the risk of   the procedure, is the risk of perioperative urosepsis, a risk of recurrence of   the contracture as well as worsening of any incontinence preexisting with   Parkinson's disease at the bladder neck will be more open.  I explained that   if there is a tumor in the bladder, I certainly would biopsy it and it would   the risk there, but this is less likely and we discussed the risk of   persistent hematuria despite our efforts.  I have also discussed perioperative   risk of deep vein thrombosis, pulmonary embolism, aspiration pneumonia, heart   attack, stroke and death.  Informed consent was given to me by the patient   and his wife to proceed.       ____________________________________     MD KENNA MASSEY / ABISAI    DD:  08/27/2017 03:09:59  DT:  08/27/2017 03:33:48    D#:  [id number]  Job#:  [id number]

## 2017-08-27 NOTE — PROGRESS NOTES
Dior from Lab called with critical result of WBC at 35.9. Critical lab result read back to Dior.   ROSA Brown with Dr. Hernandez notified of critical lab result at 1000.  Critical lab result read back by ROSA Brown with Dr. Hernandez.

## 2017-08-27 NOTE — CARE PLAN
Problem: Infection  Goal: Will remain free from infection  Outcome: PROGRESSING SLOWER THAN EXPECTED  Pt WBC trending up, ID on case.     Problem: Fluid Volume:  Goal: Will maintain balanced intake and output  Outcome: PROGRESSING AS EXPECTED  Pt. Received fluids in surgery, on tube feed running CBI currently. Monitoring I/Os.

## 2017-08-27 NOTE — OR SURGEON
Operative Report    PreOp Diagnosis: Acute Urinary Retention                                Gross Hematuria                                Difficult catheterization    PostOp Diagnosis: Acute Urinary Retention                                 Gross Hematuria with organized clots                                  Posterior urethral false passage                                  Bulbar urethral stricture    Procedure(s):  CYSTOSCOPY  DILATION OF BULBAR URETHRAL STRICTURE - Wound Class: Clean Contaminated  EVACUATION OF CLOTS IN PROST ATIC FOSSA  TUR OF OLD BLADDER GELATINOUS CLOTS,   COMPLEX CATHETERIZATION - Wound Class: Clean Contaminated          Surgeon(s):  Wild Almeida M.D.    Anesthesiologist/Type of Anesthesia:  Anesthesiologist: aJcinto Shah M.D./General LMA    Surgical Staff:  Circulator: Vlad Wu RSIMONE; Love Person R.N.  Scrub Person: Leopold von Garcia    Specimens:None    Estimated Blood Loss: 25cc acute with removal of 100cc of old clots    Findings: Bulbar urethral stricture at about 22 [place] in size dilated with the resectoscope                  Posterior urethral false passage                   Prostatic fossa open but full of clots                   Old organized bladder clots                        Complications: None  Drains: [address] way to CBI.        8/27/2017 4:33 AM Wild Almeida

## 2017-08-27 NOTE — PROGRESS NOTES
RN went to turn pt and noticed blood stain on sheet and gown, upon further inspection noticed pt bleeding from meatus. Small pool of blood present in pt left groin. Cleaned area, now just small amount of blood coming from meatus. Called and notified Dr. Hernandez. To continue monitoring and update her as needed.

## 2017-08-27 NOTE — PROGRESS NOTES
Pt. More lethargic this AM after surgery. AOx1. CBI running at moderate rate, draining yellow, small flakes of blood. Fall precautions in place.

## 2017-08-27 NOTE — PROGRESS NOTES
Pt c/o of urge to go the bathroom. Low output noted in rinaldi bag. Catheter flushed with 50ml of NS but no increase in output noted. Bladder scan showing >999ml of urine in bladder. Hospitalist notified.

## 2017-08-27 NOTE — PROGRESS NOTES
Hospitalist RN:     Notified by bedside RN of critical WBC 35.9 at 1000. Verified in patient's results review.    Dr. Hernandez notified of critical lab result at 1002.  Critical lab result read back by Dr. Hernandez.

## 2017-08-27 NOTE — PROGRESS NOTES
Pt AAO1-2. PEREZ. To self and place. Speech is unintelligible at time. Has been pulling on rinaldi catheter, bloody drainage noticed on penis and urine is blood tinged. Rinaldi care performed. Cortrak for feeding and medication. Q2 turns in place. Bed alarm on.

## 2017-08-27 NOTE — PROGRESS NOTES
Notified by nurse pt not urinating and attempt to replace the Hurst not successful. Coude placement also attempted without success. Called and discussed with Dr Almeida of urology and nurse's contact nr provided.

## 2017-08-27 NOTE — PROGRESS NOTES
Pt. Wife at bedside, wanted pt medications to be re-evaluated. Discussed with MD. Pt. AOx2, drowsy. Fall precautions and aspiration precautions in place. Hurst care done. Cortrak flushing well.

## 2017-08-27 NOTE — PROGRESS NOTES
Infectious Disease Progress Note    Author: Jessy Flanagan M.D. Date & Time of service: 2017  1:34 PM    Chief Complaint:  Back infection    Interval History:  76-year-old male with Parkinson's, MDS, splenectomy was admitted with altered mental status and fevers. Likely due to UTI  17-MAXIMUM TEMPERATURE 98.2. WBC 15.6. Today awake and responsive  17-MAXIMUM TEMPERATURE 98.1. WBC 20. Denies any increased back pain.    AF (99.8) WBC 27.6 wife does not want him on any abx-thinks they made him worse   AF WBC 35.9 urinary obstruction-now on CBI remains obtunded  Labs Reviewed, Medications Reviewed and Radiology Reviewed.    Review of Systems:  Review of Systems   Unable to perform ROS: Medical condition   Constitutional: Negative for fever.   Genitourinary: Positive for hematuria.       Hemodynamics:  Temp (24hrs), Av.7 °C (98.1 °F), Min:36.1 °C (97 °F), Max:37.3 °C (99.1 °F)  Temperature: 36.3 °C (97.4 °F)  Pulse  Av.8  Min: 60  Max: 99Heart Rate (Monitored): 74  Blood Pressure : 109/52, NIBP: 130/65       Physical Exam:  Physical Exam   Constitutional: He appears well-developed.   Frail and chronically ill   HENT:   Head: Normocephalic and atraumatic.   Mouth/Throat: No oropharyngeal exudate.   cortrak   Eyes: No scleral icterus.   Neck: Neck supple.   Cardiovascular: Normal rate and regular rhythm.    No murmur heard.  Pulmonary/Chest: Effort normal. No respiratory distress. He has no wheezes. He has no rales.   Abdominal: Soft. There is no tenderness.   Genitourinary:   Genitourinary Comments: Hurst  +bloody   Musculoskeletal: He exhibits no edema.   sarcopenia   Neurological: He displays abnormal reflex. He exhibits abnormal muscle tone. Coordination abnormal.   Obtunded  tremor   Nursing note and vitals reviewed.      Meds:    Current Facility-Administered Medications:   •  opium-belladonna  •  phenazopyridine  •  tramadol **OR** tramadol  •  ferrous sulfate  •  ascorbic  acid  •  doxazosin  •  amantadine  •  carbidopa-levodopa  •  carbidopa-levodopa  •  bacitracin  •  citalopram  •  memantine  •  modafinil  •  triazolam  •  senna-docusate **AND** polyethylene glycol/lytes **AND** magnesium hydroxide **AND** bisacodyl  •  Respiratory Care per Protocol  •  NS  •  acetaminophen  •  ondansetron  •  ondansetron    Labs:  Recent Labs      08/25/17   0324  08/26/17   0250  08/27/17   0753   WBC  20.4*  27.6*  35.9*   RBC  3.46*  3.26*  3.43*   HEMOGLOBIN  9.8*  9.2*  9.7*   HEMATOCRIT  30.5*  28.5*  29.2*   MCV  88.2  87.4  85.1   MCH  28.3  28.2  28.3   RDW  55.2*  54.8*  54.0*   PLATELETCT  313  268  255   MPV  12.2  12.8  12.1   NEUTSPOLYS  85.20*  80.40*  78.80*   LYMPHOCYTES  1.80*  2.80*  6.80*   MONOCYTES  9.30  10.30  9.30   EOSINOPHILS  0.00  1.90  4.20   BASOPHILS  0.00  1.90*  0.00   RBCMORPHOLO  Present  Present  Present     Recent Labs      08/25/17   0324  08/27/17   0754   SODIUM  136  131*   POTASSIUM  4.3  4.8   CHLORIDE  102  99   CO2  28  25   GLUCOSE  123*  122*   BUN  20  24*     Recent Labs      08/25/17   0324  08/27/17   0754   CREATININE  0.77  0.84       Imaging:  Ct-chest,abdomen,pelvis With   FINDINGS: CT Chest: There is mild bilateral posterior atelectasis. No pleural fluid is identified. There are bilateral thyroid nodules. There are scattered arterial calcifications. No significant pericardial effusion. No adenopathy is identified. There is thoracic kyphosis with mild compression deformities in the upper lumbar spine. CT Abdomen: Irregular hypodense mass in the hepatic dome measures approximately 1.7 cm. There are multiple splenules in the left upper quadrant. Soft tissue mass at the tip of the liver likely represents an additional splenule. The adrenal glands, pancreas, and left kidney are unremarkable. A 1.5 cm mass in the mid right kidney is indeterminant, but likely represents a small cyst. There is a 2 mm nonobstructing calculus in the lower pole of the  right kidney. The kidneys enhance symmetrically. There is a gallbladder calculus without wall thickening or pericholecystic fluid. There is a moderate amount of colonic stool. There are scattered arterial calcifications. CT Pelvis: The bladder is unremarkable. No significant free fluid or adenopathy. Degenerative changes are in the spine. Vertebral augmentation has been performed at T11, T12, and L1.     8/6/2017  1.  1.7 cm hypodense mass in the hepatic dome is indeterminant, but may represent a hemangioma or complex cyst. Nonemergent hepatic protocol MRI could be performed for further evaluation. 2.  1.5 cm hypodense mass in the mid right kidney, likely a complex cyst. Follow-up ultrasound or renal protocol CT or MRI could be performed for confirmation. 3.  Multiple splenules in the abdomen. 4.  Cholelithiasis 5.  Spinal compression deformities status post vertebral augmentation at T11, T12, and L1.    Ct-head W/o    8/12/2017 8/12/2017 8:27 PM HISTORY/REASON FOR EXAM:  Altered Mental Status. Fever, cough, decreased mentation. TECHNIQUE/EXAM DESCRIPTION AND NUMBER OF VIEWS: CT of the head without contrast. The study was performed on a helical multidetector CT scanner. Contiguous 2.5 mm axial sections were obtained from the skull base through the vertex. Up to date radiation dose reduction adjustments have been utilized to meet ALARA standards for radiation dose reduction. COMPARISON:  MRI of the brain 11/1/2016 FINDINGS: The calvariae and skull base are unremarkable. There are no extraaxial fluid collections. There is a pattern of cerebral atrophy manifest as enlargement of sulcal markings and ventricular prominence. The ventricular system and basal cisterns are otherwise unremarkable. There are no areas of abnormal density in the brain substance. There are no hemorrhagic lesions. There are no mass effects or shift of midline structures. The brainstem and posterior fossa structures are unremarkable. Paranasal  sinuses show some minor scattered mucosal thickening among ethmoid air cells no air-fluid levels are evident. Mastoids show opacification of clustered air cells of the left mastoid tip. This may be chronic as similar findings were noted on the MRI.     8/12/2017  1.  Mild-moderate cerebral atrophy. 2.  Opacification of a few clustered air cells of the left mastoid tip which may represent chronic or active postinflammatory changes. 3.  Otherwise, no acute findings and no appreciable change compared to MRI study 11/1/2016.    Ct-tspine W/o Plus Recons  8/12/2017  1.  Status post vertebral augmentation at T11, T12, L1. Small amount of ventral epidural cement extravasation at the T11 level. 2.  Mild superior endplate compression deformities again seen at T2, T3, T4. No change from 8/6/2017. 3.  Bibasilar subsegmental atelectatic changes most prominent at the left lower lobe posterior basal segment. Little or no change since recent exam 8/6/2017.    Dx-ankle 2- Views Left  8/15/2017  Soft tissue swelling about the lateral malleolus. No definite acute osseous abnormality.    Dx-knee 2- Right  8/15/2017  1. No definite acute osseous abnormality. 2. 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    Dx-knee 2- Left  8/15/2017  1. No definite acute osseous abnormality. 2. Small knee joint effusion    Dx-thoracolumbar Spine-2 Views    8/6/2017 8/6/2017 11:50 AM HISTORY/REASON FOR EXAM: Back pain. TECHNIQUE/EXAM DESCRIPTION AND NUMBER OF VIEWS:  Thoracic spine, 2 views. COMPARISON:  None. FINDINGS: 2 intraoperative views of the thoracic spine obtained following multilevel kyphoplasty demonstrate kyphoplasty cement within the partially collapsed vertebral bodies at approximately the T11-L1 levels.     8/6/2017  Intraoperative visualization of 3 level kyphoplasty at approximately the T11-L1 levels.    Mr-cervical Spine-with & W/o   FINDINGS: There is no acute fracture or gross malalignment.  There is 2 mm of degenerative retrolisthesis of C4 on C5. There is loss of intervertebral disc height throughout the cervical spine, most pronounced at C5-C6 and C6-C7. Marrow signal is diffusely low on T1 and T2, relatively sparing the dens. The cervical cord has a normal caliber course and signal intensity. No area of abnormal enhancement is seen. Findings specific to each level are described below: C2-3: Unremarkable C3-4:  Mild RIGHT facet arthropathy C4-5:  Posterior disc osteophyte complex extending to the BILATERAL foraminal zones. Mild RIGHT facet arthropathy. Mild central canal narrowing. Mild RIGHT neural foraminal narrowing. C5-6: Posterior disc osteophyte complex. Mild central canal narrowing. Moderate RIGHT and mild LEFT neural foraminal narrowing. C6-7: Posterior disc osteophyte complex extending to the RIGHT more than the LEFT neural foramen. Mild central canal narrowing. Mild RIGHT neural foraminal narrowing. C7-T1: Posterior disc osteophyte complex eccentric to the RIGHT more than the LEFT foraminal zone. Mild RIGHT neural foraminal narrowing. No soft tissue mass is evident.     8/17/2017  1.  No evidence of cervical discitis osteomyelitis or epidural abscess 2.  Diffuse marrow infiltration or replacement 3.  Multilevel multifactorial degenerative changes with areas of central canal or neural foraminal narrowing as described above    Mr-lumbar Spine-with & W/o   FINDINGS: There are changes secondary to the previous vertebral augmentation procedures at T11, T12 and L1 vertebral bodies. Bone marrow edema and contrast enhancement are noted within the remaining portions of the above-mentioned vertebral bodies. There is no evidence of abnormal epidural or prevertebral fluid collection. Mild prevertebral soft tissue contrast enhancement is seen. There is abnormal T2 hyperintensity at L1-2 disc with contrast enhancement. There is also abnormal contrast enhancement within the L3-4 disc. At the level of  L5-S1, there is minimal disc bulge without significant spinal or neural foraminal stenosis. At the level of L4-5, there is mild right neural foraminal stenosis. There is no central canal stenosis. At the level of L3-4, there is abnormal T2 hyperintensity and contrast enhancement within the disc space. There is no abnormal epidural or prevertebral fluid collection. At the level of L2-3, there is no spinal or neural foraminal stenosis. At the level of L1-2, there is no spinal or neural foraminal stenosis. At the level of T12-L1, there is abnormal disc fluid with contrast enhancement. The conus terminates at the level of L1. A gallstone is seen.   8/17/2017  1.  There is no evidence of epidural abscess. 2.  There is abnormal bone marrow edema and contrast enhancement within the T11, T12 and L1 vertebral bodies surrounding the vertebral cement. This finding likely represent edema/inflammation secondary to the recent procedure. However this imaging finding cannot completely exclude the possibility of osteomyelitis. 3.  There are abnormal disc T2 hyperintensity and enhancement noted involving L1-2 and L4-5 discs. This finding is suspicious for early discitis. However the previous MRI dated 5/8/2017 demonstrates disc T2 hyperintensity at the levels of L1-2 and L4-5. Advanced disc degeneration may have this appearance. 4.  There is no evidence of prevertebral abscess. 5.  Degenerative changes as described above.    Mr-thoracic Spine-with & W/o  FINDINGS: There has been interval vertebral augmentation at T11, T12 and L1. There is no demonstrable change in the height loss at each of these levels. There is adjacent edema and faint enhancement about each augmentation cavity extending into the pedicles. There  is a small amount of signal void compatible with gas in the disc space anteriorly at the T12-L1 disc. No abnormal discal or soft tissue enhancement is seen. No additional fracture is seen. There is mildly prominent  hemangioma in the T7 vertebral body. The bone marrow is diffusely low in signal on T1 and T2 as previously noted. There is no gross malalignment. There is mild loss of intervertebral disc height throughout the thoracic spine. The thoracic cord has a normal caliber course and appearance. There is no evidence of significant disk extrusion, cord compression, or neural foraminal stenosis.     2017  1.  Prior spinal augmentation at the vertebral compression fractures sites of T11, T12 and L1 2.  Areas of enhancement in the T11, T12 and L1 vertebral bodies are in keeping with what would be expected for recent intervention though infection is not excluded 3.  Thoracic spondylosis 4.  Diffuse marrow infiltration or replacement  e Venous Duplex    2017   Vascular Laboratory  CONCLUSIONS  No evidence of deep vein thrombosis or superficial thrombophlebitis in the  left lower extremity.  LOTUSANNA  Exam Date:     2017 09:05  Room #:     Inpatient  Priority:     Routine  Ht (in):             Wt (lb):  Ordering Physician:        RODRIGO CARTER  Referring Physician:       EMMA Rice  Sonographer:               MUSA Mays RVT, BERNADETTE  Study Type:                Complete Unilateral  Technical Quality:         Adequate  Age:    76    Gender:     M  MRN:    [id number]  :    1940      BSA:  Indications:     Edema, unspecified  CPT Codes:       62356  ICD Codes:       R609  History:         Left ankle edema for last several days.  Limitations:  PROCEDURES:  Left lower extremity venous duplex imaging.  The following venous structures were evaluated: common femoral, profunda  femoral, greater saphenous, femoral, popliteal , peroneal and posterior  tibial veins.  Serial compression, augmentation maneuvers,  color and spectral Doppler  flow evaluations were performed.  FINDINGS:  Doppler duplex and spectral analysis of the superficial and deep venous  systems of the  "left leg was performed.  No evidence of deep vein thrombosis or superficial thrombophlebitis in the  left lower extremity.  Contralateral flow was obtained in the right common femoral vein and  appears to be normal.  No prior studies available for comparison.  Juliano Cox MD  (Electronically Signed)  Final Date:      18 August 2017                   17:07    Dx-abdomen For Tube Placement    8/19/2017 8/19/2017 4:12 PM HISTORY/REASON FOR EXAM:  Line evaluation. TECHNIQUE/EXAM DESCRIPTION AND NUMBER OF VIEWS:  1 view(s) of the abdomen. COMPARISON:  None. FINDINGS: Enteric tube has been placed. The tip projects over the left upper quadrant. The bowel gas pattern is within normal limits.  There are multilevel changes of vertebral augmentation.     8/19/2017  1.  Feeding tube tip overlies the gastric body.    Dx-abdomen For Tube Placement    8/15/2017  8/15/2017 4:31 AM HISTORY/REASON FOR EXAM:  Tube evaluation. TECHNIQUE/EXAM DESCRIPTION AND NUMBER OF VIEWS:  1 view(s) of the abdomen. COMPARISON:  None. FINDINGS: Limited single view of the abdomen performed primarily to evaluate enteric tube position. The tip projects over the stomach fundus. Diffuse gas-filled small bowel and colon.     8/15/2017  Feeding tube with tip in the stomach fundus.      Micro:  Results     Procedure Component Value Units Date/Time    BLOOD CULTURE [[id number]]     Order Status:  Sent Specimen:  Blood from Peripheral     BLOOD CULTURE [[id number]] Collected:  08/26/17 1330    Order Status:  Completed Specimen:  Blood from Peripheral Updated:  08/27/17 0842     Significant Indicator NEG     Source BLD     Site PERIPHERAL     Blood Culture --     No Growth    Note: Blood cultures are incubated for 5 days and  are monitored continuously.Positive blood cultures  are called to the RN and reported as soon as  they are identified.      Narrative:       Per Hospital Policy: Only change Specimen Src: to \"Line\" if  specified by physician order.    " "BLOOD CULTURE [[id number]] Collected:  08/26/17 1338    Order Status:  Completed Specimen:  Blood from Peripheral Updated:  08/27/17 0842     Significant Indicator NEG     Source BLD     Site PERIPHERAL     Blood Culture --     No Growth    Note: Blood cultures are incubated for 5 days and  are monitored continuously.Positive blood cultures  are called to the RN and reported as soon as  they are identified.      Narrative:       Per Hospital Policy: Only change Specimen Src: to \"Line\" if  specified by physician order.    BLOOD CULTURE [[id number]] Collected:  08/24/17 1342    Order Status:  Completed Specimen:  Blood from Peripheral Updated:  08/25/17 0854     Significant Indicator NEG     Source BLD     Site PERIPHERAL     Blood Culture --     No Growth    Note: Blood cultures are incubated for 5 days and  are monitored continuously.Positive blood cultures  are called to the RN and reported as soon as  they are identified.      Narrative:       Per Hospital Policy: Only change Specimen Src: to \"Line\" if  specified by physician order.    BLOOD CULTURE [[id number]] Collected:  08/24/17 1335    Order Status:  Completed Specimen:  Blood from Peripheral Updated:  08/25/17 0854     Significant Indicator NEG     Source BLD     Site PERIPHERAL     Blood Culture --     No Growth    Note: Blood cultures are incubated for 5 days and  are monitored continuously.Positive blood cultures  are called to the RN and reported as soon as  they are identified.      Narrative:       Per Hospital Policy: Only change Specimen Src: to \"Line\" if  specified by physician order.          Assessment:  Active Hospital Problems    Diagnosis       • Myelodysplastic syndrome (CMS-HCC) [D46.9]   • Parkinson's disease (CMS-HCC) [G20]   • Oropharyngeal dysphagia [R13.12]   • Acute urinary retention [R33.8]   • Hypokalemia [E87.6]   • Acute bilateral low back pain without sciatica [M54.5]   • Left ankle swelling [M25.472]   • Hematuria [R31.9]   • Acute " delirium [R41.0]   • DNR (do not resuscitate) [Z66]   • Pneumonia [J18.9]       Plan:  Abnormal MRI of the back  No definitive OM or discitis  No vertebral abscess  Most c/w postop change  Repeat the MRI in 4-6 weeks-sooner if clinically indicated  Biopsy from this area on 8/6/17 c/w MDS    Urinary obstruction with hematuria, new  S/p cystoscopy, dilation stricture, and evacuation clotsUcx ordered  On CBI  Ucx sent    Leukocytosis, persistent  Afebrile  Multifactorial incl MDS and post-splectomy  Concern for aspiration given PD  Blood cxs neg 8/24  Consider oncology follow-up  Repeat blood cxs today    Splenectomy  Agent is at higher risk for infections    Parkinson's disease  Aspiration risk    Discussed with internal medicine.

## 2017-08-27 NOTE — PROGRESS NOTES
Amara Pascal Fall Risk Assessment:     Last Known Fall: Within the last year  Mobility: Use of assistive device/requires assist of two people  Medications: Cardiovascular or central nervous system meds  Mental Status/LOC/Awareness: Oriented to person and place  Toileting Needs: Use of catheters or diversion devices  Volume/Electrolyte Status: Use of IV fluids/tube feeds  Communication/Sensory: Visual (Glasses)/hearing deficit  Behavior: Appropriate behavior  Amara Pascal Fall Risk Total: 13  Fall Risk Level: MODERATE RISK    Universal Fall Precautions:  bed in low position and locked, call light/belongings in reach, wheelchairs and assistive devices out of sight, siderails up x 2, use non-slip footwear, adequate lighting, clutter free and spill free environment, educate on level of risk, educate to call for assistance    Fall Risk Level Interventions:   TRIAL ([place], NEURO, MED ZAKIYA 5) Low Fall Risk Interventions  Place yellow fall risk ID band on patient: verified  Provide patient/family education based on risk assessment: verified  Educate patient/family to call staff for assistance when getting out of bed: verified  Place fall precaution signage outside patient door: verifiedTRIAL ([place], NEURO, MED ZAKIYA 5) Moderate Fall Risk Interventions  Place yellow fall risk ID band on patient: verified  Provide patient/family education based on risk assessment : completed  Educate patient/family to call staff for assistance when getting out of bed: completed  Place fall precaution signage outside patient door: verified  Utilize bed/chair fall alarm: verifiedTRIAL ([place], NEURO, [employer] ZAKIYA 5) High Fall Risk Interventions  Place yellow fall risk ID band on patient: verified  Provide patient/family education based on risk assessment: verified  Educate patient/family to call staff for assistance when getting out of bed: verified  Place fall precaution signage outside patient door: verified  Place patient in room close to  nursing station: verified  Utilize bed/chair fall alarm: verified  Notify charge of high risk for huddle: verified    Patient Specific Interventions:     Medication: review medications with patient and family  Mental Status/LOC/Awareness: reorient patient, reinforce falls education, encourage family to stay with patient, check on patient hourly, utilize bed/chair fall alarm, reinforce the use of call light and provide activity  Toileting: provide frquent toileting  Volume/Electrolyte Status: ensure patient remains hydrated and ensure IV fluids are appropriate  Communication/Sensory: update plan of care on whiteboard, provide communication alternatives/, collaborate with doctor for possible speech therapy consult, ensure proper positioning when transferrng/ambulating and ensure patient has glasses/contacts and hearing aids/dentures  Behavioral: encourage patient to voice feelings, engage patient in daily activities and administer medication as ordered  Mobility: schedule physical activity throughout the day, provide comfort measures during transport, utilize bed/chair fall alarm, ensure bed is locked and in lowest position and collaborate with doctor for possible PT/OT consult

## 2017-08-28 PROBLEM — N30.01 ACUTE CYSTITIS WITH HEMATURIA: Status: ACTIVE | Noted: 2017-08-28

## 2017-08-28 LAB
ANISOCYTOSIS BLD QL SMEAR: ABNORMAL
APPEARANCE UR: ABNORMAL
BACTERIA #/AREA URNS HPF: ABNORMAL /HPF
BASOPHILS # BLD AUTO: 1.8 % (ref 0–1.8)
BASOPHILS # BLD: 0.48 K/UL (ref 0–0.12)
BILIRUB UR QL STRIP.AUTO: NEGATIVE
COLOR UR: YELLOW
CRP SERPL HS-MCNC: 19.51 MG/DL (ref 0–0.75)
EOSINOPHIL # BLD AUTO: 0.24 K/UL (ref 0–0.51)
EOSINOPHIL NFR BLD: 0.9 % (ref 0–6.9)
EPI CELLS #/AREA URNS HPF: NEGATIVE /HPF
ERYTHROCYTE [DISTWIDTH] IN BLOOD BY AUTOMATED COUNT: 55.4 FL (ref 35.9–50)
GLUCOSE UR STRIP.AUTO-MCNC: NEGATIVE MG/DL
HCT VFR BLD AUTO: 30.6 % (ref 42–52)
HGB BLD-MCNC: 9.8 G/DL (ref 14–18)
HYALINE CASTS #/AREA URNS LPF: ABNORMAL /LPF
HYPOCHROMIA BLD QL SMEAR: ABNORMAL
KETONES UR STRIP.AUTO-MCNC: NEGATIVE MG/DL
LEUKOCYTE ESTERASE UR QL STRIP.AUTO: ABNORMAL
LG PLATELETS BLD QL SMEAR: NORMAL
LYMPHOCYTES # BLD AUTO: 1.64 K/UL (ref 1–4.8)
LYMPHOCYTES NFR BLD: 6.2 % (ref 22–41)
MACROCYTES BLD QL SMEAR: ABNORMAL
MANUAL DIFF BLD: NORMAL
MCH RBC QN AUTO: 28.2 PG (ref 27–33)
MCHC RBC AUTO-ENTMCNC: 32 G/DL (ref 33.7–35.3)
MCV RBC AUTO: 88.2 FL (ref 81.4–97.8)
MICRO URNS: ABNORMAL
MICROCYTES BLD QL SMEAR: ABNORMAL
MONOCYTES # BLD AUTO: 3.04 K/UL (ref 0–0.85)
MONOCYTES NFR BLD AUTO: 11.5 % (ref 0–13.4)
MORPHOLOGY BLD-IMP: NORMAL
NEUTROPHILS # BLD AUTO: 21.01 K/UL (ref 1.82–7.42)
NEUTROPHILS NFR BLD: 79.6 % (ref 44–72)
NITRITE UR QL STRIP.AUTO: POSITIVE
NRBC # BLD AUTO: 0.04 K/UL
NRBC BLD AUTO-RTO: 0.2 /100 WBC
PH UR STRIP.AUTO: 7 [PH]
PLATELET # BLD AUTO: 217 K/UL (ref 164–446)
PLATELET BLD QL SMEAR: NORMAL
PMV BLD AUTO: 12.9 FL (ref 9–12.9)
POLYCHROMASIA BLD QL SMEAR: NORMAL
PREALB SERPL-MCNC: 8 MG/DL (ref 18–38)
PROT UR QL STRIP: NEGATIVE MG/DL
RBC # BLD AUTO: 3.47 M/UL (ref 4.7–6.1)
RBC # URNS HPF: ABNORMAL /HPF
RBC BLD AUTO: PRESENT
RBC UR QL AUTO: ABNORMAL
SP GR UR STRIP.AUTO: 1.01
UROBILINOGEN UR STRIP.AUTO-MCNC: 1 MG/DL
WBC # BLD AUTO: 26.4 K/UL (ref 4.8–10.8)
WBC #/AREA URNS HPF: ABNORMAL /HPF

## 2017-08-28 PROCEDURE — 99232 SBSQ HOSP IP/OBS MODERATE 35: CPT | Performed by: HOSPITALIST

## 2017-08-28 PROCEDURE — A9270 NON-COVERED ITEM OR SERVICE: HCPCS | Performed by: HOSPITALIST

## 2017-08-28 PROCEDURE — 87186 SC STD MICRODIL/AGAR DIL: CPT

## 2017-08-28 PROCEDURE — 770006 HCHG ROOM/CARE - MED/SURG/GYN SEMI*

## 2017-08-28 PROCEDURE — 700102 HCHG RX REV CODE 250 W/ 637 OVERRIDE(OP): Performed by: HOSPITALIST

## 2017-08-28 PROCEDURE — 97530 THERAPEUTIC ACTIVITIES: CPT

## 2017-08-28 PROCEDURE — A9270 NON-COVERED ITEM OR SERVICE: HCPCS | Performed by: FAMILY MEDICINE

## 2017-08-28 PROCEDURE — 84134 ASSAY OF PREALBUMIN: CPT

## 2017-08-28 PROCEDURE — 86140 C-REACTIVE PROTEIN: CPT

## 2017-08-28 PROCEDURE — 700111 HCHG RX REV CODE 636 W/ 250 OVERRIDE (IP): Performed by: INTERNAL MEDICINE

## 2017-08-28 PROCEDURE — A9270 NON-COVERED ITEM OR SERVICE: HCPCS | Performed by: INTERNAL MEDICINE

## 2017-08-28 PROCEDURE — 700105 HCHG RX REV CODE 258: Performed by: INTERNAL MEDICINE

## 2017-08-28 PROCEDURE — 87077 CULTURE AEROBIC IDENTIFY: CPT

## 2017-08-28 PROCEDURE — 87086 URINE CULTURE/COLONY COUNT: CPT

## 2017-08-28 PROCEDURE — 81001 URINALYSIS AUTO W/SCOPE: CPT

## 2017-08-28 PROCEDURE — 97535 SELF CARE MNGMENT TRAINING: CPT

## 2017-08-28 PROCEDURE — 85027 COMPLETE CBC AUTOMATED: CPT

## 2017-08-28 PROCEDURE — 700102 HCHG RX REV CODE 250 W/ 637 OVERRIDE(OP): Performed by: FAMILY MEDICINE

## 2017-08-28 PROCEDURE — 700102 HCHG RX REV CODE 250 W/ 637 OVERRIDE(OP): Performed by: INTERNAL MEDICINE

## 2017-08-28 PROCEDURE — 36415 COLL VENOUS BLD VENIPUNCTURE: CPT

## 2017-08-28 PROCEDURE — 85007 BL SMEAR W/DIFF WBC COUNT: CPT

## 2017-08-28 PROCEDURE — 97112 NEUROMUSCULAR REEDUCATION: CPT

## 2017-08-28 RX ADMIN — OXYCODONE HYDROCHLORIDE AND ACETAMINOPHEN 500 MG: 500 TABLET ORAL at 09:14

## 2017-08-28 RX ADMIN — ACETAMINOPHEN 650 MG: 325 TABLET, FILM COATED ORAL at 04:37

## 2017-08-28 RX ADMIN — MEMANTINE HYDROCHLORIDE 10 MG: 10 TABLET, FILM COATED ORAL at 21:54

## 2017-08-28 RX ADMIN — CARBIDOPA AND LEVODOPA 2 TABLET: 25; 100 TABLET ORAL at 21:54

## 2017-08-28 RX ADMIN — MEMANTINE HYDROCHLORIDE 10 MG: 10 TABLET, FILM COATED ORAL at 09:16

## 2017-08-28 RX ADMIN — CITALOPRAM HYDROBROMIDE 20 MG: 20 TABLET ORAL at 09:15

## 2017-08-28 RX ADMIN — MODAFINIL 200 MG: 100 TABLET ORAL at 09:23

## 2017-08-28 RX ADMIN — AMANTADINE HYDROCHLORIDE 100 MG: 50 SOLUTION ORAL at 21:57

## 2017-08-28 RX ADMIN — STANDARDIZED SENNA CONCENTRATE AND DOCUSATE SODIUM 2 TABLET: 8.6; 5 TABLET, FILM COATED ORAL at 09:16

## 2017-08-28 RX ADMIN — TRAMADOL HYDROCHLORIDE 50 MG: 50 TABLET, COATED ORAL at 20:40

## 2017-08-28 RX ADMIN — CEFEPIME 2 G: 2 INJECTION, POWDER, FOR SOLUTION INTRAMUSCULAR; INTRAVENOUS at 21:50

## 2017-08-28 RX ADMIN — CARBIDOPA AND LEVODOPA 1 TABLET: 10; 100 TABLET ORAL at 21:54

## 2017-08-28 RX ADMIN — POLYETHYLENE GLYCOL 3350 1 PACKET: 17 POWDER, FOR SOLUTION ORAL at 09:23

## 2017-08-28 RX ADMIN — DOXAZOSIN MESYLATE 1 MG: 1 TABLET ORAL at 21:55

## 2017-08-28 RX ADMIN — ACETAMINOPHEN 650 MG: 325 TABLET, FILM COATED ORAL at 14:15

## 2017-08-28 RX ADMIN — AMANTADINE HYDROCHLORIDE 100 MG: 50 SOLUTION ORAL at 09:13

## 2017-08-28 RX ADMIN — TRIAZOLAM 0.12 MG: 0.25 TABLET ORAL at 20:40

## 2017-08-28 RX ADMIN — CEFEPIME 2 G: 2 INJECTION, POWDER, FOR SOLUTION INTRAMUSCULAR; INTRAVENOUS at 10:35

## 2017-08-28 RX ADMIN — BACITRACIN ZINC: 500 OINTMENT TOPICAL at 21:56

## 2017-08-28 RX ADMIN — CARBIDOPA AND LEVODOPA 1 TABLET: 10; 100 TABLET ORAL at 14:13

## 2017-08-28 RX ADMIN — CARBIDOPA AND LEVODOPA 1 TABLET: 10; 100 TABLET ORAL at 05:41

## 2017-08-28 RX ADMIN — Medication 220 MG: at 09:15

## 2017-08-28 RX ADMIN — STANDARDIZED SENNA CONCENTRATE AND DOCUSATE SODIUM 2 TABLET: 8.6; 5 TABLET, FILM COATED ORAL at 21:54

## 2017-08-28 ASSESSMENT — COGNITIVE AND FUNCTIONAL STATUS - GENERAL
STANDING UP FROM CHAIR USING ARMS: A LOT
SUGGESTED CMS G CODE MODIFIER DAILY ACTIVITY: CL
HELP NEEDED FOR BATHING: A LOT
WALKING IN HOSPITAL ROOM: A LOT
PERSONAL GROOMING: A LOT
TOILETING: A LOT
MOVING FROM LYING ON BACK TO SITTING ON SIDE OF FLAT BED: A LOT
DRESSING REGULAR LOWER BODY CLOTHING: A LOT
DAILY ACTIVITIY SCORE: 12
DRESSING REGULAR UPPER BODY CLOTHING: A LOT
TURNING FROM BACK TO SIDE WHILE IN FLAT BAD: A LOT
EATING MEALS: A LOT
CLIMB 3 TO 5 STEPS WITH RAILING: TOTAL
MOVING TO AND FROM BED TO CHAIR: A LOT
SUGGESTED CMS G CODE MODIFIER MOBILITY: CL
MOBILITY SCORE: 11

## 2017-08-28 ASSESSMENT — ENCOUNTER SYMPTOMS
SHORTNESS OF BREATH: 0
LOSS OF CONSCIOUSNESS: 0
ABDOMINAL PAIN: 0
FOCAL WEAKNESS: 0
WEAKNESS: 1
DEPRESSION: 0
BACK PAIN: 1
SPEECH CHANGE: 1
NECK PAIN: 0
TREMORS: 1
BLURRED VISION: 0
FEVER: 0
HEADACHES: 0
DIZZINESS: 0

## 2017-08-28 ASSESSMENT — GAIT ASSESSMENTS: GAIT LEVEL OF ASSIST: UNABLE TO PARTICIPATE

## 2017-08-28 ASSESSMENT — PAIN SCALES - GENERAL
PAINLEVEL_OUTOF10: 0

## 2017-08-28 NOTE — PROGRESS NOTES
Patient received, report taken at bedside, appears resting in bed, with respirations even and unlabored, no s/s of distress, on continuous pulse ox, cortrak secured in place with tube feeding running at 80 ml/hr, bed in lowest position and turn q2h, will continue to monitor

## 2017-08-28 NOTE — PROGRESS NOTES
[facility] Hospitalist Progress Note    Date of Service: 8/27/2017    Chief Complaint  76 y.o. male admitted 8/12/2017 with fever, elevated WBC count, recent kyphoplasty, confusion.    Interval Problem Update  -Parkinson's-mental status is better again today. Speech pattern is much stronger today as well. Working with PT sitting up at the edge of the bed, doing leg exercises.    Back pain-per patient, appears to be back at his baseline.     Dysphagia- new cortrak continues to work well. No high residuals noted.    LLE swelling- No obvious trauma. More mild improvement noted today. Denies any new redness of his skin.   8/22:  Poor historian due to Parkinson's dementia.  States no back pain, but pain thoracic spine with palpation on exam.  Repeat sed rate.  No source of elevated wbc and sed rate found upon review of chart:  UC negative, BCs x2 neg, throat culture neg CXR negative for infiltrate.  Off restraints.  ID consulted regarding likely osteomyelitis T11, T12, L1 on MRI with discitis.  8/23:  Awaiting ID recs.  Wife at bedside, discussed all questions, concerns, plan of care.  She is concerned that he is not getting his home does of sinemet.  However, as close as can get given immediate release formulation available in hospital.  cortrak remains, Rinaldi catheter was placed upon transfer to [facility].  WBC increasing today since off zosyn IV, lungs CTA b/l.  Added cardura for BPH.  Remains off restraints.  Sed rate 60 wbc 9.9 to 14.  8/24:  Wbc continues to increase off abx.  Repeated BCx2, patient denies back pain, cortrak and rinaldi remain.  ID recommend to repeat thoracic MRI in 2 weeks.  8/25:  Increasing wbc to 20, BCs negative for growth.  ID recommends to repeat MRI in 4-6 weeks.  ID reviewed MRIs:  Believe to be post op changes.  8/26:  Had diffulties per wife at bedside overnight with worsening mentation.  Wife thinks interaction with diflucan.  dc'd diflucan, had 5 doses.  Increasing wbc to 27.  Ordered  repeat BCs x2.  Bcs  negative.  :  Wbc increased 27.6 to 35.9.  Had emergency TURP and evacuation of blood clots, CBI for hematuria and acute urinary retention in [place].  UC sent, CXR negative, repeat BCs.      Consultants/Specialty  :  ID consult regarding possible osteomyelitis on MRI T spine.    Disposition  PT/OT rec SNF.  SNF ordered .  [facility] will not take cortrak, may need to look at [facility].  Patient unsure if wants PEG tube.        Review of Systems   Constitutional: Negative for fever.   Eyes: Negative for blurred vision.   Respiratory: Negative for shortness of breath.    Cardiovascular: Positive for leg swelling (LLE, improvement noted today). Negative for chest pain.   Gastrointestinal: Negative for abdominal pain.        Mouth remains quite dry   Genitourinary: Negative for dysuria.   Musculoskeletal: Positive for back pain. Negative for neck pain.   Skin: Negative for rash.   Neurological: Positive for tremors (mild and near his baseline), speech change (feels that his words are stronger today) and weakness (slightly stronger today). Negative for dizziness, focal weakness, loss of consciousness and headaches.        No changes   Psychiatric/Behavioral: Negative for depression.       Physical Exam  Laboratory/Imaging   Hemodynamics  Temp (24hrs), Av.8 °C (98.2 °F), Min:36.1 °C (97 °F), Max:37.3 °C (99.1 °F)   Temperature: 36.9 °C (98.4 °F)  Pulse  Av.7  Min: 60  Max: 99 Heart Rate (Monitored): 74  Blood Pressure : 107/57, NIBP: 130/65      Respiratory      Respiration: 18, Pulse Oximetry: 92 %     Work Of Breathing / Effort: Mild  RUL Breath Sounds: Crackles, RML Breath Sounds: Diminished, RLL Breath Sounds: Diminished, AQUILINO Breath Sounds: Clear, LLL Breath Sounds: Diminished    Fluids    Intake/Output Summary (Last 24 hours) at 17 4983  Last data filed at 17 1715   Gross per 24 hour   Intake             5638 ml   Output             5775 ml   Net              -137 ml       Nutrition  Orders Placed This Encounter   Procedures   • DIET NPO     Standing Status:   Standing     Number of Occurrences:   1     Order Specific Question:   Restrict to:     Answer:   Strict [1]     Comments:   Stop tube feeds     Physical Exam   Constitutional: He appears well-developed and well-nourished.    hard to understand with his words   HENT:   Mouth/Throat: No oropharyngeal exudate (uvula swollen and slightly red).   Cor tract and right naris, no erosion noted   Eyes: Conjunctivae are normal. No scleral icterus.   Neck: Normal range of motion. Neck supple. No Brudzinski's sign and no Kernig's sign noted.   Cardiovascular: Normal rate, regular rhythm and normal heart sounds.    Pulmonary/Chest: Effort normal and breath sounds normal. No stridor. No respiratory distress.   Abdominal: Soft. Bowel sounds are normal. There is no tenderness.   Genitourinary:   Genitourinary Comments: Hurst catheter in place   Musculoskeletal: He exhibits no edema or tenderness.   Thoracic back pain with palpation of spine.   Neurological: He is alert. He displays tremor (resting). He displays normal reflexes.   Much quicker in his answers today, stronger diction and enunciation    Skin: Skin is warm and dry. No rash noted.   Psychiatric: He has a normal mood and affect. His behavior is normal. Thought content normal.   Nursing note and vitals reviewed.      Recent Labs      08/25/17 0324 08/26/17   0250  08/27/17   0753   WBC  20.4*  27.6*  35.9*   RBC  3.46*  3.26*  3.43*   HEMOGLOBIN  9.8*  9.2*  9.7*   HEMATOCRIT  30.5*  28.5*  29.2*   MCV  88.2  87.4  85.1   MCH  28.3  28.2  28.3   MCHC  32.1*  32.3*  33.2*   RDW  55.2*  54.8*  54.0*   PLATELETCT  313  268  255   MPV  12.2  12.8  12.1     Recent Labs      08/25/17   0324  08/27/17   0754   SODIUM  136  131*   POTASSIUM  4.3  4.8   CHLORIDE  102  99   CO2  28  25   GLUCOSE  123*  122*   BUN  20  24*   CREATININE  0.77  0.84   CALCIUM  9.0  8.4*                       Assessment/Plan     * Acute osteomyelitis of thoracic spine (CMS-[facility])- (present on admission)   Assessment & Plan    Ordered sed rate  BCs negatitve x 2 from 8/12  No other source of infection, persistent back pain, however poor historian  Prior vertebroplasty.  Review of MRI thoracic spine with T11, 12, L1 discitis, cannot rule out acute osteomyelitis  Improved with IV zosyn, now wbc rising off zosyn.  Consulted ID:  Does not believe is acute osteomyelitis:  Will repeat MRI in 2 weeks.  8/24 repeat BCsx2.  Elevated sed rate 60 8/16, repeat 62 on 8/22.        Myelodysplastic syndrome (CMS-[facility])- (present on admission)   Assessment & Plan    Seen by Dr. Ribeiro on the last admit  Platelet dysfunction present per prior TEG and evaluation  Hold lovenox  SCDs only for DVT prophylaxis        Parkinson's disease (CMS-[facility])- (present on admission)   Assessment & Plan    -Cor track in place  -Cannot do sustained released Sinemet given a cannot be crushed  -Continue short acting Sinemet while cor track is in place  -symptoms are better today, but continues to have somewhat wild daily fluctuations, possible G tube placement during this admission, need to have ongoing discussions with patient and his wife/family        Leukocytosis- (present on admission)   Assessment & Plan    Continues to climb off zosyn.  Unclear source.  MDS usually gives pancytopenia picture.  8/24 repeat BCs x2.  Can check with oncology, Dr. Ribeiro has seen  Bone Biopsy 8/6/17 during kyphoplasty by Dr. Wilburn.        Candidal UTI (urinary tract infection)- (present on admission)   Assessment & Plan    Treat with diflucan  Sediment seen        Acute urinary retention- (present on admission)   Assessment & Plan    [place] catheter in place.  Start cardura 8/23  H/o prostatectomy in past  Per wife:  [place]st catheter placed at [facility] at time of transfer with cudet catheter difficult placement  Continue [place] at least 7 days from start of cardura.  8/27:   TURP by Dr Almeida for acute urinary blood clot evacuation.        Oropharyngeal dysphagia- (present on admission)   Assessment & Plan    cortrak tube feeds per SLP eval.          Acute bilateral low back pain without sciatica- (present on admission)   Assessment & Plan    -MRI of cervical thoracic and lumbar spine shows no evidence of clear epidural or prevertebral abscess. Findings do show some hyper enhanced bone marrow edema which was there on prior MRIs  -Finished 7 days of IV zosyn  -All cultures remain negative  -Mental status fluctuates  - cautious use of IV pain meds  -No neurosurgery consult is required at this time  -worked more with PT, sitting up at the bedside, afebrile, will need SNF versus REHAB        Left ankle swelling   Assessment & Plan    -Bilateral x-rays of the knees and ankles show very small fluid collection therefore arthrocentesis is not indicated at this time but need to watch closely  -unchanged today  -U/S negative for DVT/SVT, monitor, no e/o infection at this time  -consider compression stocking, has good ROM the L ankle on dorsi/plantar flexion, continue to see daily improvement        DNR (do not resuscitate)- (present on admission)   Assessment & Plan    Confirmed with the patient's wife on admission.        Acute delirium- (present on admission)   Assessment & Plan    -fluctuates, likely multi-factorial, infection, prolonged hospitalization, parkinson's  -continue current treatment plan        Hematuria- (present on admission)   Assessment & Plan    -no AC  8/27:  Emergency TURP by DR. Almeida, 3 way CBI in place for hematuria induced acute urinary retention  UC sent        Pneumonia- (present on admission)   Assessment & Plan    -Possible pneumonia, given no clear infection of the spine on MRI and persisting cough and elevated white was gone responding to IV Zosyn, weakly appears to have pneumonia  -day 7/7 zosyn, WBC has normalized   8/22:  Reviewed all CXRs since admission:  No  infiltrates seen on all 3.  No evidence of pneumonia.        S/P splenectomy- (present on admission)   Assessment & Plan    Infections can occur easier.            [place] catheter::  Urinary Tract Retention or Urinary Tract Obstruction  DVT prophylaxis pharmacological::  Contraindicated - High bleeding risk  DVT prophylaxis - mechanical:  SCDs  Ulcer Prophylaxis::  Not indicated

## 2017-08-28 NOTE — PROGRESS NOTES
"Pt is A&Ox4, pt denies pain, N/T, or N/V. Pt appears fatigued but is easily aroused. CBI is in place and draining, draining yellow urine in bag with no noted blood. Wife at bedside declined administration of Cardura and Pyridium. Per wife, pt \"reacted violently\" to Cardura. Pt wife also wishes to DC Bacitracin ointment. Will notify day RN and MD of issue. TF running at goal. Q2 turns in place. Bed alarm is on, waffle cushion in place, call light and personal belongings within reach.   "

## 2017-08-28 NOTE — CARE PLAN
Problem: Skin Integrity  Goal: Risk for impaired skin integrity will decrease  Outcome: PROGRESSING AS EXPECTED  Maintaining clean bed if soiled, Q2 turns in place, waffle cushion in place    Problem: Urinary Elimination:  Goal: Ability to reestablish a normal urinary elimination pattern will improve    Intervention: Evaluate need to continue indwelling urinary catheter  CBI in place, will monitor for occlusions/blood clots

## 2017-08-28 NOTE — THERAPY
"Physical Therapy Treatment completed.   Bed Mobility:  Supine to Sit: Moderate Assist  Transfers: Sit to Stand: Moderate Assist (x2)  Gait: Level Of Assist: Unable to Participate        Plan of Care: Will benefit from Physical Therapy 3 times per week  Discharge Recommendations: Equipment: Will Continue to Assess for Equipment Needs. Post-acute therapy Discharge to a transitional care facility for continued skilled therapy services.     See \"Rehab Therapy-Acute\" Patient Summary Report for complete documentation.     Pt presenting today more confused and unable to determine where he is. Is very willing to participate in exercise but fatiguing quickly. Pt unable to make a full stand today d/t weak glute, quad activation as well extreme forward flexed position. Manual stretching for thoracic expansion helped assist w/ better standing posture and w/ pt stating it felt good. Pt will benefit from post acute SNF to assist w/ his functional limitiations.  "

## 2017-08-28 NOTE — DIETARY
WEEKLY TF UPDATE - TF via Cortrak: Fibersource at 80mL/hr to provide 2304 Kcal, 104 g protein and 1555 mL free water     Pertinent Labs: Na 131, glucose 122, BUN 24, iron 29  Na possibly low r/t edema   Pertinent Meds: symmetrel, ascorbic acid, carbodopa-levodopa, cefepime, celexa, doxazosin, ferrous sulfate, namenda, provigil, pyridium, senna-docusate  Fluids: per MD - 600mL in free water flushes, 2155 mL in TF free water + flushes  GI: Last BM 8/24 - senna in place   WT: 97.5kg. Weights are trending up, possibly r/t edema   SKIN: no pressure areas documented at this time  Edema: 1+ to BL LE     Update  Continue NPO w/ cortrak per SLP 8/25     PLAN/RECOMMEND     Consider replacing Na as necessary     Please obtain biweekly weights to better understand nutritional status w/ TF - nursing aware.     Fluids per MD - possible need for increased Na via IV or decreased free fluids r/t edema.      Recommend continuing current TF regimen to meet needs.     RD following

## 2017-08-28 NOTE — DISCHARGE PLANNING
LATE NOTE:     Choice obtained by ROSA Brown.  Pt's wife chose [facility].  SW faxed choice to [facility].

## 2017-08-28 NOTE — PROGRESS NOTES
Surgical Progress Note    Author: Mitch Matute Date & Time created: 2017   7:43 AM     Interval Events:    Patient seen and examined.  Slightly confused. No overnight events of note. Urine has cleared with CBI.     Review of Systems   Unable to perform ROS: Other   Constitutional: Negative for fever.     Hemodynamics:  Temp (24hrs), Av.1 °C (98.8 °F), Min:36.1 °C (97 °F), Max:38 °C (100.4 °F)  Temperature: 37.3 °C (99.2 °F)  Pulse  Av.7  Min: 60  Max: 99   Blood Pressure : 153/79     Respiratory:    Respiration: 19, Pulse Oximetry: 93 %     Work Of Breathing / Effort: Mild  RUL Breath Sounds: Clear, RML Breath Sounds: Diminished, RLL Breath Sounds: Diminished, AQUILINO Breath Sounds: Clear, LLL Breath Sounds: Diminished  Neuro:  GCS       Fluids:    Intake/Output Summary (Last 24 hours) at 17 0743  Last data filed at 17 0600   Gross per 24 hour   Intake            01810 ml   Output            02677 ml   Net            -4932 ml        Current Diet Order   Procedures   • DIET NPO     Physical Exam  Labs:  Recent Results (from the past 24 hour(s))   CBC WITH DIFFERENTIAL    Collection Time: 17  7:53 AM   Result Value Ref Range    WBC 35.9 (HH) 4.8 - 10.8 K/uL    RBC 3.43 (L) 4.70 - 6.10 M/uL    Hemoglobin 9.7 (L) 14.0 - 18.0 g/dL    Hematocrit 29.2 (L) 42.0 - 52.0 %    MCV 85.1 81.4 - 97.8 fL    MCH 28.3 27.0 - 33.0 pg    MCHC 33.2 (L) 33.7 - 35.3 g/dL    RDW 54.0 (H) 35.9 - 50.0 fL    Platelet Count 255 164 - 446 K/uL    MPV 12.1 9.0 - 12.9 fL    Nucleated RBC 0.10 /100 WBC    NRBC (Absolute) 0.04 K/uL    Neutrophils-Polys 78.80 (H) 44.00 - 72.00 %    Lymphocytes 6.80 (L) 22.00 - 41.00 %    Monocytes 9.30 0.00 - 13.40 %    Eosinophils 4.20 0.00 - 6.90 %    Basophils 0.00 0.00 - 1.80 %    Neutrophils (Absolute) 28.61 (H) 1.82 - 7.42 K/uL    Lymphs (Absolute) 2.44 1.00 - 4.80 K/uL    Monos (Absolute) 3.34 (H) 0.00 - 0.85 K/uL    Eos (Absolute) 1.51 (H) 0.00 - 0.51 K/uL    Baso  (Absolute) 0.00 0.00 - 0.12 K/uL    Anisocytosis 1+     Macrocytosis 1+     Microcytosis 1+    DIFFERENTIAL MANUAL    Collection Time: 08/27/17  7:53 AM   Result Value Ref Range    Bands-Stabs 0.90 0.00 - 10.00 %    Manual Diff Status PERFORMED    PERIPHERAL SMEAR REVIEW    Collection Time: 08/27/17  7:53 AM   Result Value Ref Range    Peripheral Smear Review see below    PLATELET ESTIMATE    Collection Time: 08/27/17  7:53 AM   Result Value Ref Range    Plt Estimation Normal    MORPHOLOGY    Collection Time: 08/27/17  7:53 AM   Result Value Ref Range    RBC Morphology Present     Giant Platelets 2+     Polychromia 1+     Poikilocytosis 1+     Ovalocytes 1+     Reactive Lymphocytes Few    BASIC METABOLIC PANEL    Collection Time: 08/27/17  7:54 AM   Result Value Ref Range    Sodium 131 (L) 135 - 145 mmol/L    Potassium 4.8 3.6 - 5.5 mmol/L    Chloride 99 96 - 112 mmol/L    Co2 25 20 - 33 mmol/L    Glucose 122 (H) 65 - 99 mg/dL    Bun 24 (H) 8 - 22 mg/dL    Creatinine 0.84 0.50 - 1.40 mg/dL    Calcium 8.4 (L) 8.5 - 10.5 mg/dL    Anion Gap 7.0 0.0 - 11.9   ESTIMATED GFR    Collection Time: 08/27/17  7:54 AM   Result Value Ref Range    GFR If African American >60 >60 mL/min/1.73 m 2    GFR If Non African American >60 >60 mL/min/1.73 m 2   CRP Quantitative (Non-Cardiac)    Collection Time: 08/28/17  3:25 AM   Result Value Ref Range    Stat C-Reactive Protein 19.51 (H) 0.00 - 0.75 mg/dL   Prealbumin    Collection Time: 08/28/17  3:25 AM   Result Value Ref Range    Pre-Albumin 8.0 (L) 18.0 - 38.0 mg/dL   CBC WITH DIFFERENTIAL    Collection Time: 08/28/17  3:25 AM   Result Value Ref Range    WBC 26.4 (H) 4.8 - 10.8 K/uL    RBC 3.47 (L) 4.70 - 6.10 M/uL    Hemoglobin 9.8 (L) 14.0 - 18.0 g/dL    Hematocrit 30.6 (L) 42.0 - 52.0 %    MCV 88.2 81.4 - 97.8 fL    MCH 28.2 27.0 - 33.0 pg    MCHC 32.0 (L) 33.7 - 35.3 g/dL    RDW 55.4 (H) 35.9 - 50.0 fL    Platelet Count 217 164 - 446 K/uL    MPV 12.9 9.0 - 12.9 fL    Nucleated RBC  0.20 /100 WBC    NRBC (Absolute) 0.04 K/uL    Neutrophils-Polys 79.60 (H) 44.00 - 72.00 %    Lymphocytes 6.20 (L) 22.00 - 41.00 %    Monocytes 11.50 0.00 - 13.40 %    Eosinophils 0.90 0.00 - 6.90 %    Basophils 1.80 0.00 - 1.80 %    Neutrophils (Absolute) 21.01 (H) 1.82 - 7.42 K/uL    Lymphs (Absolute) 1.64 1.00 - 4.80 K/uL    Monos (Absolute) 3.04 (H) 0.00 - 0.85 K/uL    Eos (Absolute) 0.24 0.00 - 0.51 K/uL    Baso (Absolute) 0.48 (H) 0.00 - 0.12 K/uL    Hypochromia 1+     Anisocytosis 2+     Macrocytosis 1+     Microcytosis 2+    DIFFERENTIAL MANUAL    Collection Time: 08/28/17  3:25 AM   Result Value Ref Range    Manual Diff Status PERFORMED    PERIPHERAL SMEAR REVIEW    Collection Time: 08/28/17  3:25 AM   Result Value Ref Range    Peripheral Smear Review see below    PLATELET ESTIMATE    Collection Time: 08/28/17  3:25 AM   Result Value Ref Range    Plt Estimation Normal    MORPHOLOGY    Collection Time: 08/28/17  3:25 AM   Result Value Ref Range    RBC Morphology Present     Large Platelets 2+     Polychromia 1+        Plan:  1. Gross hematuria s/p clot evacuation 8/27/17  2. Urethral strictures       Urine clear, CBI turned off  Leave rinaldi for 10 days.   Can follow up outpatient for rinaldi removal.  Urology signing off for now.2    Quality Measures:  Quality-Core Measures    Discussed patient condition with RN, Patient and urology-Dr. Almeida

## 2017-08-28 NOTE — PALLIATIVE CARE
"Palliative Care follow-up  Spoke with pt's spouse, Elizabeth. Discussed updates and Elizabeth's understanding of clinical changes. Elizabeth expressed concern about pt not ever being able to swallow, PC RN discussed PEG tube. Asked if pt would want a PEG tube, Elizabeth states \"We are trying to avoid it but with his set back he hasn't been able to do therapies. But I also don't want to starve him\". PC RN discussed long term placement, Elizabeth wants to be able to take pt home, she understands that pt is not doing well but is hopeful that pt can turn around and come back home. He has in the past done this but was able to over come it. PC RN discussed dementia being a progressive disease, Elizabeth is aware that long term facility placement could be the only option for pt if he does not improve and he needs are more than she can gabriella. Elizabeth requested to have a list of private care attendants so she can look into having help at home after SNF.      Updated:   VALERIO Felix    Plan:  Will continue to support pt and family.     Thank you for allowing Palliative Care to participate in this patient's care. Please feel free to call [phone] with any questions or concerns.  "

## 2017-08-28 NOTE — PROGRESS NOTES
Amara Pascal Fall Risk Assessment:     Last Known Fall: Within the last year  Mobility: Immobilized/requires assist of one person  Medications: Cardiovascular or central nervous system meds  Mental Status/LOC/Awareness: Lethargic/oriented to person only  Toileting Needs: Use of catheters or diversion devices  Volume/Electrolyte Status: Use of IV fluids/tube feeds  Communication/Sensory: Visual (Glasses)/hearing deficit  Behavior: Behavioral noncompliance with instruction  Amara Pascal Fall Risk Total: 15  Fall Risk Level: HIGH RISK    Universal Fall Precautions:  call light/belongings in reach, bed in low position and locked, wheelchairs and assistive devices out of sight, siderails up x 2, use non-slip footwear, adequate lighting, educate to call for assistance, educate on level of risk, clutter free and spill free environment    Fall Risk Level Interventions:   TRIAL ([employer], NEURO, MED ZAKIYA 5) Low Fall Risk Interventions  Place yellow fall risk ID band on patient: verified  Provide patient/family education based on risk assessment: verified  Educate patient/family to call staff for assistance when getting out of bed: verified  Place fall precaution signage outside patient door: verifiedTRIAL ([employer] [address], NEURO, MED ZAKIYA 5) Moderate Fall Risk Interventions  Place yellow fall risk ID band on patient: verified  Provide patient/family education based on risk assessment : verified  Educate patient/family to call staff for assistance when getting out of bed: verified  Place fall precaution signage outside patient door: verified  Utilize bed/chair fall alarm: verifiedTRIAL ([employer] [facility], NEURO, [employer] ZAKIYA 5) High Fall Risk Interventions  Place yellow fall risk ID band on patient: verified  Provide patient/family education based on risk assessment: completed  Educate patient/family to call staff for assistance when getting out of bed: completed  Place fall precaution signage outside patient door: verified  Place patient in  room close to nursing station: verified  Utilize bed/chair fall alarm: verified  Notify charge of high risk for huddle: completed    Patient Specific Interventions:     Medication: review medications with patient and family and limit combination of prn medications  Mental Status/LOC/Awareness: reorient patient, reinforce falls education, check on patient hourly, utilize bed/chair fall alarm, reinforce the use of call light and provide activity  Toileting: provide frquent toileting, monitor intake and output/use of appropriate interventions, instruct patient/family on the use of grab bars and instruct patient/family on the need to call for assistance when toileting  Volume/Electrolyte Status: ensure patient remains hydrated, administer medications as ordered for nausea and vomiting and monitor abnormal lab values  Communication/Sensory: update plan of care on whiteboard, ensure proper positioning when transferrng/ambulating, ensure patient has glasses/contacts and hearing aids/dentures and for visually impaired patients orient to their room surrounding and do not change their surroundings  Behavioral: encourage patient to voice feelings, engage patient in daily activities, administer medication as ordered and instruct/reinforce fall program rationale  Mobility: schedule physical activity throughout the day, utilize bed/chair fall alarm and ensure bed is locked and in lowest position

## 2017-08-28 NOTE — PROGRESS NOTES
Infectious Disease Progress Note    Author: Jessy Flanagan M.D. Date & Time of service: 2017  12:58 PM    Chief Complaint:  Back infection    Interval History:  76-year-old male with Parkinson's, MDS, splenectomy was admitted with altered mental status and fevers. Likely due to UTI  17-MAXIMUM TEMPERATURE 98.2. WBC 15.6. Today awake and responsive  17-MAXIMUM TEMPERATURE 98.1. WBC 20. Denies any increased back pain.    AF (99.8) WBC 27.6 wife does not want him on any abx-thinks they made him worse   AF WBC 35.9 urinary obstruction-now on CBI remains obtunded   AF WBC 26.4 remains obtunded-no more clots in urine  Labs Reviewed, Medications Reviewed and Radiology Reviewed.    Review of Systems:  Review of Systems   Unable to perform ROS: Medical condition   Constitutional: Negative for fever.   Genitourinary: Positive for hematuria.        No clots       Hemodynamics:  Temp (24hrs), Av.4 °C (99.3 °F), Min:36.9 °C (98.4 °F), Max:38 °C (100.4 °F)  Temperature: 36.9 °C (98.5 °F)  Pulse  Av.7  Min: 60  Max: 99   Blood Pressure : 125/62       Physical Exam:  Physical Exam   Constitutional: He appears well-developed.   Frail and chronically ill   HENT:   Head: Normocephalic and atraumatic.   Mouth/Throat: No oropharyngeal exudate.   cortrak   Eyes: No scleral icterus.   Neck: Neck supple.   Cardiovascular: Normal rate and regular rhythm.    No murmur heard.  Pulmonary/Chest: Effort normal. No respiratory distress. He has no wheezes. He has no rales.   Abdominal: Soft. There is no tenderness.   Genitourinary:   Genitourinary Comments: [place]  +dark   Musculoskeletal: He exhibits no edema.   sarcopenia   Neurological: He displays abnormal reflex. He exhibits abnormal muscle tone. Coordination abnormal.   Obtunded  tremor   Nursing note and vitals reviewed.      Meds:    Current Facility-Administered Medications:   •  cefepime  •  phenazopyridine  •  tramadol **OR** tramadol  •  ferrous  sulfate  •  ascorbic acid  •  doxazosin  •  amantadine  •  carbidopa-levodopa  •  carbidopa-levodopa  •  bacitracin  •  citalopram  •  memantine  •  modafinil  •  triazolam  •  senna-docusate **AND** polyethylene glycol/lytes **AND** magnesium hydroxide **AND** bisacodyl  •  Respiratory Care per Protocol  •  NS  •  acetaminophen  •  ondansetron  •  ondansetron    Labs:  Recent Labs      08/26/17   0250  08/27/17   0753  08/28/17   0325   WBC  27.6*  35.9*  26.4*   RBC  3.26*  3.43*  3.47*   HEMOGLOBIN  9.2*  9.7*  9.8*   HEMATOCRIT  28.5*  29.2*  30.6*   MCV  87.4  85.1  88.2   MCH  28.2  28.3  28.2   RDW  54.8*  54.0*  55.4*   PLATELETCT  268  255  217   MPV  12.8  12.1  12.9   NEUTSPOLYS  80.40*  78.80*  79.60*   LYMPHOCYTES  2.80*  6.80*  6.20*   MONOCYTES  10.30  9.30  11.50   EOSINOPHILS  1.90  4.20  0.90   BASOPHILS  1.90*  0.00  1.80   RBCMORPHOLO  Present  Present  Present     Recent Labs      08/27/17   0754   SODIUM  131*   POTASSIUM  4.8   CHLORIDE  99   CO2  25   GLUCOSE  122*   BUN  24*     Recent Labs      08/27/17   0754   CREATININE  0.84       Imaging:  Ct-chest,abdomen,pelvis With   FINDINGS: CT Chest: There is mild bilateral posterior atelectasis. No pleural fluid is identified. There are bilateral thyroid nodules. There are scattered arterial calcifications. No significant pericardial effusion. No adenopathy is identified. There is thoracic kyphosis with mild compression deformities in the upper lumbar spine. CT Abdomen: Irregular hypodense mass in the hepatic dome measures approximately 1.7 cm. There are multiple splenules in the left upper quadrant. Soft tissue mass at the tip of the liver likely represents an additional splenule. The adrenal glands, pancreas, and left kidney are unremarkable. A 1.5 cm mass in the mid right kidney is indeterminant, but likely represents a small cyst. There is a 2 mm nonobstructing calculus in the lower pole of the right kidney. The kidneys enhance  symmetrically. There is a gallbladder calculus without wall thickening or pericholecystic fluid. There is a moderate amount of colonic stool. There are scattered arterial calcifications. CT Pelvis: The bladder is unremarkable. No significant free fluid or adenopathy. Degenerative changes are in the spine. Vertebral augmentation has been performed at T11, T12, and L1.     8/6/2017  1.  1.7 cm hypodense mass in the hepatic dome is indeterminant, but may represent a hemangioma or complex cyst. Nonemergent hepatic protocol MRI could be performed for further evaluation. 2.  1.5 cm hypodense mass in the mid right kidney, likely a complex cyst. Follow-up ultrasound or renal protocol CT or MRI could be performed for confirmation. 3.  Multiple splenules in the abdomen. 4.  Cholelithiasis 5.  Spinal compression deformities status post vertebral augmentation at T11, T12, and L1.    Ct-head W/o    8/12/2017 8/12/2017 8:27 PM HISTORY/REASON FOR EXAM:  Altered Mental Status. Fever, cough, decreased mentation. TECHNIQUE/EXAM DESCRIPTION AND NUMBER OF VIEWS: CT of the head without contrast. The study was performed on a helical multidetector CT scanner. Contiguous 2.5 mm axial sections were obtained from the skull base through the vertex. Up to date radiation dose reduction adjustments have been utilized to meet ALARA standards for radiation dose reduction. COMPARISON:  MRI of the brain 11/1/2016 FINDINGS: The calvariae and skull base are unremarkable. There are no extraaxial fluid collections. There is a pattern of cerebral atrophy manifest as enlargement of sulcal markings and ventricular prominence. The ventricular system and basal cisterns are otherwise unremarkable. There are no areas of abnormal density in the brain substance. There are no hemorrhagic lesions. There are no mass effects or shift of midline structures. The brainstem and posterior fossa structures are unremarkable. Paranasal sinuses show some minor scattered  mucosal thickening among ethmoid air cells no air-fluid levels are evident. Mastoids show opacification of clustered air cells of the left mastoid tip. This may be chronic as similar findings were noted on the MRI.     8/12/2017  1.  Mild-moderate cerebral atrophy. 2.  Opacification of a few clustered air cells of the left mastoid tip which may represent chronic or active postinflammatory changes. 3.  Otherwise, no acute findings and no appreciable change compared to MRI study 11/1/2016.    Ct-tspine W/o Plus Recons  8/12/2017  1.  Status post vertebral augmentation at T11, T12, L1. Small amount of ventral epidural cement extravasation at the T11 level. 2.  Mild superior endplate compression deformities again seen at T2, T3, T4. No change from 8/6/2017. 3.  Bibasilar subsegmental atelectatic changes most prominent at the left lower lobe posterior basal segment. Little or no change since recent exam 8/6/2017.    Dx-ankle 2- Views Left  8/15/2017  Soft tissue swelling about the lateral malleolus. No definite acute osseous abnormality.    Dx-knee 2- Right  8/15/2017  1. No definite acute osseous abnormality. 2. 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    Dx-knee 2- Left  8/15/2017  1. No definite acute osseous abnormality. 2. Small knee joint effusion    Dx-thoracolumbar Spine-2 Views    8/6/2017 8/6/2017 11:50 AM HISTORY/REASON FOR EXAM: Back pain. TECHNIQUE/EXAM DESCRIPTION AND NUMBER OF VIEWS:  Thoracic spine, 2 views. COMPARISON:  None. FINDINGS: 2 intraoperative views of the thoracic spine obtained following multilevel kyphoplasty demonstrate kyphoplasty cement within the partially collapsed vertebral bodies at approximately the T11-L1 levels.     8/6/2017  Intraoperative visualization of 3 level kyphoplasty at approximately the T11-L1 levels.    Mr-cervical Spine-with & W/o   FINDINGS: There is no acute fracture or gross malalignment. There is 2 mm of degenerative  retrolisthesis of C4 on C5. There is loss of intervertebral disc height throughout the cervical spine, most pronounced at C5-C6 and C6-C7. Marrow signal is diffusely low on T1 and T2, relatively sparing the dens. The cervical cord has a normal caliber course and signal intensity. No area of abnormal enhancement is seen. Findings specific to each level are described below: C2-3: Unremarkable C3-4:  Mild RIGHT facet arthropathy C4-5:  Posterior disc osteophyte complex extending to the BILATERAL foraminal zones. Mild RIGHT facet arthropathy. Mild central canal narrowing. Mild RIGHT neural foraminal narrowing. C5-6: Posterior disc osteophyte complex. Mild central canal narrowing. Moderate RIGHT and mild LEFT neural foraminal narrowing. C6-7: Posterior disc osteophyte complex extending to the RIGHT more than the LEFT neural foramen. Mild central canal narrowing. Mild RIGHT neural foraminal narrowing. C7-T1: Posterior disc osteophyte complex eccentric to the RIGHT more than the LEFT foraminal zone. Mild RIGHT neural foraminal narrowing. No soft tissue mass is evident.     8/17/2017  1.  No evidence of cervical discitis osteomyelitis or epidural abscess 2.  Diffuse marrow infiltration or replacement 3.  Multilevel multifactorial degenerative changes with areas of central canal or neural foraminal narrowing as described above    Mr-lumbar Spine-with & W/o   FINDINGS: There are changes secondary to the previous vertebral augmentation procedures at T11, T12 and L1 vertebral bodies. Bone marrow edema and contrast enhancement are noted within the remaining portions of the above-mentioned vertebral bodies. There is no evidence of abnormal epidural or prevertebral fluid collection. Mild prevertebral soft tissue contrast enhancement is seen. There is abnormal T2 hyperintensity at L1-2 disc with contrast enhancement. There is also abnormal contrast enhancement within the L3-4 disc. At the level of L5-S1, there is minimal disc bulge  without significant spinal or neural foraminal stenosis. At the level of L4-5, there is mild right neural foraminal stenosis. There is no central canal stenosis. At the level of L3-4, there is abnormal T2 hyperintensity and contrast enhancement within the disc space. There is no abnormal epidural or prevertebral fluid collection. At the level of L2-3, there is no spinal or neural foraminal stenosis. At the level of L1-2, there is no spinal or neural foraminal stenosis. At the level of T12-L1, there is abnormal disc fluid with contrast enhancement. The conus terminates at the level of L1. A gallstone is seen.   8/17/2017  1.  There is no evidence of epidural abscess. 2.  There is abnormal bone marrow edema and contrast enhancement within the T11, T12 and L1 vertebral bodies surrounding the vertebral cement. This finding likely represent edema/inflammation secondary to the recent procedure. However this imaging finding cannot completely exclude the possibility of osteomyelitis. 3.  There are abnormal disc T2 hyperintensity and enhancement noted involving L1-2 and L4-5 discs. This finding is suspicious for early discitis. However the previous MRI dated 5/8/2017 demonstrates disc T2 hyperintensity at the levels of L1-2 and L4-5. Advanced disc degeneration may have this appearance. 4.  There is no evidence of prevertebral abscess. 5.  Degenerative changes as described above.    Mr-thoracic Spine-with & W/o  FINDINGS: There has been interval vertebral augmentation at T11, T12 and L1. There is no demonstrable change in the height loss at each of these levels. There is adjacent edema and faint enhancement about each augmentation cavity extending into the pedicles. There  is a small amount of signal void compatible with gas in the disc space anteriorly at the T12-L1 disc. No abnormal discal or soft tissue enhancement is seen. No additional fracture is seen. There is mildly prominent hemangioma in the T7 vertebral body. The  bone marrow is diffusely low in signal on T1 and T2 as previously noted. There is no gross malalignment. There is mild loss of intervertebral disc height throughout the thoracic spine. The thoracic cord has a normal caliber course and appearance. There is no evidence of significant disk extrusion, cord compression, or neural foraminal stenosis.     2017  1.  Prior spinal augmentation at the vertebral compression fractures sites of T11, T12 and L1 2.  Areas of enhancement in the T11, T12 and L1 vertebral bodies are in keeping with what would be expected for recent intervention though infection is not excluded 3.  Thoracic spondylosis 4.  Diffuse marrow infiltration or replacement  e Venous Duplex    2017   Vascular Laboratory  CONCLUSIONS  No evidence of deep vein thrombosis or superficial thrombophlebitis in the  left lower extremity.  ANNA GAUTAM  Exam Date:     2017 09:05  Room #:     Inpatient  Priority:     Routine  Ht (in):             Wt (lb):  Ordering Physician:        RODRIGO CARTER  Referring Physician:       EMMA Rice  Sonographer:               MUSA Mays RVT, BERNADETTE  Study Type:                Complete Unilateral  Technical Quality:         Adequate  Age:    76    Gender:     M  MRN:    [id number]  :    1940      BSA:  Indications:     Edema, unspecified  CPT Codes:       79690  ICD Codes:       R609  History:         Left ankle edema for last several days.  Limitations:  PROCEDURES:  Left lower extremity venous duplex imaging.  The following venous structures were evaluated: common femoral, profunda  femoral, greater saphenous, femoral, popliteal , peroneal and posterior  tibial veins.  Serial compression, augmentation maneuvers,  color and spectral Doppler  flow evaluations were performed.  FINDINGS:  Doppler duplex and spectral analysis of the superficial and deep venous  systems of the left leg was performed.  No evidence of deep  vein thrombosis or superficial thrombophlebitis in the  left lower extremity.  Contralateral flow was obtained in the right common femoral vein and  appears to be normal.  No prior studies available for comparison.  Juliano Cox MD  (Electronically Signed)  Final Date:      18 August 2017                   17:07    Dx-abdomen For Tube Placement    8/19/2017 8/19/2017 4:12 PM HISTORY/REASON FOR EXAM:  Line evaluation. TECHNIQUE/EXAM DESCRIPTION AND NUMBER OF VIEWS:  1 view(s) of the abdomen. COMPARISON:  None. FINDINGS: Enteric tube has been placed. The tip projects over the left upper quadrant. The bowel gas pattern is within normal limits.  There are multilevel changes of vertebral augmentation.     8/19/2017  1.  Feeding tube tip overlies the gastric body.    Dx-abdomen For Tube Placement    8/15/2017  8/15/2017 4:31 AM HISTORY/REASON FOR EXAM:  Tube evaluation. TECHNIQUE/EXAM DESCRIPTION AND NUMBER OF VIEWS:  1 view(s) of the abdomen. COMPARISON:  None. FINDINGS: Limited single view of the abdomen performed primarily to evaluate enteric tube position. The tip projects over the stomach fundus. Diffuse gas-filled small bowel and colon.     8/15/2017  Feeding tube with tip in the stomach fundus.      Micro:  Results     Procedure Component Value Units Date/Time    URINE CULTURE-EXISTING-LESS THAN 48 HOURS [[id number]] Collected:  08/28/17 0900    Order Status:  Completed Specimen:  Urine from Urine, [place] Cath Updated:  08/28/17 1009    Narrative:       Specimen in lab  Indication for culture:->Altered LOC    URINALYSIS [[id number]]  (Abnormal) Collected:  08/28/17 0900    Order Status:  Completed Specimen:  Urine from Urine, [facility] Cath Updated:  08/28/17 0915     Color Yellow     Character Cloudy (A)     Specific Gravity 1.012     Ph 7.0     Glucose Negative mg/dL      Ketones Negative mg/dL      Protein Negative mg/dL      Bilirubin Negative     Urobilinogen, Urine 1.0     Nitrite Positive (A)     Leukocyte  "Esterase Large (A)     Occult Blood Moderate (A)     Micro Urine Req Microscopic    Narrative:       Collected By:[id number] JENELLE TEE    BLOOD CULTURE [[id number]] Collected:  08/27/17 1700    Order Status:  Completed Specimen:  Blood from Peripheral Updated:  08/28/17 0859     Significant Indicator NEG     Source BLD     Site PERIPHERAL     Blood Culture --     No Growth    Note: Blood cultures are incubated for 5 days and  are monitored continuously.Positive blood cultures  are called to the RN and reported as soon as  they are identified.      Narrative:       Per Hospital Policy: Only change Specimen Src: to \"Line\" if  specified by physician order.    URINALYSIS [[id number]]     Order Status:  Canceled Specimen:  Urine     BLOOD CULTURE [[id number]] Collected:  08/26/17 1330    Order Status:  Completed Specimen:  Blood from Peripheral Updated:  08/27/17 0842     Significant Indicator NEG     Source BLD     Site PERIPHERAL     Blood Culture --     No Growth    Note: Blood cultures are incubated for 5 days and  are monitored continuously.Positive blood cultures  are called to the RN and reported as soon as  they are identified.      Narrative:       Per Hospital Policy: Only change Specimen Src: to \"Line\" if  specified by physician order.    BLOOD CULTURE [[id number]] Collected:  08/26/17 1338    Order Status:  Completed Specimen:  Blood from Peripheral Updated:  08/27/17 0842     Significant Indicator NEG     Source BLD     Site PERIPHERAL     Blood Culture --     No Growth    Note: Blood cultures are incubated for 5 days and  are monitored continuously.Positive blood cultures  are called to the RN and reported as soon as  they are identified.      Narrative:       Per Hospital Policy: Only change Specimen Src: to \"Line\" if  specified by physician order.    BLOOD CULTURE [[id number]] Collected:  08/24/17 1342    Order Status:  Completed Specimen:  Blood from Peripheral Updated:  08/25/17 0854     Significant " "Indicator NEG     Source BLD     Site PERIPHERAL     Blood Culture --     No Growth    Note: Blood cultures are incubated for 5 days and  are monitored continuously.Positive blood cultures  are called to the RN and reported as soon as  they are identified.      Narrative:       Per Hospital Policy: Only change Specimen Src: to \"Line\" if  specified by physician order.    BLOOD CULTURE [[id number]] Collected:  08/24/17 1335    Order Status:  Completed Specimen:  Blood from Peripheral Updated:  08/25/17 0854     Significant Indicator NEG     Source BLD     Site PERIPHERAL     Blood Culture --     No Growth    Note: Blood cultures are incubated for 5 days and  are monitored continuously.Positive blood cultures  are called to the RN and reported as soon as  they are identified.      Narrative:       Per Hospital Policy: Only change Specimen Src: to \"Line\" if  specified by physician order.          Assessment:  Active Hospital Problems    Diagnosis       • Myelodysplastic syndrome (CMS-HCC) [D46.9]   • Parkinson's disease (CMS-HCC) [G20]   • Oropharyngeal dysphagia [R13.12]   • Acute urinary retention [R33.8]   • Hypokalemia [E87.6]   • Acute bilateral low back pain without sciatica [M54.5]   • Left ankle swelling [M25.472]   • Hematuria [R31.9]   • Acute delirium [R41.0]   • DNR (do not resuscitate) [Z66]   • Pneumonia [J18.9]       Plan:  Urinary obstruction with hematuria  S/p cystoscopy, dilation stricture, and evacuation clots  UA c/w UTI  Ucx pending  On CBI    Leukocytosis, persistent  Afebrile  Multifactorial incl MDS, urinary obstruction, post-procedure, post-splectomy, and likely UTI  Concern for aspiration given PD  Blood cxs neg 8/24  Consider oncology follow-up  Repeat blood cxs neg    Abnormal MRI of the back, s/p kyphoplasty  No definitive OM or discitis  No vertebral abscess  Most c/w postop change  Repeat the MRI in 4-6 weeks-sooner if clinically indicated  Biopsy from this area on 8/6/17 c/w " MDS    Splenectomy  Agent is at higher risk for infections    Parkinson's disease  Aspiration risk    Discussed with internal medicine/family/Neurosurgery.

## 2017-08-29 ENCOUNTER — TELEPHONE (OUTPATIENT)
Dept: NEUROLOGY | Facility: MEDICAL CENTER | Age: 77
End: 2017-08-29

## 2017-08-29 PROBLEM — E87.6 HYPOKALEMIA: Status: RESOLVED | Noted: 2017-08-17 | Resolved: 2017-08-29

## 2017-08-29 PROBLEM — N39.0 UTI (URINARY TRACT INFECTION): Status: ACTIVE | Noted: 2017-08-24

## 2017-08-29 PROBLEM — R31.9 HEMATURIA: Status: RESOLVED | Noted: 2017-08-13 | Resolved: 2017-08-29

## 2017-08-29 LAB
ANISOCYTOSIS BLD QL SMEAR: ABNORMAL
BACTERIA BLD CULT: NORMAL
BACTERIA BLD CULT: NORMAL
BASOPHILS # BLD AUTO: 0.9 % (ref 0–1.8)
BASOPHILS # BLD: 0.11 K/UL (ref 0–0.12)
EOSINOPHIL # BLD AUTO: 0 K/UL (ref 0–0.51)
EOSINOPHIL NFR BLD: 0 % (ref 0–6.9)
ERYTHROCYTE [DISTWIDTH] IN BLOOD BY AUTOMATED COUNT: 53.1 FL (ref 35.9–50)
HCT VFR BLD AUTO: 28.8 % (ref 42–52)
HGB BLD-MCNC: 9.3 G/DL (ref 14–18)
LG PLATELETS BLD QL SMEAR: NORMAL
LYMPHOCYTES # BLD AUTO: 1.16 K/UL (ref 1–4.8)
LYMPHOCYTES NFR BLD: 9.5 % (ref 22–41)
MANUAL DIFF BLD: NORMAL
MCH RBC QN AUTO: 27.5 PG (ref 27–33)
MCHC RBC AUTO-ENTMCNC: 32.3 G/DL (ref 33.7–35.3)
MCV RBC AUTO: 85.2 FL (ref 81.4–97.8)
MONOCYTES # BLD AUTO: 1.81 K/UL (ref 0–0.85)
MONOCYTES NFR BLD AUTO: 14.8 % (ref 0–13.4)
MORPHOLOGY BLD-IMP: NORMAL
NEUTROPHILS # BLD AUTO: 9.13 K/UL (ref 1.82–7.42)
NEUTROPHILS NFR BLD: 74.8 % (ref 44–72)
NRBC # BLD AUTO: 0.05 K/UL
NRBC BLD AUTO-RTO: 0.4 /100 WBC
PLATELET # BLD AUTO: 195 K/UL (ref 164–446)
PLATELET BLD QL SMEAR: NORMAL
PMV BLD AUTO: 12.9 FL (ref 9–12.9)
RBC # BLD AUTO: 3.38 M/UL (ref 4.7–6.1)
RBC BLD AUTO: PRESENT
SIGNIFICANT IND 70042: NORMAL
SIGNIFICANT IND 70042: NORMAL
SITE SITE: NORMAL
SITE SITE: NORMAL
SOURCE SOURCE: NORMAL
SOURCE SOURCE: NORMAL
WBC # BLD AUTO: 12.2 K/UL (ref 4.8–10.8)

## 2017-08-29 PROCEDURE — 770006 HCHG ROOM/CARE - MED/SURG/GYN SEMI*

## 2017-08-29 PROCEDURE — 85027 COMPLETE CBC AUTOMATED: CPT

## 2017-08-29 PROCEDURE — A9270 NON-COVERED ITEM OR SERVICE: HCPCS | Performed by: FAMILY MEDICINE

## 2017-08-29 PROCEDURE — 700102 HCHG RX REV CODE 250 W/ 637 OVERRIDE(OP): Performed by: NURSE PRACTITIONER

## 2017-08-29 PROCEDURE — 85007 BL SMEAR W/DIFF WBC COUNT: CPT

## 2017-08-29 PROCEDURE — 700105 HCHG RX REV CODE 258: Performed by: INTERNAL MEDICINE

## 2017-08-29 PROCEDURE — 700102 HCHG RX REV CODE 250 W/ 637 OVERRIDE(OP): Performed by: INTERNAL MEDICINE

## 2017-08-29 PROCEDURE — 700111 HCHG RX REV CODE 636 W/ 250 OVERRIDE (IP): Performed by: INTERNAL MEDICINE

## 2017-08-29 PROCEDURE — 99232 SBSQ HOSP IP/OBS MODERATE 35: CPT | Performed by: HOSPITALIST

## 2017-08-29 PROCEDURE — A9270 NON-COVERED ITEM OR SERVICE: HCPCS | Performed by: INTERNAL MEDICINE

## 2017-08-29 PROCEDURE — 700102 HCHG RX REV CODE 250 W/ 637 OVERRIDE(OP): Performed by: HOSPITALIST

## 2017-08-29 PROCEDURE — A9270 NON-COVERED ITEM OR SERVICE: HCPCS | Performed by: HOSPITALIST

## 2017-08-29 PROCEDURE — A9270 NON-COVERED ITEM OR SERVICE: HCPCS | Performed by: NURSE PRACTITIONER

## 2017-08-29 PROCEDURE — 700102 HCHG RX REV CODE 250 W/ 637 OVERRIDE(OP): Performed by: FAMILY MEDICINE

## 2017-08-29 PROCEDURE — 700111 HCHG RX REV CODE 636 W/ 250 OVERRIDE (IP): Performed by: NURSE PRACTITIONER

## 2017-08-29 PROCEDURE — 36415 COLL VENOUS BLD VENIPUNCTURE: CPT

## 2017-08-29 PROCEDURE — 92526 ORAL FUNCTION THERAPY: CPT

## 2017-08-29 RX ADMIN — STANDARDIZED SENNA CONCENTRATE AND DOCUSATE SODIUM 2 TABLET: 8.6; 5 TABLET, FILM COATED ORAL at 22:02

## 2017-08-29 RX ADMIN — PHENAZOPYRIDINE HYDROCHLORIDE 100 MG: 100 TABLET ORAL at 13:27

## 2017-08-29 RX ADMIN — CITALOPRAM HYDROBROMIDE 20 MG: 20 TABLET ORAL at 09:00

## 2017-08-29 RX ADMIN — DOXAZOSIN MESYLATE 1 MG: 1 TABLET ORAL at 22:02

## 2017-08-29 RX ADMIN — CARBIDOPA AND LEVODOPA 1 TABLET: 10; 100 TABLET ORAL at 13:31

## 2017-08-29 RX ADMIN — CEFEPIME 2 G: 2 INJECTION, POWDER, FOR SOLUTION INTRAMUSCULAR; INTRAVENOUS at 09:44

## 2017-08-29 RX ADMIN — AMANTADINE HYDROCHLORIDE 100 MG: 50 SOLUTION ORAL at 22:03

## 2017-08-29 RX ADMIN — OXYCODONE HYDROCHLORIDE AND ACETAMINOPHEN 500 MG: 500 TABLET ORAL at 09:44

## 2017-08-29 RX ADMIN — CARBIDOPA AND LEVODOPA 2 TABLET: 25; 100 TABLET ORAL at 22:02

## 2017-08-29 RX ADMIN — PHENAZOPYRIDINE HYDROCHLORIDE 100 MG: 100 TABLET ORAL at 09:43

## 2017-08-29 RX ADMIN — Medication 220 MG: at 22:03

## 2017-08-29 RX ADMIN — BACITRACIN ZINC: 500 OINTMENT TOPICAL at 09:44

## 2017-08-29 RX ADMIN — MEMANTINE HYDROCHLORIDE 10 MG: 10 TABLET, FILM COATED ORAL at 22:02

## 2017-08-29 RX ADMIN — MEMANTINE HYDROCHLORIDE 10 MG: 10 TABLET, FILM COATED ORAL at 09:44

## 2017-08-29 RX ADMIN — CARBIDOPA AND LEVODOPA 1 TABLET: 10; 100 TABLET ORAL at 05:25

## 2017-08-29 RX ADMIN — AMANTADINE HYDROCHLORIDE 100 MG: 50 SOLUTION ORAL at 09:43

## 2017-08-29 RX ADMIN — CEFEPIME 2 G: 2 INJECTION, POWDER, FOR SOLUTION INTRAMUSCULAR; INTRAVENOUS at 22:02

## 2017-08-29 RX ADMIN — BACITRACIN ZINC: 500 OINTMENT TOPICAL at 22:03

## 2017-08-29 RX ADMIN — ENOXAPARIN SODIUM 40 MG: 100 INJECTION SUBCUTANEOUS at 13:28

## 2017-08-29 RX ADMIN — CARBIDOPA AND LEVODOPA 1 TABLET: 10; 100 TABLET ORAL at 22:02

## 2017-08-29 RX ADMIN — Medication 220 MG: at 09:44

## 2017-08-29 ASSESSMENT — PAIN SCALES - GENERAL
PAINLEVEL_OUTOF10: 0
PAINLEVEL_OUTOF10: 0

## 2017-08-29 ASSESSMENT — ENCOUNTER SYMPTOMS
SPEECH CHANGE: 1
HEADACHES: 0
FOCAL WEAKNESS: 0
SHORTNESS OF BREATH: 0
BLURRED VISION: 0
FALLS: 0
COUGH: 0
TINGLING: 0
BACK PAIN: 1
WEAKNESS: 1
DIAPHORESIS: 0
ABDOMINAL PAIN: 0
DIZZINESS: 0
TREMORS: 1
DOUBLE VISION: 0
SENSORY CHANGE: 0
NECK PAIN: 0
FEVER: 0
CONSTIPATION: 0
PALPITATIONS: 0
NAUSEA: 0
DIARRHEA: 0
VOMITING: 0
DEPRESSION: 0

## 2017-08-29 NOTE — PROGRESS NOTES
Amara Pascal Fall Risk Assessment:     Last Known Fall: Within the last year  Mobility: Use of assistive device/requires assist of two people  Medications: Cardiovascular or central nervous system meds  Mental Status/LOC/Awareness: Awake, alert, and oriented to date, place, and person  Toileting Needs: Use of assistive device (Bedside commode, bedpan, urinal)  Volume/Electrolyte Status: Use of IV fluids/tube feeds  Communication/Sensory: Visual (Glasses)/hearing deficit  Behavior: Appropriate behavior  Amara Pascal Fall Risk Total: 13  Fall Risk Level: MODERATE RISK    Universal Fall Precautions:  call light/belongings in reach    Fall Risk Level Interventions:   TRIAL (TELE 8, NEURO, MED ZAKIYA 5) Low Fall Risk Interventions  Place yellow fall risk ID band on patient: verified  Provide patient/family education based on risk assessment: verified  Educate patient/family to call staff for assistance when getting out of bed: verified  Place fall precaution signage outside patient door: verifiedTRIAL (TELE 8, NEURO, MED ZAKIYA 5) Moderate Fall Risk Interventions  Place yellow fall risk ID band on patient: verified  Provide patient/family education based on risk assessment : completed  Educate patient/family to call staff for assistance when getting out of bed: completed  Place fall precaution signage outside patient door: verified  Utilize bed/chair fall alarm: verifiedTRIAL (TELE 8, NEURO, [employer] ZAKIYA 5) High Fall Risk Interventions  Place yellow fall risk ID band on patient: verified  Provide patient/family education based on risk assessment: completed  Educate patient/family to call staff for assistance when getting out of bed: completed  Place fall precaution signage outside patient door: verified  Place patient in room close to nursing station: verified  Utilize bed/chair fall alarm: verified  Notify charge of high risk for huddle: completed    Patient Specific Interventions:     Medication: review medications with  patient and family  Mental Status/LOC/Awareness: reorient patient, reinforce falls education and reinforce the use of call light  Toileting: not applicable  Volume/Electrolyte Status: ensure patient remains hydrated, monitor abnormal lab values and ensure IV fluids are appropriate  Communication/Sensory: update plan of care on whiteboard and ensure patient has glasses/contacts and hearing aids/dentures  Behavioral: administer medication as ordered, instruct/reinforce fall program rationale and use appropriate de-escalation techniques  Mobility: utilize bed/chair fall alarm and ensure bed is locked and in lowest position

## 2017-08-29 NOTE — PROGRESS NOTES
[facility] Hospitalist Progress Note    Date of Service: 8/28/2017    Chief Complaint  76 y.o. male admitted 8/12/2017 with fever, elevated WBC count, recent kyphoplasty, confusion.    Interval Problem Update  -Parkinson's-mental status is better again today. Speech pattern is much stronger today as well. Working with PT sitting up at the edge of the bed, doing leg exercises.    Back pain-per patient, appears to be back at his baseline.     Dysphagia- new cortrak continues to work well. No high residuals noted.    LLE swelling- No obvious trauma. More mild improvement noted today. Denies any new redness of his skin.   8/22:  Poor historian due to Parkinson's dementia.  States no back pain, but pain thoracic spine with palpation on exam.  Repeat sed rate.  No source of elevated wbc and sed rate found upon review of chart:  UC negative, BCs x2 neg, throat culture neg CXR negative for infiltrate.  Off restraints.  ID consulted regarding likely osteomyelitis T11, T12, L1 on MRI with discitis.  8/23:  Awaiting ID recs.  Wife at bedside, discussed all questions, concerns, plan of care.  She is concerned that he is not getting his home does of sinemet.  However, as close as can get given immediate release formulation available in hospital.  cortrak remains, Rinaldi catheter was placed upon transfer to [facility].  WBC increasing today since off zosyn IV, lungs CTA b/l.  Added cardura for BPH.  Remains off restraints.  Sed rate 60 wbc 9.9 to 14.  8/24:  Wbc continues to increase off abx.  Repeated BCx2, patient denies back pain, cortrak and rinaldi remain.  ID recommend to repeat thoracic MRI in 2 weeks.  8/25:  Increasing wbc to 20, BCs negative for growth.  ID recommends to repeat MRI in 4-6 weeks.  ID reviewed MRIs:  Believe to be post op changes.  8/26:  Had diffulties per wife at bedside overnight with worsening mentation.  Wife thinks interaction with diflucan.  dc'd diflucan, had 5 doses.  Increasing wbc to 27.  Ordered  repeat BCs x2.  Bcs  negative.  :  Wbc increased 27.6 to 35.9.  Had emergency TURP and evacuation of blood clots, CBI for hematuria and acute urinary retention in [place].  UC sent, CXR negative, repeat BCs.    :  Cloudy [place], UA with packed wbc, UC pending, start Rocephin IV for possible UTI since unclear etiology of wbc.  Recent TURP on  with Dr. Almeida.  Appears more alert.  Wife at bedside and updated.    Consultants/Specialty  :  ID consult regarding possible osteomyelitis on MRI T spine.    Disposition  PT/OT rec SNF.  SNF ordered .  [facility] will not take cortrak, may need to look at [facility].  Patient unsure if wants PEG tube.        Review of Systems   Constitutional: Negative for fever.   Eyes: Negative for blurred vision.   Respiratory: Negative for shortness of breath.    Cardiovascular: Positive for leg swelling (LLE, improvement noted today). Negative for chest pain.   Gastrointestinal: Negative for abdominal pain.        Mouth remains quite dry   Genitourinary: Negative for dysuria.   Musculoskeletal: Positive for back pain. Negative for neck pain.   Skin: Negative for rash.   Neurological: Positive for tremors (mild and near his baseline), speech change (feels that his words are stronger today) and weakness (slightly stronger today). Negative for dizziness, focal weakness, loss of consciousness and headaches.        No changes   Psychiatric/Behavioral: Negative for depression.       Physical Exam  Laboratory/Imaging   Hemodynamics  Temp (24hrs), Av.4 °C (99.4 °F), Min:36.9 °C (98.5 °F), Max:38 °C (100.4 °F)   Temperature: 36.9 °C (98.5 °F)  Pulse  Av.7  Min: 60  Max: 99    Blood Pressure : 125/62      Respiratory      Respiration: 16, Pulse Oximetry: 96 %     Work Of Breathing / Effort: Mild  RUL Breath Sounds: Clear, RML Breath Sounds: Diminished, RLL Breath Sounds: Diminished, AQUILINO Breath Sounds: Clear, LLL Breath Sounds: Diminished    Fluids    Intake/Output  Summary (Last 24 hours) at 08/28/17 1701  Last data filed at 08/28/17 0600   Gross per 24 hour   Intake             6960 ml   Output            26488 ml   Net            -3690 ml       Nutrition  Orders Placed This Encounter   Procedures   • DIET NPO     Standing Status:   Standing     Number of Occurrences:   1     Order Specific Question:   Restrict to:     Answer:   Strict [1]     Comments:   Stop tube feeds     Physical Exam   Constitutional: He appears well-developed and well-nourished.    hard to understand with his words   HENT:   Mouth/Throat: No oropharyngeal exudate (uvula swollen and slightly red).   Cor tract and right naris, no erosion noted   Eyes: Conjunctivae are normal. No scleral icterus.   Neck: Normal range of motion. Neck supple. No Brudzinski's sign and no Kernig's sign noted.   Cardiovascular: Normal rate, regular rhythm and normal heart sounds.    Pulmonary/Chest: Effort normal and breath sounds normal. No stridor. No respiratory distress.   Abdominal: Soft. Bowel sounds are normal. There is no tenderness.   Genitourinary:   Genitourinary Comments: Hurst catheter in place   Musculoskeletal: He exhibits no edema or tenderness.   Thoracic back pain with palpation of spine.   Neurological: He is alert. He displays tremor (resting). He displays normal reflexes.   Much quicker in his answers today, stronger diction and enunciation    Skin: Skin is warm and dry. No rash noted.   Psychiatric: He has a normal mood and affect. His behavior is normal. Thought content normal.   Nursing note and vitals reviewed.      Recent Labs      08/26/17   0250  08/27/17   0753  08/28/17   0325   WBC  27.6*  35.9*  26.4*   RBC  3.26*  3.43*  3.47*   HEMOGLOBIN  9.2*  9.7*  9.8*   HEMATOCRIT  28.5*  29.2*  30.6*   MCV  87.4  85.1  88.2   MCH  28.2  28.3  28.2   MCHC  32.3*  33.2*  32.0*   RDW  54.8*  54.0*  55.4*   PLATELETCT  268  255  217   MPV  12.8  12.1  12.9     Recent Labs      08/27/17   0754   SODIUM  131*    POTASSIUM  4.8   CHLORIDE  99   CO2  25   GLUCOSE  122*   BUN  24*   CREATININE  0.84   CALCIUM  8.4*                      Assessment/Plan     * Acute osteomyelitis of thoracic spine (CMS-HCC)- (present on admission)   Assessment & Plan    Ordered sed rate  BCs negatitve x 2 from 8/12  No other source of infection, persistent back pain, however poor historian  Prior vertebroplasty.  Review of MRI thoracic spine with T11, 12, L1 discitis, cannot rule out acute osteomyelitis  Improved with IV zosyn, now wbc rising off zosyn.  Consulted ID:  Does not believe is acute osteomyelitis:  Will repeat MRI in 2 weeks.  8/24 repeat BCsx2.  Elevated sed rate 60 8/16, repeat 62 on 8/22.        Myelodysplastic syndrome (CMS-HCC)- (present on admission)   Assessment & Plan    Seen by Dr. Ribeiro on the last admit  Platelet dysfunction present per prior TEG and evaluation  Hold lovenox  SCDs only for DVT prophylaxis        Parkinson's disease (CMS-[facility])- (present on admission)   Assessment & Plan    -Cor track in place  -Cannot do sustained released Sinemet given a cannot be crushed  -Continue short acting Sinemet while cor track is in place  -symptoms are better today, but continues to have somewhat wild daily fluctuations, possible G tube placement during this admission, need to have ongoing discussions with patient and his wife/family        Acute cystitis with hematuria- (present on admission)   Assessment & Plan    8/28:  Order for UA since acute urinary retention  abnl with wbc packed  UC ordered  Start Rocephin IV daily.  Unclear if source of elevated wbc.  Repeat CBC daily.        Leukocytosis- (present on admission)   Assessment & Plan    Continues to climb off zosyn.  Unclear source.  MDS usually gives pancytopenia picture.  8/24 repeat BCs x2.  Can check with oncology, Dr. Ribeiro has seen  Bone Biopsy 8/6/17 during kyphoplasty by Dr. Wilburn.        Candidal UTI (urinary tract infection)- (present on admission)    Assessment & Plan    Treat with diflucan  Sediment seen        Acute urinary retention- (present on admission)   Assessment & Plan    [place] catheter in place.  Start cardura 8/23  H/o prostatectomy in past  Per wife:  [place]st catheter placed at [facility] at time of transfer with cudet catheter difficult placement  Continue [place] at least 7 days from start of cardura.  8/27:  TURP by Dr Almeida for acute urinary blood clot evacuation.        Oropharyngeal dysphagia- (present on admission)   Assessment & Plan    cortrak tube feeds per SLP eval.          Acute bilateral low back pain without sciatica- (present on admission)   Assessment & Plan    -MRI of cervical thoracic and lumbar spine shows no evidence of clear epidural or prevertebral abscess. Findings do show some hyper enhanced bone marrow edema which was there on prior MRIs  -Finished 7 days of IV zosyn  -All cultures remain negative  -Mental status fluctuates  - cautious use of IV pain meds  -No neurosurgery consult is required at this time  -worked more with PT, sitting up at the bedside, afebrile, will need SNF versus REHAB        Left ankle swelling   Assessment & Plan    -Bilateral x-rays of the knees and ankles show very small fluid collection therefore arthrocentesis is not indicated at this time but need to watch closely  -unchanged today  -U/S negative for DVT/SVT, monitor, no e/o infection at this time  -consider compression stocking, has good ROM the L ankle on dorsi/plantar flexion, continue to see daily improvement        DNR (do not resuscitate)- (present on admission)   Assessment & Plan    Confirmed with the patient's wife on admission.        Acute delirium- (present on admission)   Assessment & Plan    -fluctuates, likely multi-factorial, infection, prolonged hospitalization, parkinson's  -continue current treatment plan        Hematuria- (present on admission)   Assessment & Plan    -no AC  8/27:  Emergency TURP by DR. Almeida, 3 way CBI in place for  hematuria induced acute urinary retention  UC sent        Pneumonia- (present on admission)   Assessment & Plan    -Possible pneumonia, given no clear infection of the spine on MRI and persisting cough and elevated white was gone responding to IV Zosyn, weakly appears to have pneumonia  -day 7/7 zosyn, WBC has normalized   8/22:  Reviewed all CXRs since admission:  No infiltrates seen on all 3.  No evidence of pneumonia.        S/P splenectomy- (present on admission)   Assessment & Plan    Infections can occur easier.            [place]st catheter::  Urinary Tract Retention or Urinary Tract Obstruction  DVT prophylaxis pharmacological::  Contraindicated - High bleeding risk  DVT prophylaxis - mechanical:  SCDs  Ulcer Prophylaxis::  Not indicated

## 2017-08-29 NOTE — THERAPY
"Speech Language Therapy dysphagia treatment completed.   Functional Status:  Pt was seen for dysphagia tx on this date. He was asleep upon entering, and woke easily to name. Pt agreed to dysphagia therapy and reported that he has been completing swallowing exercises with his wife. Pt completed PO trials of single ice chips x10 and multiple ice chips x3 with cough elicited post 3rd trial of multiple ice chips. He reported feeling \"too cramped sitting up\" and asked to lay back in bed. PO trials were concluded and pt agreed to complete swallowing exercises. He independently completed 10/10 lingual sweeps with \"good\" overall accuracy. He completed 5 TBR with \"good\" overall accuracy before falling asleep. When cued to attempt Ashley, pt was unable to maintain alertness d/t lethargy. Dysphagia tx was concluded.     Recommendations: Recommend continuation of NPO+TF w/ 5-10 SINGLE ice chips per hour only when the pt is alert. Pt was again educated on results of FEES, and recommendations of SLP. Pt verbalized understanding at this time. SLP to continue following closely for dysphagia therapy.  Plan of Care: Will benefit from Speech Therapy 3 times per week  Post-Acute Therapy: Discharge to a transitional care facility for continued skilled therapy services.    See \"Rehab Therapy-Acute\" Patient Summary Report for complete documentation.     "

## 2017-08-29 NOTE — DISCHARGE PLANNING
Followed up with [facility] to verify bed availability. No beds available at this time. Elvira(VALERIO) notified.

## 2017-08-29 NOTE — PROGRESS NOTES
Infectious Disease Progress Note    Author: Jessy Flanagan M.D. Date & Time of service: 2017  12:41 PM    Chief Complaint:  Back infection    Interval History:  76-year-old male with Parkinson's, MDS, splenectomy was admitted with altered mental status and fevers. Likely due to UTI  17-MAXIMUM TEMPERATURE 98.2. WBC 15.6. Today awake and responsive  17-MAXIMUM TEMPERATURE 98.1. WBC 20. Denies any increased back pain.    AF (99.8) WBC 27.6 wife does not want him on any abx-thinks they made him worse   AF WBC 35.9 urinary obstruction-now on CBI remains obtunded   AF WBC 26.4 remains obtunded-no more clots in urine   AF (99) WBC 12 Ucx GNR able to participate with speech today  Labs Reviewed, Medications Reviewed and Radiology Reviewed.    Review of Systems:  Review of Systems   Unable to perform ROS: Medical condition   Constitutional: Negative for fever.   Genitourinary: Positive for hematuria.       Hemodynamics:  Temp (24hrs), Av.8 °C (98.3 °F), Min:36.7 °C (98 °F), Max:37.2 °C (99 °F)  Temperature: 36.7 °C (98.1 °F)  Pulse  Av.3  Min: 50  Max: 99   Blood Pressure : 150/76       Physical Exam:  Physical Exam   Constitutional: He appears well-developed.   Frail and chronically ill   HENT:   Head: Normocephalic and atraumatic.   Mouth/Throat: No oropharyngeal exudate.   cortrak   Eyes: No scleral icterus.   Neck: Neck supple.   Cardiovascular: Normal rate and regular rhythm.    No murmur heard.  Pulmonary/Chest: Effort normal. No respiratory distress. He has no wheezes. He has no rales.   Abdominal: Soft. There is no tenderness.   Genitourinary:   Genitourinary Comments: [place]  +dark   Musculoskeletal: He exhibits no edema.   sarcopenia   Neurological: He displays abnormal reflex. He exhibits abnormal muscle tone. Coordination abnormal.   Obtunded  tremor   Nursing note and vitals reviewed.      Meds:    Current Facility-Administered Medications:   •  enoxaparin  •   cefepime  •  phenazopyridine  •  tramadol **OR** tramadol  •  ferrous sulfate  •  ascorbic acid  •  doxazosin  •  amantadine  •  carbidopa-levodopa  •  carbidopa-levodopa  •  bacitracin  •  citalopram  •  memantine  •  modafinil  •  triazolam  •  senna-docusate **AND** polyethylene glycol/lytes **AND** magnesium hydroxide **AND** bisacodyl  •  Respiratory Care per Protocol  •  NS  •  acetaminophen  •  ondansetron  •  ondansetron    Labs:  Recent Labs      08/27/17   0753  08/28/17   0325  08/29/17   0240   WBC  35.9*  26.4*  12.2*   RBC  3.43*  3.47*  3.38*   HEMOGLOBIN  9.7*  9.8*  9.3*   HEMATOCRIT  29.2*  30.6*  28.8*   MCV  85.1  88.2  85.2   MCH  28.3  28.2  27.5   RDW  54.0*  55.4*  53.1*   PLATELETCT  255  217  195   MPV  12.1  12.9  12.9   NEUTSPOLYS  78.80*  79.60*  74.80*   LYMPHOCYTES  6.80*  6.20*  9.50*   MONOCYTES  9.30  11.50  14.80*   EOSINOPHILS  4.20  0.90  0.00   BASOPHILS  0.00  1.80  0.90   RBCMORPHOLO  Present  Present  Present     Recent Labs      08/27/17   0754   SODIUM  131*   POTASSIUM  4.8   CHLORIDE  99   CO2  25   GLUCOSE  122*   BUN  24*     Recent Labs      08/27/17   0754   CREATININE  0.84       Imaging:  Ct-chest,abdomen,pelvis With   FINDINGS: CT Chest: There is mild bilateral posterior atelectasis. No pleural fluid is identified. There are bilateral thyroid nodules. There are scattered arterial calcifications. No significant pericardial effusion. No adenopathy is identified. There is thoracic kyphosis with mild compression deformities in the upper lumbar spine. CT Abdomen: Irregular hypodense mass in the hepatic dome measures approximately 1.7 cm. There are multiple splenules in the left upper quadrant. Soft tissue mass at the tip of the liver likely represents an additional splenule. The adrenal glands, pancreas, and left kidney are unremarkable. A 1.5 cm mass in the mid right kidney is indeterminant, but likely represents a small cyst. There is a 2 mm nonobstructing calculus in  the lower pole of the right kidney. The kidneys enhance symmetrically. There is a gallbladder calculus without wall thickening or pericholecystic fluid. There is a moderate amount of colonic stool. There are scattered arterial calcifications. CT Pelvis: The bladder is unremarkable. No significant free fluid or adenopathy. Degenerative changes are in the spine. Vertebral augmentation has been performed at T11, T12, and L1.     8/6/2017  1.  1.7 cm hypodense mass in the hepatic dome is indeterminant, but may represent a hemangioma or complex cyst. Nonemergent hepatic protocol MRI could be performed for further evaluation. 2.  1.5 cm hypodense mass in the mid right kidney, likely a complex cyst. Follow-up ultrasound or renal protocol CT or MRI could be performed for confirmation. 3.  Multiple splenules in the abdomen. 4.  Cholelithiasis 5.  Spinal compression deformities status post vertebral augmentation at T11, T12, and L1.    Ct-head W/o    8/12/2017 8/12/2017 8:27 PM HISTORY/REASON FOR EXAM:  Altered Mental Status. Fever, cough, decreased mentation. TECHNIQUE/EXAM DESCRIPTION AND NUMBER OF VIEWS: CT of the head without contrast. The study was performed on a helical multidetector CT scanner. Contiguous 2.5 mm axial sections were obtained from the skull base through the vertex. Up to date radiation dose reduction adjustments have been utilized to meet ALARA standards for radiation dose reduction. COMPARISON:  MRI of the brain 11/1/2016 FINDINGS: The calvariae and skull base are unremarkable. There are no extraaxial fluid collections. There is a pattern of cerebral atrophy manifest as enlargement of sulcal markings and ventricular prominence. The ventricular system and basal cisterns are otherwise unremarkable. There are no areas of abnormal density in the brain substance. There are no hemorrhagic lesions. There are no mass effects or shift of midline structures. The brainstem and posterior fossa structures are  unremarkable. Paranasal sinuses show some minor scattered mucosal thickening among ethmoid air cells no air-fluid levels are evident. Mastoids show opacification of clustered air cells of the left mastoid tip. This may be chronic as similar findings were noted on the MRI.     8/12/2017  1.  Mild-moderate cerebral atrophy. 2.  Opacification of a few clustered air cells of the left mastoid tip which may represent chronic or active postinflammatory changes. 3.  Otherwise, no acute findings and no appreciable change compared to MRI study 11/1/2016.    Ct-tspine W/o Plus Recons  8/12/2017  1.  Status post vertebral augmentation at T11, T12, L1. Small amount of ventral epidural cement extravasation at the T11 level. 2.  Mild superior endplate compression deformities again seen at T2, T3, T4. No change from 8/6/2017. 3.  Bibasilar subsegmental atelectatic changes most prominent at the left lower lobe posterior basal segment. Little or no change since recent exam 8/6/2017.    Dx-ankle 2- Views Left  8/15/2017  Soft tissue swelling about the lateral malleolus. No definite acute osseous abnormality.    Dx-knee 2- Right  8/15/2017  1. No definite acute osseous abnormality. 2. 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    Dx-knee 2- Left  8/15/2017  1. No definite acute osseous abnormality. 2. Small knee joint effusion    Dx-thoracolumbar Spine-2 Views    8/6/2017 8/6/2017 11:50 AM HISTORY/REASON FOR EXAM: Back pain. TECHNIQUE/EXAM DESCRIPTION AND NUMBER OF VIEWS:  Thoracic spine, 2 views. COMPARISON:  None. FINDINGS: 2 intraoperative views of the thoracic spine obtained following multilevel kyphoplasty demonstrate kyphoplasty cement within the partially collapsed vertebral bodies at approximately the T11-L1 levels.     8/6/2017  Intraoperative visualization of 3 level kyphoplasty at approximately the T11-L1 levels.    Mr-cervical Spine-with & W/o   FINDINGS: There is no acute fracture  or gross malalignment. There is 2 mm of degenerative retrolisthesis of C4 on C5. There is loss of intervertebral disc height throughout the cervical spine, most pronounced at C5-C6 and C6-C7. Marrow signal is diffusely low on T1 and T2, relatively sparing the dens. The cervical cord has a normal caliber course and signal intensity. No area of abnormal enhancement is seen. Findings specific to each level are described below: C2-3: Unremarkable C3-4:  Mild RIGHT facet arthropathy C4-5:  Posterior disc osteophyte complex extending to the BILATERAL foraminal zones. Mild RIGHT facet arthropathy. Mild central canal narrowing. Mild RIGHT neural foraminal narrowing. C5-6: Posterior disc osteophyte complex. Mild central canal narrowing. Moderate RIGHT and mild LEFT neural foraminal narrowing. C6-7: Posterior disc osteophyte complex extending to the RIGHT more than the LEFT neural foramen. Mild central canal narrowing. Mild RIGHT neural foraminal narrowing. C7-T1: Posterior disc osteophyte complex eccentric to the RIGHT more than the LEFT foraminal zone. Mild RIGHT neural foraminal narrowing. No soft tissue mass is evident.     8/17/2017  1.  No evidence of cervical discitis osteomyelitis or epidural abscess 2.  Diffuse marrow infiltration or replacement 3.  Multilevel multifactorial degenerative changes with areas of central canal or neural foraminal narrowing as described above    Mr-lumbar Spine-with & W/o   FINDINGS: There are changes secondary to the previous vertebral augmentation procedures at T11, T12 and L1 vertebral bodies. Bone marrow edema and contrast enhancement are noted within the remaining portions of the above-mentioned vertebral bodies. There is no evidence of abnormal epidural or prevertebral fluid collection. Mild prevertebral soft tissue contrast enhancement is seen. There is abnormal T2 hyperintensity at L1-2 disc with contrast enhancement. There is also abnormal contrast enhancement within the L3-4  disc. At the level of L5-S1, there is minimal disc bulge without significant spinal or neural foraminal stenosis. At the level of L4-5, there is mild right neural foraminal stenosis. There is no central canal stenosis. At the level of L3-4, there is abnormal T2 hyperintensity and contrast enhancement within the disc space. There is no abnormal epidural or prevertebral fluid collection. At the level of L2-3, there is no spinal or neural foraminal stenosis. At the level of L1-2, there is no spinal or neural foraminal stenosis. At the level of T12-L1, there is abnormal disc fluid with contrast enhancement. The conus terminates at the level of L1. A gallstone is seen.   8/17/2017  1.  There is no evidence of epidural abscess. 2.  There is abnormal bone marrow edema and contrast enhancement within the T11, T12 and L1 vertebral bodies surrounding the vertebral cement. This finding likely represent edema/inflammation secondary to the recent procedure. However this imaging finding cannot completely exclude the possibility of osteomyelitis. 3.  There are abnormal disc T2 hyperintensity and enhancement noted involving L1-2 and L4-5 discs. This finding is suspicious for early discitis. However the previous MRI dated 5/8/2017 demonstrates disc T2 hyperintensity at the levels of L1-2 and L4-5. Advanced disc degeneration may have this appearance. 4.  There is no evidence of prevertebral abscess. 5.  Degenerative changes as described above.    Mr-thoracic Spine-with & W/o  FINDINGS: There has been interval vertebral augmentation at T11, T12 and L1. There is no demonstrable change in the height loss at each of these levels. There is adjacent edema and faint enhancement about each augmentation cavity extending into the pedicles. There  is a small amount of signal void compatible with gas in the disc space anteriorly at the T12-L1 disc. No abnormal discal or soft tissue enhancement is seen. No additional fracture is seen. There is  mildly prominent hemangioma in the T7 vertebral body. The bone marrow is diffusely low in signal on T1 and T2 as previously noted. There is no gross malalignment. There is mild loss of intervertebral disc height throughout the thoracic spine. The thoracic cord has a normal caliber course and appearance. There is no evidence of significant disk extrusion, cord compression, or neural foraminal stenosis.     2017  1.  Prior spinal augmentation at the vertebral compression fractures sites of T11, T12 and L1 2.  Areas of enhancement in the T11, T12 and L1 vertebral bodies are in keeping with what would be expected for recent intervention though infection is not excluded 3.  Thoracic spondylosis 4.  Diffuse marrow infiltration or replacement  e Venous Duplex    2017   Vascular Laboratory  CONCLUSIONS  No evidence of deep vein thrombosis or superficial thrombophlebitis in the  left lower extremity.  ANNA GAUTAM  Exam Date:     2017 09:05  Room #:     Inpatient  Priority:     Routine  Ht (in):             Wt (lb):  Ordering Physician:        RODRIGO CARTER  Referring Physician:       EMMA Rice  Sonographer:               MUSA Mays RVT, BERNADETTE  Study Type:                Complete Unilateral  Technical Quality:         Adequate  Age:    76    Gender:     M  MRN:    [id number]  :    1940      BSA:  Indications:     Edema, unspecified  CPT Codes:       62761  ICD Codes:       R609  History:         Left ankle edema for last several days.  Limitations:  PROCEDURES:  Left lower extremity venous duplex imaging.  The following venous structures were evaluated: common femoral, profunda  femoral, greater saphenous, femoral, popliteal , peroneal and posterior  tibial veins.  Serial compression, augmentation maneuvers,  color and spectral Doppler  flow evaluations were performed.  FINDINGS:  Doppler duplex and spectral analysis of the superficial and deep venous   systems of the left leg was performed.  No evidence of deep vein thrombosis or superficial thrombophlebitis in the  left lower extremity.  Contralateral flow was obtained in the right common femoral vein and  appears to be normal.  No prior studies available for comparison.  Juliano Cox MD  (Electronically Signed)  Final Date:      18 August 2017                   17:07    Dx-abdomen For Tube Placement    8/19/2017 8/19/2017 4:12 PM HISTORY/REASON FOR EXAM:  Line evaluation. TECHNIQUE/EXAM DESCRIPTION AND NUMBER OF VIEWS:  1 view(s) of the abdomen. COMPARISON:  None. FINDINGS: Enteric tube has been placed. The tip projects over the left upper quadrant. The bowel gas pattern is within normal limits.  There are multilevel changes of vertebral augmentation.     8/19/2017  1.  Feeding tube tip overlies the gastric body.    Dx-abdomen For Tube Placement    8/15/2017  8/15/2017 4:31 AM HISTORY/REASON FOR EXAM:  Tube evaluation. TECHNIQUE/EXAM DESCRIPTION AND NUMBER OF VIEWS:  1 view(s) of the abdomen. COMPARISON:  None. FINDINGS: Limited single view of the abdomen performed primarily to evaluate enteric tube position. The tip projects over the stomach fundus. Diffuse gas-filled small bowel and colon.     8/15/2017  Feeding tube with tip in the stomach fundus.      Micro:  Results     Procedure Component Value Units Date/Time    URINE CULTURE-EXISTING-LESS THAN 48 HOURS [[id number]]  (Abnormal) Collected:  08/28/17 0900    Order Status:  Completed Specimen:  Urine from Urine, Vaughn Cath Updated:  08/29/17 0948     Significant Indicator POS (POS)     Source UR     Site URINE, VAUGHN CATH     Urine Culture -- (A)     Urine Culture -- (A)     Lactose fermenting Gram negative michael  >100,000 cfu/mL      Narrative:       Specimen in lab  Indication for culture:->Altered LOC    URINALYSIS [[id number]]  (Abnormal) Collected:  08/28/17 0900    Order Status:  Completed Specimen:  Urine from Urine, Vaughn Cath Updated:  08/28/17  "0915     Color Yellow     Character Cloudy (A)     Specific Gravity 1.012     Ph 7.0     Glucose Negative mg/dL      Ketones Negative mg/dL      Protein Negative mg/dL      Bilirubin Negative     Urobilinogen, Urine 1.0     Nitrite Positive (A)     Leukocyte Esterase Large (A)     Occult Blood Moderate (A)     Micro Urine Req Microscopic    Narrative:       Collected By:[id number] JENELLE TEE    BLOOD CULTURE [[id number]] Collected:  08/27/17 1700    Order Status:  Completed Specimen:  Blood from Peripheral Updated:  08/28/17 0859     Significant Indicator NEG     Source BLD     Site PERIPHERAL     Blood Culture --     No Growth    Note: Blood cultures are incubated for 5 days and  are monitored continuously.Positive blood cultures  are called to the RN and reported as soon as  they are identified.      Narrative:       Per Hospital Policy: Only change Specimen Src: to \"Line\" if  specified by physician order.    URINALYSIS [[id number]]     Order Status:  Canceled Specimen:  Urine     BLOOD CULTURE [[id number]] Collected:  08/26/17 1330    Order Status:  Completed Specimen:  Blood from Peripheral Updated:  08/27/17 0842     Significant Indicator NEG     Source BLD     Site PERIPHERAL     Blood Culture --     No Growth    Note: Blood cultures are incubated for 5 days and  are monitored continuously.Positive blood cultures  are called to the RN and reported as soon as  they are identified.      Narrative:       Per Hospital Policy: Only change Specimen Src: to \"Line\" if  specified by physician order.    BLOOD CULTURE [[id number]] Collected:  08/26/17 1338    Order Status:  Completed Specimen:  Blood from Peripheral Updated:  08/27/17 0842     Significant Indicator NEG     Source BLD     Site PERIPHERAL     Blood Culture --     No Growth    Note: Blood cultures are incubated for 5 days and  are monitored continuously.Positive blood cultures  are called to the RN and reported as soon as  they are identified.      " "Narrative:       Per Hospital Policy: Only change Specimen Src: to \"Line\" if  specified by physician order.    BLOOD CULTURE [[id number]] Collected:  08/24/17 1342    Order Status:  Completed Specimen:  Blood from Peripheral Updated:  08/25/17 0854     Significant Indicator NEG     Source BLD     Site PERIPHERAL     Blood Culture --     No Growth    Note: Blood cultures are incubated for 5 days and  are monitored continuously.Positive blood cultures  are called to the RN and reported as soon as  they are identified.      Narrative:       Per Hospital Policy: Only change Specimen Src: to \"Line\" if  specified by physician order.    BLOOD CULTURE [[id number]] Collected:  08/24/17 1335    Order Status:  Completed Specimen:  Blood from Peripheral Updated:  08/25/17 0854     Significant Indicator NEG     Source BLD     Site PERIPHERAL     Blood Culture --     No Growth    Note: Blood cultures are incubated for 5 days and  are monitored continuously.Positive blood cultures  are called to the RN and reported as soon as  they are identified.      Narrative:       Per Hospital Policy: Only change Specimen Src: to \"Line\" if  specified by physician order.          Assessment:  Active Hospital Problems    Diagnosis       • Myelodysplastic syndrome (CMS-HCC) [D46.9]   • Parkinson's disease (CMS-HCC) [G20]   • Oropharyngeal dysphagia [R13.12]   • Acute urinary retention [R33.8]   • Hypokalemia [E87.6]   • Acute bilateral low back pain without sciatica [M54.5]   • Left ankle swelling [M25.472]   • Hematuria [R31.9]   • Acute delirium [R41.0]   • DNR (do not resuscitate) [Z66]   • Pneumonia [J18.9]       Plan:  UTI, new  FU UCx result  On cefepime  Anticipate 14 days course abx per sensi    Urinary obstruction with hematuria  S/p cystoscopy, dilation stricture, and evacuation clots  UA c/w UTI  Ucx pending  On CBI    Leukocytosis, resolving  Afebrile  Multifactorial incl MDS, urinary obstruction, post-procedure, post-splectomy, and " likely UTI  Blood cxs neg 8/24  Consider oncology follow-up  Repeat blood cxs neg  Ucx +GNR    Abnormal MRI of the back, s/p kyphoplasty  No definitive OM or discitis  No vertebral abscess  Most c/w postop change  Repeat the MRI in 4-6 weeks-sooner if clinically indicated  Biopsy from this area on 8/6/17 c/w MDS    Splenectomy  Agent is at higher risk for infections    Parkinson's disease  Aspiration risk

## 2017-08-29 NOTE — PROGRESS NOTES
Amara Pascal Fall Risk Assessment:     Last Known Fall: Within the last year  Mobility: Use of assistive device/requires assist of two people  Medications: Cardiovascular or central nervous system meds  Mental Status/LOC/Awareness: Awake, alert, and oriented to date, place, and person  Toileting Needs: Use of assistive device (Bedside commode, bedpan, urinal)  Volume/Electrolyte Status: Use of IV fluids/tube feeds  Communication/Sensory: Visual (Glasses)/hearing deficit  Behavior: Appropriate behavior  Amara Pascal Fall Risk Total: 13  Fall Risk Level: MODERATE RISK    Universal Fall Precautions:  call light/belongings in reach, bed in low position and locked, wheelchairs and assistive devices out of sight, use non-slip footwear, siderails up x 2, adequate lighting, clutter free and spill free environment, educate on level of risk, educate to call for assistance    Fall Risk Level Interventions:   TRIAL (TELE 8, NEURO, MED ZAKIYA 5) Low Fall Risk Interventions  Place yellow fall risk ID band on patient: verified  Provide patient/family education based on risk assessment: verified  Educate patient/family to call staff for assistance when getting out of bed: verified  Place fall precaution signage outside patient door: verifiedTRIAL (TELE 8, NEURO, MED ZAKIYA 5) Moderate Fall Risk Interventions  Place yellow fall risk ID band on patient: verified  Provide patient/family education based on risk assessment : completed  Educate patient/family to call staff for assistance when getting out of bed: completed  Place fall precaution signage outside patient door: verified  Utilize bed/chair fall alarm: verifiedTRIAL (TELE 8, NEURO, MED ZAKIYA 5) High Fall Risk Interventions  Place yellow fall risk ID band on patient: verified  Provide patient/family education based on risk assessment: completed  Educate patient/family to call staff for assistance when getting out of bed: completed  Place fall precaution signage outside patient  door: verified  Place patient in room close to nursing station: verified  Utilize bed/chair fall alarm: verified  Notify charge of high risk for huddle: completed    Patient Specific Interventions:     Medication: review medications with patient and family  Mental Status/LOC/Awareness: utilize bed/chair fall alarm and reinforce the use of call light  Toileting: provide frquent toileting  Volume/Electrolyte Status: monitor abnormal lab values  Communication/Sensory: update plan of care on whiteboard  Behavioral: administer medication as ordered  Mobility: utilize bed/chair fall alarm and ensure bed is locked and in lowest position

## 2017-08-29 NOTE — PROGRESS NOTES
[facility]ist Progress Note    Date of Service: 2017    Chief Complaint  76 y.o. Male with h/o splenectomy and parkinsons admitted 2017 with fever, elevated WBC count, recent kyphoplasty, confusion.  Found to have UTI.  Developed urinary retention, had TURP on .      Interval Problem Update  Mental status improved today.  Alert and oriented.  Speech improved.  Generalized weakness persists.  Afebrile.  New urine cx positive for LFGNR.  Continue antibx.  VSS.  Cortrak in place.  Tolerating TF.    Consultants/Specialty  ID  Urology - s/o    Disposition  Awaiting SNF placement.        Review of Systems   Constitutional: Negative for diaphoresis and fever.   HENT: Negative for congestion.    Eyes: Negative for blurred vision and double vision.   Respiratory: Negative for cough and shortness of breath.    Cardiovascular: Positive for leg swelling (LLE, improvement noted today). Negative for chest pain and palpitations.   Gastrointestinal: Negative for abdominal pain, constipation, diarrhea, nausea and vomiting.        Mouth remains quite dry   Genitourinary: Negative for dysuria and urgency.   Musculoskeletal: Positive for back pain. Negative for falls and neck pain.   Skin: Negative for itching and rash.   Neurological: Positive for tremors (mild and near his baseline), speech change (feels that his words are stronger today) and weakness (slightly stronger today). Negative for dizziness, tingling, sensory change, focal weakness and headaches.        No changes   Psychiatric/Behavioral: Negative for depression.       Physical Exam  Laboratory/Imaging   Hemodynamics  Temp (24hrs), Av.8 °C (98.3 °F), Min:36.7 °C (98 °F), Max:37.2 °C (99 °F)   Temperature: 36.7 °C (98.1 °F)  Pulse  Av.3  Min: 50  Max: 99    Blood Pressure : 150/76      Respiratory      Respiration: 20, Pulse Oximetry: 93 %        RUL Breath Sounds: Clear, RML Breath Sounds: Diminished, RLL Breath Sounds: Diminished, AQUILINO Breath  Sounds: Clear, LLL Breath Sounds: Diminished    Fluids    Intake/Output Summary (Last 24 hours) at 08/29/17 1422  Last data filed at 08/29/17 0800   Gross per 24 hour   Intake              710 ml   Output             2950 ml   Net            -2240 ml       Nutrition  Orders Placed This Encounter   Procedures   • DIET NPO     Standing Status:   Standing     Number of Occurrences:   1     Order Specific Question:   Restrict to:     Answer:   Strict [1]     Comments:   Stop tube feeds     Physical Exam   Constitutional: He appears well-developed and well-nourished. No distress.    hard to understand with his words   HENT:   Head: Normocephalic and atraumatic.   Mouth/Throat: Oropharyngeal exudate: uvula swollen and slightly red.   Cor tract and right naris, no erosion noted   Eyes: Conjunctivae are normal. Right eye exhibits no discharge. Left eye exhibits no discharge.   Neck: Normal range of motion. Neck supple. No tracheal deviation present. No Brudzinski's sign and no Kernig's sign noted.   Cardiovascular: Normal rate, regular rhythm, normal heart sounds and intact distal pulses.    No murmur heard.  Pulmonary/Chest: Effort normal and breath sounds normal. No stridor. No respiratory distress. He has no wheezes.   Abdominal: Soft. Bowel sounds are normal. He exhibits no distension. There is no tenderness.   Cortrak in place.   Genitourinary:   Genitourinary Comments: Hurst catheter in place   Musculoskeletal: He exhibits no edema.   Thoracic back pain with palpation of spine.  Generalized weakness.   Neurological: He is alert. He displays tremor (resting). No cranial nerve deficit.   Alert and oriented today.  Speech improved.   Skin: Skin is warm and dry. No rash noted. He is not diaphoretic. No erythema.   Psychiatric: He has a normal mood and affect. His behavior is normal. Thought content normal.   Nursing note and vitals reviewed.      Recent Labs      08/27/17   0753  08/28/17   0325  08/29/17   0240   WBC   35.9*  26.4*  12.2*   RBC  3.43*  3.47*  3.38*   HEMOGLOBIN  9.7*  9.8*  9.3*   HEMATOCRIT  29.2*  30.6*  28.8*   MCV  85.1  88.2  85.2   MCH  28.3  28.2  27.5   MCHC  33.2*  32.0*  32.3*   RDW  54.0*  55.4*  53.1*   PLATELETCT  255  217  195   MPV  12.1  12.9  12.9     Recent Labs      08/27/17   0754   SODIUM  131*   POTASSIUM  4.8   CHLORIDE  99   CO2  25   GLUCOSE  122*   BUN  24*   CREATININE  0.84   CALCIUM  8.4*                      Assessment/Plan     * Acute osteomyelitis of thoracic spine (CMS-[facility])- (present on admission)   Assessment & Plan    - BC negative.  - Afebrile.  - MRI shows areas of enhancement indicating normal post-procedural findings vs OM.  - ID following.  Unlikely OM.  - Follow up MRI in 4-6 weeks.        Myelodysplastic syndrome (CMS-HCC)- (present on admission)   Assessment & Plan    - Seen by Dr. Ribeiro on the last admit  - Currently stable.    - Start lovenox.        Parkinson's disease (CMS-HCC)- (present on admission)   Assessment & Plan    - Speech improved today.  - Cortrak in place.  May consider G-tube if no improvement.  - Continue Sinemet and Symmetrel  - Follows Dr. Ferguson as outpatient.  - Neurology consulted for inpatient follow up.        Acute cystitis with hematuria- (present on admission)   Assessment & Plan    - Await final urine cx.  - Continue cefepime.        Leukocytosis- (present on admission)   Assessment & Plan    - Likely multifactorial  - Continue cefepime for UTI.  - Afebrile.  - History of splenectomy.  - May need follow up with Dr. Ribeiro.        UTI (urinary tract infection)   Assessment & Plan    - Urine cx positive for LFGNR.  - Follow cx.  - Afebrile.  - Leukocytosis improving.  - Continue cefepime.  - ID following.        Acute urinary retention- (present on admission)   Assessment & Plan    - S/P TURP on 8/27.  - CBI completed.  - Leave Hurst for 10 days.  - Continue Cardura.  - Follow with urology as outpatient.          Oropharyngeal dysphagia-  (present on admission)   Assessment & Plan    - Cortrak in place.  - SLP following.        Acute bilateral low back pain without sciatica- (present on admission)   Assessment & Plan    - S/P recent kyphoplasty.  - MRI findings negative for any acute abnormality.  - Continue PT/OT.  - Continue conservative pain control.        Left ankle swelling   Assessment & Plan    - Bilateral x-rays of the knees and ankles show very small fluid collection therefore arthrocentesis is not indicated at this time but need to watch closely  - unchanged today  - U/S negative for DVT/SVT, monitor, no e/o infection at this time  - Continue to monitor.        DNR (do not resuscitate)- (present on admission)   Assessment & Plan    - Confirmed with the patient's wife on admission.        Acute delirium- (present on admission)   Assessment & Plan    - fluctuates, likely multi-factorial, infection, prolonged hospitalization, parkinson's  - Alert and oriented today.        Pneumonia- (present on admission)   Assessment & Plan    - Completed antibiotic regimen.  - Respiratory status stable.        S/P splenectomy- (present on admission)   Assessment & Plan    - History of splenectomy.  - Higher risk of infections.            Hurst catheter::  Urinary Tract Retention or Urinary Tract Obstruction  DVT prophylaxis pharmacological::  Enoxaparin (Lovenox)  DVT prophylaxis - mechanical:  SCDs  Ulcer Prophylaxis::  Not indicated  Antibiotics:  Treating active infection/contamination beyond 24 hours perioperative coverage

## 2017-08-29 NOTE — DISCHARGE PLANNING
Transitional Care Navigator:    TCN met with patient and spoke with wife over the phone to discuss options for SNF with possible transition to IRF as tolerated.  [facility] is unable to accept patient due to bed availability.  TCN provided a list of SNF's in the area and discussed SNF's that collaborate with [facility].  Contact information provided. Wife will call with choice.  Wife plans to tour facilities today.  Sw aware. TCN will follow-up as needed.

## 2017-08-29 NOTE — CARE PLAN
Problem: Safety  Goal: Will remain free from injury  Outcome: PROGRESSING AS EXPECTED      Problem: Pain Management  Goal: Pain level will decrease to patient's comfort goal  Outcome: PROGRESSING AS EXPECTED

## 2017-08-29 NOTE — TELEPHONE ENCOUNTER
Rafael Ferguson M.D. 3 days ago         Dr. Ferguson - My  is still in the neurology unit (183). Yesterday he began to lose his ability to communicate and his body jerked violently for hours. He was in obvious pain but couldn't express the location. Two new meds had been introduced on 8/25 - Diflucan and Cardura. I'm sure that one of these, probably Diflucan, is the problem. He is still not entirely coherent this morning but did complain about having to urinate, even though he has a catheter.   His Sinemet is different than what you prescribed but I was told that the strength equates to the Rytary he was taking. Please review all the medications. I have asked the medical staff to discontinue the Diflucan and Cardura pending your review.  Thank you. I am at the hospital now.         Sent via [employer]

## 2017-08-30 VITALS
TEMPERATURE: 97.5 F | BODY MASS INDEX: 27.59 KG/M2 | HEART RATE: 65 BPM | DIASTOLIC BLOOD PRESSURE: 84 MMHG | RESPIRATION RATE: 16 BRPM | HEIGHT: 74 IN | SYSTOLIC BLOOD PRESSURE: 158 MMHG | OXYGEN SATURATION: 93 % | WEIGHT: 214.95 LBS

## 2017-08-30 PROBLEM — M46.24 ACUTE OSTEOMYELITIS OF THORACIC SPINE (HCC): Status: RESOLVED | Noted: 2017-08-22 | Resolved: 2017-08-30

## 2017-08-30 PROBLEM — N30.01 ACUTE CYSTITIS WITH HEMATURIA: Status: RESOLVED | Noted: 2017-08-28 | Resolved: 2017-08-30

## 2017-08-30 PROBLEM — R41.0 ACUTE DELIRIUM: Status: RESOLVED | Noted: 2017-08-13 | Resolved: 2017-08-30

## 2017-08-30 PROBLEM — J18.9 PNEUMONIA: Status: RESOLVED | Noted: 2017-08-12 | Resolved: 2017-08-30

## 2017-08-30 PROBLEM — R33.8 ACUTE URINARY RETENTION: Status: RESOLVED | Noted: 2017-08-22 | Resolved: 2017-08-30

## 2017-08-30 LAB
ANION GAP SERPL CALC-SCNC: 7 MMOL/L (ref 0–11.9)
BACTERIA UR CULT: ABNORMAL
BACTERIA UR CULT: ABNORMAL
BASOPHILS # BLD AUTO: 0 % (ref 0–1.8)
BASOPHILS # BLD: 0 K/UL (ref 0–0.12)
BUN SERPL-MCNC: 24 MG/DL (ref 8–22)
CALCIUM SERPL-MCNC: 8.8 MG/DL (ref 8.5–10.5)
CHLORIDE SERPL-SCNC: 104 MMOL/L (ref 96–112)
CO2 SERPL-SCNC: 25 MMOL/L (ref 20–33)
CREAT SERPL-MCNC: 0.58 MG/DL (ref 0.5–1.4)
EOSINOPHIL # BLD AUTO: 0.08 K/UL (ref 0–0.51)
EOSINOPHIL NFR BLD: 1 % (ref 0–6.9)
ERYTHROCYTE [DISTWIDTH] IN BLOOD BY AUTOMATED COUNT: 53.3 FL (ref 35.9–50)
GFR SERPL CREATININE-BSD FRML MDRD: >60 ML/MIN/1.73 M 2
GLUCOSE SERPL-MCNC: 140 MG/DL (ref 65–99)
HCT VFR BLD AUTO: 29.3 % (ref 42–52)
HGB BLD-MCNC: 9.5 G/DL (ref 14–18)
LG PLATELETS BLD QL SMEAR: NORMAL
LYMPHOCYTES # BLD AUTO: 0.76 K/UL (ref 1–4.8)
LYMPHOCYTES NFR BLD: 9.1 % (ref 22–41)
MANUAL DIFF BLD: NORMAL
MCH RBC QN AUTO: 27.6 PG (ref 27–33)
MCHC RBC AUTO-ENTMCNC: 32.4 G/DL (ref 33.7–35.3)
MCV RBC AUTO: 85.2 FL (ref 81.4–97.8)
MONOCYTES # BLD AUTO: 2.51 K/UL (ref 0–0.85)
MONOCYTES NFR BLD AUTO: 30.3 % (ref 0–13.4)
MORPHOLOGY BLD-IMP: NORMAL
MYELOCYTES NFR BLD MANUAL: 2 %
NEUTROPHILS # BLD AUTO: 4.78 K/UL (ref 1.82–7.42)
NEUTROPHILS NFR BLD: 54.6 % (ref 44–72)
NEUTS BAND NFR BLD MANUAL: 3 % (ref 0–10)
NRBC # BLD AUTO: 0.03 K/UL
NRBC BLD AUTO-RTO: 0.4 /100 WBC
PLATELET # BLD AUTO: 204 K/UL (ref 164–446)
PLATELET BLD QL SMEAR: NORMAL
PMV BLD AUTO: 13.3 FL (ref 9–12.9)
POTASSIUM SERPL-SCNC: 4.3 MMOL/L (ref 3.6–5.5)
RBC # BLD AUTO: 3.44 M/UL (ref 4.7–6.1)
RBC BLD AUTO: PRESENT
SIGNIFICANT IND 70042: ABNORMAL
SITE SITE: ABNORMAL
SODIUM SERPL-SCNC: 136 MMOL/L (ref 135–145)
SOURCE SOURCE: ABNORMAL
WBC # BLD AUTO: 8.3 K/UL (ref 4.8–10.8)

## 2017-08-30 PROCEDURE — 700111 HCHG RX REV CODE 636 W/ 250 OVERRIDE (IP): Performed by: INTERNAL MEDICINE

## 2017-08-30 PROCEDURE — 700102 HCHG RX REV CODE 250 W/ 637 OVERRIDE(OP): Performed by: FAMILY MEDICINE

## 2017-08-30 PROCEDURE — A9270 NON-COVERED ITEM OR SERVICE: HCPCS | Performed by: INTERNAL MEDICINE

## 2017-08-30 PROCEDURE — 700102 HCHG RX REV CODE 250 W/ 637 OVERRIDE(OP): Performed by: HOSPITALIST

## 2017-08-30 PROCEDURE — 97530 THERAPEUTIC ACTIVITIES: CPT

## 2017-08-30 PROCEDURE — 700111 HCHG RX REV CODE 636 W/ 250 OVERRIDE (IP): Performed by: NURSE PRACTITIONER

## 2017-08-30 PROCEDURE — A9270 NON-COVERED ITEM OR SERVICE: HCPCS | Performed by: NURSE PRACTITIONER

## 2017-08-30 PROCEDURE — 36415 COLL VENOUS BLD VENIPUNCTURE: CPT

## 2017-08-30 PROCEDURE — 700102 HCHG RX REV CODE 250 W/ 637 OVERRIDE(OP): Performed by: INTERNAL MEDICINE

## 2017-08-30 PROCEDURE — A9270 NON-COVERED ITEM OR SERVICE: HCPCS | Performed by: HOSPITALIST

## 2017-08-30 PROCEDURE — 99232 SBSQ HOSP IP/OBS MODERATE 35: CPT | Performed by: HOSPITALIST

## 2017-08-30 PROCEDURE — 700102 HCHG RX REV CODE 250 W/ 637 OVERRIDE(OP): Performed by: NURSE PRACTITIONER

## 2017-08-30 PROCEDURE — 700105 HCHG RX REV CODE 258: Performed by: INTERNAL MEDICINE

## 2017-08-30 PROCEDURE — 85027 COMPLETE CBC AUTOMATED: CPT

## 2017-08-30 PROCEDURE — 85007 BL SMEAR W/DIFF WBC COUNT: CPT

## 2017-08-30 PROCEDURE — 92526 ORAL FUNCTION THERAPY: CPT

## 2017-08-30 PROCEDURE — 80048 BASIC METABOLIC PNL TOTAL CA: CPT

## 2017-08-30 PROCEDURE — 97116 GAIT TRAINING THERAPY: CPT

## 2017-08-30 PROCEDURE — A9270 NON-COVERED ITEM OR SERVICE: HCPCS | Performed by: FAMILY MEDICINE

## 2017-08-30 RX ORDER — DOXAZOSIN MESYLATE 1 MG/1
1 TABLET ORAL
Qty: 30 TAB
Start: 2017-08-30 | End: 2018-01-26

## 2017-08-30 RX ORDER — ACETAMINOPHEN 325 MG/1
650 TABLET ORAL EVERY 6 HOURS PRN
Status: DISCONTINUED | OUTPATIENT
Start: 2017-08-30 | End: 2017-08-30 | Stop reason: HOSPADM

## 2017-08-30 RX ADMIN — CARBIDOPA AND LEVODOPA 1 TABLET: 10; 100 TABLET ORAL at 05:07

## 2017-08-30 RX ADMIN — CEFEPIME 2 G: 2 INJECTION, POWDER, FOR SOLUTION INTRAMUSCULAR; INTRAVENOUS at 08:31

## 2017-08-30 RX ADMIN — Medication 220 MG: at 08:31

## 2017-08-30 RX ADMIN — MEMANTINE HYDROCHLORIDE 10 MG: 10 TABLET, FILM COATED ORAL at 08:30

## 2017-08-30 RX ADMIN — STANDARDIZED SENNA CONCENTRATE AND DOCUSATE SODIUM 2 TABLET: 8.6; 5 TABLET, FILM COATED ORAL at 08:30

## 2017-08-30 RX ADMIN — CITALOPRAM HYDROBROMIDE 20 MG: 20 TABLET ORAL at 08:30

## 2017-08-30 RX ADMIN — BACITRACIN ZINC: 500 OINTMENT TOPICAL at 08:31

## 2017-08-30 RX ADMIN — OXYCODONE HYDROCHLORIDE AND ACETAMINOPHEN 500 MG: 500 TABLET ORAL at 08:30

## 2017-08-30 RX ADMIN — MODAFINIL 200 MG: 100 TABLET ORAL at 08:39

## 2017-08-30 RX ADMIN — ENOXAPARIN SODIUM 40 MG: 100 INJECTION SUBCUTANEOUS at 08:31

## 2017-08-30 RX ADMIN — ACETAMINOPHEN 650 MG: 325 TABLET, FILM COATED ORAL at 16:21

## 2017-08-30 RX ADMIN — AMANTADINE HYDROCHLORIDE 100 MG: 50 SOLUTION ORAL at 08:31

## 2017-08-30 RX ADMIN — CARBIDOPA AND LEVODOPA 1 TABLET: 10; 100 TABLET ORAL at 14:00

## 2017-08-30 ASSESSMENT — ENCOUNTER SYMPTOMS
FALLS: 0
ABDOMINAL PAIN: 0
NAUSEA: 0
FOCAL WEAKNESS: 0
WEAKNESS: 1
SHORTNESS OF BREATH: 0
BLURRED VISION: 0
DIARRHEA: 0
SPEECH CHANGE: 1
TREMORS: 1
VOMITING: 0
BACK PAIN: 1
FEVER: 0
HEADACHES: 0
TINGLING: 0
CHILLS: 0
DEPRESSION: 0
DIZZINESS: 0

## 2017-08-30 ASSESSMENT — COGNITIVE AND FUNCTIONAL STATUS - GENERAL
STANDING UP FROM CHAIR USING ARMS: A LOT
CLIMB 3 TO 5 STEPS WITH RAILING: TOTAL
MOVING FROM LYING ON BACK TO SITTING ON SIDE OF FLAT BED: A LITTLE
SUGGESTED CMS G CODE MODIFIER MOBILITY: CL
MOBILITY SCORE: 14
MOVING TO AND FROM BED TO CHAIR: A LITTLE
TURNING FROM BACK TO SIDE WHILE IN FLAT BAD: A LITTLE
WALKING IN HOSPITAL ROOM: A LOT

## 2017-08-30 ASSESSMENT — GAIT ASSESSMENTS
DEVIATION: BRADYKINETIC;SHUFFLED GAIT
DISTANCE (FEET): 5
GAIT LEVEL OF ASSIST: MODERATE ASSIST
ASSISTIVE DEVICE: FRONT WHEEL WALKER

## 2017-08-30 ASSESSMENT — PAIN SCALES - GENERAL: PAINLEVEL_OUTOF10: 0

## 2017-08-30 NOTE — PROGRESS NOTES
Infectious Disease Progress Note    Author: Jessy Flanagan M.D. Date & Time of service: 2017  2:21 PM    Chief Complaint:  Back infection    Interval History:  76-year-old male with Parkinson's, MDS, splenectomy was admitted with altered mental status and fevers. Likely due to UTI  17-MAXIMUM TEMPERATURE 98.2. WBC 15.6. Today awake and responsive  17-MAXIMUM TEMPERATURE 98.1. WBC 20. Denies any increased back pain.    AF (99.8) WBC 27.6 wife does not want him on any abx-thinks they made him worse   AF WBC 35.9 urinary obstruction-now on CBI remains obtunded   AF WBC 26.4 remains obtunded-no more clots in urine   AF (99) WBC 12 Ucx GNR able to participate with speech today   AF WBC 8.3 Ucx Ecoli-up to chair today-more awake but difficult to understand  Labs Reviewed, Medications Reviewed and Radiology Reviewed.    Review of Systems:  Review of Systems   Unable to perform ROS: Medical condition   Constitutional: Negative for fever.   Gastrointestinal: Negative for nausea and vomiting.       Hemodynamics:  Temp (24hrs), Av.4 °C (97.5 °F), Min:36.2 °C (97.1 °F), Max:36.6 °C (97.9 °F)  Temperature: 36.4 °C (97.5 °F)  Pulse  Av.3  Min: 50  Max: 99   Blood Pressure : 158/84       Physical Exam:  Physical Exam   Constitutional: He appears well-developed.   Frail and chronically ill   HENT:   Head: Normocephalic and atraumatic.   Mouth/Throat: No oropharyngeal exudate.   cortrak   Eyes: EOM are normal. Pupils are equal, round, and reactive to light. No scleral icterus.   Neck: Neck supple.   Cardiovascular: Normal rate and regular rhythm.    No murmur heard.  Pulmonary/Chest: Effort normal. No respiratory distress. He has no wheezes. He has no rales.   Abdominal: Soft. There is no tenderness.   Genitourinary:   Genitourinary Comments: [place]   Musculoskeletal: He exhibits no edema.   sarcopenia   Neurological: He is alert. He displays abnormal reflex. He exhibits abnormal  muscle tone. Coordination abnormal.   tremor   Nursing note and vitals reviewed.      Meds:    Current Facility-Administered Medications:   •  acetaminophen  •  enoxaparin  •  ferrous sulfate  •  cefepime  •  tramadol **OR** tramadol  •  ascorbic acid  •  doxazosin  •  amantadine  •  carbidopa-levodopa  •  carbidopa-levodopa  •  bacitracin  •  citalopram  •  memantine  •  modafinil  •  triazolam  •  senna-docusate **AND** polyethylene glycol/lytes **AND** magnesium hydroxide **AND** bisacodyl  •  Respiratory Care per Protocol  •  NS  •  ondansetron  •  ondansetron    Labs:  Recent Labs      08/28/17   0325  08/29/17   0240  08/30/17   0237   WBC  26.4*  12.2*  8.3   RBC  3.47*  3.38*  3.44*   HEMOGLOBIN  9.8*  9.3*  9.5*   HEMATOCRIT  30.6*  28.8*  29.3*   MCV  88.2  85.2  85.2   MCH  28.2  27.5  27.6   RDW  55.4*  53.1*  53.3*   PLATELETCT  217  195  204   MPV  12.9  12.9  13.3*   NEUTSPOLYS  79.60*  74.80*  54.60   LYMPHOCYTES  6.20*  9.50*  9.10*   MONOCYTES  11.50  14.80*  30.30*   EOSINOPHILS  0.90  0.00  1.00   BASOPHILS  1.80  0.90  0.00   RBCMORPHOLO  Present  Present  Present     Recent Labs      08/30/17   0238   SODIUM  136   POTASSIUM  4.3   CHLORIDE  104   CO2  25   GLUCOSE  140*   BUN  24*     Recent Labs      08/30/17   0238   CREATININE  0.58       Imaging:  Ct-chest,abdomen,pelvis With   FINDINGS: CT Chest: There is mild bilateral posterior atelectasis. No pleural fluid is identified. There are bilateral thyroid nodules. There are scattered arterial calcifications. No significant pericardial effusion. No adenopathy is identified. There is thoracic kyphosis with mild compression deformities in the upper lumbar spine. CT Abdomen: Irregular hypodense mass in the hepatic dome measures approximately 1.7 cm. There are multiple splenules in the left upper quadrant. Soft tissue mass at the tip of the liver likely represents an additional splenule. The adrenal glands, pancreas, and left kidney are  unremarkable. A 1.5 cm mass in the mid right kidney is indeterminant, but likely represents a small cyst. There is a 2 mm nonobstructing calculus in the lower pole of the right kidney. The kidneys enhance symmetrically. There is a gallbladder calculus without wall thickening or pericholecystic fluid. There is a moderate amount of colonic stool. There are scattered arterial calcifications. CT Pelvis: The bladder is unremarkable. No significant free fluid or adenopathy. Degenerative changes are in the spine. Vertebral augmentation has been performed at T11, T12, and L1.     8/6/2017  1.  1.7 cm hypodense mass in the hepatic dome is indeterminant, but may represent a hemangioma or complex cyst. Nonemergent hepatic protocol MRI could be performed for further evaluation. 2.  1.5 cm hypodense mass in the mid right kidney, likely a complex cyst. Follow-up ultrasound or renal protocol CT or MRI could be performed for confirmation. 3.  Multiple splenules in the abdomen. 4.  Cholelithiasis 5.  Spinal compression deformities status post vertebral augmentation at T11, T12, and L1.    Ct-head W/o    8/12/2017 8/12/2017 8:27 PM HISTORY/REASON FOR EXAM:  Altered Mental Status. Fever, cough, decreased mentation. TECHNIQUE/EXAM DESCRIPTION AND NUMBER OF VIEWS: CT of the head without contrast. The study was performed on a helical multidetector CT scanner. Contiguous 2.5 mm axial sections were obtained from the skull base through the vertex. Up to date radiation dose reduction adjustments have been utilized to meet ALARA standards for radiation dose reduction. COMPARISON:  MRI of the brain 11/1/2016 FINDINGS: The calvariae and skull base are unremarkable. There are no extraaxial fluid collections. There is a pattern of cerebral atrophy manifest as enlargement of sulcal markings and ventricular prominence. The ventricular system and basal cisterns are otherwise unremarkable. There are no areas of abnormal density in the brain  substance. There are no hemorrhagic lesions. There are no mass effects or shift of midline structures. The brainstem and posterior fossa structures are unremarkable. Paranasal sinuses show some minor scattered mucosal thickening among ethmoid air cells no air-fluid levels are evident. Mastoids show opacification of clustered air cells of the left mastoid tip. This may be chronic as similar findings were noted on the MRI.     8/12/2017  1.  Mild-moderate cerebral atrophy. 2.  Opacification of a few clustered air cells of the left mastoid tip which may represent chronic or active postinflammatory changes. 3.  Otherwise, no acute findings and no appreciable change compared to MRI study 11/1/2016.    Ct-tspine W/o Plus Recons  8/12/2017  1.  Status post vertebral augmentation at T11, T12, L1. Small amount of ventral epidural cement extravasation at the T11 level. 2.  Mild superior endplate compression deformities again seen at T2, T3, T4. No change from 8/6/2017. 3.  Bibasilar subsegmental atelectatic changes most prominent at the left lower lobe posterior basal segment. Little or no change since recent exam 8/6/2017.    Dx-ankle 2- Views Left  8/15/2017  Soft tissue swelling about the lateral malleolus. No definite acute osseous abnormality.    Dx-knee 2- Right  8/15/2017  1. No definite acute osseous abnormality. 2. 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    Dx-knee 2- Left  8/15/2017  1. No definite acute osseous abnormality. 2. Small knee joint effusion    Dx-thoracolumbar Spine-2 Views    8/6/2017 8/6/2017 11:50 AM HISTORY/REASON FOR EXAM: Back pain. TECHNIQUE/EXAM DESCRIPTION AND NUMBER OF VIEWS:  Thoracic spine, 2 views. COMPARISON:  None. FINDINGS: 2 intraoperative views of the thoracic spine obtained following multilevel kyphoplasty demonstrate kyphoplasty cement within the partially collapsed vertebral bodies at approximately the T11-L1 levels.     8/6/2017   Intraoperative visualization of 3 level kyphoplasty at approximately the T11-L1 levels.    Mr-cervical Spine-with & W/o   FINDINGS: There is no acute fracture or gross malalignment. There is 2 mm of degenerative retrolisthesis of C4 on C5. There is loss of intervertebral disc height throughout the cervical spine, most pronounced at C5-C6 and C6-C7. Marrow signal is diffusely low on T1 and T2, relatively sparing the dens. The cervical cord has a normal caliber course and signal intensity. No area of abnormal enhancement is seen. Findings specific to each level are described below: C2-3: Unremarkable C3-4:  Mild RIGHT facet arthropathy C4-5:  Posterior disc osteophyte complex extending to the BILATERAL foraminal zones. Mild RIGHT facet arthropathy. Mild central canal narrowing. Mild RIGHT neural foraminal narrowing. C5-6: Posterior disc osteophyte complex. Mild central canal narrowing. Moderate RIGHT and mild LEFT neural foraminal narrowing. C6-7: Posterior disc osteophyte complex extending to the RIGHT more than the LEFT neural foramen. Mild central canal narrowing. Mild RIGHT neural foraminal narrowing. C7-T1: Posterior disc osteophyte complex eccentric to the RIGHT more than the LEFT foraminal zone. Mild RIGHT neural foraminal narrowing. No soft tissue mass is evident.     8/17/2017  1.  No evidence of cervical discitis osteomyelitis or epidural abscess 2.  Diffuse marrow infiltration or replacement 3.  Multilevel multifactorial degenerative changes with areas of central canal or neural foraminal narrowing as described above    Mr-lumbar Spine-with & W/o   FINDINGS: There are changes secondary to the previous vertebral augmentation procedures at T11, T12 and L1 vertebral bodies. Bone marrow edema and contrast enhancement are noted within the remaining portions of the above-mentioned vertebral bodies. There is no evidence of abnormal epidural or prevertebral fluid collection. Mild prevertebral soft tissue contrast  enhancement is seen. There is abnormal T2 hyperintensity at L1-2 disc with contrast enhancement. There is also abnormal contrast enhancement within the L3-4 disc. At the level of L5-S1, there is minimal disc bulge without significant spinal or neural foraminal stenosis. At the level of L4-5, there is mild right neural foraminal stenosis. There is no central canal stenosis. At the level of L3-4, there is abnormal T2 hyperintensity and contrast enhancement within the disc space. There is no abnormal epidural or prevertebral fluid collection. At the level of L2-3, there is no spinal or neural foraminal stenosis. At the level of L1-2, there is no spinal or neural foraminal stenosis. At the level of T12-L1, there is abnormal disc fluid with contrast enhancement. The conus terminates at the level of L1. A gallstone is seen.   8/17/2017  1.  There is no evidence of epidural abscess. 2.  There is abnormal bone marrow edema and contrast enhancement within the T11, T12 and L1 vertebral bodies surrounding the vertebral cement. This finding likely represent edema/inflammation secondary to the recent procedure. However this imaging finding cannot completely exclude the possibility of osteomyelitis. 3.  There are abnormal disc T2 hyperintensity and enhancement noted involving L1-2 and L4-5 discs. This finding is suspicious for early discitis. However the previous MRI dated 5/8/2017 demonstrates disc T2 hyperintensity at the levels of L1-2 and L4-5. Advanced disc degeneration may have this appearance. 4.  There is no evidence of prevertebral abscess. 5.  Degenerative changes as described above.    Mr-thoracic Spine-with & W/o  FINDINGS: There has been interval vertebral augmentation at T11, T12 and L1. There is no demonstrable change in the height loss at each of these levels. There is adjacent edema and faint enhancement about each augmentation cavity extending into the pedicles. There  is a small amount of signal void compatible  with gas in the disc space anteriorly at the T12-L1 disc. No abnormal discal or soft tissue enhancement is seen. No additional fracture is seen. There is mildly prominent hemangioma in the T7 vertebral body. The bone marrow is diffusely low in signal on T1 and T2 as previously noted. There is no gross malalignment. There is mild loss of intervertebral disc height throughout the thoracic spine. The thoracic cord has a normal caliber course and appearance. There is no evidence of significant disk extrusion, cord compression, or neural foraminal stenosis.     2017  1.  Prior spinal augmentation at the vertebral compression fractures sites of T11, T12 and L1 2.  Areas of enhancement in the T11, T12 and L1 vertebral bodies are in keeping with what would be expected for recent intervention though infection is not excluded 3.  Thoracic spondylosis 4.  Diffuse marrow infiltration or replacement  e Venous Duplex    2017   Vascular Laboratory  CONCLUSIONS  No evidence of deep vein thrombosis or superficial thrombophlebitis in the  left lower extremity.  ANNA GAUTAM  Exam Date:     2017 09:05  Room #:     Inpatient  Priority:     Routine  Ht (in):             Wt (lb):  Ordering Physician:        RODRIGO CARTER  Referring Physician:       [id number] Owens  Sonographer:               MUSA Mays RVT, RDMS  Study Type:                Complete Unilateral  Technical Quality:         Adequate  Age:    76    Gender:     M  MRN:    [id number]  :    1940      BSA:  Indications:     Edema, unspecified  CPT Codes:       26052  ICD Codes:       R609  History:         Left ankle edema for last several days.  Limitations:  PROCEDURES:  Left lower extremity venous duplex imaging.  The following venous structures were evaluated: common femoral, profunda  femoral, greater saphenous, femoral, popliteal , peroneal and posterior  tibial veins.  Serial compression, augmentation  maneuvers,  color and spectral Doppler  flow evaluations were performed.  FINDINGS:  Doppler duplex and spectral analysis of the superficial and deep venous  systems of the left leg was performed.  No evidence of deep vein thrombosis or superficial thrombophlebitis in the  left lower extremity.  Contralateral flow was obtained in the right common femoral vein and  appears to be normal.  No prior studies available for comparison.  Juliano Cox MD  (Electronically Signed)  Final Date:      18 August 2017                   17:07    Dx-abdomen For Tube Placement    8/19/2017 8/19/2017 4:12 PM HISTORY/REASON FOR EXAM:  Line evaluation. TECHNIQUE/EXAM DESCRIPTION AND NUMBER OF VIEWS:  1 view(s) of the abdomen. COMPARISON:  None. FINDINGS: Enteric tube has been placed. The tip projects over the left upper quadrant. The bowel gas pattern is within normal limits.  There are multilevel changes of vertebral augmentation.     8/19/2017  1.  Feeding tube tip overlies the gastric body.    Dx-abdomen For Tube Placement    8/15/2017  8/15/2017 4:31 AM HISTORY/REASON FOR EXAM:  Tube evaluation. TECHNIQUE/EXAM DESCRIPTION AND NUMBER OF VIEWS:  1 view(s) of the abdomen. COMPARISON:  None. FINDINGS: Limited single view of the abdomen performed primarily to evaluate enteric tube position. The tip projects over the stomach fundus. Diffuse gas-filled small bowel and colon.     8/15/2017  Feeding tube with tip in the stomach fundus.      Micro:  Results     Procedure Component Value Units Date/Time    URINE CULTURE-EXISTING-LESS THAN 48 HOURS [[id number]]  (Abnormal)  (Susceptibility) Collected:  08/28/17 0900    Order Status:  Completed Specimen:  Urine from Urine, [facility] Cath Updated:  08/30/17 0908     Significant Indicator POS (POS)     Source UR     Site URINE, VAUGHN CATH     Urine Culture -- (A)     Urine Culture -- (A)     Escherichia coli  >100,000 cfu/mL      Narrative:       Specimen in lab  Indication for culture:->Altered  "LOC    Culture & Susceptibility     ESCHERICHIA COLI     Antibiotic Sensitivity Microscan Unit Status    Ampicillin Resistant >16 mcg/mL Final    Cefepime Sensitive <=8 mcg/mL Final    Cefotaxime Sensitive <=2 mcg/mL Final    Cefotetan Sensitive <=16 mcg/mL Final    Ceftazidime Sensitive <=1 mcg/mL Final    Ceftriaxone Sensitive <=8 mcg/mL Final    Cefuroxime Sensitive <=4 mcg/mL Final    Cephalothin Intermediate 16 mcg/mL Final    Ciprofloxacin Sensitive <=1 mcg/mL Final    Gentamicin Sensitive <=4 mcg/mL Final    Levofloxacin Sensitive <=2 mcg/mL Final    Nitrofurantoin Sensitive <=32 mcg/mL Final    Pip/Tazobactam Sensitive <=16 mcg/mL Final    Piperacillin Resistant >64 mcg/mL Final    Tigecycline Sensitive <=2 mcg/mL Final    Tobramycin Sensitive <=4 mcg/mL Final    Trimeth/Sulfa Resistant >2/38 mcg/mL Final                       BLOOD CULTURE [[id number]] Collected:  08/24/17 1342    Order Status:  Completed Specimen:  Blood from Peripheral Updated:  08/29/17 1500     Significant Indicator NEG     Source BLD     Site PERIPHERAL     Blood Culture No growth after 5 days of incubation.    Narrative:       Per Hospital Policy: Only change Specimen Src: to \"Line\" if  specified by physician order.    BLOOD CULTURE [[id number]] Collected:  08/24/17 1335    Order Status:  Completed Specimen:  Blood from Peripheral Updated:  08/29/17 1500     Significant Indicator NEG     Source BLD     Site PERIPHERAL     Blood Culture No growth after 5 days of incubation.    Narrative:       Per Hospital Policy: Only change Specimen Src: to \"Line\" if  specified by physician order.    URINALYSIS [[id number]]  (Abnormal) Collected:  08/28/17 0900    Order Status:  Completed Specimen:  Urine from Urine, [facility] Cath Updated:  08/28/17 0915     Color Yellow     Character Cloudy (A)     Specific Gravity 1.012     Ph 7.0     Glucose Negative mg/dL      Ketones Negative mg/dL      Protein Negative mg/dL      Bilirubin Negative     Urobilinogen, " "Urine 1.0     Nitrite Positive (A)     Leukocyte Esterase Large (A)     Occult Blood Moderate (A)     Micro Urine Req Microscopic    Narrative:       Collected By:[id number] JENELLE TEE    BLOOD CULTURE [[id number]] Collected:  08/27/17 1700    Order Status:  Completed Specimen:  Blood from Peripheral Updated:  08/28/17 0859     Significant Indicator NEG     Source BLD     Site PERIPHERAL     Blood Culture --     No Growth    Note: Blood cultures are incubated for 5 days and  are monitored continuously.Positive blood cultures  are called to the RN and reported as soon as  they are identified.      Narrative:       Per Hospital Policy: Only change Specimen Src: to \"Line\" if  specified by physician order.    URINALYSIS [[id number]]     Order Status:  Canceled Specimen:  Urine     BLOOD CULTURE [[id number]] Collected:  08/26/17 1330    Order Status:  Completed Specimen:  Blood from Peripheral Updated:  08/27/17 0842     Significant Indicator NEG     Source BLD     Site PERIPHERAL     Blood Culture --     No Growth    Note: Blood cultures are incubated for 5 days and  are monitored continuously.Positive blood cultures  are called to the RN and reported as soon as  they are identified.      Narrative:       Per Hospital Policy: Only change Specimen Src: to \"Line\" if  specified by physician order.    BLOOD CULTURE [[id number]] Collected:  08/26/17 1338    Order Status:  Completed Specimen:  Blood from Peripheral Updated:  08/27/17 0842     Significant Indicator NEG     Source BLD     Site PERIPHERAL     Blood Culture --     No Growth    Note: Blood cultures are incubated for 5 days and  are monitored continuously.Positive blood cultures  are called to the RN and reported as soon as  they are identified.      Narrative:       Per Hospital Policy: Only change Specimen Src: to \"Line\" if  specified by physician order.          Assessment:  Active Hospital Problems    Diagnosis       • Myelodysplastic syndrome (CMS-HCC) [D46.9] "   • Parkinson's disease (CMS-HCC) [G20]   • Oropharyngeal dysphagia [R13.12]   • Acute urinary retention [R33.8]   • Hypokalemia [E87.6]   • Acute bilateral low back pain without sciatica [M54.5]   • Left ankle swelling [M25.472]   • Hematuria [R31.9]   • Acute delirium [R41.0]   • DNR (do not resuscitate) [Z66]   • Pneumonia [J18.9]       Plan:  UTI  Afebrile  Leukocytosis resolved  Ucx +Ecoli  On cefepime  Anticipate 14 days course abx  Stop date 9/10/2017  Can complete course with oral cefdinir once clear for discharge    Urinary obstruction with hematuria  S/p cystoscopy, dilation stricture, and evacuation clots  Hurst in place  Hematuria improved    Abnormal MRI of the back, s/p kyphoplasty  No definitive OM or discitis  No vertebral abscess  Most c/w postop change  Repeat the MRI in 4-6 weeks-sooner if clinically indicated  Biopsy from this area on 8/6/17 c/w MDS    Splenectomy  Agent is at higher risk for infections    Parkinson's disease  Aspiration risk

## 2017-08-30 NOTE — DISCHARGE INSTRUCTIONS
Discharge Instructions    Discharged to other by [facility] with escort. Discharged via wheelchair, hospital escort: Yes.  Special equipment needed: Not Applicable    Be sure to schedule a follow-up appointment with your primary care doctor or any specialists as instructed.     Discharge Plan:   Influenza Vaccine Indication: Indicated: Not available from distributor/    I understand that a diet low in cholesterol, fat, and sodium is recommended for good health. Unless I have been given specific instructions below for another diet, I accept this instruction as my diet prescription.   Other diet: tube feed and full liquids    Special Instructions: None    · Is patient discharged on Warfarin / Coumadin?   No     · Is patient Post Blood Transfusion?  No    Depression / Suicide Risk    As you are discharged from this [facility], it is important to learn how to keep safe from harming yourself.    Recognize the warning signs:  · Abrupt changes in personality, positive or negative- including increase in energy   · Giving away possessions  · Change in eating patterns- significant weight changes-  positive or negative  · Change in sleeping patterns- unable to sleep or sleeping all the time   · Unwillingness or inability to communicate  · Depression  · Unusual sadness, discouragement and loneliness  · Talk of wanting to die  · Neglect of personal appearance   · Rebelliousness- reckless behavior  · Withdrawal from people/activities they love  · Confusion- inability to concentrate     If you or a loved one observes any of these behaviors or has concerns about self-harm, here's what you can do:  · Talk about it- your feelings and reasons for harming yourself  · Remove any means that you might use to hurt yourself (examples: pills, rope, extension cords, firearm)  · Get professional help from the community (Mental Health, Substance Abuse, psychological counseling)  · Do not be alone:Call your Safe Contact-  someone whom you trust who will be there for you.  · Call your local CRISIS HOTLINE 533-0944 or 977-065-0607  · Call your local Children's Mobile Crisis Response Team [place] (741) 672-1933 or www.Affine  · Call the toll free National Suicide Prevention Hotlines   · National Suicide Prevention Lifeline 053-099-VDCY (2631)  · Meet[employer] Line Network 800-SUICIDE (425-9014)

## 2017-08-30 NOTE — DISCHARGE PLANNING
Received choice form from Richelle(BRAD[place]). Referral sent to [facility].    Referral mistakenly sent to [facility]. [facility] notified to disregard referral.

## 2017-08-30 NOTE — PROGRESS NOTES
Amara Pascal Fall Risk Assessment:     Last Known Fall: Within the last year  Mobility: Use of assistive device/requires assist of two people  Medications: Cardiovascular or central nervous system meds  Mental Status/LOC/Awareness: Awake, alert, and oriented to date, place, and person  Toileting Needs: Use of assistive device (Bedside commode, bedpan, urinal)  Volume/Electrolyte Status: Use of IV fluids/tube feeds  Communication/Sensory: Visual (Glasses)/hearing deficit  Behavior: Appropriate behavior  Amara Pascal Fall Risk Total: 13  Fall Risk Level: MODERATE RISK    Universal Fall Precautions:  call light/belongings in reach, bed in low position and locked, wheelchairs and assistive devices out of sight, siderails up x 2, use non-slip footwear, adequate lighting, clutter free and spill free environment, educate on level of risk, educate to call for assistance    Fall Risk Level Interventions:   TRIAL (TELE 8, NEURO, MED ZAKIYA 5) Low Fall Risk Interventions  Place yellow fall risk ID band on patient: verified  Provide patient/family education based on risk assessment: verified  Educate patient/family to call staff for assistance when getting out of bed: verified  Place fall precaution signage outside patient door: verifiedTRIAL (TELE 8, NEURO, MED ZAKIYA 5) Moderate Fall Risk Interventions  Place yellow fall risk ID band on patient: verified  Provide patient/family education based on risk assessment : completed  Educate patient/family to call staff for assistance when getting out of bed: completed  Place fall precaution signage outside patient door: verified  Utilize bed/chair fall alarm: verifiedTRIAL (TELE 8, NEURO, MED ZAKIYA 5) High Fall Risk Interventions  Place yellow fall risk ID band on patient: verified  Provide patient/family education based on risk assessment: completed  Educate patient/family to call staff for assistance when getting out of bed: completed  Place fall precaution signage outside patient  door: verified  Place patient in room close to nursing station: verified  Utilize bed/chair fall alarm: verified  Notify charge of high risk for huddle: completed    Patient Specific Interventions:     Medication: limit combination of prn medications  Mental Status/LOC/Awareness: check on patient hourly, utilize bed/chair fall alarm and reinforce the use of call light  Toileting: instruct patient/family on the need to call for assistance when toileting  Volume/Electrolyte Status: monitor abnormal lab values  Communication/Sensory: update plan of care on whiteboard  Behavioral: administer medication as ordered  Mobility: utilize bed/chair fall alarm and ensure bed is locked and in lowest position

## 2017-08-30 NOTE — DISCHARGE PLANNING
Pt agreeable to transfer to [facility] today.  SW updated pt, pt's spouse, and RN Janis regarding pt's transport time at 1800.     COBRA completed and placed by pt's chart.

## 2017-08-30 NOTE — PROGRESS NOTES
[facility] Hospitalist Progress Note    Date of Service: 2017    Chief Complaint  76 y.o. Male with h/o splenectomy and parkinsons admitted 2017 with fever, elevated WBC count, recent kyphoplasty, confusion.  Found to have UTI.  Developed urinary retention, had TURP on .      Interval Problem Update  Mental status continues to improve.  Alert and oriented.  Able to tolerate modified oral diet.  Urine cx positive for E. Coli.  VSS.  Afebrile.    Consultants/Specialty  ID  Urology - s/o    Disposition  Awaiting SNF placement.        Review of Systems   Constitutional: Negative for chills and fever.   Eyes: Negative for blurred vision.   Respiratory: Negative for shortness of breath.    Cardiovascular: Positive for leg swelling (LLE, improvement noted today). Negative for chest pain.   Gastrointestinal: Negative for abdominal pain, diarrhea, nausea and vomiting.        Mouth remains quite dry   Genitourinary: Negative for dysuria.   Musculoskeletal: Positive for back pain. Negative for falls.   Skin: Negative for itching.   Neurological: Positive for tremors (mild and near his baseline), speech change (feels that his words are stronger today) and weakness (slightly stronger today). Negative for dizziness, tingling, focal weakness and headaches.        No changes   Psychiatric/Behavioral: Negative for depression.       Physical Exam  Laboratory/Imaging   Hemodynamics  Temp (24hrs), Av.4 °C (97.5 °F), Min:36.2 °C (97.1 °F), Max:36.6 °C (97.9 °F)   Temperature: 36.4 °C (97.5 °F)  Pulse  Av.3  Min: 50  Max: 99    Blood Pressure : 158/84      Respiratory      Respiration: 16, Pulse Oximetry: 93 %        RUL Breath Sounds: Clear, RML Breath Sounds: Diminished, RLL Breath Sounds: Diminished, AQUILINO Breath Sounds: Clear, LLL Breath Sounds: Diminished    Fluids    Intake/Output Summary (Last 24 hours) at 17 1251  Last data filed at 17 0408   Gross per 24 hour   Intake              160 ml   Output              1100 ml   Net             -940 ml       Nutrition  Orders Placed This Encounter   Procedures   • DIET ORDER     Standing Status:   Standing     Number of Occurrences:   1     Order Specific Question:   Diet:     Answer:   Full Liquid [11]     Order Specific Question:   Consistency/Fluid modifications:     Answer:   Nectar Thick [2]     Order Specific Question:   Miscellaneous modifications:     Answer:   SLP - 1:1 Supervision by Nursing [21]     Order Specific Question:   Miscellaneous modifications:     Answer:   SLP - Deliver to Nursing Station [22]     Physical Exam   Constitutional: He is oriented to person, place, and time. He appears well-developed. No distress.    hard to understand with his words   HENT:   Head: Normocephalic and atraumatic.   Mouth/Throat: No oropharyngeal exudate (uvula swollen and slightly red).   Cor tract and right naris, no erosion noted   Eyes: Conjunctivae are normal. No scleral icterus.   Neck: Normal range of motion. Neck supple. No Brudzinski's sign and no Kernig's sign noted.   Cardiovascular: Normal rate, regular rhythm, normal heart sounds and intact distal pulses.  Exam reveals no friction rub.    No murmur heard.  Pulmonary/Chest: Effort normal and breath sounds normal. No stridor. No respiratory distress.   Abdominal: Soft. Bowel sounds are normal. He exhibits no distension. There is no tenderness. There is no guarding.   Cortrak in place.   Genitourinary:   Genitourinary Comments: Hurst catheter in place   Musculoskeletal: He exhibits no edema or tenderness.   Thoracic back pain with palpation of spine.  Generalized weakness.   Neurological: He is alert and oriented to person, place, and time. He displays tremor (resting). No cranial nerve deficit. He exhibits normal muscle tone.   Alert and oriented today.  Speech improved.   Skin: Skin is warm and dry. He is not diaphoretic. No erythema.   Psychiatric: He has a normal mood and affect. His behavior is normal.  Thought content normal.   Nursing note and vitals reviewed.      Recent Labs      08/28/17   0325  08/29/17   0240  08/30/17   0237   WBC  26.4*  12.2*  8.3   RBC  3.47*  3.38*  3.44*   HEMOGLOBIN  9.8*  9.3*  9.5*   HEMATOCRIT  30.6*  28.8*  29.3*   MCV  88.2  85.2  85.2   MCH  28.2  27.5  27.6   MCHC  32.0*  32.3*  32.4*   RDW  55.4*  53.1*  53.3*   PLATELETCT  217  195  204   MPV  12.9  12.9  13.3*     Recent Labs      08/30/17   0238   SODIUM  136   POTASSIUM  4.3   CHLORIDE  104   CO2  25   GLUCOSE  140*   BUN  24*   CREATININE  0.58   CALCIUM  8.8                      Assessment/Plan     * Acute osteomyelitis of thoracic spine (CMS-HCC)- (present on admission)   Assessment & Plan    - BC negative.  - Afebrile.  - MRI shows areas of enhancement indicating normal post-procedural findings vs OM.  - ID following.  Unlikely OM.  - Follow up MRI in 4-6 weeks.        Myelodysplastic syndrome (CMS-HCC)- (present on admission)   Assessment & Plan    - Seen by Dr. Ribeiro on the last admit  - Currently stable.    - Continue lovenox.        Parkinson's disease (CMS-HCC)- (present on admission)   Assessment & Plan    - Speech and mentation improving.   - Dysphagia improving.  Transitioned to oral modified diet.    - Continue Sinemet and Symmetrel  - Follows Dr. Ferguson as outpatient        Acute cystitis with hematuria- (present on admission)   Assessment & Plan    - Hematuria resolved.  - Continue cefepime.        Leukocytosis- (present on admission)   Assessment & Plan    - Improves with antibx.  - Likely multifactorial  - Continue cefepime for UTI.  - Afebrile.  - History of splenectomy.  - May need follow up with Dr. Ribeiro.        UTI (urinary tract infection)   Assessment & Plan    - Urine cx positive for E. Coli.  - Afebrile.  - Leukocytosis improved.  - Continue cefepime.  - ID following.        Acute urinary retention- (present on admission)   Assessment & Plan    - S/P TURP on 8/27.  - CBI completed.  - Leave  Hurst for 10 days.  - Continue Cardura.  - Follow with urology as outpatient.          Oropharyngeal dysphagia- (present on admission)   Assessment & Plan    - Cortrak in place.  - SLP following.        Acute bilateral low back pain without sciatica- (present on admission)   Assessment & Plan    - S/P recent kyphoplasty.  - MRI findings negative for any acute abnormality.  - Continue PT/OT.  - Continue conservative pain control.        Left ankle swelling   Assessment & Plan    - Bilateral x-rays of the knees and ankles show very small fluid collection therefore arthrocentesis is not indicated at this time but need to watch closely  - unchanged today  - U/S negative for DVT/SVT, monitor, no e/o infection at this time  - Continue to monitor.        DNR (do not resuscitate)- (present on admission)   Assessment & Plan    - Confirmed with the patient's wife on admission.        Acute delirium- (present on admission)   Assessment & Plan    - Much improved.  Alert an oriented.  - fluctuates, likely multi-factorial, infection, prolonged hospitalization, parkinson's          Pneumonia- (present on admission)   Assessment & Plan    - Completed antibiotic regimen.  - Respiratory status stable.        S/P splenectomy- (present on admission)   Assessment & Plan    - History of splenectomy.  - Higher risk of infections.            Hurst catheter::  Urinary Tract Retention or Urinary Tract Obstruction  DVT prophylaxis pharmacological::  Enoxaparin (Lovenox)  DVT prophylaxis - mechanical:  SCDs  Ulcer Prophylaxis::  Not indicated  Antibiotics:  Treating active infection/contamination beyond 24 hours perioperative coverage

## 2017-08-30 NOTE — PROGRESS NOTES
Discharge papers reviewed with pt and wife; pt discharged with cortrak in place and rinaldi in place  Pt discharging to [facility]

## 2017-08-30 NOTE — PROGRESS NOTES
Assumed care of pt; pt aox3, do to event, pt reoriented; pt denies pain, n/v, and n/t; cortrak in place secured with bridle, running fibersource at goal, tolerating well; pt up to chair 3x a day; pt able to trial full liquid diet per speech; Q2 turns in place; updated on poc; will continue to monitor

## 2017-08-30 NOTE — DISCHARGE SUMMARY
CHIEF COMPLAINT ON ADMISSION:  Altered mental status and fevers.    HISTORY OF PRESENT ILLNESS AND HOSPITAL COURSE:  For complete history and   physical, please review the note posted by Dr. Beckham on 08/13/2017.  In   summary, this is a 76-year-old male who presented with the above complaints.    The patient had a recent kyphoplasty prior to this admission and there was   initial concern for infection postoperatively.  Therefore, MRI of the thoracic   spine was performed and did reveal areas of enhancement in the T11, T12, and   L1 vertebral bodies, which were consistent with either normal postoperative   findings or possible infection.  The patient was noted to have a UTI and   was started on Diflucan.  Infectious disease assessed the patient and felt   that the patient's fevers were likely associated with the UTI and that the MRI   findings were not definitive for osteomyelitis.  The patient received a   feeding tube on this admission due to his lethargy and mental status being a   high risk for aspiration.  The patient was noted to have pneumonia on admission, likely secondary to aspiration, and completed an antibiotic regimen for this.  The patient over admission also developed   hematuria as well as urinary retention and Dr. Almeida performed a   cystoureteroscopy on 08/27/2017 with a complex catheterization and completed CBI.  He will need to leave his Hurst catheter in until 09/05/2017 and will follow up with Dr. Almeida as an outpatient.  The patient also developed an E. Coli UTI over admission and is currently completing a regimen of cefepime for this, will be complete on 09/10/2017.  The patient was noted to have consistently   high white blood cell count when he was not on antibiotics and there was no   distinct known cause of this.  The patient does have a history of   myelodysplastic syndrome, although this normally present with leukopenia.  The   patient has followed with Dr. Ribeiro as an outpatient and  should continue to   do so for further evaluation of his myelodysplastic syndrome.  After   improvement of the patient's infection, the patient did have improvement of   his mental status as well.  The patient does have a history of Parkinson's   disease, but appeared to ultimately be at his baseline.  The patient   ultimately was able to tolerate an oral diet and have his feeding tube   removed.  The patient at this time is clinically stable, but does require   extensive physical therapy and will be discharged to a skilled nursing   facility.    DISCHARGE PROBLEM LIST:  1.  Urinary tract infection.  2.  Myelodysplastic syndrome.  3.  Parkinson disease.  4.  Acute cystitis with hematuria, resolved.  5.  Leukocytosis.  6.  Acute delirium, resolved.  7.  Pneumonia, resolved.    DISCHARGE MEDICATIONS:  1.  Carbidopa/levodopa 61.25 and 245 mg cap, take 1 cap by mouth 3 times a   day.  2.  Ultram 50 mg tab, take 1 tab by mouth every 6 hours as needed for   moderate-to-severe pain.  3.  Provigil 200 mg tab, take 1 tab by mouth every day.  4.  Namenda 10 mg tab, take 1 tab by mouth 2 times a day.  5.  Symmetrel 100 mg cap, take 1 cap by mouth 2 times a day.  6.  Triazolam 0.125 mg tab, take 1 tab by mouth at bedtime as needed for   sleep.  7.  Celexa 20 mg tab, take 1 tab by mouth every day.  8.  Sinemet 50 and 200 mg tab, take 1 tab by mouth every bedtime.  9.  Cardura 1 mg tab, take 1 mg by mouth every bedtime.  10.  Ferrous sulfate 220 mg solution, take 220 mg 2 times daily with meals.  11.  Cefepime 2 g IV every 12 hours.  Stop date 09/10/2017.    DISCHARGE DIET:  Full liquid nectar thick.    DISCHARGE ACTIVITY:  As tolerated per skilled nursing facility.    CONSULTATIONS:  1.  Urology.  2.  Infectious disease.    PROCEDURES:  Cystourethroscopy on 08/27/2017.    LABORATORY DATA:  White blood cells 8.3, hemoglobin 9.5, hematocrit 29.3,   platelet count 204.  Sodium 136, potassium 4.3, chloride 104, glucose 140, BUN    24, creatinine 0.58.    The total time of the discharge process was approximately 41 minutes.       ____________________________________     SEDA Romeo / NTS    DD:  08/30/2017 13:46:29  DT:  08/30/2017 16:37:42    D#:  [id number]  Job#:  [id number]

## 2017-08-30 NOTE — DISCHARGE PLANNING
[facility] has accepted patient. Arranged patient's transport to [facility] at 1630 via [facility]. Elvira(VALERIO) notified.

## 2017-08-30 NOTE — THERAPY
"Physical Therapy Treatment completed.   Bed Mobility:  Supine to Sit: Moderate Assist  Transfers: Sit to Stand: Moderate Assist  Gait: Level Of Assist: Moderate Assist with Front-Wheel Walker       Plan of Care: Will benefit from Physical Therapy 3 times per week  Discharge Recommendations: Equipment: Will Continue to Assess for Equipment Needs. Post-acute therapy Discharge to a transitional care facility for continued skilled therapy services.     See \"Rehab Therapy-Acute\" Patient Summary Report for complete documentation.     Pt more alert today and demonstrating increased strength. Pt less rigid helping w/ functional mobility. Pt utilizing B LE's more for standing as well and the decreased rigitdity, helping pt endurance increase. Improved posture also help w/ pt ambulation as he is able to use his arms less for balance. Pt able to SPT to chair w/ modA only for line management and guarding for safety. Pt will benefit from post acute SNF upon DC from acute.  "

## 2017-08-30 NOTE — THERAPY
"Speech Language Therapy dysphagia treatment completed.   Functional Status:  Pt sitting up in chair and is the most alert he has been since this admission. RN reporting more tired this morning but has to get up to the chair 3x per day and he is much more awake in the chair. No overt s/sx of aspiration noted on trials of ntl and purees.   Recommendations: Given recent FEES results and improved mentation recommend trial of NTFL diet with 1:1 supervision. Up to chair for all meals. Recommend keep cortrak in place and do not feed pt with any lethargy. Also hold meals with any s/sx of aspiration.    Plan of Care: Will benefit from Speech Therapy 3 times per week  Post-Acute Therapy: Discharge to a transitional care facility for continued skilled therapy services.    See \"Rehab Therapy-Acute\" Patient Summary Report for complete documentation.     "

## 2017-08-30 NOTE — DISCHARGE PLANNING
Transitional Care Navigator:    TCN received a call from patients spouse requesting a referral be sent to [facility].  Choice form completed and faxed to [facility].  IMM also completed.  Sw aware. TCN will follow-up as needed.

## 2017-08-31 LAB
BACTERIA BLD CULT: NORMAL
BACTERIA BLD CULT: NORMAL
SIGNIFICANT IND 70042: NORMAL
SIGNIFICANT IND 70042: NORMAL
SITE SITE: NORMAL
SITE SITE: NORMAL
SOURCE SOURCE: NORMAL
SOURCE SOURCE: NORMAL

## 2017-08-31 NOTE — CONSULTS
DATE OF SERVICE:  08/30/2017    CHIEF COMPLAINT:  Neurologic consultation was requested on this gentleman by   Dr. Gillespie of hospitalist service, admitted originally for worsening confusion   related to pneumonia with a history of Parkinson's disease followed by Dr. Ferguson, for further recommendations about treatment for his disease.    HISTORY OF PRESENT ILLNESS:  The history is gotten from discussions with the   patient, his wife, as well as review of records and discussions with Dr. Gillespie.  He is a 76-year-old right-handed  gentleman followed by Dr. Ferguson for Parkinson disease, the patient states diagnosed a couple of years   ago.  The disease has involved tremulousness, rigidity, progressive balance   difficulties, softening of voice volume, difficulty swallowing, etc.  He has   been on a regimen of amantadine 100 mg b.i.d., Sinemet CR 50/200 at bedtime,   Rytary 61.25/245 t.i.d., Namenda 10 mg b.i.d., Provigil 200 mg daily.    Originally undergoing a kyphoplasty, the surgery went well for chronic back   pain, he was discharged to a local rehabilitation facility, and within 1 or 2   days began to develop fevers, malaise and increasing confusion with shortness   of breath.  Transferred back here, he was diagnosed with a pneumonia,   evidently urinary tract infection also evolved.  The parkinsonian symptoms he   has suffered from were made worse, he was less ambulatory, had profound   difficulties with swallowing, but since then, on antibiotics, etc., his   progress has been slow and steady.  The confusion continued to improve, his   ability to swallow improved as well, in fact today was the first time he took   anything orally, he has been up and about more with physical therapy.  His   home medication regimen has been continued, though the Rytary was changed to a   Sinemet-CR formulation at 25/250 t.i.d.  Neurologic consultation was then   requested for further recommendations if any were  necessary.    REVIEW OF SYSTEMS:  Other than the above, from my standpoint, noncontributory.    PAST MEDICAL HISTORY:  1.  Parkinson disease.  2.  MDS.  3.  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    There is no history of CVA, CAD, PVD, systemic malignancy, thyroid disease,   diabetes, hypertension, autoimmune disease, demyelinating disease, seizure, or   migraine.    PAST SURGICAL HISTORY:  Noncontributory from my standpoint.    MEDICATIONS:  At present, the patient is on:  1.  Symmetrel 100 mg b.i.d.  2.  Sinemet 25/250 t.i.d.  3.  Sinemet 25/100, #2 tablets at bedtime.  4.  Celexa 20 mg daily.  5.  Cardura.  6.  Namenda 10 mg b.i.d.  7.  Provigil 200 mg daily.    The rest is per the electronic health record.    ALLERGIES:  DIFLUCAN.    FAMILY HISTORY:  Noncontributory.    SOCIAL HISTORY:  He rarely drinks alcohol, does not smoke.    PHYSICAL EXAMINATION:  CONSTITUTIONAL:  The patient is lying in bed, and is in no acute distress,   though he is clearly frustrated with the difficulties he has been having.    Still, he is quite cooperative.  VITAL SIGNS:  Stable, he is afebrile, pulse is 60 and regular, respiratory   rate 18, blood pressure 134/70, and he is 97% saturated on room air.  HEENT:  Negative for malar rash, there is some mild sialorrhea, but he does   not drool excessively.  Glabellar tap is present.  NECK:  Supple, without meningismus.  COR:  Remarkable for a regular rhythm.  VASCULAR:  Negative for significant lower extremity edema.  NEUROLOGIC EXAMINATION:  Mental status exam does reveal him to be near fully   oriented, his mental processing speed is slowed.  He knows that he is in a   hospital in [place], [place], knows the year correctly, but incorrectly guesses   that the month or day of the week.  There is no aphasia, apraxia, or   inattention.  He is perseverative, multistep commands are difficult, though he   does follow them appropriately if given enough time.  He knows his wife when   she  arrives, knows his age and date of birth.    Cranial nerve exam reveals PERRLA/EOMI, visual fields are full, there is   hypophonia and tachyphemia, but facial movements are symmetric, there is no   sensory loss to pinprick across the midline, the tongue and uvula are midline.    Motor exam reveals rigidity bilaterally, there is a fine tremulousness with   both hands with action only, there is no tremor at rest.  All movements are   slowed on initiation, especially, some improvement for full range of motion.    There is no obvious weakness.  Both toes are downgoing.    Coordination reveals no appendicular dystaxia with the upper extremities,   repetitive movements show a significant reduction in amplitude and increased   frequency in all 4 extremities.  I did not stand the patient to walk.    Sensory exam is intact to temperature, vibration is felt inconsistently.    DIAGNOSTICS:  Most recent CBC showed an anemia of 9.5/29.3 with normal   indices, WBC 8.3 and platelets 204,000.  Sodium is 136, glucose 140, BUN 24,   creatinine 0.58.  From 2 days ago, urinalysis revealed findings consistent   with a urinary tract infection.  TSH at the beginning of the admission was   1.7.  Coagulation profile showed slight elevation of PT of 16.7 and PTT of   41.1.  Platelet functions were normal.    IMPRESSION:  This gentleman is suffering from Parkinson disease with   associated dementia, neuropsychiatric symptoms seen in about 60% of these   patients.  He has additional symptoms including constipation, possibly urinary   retention, but in this specific circumstance, all of these symptoms will be   made worse with anything that is superimposed such as a medical condition with   infection.  I would not recommend changing his anti-parkinsonian regimen at   this time, simply allowing his medical circumstance to improve slowly and   steadily, imaging of the brain was done revealing no abnormalities, so I would   anticipate recovery  back to baseline eventually.  At that point, he can   follow up with Dr. Ferguson on an outpatient basis and further medication   adjustments can be made.  I discussed the case at length with Dr. Ferguson as well   as with Dr. Gillespie prior to discussing this with the patient and his wife.    They were all quite comfortable with these recommendations.    PLAN:  1.  Continue home medication regimen for Parkinson's as well as with   symptomatic relief for depression and cognition.  2.  Follow up with Dr. Ferguson on an outpatient basis for further medication   adjustments.    Thank you very much for this consultation.    TIME:  Evaluation of 70 minutes for exam, review, discussion and education.    DISCUSSION:  As mentioned in the assessment, over 50% of the time was spent   face-to-face in counseling and coordinating care.       ____________________________________     MD JOANN ROSA / ABISAI    DD:  08/30/2017 16:17:17  DT:  08/30/2017 17:22:22    D#:  [id number]  Job#:  [id number]

## 2017-09-01 LAB
BACTERIA BLD CULT: NORMAL
SIGNIFICANT IND 70042: NORMAL
SITE SITE: NORMAL
SOURCE SOURCE: NORMAL

## 2017-09-08 NOTE — DOCUMENTATION QUERY
DOCUMENTATION QUERY    PROVIDERS: Please select “Cosign w/ note”to reply to query.    To better represent the severity of illness of your patient, please review the following information and exercise your independent professional judgment in responding to this query.     Encephalopathy and Sepsis are documented in the History and Physical and Progress Notes. Per coding guidelines, the clinical indicators for severe sepsis are organ failure, organ dysfunction/shock, documented as related to the sepsis. Based upon the clinical findings, risk factors, and treatment, can the relationship between these two conditions be further specified?    • Encephalopathy is related to sepsis (Severe Sepsis)  • Encephalopathy is not related to sepsis  • Other explanation of clinical findings (please document)  • Unable to determine        The medical record reflects the following:   Clinical Findings  WBC 16.7, Temp 102.2F, /62, confusion, deliriums, History of Parkinson's disease, mild dementia   Treatment  IV Zosyn, IV Cefepime, IV Vancomycin, IV Rocephin   Risk Factors  As clinically indicated   Location within medical record  History and Physical, Progress Notes and Discharge Summary     Thank you,   DANICA Ingram, NAIF, HIM

## 2017-09-20 ENCOUNTER — APPOINTMENT (OUTPATIENT)
Dept: RADIOLOGY | Facility: MEDICAL CENTER | Age: 77
DRG: 698 | End: 2017-09-20
Attending: EMERGENCY MEDICINE
Payer: MEDICARE

## 2017-09-20 ENCOUNTER — RESOLUTE PROFESSIONAL BILLING HOSPITAL PROF FEE (OUTPATIENT)
Dept: HOSPITALIST | Facility: MEDICAL CENTER | Age: 77
End: 2017-09-20
Payer: MEDICARE

## 2017-09-20 ENCOUNTER — HOSPITAL ENCOUNTER (INPATIENT)
Facility: MEDICAL CENTER | Age: 77
LOS: 5 days | DRG: 698 | End: 2017-09-25
Attending: EMERGENCY MEDICINE | Admitting: FAMILY MEDICINE
Payer: MEDICARE

## 2017-09-20 DIAGNOSIS — D69.6 THROMBOCYTOPENIA (HCC): ICD-10-CM

## 2017-09-20 DIAGNOSIS — R53.1 WEAKNESS: ICD-10-CM

## 2017-09-20 DIAGNOSIS — N39.0 URINARY TRACT INFECTION ASSOCIATED WITH INDWELLING URETHRAL CATHETER, SUBSEQUENT ENCOUNTER: ICD-10-CM

## 2017-09-20 DIAGNOSIS — R41.0 CONFUSION: ICD-10-CM

## 2017-09-20 DIAGNOSIS — T83.511D URINARY TRACT INFECTION ASSOCIATED WITH INDWELLING URETHRAL CATHETER, SUBSEQUENT ENCOUNTER: ICD-10-CM

## 2017-09-20 PROBLEM — A41.9 SEPSIS, UNSPECIFIED: Status: ACTIVE | Noted: 2017-09-20

## 2017-09-20 LAB
ALBUMIN SERPL BCP-MCNC: 3.5 G/DL (ref 3.2–4.9)
ALBUMIN/GLOB SERPL: 1 G/DL
ALP SERPL-CCNC: 135 U/L (ref 30–99)
ALT SERPL-CCNC: 8 U/L (ref 2–50)
ANION GAP SERPL CALC-SCNC: 8 MMOL/L (ref 0–11.9)
ANISOCYTOSIS BLD QL SMEAR: ABNORMAL
APPEARANCE UR: ABNORMAL
APTT PPP: 34.4 SEC (ref 24.7–36)
APTT PPP: 34.5 SEC (ref 24.7–36)
AST SERPL-CCNC: 18 U/L (ref 12–45)
BACTERIA #/AREA URNS HPF: ABNORMAL /HPF
BASOPHILS # BLD AUTO: 0.8 % (ref 0–1.8)
BASOPHILS # BLD: 0.09 K/UL (ref 0–0.12)
BILIRUB SERPL-MCNC: 0.7 MG/DL (ref 0.1–1.5)
BILIRUB UR QL STRIP.AUTO: NEGATIVE
BNP SERPL-MCNC: 92 PG/ML (ref 0–100)
BUN SERPL-MCNC: 22 MG/DL (ref 8–22)
CALCIUM SERPL-MCNC: 8.8 MG/DL (ref 8.5–10.5)
CHLORIDE SERPL-SCNC: 102 MMOL/L (ref 96–112)
CO2 SERPL-SCNC: 26 MMOL/L (ref 20–33)
COLOR UR: YELLOW
CREAT SERPL-MCNC: 1.02 MG/DL (ref 0.5–1.4)
CULTURE IF INDICATED INDCX: YES UA CULTURE
EOSINOPHIL # BLD AUTO: 0.09 K/UL (ref 0–0.51)
EOSINOPHIL NFR BLD: 0.8 % (ref 0–6.9)
ERYTHROCYTE [DISTWIDTH] IN BLOOD BY AUTOMATED COUNT: 62.9 FL (ref 35.9–50)
GFR SERPL CREATININE-BSD FRML MDRD: >60 ML/MIN/1.73 M 2
GIANT PLATELETS BLD QL SMEAR: NORMAL
GLOBULIN SER CALC-MCNC: 3.4 G/DL (ref 1.9–3.5)
GLUCOSE SERPL-MCNC: 99 MG/DL (ref 65–99)
GLUCOSE UR STRIP.AUTO-MCNC: NEGATIVE MG/DL
HCT VFR BLD AUTO: 31.8 % (ref 42–52)
HGB BLD-MCNC: 10.2 G/DL (ref 14–18)
INR PPP: 1.27 (ref 0.87–1.13)
INR PPP: 1.35 (ref 0.87–1.13)
KETONES UR STRIP.AUTO-MCNC: NEGATIVE MG/DL
LACTATE BLD-SCNC: 0.9 MMOL/L (ref 0.5–2)
LACTATE BLD-SCNC: 1.2 MMOL/L (ref 0.5–2)
LEUKOCYTE ESTERASE UR QL STRIP.AUTO: ABNORMAL
LG PLATELETS BLD QL SMEAR: NORMAL
LYMPHOCYTES # BLD AUTO: 0.86 K/UL (ref 1–4.8)
LYMPHOCYTES NFR BLD: 7.7 % (ref 22–41)
MACROCYTES BLD QL SMEAR: ABNORMAL
MANUAL DIFF BLD: NORMAL
MCH RBC QN AUTO: 28 PG (ref 27–33)
MCHC RBC AUTO-ENTMCNC: 32.1 G/DL (ref 33.7–35.3)
MCV RBC AUTO: 87.4 FL (ref 81.4–97.8)
METAMYELOCYTES NFR BLD MANUAL: 0.9 %
MICRO URNS: ABNORMAL
MICROCYTES BLD QL SMEAR: ABNORMAL
MONOCYTES # BLD AUTO: 1.72 K/UL (ref 0–0.85)
MONOCYTES NFR BLD AUTO: 15.4 % (ref 0–13.4)
MORPHOLOGY BLD-IMP: NORMAL
NEUTROPHILS # BLD AUTO: 8.33 K/UL (ref 1.82–7.42)
NEUTROPHILS NFR BLD: 73.5 % (ref 44–72)
NEUTS BAND NFR BLD MANUAL: 0.9 % (ref 0–10)
NITRITE UR QL STRIP.AUTO: POSITIVE
NRBC # BLD AUTO: 0.03 K/UL
NRBC BLD AUTO-RTO: 0.3 /100 WBC
PH UR STRIP.AUTO: 6 [PH]
PLATELET # BLD AUTO: 40 K/UL (ref 164–446)
PLATELET BLD QL SMEAR: NORMAL
PLATELETS.RETICULATED NFR BLD AUTO: 37.4 K/UL (ref 0.6–13.1)
POTASSIUM SERPL-SCNC: 4.1 MMOL/L (ref 3.6–5.5)
PROT SERPL-MCNC: 6.9 G/DL (ref 6–8.2)
PROT UR QL STRIP: 100 MG/DL
PROTHROMBIN TIME: 16.3 SEC (ref 12–14.6)
PROTHROMBIN TIME: 17.1 SEC (ref 12–14.6)
RBC # BLD AUTO: 3.64 M/UL (ref 4.7–6.1)
RBC # URNS HPF: ABNORMAL /HPF
RBC BLD AUTO: PRESENT
RBC UR QL AUTO: ABNORMAL
SODIUM SERPL-SCNC: 136 MMOL/L (ref 135–145)
SP GR UR STRIP.AUTO: 1.02
T4 FREE SERPL-MCNC: 0.77 NG/DL (ref 0.53–1.43)
TROPONIN I SERPL-MCNC: 0.02 NG/ML (ref 0–0.04)
TSH SERPL DL<=0.005 MIU/L-ACNC: 1.85 UIU/ML (ref 0.3–3.7)
UROBILINOGEN UR STRIP.AUTO-MCNC: 1 MG/DL
WBC # BLD AUTO: 11.2 K/UL (ref 4.8–10.8)
WBC #/AREA URNS HPF: ABNORMAL /HPF

## 2017-09-20 PROCEDURE — 85610 PROTHROMBIN TIME: CPT

## 2017-09-20 PROCEDURE — 83880 ASSAY OF NATRIURETIC PEPTIDE: CPT

## 2017-09-20 PROCEDURE — 87040 BLOOD CULTURE FOR BACTERIA: CPT

## 2017-09-20 PROCEDURE — A9270 NON-COVERED ITEM OR SERVICE: HCPCS | Performed by: EMERGENCY MEDICINE

## 2017-09-20 PROCEDURE — 36415 COLL VENOUS BLD VENIPUNCTURE: CPT

## 2017-09-20 PROCEDURE — A9270 NON-COVERED ITEM OR SERVICE: HCPCS | Performed by: FAMILY MEDICINE

## 2017-09-20 PROCEDURE — 770020 HCHG ROOM/CARE - TELE (206)

## 2017-09-20 PROCEDURE — 87077 CULTURE AEROBIC IDENTIFY: CPT

## 2017-09-20 PROCEDURE — 87086 URINE CULTURE/COLONY COUNT: CPT

## 2017-09-20 PROCEDURE — 85027 COMPLETE CBC AUTOMATED: CPT

## 2017-09-20 PROCEDURE — 700102 HCHG RX REV CODE 250 W/ 637 OVERRIDE(OP): Performed by: FAMILY MEDICINE

## 2017-09-20 PROCEDURE — 99223 1ST HOSP IP/OBS HIGH 75: CPT | Mod: AI | Performed by: FAMILY MEDICINE

## 2017-09-20 PROCEDURE — 96365 THER/PROPH/DIAG IV INF INIT: CPT

## 2017-09-20 PROCEDURE — 81001 URINALYSIS AUTO W/SCOPE: CPT

## 2017-09-20 PROCEDURE — 96361 HYDRATE IV INFUSION ADD-ON: CPT

## 2017-09-20 PROCEDURE — 71010 DX-CHEST-PORTABLE (1 VIEW): CPT

## 2017-09-20 PROCEDURE — 84484 ASSAY OF TROPONIN QUANT: CPT

## 2017-09-20 PROCEDURE — 84439 ASSAY OF FREE THYROXINE: CPT

## 2017-09-20 PROCEDURE — 700105 HCHG RX REV CODE 258: Performed by: FAMILY MEDICINE

## 2017-09-20 PROCEDURE — 94760 N-INVAS EAR/PLS OXIMETRY 1: CPT

## 2017-09-20 PROCEDURE — 85055 RETICULATED PLATELET ASSAY: CPT

## 2017-09-20 PROCEDURE — 85007 BL SMEAR W/DIFF WBC COUNT: CPT

## 2017-09-20 PROCEDURE — 84443 ASSAY THYROID STIM HORMONE: CPT

## 2017-09-20 PROCEDURE — 700111 HCHG RX REV CODE 636 W/ 250 OVERRIDE (IP): Performed by: EMERGENCY MEDICINE

## 2017-09-20 PROCEDURE — 87186 SC STD MICRODIL/AGAR DIL: CPT

## 2017-09-20 PROCEDURE — 80053 COMPREHEN METABOLIC PANEL: CPT

## 2017-09-20 PROCEDURE — 700105 HCHG RX REV CODE 258: Performed by: EMERGENCY MEDICINE

## 2017-09-20 PROCEDURE — 700102 HCHG RX REV CODE 250 W/ 637 OVERRIDE(OP): Performed by: EMERGENCY MEDICINE

## 2017-09-20 PROCEDURE — 99285 EMERGENCY DEPT VISIT HI MDM: CPT

## 2017-09-20 PROCEDURE — 83605 ASSAY OF LACTIC ACID: CPT

## 2017-09-20 PROCEDURE — 85730 THROMBOPLASTIN TIME PARTIAL: CPT

## 2017-09-20 RX ORDER — ACETAMINOPHEN 325 MG/1
650 TABLET ORAL EVERY 6 HOURS PRN
Status: DISCONTINUED | OUTPATIENT
Start: 2017-09-20 | End: 2017-09-25 | Stop reason: HOSPADM

## 2017-09-20 RX ORDER — TRAMADOL HYDROCHLORIDE 50 MG/1
50 TABLET ORAL EVERY 6 HOURS PRN
Status: DISCONTINUED | OUTPATIENT
Start: 2017-09-20 | End: 2017-09-25 | Stop reason: HOSPADM

## 2017-09-20 RX ORDER — MEMANTINE HYDROCHLORIDE 10 MG/1
10 TABLET ORAL 2 TIMES DAILY
Status: DISCONTINUED | OUTPATIENT
Start: 2017-09-20 | End: 2017-09-25 | Stop reason: HOSPADM

## 2017-09-20 RX ORDER — CARBIDOPA AND LEVODOPA 50; 200 MG/1; MG/1
1 TABLET, EXTENDED RELEASE ORAL
Status: DISCONTINUED | OUTPATIENT
Start: 2017-09-20 | End: 2017-09-25 | Stop reason: HOSPADM

## 2017-09-20 RX ORDER — DOXAZOSIN MESYLATE 1 MG/1
1 TABLET ORAL
Status: DISCONTINUED | OUTPATIENT
Start: 2017-09-20 | End: 2017-09-25 | Stop reason: HOSPADM

## 2017-09-20 RX ORDER — ENALAPRILAT 1.25 MG/ML
1.25 INJECTION INTRAVENOUS EVERY 6 HOURS PRN
Status: DISCONTINUED | OUTPATIENT
Start: 2017-09-20 | End: 2017-09-25 | Stop reason: HOSPADM

## 2017-09-20 RX ORDER — SODIUM CHLORIDE 9 MG/ML
INJECTION, SOLUTION INTRAVENOUS CONTINUOUS
Status: DISCONTINUED | OUTPATIENT
Start: 2017-09-20 | End: 2017-09-22

## 2017-09-20 RX ORDER — POLYETHYLENE GLYCOL 3350 17 G/17G
1 POWDER, FOR SOLUTION ORAL
Status: DISCONTINUED | OUTPATIENT
Start: 2017-09-20 | End: 2017-09-25 | Stop reason: HOSPADM

## 2017-09-20 RX ORDER — ONDANSETRON 2 MG/ML
4 INJECTION INTRAMUSCULAR; INTRAVENOUS EVERY 4 HOURS PRN
Status: DISCONTINUED | OUTPATIENT
Start: 2017-09-20 | End: 2017-09-25 | Stop reason: HOSPADM

## 2017-09-20 RX ORDER — SODIUM CHLORIDE 9 MG/ML
INJECTION, SOLUTION INTRAVENOUS CONTINUOUS
Status: DISCONTINUED | OUTPATIENT
Start: 2017-09-20 | End: 2017-09-20

## 2017-09-20 RX ORDER — LABETALOL HYDROCHLORIDE 5 MG/ML
10 INJECTION, SOLUTION INTRAVENOUS EVERY 4 HOURS PRN
Status: DISCONTINUED | OUTPATIENT
Start: 2017-09-20 | End: 2017-09-25 | Stop reason: HOSPADM

## 2017-09-20 RX ORDER — ONDANSETRON 4 MG/1
4 TABLET, ORALLY DISINTEGRATING ORAL EVERY 4 HOURS PRN
Status: DISCONTINUED | OUTPATIENT
Start: 2017-09-20 | End: 2017-09-25 | Stop reason: HOSPADM

## 2017-09-20 RX ORDER — MODAFINIL 100 MG/1
200 TABLET ORAL DAILY
Status: DISCONTINUED | OUTPATIENT
Start: 2017-09-21 | End: 2017-09-25 | Stop reason: HOSPADM

## 2017-09-20 RX ORDER — CITALOPRAM 20 MG/1
20 TABLET ORAL DAILY
Status: DISCONTINUED | OUTPATIENT
Start: 2017-09-21 | End: 2017-09-25 | Stop reason: HOSPADM

## 2017-09-20 RX ORDER — AMOXICILLIN 250 MG
2 CAPSULE ORAL 2 TIMES DAILY
Status: DISCONTINUED | OUTPATIENT
Start: 2017-09-20 | End: 2017-09-25 | Stop reason: HOSPADM

## 2017-09-20 RX ORDER — SODIUM CHLORIDE 9 MG/ML
1000 INJECTION, SOLUTION INTRAVENOUS
Status: DISCONTINUED | OUTPATIENT
Start: 2017-09-20 | End: 2017-09-25 | Stop reason: HOSPADM

## 2017-09-20 RX ORDER — BISACODYL 10 MG
10 SUPPOSITORY, RECTAL RECTAL
Status: DISCONTINUED | OUTPATIENT
Start: 2017-09-20 | End: 2017-09-25 | Stop reason: HOSPADM

## 2017-09-20 RX ORDER — AMANTADINE HYDROCHLORIDE 100 MG/1
100 CAPSULE, GELATIN COATED ORAL 2 TIMES DAILY
Status: DISCONTINUED | OUTPATIENT
Start: 2017-09-20 | End: 2017-09-25 | Stop reason: HOSPADM

## 2017-09-20 RX ORDER — CEFTRIAXONE 2 G/1
2 INJECTION, POWDER, FOR SOLUTION INTRAMUSCULAR; INTRAVENOUS ONCE
Status: COMPLETED | OUTPATIENT
Start: 2017-09-20 | End: 2017-09-20

## 2017-09-20 RX ORDER — CARBIDOPA AND LEVODOPA 50; 200 MG/1; MG/1
1 TABLET, EXTENDED RELEASE ORAL ONCE
Status: COMPLETED | OUTPATIENT
Start: 2017-09-20 | End: 2017-09-20

## 2017-09-20 RX ADMIN — CARBIDOPA AND LEVODOPA 1 TABLET: 50; 200 TABLET, EXTENDED RELEASE ORAL at 16:25

## 2017-09-20 RX ADMIN — CEFTRIAXONE SODIUM 2 G: 2 INJECTION, POWDER, FOR SOLUTION INTRAMUSCULAR; INTRAVENOUS at 16:33

## 2017-09-20 RX ADMIN — DOXAZOSIN MESYLATE 1 MG: 1 TABLET ORAL at 21:52

## 2017-09-20 RX ADMIN — SODIUM CHLORIDE: 9 INJECTION, SOLUTION INTRAVENOUS at 16:16

## 2017-09-20 RX ADMIN — CARBIDOPA AND LEVODOPA 1 TABLET: 50; 200 TABLET, EXTENDED RELEASE ORAL at 21:52

## 2017-09-20 RX ADMIN — STANDARDIZED SENNA CONCENTRATE AND DOCUSATE SODIUM 2 TABLET: 8.6; 5 TABLET, FILM COATED ORAL at 21:52

## 2017-09-20 RX ADMIN — AMANTADINE HYDROCHLORIDE 100 MG: 100 CAPSULE ORAL at 21:52

## 2017-09-20 RX ADMIN — SODIUM CHLORIDE: 9 INJECTION, SOLUTION INTRAVENOUS at 18:31

## 2017-09-20 RX ADMIN — MEMANTINE HYDROCHLORIDE 10 MG: 10 TABLET, FILM COATED ORAL at 21:52

## 2017-09-20 ASSESSMENT — PATIENT HEALTH QUESTIONNAIRE - PHQ9
2. FEELING DOWN, DEPRESSED, IRRITABLE, OR HOPELESS: NOT AT ALL
1. LITTLE INTEREST OR PLEASURE IN DOING THINGS: NOT AT ALL
SUM OF ALL RESPONSES TO PHQ9 QUESTIONS 1 AND 2: 0
SUM OF ALL RESPONSES TO PHQ QUESTIONS 1-9: 0

## 2017-09-20 ASSESSMENT — LIFESTYLE VARIABLES
TOTAL SCORE: 1
TOTAL SCORE: 1
DO YOU DRINK ALCOHOL: NO
HAVE PEOPLE ANNOYED YOU BY CRITICIZING YOUR DRINKING: NO
CONSUMPTION TOTAL: NEGATIVE
ON A TYPICAL DAY WHEN YOU DRINK ALCOHOL HOW MANY DRINKS DO YOU HAVE: 1
ALCOHOL_USE: YES
AVERAGE NUMBER OF DAYS PER WEEK YOU HAVE A DRINK CONTAINING ALCOHOL: 7
TOTAL SCORE: 1
HAVE YOU EVER FELT YOU SHOULD CUT DOWN ON YOUR DRINKING: YES
EVER_SMOKED: NEVER
EVER HAD A DRINK FIRST THING IN THE MORNING TO STEADY YOUR NERVES TO GET RID OF A HANGOVER: NO
EVER FELT BAD OR GUILTY ABOUT YOUR DRINKING: NO
HOW MANY TIMES IN THE PAST YEAR HAVE YOU HAD 5 OR MORE DRINKS IN A DAY: 0

## 2017-09-20 ASSESSMENT — PAIN SCALES - GENERAL
PAINLEVEL_OUTOF10: 0

## 2017-09-20 NOTE — ED PROVIDER NOTES
ED Provider Note    Scribed for Alden Bustos M.D. by Delano Jones. 9/20/2017  2:57 PM    Primary Care Provider: MERI Casarez  Means of arrival: EMS  History limited by: None    CHIEF COMPLAINT  Chief Complaint   Patient presents with   • Blood in Urine       HPI  Rafael Felix is a 76 y.o. Male with a history of a myelodysplastic syndrome, UTI, and parkinson's disease who presents to the ED complaining of complaining of hematuria. There were blood clots as well as blood in his rinaldi catheter drainage, but the rinaldi flushed completely. The patient reportedly had a kyphoplasty on 8/6. His back pain is secondary to a chronic injury. He did not received a platelet transfusion prior to the surgery then received a platelet transfusion post surgery. His platelet count was 170 post surgery. Patient was taken to Skilled Nursing and spent the next three weeks admitted here with a nasogastric feeding tube and rinaldi catheter. Patient has not walked since admittance on 8/6. Today his platelet count was 30 prompting his [facility] physician to refer him here for UTI and platelet check. Additionally, he began to experience upper body shaking secondary to his Parkinson's and he has been moving his hands around when he is fatigued or asleep. These changes onset about five days ago. The patient's wife reports associated fatigue. His fatigue is similar to past episodes of UTI. He denies any pain, headache, blurred vision, rhinorrhea, sore throat, chest pain, shortness of breath, abdominal pain, nausea, vomiting, or new unilateral weakness. Historian was the patient's wife.      REVIEW OF SYSTEMS  CONSTITUTIONAL:  Positive for fatigue.  EYES:  Denies blurred vision.  ENT:  Denies rhinorrhea or sore throat.  CARDIOVASCULAR:  Denies chest pain  RESPIRATORY:  Denies shortness of breath  GI:  Denies abdominal pain, nausea, vomiting  : Positive for hematuria and and blood clots in urine.  MUSCULOSKELETAL:  Denies  pain.  NEUROLOGIC:  Denies headache or unilateral weakness.  C    PAST MEDICAL HISTORY  Past Medical History:   Diagnosis Date   • Hemorrhagic disorder (CMS-HCC) 7-    bruises easily   • Compression fracture of spine (CMS-HCC)    • MDS (myelodysplastic syndrome) (CMS-HCC)    • Parkinson's disease ([facility])        FAMILY HISTORY  Family History   Problem Relation Age of Onset   • Heart Disease Mother    • Lung Disease Mother    • Cancer Father      pancreatic       SOCIAL HISTORY   reports that he has never smoked. He has never used smokeless tobacco. He reports that he drinks alcohol. He reports that he does not use drugs.    SURGICAL HISTORY  Past Surgical History:   Procedure Laterality Date   • CYSTOSCOPY  8/27/2017    Procedure: CYSTOSCOPY, COMPLEX CATHETERIZATION;  Surgeon: Wild Almeida M.D.;  Location: SURGERY [facility];  Service: Urology   • EVACUATION OF HEMATOMA  8/27/2017    Procedure: EVACUATION OF HEMATOMA, TUR OLD BLADDER CLOTS, DILATION OF URETHRAL STRICTURE;  Surgeon: Wild Almeida M.D.;  Location: SURGERY [facility];  Service: Urology   • TRANS URETHRAL RESECTION BLADDER  8/27/2017    Procedure: TRANS URETHRAL RESECTION BLADDER;  Surgeon: Wild Almeida M.D.;  Location: SURGERY [facility];  Service: Urology   • KYPHOPLASTY  8/6/2017    Procedure: KYPHOPLASTY T11, T12, L1 ;  Surgeon: Niels Gallardo M.D.;  Location: [facility];  Service:    • CATARACT EXTRACTION WITH IOL     • KNEE ARTHROSCOPY     • PROSTATECTOMY, RADIAL      subtotal   • ROTATOR CUFF REPAIR      right   • SPLENECTOMY         CURRENT MEDICATIONS  No current facility-administered medications on file prior to encounter.      Current Outpatient Prescriptions on File Prior to Encounter   Medication Sig Dispense Refill   • doxazosin (CARDURA) 1 MG Tab Take 1 Tab by mouth every bedtime. 30 Tab    • ferrous sulfate (FEOSOL) 220 (44 Fe) MG/5ML Elixir Take 5 mL by mouth 2 times a day, with meals. 1  Bottle 0   • Carbidopa-Levodopa ER (RYTARY) 61. MG Cap CR Take 1 Cap by mouth 3 times a day.     • tramadol (ULTRAM) 50 MG Tab Take 1 Tab by mouth every 6 hours as needed for Moderate Pain or Severe Pain. 30 Tab 0   • modafinil (PROVIGIL) 200 MG Tab Take 1 Tab by mouth every day. 30 Tab 5   • memantine (NAMENDA) 10 MG Tab Take 1 Tab by mouth 2 times a day. 60 Tab 6   • amantadine (SYMMETREL) 100 MG Cap Take 1 Cap by mouth 2 times a day. 60 Cap 3   • triazolam (HALCION) 0.125 MG tablet Take 1 Tab by mouth at bedtime as needed. 30 Tab 5   • citalopram (CELEXA) 20 MG Tab Take 1 Tab by mouth every day. 30 Tab 11   • carbidopa-levodopa SR (SINEMET CR)  MG per tablet Take 1 Tab by mouth every bedtime. 60 Tab 5       ALLERGIES  Allergies   Allergen Reactions   • Diflucan [Fluconazole]      Reaction: Convulsions per wife       PHYSICAL EXAM  VITAL SIGNS: /74   Pulse 65   Temp 36.7 °C (98 °F)   Resp 16   Wt 78.5 kg (173 lb)   BMI 22.20 kg/m²      Constitutional: Patient is awake and alert. No acute respiratory distress. Well developed, Well nourished, Non-toxic appearance.  HENT: Normocephalic, Atraumatic, Bilateral external ears normal, Oropharynx pink and very dry with no exudates, Nose patent.  Eyes: PERRLA, EOMI, Sclera and conjunctiva clear, Eyes are sunken, No discharge.   Neck:  Supple no nuchal rigidity, no thyromegaly or mass. Non-tender  Lymphatic: No supraclavicular lymph nodes.   Cardiovascular: Heart is regular rate and rhythm no murmur, rub or thrill.   Thorax & Lungs: Chest is symmetrical, decreased breath sounds throughout. No wheezing or crackles. No respiratory distress, No chest tenderness.   Abdomen: Soft, No tenderness no hepatosplenomegaly there is no guarding or rebound, No masses, No pulsatile masses. Bowel sounds are present. Large well healed scar from splenectomy.  : Normal male genitalia. Hurst in place no obvious drainage around the catheter.  Skin: Warm, Dry, no  petechia, . 7 bruises of different ages arms and legs.  Back: No CVA tenderness.   Extremities: No edema. Non tender.   Musculoskeletal: Good range of motion to wrists, elbows, shoulders, hips, knees, and ankles. Pulses 2+ radially and femorally. No gross deformities noted.   Neurologic: Alert & oriented to person, time, and place.  Strength is 5 over 5 and symmetric in bilateral upper and lower extremities. Unable to sit up on his own.  Sensory is intact to light touch to face, arms, and legs.  DTRs are symmetrical in biceps brachioradialis, patella and Achilles.   Psychiatric: Normal affect        LABS  Results for orders placed or performed during the hospital encounter of 09/20/17   COMP METABOLIC PANEL   Result Value Ref Range    Sodium 136 135 - 145 mmol/L    Potassium 4.1 3.6 - 5.5 mmol/L    Chloride 102 96 - 112 mmol/L    Co2 26 20 - 33 mmol/L    Anion Gap 8.0 0.0 - 11.9    Glucose 99 65 - 99 mg/dL    Bun 22 8 - 22 mg/dL    Creatinine 1.02 0.50 - 1.40 mg/dL    Calcium 8.8 8.5 - 10.5 mg/dL    AST(SGOT) 18 12 - 45 U/L    ALT(SGPT) 8 2 - 50 U/L    Alkaline Phosphatase 135 (H) 30 - 99 U/L    Total Bilirubin 0.7 0.1 - 1.5 mg/dL    Albumin 3.5 3.2 - 4.9 g/dL    Total Protein 6.9 6.0 - 8.2 g/dL    Globulin 3.4 1.9 - 3.5 g/dL    A-G Ratio 1.0 g/dL   PROTHROMBIN TIME   Result Value Ref Range    PT 16.3 (H) 12.0 - 14.6 sec    INR 1.27 (H) 0.87 - 1.13   APTT   Result Value Ref Range    APTT 34.5 24.7 - 36.0 sec   TROPONIN   Result Value Ref Range    Troponin I 0.02 0.00 - 0.04 ng/mL   URINALYSIS CULTURE, IF INDICATED   Result Value Ref Range    Color Yellow     Character Turbid (A)     Specific Gravity 1.016 <1.035    Ph 6.0 5.0 - 8.0    Glucose Negative Negative mg/dL    Ketones Negative Negative mg/dL    Protein 100 (A) Negative mg/dL    Bilirubin Negative Negative    Urobilinogen, Urine 1.0 Negative    Nitrite Positive (A) Negative    Leukocyte Esterase Large (A) Negative    Occult Blood Large (A) Negative    Micro  Urine Req Microscopic     Culture Indicated Yes UA Culture   URINE MICROSCOPIC (W/UA)   Result Value Ref Range    WBC Packed (A) /hpf    RBC 10-20 (A) /hpf    Bacteria Few (A) None /hpf   ESTIMATED GFR   Result Value Ref Range    GFR If African American >60 >60 mL/min/1.73 m 2    GFR If Non African American >60 >60 mL/min/1.73 m 2     All labs reviewed by me.    RADIOLOGY/PROCEDURES  DX-CHEST-PORTABLE (1 VIEW)   Final Result      1.  Linear bibasilar atelectasis.      2.  Cardiomegaly.        The radiologist's interpretations of all radiological studies have been reviewed by me.     COURSE & MEDICAL DECISION MAKING  Pertinent Labs & Imaging studies reviewed. (See chart for details)    Differential diagnoses include but are not limited to UTI vs urosepsis vs pneumonia vs medication side effect vs electrolyte imbalance    2:57 PM - Patient seen and examined at bedside. Ordered DX chest, TSH, Free TSH, CBC with differential, CMP, PTT, APTT, Troponin, BNP, U/A culture if indicated, and Blood culture.  Patient will be medicated with NS infusion for dehydration for his symptoms.     3:14 PM Review of laboratory results reveal 35 platelets.    3:18 PM Review of past medical records shows the patient is status DNR as of 8/12. On 9/19/2017 two CBCs: one platelet count was 30 the other was 36, H&H was 10.8 and 33.    3:43 PM I order Sinemet CT  mg tablet to treat.    4:24 PM I ordered Rocephin injection 2 g to treat.    Decision Making  Patient has been sick for about 5 days. Or confusing usual. Now some hematuria. He also has been noticed to be thrombocytopenic again. He is sent here from the care facility due to his thrombocytopenia possibly urinary tract infection. I went back to the records and he had E. coli sensitive to Rocephin about 3 weeks ago therefore I gave him Rocephin IV. I discussed the case with the [facility] and they'll name the hospital for continued workup of his urinary tract infection  with Hurst catheter in place and his increased confusion and his thrombocytopenia. The patient does have cancer that is causing the thrombocytopenia.  Patient's wife states that he is a DNR and I looked this up and in fact he was a DNR last time he was hospitalized.      FINAL IMPRESSION  1. Confusion  2. Urinary tract infection with indwelling catheter  3. Thrombocytopenia  4. Myeloma dysplastic syndrome  5. DNR    PLAN  1. Admission [facility]  2. IV antibiotics for urinary tract infection and possible early urosepsis  3. Continued workup and evaluation of his thrombocytopenia       Delano HUANG (Scribe), am scribing for, and in the presence of, Alden Bustos M.D..    Electronically signed by: Delano Jones (Mendozaibe), 9/20/2017    Alden HUANG M.D. personally performed the services described in this documentation, as scribed by Delano Jones in my presence, and it is both accurate and complete.    The note accurately reflects work and decisions made by me.  Alden Bustos  9/20/2017  6:11 PM

## 2017-09-20 NOTE — ED NOTES
Pt arrived via [facility], resident at [facility]. EMS states hematuria x 2-3 days. EMS states this is usual when pt has UTI. Pt has indwelling catheter in place, blood clots noted to inside of bag otherwise urine appears yellow.     EMS also states pt platelets are 30. Pt denies pain. A/o x4, speaking in full sentences.

## 2017-09-21 LAB
ANION GAP SERPL CALC-SCNC: 8 MMOL/L (ref 0–11.9)
BASOPHILS # BLD AUTO: 0.4 % (ref 0–1.8)
BASOPHILS # BLD: 0.03 K/UL (ref 0–0.12)
BUN SERPL-MCNC: 19 MG/DL (ref 8–22)
CALCIUM SERPL-MCNC: 8.8 MG/DL (ref 8.5–10.5)
CHLORIDE SERPL-SCNC: 105 MMOL/L (ref 96–112)
CO2 SERPL-SCNC: 23 MMOL/L (ref 20–33)
CREAT SERPL-MCNC: 0.94 MG/DL (ref 0.5–1.4)
EOSINOPHIL # BLD AUTO: 0.25 K/UL (ref 0–0.51)
EOSINOPHIL NFR BLD: 3.5 % (ref 0–6.9)
ERYTHROCYTE [DISTWIDTH] IN BLOOD BY AUTOMATED COUNT: 63.3 FL (ref 35.9–50)
GFR SERPL CREATININE-BSD FRML MDRD: >60 ML/MIN/1.73 M 2
GLUCOSE SERPL-MCNC: 89 MG/DL (ref 65–99)
HCT VFR BLD AUTO: 31.5 % (ref 42–52)
HGB BLD-MCNC: 10.4 G/DL (ref 14–18)
IMM GRANULOCYTES # BLD AUTO: 0.03 K/UL (ref 0–0.11)
IMM GRANULOCYTES NFR BLD AUTO: 0.4 % (ref 0–0.9)
LYMPHOCYTES # BLD AUTO: 1.06 K/UL (ref 1–4.8)
LYMPHOCYTES NFR BLD: 14.7 % (ref 22–41)
MCH RBC QN AUTO: 28.7 PG (ref 27–33)
MCHC RBC AUTO-ENTMCNC: 33 G/DL (ref 33.7–35.3)
MCV RBC AUTO: 87 FL (ref 81.4–97.8)
MONOCYTES # BLD AUTO: 2.34 K/UL (ref 0–0.85)
MONOCYTES NFR BLD AUTO: 32.5 % (ref 0–13.4)
NEUTROPHILS # BLD AUTO: 3.49 K/UL (ref 1.82–7.42)
NEUTROPHILS NFR BLD: 48.5 % (ref 44–72)
NRBC # BLD AUTO: 0.02 K/UL
NRBC BLD AUTO-RTO: 0.3 /100 WBC
PLATELET # BLD AUTO: 39 K/UL (ref 164–446)
POTASSIUM SERPL-SCNC: 4 MMOL/L (ref 3.6–5.5)
RBC # BLD AUTO: 3.62 M/UL (ref 4.7–6.1)
SODIUM SERPL-SCNC: 136 MMOL/L (ref 135–145)
WBC # BLD AUTO: 7.2 K/UL (ref 4.8–10.8)

## 2017-09-21 PROCEDURE — 770020 HCHG ROOM/CARE - TELE (206)

## 2017-09-21 PROCEDURE — G8996 SWALLOW CURRENT STATUS: HCPCS | Mod: CJ

## 2017-09-21 PROCEDURE — 92610 EVALUATE SWALLOWING FUNCTION: CPT

## 2017-09-21 PROCEDURE — 99233 SBSQ HOSP IP/OBS HIGH 50: CPT | Performed by: HOSPITALIST

## 2017-09-21 PROCEDURE — 700111 HCHG RX REV CODE 636 W/ 250 OVERRIDE (IP): Performed by: FAMILY MEDICINE

## 2017-09-21 PROCEDURE — G8997 SWALLOW GOAL STATUS: HCPCS | Mod: CI

## 2017-09-21 PROCEDURE — 700105 HCHG RX REV CODE 258: Performed by: FAMILY MEDICINE

## 2017-09-21 PROCEDURE — 80048 BASIC METABOLIC PNL TOTAL CA: CPT

## 2017-09-21 PROCEDURE — A9270 NON-COVERED ITEM OR SERVICE: HCPCS | Performed by: FAMILY MEDICINE

## 2017-09-21 PROCEDURE — 700102 HCHG RX REV CODE 250 W/ 637 OVERRIDE(OP): Performed by: FAMILY MEDICINE

## 2017-09-21 PROCEDURE — 85025 COMPLETE CBC W/AUTO DIFF WBC: CPT

## 2017-09-21 PROCEDURE — 36415 COLL VENOUS BLD VENIPUNCTURE: CPT

## 2017-09-21 RX ADMIN — AMANTADINE HYDROCHLORIDE 100 MG: 100 CAPSULE ORAL at 21:11

## 2017-09-21 RX ADMIN — STANDARDIZED SENNA CONCENTRATE AND DOCUSATE SODIUM 2 TABLET: 8.6; 5 TABLET, FILM COATED ORAL at 21:11

## 2017-09-21 RX ADMIN — Medication 220 MG: at 17:30

## 2017-09-21 RX ADMIN — CARBIDOPA AND LEVODOPA 1 TABLET: 50; 200 TABLET, EXTENDED RELEASE ORAL at 09:17

## 2017-09-21 RX ADMIN — SODIUM CHLORIDE: 9 INJECTION, SOLUTION INTRAVENOUS at 23:40

## 2017-09-21 RX ADMIN — Medication 220 MG: at 09:42

## 2017-09-21 RX ADMIN — MEMANTINE HYDROCHLORIDE 10 MG: 10 TABLET, FILM COATED ORAL at 09:20

## 2017-09-21 RX ADMIN — CITALOPRAM HYDROBROMIDE 20 MG: 20 TABLET ORAL at 09:20

## 2017-09-21 RX ADMIN — DOXAZOSIN MESYLATE 1 MG: 1 TABLET ORAL at 21:10

## 2017-09-21 RX ADMIN — CARBIDOPA AND LEVODOPA 1 TABLET: 50; 200 TABLET, EXTENDED RELEASE ORAL at 21:11

## 2017-09-21 RX ADMIN — ENALAPRILAT 1.25 MG: 1.25 INJECTION INTRAVENOUS at 01:02

## 2017-09-21 RX ADMIN — SODIUM CHLORIDE: 9 INJECTION, SOLUTION INTRAVENOUS at 09:33

## 2017-09-21 RX ADMIN — ENALAPRILAT 1.25 MG: 1.25 INJECTION INTRAVENOUS at 23:44

## 2017-09-21 RX ADMIN — MEMANTINE HYDROCHLORIDE 10 MG: 10 TABLET, FILM COATED ORAL at 21:11

## 2017-09-21 RX ADMIN — AMANTADINE HYDROCHLORIDE 100 MG: 100 CAPSULE ORAL at 09:19

## 2017-09-21 RX ADMIN — MODAFINIL 200 MG: 100 TABLET ORAL at 09:17

## 2017-09-21 RX ADMIN — CEFTRIAXONE 2 G: 2 INJECTION, POWDER, FOR SOLUTION INTRAMUSCULAR; INTRAVENOUS at 09:35

## 2017-09-21 RX ADMIN — STANDARDIZED SENNA CONCENTRATE AND DOCUSATE SODIUM 2 TABLET: 8.6; 5 TABLET, FILM COATED ORAL at 09:00

## 2017-09-21 ASSESSMENT — PAIN SCALES - GENERAL
PAINLEVEL_OUTOF10: 0
PAINLEVEL_OUTOF10: 0

## 2017-09-21 NOTE — PROGRESS NOTES
Patient up to tele floor, tele box in place, monitor room notified.  Patient has no complaints of pain at this time.  Spouse at bedside.  Bed in low, locked position.  Bed alarm on.

## 2017-09-21 NOTE — CARE PLAN
Problem: Communication  Goal: The ability to communicate needs accurately and effectively will improve    Intervention: Educate patient and significant other/support system about the plan of care, procedures, treatments, medications and allow for questions  Discuss plan of care with pt and family. Address concerns and questions. Reorient pt as needed.       Problem: Safety  Goal: Will remain free from injury  Educate pt regarding safety precautions and to call for assistance. Bed alarm on. Non-skid socks on.

## 2017-09-21 NOTE — PROGRESS NOTES
Lab called with critical result of platelets at 39 at 0250. Critical lab result read back to lab.  Dr. Poole notified of critical lab result at 0255.  Critical lab result read back by Dr. Poole.

## 2017-09-21 NOTE — PROGRESS NOTES
Full skin assessment complete with Lorrie LEE.  Pt has cut on right side of mouth, bruising right forearm, petechia present bilat upper extremities and chest, blanching redness around urethra (rinaldi present), blanching redness on sacrum, dry flaky skin on feet.

## 2017-09-21 NOTE — ASSESSMENT & PLAN NOTE
Resolved  Continue as per sepsis protocol  Continue IV fluids  Continue IV antibiotics  Patient has a Pseudomonas urinary tract infection secondary to Hurst catheter

## 2017-09-21 NOTE — PROGRESS NOTES
Pt observed, no change from original assessment, vss, no c/o pain at this time  Pt AAOx3, no other signs or symptoms of distress, fall precautions in place, call light within reach, all questions answered, will continue to monitor. In a sinus rhythm.

## 2017-09-21 NOTE — PROGRESS NOTES
Received report. Assessed pt. Discussed plan of care with pt and wife. AOx3 Disoriented to time. Call light in reach. Fall precautions in place. Bed in lowest position, bed locked, RN and CNA numbers provided. Provided thickened water. Bedside swallow eval completed. Hourly rounding in practice.

## 2017-09-21 NOTE — THERAPY
"Speech Language Therapy Clinical Swallow Evaluation completed.    Functional Status:   Pt was sleepy with confusion initially, however aroused with repositioning.  Pt's spouse reports pt has worked extensively with speech therapy at [facility], and was on a dysphagia II/thin liquid diet at [facility].  Pt had limited lingual ROM with slight inconsistent tremors noted in lips.  Pt consumed PO trials of nectars, puree, soft solids and thins without any overt s/sx of aspiration.  Mastication was effective with soft solids and no oral residue was noted.  Pt requires assistance with feeding and set-up.  Recommend a dysphagia II diet with thins, with direct assistance.      Recommendations - Diet: Thin Liquid, Dysphagia II                          Strategies: Direct supervision during meals, Assistance needed for meal tray set-up, No Straws and Head of Bed at 90 Degrees                          Medication Administration:  Float Whole with Puree    Plan of Care: Will benefit from Speech Therapy 3 times per week    Post-Acute Therapy: Discharge to a transitional care facility for continued skilled therapy services.    See \"Rehab Therapy-Acute\" Patient Summary Report for complete documentation. Thank you for the consult.  "

## 2017-09-21 NOTE — DIETARY
"Nutrition Services: Unintended weight loss   76 year old male with diagnosis of sepsis r/t UTI. Spoke with patient's wife at bedside along with my co-intern and the dietitian regarding her 's weight loss. She reported the patient's usual body weight is ~195 lbs. He has lost ~22 lbs, which is likely severe weight loss of 11% over the past 3-6 months dependent on specific time frame. She reported that he has had a decrease in appetite related to his current infection, as well as Parkinson's disease. In addition, he has been unable to walk for the last 6 weeks due to his infection. He is currently on a dysphagia 2 diet and the wife stated that he is tolerating it well. The wife also stated the patient takes Ensure with his medications at home. We recommended supplementing Boost to help increase calories while he is here. RD discussed this with RN. Wife is hoping that after the infection is cleared up and with adequate nutrition, he will be able to get up and moving again.   RD will continue to monitor.     Past Medical History:   Diagnosis Date   • Hemorrhagic disorder (CMS-HCC) 7-    bruises easily   • Compression fracture of spine (CMS-HCC)    • MDS (myelodysplastic syndrome) (CMS-HCC)    • Parkinson's disease (CMS-HCC)      Ht: 6'3\"  Wt: 78.5 kg (stated)  BMI: 21.62  Pertinent Labs: chem panel within normal limits  Pertinent Meds: amantadine, carbidopa/levodopa, rocephin, citalopram, ferrous sulfate, memantine, pericolace  Fluids: NS infusion 75 mL/hr  Skin: intact  GI: last BM 9/20  Diet: Dysphagia 2 (thin liquids)    PLAN/RECOMMEND -   1) Encourage PO intake & supplement intake to avoid further weight loss  2) NR to see daily for meal selections   3) Monitor weights regularly  4) Advance diet as appropriate per MD/SLP  5) Obtain scale weight as able      RD following  "

## 2017-09-21 NOTE — H&P
DOS: 9/20/2017    PATIENT ID:  NAME:  Rafael Felix  MRN:               [id number]  YOB: 1940    PMD: MERI Casarez    CC: fever    HPI: Rafael Felix is a 76 y.o. male who presents with fever, noted today at the skilled nursing facility, he is a poor historian, he sees taken from the spouse is at bedside. She has noted that he has been increasingly confused over the past few days. He has a chronic Hurst catheter in place secondary to urinary retention. On review of the records it appears that it was difficult to place the catheter and urology had to be involved. He was admitted here a month ago secondary to aspiration pneumonia as well as urinary tract infection and dysphagia. His wife states that the dysphagia is no longer an issue. Urinalysis here shows a urinary tract infection.                REVIEW OF SYSTEMS  A full review of systems was completed and all pertinent positives and negatives are included in the HPI above by AMA/CMS criteria.    PAST MEDICAL HISTORY  Past Medical History:   Diagnosis Date   • Hemorrhagic disorder (CMS-[facility]) 7-    bruises easily   • Compression fracture of spine (CMS-HCC)    • MDS (myelodysplastic syndrome) (CMS-HCC)    • Parkinson's disease (CMS-HCC)        PAST SURGICAL HISTORY  Past Surgical History:   Procedure Laterality Date   • CYSTOSCOPY  8/27/2017    Procedure: CYSTOSCOPY, COMPLEX CATHETERIZATION;  Surgeon: Wild Almeida M.D.;  Location: [facility];  Service: Urology   • EVACUATION OF HEMATOMA  8/27/2017    Procedure: EVACUATION OF HEMATOMA, TUR OLD BLADDER CLOTS, DILATION OF URETHRAL STRICTURE;  Surgeon: Wild Almeida M.D.;  Location: [facility];  Service: Urology   • TRANS URETHRAL RESECTION BLADDER  8/27/2017    Procedure: TRANS URETHRAL RESECTION BLADDER;  Surgeon: Wild Almeida M.D.;  Location: [facility];  Service: Urology   • KYPHOPLASTY  8/6/2017    Procedure: KYPHOPLASTY T11, T12, L1 ;  Surgeon:  Niels Gallardo M.D.;  [place] [facility] [place]  Service:    • CATARACT EXTRACTION WITH IOL     • KNEE ARTHROSCOPY     • PROSTATECTOMY, RADIAL      subtotal   • ROTATOR CUFF REPAIR      right   • SPLENECTOMY         FAMILY HISTORY  Family History   Problem Relation Age of Onset   • Heart Disease Mother    • Lung Disease Mother    • Cancer Father      pancreatic       SOCIAL HISTORY  Social History   Substance Use Topics   • Smoking status: Never Smoker   • Smokeless tobacco: Never Used   • Alcohol use Yes      Comment: 1 per day       ALLERGIES  Allergies as of 09/20/2017 - Reviewed 09/20/2017   Allergen Reaction Noted   • Diflucan [fluconazole]  08/27/2017       OUTPATIENT MEDICATIONS     Medication List      ASK your doctor about these medications      Instructions   amantadine 100 MG Caps  Commonly known as:  SYMMETREL   Take 1 Cap by mouth 2 times a day.  Dose:  100 mg     * RYTARY 61. MG Cpcr  Generic drug:  Carbidopa-Levodopa ER   Take 1 Cap by mouth 3 times a day.  Dose:  1 Cap     * carbidopa-levodopa SR  MG per tablet  Commonly known as:  SINEMET CR   Take 1 Tab by mouth every bedtime.  Dose:  1 Tab     citalopram 20 MG Tabs  Commonly known as:  CELEXA   Take 1 Tab by mouth every day.  Dose:  20 mg     doxazosin 1 MG Tabs  Commonly known as:  CARDURA   Take 1 Tab by mouth every bedtime.  Dose:  1 mg     ferrous sulfate 220 (44 Fe) MG/5ML Elix  Commonly known as:  FEOSOL   Take 5 mL by mouth 2 times a day, with meals.  Dose:  220 mg     memantine 10 MG Tabs  Commonly known as:  NAMENDA   Take 1 Tab by mouth 2 times a day.  Dose:  10 mg     modafinil 200 MG Tabs  Commonly known as:  PROVIGIL   Take 1 Tab by mouth every day.  Dose:  200 mg     tramadol 50 MG Tabs  Commonly known as:  ULTRAM   Take 1 Tab by mouth every 6 hours as needed for Moderate Pain or Severe Pain.  Dose:  50 mg     triazolam 0.125 MG tablet  Commonly known as:  HALCION   Take 1 Tab by mouth at bedtime as  needed.  Dose:  0.125 mg        * This list has 2 medication(s) that are the same as other medications prescribed for you. Read the directions carefully, and ask your doctor or other care provider to review them with you.                PHYSICAL EXAM:  Blood pressure 128/74, pulse 61, temperature 36.7 °C (98 °F), resp. rate 14, weight 78.5 kg (173 lb), SpO2 97 %.  Oxygen: Pulse Oximetry: 97 %, O2 Delivery: None (Room Air)    Gen: NAD, comfortable  HEENT: NCAT, PERRL, EOMI, Oropharynx moist and clear, no LAD, no JVD, neck supple  Chest: no accessory muscle use, CTAB  CV: RRR, S1 and S2 distinct, no murmur  GI: +BS, soft, NT, ND, no rebound/guarding, no hepatosplenomegaly  Extremities: Warm, well-perfused, no cyanosis/clubbing, no peripheral edema, distal pulses are intact  Neuro: AO x 1, CN II-XII grossly intact, MMT 5/5, no sensory deficits, hand tremors    LAB TESTS and IMAGES:   Recent Labs      09/20/17   1535   WBC  11.2*   RBC  3.64*   HEMOGLOBIN  10.2*   HEMATOCRIT  31.8*   MCV  87.4   MCH  28.0   RDW  62.9*   PLATELETCT  40*   NEUTSPOLYS  73.50*   LYMPHOCYTES  7.70*   MONOCYTES  15.40*   EOSINOPHILS  0.80   BASOPHILS  0.80   RBCMORPHOLO  Present     Recent Labs      09/20/17   1535   TROPONINI  0.02   BNPBTYPENAT  92     Recent Labs      09/20/17   1535   APTT  34.5   INR  1.27*     Recent Labs      09/20/17   1535   SODIUM  136   POTASSIUM  4.1   CHLORIDE  102   CO2  26   BUN  22   CREATININE  1.02   CALCIUM  8.8   ALBUMIN  3.5     Estimated GFR/CRCL = CrCl cannot be calculated (Unknown ideal weight.).  Recent Labs      09/20/17   1535   GLUCOSE  99     Free T-4   Date/Time Value Ref Range Status   09/20/2017 03:35 PM 0.77 0.53 - 1.43 ng/dL Final   09/28/2016 01:53 PM 0.83 0.53 - 1.43 ng/dL Final     Recent Labs      09/20/17   1535   ASTSGOT  18   ALTSGPT  8   TBILIRUBIN  0.7   ALKPHOSPHAT  135*   GLOBULIN  3.4   INR  1.27*           Invalid input(s): [id number]  Vitamin B12 -True Cobalamin   Date/Time  Value Ref Range Status   05/08/2017 06:01 PM >1500 (H) 211 - 911 pg/mL Final   09/28/2016 01:53 PM 1,136 (H) 211 - 911 pg/mL Final     Iron   Date/Time Value Ref Range Status   08/24/2017 01:35 PM 29 (L) 50 - 180 ug/dL Final   09/28/2016 01:53 PM 85 50 - 180 ug/dL Final     Total Iron Binding   Date/Time Value Ref Range Status   08/24/2017 01:35  250 - 450 ug/dL Final   09/28/2016 01:53  250 - 450 ug/dL Final     Dx-chest-portable (1 View)    Result Date: 9/20/2017 9/20/2017 3:27 PM HISTORY/REASON FOR EXAM: Hematuria TECHNIQUE/EXAM DESCRIPTION AND NUMBER OF VIEWS: Single portable view of the chest. COMPARISON: 8/27/2017 FINDINGS: There is linear bibasilar atelectasis. There is no pleural effusion. The heart is enlarged.     1.  Linear bibasilar atelectasis. 2.  Cardiomegaly.    Dx-chest-portable (1 View)    Result Date: 8/27/2017 8/27/2017 3:33 PM HISTORY/REASON FOR EXAM: Fever and cough TECHNIQUE/EXAM DESCRIPTION AND NUMBER OF VIEWS: Single portable view of the chest. COMPARISON: 8/13/2017 FINDINGS: There is left lung base atelectasis. There is small left pleural effusion. The heart is enlarged. Feeding tube is present. There are surgical changes involving the right shoulder.     Left lung base atelectasis with small left pleural effusion.      ASSESSMENT/PLAN:     1.  Sepsis. Likely urinary source, follow cultures, protocol started  2.  Urinary tract infection. Review of culture shows previous E. coli, start IV Rocephin  3.  Encephalopathy. Multifactorial  4.  Urinary retention. Hurst catheter will need to be changed, consult urology in the morning  5.  Parkinson's disease. Continue current regimen  6.  History of dysphagia. Bedside swallow evaluation, if there are concerns will consult speech for a formal swallow evaluation  7.  Myelodysplastic syndrome. Follow CBC, he currently has anemia and thrombocytopenia      PPX: SCDs    CODE STATUS: DNR this was verified and verified with the patient's  spouse    Anticipate that patient will need more than 2 midnights for management of the above discussed medical issues.    This dictation was created using voice recognition software. The accuracy of the dictation is limited to the abilities of the software. I expect there may be some errors of grammar and possibly content.

## 2017-09-21 NOTE — PROGRESS NOTES
[facility] Hospitalist Progress Note    Date of Service: 2017    Chief Complaint  76 y.o. male admitted 2017 with an extensive past medical history including myelodysplastic syndrome and Parkinson's disease. He has a indwelling Hurst catheter. He presented to hospital with fevers and chills at the skilled nursing facility. His diagnosis having sepsis from UTI.    Interval Problem Update  He remains confused  Afebrile  No events overnight  Hurst catheter remains in place    Consultants/Specialty  None    Disposition  Eventually back to skilled nursing facility        Review of Systems   Unable to perform ROS: Dementia      Physical Exam  Laboratory/Imaging   Hemodynamics  Temp (24hrs), Av.9 °C (98.4 °F), Min:36.7 °C (98 °F), Max:37.3 °C (99.1 °F)   Temperature: 36.8 °C (98.3 °F)  Pulse  Av.7  Min: 53  Max: 76 Heart Rate (Monitored): 61  Blood Pressure : 124/75, NIBP: 154/67      Respiratory      Respiration: 18, Pulse Oximetry: 95 %        RUL Breath Sounds: Diminished, RML Breath Sounds: Diminished, RLL Breath Sounds: Diminished, AQUILINO Breath Sounds: Diminished, LLL Breath Sounds: Diminished    Fluids    Intake/Output Summary (Last 24 hours) at 17 1257  Last data filed at 17 0700   Gross per 24 hour   Intake                0 ml   Output             1650 ml   Net            -1650 ml       Nutrition  Orders Placed This Encounter   Procedures   • DIET ORDER     Standing Status:   Standing     Number of Occurrences:   1     Order Specific Question:   Diet:     Answer:   Regular [1]     Order Specific Question:   Texture/Fiber modifications:     Answer:   Dysphagia 2(Pureed/Chopped)specify fluid consistency(question 6) [2]     Order Specific Question:   Consistency/Fluid modifications:     Answer:   Thin Liquids [3]     Order Specific Question:   Miscellaneous modifications:     Answer:   SLP - 1:1 Supervision by Nursing [21]     Order Specific Question:   Miscellaneous modifications:     Answer:    SLP - Deliver to Nursing Station [22]     Physical Exam   Constitutional: He appears lethargic. No distress.   Cachectic  Chronically ill   HENT:   Head: Normocephalic and atraumatic.   Eyes: Conjunctivae are normal. Pupils are equal, round, and reactive to light.   Neck: Normal range of motion. Neck supple.   Cardiovascular: Normal rate and regular rhythm.    Pulmonary/Chest: Effort normal and breath sounds normal. No respiratory distress.   Abdominal: Soft. Bowel sounds are normal. He exhibits no distension.   Musculoskeletal: He exhibits no edema or tenderness.   Neurological: He appears lethargic. He is disoriented.   Skin: Skin is warm and dry. He is not diaphoretic.   Nursing note and vitals reviewed.      Recent Labs      09/20/17   1535  09/21/17   0157   WBC  11.2*  7.2   RBC  3.64*  3.62*   HEMOGLOBIN  10.2*  10.4*   HEMATOCRIT  31.8*  31.5*   MCV  87.4  87.0   MCH  28.0  28.7   MCHC  32.1*  33.0*   RDW  62.9*  63.3*   PLATELETCT  40*  39*     Recent Labs      09/20/17   1535  09/21/17   0157   SODIUM  136  136   POTASSIUM  4.1  4.0   CHLORIDE  102  105   CO2  26  23   GLUCOSE  99  89   BUN  22  19   CREATININE  1.02  0.94   CALCIUM  8.8  8.8     Recent Labs      09/20/17   1535  09/20/17   2042   APTT  34.5  34.4   INR  1.27*  1.35*     Recent Labs      09/20/17   1535   BNPBTYPENAT  92              Assessment/Plan     Myelodysplastic syndrome (CMS-HCC)- (present on admission)   Assessment & Plan    Platelets are at baseline  No anticoagulation for DVT prophylaxis as he is high risk for bleeding        Parkinson's disease (CMS-HCC)- (present on admission)   Assessment & Plan    Continue home medications  Physical therapy and occupational therapy  Speech therapy        Sepsis (CMS-HCC)   Assessment & Plan    Continue aspirin sepsis protocol  Continue IV fluids  Continue IV antibiotics  Source is likely urinary tract  Follow-up culture results            Reviewed items::  Radiology images reviewed, Labs  reviewed and Medications reviewed  DVT prophylaxis pharmacological::  Contraindicated - High bleeding risk  Antibiotics:  Treating active infection/contamination beyond 24 hours perioperative coverage

## 2017-09-21 NOTE — CARE PLAN
Problem: Nutritional:  Goal: Achieve adequate nutritional intake  Patient will consume 50-75% of meals  Outcome: PROGRESSING AS EXPECTED  See dietary note.

## 2017-09-22 PROBLEM — N39.0 PSEUDOMONAS URINARY TRACT INFECTION: Status: ACTIVE | Noted: 2017-09-22

## 2017-09-22 PROBLEM — B96.5 PSEUDOMONAS URINARY TRACT INFECTION: Status: ACTIVE | Noted: 2017-09-22

## 2017-09-22 LAB
BACTERIA UR CULT: ABNORMAL
BACTERIA UR CULT: ABNORMAL
SIGNIFICANT IND 70042: ABNORMAL
SITE SITE: ABNORMAL
SOURCE SOURCE: ABNORMAL

## 2017-09-22 PROCEDURE — 99232 SBSQ HOSP IP/OBS MODERATE 35: CPT | Performed by: HOSPITALIST

## 2017-09-22 PROCEDURE — 700111 HCHG RX REV CODE 636 W/ 250 OVERRIDE (IP): Performed by: HOSPITALIST

## 2017-09-22 PROCEDURE — 700102 HCHG RX REV CODE 250 W/ 637 OVERRIDE(OP): Performed by: HOSPITALIST

## 2017-09-22 PROCEDURE — G8987 SELF CARE CURRENT STATUS: HCPCS | Mod: CL

## 2017-09-22 PROCEDURE — G8979 MOBILITY GOAL STATUS: HCPCS | Mod: CJ

## 2017-09-22 PROCEDURE — 700105 HCHG RX REV CODE 258: Performed by: HOSPITALIST

## 2017-09-22 PROCEDURE — 700105 HCHG RX REV CODE 258: Performed by: FAMILY MEDICINE

## 2017-09-22 PROCEDURE — 700102 HCHG RX REV CODE 250 W/ 637 OVERRIDE(OP): Performed by: FAMILY MEDICINE

## 2017-09-22 PROCEDURE — 97162 PT EVAL MOD COMPLEX 30 MIN: CPT

## 2017-09-22 PROCEDURE — G8978 MOBILITY CURRENT STATUS: HCPCS | Mod: CL

## 2017-09-22 PROCEDURE — A9270 NON-COVERED ITEM OR SERVICE: HCPCS | Performed by: HOSPITALIST

## 2017-09-22 PROCEDURE — 97166 OT EVAL MOD COMPLEX 45 MIN: CPT

## 2017-09-22 PROCEDURE — G8988 SELF CARE GOAL STATUS: HCPCS | Mod: CJ

## 2017-09-22 PROCEDURE — 700111 HCHG RX REV CODE 636 W/ 250 OVERRIDE (IP): Performed by: FAMILY MEDICINE

## 2017-09-22 PROCEDURE — A9270 NON-COVERED ITEM OR SERVICE: HCPCS | Performed by: FAMILY MEDICINE

## 2017-09-22 PROCEDURE — 770020 HCHG ROOM/CARE - TELE (206)

## 2017-09-22 RX ORDER — CARBIDOPA AND LEVODOPA 50; 200 MG/1; MG/1
1 TABLET, EXTENDED RELEASE ORAL 2 TIMES DAILY
Status: DISCONTINUED | OUTPATIENT
Start: 2017-09-22 | End: 2017-09-22

## 2017-09-22 RX ADMIN — ENALAPRILAT 1.25 MG: 1.25 INJECTION INTRAVENOUS at 08:15

## 2017-09-22 RX ADMIN — Medication 220 MG: at 17:21

## 2017-09-22 RX ADMIN — SODIUM CHLORIDE: 9 INJECTION, SOLUTION INTRAVENOUS at 13:59

## 2017-09-22 RX ADMIN — AMANTADINE HYDROCHLORIDE 100 MG: 100 CAPSULE ORAL at 20:59

## 2017-09-22 RX ADMIN — DOXAZOSIN MESYLATE 1 MG: 1 TABLET ORAL at 20:59

## 2017-09-22 RX ADMIN — CARBIDOPA AND LEVODOPA 1 TABLET: 50; 200 TABLET, EXTENDED RELEASE ORAL at 15:33

## 2017-09-22 RX ADMIN — ENALAPRILAT 1.25 MG: 1.25 INJECTION INTRAVENOUS at 23:23

## 2017-09-22 RX ADMIN — MEMANTINE HYDROCHLORIDE 10 MG: 10 TABLET, FILM COATED ORAL at 08:14

## 2017-09-22 RX ADMIN — Medication 220 MG: at 08:15

## 2017-09-22 RX ADMIN — MODAFINIL 200 MG: 100 TABLET ORAL at 08:14

## 2017-09-22 RX ADMIN — CARBIDOPA AND LEVODOPA 1 TABLET: 50; 200 TABLET, EXTENDED RELEASE ORAL at 20:59

## 2017-09-22 RX ADMIN — STANDARDIZED SENNA CONCENTRATE AND DOCUSATE SODIUM 2 TABLET: 8.6; 5 TABLET, FILM COATED ORAL at 08:14

## 2017-09-22 RX ADMIN — CEFTRIAXONE 2 G: 2 INJECTION, POWDER, FOR SOLUTION INTRAMUSCULAR; INTRAVENOUS at 08:21

## 2017-09-22 RX ADMIN — STANDARDIZED SENNA CONCENTRATE AND DOCUSATE SODIUM 2 TABLET: 8.6; 5 TABLET, FILM COATED ORAL at 20:59

## 2017-09-22 RX ADMIN — AMANTADINE HYDROCHLORIDE 100 MG: 100 CAPSULE ORAL at 08:13

## 2017-09-22 RX ADMIN — CITALOPRAM HYDROBROMIDE 20 MG: 20 TABLET ORAL at 08:15

## 2017-09-22 RX ADMIN — MEMANTINE HYDROCHLORIDE 10 MG: 10 TABLET, FILM COATED ORAL at 20:59

## 2017-09-22 ASSESSMENT — PATIENT HEALTH QUESTIONNAIRE - PHQ9
SUM OF ALL RESPONSES TO PHQ9 QUESTIONS 1 AND 2: 0
SUM OF ALL RESPONSES TO PHQ QUESTIONS 1-9: 0
2. FEELING DOWN, DEPRESSED, IRRITABLE, OR HOPELESS: NOT AT ALL
1. LITTLE INTEREST OR PLEASURE IN DOING THINGS: NOT AT ALL

## 2017-09-22 ASSESSMENT — ACTIVITIES OF DAILY LIVING (ADL): TOILETING: INDEPENDENT

## 2017-09-22 ASSESSMENT — COGNITIVE AND FUNCTIONAL STATUS - GENERAL
MOVING TO AND FROM BED TO CHAIR: UNABLE
DRESSING REGULAR UPPER BODY CLOTHING: A LOT
TURNING FROM BACK TO SIDE WHILE IN FLAT BAD: UNABLE
MOBILITY SCORE: 8
HELP NEEDED FOR BATHING: A LOT
DRESSING REGULAR LOWER BODY CLOTHING: A LOT
TOILETING: A LOT
PERSONAL GROOMING: A LITTLE
CLIMB 3 TO 5 STEPS WITH RAILING: TOTAL
WALKING IN HOSPITAL ROOM: A LOT
MOVING FROM LYING ON BACK TO SITTING ON SIDE OF FLAT BED: UNABLE
EATING MEALS: A LITTLE
STANDING UP FROM CHAIR USING ARMS: A LOT
SUGGESTED CMS G CODE MODIFIER MOBILITY: CM
SUGGESTED CMS G CODE MODIFIER DAILY ACTIVITY: CK
DAILY ACTIVITIY SCORE: 14

## 2017-09-22 ASSESSMENT — GAIT ASSESSMENTS
DISTANCE (FEET): 3
ASSISTIVE DEVICE: FRONT WHEEL WALKER
GAIT LEVEL OF ASSIST: MODERATE ASSIST

## 2017-09-22 ASSESSMENT — PAIN SCALES - GENERAL: PAINLEVEL_OUTOF10: 0

## 2017-09-22 NOTE — DISCHARGE PLANNING
Received choice form from [facility] at 1237.  Referral sent to [facility] at 1316 on 09-22-17.

## 2017-09-22 NOTE — THERAPY
"Physical Therapy Evaluation completed.   Bed Mobility:  Supine to Sit: Maximal Assist  Transfers: Sit to Stand: Moderate Assist  Gait: Level Of Assist: Moderate Assist with Front-Wheel Walker       Plan of Care: Will benefit from Physical Therapy 3 times per week  Discharge Recommendations: Equipment: Will Continue to Assess for Equipment Needs. Post-acute therapy Discharge to a transitional care facility for continued skilled therapy services.    Pt presents with decreased functional mobiltiy most likely due to B LE weakness, B LE rigidity, instability, and confusion. In sitting and standing, pt appearing retropulsive. In standing, he tends to have weight primarily on heels and excessive hip flexion. He is unable to correct and is fearful when manually corrected. During stepping, pattern is shuffled, consistent with Parkinsons, with most difficulty lifting and placing L LE. He will benefit from further acute skilled PT services to improve functional mobiltiy and will also benefit from return to SNF.     See \"Rehab Therapy-Acute\" Patient Summary Report for complete documentation.     "

## 2017-09-22 NOTE — PROGRESS NOTES
[facility] Hospitalist Progress Note    Date of Service: 2017    Chief Complaint  76 y.o. male admitted 2017 with an extensive past medical history including myelodysplastic syndrome and Parkinson's disease. He has a indwelling Hurst catheter. He presented to hospital with fevers and chills at the skilled nursing facility. His diagnosis having sepsis from UTI.    Interval Problem Update  No issues overnight  No complaints today  More awake and alert today    Consultants/Specialty  None    Disposition  Eventually back to skilled nursing facility; likely over the weekend        Review of Systems   Unable to perform ROS: Dementia      Physical Exam  Laboratory/Imaging   Hemodynamics  Temp (24hrs), Av.9 °C (98.4 °F), Min:36.7 °C (98.1 °F), Max:37 °C (98.6 °F)   Temperature: 36.7 °C (98.1 °F)  Pulse  Av.3  Min: 53  Max: 82    Blood Pressure : (!) 168/80      Respiratory      Respiration: 19, Pulse Oximetry: 94 %        RUL Breath Sounds: Diminished, RML Breath Sounds: Diminished, RLL Breath Sounds: Diminished, AQUILINO Breath Sounds: Diminished, LLL Breath Sounds: Diminished    Fluids    Intake/Output Summary (Last 24 hours) at 17 1421  Last data filed at 17 0900   Gross per 24 hour   Intake              720 ml   Output             1400 ml   Net             -680 ml       Nutrition  Orders Placed This Encounter   Procedures   • DIET ORDER     Standing Status:   Standing     Number of Occurrences:   1     Order Specific Question:   Diet:     Answer:   Regular [1]     Order Specific Question:   Texture/Fiber modifications:     Answer:   Dysphagia 2(Pureed/Chopped)specify fluid consistency(question 6) [2]     Order Specific Question:   Consistency/Fluid modifications:     Answer:   Thin Liquids [3]     Order Specific Question:   Miscellaneous modifications:     Answer:   SLP - 1:1 Supervision by Nursing [21]     Order Specific Question:   Miscellaneous modifications:     Answer:   SLP - Deliver to  Nursing Station [22]     Physical Exam   Constitutional: He appears lethargic. No distress.   Cachectic  Chronically ill   HENT:   Head: Normocephalic and atraumatic.   Mouth/Throat: No oropharyngeal exudate.   Eyes: Conjunctivae and EOM are normal. Pupils are equal, round, and reactive to light.   Neck: Normal range of motion. Neck supple.   Cardiovascular: Normal rate and regular rhythm.    Pulmonary/Chest: Effort normal and breath sounds normal. No respiratory distress.   Abdominal: Soft. Bowel sounds are normal. He exhibits no distension.   Musculoskeletal: He exhibits no edema or tenderness.   Neurological: He appears lethargic. He is disoriented.   Skin: Skin is warm and dry. He is not diaphoretic.   Nursing note and vitals reviewed.      Recent Labs      09/20/17   1535  09/21/17   0157   WBC  11.2*  7.2   RBC  3.64*  3.62*   HEMOGLOBIN  10.2*  10.4*   HEMATOCRIT  31.8*  31.5*   MCV  87.4  87.0   MCH  28.0  28.7   MCHC  32.1*  33.0*   RDW  62.9*  63.3*   PLATELETCT  40*  39*     Recent Labs      09/20/17   1535  09/21/17   0157   SODIUM  136  136   POTASSIUM  4.1  4.0   CHLORIDE  102  105   CO2  26  23   GLUCOSE  99  89   BUN  22  19   CREATININE  1.02  0.94   CALCIUM  8.8  8.8     Recent Labs      09/20/17   1535  09/20/17   2042   APTT  34.5  34.4   INR  1.27*  1.35*     Recent Labs      09/20/17   1535   BNPBTYPENAT  92              Assessment/Plan     * Pseudomonas urinary tract infection   Assessment & Plan    Secondary to indwelling rinaldi catheter   Continue IV abx        Myelodysplastic syndrome (CMS-[facility])- (present on admission)   Assessment & Plan    Platelets are at baseline  No anticoagulation for DVT prophylaxis as he is high risk for bleeding        Parkinson's disease (CMS-[facility])- (present on admission)   Assessment & Plan    Continue home medications; wife is to bring Parkinson medications from home as they are not on formulary here  Physical therapy and occupational therapy  Speech therapy         Sepsis (CMS-HCC)   Assessment & Plan    Resolved  Continue as per sepsis protocol  Continue IV fluids  Continue IV antibiotics  Source is from a urinary tract infection  Follow-up culture results            Reviewed items::  Labs reviewed and Medications reviewed  DVT prophylaxis pharmacological::  Contraindicated - High bleeding risk  Antibiotics:  Treating active infection/contamination beyond 24 hours perioperative coverage

## 2017-09-22 NOTE — DOCUMENTATION QUERY
DOCUMENTATION QUERY    PROVIDERS: Please select “Cosign w/ note”to reply to query.    To better represent the severity of illness and risk of mortaltiy of your patient, please review the following information and exercise your independent professional judgment in responding to this query.     Chronic rinaldi catheter, Urinary tract infection with indwelling catheter and sepsis source is likely urinary tract are documented in the progress notes and ED report. Please clarify if a relationship exists between the urinary tract infection and indwelling catheter. A probable, likely or suspected diagnosis may be used.    • UTI secondary to indwelling urinary catheter  • UTI unrelated to indwelling urinary catheter  • Other explanation of clinical findings  • Unable to determine (no explanation for clinical findings)          The medical record reflects the following:   Clinical Findings Urine culture: Pseudomonas aeruginosa > 100,000; Packed WBC; Confusion   Treatment  Antibiotics IV;    Risk Factors  Chronic rinaldi - Hx urinary retention;    Location within medical record  ED report, progress notes and lab results      Thank you,   Alexia Hutchinson RN   Clinical   Phone - [phone]

## 2017-09-22 NOTE — PROGRESS NOTES
Patient was very lethargic this morning but is now more awake and responsive. No chest pain or shortness of breath.

## 2017-09-22 NOTE — THERAPY
"Occupational Therapy Evaluation completed.   Functional Status:  Pt presenting to skilled OT services following sepsis and UTI, currently undergoing rehab at SNF, hx of PD,  pt demonstrates impaired balance, decreased activity tolerance, rigidity BLE's more so than UE, confused, generalized weakness all limiting pt's ability to safely perform ADLs. Performed bed mobility with max a, LB dressing max a, h/g simulated cga for facewashing, mod a x2 with STS, mod a x2 for sidestepping to Lt side with FWW, noted retropulsion in sitting and standing. Pt would benefit from acute skilled services while in house and recommended to continue rehab at SNF once medically cleared for d/c.  Plan of Care: Will benefit from Occupational Therapy 3 times per week  Discharge Recommendations:  Equipment: Will Continue to Assess for Equipment Needs. Post-acute therapy Discharge to a transitional care facility for continued skilled therapy services.    See \"Rehab Therapy-Acute\" Patient Summary Report for complete documentation.    "

## 2017-09-22 NOTE — PROGRESS NOTES
Bedside report completed with noc RN. Pt resting. Bed locked in low position. Bed alarm on. Call light within reach. Bed alarm on. Reviewed POC with noc RN and pt.

## 2017-09-22 NOTE — PROGRESS NOTES
Received report. Assessed pt. Discussed plan of care with wife. AOx2 Disoriented to time and event. Call light in reach. Fall precautions in place. Bed in lowest position, bed locked, RN and CNA numbers provided. Provided water. Bed alarm on. Hourly rounding in practice.

## 2017-09-22 NOTE — CARE PLAN
Problem: Communication  Goal: The ability to communicate needs accurately and effectively will improve    Intervention: Educate patient and significant other/support system about the plan of care, procedures, treatments, medications and allow for questions  Discuss plan of care with pt and family. Address concerns and questions. Reorient pt as needed.       Problem: Safety  Goal: Will remain free from injury    Intervention: Provide assistance with mobility  Educate pt regarding safety precautions and to call for assistance. Bed alarm on. Non-skid socks on.       Problem: Mobility  Goal: Risk for activity intolerance will decrease    Intervention: Assess and monitor signs of activity intolerance  Assist pt with moving and mobility. Encourage pt to turn in bed.

## 2017-09-22 NOTE — DISCHARGE PLANNING
Medical Social Work    Met with pt and family at bedside. Pt came from [facility]. Confirmed with POA that plan is to DC back to [facility] once medically cleared.     Choice form completed and faxed to [facility]

## 2017-09-23 LAB
ANION GAP SERPL CALC-SCNC: 10 MMOL/L (ref 0–11.9)
ANISOCYTOSIS BLD QL SMEAR: ABNORMAL
BASOPHILS # BLD AUTO: 1.7 % (ref 0–1.8)
BASOPHILS # BLD: 0.13 K/UL (ref 0–0.12)
BUN SERPL-MCNC: 16 MG/DL (ref 8–22)
CALCIUM SERPL-MCNC: 8.6 MG/DL (ref 8.5–10.5)
CHLORIDE SERPL-SCNC: 106 MMOL/L (ref 96–112)
CO2 SERPL-SCNC: 23 MMOL/L (ref 20–33)
CREAT SERPL-MCNC: 0.84 MG/DL (ref 0.5–1.4)
EOSINOPHIL # BLD AUTO: 0.34 K/UL (ref 0–0.51)
EOSINOPHIL NFR BLD: 4.3 % (ref 0–6.9)
ERYTHROCYTE [DISTWIDTH] IN BLOOD BY AUTOMATED COUNT: 64 FL (ref 35.9–50)
GFR SERPL CREATININE-BSD FRML MDRD: >60 ML/MIN/1.73 M 2
GLUCOSE SERPL-MCNC: 95 MG/DL (ref 65–99)
HCT VFR BLD AUTO: 31 % (ref 42–52)
HGB BLD-MCNC: 9.9 G/DL (ref 14–18)
LG PLATELETS BLD QL SMEAR: NORMAL
LYMPHOCYTES # BLD AUTO: 0.87 K/UL (ref 1–4.8)
LYMPHOCYTES NFR BLD: 11.2 % (ref 22–41)
MANUAL DIFF BLD: NORMAL
MCH RBC QN AUTO: 28.4 PG (ref 27–33)
MCHC RBC AUTO-ENTMCNC: 31.9 G/DL (ref 33.7–35.3)
MCV RBC AUTO: 88.8 FL (ref 81.4–97.8)
MICROCYTES BLD QL SMEAR: ABNORMAL
MONOCYTES # BLD AUTO: 1.95 K/UL (ref 0–0.85)
MONOCYTES NFR BLD AUTO: 25 % (ref 0–13.4)
MORPHOLOGY BLD-IMP: NORMAL
NEUTROPHILS # BLD AUTO: 4.51 K/UL (ref 1.82–7.42)
NEUTROPHILS NFR BLD: 57.8 % (ref 44–72)
NRBC # BLD AUTO: 0.02 K/UL
NRBC BLD AUTO-RTO: 0.3 /100 WBC
PLATELET # BLD AUTO: 37 K/UL (ref 164–446)
PLATELET BLD QL SMEAR: NORMAL
PLATELETS.RETICULATED NFR BLD AUTO: 35.9 K/UL (ref 0.6–13.1)
POTASSIUM SERPL-SCNC: 3.5 MMOL/L (ref 3.6–5.5)
RBC # BLD AUTO: 3.49 M/UL (ref 4.7–6.1)
RBC BLD AUTO: PRESENT
SODIUM SERPL-SCNC: 139 MMOL/L (ref 135–145)
WBC # BLD AUTO: 7.8 K/UL (ref 4.8–10.8)

## 2017-09-23 PROCEDURE — 700102 HCHG RX REV CODE 250 W/ 637 OVERRIDE(OP): Performed by: FAMILY MEDICINE

## 2017-09-23 PROCEDURE — A9270 NON-COVERED ITEM OR SERVICE: HCPCS | Performed by: FAMILY MEDICINE

## 2017-09-23 PROCEDURE — 700101 HCHG RX REV CODE 250: Performed by: FAMILY MEDICINE

## 2017-09-23 PROCEDURE — 85027 COMPLETE CBC AUTOMATED: CPT

## 2017-09-23 PROCEDURE — 700111 HCHG RX REV CODE 636 W/ 250 OVERRIDE (IP): Performed by: HOSPITALIST

## 2017-09-23 PROCEDURE — 85007 BL SMEAR W/DIFF WBC COUNT: CPT

## 2017-09-23 PROCEDURE — 80048 BASIC METABOLIC PNL TOTAL CA: CPT

## 2017-09-23 PROCEDURE — 770020 HCHG ROOM/CARE - TELE (206)

## 2017-09-23 PROCEDURE — 85055 RETICULATED PLATELET ASSAY: CPT

## 2017-09-23 PROCEDURE — 51798 US URINE CAPACITY MEASURE: CPT

## 2017-09-23 PROCEDURE — 700105 HCHG RX REV CODE 258: Performed by: UROLOGY

## 2017-09-23 PROCEDURE — 99232 SBSQ HOSP IP/OBS MODERATE 35: CPT | Performed by: HOSPITALIST

## 2017-09-23 PROCEDURE — 700105 HCHG RX REV CODE 258: Performed by: HOSPITALIST

## 2017-09-23 PROCEDURE — 36415 COLL VENOUS BLD VENIPUNCTURE: CPT

## 2017-09-23 RX ORDER — SODIUM CHLORIDE 9 MG/ML
1000 INJECTION, SOLUTION INTRAVENOUS ONCE
Status: COMPLETED | OUTPATIENT
Start: 2017-09-23 | End: 2017-09-23

## 2017-09-23 RX ORDER — CIPROFLOXACIN 2 MG/ML
400 INJECTION, SOLUTION INTRAVENOUS EVERY 12 HOURS
Status: DISCONTINUED | OUTPATIENT
Start: 2017-09-23 | End: 2017-09-25

## 2017-09-23 RX ADMIN — CIPROFLOXACIN 400 MG: 2 INJECTION, SOLUTION INTRAVENOUS at 20:05

## 2017-09-23 RX ADMIN — CIPROFLOXACIN 400 MG: 2 INJECTION, SOLUTION INTRAVENOUS at 14:01

## 2017-09-23 RX ADMIN — DOXAZOSIN MESYLATE 1 MG: 1 TABLET ORAL at 20:03

## 2017-09-23 RX ADMIN — MEMANTINE HYDROCHLORIDE 10 MG: 10 TABLET, FILM COATED ORAL at 20:03

## 2017-09-23 RX ADMIN — AMANTADINE HYDROCHLORIDE 100 MG: 100 CAPSULE ORAL at 09:30

## 2017-09-23 RX ADMIN — STANDARDIZED SENNA CONCENTRATE AND DOCUSATE SODIUM 2 TABLET: 8.6; 5 TABLET, FILM COATED ORAL at 20:03

## 2017-09-23 RX ADMIN — CARBIDOPA AND LEVODOPA 1 TABLET: 50; 200 TABLET, EXTENDED RELEASE ORAL at 20:03

## 2017-09-23 RX ADMIN — Medication 220 MG: at 17:53

## 2017-09-23 RX ADMIN — CEFTRIAXONE 2 G: 2 INJECTION, POWDER, FOR SOLUTION INTRAMUSCULAR; INTRAVENOUS at 09:31

## 2017-09-23 RX ADMIN — STANDARDIZED SENNA CONCENTRATE AND DOCUSATE SODIUM 2 TABLET: 8.6; 5 TABLET, FILM COATED ORAL at 09:24

## 2017-09-23 RX ADMIN — MEMANTINE HYDROCHLORIDE 10 MG: 10 TABLET, FILM COATED ORAL at 09:24

## 2017-09-23 RX ADMIN — SODIUM CHLORIDE 1000 ML: 9 INJECTION, SOLUTION INTRAVENOUS at 09:21

## 2017-09-23 RX ADMIN — LABETALOL HYDROCHLORIDE 10 MG: 5 INJECTION, SOLUTION INTRAVENOUS at 01:28

## 2017-09-23 RX ADMIN — Medication 220 MG: at 09:22

## 2017-09-23 RX ADMIN — MODAFINIL 200 MG: 100 TABLET ORAL at 09:24

## 2017-09-23 RX ADMIN — AMANTADINE HYDROCHLORIDE 100 MG: 100 CAPSULE ORAL at 20:03

## 2017-09-23 RX ADMIN — CITALOPRAM HYDROBROMIDE 20 MG: 20 TABLET ORAL at 09:24

## 2017-09-23 ASSESSMENT — PAIN SCALES - GENERAL
PAINLEVEL_OUTOF10: 0

## 2017-09-23 NOTE — CONSULTS
DATE OF SERVICE:  09/23/2017    REASON FOR CONSULTATION:  Urology service was consulted by Dr. Astudillo for   advice and opinion regarding this gentleman's urinary retention and urinary   tract infection.    HISTORY OF PRESENT ILLNESS:  The patient is known to Dr. Almeida.  He has a   complex urologic history including within the last month a bout of clot   urinary retention requiring evacuation in the operating room.  He also was   found to have urethral strictures in the bulbar urethra that were dilated, in addition to false posterior passage.  He has had   recurrent urinary tract infections, most recently pseudomonas from 3 days ago   that is pansensitive.    Given that this is indwelling catheter was a likely contributor or source of   his infection, this would need to be changed.  I had a long discussion with   the patient and his wife.  They are anxious to see if he is able to void on   his own.    PHYSICAL EXAMINATION:  GENERAL:  He is in no distress.  VITAL SIGNS:  Temperature 36.8, pulse 60, blood pressure 155/105.  GENITOURINARY:  Urine is clear yellow.  There is a 16-Maori catheter in   place.    IMPRESSION AND PLAN:  A 76-year-old gentleman with urinary tract infection and   urinary retention, with history of urethral strictures recently dilated.    Plan will be for continued broad spectrum antibiotics and full treatment of   this infection.  The catheter will be removed.  The patient is eager to see if   he is able to void on his own, having failed one time already in the office several   weeks ago.  We will monitor him closely today with postvoid residual scans.    If he continues to retain in a significant manner, I explained that the Hurst   catheter will be replaced and another void trial potentially done as an   outpatient.  I have discussed this with the family as well as the nursing   staff and we will follow closely.       ____________________________________     Delano Temple MD    [facility] /  [facility]    DD:  09/23/2017 09:08:15  DT:  09/23/2017 09:29:36    D#:  [id number]  Job#:  [id number]    cc: BRITTANIE ZHAO MD, Umang Astudillo MD

## 2017-09-23 NOTE — PROGRESS NOTES
Received report. Assessed pt. Discussed plan of care with family Addressed questions. AOx3 Disoriented to event. Call light in reach. Fall precautions in place. Bed in lowest position, bed locked, RN and CNA numbers provided. Provided water. Hourly rounding in practice.

## 2017-09-23 NOTE — PROGRESS NOTES
Hurst d/c'ed per orders of Dr. Garcia - urology. Will do post-voidal trials with bladder scans and report to urology in 6 hrs.

## 2017-09-23 NOTE — PROGRESS NOTES
Lab called with critical result of Platelets at 0300. Critical lab result read back to Lab  Dr. Poole notified of critical lab result at 0310.  Critical lab result read back by Dr. Poole.

## 2017-09-23 NOTE — PROGRESS NOTES
[facility] Hospitalist Progress Note    Date of Service: 2017    Chief Complaint  76 y.o. male admitted 2017 with an extensive past medical history including myelodysplastic syndrome and Parkinson's disease. He has a indwelling Hurst catheter. He presented to hospital with fevers and chills at the skilled nursing facility. His diagnosis having sepsis from UTI.    Interval Problem Update  Doing well  No issues overnight  Afebrile  More alert today  Hurst catheter removed    Consultants/Specialty  None    Disposition  Eventually back to skilled nursing facility; likely Monday        Review of Systems   Unable to perform ROS: Dementia      Physical Exam  Laboratory/Imaging   Hemodynamics  Temp (24hrs), Av.8 °C (98.3 °F), Min:36.6 °C (97.9 °F), Max:37.2 °C (98.9 °F)   Temperature: 36.8 °C (98.3 °F)  Pulse  Av.6  Min: 53  Max: 82    Blood Pressure : 155/105      Respiratory      Respiration: 20, Pulse Oximetry: 93 %        RUL Breath Sounds: Diminished, RML Breath Sounds: Diminished, RLL Breath Sounds: Diminished, AQUILINO Breath Sounds: Diminished, LLL Breath Sounds: Diminished    Fluids    Intake/Output Summary (Last 24 hours) at 17 1341  Last data filed at 17 1251   Gross per 24 hour   Intake             1885 ml   Output             1850 ml   Net               35 ml       Nutrition  Orders Placed This Encounter   Procedures   • DIET ORDER     Standing Status:   Standing     Number of Occurrences:   1     Order Specific Question:   Diet:     Answer:   Regular [1]     Order Specific Question:   Texture/Fiber modifications:     Answer:   Dysphagia 2(Pureed/Chopped)specify fluid consistency(question 6) [2]     Order Specific Question:   Consistency/Fluid modifications:     Answer:   Thin Liquids [3]     Order Specific Question:   Miscellaneous modifications:     Answer:   SLP - 1:1 Supervision by Nursing [21]     Order Specific Question:   Miscellaneous modifications:     Answer:   SLP - Deliver to  Nursing Station [22]     Physical Exam   Constitutional: No distress.   Cachectic  Chronically ill   HENT:   Head: Normocephalic and atraumatic.   Eyes: EOM are normal. Pupils are equal, round, and reactive to light.   Neck: Normal range of motion. Neck supple.   Cardiovascular: Normal rate and regular rhythm.    Pulmonary/Chest: Effort normal and breath sounds normal. No respiratory distress.   Abdominal: Soft. Bowel sounds are normal. He exhibits no distension.   Genitourinary:   Genitourinary Comments: Rinaldi catheter removed   Musculoskeletal: He exhibits no edema or tenderness.   Neurological: He is alert. He is disoriented.   Skin: Skin is warm and dry. He is not diaphoretic. No erythema.   Nursing note and vitals reviewed.      Recent Labs      09/20/17   1535 09/21/17 0157 09/23/17 0219   WBC  11.2*  7.2  7.8   RBC  3.64*  3.62*  3.49*   HEMOGLOBIN  10.2*  10.4*  9.9*   HEMATOCRIT  31.8*  31.5*  31.0*   MCV  87.4  87.0  88.8   MCH  28.0  28.7  28.4   MCHC  32.1*  33.0*  31.9*   RDW  62.9*  63.3*  64.0*   PLATELETCT  40*  39*  37*     Recent Labs      09/20/17   1535 09/21/17 0157 09/23/17   0219   SODIUM  136  136  139   POTASSIUM  4.1  4.0  3.5*   CHLORIDE  102  105  106   CO2  26  23  23   GLUCOSE  99  89  95   BUN  22  19  16   CREATININE  1.02  0.94  0.84   CALCIUM  8.8  8.8  8.6     Recent Labs      09/20/17   1535 09/20/17   2042   APTT  34.5  34.4   INR  1.27*  1.35*     Recent Labs      09/20/17   1535   BNPBTYPENAT  92              Assessment/Plan     * Pseudomonas urinary tract infection   Assessment & Plan    Secondary to indwelling rinaldi catheter   Continue IV abx; changed to ciprofloxacin        Myelodysplastic syndrome (CMS-HCC)- (present on admission)   Assessment & Plan    Platelets are at baseline  No anticoagulation for DVT prophylaxis as he is high risk for bleeding        Parkinson's disease (CMS-HCC)- (present on admission)   Assessment & Plan    Continue home  medications  Physical therapy and occupational therapy  Speech therapy        Sepsis (CMS-HCC)   Assessment & Plan    Resolved  Continue as per sepsis protocol  Continue IV fluids  Continue IV antibiotics  Patient has a Pseudomonas urinary tract infection secondary to Hurst catheter        .    Reviewed items::  Labs reviewed and Medications reviewed  DVT prophylaxis pharmacological::  Contraindicated - High bleeding risk  Antibiotics:  Treating active infection/contamination beyond 24 hours perioperative coverage

## 2017-09-23 NOTE — PROGRESS NOTES
Reported to Dr. Garcia on the bladder scan results. Order to do a bladder scan tomorrow in the morning.

## 2017-09-23 NOTE — CARE PLAN
Problem: Communication  Goal: The ability to communicate needs accurately and effectively will improve    Intervention: Educate patient and significant other/support system about the plan of care, procedures, treatments, medications and allow for questions  Discuss plan of care with pt and family. Addressed concerns and questions.       Problem: Safety  Goal: Will remain free from injury    Intervention: Provide assistance with mobility  Educate pt regarding safety precautions and to call for assistance.       Problem: Mobility  Goal: Risk for activity intolerance will decrease    Intervention: Assess and monitor signs of activity intolerance  Assist pt with mobility and encourage turning.

## 2017-09-23 NOTE — PROGRESS NOTES
Received bedside report from PM nurse. Assumed patient care. Chart reviewed. Pt was resting in bed. A&O x 1. Hurst in place per active order. No concerns, complaints or distress. Patient denies pain. POC updated with pt and on the patient communication board. Bed locked and in the lowest position. Call light within reach. Tele box on. Wife at bedside. Will continue to monitor.

## 2017-09-24 PROBLEM — R33.9 URINARY RETENTION: Status: ACTIVE | Noted: 2017-08-22

## 2017-09-24 LAB
ANION GAP SERPL CALC-SCNC: 8 MMOL/L (ref 0–11.9)
BASOPHILS # BLD AUTO: 0.5 % (ref 0–1.8)
BASOPHILS # BLD: 0.03 K/UL (ref 0–0.12)
BUN SERPL-MCNC: 11 MG/DL (ref 8–22)
CALCIUM SERPL-MCNC: 8.3 MG/DL (ref 8.5–10.5)
CHLORIDE SERPL-SCNC: 103 MMOL/L (ref 96–112)
CO2 SERPL-SCNC: 25 MMOL/L (ref 20–33)
CREAT SERPL-MCNC: 0.63 MG/DL (ref 0.5–1.4)
EOSINOPHIL # BLD AUTO: 0.33 K/UL (ref 0–0.51)
EOSINOPHIL NFR BLD: 5.8 % (ref 0–6.9)
ERYTHROCYTE [DISTWIDTH] IN BLOOD BY AUTOMATED COUNT: 63.5 FL (ref 35.9–50)
GFR SERPL CREATININE-BSD FRML MDRD: >60 ML/MIN/1.73 M 2
GLUCOSE SERPL-MCNC: 98 MG/DL (ref 65–99)
HCT VFR BLD AUTO: 28.9 % (ref 42–52)
HGB BLD-MCNC: 9.5 G/DL (ref 14–18)
IMM GRANULOCYTES # BLD AUTO: 0.02 K/UL (ref 0–0.11)
IMM GRANULOCYTES NFR BLD AUTO: 0.4 % (ref 0–0.9)
LYMPHOCYTES # BLD AUTO: 0.87 K/UL (ref 1–4.8)
LYMPHOCYTES NFR BLD: 15.2 % (ref 22–41)
MCH RBC QN AUTO: 28.9 PG (ref 27–33)
MCHC RBC AUTO-ENTMCNC: 32.9 G/DL (ref 33.7–35.3)
MCV RBC AUTO: 87.8 FL (ref 81.4–97.8)
MONOCYTES # BLD AUTO: 2.43 K/UL (ref 0–0.85)
MONOCYTES NFR BLD AUTO: 42.6 % (ref 0–13.4)
NEUTROPHILS # BLD AUTO: 2.03 K/UL (ref 1.82–7.42)
NEUTROPHILS NFR BLD: 35.5 % (ref 44–72)
NRBC # BLD AUTO: 0.03 K/UL
NRBC BLD AUTO-RTO: 0.5 /100 WBC
PLATELET # BLD AUTO: 34 K/UL (ref 164–446)
POTASSIUM SERPL-SCNC: 3.4 MMOL/L (ref 3.6–5.5)
RBC # BLD AUTO: 3.29 M/UL (ref 4.7–6.1)
SODIUM SERPL-SCNC: 136 MMOL/L (ref 135–145)
WBC # BLD AUTO: 5.7 K/UL (ref 4.8–10.8)

## 2017-09-24 PROCEDURE — 85025 COMPLETE CBC W/AUTO DIFF WBC: CPT

## 2017-09-24 PROCEDURE — 700111 HCHG RX REV CODE 636 W/ 250 OVERRIDE (IP): Performed by: HOSPITALIST

## 2017-09-24 PROCEDURE — 36415 COLL VENOUS BLD VENIPUNCTURE: CPT

## 2017-09-24 PROCEDURE — 302140 GU CART: Performed by: UROLOGY

## 2017-09-24 PROCEDURE — 80048 BASIC METABOLIC PNL TOTAL CA: CPT

## 2017-09-24 PROCEDURE — 770006 HCHG ROOM/CARE - MED/SURG/GYN SEMI*

## 2017-09-24 PROCEDURE — 700111 HCHG RX REV CODE 636 W/ 250 OVERRIDE (IP): Performed by: FAMILY MEDICINE

## 2017-09-24 PROCEDURE — A9270 NON-COVERED ITEM OR SERVICE: HCPCS | Performed by: FAMILY MEDICINE

## 2017-09-24 PROCEDURE — 700102 HCHG RX REV CODE 250 W/ 637 OVERRIDE(OP): Performed by: FAMILY MEDICINE

## 2017-09-24 PROCEDURE — 51798 US URINE CAPACITY MEASURE: CPT

## 2017-09-24 PROCEDURE — 99232 SBSQ HOSP IP/OBS MODERATE 35: CPT | Performed by: HOSPITALIST

## 2017-09-24 RX ADMIN — Medication 220 MG: at 08:01

## 2017-09-24 RX ADMIN — CIPROFLOXACIN 400 MG: 2 INJECTION, SOLUTION INTRAVENOUS at 21:00

## 2017-09-24 RX ADMIN — STANDARDIZED SENNA CONCENTRATE AND DOCUSATE SODIUM 2 TABLET: 8.6; 5 TABLET, FILM COATED ORAL at 21:00

## 2017-09-24 RX ADMIN — ENALAPRILAT 1.25 MG: 1.25 INJECTION INTRAVENOUS at 05:11

## 2017-09-24 RX ADMIN — MEMANTINE HYDROCHLORIDE 10 MG: 10 TABLET, FILM COATED ORAL at 08:01

## 2017-09-24 RX ADMIN — AMANTADINE HYDROCHLORIDE 100 MG: 100 CAPSULE ORAL at 21:00

## 2017-09-24 RX ADMIN — CARBIDOPA AND LEVODOPA 1 TABLET: 50; 200 TABLET, EXTENDED RELEASE ORAL at 21:00

## 2017-09-24 RX ADMIN — ENALAPRILAT 1.25 MG: 1.25 INJECTION INTRAVENOUS at 17:06

## 2017-09-24 RX ADMIN — CIPROFLOXACIN 400 MG: 2 INJECTION, SOLUTION INTRAVENOUS at 07:54

## 2017-09-24 RX ADMIN — MEMANTINE HYDROCHLORIDE 10 MG: 10 TABLET, FILM COATED ORAL at 21:08

## 2017-09-24 RX ADMIN — DOXAZOSIN MESYLATE 1 MG: 1 TABLET ORAL at 21:01

## 2017-09-24 RX ADMIN — ACETAMINOPHEN 650 MG: 325 TABLET, FILM COATED ORAL at 21:04

## 2017-09-24 RX ADMIN — MODAFINIL 200 MG: 100 TABLET ORAL at 08:01

## 2017-09-24 RX ADMIN — AMANTADINE HYDROCHLORIDE 100 MG: 100 CAPSULE ORAL at 08:01

## 2017-09-24 RX ADMIN — Medication 220 MG: at 16:53

## 2017-09-24 RX ADMIN — CITALOPRAM HYDROBROMIDE 20 MG: 20 TABLET ORAL at 08:01

## 2017-09-24 ASSESSMENT — PATIENT HEALTH QUESTIONNAIRE - PHQ9
1. LITTLE INTEREST OR PLEASURE IN DOING THINGS: NOT AT ALL
SUM OF ALL RESPONSES TO PHQ9 QUESTIONS 1 AND 2: 0
SUM OF ALL RESPONSES TO PHQ QUESTIONS 1-9: 0
2. FEELING DOWN, DEPRESSED, IRRITABLE, OR HOPELESS: NOT AT ALL

## 2017-09-24 ASSESSMENT — PAIN SCALES - GENERAL
PAINLEVEL_OUTOF10: 0

## 2017-09-24 NOTE — ASSESSMENT & PLAN NOTE
Urology following  Will likely need to have rinaldi catheter replaced  Continue with bladder scans

## 2017-09-24 NOTE — DISCHARGE PLANNING
Medical Social Work    Per IDT rounds, pt likely clear to d/c to [facility] tomorrow. SW faxed transport form to [facility].

## 2017-09-24 NOTE — PROGRESS NOTES
Dr. Garcia at bedside. Updated on the post voidal residual result. Order to bring in the  cart at bedside for a possible procedure in an hour.

## 2017-09-24 NOTE — PROGRESS NOTES
[facility] Hospitalist Progress Note    Date of Service: 2017    Chief Complaint  76 y.o. male admitted 2017 with an extensive past medical history including myelodysplastic syndrome and Parkinson's disease. He has a indwelling Hurst catheter. He presented to hospital with fevers and chills at the skilled nursing facility. His diagnosis having sepsis from UTI.    Interval Problem Update  Doing well  No issues overnight  Afebrile  Hrust catheter out but he still had quite a bit of residual urine but the wife did not want a catheter replaced at this time and she would like to wait to see if the urine builds up; patient doesn't have any complaint at this time    Consultants/Specialty  None    Disposition  Eventually back to skilled nursing facility; likely Monday        Review of Systems   Unable to perform ROS: Dementia      Physical Exam  Laboratory/Imaging   Hemodynamics  Temp (24hrs), Av.5 °C (97.7 °F), Min:36.3 °C (97.4 °F), Max:36.8 °C (98.2 °F)   Temperature: 36.6 °C (97.9 °F)  Pulse  Av.1  Min: 52  Max: 82    Blood Pressure : 113/71      Respiratory      Respiration: 18, Pulse Oximetry: 93 %        RUL Breath Sounds: Diminished, RML Breath Sounds: Diminished, RLL Breath Sounds: Diminished, AQUILINO Breath Sounds: Diminished, LLL Breath Sounds: Diminished    Fluids    Intake/Output Summary (Last 24 hours) at 17 1407  Last data filed at 17 1305   Gross per 24 hour   Intake              560 ml   Output             1120 ml   Net             -560 ml       Nutrition  Orders Placed This Encounter   Procedures   • DIET ORDER     Standing Status:   Standing     Number of Occurrences:   1     Order Specific Question:   Diet:     Answer:   Regular [1]     Order Specific Question:   Texture/Fiber modifications:     Answer:   Dysphagia 2(Pureed/Chopped)specify fluid consistency(question 6) [2]     Order Specific Question:   Consistency/Fluid modifications:     Answer:   Thin Liquids [3]     Order  Specific Question:   Miscellaneous modifications:     Answer:   SLP - 1:1 Supervision by Nursing [21]     Order Specific Question:   Miscellaneous modifications:     Answer:   SLP - Deliver to Nursing Station [22]     Physical Exam   Constitutional: No distress.   Cachectic  Chronically ill   HENT:   Head: Normocephalic and atraumatic.   Eyes: EOM are normal. Pupils are equal, round, and reactive to light.   Neck: Normal range of motion. Neck supple.   Cardiovascular: Normal rate and regular rhythm.    Pulmonary/Chest: Effort normal and breath sounds normal. No respiratory distress.   Abdominal: Soft. Bowel sounds are normal. He exhibits no distension.   Genitourinary:   Genitourinary Comments: Rinaldi catheter removed   Musculoskeletal: He exhibits no edema or tenderness.   Neurological: He is alert. He is disoriented.   Skin: Skin is warm and dry. He is not diaphoretic. No erythema.   Nursing note and vitals reviewed.      Recent Labs      09/23/17 0219 09/24/17   0323   WBC  7.8  5.7   RBC  3.49*  3.29*   HEMOGLOBIN  9.9*  9.5*   HEMATOCRIT  31.0*  28.9*   MCV  88.8  87.8   MCH  28.4  28.9   MCHC  31.9*  32.9*   RDW  64.0*  63.5*   PLATELETCT  37*  34*     Recent Labs      09/23/17 0219  09/24/17   0323   SODIUM  139  136   POTASSIUM  3.5*  3.4*   CHLORIDE  106  103   CO2  23  25   GLUCOSE  95  98   BUN  16  11   CREATININE  0.84  0.63   CALCIUM  8.6  8.3*                      Assessment/Plan     * Pseudomonas urinary tract infection   Assessment & Plan    Secondary to indwelling rinaldi catheter   Continue IV ciprofloxacin        Myelodysplastic syndrome (CMS-HCC)- (present on admission)   Assessment & Plan    Platelets are at baseline  No anticoagulation for DVT prophylaxis as he is high risk for bleeding        Parkinson's disease (CMS-HCC)- (present on admission)   Assessment & Plan    Continue home medications  Physical therapy and occupational therapy  Speech therapy        Sepsis (CMS-HCC)   Assessment &  Plan    Resolved  Continue as per sepsis protocol  Continue IV fluids  Continue IV antibiotics  Patient has a Pseudomonas urinary tract infection secondary to Rinaldi catheter        Urinary retention- (present on admission)   Assessment & Plan    Urology following  Will likely need to have rinaldi catheter replaced  Continue with bladder scans        .    Reviewed items::  Labs reviewed and Medications reviewed  DVT prophylaxis pharmacological::  Contraindicated - High bleeding risk  Antibiotics:  Treating active infection/contamination beyond 24 hours perioperative coverage

## 2017-09-24 NOTE — PROGRESS NOTES
"Urology Progress Note      Overnight Events: None    S: increasing post void residuals since rinaldi catheter removed 9/23/17. Recent  cc after voiding around 400 cc. Wife has concerns regarding placement of rinaldi catheter.    O:   Blood pressure 151/91, pulse 69, temperature 36.5 °C (97.7 °F), resp. rate 18, height 1.905 m (6' 3\"), weight 71.2 kg (156 lb 15.5 oz), SpO2 92 %.  Recent Labs      09/23/17   0219  09/24/17   0323   SODIUM  139  136   POTASSIUM  3.5*  3.4*   CHLORIDE  106  103   CO2  23  25   GLUCOSE  95  98   BUN  16  11   CREATININE  0.84  0.63   CALCIUM  8.6  8.3*     Recent Labs      09/23/17 0219 09/24/17   0323   WBC  7.8  5.7   RBC  3.49*  3.29*   HEMOGLOBIN  9.9*  9.5*   HEMATOCRIT  31.0*  28.9*   MCV  88.8  87.8   MCH  28.4  28.9   MCHC  31.9*  32.9*   RDW  64.0*  63.5*   PLATELETCT  37*  34*         Intake/Output Summary (Last 24 hours) at 09/24/17 0948  Last data filed at 09/24/17 0900   Gross per 24 hour   Intake             1800 ml   Output              950 ml   Net              850 ml       Exam:  Abdomen soft, benign.    Urine: clear      A/P:    Active Hospital Problems    Diagnosis   • Myelodysplastic syndrome (CMS-[facility]) [D46.9]     Priority: High   • Parkinson's disease (CMS-[facility]) [G20]     Priority: Medium   • Pseudomonas urinary tract infection [N39.0, B96.5]   • Sepsis (CMS-[facility]) [A41.9]   • DNR (do not resuscitate) [Z66]       I was ready to place rinaldi catheter when Mr Felix voiding another 350 cc.   Wife does not want catheter placed  At this time we will monitor and inform Dr Almeida on Monday  Check PVR every 6-8 hours after voiding.   "

## 2017-09-24 NOTE — PROGRESS NOTES
Received report. Assessed pt. Discussed plan of care with pt and family. AOx2 Disoriented to time and event. Reorient as needed. Call light in reach. Fall precautions in place. Bed in lowest position, bed locked, RN and CNA numbers provided. Provided water. Assisted with repositioning. Hourly rounding in practice.

## 2017-09-24 NOTE — CARE PLAN
Problem: Safety  Goal: Will remain free from falls  Fall precautions in place. Bed wheels locked, bed is in the lowest position. Call light in reach. Bed alarm on and in place. Non-skid socks provided. Patient provides successful verbalization of fall and safety precautions and demonstration of use of call bell. Upper side rails are up. Hourly rounding in progress.     Problem: Knowledge Deficit  Goal: Knowledge of disease process/condition, treatment plan, diagnostic tests, and medications will improve  POC discussed with the patient and family. All questions and concerns addressed and answered. Patient is involved in POC and verbalizes the understanding of the disease process, medications, tests and treatments. Questions on POC encouraged throughout care.

## 2017-09-24 NOTE — CARE PLAN
Problem: Communication  Goal: The ability to communicate needs accurately and effectively will improve    Intervention: Educate patient and significant other/support system about the plan of care, procedures, treatments, medications and allow for questions  Discuss plan of care with pt and family. Address concerns and questions.       Problem: Safety  Goal: Will remain free from injury    Intervention: Provide assistance with mobility  Educate pt regarding safety precautions and to call for assistance. Bed alarm on. Non-skid socks on.       Problem: Mobility  Goal: Risk for activity intolerance will decrease    Intervention: Assess and monitor signs of activity intolerance  Assist pt with mobility and encourage turning.       Problem: Skin Integrity  Goal: Risk for impaired skin integrity will decrease    Intervention: Assess risk factors for impaired skin integrity and/or pressure ulcers  Maintain clean dry skin. Assist pt with turning as needed.

## 2017-09-24 NOTE — DISCHARGE PLANNING
Received transport form from VALERIO Larson to [facility]. Contacted [facility] spoke to Norma they accepted and can take tomorrow. Transport will need to be set up Monday morning. Notified [facility] via voicemail.

## 2017-09-24 NOTE — PROGRESS NOTES
Received bedside report from PM nurse. Assumed patient care. Chart reviewed. Pt was resting in bed. A&O x 2. No concerns, complaints or distress. Patient denies pain. POC updated with pt and on the patient communication board. Bed locked and in the lowest position. Call light within reach. Tele box on. Will continue to monitor.

## 2017-09-24 NOTE — PROGRESS NOTES
Received an order to transfer patient to medical floor. Discontinued Tele box, Monitor room notified.

## 2017-09-24 NOTE — PROGRESS NOTES
Lab called with critical result of Platelets at 0350. Critical lab result read back to lab.   Dr. Poole notified of critical lab result at 0400.  Critical lab result read back by Dr. Poole.  Received parameters for platelets.

## 2017-09-24 NOTE — CARE PLAN
Problem: Knowledge Deficit  Goal: Knowledge of disease process/condition, treatment plan, diagnostic tests, and medications will improve  POC discussed with the patient and family. All questions and concerns addressed and answered. Patient is involved in POC and verbalizes the understanding of the disease process, medications, tests and treatments. Questions on POC encouraged throughout care.       Problem: Psychosocial Needs:  Goal: Level of anxiety will decrease    Intervention: Identify and develop with patient strategies to cope with anxiety triggers  Anxiety triggers identified. Calming techniques provided to patient. Patient is involved in POC and is encouraged to verbalize questions and concerns.

## 2017-09-25 VITALS
HEIGHT: 75 IN | HEART RATE: 75 BPM | WEIGHT: 156.31 LBS | OXYGEN SATURATION: 93 % | BODY MASS INDEX: 19.43 KG/M2 | RESPIRATION RATE: 18 BRPM | DIASTOLIC BLOOD PRESSURE: 74 MMHG | SYSTOLIC BLOOD PRESSURE: 114 MMHG | TEMPERATURE: 99 F

## 2017-09-25 PROBLEM — A41.9 SEPSIS, UNSPECIFIED: Status: RESOLVED | Noted: 2017-09-20 | Resolved: 2017-09-25

## 2017-09-25 LAB
ANION GAP SERPL CALC-SCNC: 8 MMOL/L (ref 0–11.9)
ANISOCYTOSIS BLD QL SMEAR: ABNORMAL
BACTERIA BLD CULT: NORMAL
BACTERIA BLD CULT: NORMAL
BASOPHILS # BLD AUTO: 1.8 % (ref 0–1.8)
BASOPHILS # BLD: 0.16 K/UL (ref 0–0.12)
BUN SERPL-MCNC: 13 MG/DL (ref 8–22)
CALCIUM SERPL-MCNC: 8.2 MG/DL (ref 8.5–10.5)
CHLORIDE SERPL-SCNC: 101 MMOL/L (ref 96–112)
CO2 SERPL-SCNC: 25 MMOL/L (ref 20–33)
CREAT SERPL-MCNC: 0.85 MG/DL (ref 0.5–1.4)
EOSINOPHIL # BLD AUTO: 0.31 K/UL (ref 0–0.51)
EOSINOPHIL NFR BLD: 3.5 % (ref 0–6.9)
ERYTHROCYTE [DISTWIDTH] IN BLOOD BY AUTOMATED COUNT: 63.3 FL (ref 35.9–50)
GFR SERPL CREATININE-BSD FRML MDRD: >60 ML/MIN/1.73 M 2
GLUCOSE SERPL-MCNC: 90 MG/DL (ref 65–99)
HCT VFR BLD AUTO: 30.3 % (ref 42–52)
HGB BLD-MCNC: 10 G/DL (ref 14–18)
LG PLATELETS BLD QL SMEAR: NORMAL
LYMPHOCYTES # BLD AUTO: 0.92 K/UL (ref 1–4.8)
LYMPHOCYTES NFR BLD: 10.5 % (ref 22–41)
MANUAL DIFF BLD: NORMAL
MCH RBC QN AUTO: 29.1 PG (ref 27–33)
MCHC RBC AUTO-ENTMCNC: 33 G/DL (ref 33.7–35.3)
MCV RBC AUTO: 88.1 FL (ref 81.4–97.8)
MONOCYTES # BLD AUTO: 3.01 K/UL (ref 0–0.85)
MONOCYTES NFR BLD AUTO: 34.2 % (ref 0–13.4)
MORPHOLOGY BLD-IMP: NORMAL
NEUTROPHILS # BLD AUTO: 4.4 K/UL (ref 1.82–7.42)
NEUTROPHILS NFR BLD: 49.1 % (ref 44–72)
NEUTS BAND NFR BLD MANUAL: 0.9 % (ref 0–10)
NRBC # BLD AUTO: 0.03 K/UL
NRBC BLD AUTO-RTO: 0.3 /100 WBC
PLATELET # BLD AUTO: 37 K/UL (ref 164–446)
PLATELET BLD QL SMEAR: NORMAL
PLATELETS.RETICULATED NFR BLD AUTO: 38.5 K/UL (ref 0.6–13.1)
POTASSIUM SERPL-SCNC: 3.4 MMOL/L (ref 3.6–5.5)
RBC # BLD AUTO: 3.44 M/UL (ref 4.7–6.1)
RBC BLD AUTO: PRESENT
SIGNIFICANT IND 70042: NORMAL
SIGNIFICANT IND 70042: NORMAL
SITE SITE: NORMAL
SITE SITE: NORMAL
SODIUM SERPL-SCNC: 134 MMOL/L (ref 135–145)
SOURCE SOURCE: NORMAL
SOURCE SOURCE: NORMAL
WBC # BLD AUTO: 8.8 K/UL (ref 4.8–10.8)

## 2017-09-25 PROCEDURE — A9270 NON-COVERED ITEM OR SERVICE: HCPCS | Performed by: FAMILY MEDICINE

## 2017-09-25 PROCEDURE — 99239 HOSP IP/OBS DSCHRG MGMT >30: CPT | Performed by: HOSPITALIST

## 2017-09-25 PROCEDURE — 302146: Performed by: HOSPITALIST

## 2017-09-25 PROCEDURE — A9270 NON-COVERED ITEM OR SERVICE: HCPCS | Performed by: HOSPITALIST

## 2017-09-25 PROCEDURE — 80048 BASIC METABOLIC PNL TOTAL CA: CPT

## 2017-09-25 PROCEDURE — 85055 RETICULATED PLATELET ASSAY: CPT

## 2017-09-25 PROCEDURE — 85007 BL SMEAR W/DIFF WBC COUNT: CPT

## 2017-09-25 PROCEDURE — 85027 COMPLETE CBC AUTOMATED: CPT

## 2017-09-25 PROCEDURE — 92526 ORAL FUNCTION THERAPY: CPT

## 2017-09-25 PROCEDURE — 51798 US URINE CAPACITY MEASURE: CPT

## 2017-09-25 PROCEDURE — 36415 COLL VENOUS BLD VENIPUNCTURE: CPT

## 2017-09-25 PROCEDURE — 700102 HCHG RX REV CODE 250 W/ 637 OVERRIDE(OP): Performed by: FAMILY MEDICINE

## 2017-09-25 PROCEDURE — 700111 HCHG RX REV CODE 636 W/ 250 OVERRIDE (IP): Performed by: HOSPITALIST

## 2017-09-25 PROCEDURE — 700102 HCHG RX REV CODE 250 W/ 637 OVERRIDE(OP): Performed by: HOSPITALIST

## 2017-09-25 RX ORDER — CIPROFLOXACIN 500 MG/1
500 TABLET, FILM COATED ORAL EVERY 12 HOURS
Status: DISCONTINUED | OUTPATIENT
Start: 2017-09-25 | End: 2017-09-25

## 2017-09-25 RX ORDER — CIPROFLOXACIN 500 MG/1
500 TABLET, FILM COATED ORAL EVERY 12 HOURS
Status: DISCONTINUED | OUTPATIENT
Start: 2017-09-25 | End: 2017-09-25 | Stop reason: HOSPADM

## 2017-09-25 RX ORDER — CIPROFLOXACIN 500 MG/1
500 TABLET, FILM COATED ORAL EVERY 12 HOURS
Qty: 20 TAB | Refills: 0 | Status: SHIPPED | OUTPATIENT
Start: 2017-09-25 | End: 2018-01-26

## 2017-09-25 RX ORDER — LORAZEPAM 2 MG/ML
1 INJECTION INTRAMUSCULAR ONCE
Status: COMPLETED | OUTPATIENT
Start: 2017-09-25 | End: 2017-09-25

## 2017-09-25 RX ADMIN — Medication 220 MG: at 16:55

## 2017-09-25 RX ADMIN — Medication 220 MG: at 09:28

## 2017-09-25 RX ADMIN — MODAFINIL 200 MG: 100 TABLET ORAL at 09:29

## 2017-09-25 RX ADMIN — CIPROFLOXACIN HYDROCHLORIDE 500 MG: 500 TABLET, FILM COATED ORAL at 16:55

## 2017-09-25 RX ADMIN — MEMANTINE HYDROCHLORIDE 10 MG: 10 TABLET, FILM COATED ORAL at 09:29

## 2017-09-25 RX ADMIN — AMANTADINE HYDROCHLORIDE 100 MG: 100 CAPSULE ORAL at 09:26

## 2017-09-25 RX ADMIN — CIPROFLOXACIN 400 MG: 2 INJECTION, SOLUTION INTRAVENOUS at 09:27

## 2017-09-25 RX ADMIN — STANDARDIZED SENNA CONCENTRATE AND DOCUSATE SODIUM 2 TABLET: 8.6; 5 TABLET, FILM COATED ORAL at 09:29

## 2017-09-25 RX ADMIN — CITALOPRAM HYDROBROMIDE 20 MG: 20 TABLET ORAL at 09:28

## 2017-09-25 RX ADMIN — LORAZEPAM 1 MG: 2 INJECTION INTRAMUSCULAR; INTRAVENOUS at 11:48

## 2017-09-25 NOTE — DISCHARGE INSTRUCTIONS
Discharge Instructions    Discharged to other by ambulance with escort. Discharged via ambulance, hospital escort: Yes.  Special equipment needed: Not Applicable    Be sure to schedule a follow-up appointment with your primary care doctor or any specialists as instructed.     Discharge Plan:   Influenza Vaccine Indication: Refused  Pneumonia Vaccine: Refused    I understand that a diet low in cholesterol, fat, and sodium is recommended for good health. Unless I have been given specific instructions below for another diet, I accept this instruction as my diet prescription.   Other diet: Pureed Diet, Thin Liquids  Sepsis, Adult  Sepsis is a serious infection of your blood or tissues that affects your whole body. The infection that causes sepsis may be bacterial, viral, fungal, or parasitic. Sepsis may be life threatening. Sepsis can cause your blood pressure to drop. This may result in shock. Shock causes your central nervous system and your organs to stop working correctly.   RISK FACTORS  Sepsis can happen in anyone, but it is more likely to happen in people who have weakened immune systems.  SIGNS AND SYMPTOMS   Symptoms of sepsis can include:  · Fever or low body temperature (hypothermia).  · Rapid breathing (hyperventilation).  · Chills.  · Rapid heartbeat (tachycardia).  · Confusion or light-headedness.  · Trouble breathing.  · Urinating much less than usual.  · Cool, clammy skin or red, flushed skin.  · Other problems with the heart, kidneys, or brain.  DIAGNOSIS   Your health care provider will likely do tests to look for an infection, to see if the infection has spread to your blood, and to see how serious your condition is. Tests can include:  · Blood tests, including cultures of your blood.  · Cultures of other fluids from your body, such as:  ¨ Urine.  ¨ Pus from wounds.  ¨ Mucus coughed up from your lungs.  · Urine tests other than cultures.  · X-ray exams or other imaging tests.  TREATMENT   Treatment  will begin with elimination of the source of infection. If your sepsis is likely caused by a bacterial or fungal infection, you will be given antibiotic or antifungal medicines.  You may also receive:  · Oxygen.  · Fluids through an IV tube.  · Medicines to increase your blood pressure.  · A machine to clean your blood (dialysis) if your kidneys fail.  · A machine to help you breathe if your lungs fail.  SEEK IMMEDIATE MEDICAL CARE IF:  You get an infection or develop any of the signs and symptoms of sepsis after surgery or a hospitalization.     This information is not intended to replace advice given to you by your health care provider. Make sure you discuss any questions you have with your health care provider.     Document Released: 09/15/2004 Document Revised: 05/03/2016 Document Reviewed: 08/25/2014  [employer] Interactive Patient Education ©2016 Elsevier Inc.      Urinary Tract Infection  Urinary tract infections (UTIs) can develop anywhere along your urinary tract. Your urinary tract is your body's drainage system for removing wastes and extra water. Your urinary tract includes two kidneys, two ureters, a bladder, and a urethra. Your kidneys are a pair of bean-shaped organs. Each kidney is about the size of your fist. They are located below your ribs, one on each side of your spine.  CAUSES  Infections are caused by microbes, which are microscopic organisms, including fungi, viruses, and bacteria. These organisms are so small that they can only be seen through a microscope. Bacteria are the microbes that most commonly cause UTIs.  SYMPTOMS   Symptoms of UTIs may vary by age and gender of the patient and by the location of the infection. Symptoms in young women typically include a frequent and intense urge to urinate and a painful, burning feeling in the bladder or urethra during urination. Older women and men are more likely to be tired, shaky, and weak and have muscle aches and abdominal pain. A fever may  mean the infection is in your kidneys. Other symptoms of a kidney infection include pain in your back or sides below the ribs, nausea, and vomiting.  DIAGNOSIS  To diagnose a UTI, your caregiver will ask you about your symptoms. Your caregiver also will ask to provide a urine sample. The urine sample will be tested for bacteria and white blood cells. White blood cells are made by your body to help fight infection.  TREATMENT   Typically, UTIs can be treated with medication. Because most UTIs are caused by a bacterial infection, they usually can be treated with the use of antibiotics. The choice of antibiotic and length of treatment depend on your symptoms and the type of bacteria causing your infection.  HOME CARE INSTRUCTIONS  · If you were prescribed antibiotics, take them exactly as your caregiver instructs you. Finish the medication even if you feel better after you have only taken some of the medication.  · Drink enough water and fluids to keep your urine clear or pale yellow.  · Avoid caffeine, tea, and carbonated beverages. They tend to irritate your bladder.  · Empty your bladder often. Avoid holding urine for long periods of time.  · Empty your bladder before and after sexual intercourse.  · After a bowel movement, women should cleanse from front to back. Use each tissue only once.  SEEK MEDICAL CARE IF:   · You have back pain.  · You develop a fever.  · Your symptoms do not begin to resolve within 3 days.  SEEK IMMEDIATE MEDICAL CARE IF:   · You have severe back pain or lower abdominal pain.  · You develop chills.  · You have nausea or vomiting.  · You have continued burning or discomfort with urination.  MAKE SURE YOU:   · Understand these instructions.  · Will watch your condition.  · Will get help right away if you are not doing well or get worse.     This information is not intended to replace advice given to you by your health care provider. Make sure you discuss any questions you have with your  health care provider.     Document Released: 09/27/2006 Document Revised: 01/08/2016 Document Reviewed: 01/25/2013  [employer] Interactive Patient Education ©2016 [employer].    · Is patient discharged on Warfarin / Coumadin?   No     · Is patient Post Blood Transfusion?  No    Depression / Suicide Risk    As you are discharged from this [facility] facility, it is important to learn how to keep safe from harming yourself.    Recognize the warning signs:  · Abrupt changes in personality, positive or negative- including increase in energy   · Giving away possessions  · Change in eating patterns- significant weight changes-  positive or negative  · Change in sleeping patterns- unable to sleep or sleeping all the time   · Unwillingness or inability to communicate  · Depression  · Unusual sadness, discouragement and loneliness  · Talk of wanting to die  · Neglect of personal appearance   · Rebelliousness- reckless behavior  · Withdrawal from people/activities they love  · Confusion- inability to concentrate     If you or a loved one observes any of these behaviors or has concerns about self-harm, here's what you can do:  · Talk about it- your feelings and reasons for harming yourself  · Remove any means that you might use to hurt yourself (examples: pills, rope, extension cords, firearm)  · Get professional help from the community (Mental Health, Substance Abuse, psychological counseling)  · Do not be alone:Call your Safe Contact- someone whom you trust who will be there for you.  · Call your local CRISIS HOTLINE 903-5596 or 972-326-1033  · Call your local Children's Mobile Crisis Response Team [place] (892) 565-7878 or www.Reasult  · Call the toll free National Suicide Prevention Hotlines   · National Suicide Prevention Lifeline 971-021-XUOA (1416)  · National Hope Line Network 800-SUICIDE (013-4105)

## 2017-09-25 NOTE — DISCHARGE PLANNING
Medical Social Work    Spoke with Anuja at [facility]. Pt currently pending rinaldi placement. Transportation re-arranged for 1600 for transfer to SNF.

## 2017-09-25 NOTE — PROGRESS NOTES
Pt lethargic but arousable on exam. Pt able to maintain wakefulness during breakfast. A/O to self only at this time. Wife at bedside. TQ2. Respirations regular and non labored at this time. Lungs diminished throughout. Pt incontinent small amounts but, having issues with rentention. Frequent bladder scans to assess need for rinaldi placement. Call light within reach. BA in place. Bed in lowest, locked position.

## 2017-09-25 NOTE — CARE PLAN
Problem: Skin Integrity  Goal: Risk for impaired skin integrity will decrease  Outcome: PROGRESSING SLOWER THAN EXPECTED  Pt skin continues to be CDI. Sacrum and coccyx redness but blanchable, barrier cream applied. Dri-delmy pads to help with moisture. TQ2

## 2017-09-25 NOTE — CARE PLAN
Problem: Safety  Goal: Will remain free from falls  Outcome: PROGRESSING AS EXPECTED  Patient instructed to use the call bell for assistance, bed alarm activated, needs met    Problem: Urinary Elimination:  Goal: Ability to reestablish a normal urinary elimination pattern will improve  Outcome: PROGRESSING AS EXPECTED  Incontinent of large amount of urine, no restlessness, more calm after using the urinal

## 2017-09-25 NOTE — DISCHARGE PLANNING
CCS received a transport form to arrange transportation to transfer the patient to [facility]. [facility] called ans spoke to Abigail Abebe at [facility]. Transportation has been arranged to transfer the patient today at 1300. SW on floor Marsha has been notified.

## 2017-09-25 NOTE — PROGRESS NOTES
BS results 716. Dr. Mosquera paged with Urology to ask for rinaldi placement after speaking with Dr. Astudillo and receiving consent from wife for placement after speaking about risks vs. Benefit. Per Dr. Mosquera, he is in an emergency at [facility] at this time and will be to the floor to place the rinaldi catheter when able.

## 2017-09-25 NOTE — DISCHARGE PLANNING
CCS received a PCS form. Transportation has now bee rescheduled to transfer the patient to [facility] at 1730 via [facility] SW on floor Marsha has been notified.

## 2017-09-25 NOTE — DISCHARGE PLANNING
Medical Social Work    Transport form faxed to [facility] for transfer to [facility]

## 2017-09-25 NOTE — THERAPY
"Speech Language Therapy dysphagia treatment completed.     Functional Status:  The patient was seen for dysphagia tx at lunch with a D2/thin liquid meal tray.  Spouse present bedside and reports he is very sleepy s/p taking Ativan this morning.  Pt was able to rouse for meal with moderate tactile stimm, however was confused and confabulatory.  He initially consumed soft solids, nectars and thins without any overt s/sx of aspiration.  Pt's spouse provided 1:1 feeding assistance.  As pt became increasingly sleepy, he had increasing amounts of oral residue and delayed coughing with thins.  All PO was discontinued at this time.  Pt is due to transfer to [facility] this afternoon.  Recommend to continue current diet of dysphagia II with nectars during meals.  OK for small sips of thins between meals.  The patient will benefit from an SLP evaluation soon after arrival to SNF to continue dysphagia therapy.  Pt's spouse verbalized good understanding of SLP recs and risks associated with aspiration.  If pt does not discharge tonight, SLP will follow closely in this setting.        Recommendations: 1) Dysphagia II/nectars, only when pt is awake and alert.  2) OK for small sips of thins between meals, 3) SLP evaluation soon after arrival to [facility]     Plan of Care: Will benefit from Speech Therapy 3 times per week    Post-Acute Therapy: Discharge to a transitional care facility for continued skilled therapy services.    See \"Rehab Therapy-Acute\" Patient Summary Report for complete documentation.     "

## 2017-09-25 NOTE — CARE PLAN
Problem: Nutritional:  Goal: Achieve adequate nutritional intake  Patient will consume 50-75% of meals   Outcome: MET Date Met: 09/25/17  Per chart pt PO %. Pt is eating well.

## 2017-09-25 NOTE — CARE PLAN
Problem: Bowel/Gastric:  Goal: Normal bowel function is maintained or improved  Outcome: PROGRESSING AS EXPECTED  Pt having regular BM, last BM 9/25

## 2017-09-25 NOTE — PROGRESS NOTES
Surgical Progress Note    Author: Mitch Matute Date & Time created: 2017   2:42 PM     Interval Events:    Patient seen and examined.  Not voiding. Previously had deferred catheter placement but he and his wife are now agreeable. Previous difficult placement per RN. On abx for UTI.     Review of Systems   Unable to perform ROS: Dementia     Hemodynamics:  Temp (24hrs), Av.1 °C (98.7 °F), Min:36.8 °C (98.2 °F), Max:37.5 °C (99.5 °F)  Temperature: 37.2 °C (99 °F)  Pulse  Av.4  Min: 52  Max: 82   Blood Pressure : 114/74     Respiratory:    Respiration: 18, Pulse Oximetry: 93 %        RUL Breath Sounds: Diminished, RML Breath Sounds: Diminished, RLL Breath Sounds: Diminished, AQUILINO Breath Sounds: Diminished, LLL Breath Sounds: Diminished  Neuro:  GCS       Fluids:    Intake/Output Summary (Last 24 hours) at 17 1442  Last data filed at 17 1400   Gross per 24 hour   Intake              360 ml   Output             1175 ml   Net             -815 ml     Weight: 70.9 kg (156 lb 4.9 oz)  Current Diet Order   Procedures   • DIET ORDER     Physical Exam   Constitutional: No distress.   Confused   Pulmonary/Chest: Effort normal.   Abdominal: Soft. Bowel sounds are normal. He exhibits no distension. There is no tenderness.   Genitourinary:   Genitourinary Comments: Circumcised penis. Mild glans hypospadias.    Neurological: He is alert.   Skin: Skin is warm and dry.   Nursing note and vitals reviewed.    Labs:  Recent Results (from the past 24 hour(s))   CBC WITH DIFFERENTIAL    Collection Time: 17  5:27 AM   Result Value Ref Range    WBC 8.8 4.8 - 10.8 K/uL    RBC 3.44 (L) 4.70 - 6.10 M/uL    Hemoglobin 10.0 (L) 14.0 - 18.0 g/dL    Hematocrit 30.3 (L) 42.0 - 52.0 %    MCV 88.1 81.4 - 97.8 fL    MCH 29.1 27.0 - 33.0 pg    MCHC 33.0 (L) 33.7 - 35.3 g/dL    RDW 63.3 (H) 35.9 - 50.0 fL    Platelet Count 37 (LL) 164 - 446 K/uL    Nucleated RBC 0.30 /100 WBC    NRBC (Absolute) 0.03 K/uL     Neutrophils-Polys 49.10 44.00 - 72.00 %    Lymphocytes 10.50 (L) 22.00 - 41.00 %    Monocytes 34.20 (H) 0.00 - 13.40 %    Eosinophils 3.50 0.00 - 6.90 %    Basophils 1.80 0.00 - 1.80 %    Neutrophils (Absolute) 4.40 1.82 - 7.42 K/uL    Lymphs (Absolute) 0.92 (L) 1.00 - 4.80 K/uL    Monos (Absolute) 3.01 (H) 0.00 - 0.85 K/uL    Eos (Absolute) 0.31 0.00 - 0.51 K/uL    Baso (Absolute) 0.16 (H) 0.00 - 0.12 K/uL    Anisocytosis 1+    BASIC METABOLIC PANEL    Collection Time: 09/25/17  5:27 AM   Result Value Ref Range    Sodium 134 (L) 135 - 145 mmol/L    Potassium 3.4 (L) 3.6 - 5.5 mmol/L    Chloride 101 96 - 112 mmol/L    Co2 25 20 - 33 mmol/L    Glucose 90 65 - 99 mg/dL    Bun 13 8 - 22 mg/dL    Creatinine 0.85 0.50 - 1.40 mg/dL    Calcium 8.2 (L) 8.5 - 10.5 mg/dL    Anion Gap 8.0 0.0 - 11.9   ESTIMATED GFR    Collection Time: 09/25/17  5:27 AM   Result Value Ref Range    GFR If African American >60 >60 mL/min/1.73 m 2    GFR If Non African American >60 >60 mL/min/1.73 m 2   DIFFERENTIAL MANUAL    Collection Time: 09/25/17  5:27 AM   Result Value Ref Range    Bands-Stabs 0.90 0.00 - 10.00 %    Manual Diff Status PERFORMED    PERIPHERAL SMEAR REVIEW    Collection Time: 09/25/17  5:27 AM   Result Value Ref Range    Peripheral Smear Review see below    PLATELET ESTIMATE    Collection Time: 09/25/17  5:27 AM   Result Value Ref Range    Plt Estimation Marked Decrease    MORPHOLOGY    Collection Time: 09/25/17  5:27 AM   Result Value Ref Range    RBC Morphology Present     Large Platelets 3+    IMMATURE PLT FRACTION    Collection Time: 09/25/17  5:27 AM   Result Value Ref Range    Imm. Plt Fraction 38.5 (H) 0.6 - 13.1 K/uL     Medical Decision Making, by Problem:  Active Hospital Problems    Diagnosis   • Myelodysplastic syndrome (CMS-HCC) [D46.9]     Priority: High   • Parkinson's disease (CMS-HCC) [G20]     Priority: Medium   • Pseudomonas urinary tract infection [N39.0, B96.5]   • Urinary retention [R33.9]   • DNR (do not  resuscitate) [Z66]     Plan:  16f coude rinaldi placed with prompt return of clear yellow urine. Advised to keep rinaldi in place for now. Antibiotics per hospital team. We will continue to follow, and ultimately will complete a voiding trial in the office.     Quality Measures:    Reviewed items::  Labs reviewed and Medications reviewed  Rinaldi catheter::  No Rinaldi      Discussed patient condition with Family, RN, Patient and urology-Dr. Almeida.

## 2017-09-25 NOTE — DISCHARGE SUMMARY
CHIEF COMPLAINT ON ADMISSION  Chief Complaint   Patient presents with   • Blood in Urine       CODE STATUS  DNR    HPI & HOSPITAL COURSE  This is a 76 y.o. male here with a Past medical history of Parkinson's disease and a chronic Hurst catheter presented to the hospital with fevers and chills at a skilled nursing facility. He has a Hurst catheter in place for urinary retention. Urinalysis was consistent with a urinary tract infection. He was diagnosed as having sepsis secondary to UTI and admitted to telemetry.  There is no events on telemetry. His vital signs remained stable. His symptoms improved over the course of 24 hours. His put on empiric Rocephin therapy. Urine culture results showed Pseudomonas UTI. His antibiotics were adjusted over to ciprofloxacin IV. During the hospital course we consulted urology. He has a Hurst catheter that is in place and needed to be replaced. Urology removed the catheter and the wife wanted to wait to see if the patient can urinate on his own. After over 24 hours the patient was unable to void on his own and continue to have residual of greater than 700 mL. Eventually urology replaced the Husrt catheter. Patient is to be transferred back to skilled nursing facility. He needs to continue antibiotics and finish a full course. He should follow-up with his PCP and urology as an outpatient.    Therefore, he is discharged in guarded and stable condition with close outpatient follow-up.    SPECIFIC OUTPATIENT FOLLOW-UP  PCP  Dr. Almeida with urology as scheduled    DISCHARGE PROBLEM LIST  Principal Problem:    Pseudomonas urinary tract infection POA: Unknown  Active Problems:    Myelodysplastic syndrome (CMS-HCC) POA: Yes    Parkinson's disease (CMS-HCC) POA: Yes    DNR (do not resuscitate) POA: Yes    Urinary retention POA: Yes  Resolved Problems:    Sepsis (CMS-HCC) POA: Unknown      FOLLOW UP  Future Appointments  Date Time Provider Department Center   10/6/2017 9:00 AM Popeye Ferguson  M.D. [facility] None   11/2/2017 2:20 PM Wild Sherman A.P.N. [address]     Wild Almeida M.D.  [address]  [place] [address]  [phone]          [facility] ([facility])  [address] [place] [address]  [phone]        BUBBA CasarezP.N.  [address]  [place] [address]  [phone]            MEDICATIONS ON DISCHARGE   Rafael Felix   Home Medication Instructions EAMON:[id number]    Printed on:09/25/17 0459   Medication Information                      amantadine (SYMMETREL) 100 MG Cap  Take 1 Cap by mouth 2 times a day.             Carbidopa-Levodopa ER (RYTARY) 61. MG Cap CR  Take 1 Cap by mouth 3 times a day.             carbidopa-levodopa SR (SINEMET CR)  MG per tablet  Take 1 Tab by mouth every bedtime.             ciprofloxacin (CIPRO) 500 MG Tab  Take 1 Tab by mouth every 12 hours.             citalopram (CELEXA) 20 MG Tab  Take 1 Tab by mouth every day.             doxazosin (CARDURA) 1 MG Tab  Take 1 Tab by mouth every bedtime.             ferrous sulfate (FEOSOL) 220 (44 Fe) MG/5ML Elixir  Take 5 mL by mouth 2 times a day, with meals.             memantine (NAMENDA) 10 MG Tab  Take 1 Tab by mouth 2 times a day.             modafinil (PROVIGIL) 200 MG Tab  Take 1 Tab by mouth every day.             tramadol (ULTRAM) 50 MG Tab  Take 1 Tab by mouth every 6 hours as needed for Moderate Pain or Severe Pain.             triazolam (HALCION) 0.125 MG tablet  Take 1 Tab by mouth at bedtime as needed.                 DIET  Orders Placed This Encounter   Procedures   • DIET ORDER     Standing Status:   Standing     Number of Occurrences:   1     Order Specific Question:   Diet:     Answer:   Regular [1]     Order Specific Question:   Texture/Fiber modifications:     Answer:   Dysphagia 2(Pureed/Chopped)specify fluid consistency(question 6) [2]     Order Specific Question:   Consistency/Fluid modifications:     Answer:   Thin Liquids [3]      Order Specific Question:   Miscellaneous modifications:     Answer:   SLP - 1:1 Supervision by Nursing [21]     Order Specific Question:   Miscellaneous modifications:     Answer:   SLP - Deliver to Nursing Station [22]       ACTIVITY  As tolerated.  Weight bearing as tolerated      CONSULTATIONS  Urology    PROCEDURES  None    LABORATORY  Lab Results   Component Value Date/Time    SODIUM 134 (L) 09/25/2017 05:27 AM    POTASSIUM 3.4 (L) 09/25/2017 05:27 AM    CHLORIDE 101 09/25/2017 05:27 AM    CO2 25 09/25/2017 05:27 AM    GLUCOSE 90 09/25/2017 05:27 AM    BUN 13 09/25/2017 05:27 AM    CREATININE 0.85 09/25/2017 05:27 AM        Lab Results   Component Value Date/Time    WBC 8.8 09/25/2017 05:27 AM    HEMOGLOBIN 10.0 (L) 09/25/2017 05:27 AM    HEMATOCRIT 30.3 (L) 09/25/2017 05:27 AM    PLATELETCT 37 (LL) 09/25/2017 05:27 AM        Total time of the discharge process exceeds 31 minutes

## 2017-09-26 NOTE — PROGRESS NOTES
Went over discharge instructions, follow up appointments, and new prescription with wife at bedside. She confirms understanding and denies any further questions. Pt still lethargic but aroused by touch and speech. [facility] here to transport to [facility]. Amita Hou here to transport, copy of badge taken and placed in chart along with completed form. IV removed, tip intact. Pt home Carbidopa-Levodopa sent with wife.

## 2017-10-03 NOTE — DOCUMENTATION QUERY
DOCUMENTATION QUERY    PROVIDERS: Please select “Cosign w/ note”to reply to query.    To better represent the severity of illness of your patient, please review the following information and exercise your independent professional judgment in responding to this query.     Encephalopathy is documented in the setting of Sepsis.  Per coding guidelines, the clinical indicator for severe sepsis is an associated organ failure/organ dysfunction/shock documented as being DUE to the sepsis.   Based upon the clinical findings, risk factors, and treatment, can this diagnosis be further specified?    • Encephalopathy is related to Sepsis (Severe Sepsis)  • Encephalopathy is not related to Sepsis  • Other explanation of clinical findings (Please document)  • Unable to determine        The medical record reflects the following:   Clinical Findings ED Provider Notes by Alden Bustos M.D. at 9/20/2017    FINAL IMPRESSION  1. Confusion      H&P by Richi Beckham M.D. at 9/20/2017  HPI: She has noted that he has been increasingly confused over the past few days.    ASSESSMENT/PLAN:      1. Sepsis. Likely urinary source, follow cultures, protocol started  2. Urinary tract infection. Review of culture shows previous E. coli, start IV Rocephin  3. Encephalopathy. Multifactorial       Treatment Hurst catheter will need to be changed, consult urology in the morning  Continue as per sepsis protocol  Continue IV fluids  Continue IV antibiotics   Risk Factors  UTI   Location within medical record  History and Physical     Thank you,   Alessandra Hill, CCA

## 2017-10-05 NOTE — DOCUMENTATION QUERY
DOCUMENTATION QUERY     DR. MALAGON, please review this Documentation Query and reply with an addendum. You co-signed only, which does not address the needed clarification from you to be able to code and complete this account.       To better represent the severity of illness of your patient, please review the following information and exercise your independent professional judgment in responding to this query.      Encephalopathy is documented in the setting of Sepsis.  Per coding guidelines, the clinical indicator for severe sepsis is an associated organ failure/organ dysfunction/shock documented as being DUE to the sepsis.   Based upon the clinical findings, risk factors, and treatment, can this diagnosis be further specified?     · Encephalopathy is related to Sepsis (Severe Sepsis)  · Encephalopathy is not related to Sepsis  · Other explanation of clinical findings (Please document)  · Unable to determine           The medical record reflects the following:   Clinical Findings ED Provider Notes by Alden Bustos M.D. at 9/20/2017    FINAL IMPRESSION  1. Confusion        H&P by Richi Beckham M.D. at 9/20/2017  HPI: She has noted that he has been increasingly confused over the past few days.     ASSESSMENT/PLAN:      1. Sepsis. Likely urinary source, follow cultures, protocol started  2. Urinary tract infection. Review of culture shows previous E. coli, start IV Rocephin  3. Encephalopathy. Multifactorial         Treatment Hurst catheter will need to be changed, consult urology in the morning  Continue as per sepsis protocol  Continue IV fluids  Continue IV antibiotics   Risk Factors  UTI   Location within medical record  History and Physical      Thank you,   Alessandra Hill, CCA

## 2017-10-06 ENCOUNTER — OFFICE VISIT (OUTPATIENT)
Dept: NEUROLOGY | Facility: MEDICAL CENTER | Age: 77
End: 2017-10-06
Payer: MEDICARE

## 2017-10-06 ENCOUNTER — PATIENT OUTREACH (OUTPATIENT)
Dept: HEALTH INFORMATION MANAGEMENT | Facility: OTHER | Age: 77
End: 2017-10-06

## 2017-10-06 VITALS
RESPIRATION RATE: 16 BRPM | DIASTOLIC BLOOD PRESSURE: 70 MMHG | HEART RATE: 74 BPM | SYSTOLIC BLOOD PRESSURE: 118 MMHG | BODY MASS INDEX: 19.89 KG/M2 | TEMPERATURE: 97.7 F | OXYGEN SATURATION: 74 % | HEIGHT: 75 IN | WEIGHT: 160 LBS

## 2017-10-06 DIAGNOSIS — D46.9 MYELODYSPLASTIC SYNDROME (HCC): ICD-10-CM

## 2017-10-06 DIAGNOSIS — G20.A1 PARKINSON'S DISEASE: ICD-10-CM

## 2017-10-06 PROCEDURE — 99214 OFFICE O/P EST MOD 30 MIN: CPT | Performed by: PSYCHIATRY & NEUROLOGY

## 2017-10-06 NOTE — PATIENT INSTRUCTIONS
IMPRESSION:    1. Parkinson Disease Stage III  2. Dementia-- deterioration   3. Myelodysplastic syndrome  4. Lack of energy for 3 years-- good days and bad days-- worsened in the last year-- in fact, patient developed one episode of lack of energy in front of me  5. Long hospital admission since last visit-- at times, confused, agitated -- during the hospital  6. Swallowing problems due to 1    PLAN/RECOMMENDATIONS:      We discuss about the following issue      1. Long term nursing home placement,    2. Hospice ( the family already decided not to resort to PEG tube or endotracheal tube)    We will offer the patient to  to discuss about the nursing care issue-- such as home health even hospice    ________________________________________________________________________    Explained to the wife that   1. I prefer not to change medication for parkinson now-- since the patient is fragile now  2. It is fine to crush the long acting sinemet if the patient could not swallow the medicine at times ( understanding that , the long acting sinemet will lose its lone acting effecting)  3. Advise the following      ________________________________________________________________________    Multiple vitamin daily  Thiamine 200mg daily  Fish Oil -- Omega 3 1000mg 3# daily  Vit D-3 4000 unit daily  ________________________________________________________________________            SIGNATURE:  Popeye Ferguson      CC:  MERI Casarez

## 2017-10-06 NOTE — PROGRESS NOTES
NEUROLOGY NOTE    Referring Physician  Wild Sherman      CHIEF COMPLAINT:    Since the last visit, the patient was in hospital for months  Due to infection, confusion, surgery-- right now, the patient lives in [place]    Retired at the age of 63YO  Noticed to have cognitive problems for the 5 years  Chief Complaint   Patient presents with   • Follow-Up     Dementia, Parkinson's       PRESENT ILLNESS:     Since the last visit, the patient was in hospital for months  Due to infection, confusion, surgery-- right now, the patient lives in [place]    Retired at the age of 63YO  Noticed to have cognitive problems for the 5 years    Used to teach in college     He was diagnosed as parkinson disease in [facility]-- diagnosed 5 years ago-- initially in [place]--- was put on Sinemet for 5 years      Could not tolerate low dose Keppra   Keppra 125mg before sleep for one week, then 125mg two times per day      PAST MEDICAL HISTORY:  Past Medical History:   Diagnosis Date   • Hemorrhagic disorder (CMS-[facility]) 7-    bruises easily   • Compression fracture of spine (CMS-HCC)    • MDS (myelodysplastic syndrome) (CMS-HCC)    • Parkinson's disease (CMS-HCC)        PAST SURGICAL HISTORY:  Past Surgical History:   Procedure Laterality Date   • CYSTOSCOPY  8/27/2017    Procedure: CYSTOSCOPY, COMPLEX CATHETERIZATION;  Surgeon: Wild Almeida M.D.;  Location: [facility];  Service: Urology   • EVACUATION OF HEMATOMA  8/27/2017    Procedure: EVACUATION OF HEMATOMA, TUR OLD BLADDER CLOTS, DILATION OF URETHRAL STRICTURE;  Surgeon: Wild Almeida M.D.;  Location: [facility];  Service: Urology   • TRANS URETHRAL RESECTION BLADDER  8/27/2017    Procedure: TRANS URETHRAL RESECTION BLADDER;  Surgeon: Wild Almeida M.D.;  Location: [facility];  Service: Urology   • KYPHOPLASTY  8/6/2017    Procedure: KYPHOPLASTY T11, T12, L1 ;  Surgeon: Niels Gallardo M.D.;  Location: [facility];  Service:     • CATARACT EXTRACTION WITH IOL     • KNEE ARTHROSCOPY     • PROSTATECTOMY, RADIAL      subtotal   • ROTATOR CUFF REPAIR      right   • SPLENECTOMY         FAMILY HISTORY:  Family History   Problem Relation Age of Onset   • Heart Disease Mother    • Lung Disease Mother    • Cancer Father      pancreatic       SOCIAL HISTORY:  Social History     Social History   • Marital status:      Spouse name: N/A   • Number of children: N/A   • Years of education: N/A     Occupational History   • Not on file.     Social History Main Topics   • Smoking status: Never Smoker   • Smokeless tobacco: Never Used   • Alcohol use Yes      Comment: 1 per day   • Drug use: No   • Sexual activity: Not on file     Other Topics Concern   • Not on file     Social History Narrative   • No narrative on file     ALLERGIES:  Allergies   Allergen Reactions   • Diflucan [Fluconazole]      Reaction: Convulsions per wife     TOBHX  History   Smoking Status   • Never Smoker   Smokeless Tobacco   • Never Used     ALCHX  History   Alcohol Use   • Yes     Comment: 1 per day     DRUGHX  History   Drug Use No           MEDICATIONS:  Current Outpatient Prescriptions   Medication   • doxazosin (CARDURA) 1 MG Tab   • Carbidopa-Levodopa ER (RYTARY) 61. MG Cap CR   • modafinil (PROVIGIL) 200 MG Tab   • memantine (NAMENDA) 10 MG Tab   • amantadine (SYMMETREL) 100 MG Cap   • citalopram (CELEXA) 20 MG Tab   • carbidopa-levodopa SR (SINEMET CR)  MG per tablet   • ciprofloxacin (CIPRO) 500 MG Tab   • ferrous sulfate (FEOSOL) 220 (44 Fe) MG/5ML Elixir   • tramadol (ULTRAM) 50 MG Tab   • triazolam (HALCION) 0.125 MG tablet     No current facility-administered medications for this visit.        REVIEW OF SYSTEM:    Constitutional: Denies fevers, Denies weight changes   Eyes: Denies changes in vision, no eye pain   Ears/Nose/Throat/Mouth: Denies nasal congestion or sore throat   Cardiovascular: Denies chest pain or palpitations   Respiratory: Denies  "SOB.   Gastrointestinal/Hepatic: Denies abdominal pain, nausea, vomiting, diarrhea, constipation or GI bleeding   Genitourinary: Denies bladder dysfunction, dysuria or frequency   Musculoskeletal/Rheum: Denies joint pain and swelling   Skin/Breast: Denies rash, denies breast lumps or discharge   Neurological: Parkinson Disease, Dementia  Psychiatric: denies mood disorder   Endocrine: denies hx of diabetes or thyroid dysfunction   Heme/Oncology/Lymph Nodes: Denies enlarged lymph nodes, denies brusing or known bleeding disorder   Allergic/Immunologic: Denies hx of allergies         PHYSICAL AND NEUROLOGICAL EXMAINATIONS:  VITAL SIGNS: /70   Pulse 74   Temp 36.5 °C (97.7 °F)   Resp 16   Ht 1.905 m (6' 3\")   Wt 72.6 kg (160 lb)   SpO2 (!) 74%   BMI 20.00 kg/m²   CURRENT WEIGHT:   BMI: Body mass index is 20 kg/m².  PREVIOUS WEIGHTS:  Wt Readings from Last 25 Encounters:   10/06/17 72.6 kg (160 lb)   09/24/17 70.9 kg (156 lb 4.9 oz)   08/16/17 97.5 kg (214 lb 15.2 oz)   08/06/17 87.2 kg (192 lb 3.9 oz)   06/13/17 86.6 kg (191 lb)   05/03/17 88 kg (194 lb)   02/01/17 88 kg (194 lb)   12/14/16 89.2 kg (196 lb 9.6 oz)   09/28/16 88.9 kg (196 lb)   07/20/16 85.7 kg (189 lb)       General appearance of patient: WDWN(+) NAD(+)    EYES  o Fundus : Papilledem(-) Exudates(-) Hemorrhage(-)  Nervous System  Orientation to time, place and person(+) today, the patient could ask for water  Memory normal(-)  Language: aphasia(-)  Knowledge: past(+) Current(+)  Attention(+)  Cranial Nerves  • Nerve 2: intact  • Nerve 3,4,6: intact  • Nerve 5 : intact  • Nerve 7: intact  • Nerve 8: intact  • Nerve 9 & 10: intact  • Nerve 11: intact  • Nerve 12: intact  Muscle Power and muscle tone: general weak   Sensory System: Pin sensation intact(+)  Gait : Steady(-) on wheelchair today   Heart and Vascular  Peripheral Vasucular system : Edema (-) Swelling(-)  RHB, Breathing sound clear  abdomen bowel sound normoactive  Extremities freely " moveable  Joints no contracture       NEUROIMAGING: I reviewed the MRI/CT of brain     Registration 3/3  Recall 3/3       Pt is sending this message via [email or web address] e-mail. Please advise. Thank you. CHRISTIN Ferguson - I have been taking the increased strength of Rytary since you prescribed it but feel worse than ever. I have no energy and my balance is not good. I think I should revert to the original strength but the literature from the pharmacy indicates that lowering the dosage should be done slowly. What do you recommend?  I've discontinued Azilect as it doesn't seem to help at all.     Jamie Barneyer              In the past, CARBIDOPA-LEVODOPA ER 48. MG PO CPCR three times per day did not make him feel good  Now he is on Carbidopa-Levodopa ER 36. MG Cap CR  But now he is OK with Carbidopa 48.75          IMPRESSION:    1. Parkinson Disease Stage III  2. Dementia-- deterioration   3. Myelodysplastic syndrome  4. Lack of energy for 3 years-- good days and bad days-- worsened in the last year-- in fact, patient developed one episode of lack of energy in front of me  5. Long hospital admission since last visit-- at times, confused, agitated -- during the hospital  6. Swallowing problems due to 1    PLAN/RECOMMENDATIONS:      We discuss about the following issue      1. Long term nursing home placement,    2. Hospice ( the family already decided not to resort to PEG tube or endotracheal tube)    We will offer the patient to  to discuss about the nursing care issue-- such as home health even hospice    ________________________________________________________________________    Explained to the wife that   1. I prefer not to change medication for parkinson now-- since the patient is fragile now  2. It is fine to crush the long acting sinemet if the patient could not swallow the medicine at times ( understanding that , the long acting sinemet will lose its lone acting effecting)  3. Advise the  following      ________________________________________________________________________    Multiple vitamin daily  Thiamine 200mg daily  Fish Oil -- Omega 3 1000mg 3# daily  Vit D-3 4000 unit daily  ________________________________________________________________________            SIGNATURE:  Popeye Ferguson      CC:  MERI Casarez

## 2017-10-18 ENCOUNTER — TELEPHONE (OUTPATIENT)
Dept: NEUROLOGY | Facility: MEDICAL CENTER | Age: 77
End: 2017-10-18

## 2017-10-18 NOTE — TELEPHONE ENCOUNTER
Dr. Ferguson -  I am Rafaelrowan Felix's wife, Elizabeth. Jamie is experiencing a lot of anxiety related to his Parkinson's. He has very restless legs and is often agitated. Is there a sedative that you could prescribe for him that might lessen his anxiety when needed?  Thank you.     Please advise    Thank you

## 2017-10-19 RX ORDER — CLONAZEPAM 0.5 MG/1
0.25 TABLET ORAL
Qty: 10 TAB | Refills: 1 | Status: SHIPPED | OUTPATIENT
Start: 2017-10-19 | End: 2018-01-26

## 2017-10-19 NOTE — TELEPHONE ENCOUNTER
Popeye Ferguson M.D.  Hannah Pacheco, Med Ass't   Caller: Unspecified (Yesterday,  2:09 PM)             traditional medication for anxiety could make him sleepy   Anyway, I will give them Klonopin 0.5mg QD PRN maximum 20# per month     Medical Marijuana might be another option too( the patient is in the terminal stage of degeneration)     Order Klonopin given     Spoke to wife gave above information, and script has been fx'd to [facility]. Wife will call with any concerns.

## 2017-10-30 ENCOUNTER — APPOINTMENT (OUTPATIENT)
Dept: RADIOLOGY | Facility: MEDICAL CENTER | Age: 77
End: 2017-10-30
Attending: EMERGENCY MEDICINE
Payer: MEDICARE

## 2017-10-30 ENCOUNTER — HOSPITAL ENCOUNTER (EMERGENCY)
Facility: MEDICAL CENTER | Age: 77
End: 2017-10-30
Attending: EMERGENCY MEDICINE
Payer: MEDICARE

## 2017-10-30 VITALS
BODY MASS INDEX: 19.89 KG/M2 | HEIGHT: 75 IN | WEIGHT: 160 LBS | DIASTOLIC BLOOD PRESSURE: 74 MMHG | SYSTOLIC BLOOD PRESSURE: 150 MMHG | OXYGEN SATURATION: 94 % | HEART RATE: 64 BPM | RESPIRATION RATE: 16 BRPM | TEMPERATURE: 97.5 F

## 2017-10-30 DIAGNOSIS — G20.A1 PARKINSON'S DISEASE: ICD-10-CM

## 2017-10-30 DIAGNOSIS — N30.00 ACUTE CYSTITIS WITHOUT HEMATURIA: ICD-10-CM

## 2017-10-30 DIAGNOSIS — Z66 DO NOT RESUSCITATE: ICD-10-CM

## 2017-10-30 DIAGNOSIS — F43.24 ADJUSTMENT DISORDER WITH DISTURBANCE OF CONDUCT: ICD-10-CM

## 2017-10-30 LAB
ALBUMIN SERPL BCP-MCNC: 4.1 G/DL (ref 3.2–4.9)
ALBUMIN/GLOB SERPL: 1.1 G/DL
ALP SERPL-CCNC: 128 U/L (ref 30–99)
ALT SERPL-CCNC: 5 U/L (ref 2–50)
ANION GAP SERPL CALC-SCNC: 9 MMOL/L (ref 0–11.9)
APPEARANCE UR: ABNORMAL
AST SERPL-CCNC: 20 U/L (ref 12–45)
BACTERIA #/AREA URNS HPF: ABNORMAL /HPF
BASOPHILS # BLD AUTO: 0.5 % (ref 0–1.8)
BASOPHILS # BLD: 0.03 K/UL (ref 0–0.12)
BILIRUB SERPL-MCNC: 0.6 MG/DL (ref 0.1–1.5)
BILIRUB UR QL STRIP.AUTO: NEGATIVE
BNP SERPL-MCNC: 44 PG/ML (ref 0–100)
BUN SERPL-MCNC: 27 MG/DL (ref 8–22)
CALCIUM SERPL-MCNC: 9.6 MG/DL (ref 8.5–10.5)
CHLORIDE SERPL-SCNC: 105 MMOL/L (ref 96–112)
CO2 SERPL-SCNC: 21 MMOL/L (ref 20–33)
COLOR UR: ABNORMAL
CREAT SERPL-MCNC: 1.49 MG/DL (ref 0.5–1.4)
CULTURE IF INDICATED INDCX: YES UA CULTURE
EOSINOPHIL # BLD AUTO: 0.24 K/UL (ref 0–0.51)
EOSINOPHIL NFR BLD: 3.7 % (ref 0–6.9)
EPI CELLS #/AREA URNS HPF: ABNORMAL /HPF
ERYTHROCYTE [DISTWIDTH] IN BLOOD BY AUTOMATED COUNT: 64.5 FL (ref 35.9–50)
GFR SERPL CREATININE-BSD FRML MDRD: 46 ML/MIN/1.73 M 2
GLOBULIN SER CALC-MCNC: 3.8 G/DL (ref 1.9–3.5)
GLUCOSE SERPL-MCNC: 105 MG/DL (ref 65–99)
GLUCOSE UR STRIP.AUTO-MCNC: NEGATIVE MG/DL
HCT VFR BLD AUTO: 31.4 % (ref 42–52)
HGB BLD-MCNC: 10.3 G/DL (ref 14–18)
IMM GRANULOCYTES # BLD AUTO: 0.02 K/UL (ref 0–0.11)
IMM GRANULOCYTES NFR BLD AUTO: 0.3 % (ref 0–0.9)
KETONES UR STRIP.AUTO-MCNC: ABNORMAL MG/DL
LACTATE BLD-SCNC: 1 MMOL/L (ref 0.5–2)
LEUKOCYTE ESTERASE UR QL STRIP.AUTO: ABNORMAL
LYMPHOCYTES # BLD AUTO: 0.91 K/UL (ref 1–4.8)
LYMPHOCYTES NFR BLD: 14.1 % (ref 22–41)
MAGNESIUM SERPL-MCNC: 2 MG/DL (ref 1.5–2.5)
MCH RBC QN AUTO: 28.1 PG (ref 27–33)
MCHC RBC AUTO-ENTMCNC: 32.8 G/DL (ref 33.7–35.3)
MCV RBC AUTO: 85.8 FL (ref 81.4–97.8)
MICRO URNS: ABNORMAL
MONOCYTES # BLD AUTO: 2.07 K/UL (ref 0–0.85)
MONOCYTES NFR BLD AUTO: 32 % (ref 0–13.4)
NEUTROPHILS # BLD AUTO: 3.2 K/UL (ref 1.82–7.42)
NEUTROPHILS NFR BLD: 49.4 % (ref 44–72)
NITRITE UR QL STRIP.AUTO: POSITIVE
NRBC # BLD AUTO: 0.02 K/UL
NRBC BLD AUTO-RTO: 0.3 /100 WBC
PH UR STRIP.AUTO: 5.5 [PH]
PHOSPHATE SERPL-MCNC: 3.6 MG/DL (ref 2.5–4.5)
PLATELET # BLD AUTO: 85 K/UL (ref 164–446)
POTASSIUM SERPL-SCNC: 4.4 MMOL/L (ref 3.6–5.5)
PROT SERPL-MCNC: 7.9 G/DL (ref 6–8.2)
PROT UR QL STRIP: 100 MG/DL
RBC # BLD AUTO: 3.66 M/UL (ref 4.7–6.1)
RBC # URNS HPF: ABNORMAL /HPF
RBC UR QL AUTO: ABNORMAL
SODIUM SERPL-SCNC: 135 MMOL/L (ref 135–145)
SP GR UR STRIP.AUTO: 1.02
T4 FREE SERPL-MCNC: 0.79 NG/DL (ref 0.53–1.43)
TROPONIN I SERPL-MCNC: 0.03 NG/ML (ref 0–0.04)
TSH SERPL DL<=0.005 MIU/L-ACNC: 2.29 UIU/ML (ref 0.3–3.7)
UROBILINOGEN UR STRIP.AUTO-MCNC: 0.2 MG/DL
WBC # BLD AUTO: 6.5 K/UL (ref 4.8–10.8)
WBC #/AREA URNS HPF: ABNORMAL /HPF
YEAST BUDDING URNS QL: PRESENT /HPF
YEAST HYPHAE #/AREA URNS HPF: PRESENT /HPF

## 2017-10-30 PROCEDURE — 99284 EMERGENCY DEPT VISIT MOD MDM: CPT

## 2017-10-30 PROCEDURE — 84100 ASSAY OF PHOSPHORUS: CPT

## 2017-10-30 PROCEDURE — 85025 COMPLETE CBC W/AUTO DIFF WBC: CPT

## 2017-10-30 PROCEDURE — 83605 ASSAY OF LACTIC ACID: CPT

## 2017-10-30 PROCEDURE — 84484 ASSAY OF TROPONIN QUANT: CPT

## 2017-10-30 PROCEDURE — 87086 URINE CULTURE/COLONY COUNT: CPT

## 2017-10-30 PROCEDURE — 700102 HCHG RX REV CODE 250 W/ 637 OVERRIDE(OP): Performed by: EMERGENCY MEDICINE

## 2017-10-30 PROCEDURE — 36415 COLL VENOUS BLD VENIPUNCTURE: CPT

## 2017-10-30 PROCEDURE — 71010 DX-CHEST-LIMITED (1 VIEW): CPT

## 2017-10-30 PROCEDURE — 80053 COMPREHEN METABOLIC PANEL: CPT

## 2017-10-30 PROCEDURE — 81001 URINALYSIS AUTO W/SCOPE: CPT

## 2017-10-30 PROCEDURE — 83735 ASSAY OF MAGNESIUM: CPT

## 2017-10-30 PROCEDURE — 84439 ASSAY OF FREE THYROXINE: CPT

## 2017-10-30 PROCEDURE — 83880 ASSAY OF NATRIURETIC PEPTIDE: CPT

## 2017-10-30 PROCEDURE — A9270 NON-COVERED ITEM OR SERVICE: HCPCS | Performed by: EMERGENCY MEDICINE

## 2017-10-30 PROCEDURE — 84443 ASSAY THYROID STIM HORMONE: CPT

## 2017-10-30 RX ORDER — LEVOFLOXACIN 750 MG/1
750 TABLET, FILM COATED ORAL DAILY
Status: DISCONTINUED | OUTPATIENT
Start: 2017-10-30 | End: 2017-10-30 | Stop reason: HOSPADM

## 2017-10-30 RX ORDER — LEVOFLOXACIN 500 MG/1
500 TABLET, FILM COATED ORAL DAILY
Qty: 5 TAB | Refills: 0 | Status: SHIPPED | OUTPATIENT
Start: 2017-10-30 | End: 2018-01-26

## 2017-10-30 RX ADMIN — LEVOFLOXACIN 750 MG: 750 TABLET, FILM COATED ORAL at 15:47

## 2017-10-30 ASSESSMENT — PAIN SCALES - GENERAL
PAINLEVEL_OUTOF10: 0
PAINLEVEL_OUTOF10: 0

## 2017-10-30 ASSESSMENT — LIFESTYLE VARIABLES: DO YOU DRINK ALCOHOL: NO

## 2017-10-30 NOTE — ED PROVIDER NOTES
ED Provider Note  CHIEF COMPLAINT  Chief Complaint   Patient presents with   • Tremors       HPI  Rafael Felix is a 77 y.o. male who presentsBy ambulance for evaluation of tremors. Patient has known Parkinson's disease. He is on multiple parkinsonian medication. According to the nursing staff at the [facility] this is his normal level of tremors. And neurological status. The wife is concerned because she felt that he was having more tremors and normal. Cords and the wife the patient was admitted here for urinary tract infection back in August of this year. He's been on Bactrim for about 14 days. Patient's had somewhat of a progressive downhill course according to the wife. The patient is a DNR. Patient also has been considered for hospice care. Patient's wife is requesting the patient be admitted for reevaluation and is to what's causing his acute problem. Patient himself does not communicate. According to the wife cannot walk anymore. He does eat some soft foods for the spoon fed.    REVIEW OF SYSTEMS  No headache, no chest pain, no difficulty breathing.  ALL OTHER SYSTEMS NEGATIVE    ALLERGIES  Allergies   Allergen Reactions   • Diflucan [Fluconazole]      Reaction: Convulsions per wife       CURRENT MEDICATIONS  Home Medications     Reviewed by Destiny Rivas R.N. (Registered Nurse) on 10/30/17 at 1030  Med List Status: Unable to Obtain   Medication Last Dose Status   amantadine (SYMMETREL) 100 MG Cap 10/6/2017 Active   Carbidopa-Levodopa ER (RYTARY) 61. MG Cap CR 10/6/2017 Active   carbidopa-levodopa SR (SINEMET CR)  MG per tablet 10/6/2017 Active   ciprofloxacin (CIPRO) 500 MG Tab  Active   citalopram (CELEXA) 20 MG Tab 10/6/2017 Active   clonazepam (KLONOPIN) 0.5 MG Tab  Active   doxazosin (CARDURA) 1 MG Tab 10/6/2017 Active   ferrous sulfate (FEOSOL) 220 (44 Fe) MG/5ML Elixir 9/29/2017 Active   memantine (NAMENDA) 10 MG Tab 10/6/2017 Active   modafinil (PROVIGIL) 200 MG Tab 10/6/2017  Active   tramadol (ULTRAM) 50 MG Tab > Month Active   triazolam (HALCION) 0.125 MG tablet Unknown Active                PAST MEDICAL HISTORY  Past Medical History:   Diagnosis Date   • Compression fracture of spine (CMS-HCC)    • Hemorrhagic disorder (CMS-HCC) 7-    bruises easily   • MDS (myelodysplastic syndrome) (CMS-HCC)    • Parkinson's disease (CMS-HCC)        SURGICAL HISTORY  Past Surgical History:   Procedure Laterality Date   • CYSTOSCOPY  8/27/2017    Procedure: CYSTOSCOPY, COMPLEX CATHETERIZATION;  Surgeon: Wild Almeida M.D.;  [facility];  Service: Urology   • EVACUATION OF HEMATOMA  8/27/2017    Procedure: EVACUATION OF HEMATOMA, TUR OLD BLADDER CLOTS, DILATION OF URETHRAL STRICTURE;  Surgeon: Wild Almeida M.D.;  Location: [facility];  Service: Urology   • TRANS URETHRAL RESECTION BLADDER  8/27/2017    Procedure: TRANS URETHRAL RESECTION BLADDER;  Surgeon: Wild Almeida M.D.;  Location: [facility];  Service: Urology   • KYPHOPLASTY  8/6/2017    Procedure: KYPHOPLASTY T11, T12, L1 ;  Surgeon: Niels Gallardo M.D.;  Location: [facility];  Service:    • CATARACT EXTRACTION WITH IOL     • KNEE ARTHROSCOPY     • PROSTATECTOMY, RADIAL      subtotal   • ROTATOR CUFF REPAIR      right   • SPLENECTOMY         FAMILY HISTORY  Family History   Problem Relation Age of Onset   • Heart Disease Mother    • Lung Disease Mother    • Cancer Father      pancreatic       SOCIAL HISTORY  Social History     Social History   • Marital status:      Spouse name: N/A   • Number of children: N/A   • Years of education: N/A     Social History Main Topics   • Smoking status: Never Smoker   • Smokeless tobacco: Never Used   • Alcohol use Yes      Comment: 1 per day   • Drug use: No   • Sexual activity: Not on file     Other Topics Concern   • Not on file     Social History Narrative   • No narrative on file       PHYSICAL EXAM  GENERAL:Awake male adult  "with Parkinson's  VITAL SIGNS: /74   Pulse 66   Temp 36.4 °C (97.5 °F)   Resp 14   Ht 1.905 m (6' 3\")   Wt 72.6 kg (160 lb)   BMI 20.00 kg/m²    Constitutional: Awake male adult adult HENT: Scalp is normal size and nontender. Ears are clear. Nose is clear. Throat is clear with no stridor no drooling no trismus. Teeth are all intact.  Eyes: Pupils equal round and reactive to light, extraocular motor fall. There is no scleral icterus.  Neck: Neck is supple and nontender. There is no meningismus. No adenitis. No thyromegaly.  Lymphatic: No adenopathy.   Cardiovascular: Heart regular rhythm without murmurs or gallops   Thorax & Lungs: No chest wall tenderness. Lungs are clear. Patient has good breath sounds bilateral. No rales, no rhonchi, no wheezes.  Abdomen: Abdomen is soft, nontender, not rigid, no guarding, and no organomegaly. There is no palpable hernia   Skin: Warm, pink, and dry with no erythema and no rash.   Back: Nontender, no midline bony tenderness, no flank tenderness.  Extremities: Full range of motion  No tenderness to palpation and no deformities noted. No calf or thigh swelling. No calf or thigh tenderness. No clinical DVT.  Neurologic: Awake with Parkinson's. Cranial nerves are grossly intact as tested. Patient moves all 4 extremities well. Patient has good strong flexion and extension of the ankle joints knee joints hip joints and elbow joints. Sensation is normal and symmetrical in the upper and lower extremities.   Psychiatric: Patient is awake with parkinsonian face and tremors.      RADIOLOGY/PROCEDURES  DX-CHEST-LIMITED (1 VIEW)   Final Result      No acute cardiopulmonary disease.            COURSE & MEDICAL DECISION MAKING  The patient was transferred in from [facility] for evaluation of tremors. According to the nursing staff there this is his baseline tremors and neurological status. The wife is the one that was concerned about maybe more tremors today than normal. Differential " diagnosis: Tremors, normal Parkinsonian baseline tremors metabolic problem, thyroid disease, electrolyte abnormality, infection, etc.    Plan: #1 IV #2 cardiac monitor, pulse ox monitor, blood pressure monitor. #3 lab evaluation including CBC, CMP, T4, TSH, magnesium and phosphorus levels. #4. Review previous medical records    Laboratory and reexamination: Chest x-ray is normal. Globe is 10. White count is 6000. Differential is normal. No bandemia. Creatinine and BUN are slightly yellow with very mild to minimal renal insufficiency. Troponin is 0.03. BNP is normal at 44. Leukocyte esterase large. Lactate level is 1.0. Thyroid studies normal.    Results for orders placed or performed during the hospital encounter of 10/30/17   CBC w/ Differential   Result Value Ref Range    WBC 6.5 4.8 - 10.8 K/uL    RBC 3.66 (L) 4.70 - 6.10 M/uL    Hemoglobin 10.3 (L) 14.0 - 18.0 g/dL    Hematocrit 31.4 (L) 42.0 - 52.0 %    MCV 85.8 81.4 - 97.8 fL    MCH 28.1 27.0 - 33.0 pg    MCHC 32.8 (L) 33.7 - 35.3 g/dL    RDW 64.5 (H) 35.9 - 50.0 fL    Platelet Count 85 (L) 164 - 446 K/uL    Neutrophils-Polys 49.40 44.00 - 72.00 %    Lymphocytes 14.10 (L) 22.00 - 41.00 %    Monocytes 32.00 (H) 0.00 - 13.40 %    Eosinophils 3.70 0.00 - 6.90 %    Basophils 0.50 0.00 - 1.80 %    Immature Granulocytes 0.30 0.00 - 0.90 %    Nucleated RBC 0.30 /100 WBC    Neutrophils (Absolute) 3.20 1.82 - 7.42 K/uL    Lymphs (Absolute) 0.91 (L) 1.00 - 4.80 K/uL    Monos (Absolute) 2.07 (H) 0.00 - 0.85 K/uL    Eos (Absolute) 0.24 0.00 - 0.51 K/uL    Baso (Absolute) 0.03 0.00 - 0.12 K/uL    Immature Granulocytes (abs) 0.02 0.00 - 0.11 K/uL    NRBC (Absolute) 0.02 K/uL   Complete Metabolic Panel (CMP)   Result Value Ref Range    Sodium 135 135 - 145 mmol/L    Potassium 4.4 3.6 - 5.5 mmol/L    Chloride 105 96 - 112 mmol/L    Co2 21 20 - 33 mmol/L    Anion Gap 9.0 0.0 - 11.9    Glucose 105 (H) 65 - 99 mg/dL    Bun 27 (H) 8 - 22 mg/dL    Creatinine 1.49 (H) 0.50 - 1.40  mg/dL    Calcium 9.6 8.5 - 10.5 mg/dL    AST(SGOT) 20 12 - 45 U/L    ALT(SGPT) 5 2 - 50 U/L    Alkaline Phosphatase 128 (H) 30 - 99 U/L    Total Bilirubin 0.6 0.1 - 1.5 mg/dL    Albumin 4.1 3.2 - 4.9 g/dL    Total Protein 7.9 6.0 - 8.2 g/dL    Globulin 3.8 (H) 1.9 - 3.5 g/dL    A-G Ratio 1.1 g/dL   Troponin   Result Value Ref Range    Troponin I 0.03 0.00 - 0.04 ng/mL   Btype Natriuretic Peptide (BNP)   Result Value Ref Range    B Natriuretic Peptide 44 0 - 100 pg/mL   MAGNESIUM   Result Value Ref Range    Magnesium 2.0 1.5 - 2.5 mg/dL   PHOSPHORUS   Result Value Ref Range    Phosphorus 3.6 2.5 - 4.5 mg/dL   TSH   Result Value Ref Range    TSH 2.290 0.300 - 3.700 uIU/mL   URINALYSIS,CULTURE IF INDICATED   Result Value Ref Range    Color DK Yellow     Character Turbid (A)     Specific Gravity 1.021 <1.035    Ph 5.5 5.0 - 8.0    Glucose Negative Negative mg/dL    Ketones Trace (A) Negative mg/dL    Protein 100 (A) Negative mg/dL    Bilirubin Negative Negative    Urobilinogen, Urine 0.2 Negative    Nitrite Positive (A) Negative    Leukocyte Esterase Large (A) Negative    Occult Blood Moderate (A) Negative    Micro Urine Req Microscopic     Culture Indicated Yes UA Culture   FREE THYROXINE   Result Value Ref Range    Free T-4 0.79 0.53 - 1.43 ng/dL   LACTIC ACID   Result Value Ref Range    Lactic Acid 1.0 0.5 - 2.0 mmol/L   ESTIMATED GFR   Result Value Ref Range    GFR If  55 (A) >60 mL/min/1.73 m 2    GFR If Non  46 (A) >60 mL/min/1.73 m 2   URINE MICROSCOPIC (W/UA)   Result Value Ref Range    WBC Packed (A) /hpf    RBC 20-50 (A) /hpf    Bacteria Few (A) None /hpf    Epithelial Cells Few /hpf    Budding Yeast Present (A) Absent /hpf    Hyphae Yeast Present (A) Absent /hpf          Vital lengthy discussion with the wife and also with . At this point there is no indication patient needs to be admitted to the hospital. The patient will be started on Levaquin by mouth. Case  manager will discuss this with the patient. The patient be transferred back to the nursing home for further definitive care.  FINAL IMPRESSION  1. Parkinson's disease. Stable. End stage  2. Acute persistent urinary tract infection  3. Do not resuscitate         Electronically signed by: Gary Gansert, 10/30/2017 /2:30 PM

## 2017-10-30 NOTE — DISCHARGE PLANNING
Spoke wife wife about patient not needing admit and will be returning to [facility] at 500.  Gave wife information on home care as she wants to get home.

## 2017-10-30 NOTE — ED NOTES
Pt BIB EMS from [facility]. Wife visited pt today at nursing home and noticed pt was having more tremors than normal and appeared less alert than normal. Nursing staff at [facility] states pt is at current baseline. HX of Parkinson's disease. Pt recently treated for UTI.

## 2017-11-03 NOTE — ED NOTES
ED Positive Culture Follow-up/Notification Note:    Date: 11/3/17     Patient seen in the ED on 10/30/2017 for    1. Parkinson's disease (CMS-[facility])    2. Acute cystitis without hematuria    3. Do not resuscitate    4. Adjustment disorder with disturbance of conduct       Discharge Medication List as of 10/30/2017  4:52 PM      START taking these medications    Details   levofloxacin (LEVAQUIN) 500 MG tablet Take 1 Tab by mouth every day., Disp-5 Tab, R-0, Print Rx Paper             Allergies: Diflucan [fluconazole]     Final cultures:   Results     Procedure Component Value Units Date/Time    URINE CULTURE(NEW) [[id number]]  (Abnormal) Collected:  10/30/17 1127    Order Status:  Completed Specimen:  Urine Updated:  11/01/17 1550     Significant Indicator POS (POS)     Source UR     Site --     Urine Culture Mixed skin jennifer 10-50,000 cfu/mL (A)     Urine Culture -- (A)     Candida albicans  >100,000 cfu/mL      URINALYSIS,CULTURE IF INDICATED [[id number]]  (Abnormal) Collected:  10/30/17 1127    Order Status:  Completed Specimen:  Urine Updated:  10/30/17 1216     Color DK Yellow     Character Turbid (A)     Specific Gravity 1.021     Ph 5.5     Glucose Negative mg/dL      Ketones Trace (A) mg/dL      Protein 100 (A) mg/dL      Bilirubin Negative     Urobilinogen, Urine 0.2     Nitrite Positive (A)     Leukocyte Esterase Large (A)     Occult Blood Moderate (A)     Micro Urine Req Microscopic     Culture Indicated Yes UA Culture     URINALYSIS [[id number]]     Order Status:  Canceled Specimen:  Urine           Plan:   Candida in the urine is a likely colonizer and not a true urinary pathogen. Levofloxacin will provide good empiric coverage. No further treatment required.     Clair Flores

## 2018-01-26 ENCOUNTER — OFFICE VISIT (OUTPATIENT)
Dept: MEDICAL GROUP | Facility: MEDICAL CENTER | Age: 78
End: 2018-01-26
Payer: MEDICARE

## 2018-01-26 ENCOUNTER — OFFICE VISIT (OUTPATIENT)
Dept: NEUROLOGY | Facility: MEDICAL CENTER | Age: 78
End: 2018-01-26
Payer: MEDICARE

## 2018-01-26 VITALS
BODY MASS INDEX: 23.57 KG/M2 | HEART RATE: 92 BPM | WEIGHT: 174 LBS | SYSTOLIC BLOOD PRESSURE: 108 MMHG | RESPIRATION RATE: 16 BRPM | TEMPERATURE: 97.6 F | DIASTOLIC BLOOD PRESSURE: 68 MMHG | OXYGEN SATURATION: 93 % | HEIGHT: 72 IN

## 2018-01-26 VITALS
HEART RATE: 72 BPM | SYSTOLIC BLOOD PRESSURE: 110 MMHG | WEIGHT: 175 LBS | BODY MASS INDEX: 23.7 KG/M2 | DIASTOLIC BLOOD PRESSURE: 66 MMHG | OXYGEN SATURATION: 98 % | RESPIRATION RATE: 16 BRPM | HEIGHT: 72 IN | TEMPERATURE: 97.2 F

## 2018-01-26 DIAGNOSIS — G47.00 INSOMNIA, UNSPECIFIED TYPE: ICD-10-CM

## 2018-01-26 DIAGNOSIS — F02.80 DEMENTIA ASSOCIATED WITH OTHER UNDERLYING DISEASE WITHOUT BEHAVIORAL DISTURBANCE (HCC): ICD-10-CM

## 2018-01-26 DIAGNOSIS — F33.42 RECURRENT MAJOR DEPRESSIVE DISORDER, IN FULL REMISSION (HCC): ICD-10-CM

## 2018-01-26 DIAGNOSIS — Z91.81 RISK FOR FALLS: ICD-10-CM

## 2018-01-26 DIAGNOSIS — G20.A1 PARKINSON'S DISEASE: ICD-10-CM

## 2018-01-26 DIAGNOSIS — R53.83 LACK OF ENERGY: ICD-10-CM

## 2018-01-26 DIAGNOSIS — R33.9 URINARY RETENTION: ICD-10-CM

## 2018-01-26 DIAGNOSIS — Z13.220 SCREENING CHOLESTEROL LEVEL: ICD-10-CM

## 2018-01-26 DIAGNOSIS — Z23 INFLUENZA VACCINE NEEDED: ICD-10-CM

## 2018-01-26 DIAGNOSIS — D72.829 LEUKOCYTOSIS, UNSPECIFIED TYPE: ICD-10-CM

## 2018-01-26 DIAGNOSIS — D46.9 MYELODYSPLASTIC SYNDROME (HCC): ICD-10-CM

## 2018-01-26 PROBLEM — N39.0 UTI (URINARY TRACT INFECTION): Status: RESOLVED | Noted: 2017-08-24 | Resolved: 2018-01-26

## 2018-01-26 PROCEDURE — G0008 ADMIN INFLUENZA VIRUS VAC: HCPCS | Mod: GW | Performed by: NURSE PRACTITIONER

## 2018-01-26 PROCEDURE — 90662 IIV NO PRSV INCREASED AG IM: CPT | Mod: GW | Performed by: NURSE PRACTITIONER

## 2018-01-26 PROCEDURE — 99214 OFFICE O/P EST MOD 30 MIN: CPT | Mod: GW | Performed by: PSYCHIATRY & NEUROLOGY

## 2018-01-26 PROCEDURE — 99214 OFFICE O/P EST MOD 30 MIN: CPT | Mod: 25,GW | Performed by: NURSE PRACTITIONER

## 2018-01-26 RX ORDER — TRIAZOLAM 0.12 MG/1
0.12 TABLET ORAL NIGHTLY PRN
Qty: 30 TAB | Refills: 5 | Status: SHIPPED | OUTPATIENT
Start: 2018-01-26 | End: 2018-02-25

## 2018-01-26 RX ORDER — CARBIDOPA AND LEVODOPA 50; 200 MG/1; MG/1
1 TABLET, EXTENDED RELEASE ORAL
Qty: 90 TAB | Refills: 1 | Status: SHIPPED | OUTPATIENT
Start: 2018-01-26

## 2018-01-26 RX ORDER — GUAIFENESIN 600 MG/1
600 TABLET, EXTENDED RELEASE ORAL EVERY 12 HOURS
COMMUNITY
End: 2018-01-26

## 2018-01-26 RX ORDER — MODAFINIL 200 MG/1
200 TABLET ORAL DAILY
Qty: 90 TAB | Refills: 1 | Status: SHIPPED | OUTPATIENT
Start: 2018-01-26 | End: 2018-06-12

## 2018-01-26 RX ORDER — AMANTADINE HYDROCHLORIDE 100 MG/1
100 CAPSULE, GELATIN COATED ORAL 2 TIMES DAILY
Qty: 180 CAP | Refills: 1 | Status: SHIPPED | OUTPATIENT
Start: 2018-01-26

## 2018-01-26 RX ORDER — MEMANTINE HYDROCHLORIDE 10 MG/1
10 TABLET ORAL 2 TIMES DAILY
Qty: 180 TAB | Refills: 1 | Status: SHIPPED | OUTPATIENT
Start: 2018-01-26 | End: 2018-08-17 | Stop reason: SDUPTHER

## 2018-01-26 ASSESSMENT — ENCOUNTER SYMPTOMS
TREMORS: 1
WEAKNESS: 1
INSOMNIA: 1

## 2018-01-26 ASSESSMENT — PATIENT HEALTH QUESTIONNAIRE - PHQ9: CLINICAL INTERPRETATION OF PHQ2 SCORE: 0

## 2018-01-26 NOTE — PROGRESS NOTES
Subjective:      Rafael Felix is a 77 y.o. male who presents with Follow-Up (surgery) and Medication Refill (needs printed RX)        CC: Patient is brought in today by his wife for need of insomnia medications, lab work and flu shot.    HPI Rafael Felix      1. Myelodysplastic syndrome (CMS-[facility])  Patient was going to hematology up until about a year ago and was stable but was supposed to follow back and has not done so. His wife states he has had some any other issues going on she has not been too concerned. His last platelet count was from the hospital at 85 which apparently is not abnormal for him. He reports no overt bleeding or bruising.    2. Parkinson's disease (CMS-HCC)  Patient continues to follow with neurology and was seen earlier today. He was in rehabilitation for quite a while but now is at home with his wife and their pain for some assistance. His memory tends to have good and bad days and he continues on his Namenda. His wife has taken over much of the financial issues for the house.    3. Leukocytosis, unspecified type  Patient due for lab work.    4. Urinary retention  Patient has been having some problems with urinary retention and wears a Hurst catheter. Wife states that there is a local urologist in [facility] where they live who sees patient on a regular basis and has been checking urine. He does not wish to self catheter.    5. Insomnia, unspecified type  Patient would like to get refill on Halcion which he has used on and off for years. His wife states he currently uses it about twice a week when he is having a lot of trouble with sleep. He also has been on tramadol and I had explained on a previous visit that patient would have to give up one of the medicines because of the risk for interaction. He and his wife state they would prefer that he stay with the sleeping medicine.    6. Recurrent major depressive disorder, in full remission (CMS-HCC)  Patient continues to use SSRI  which is helpful.    7. Risk for falls  Patient is mostly help with transfers and has difficulty walking. He is currently getting home physical therapy.    8. Influenza vaccine needed  Patient has not had flu shot yet this year.    9. Screening cholesterol level  Patient due for some routine screening.    Social History   Substance Use Topics   • Smoking status: Never Smoker   • Smokeless tobacco: Never Used   • Alcohol use Yes      Comment: 1 per day     Current Outpatient Prescriptions   Medication Sig Dispense Refill   • memantine (NAMENDA) 10 MG Tab Take 1 Tab by mouth 2 times a day. 180 Tab 1   • modafinil (PROVIGIL) 200 MG Tab Take 1 Tab by mouth every day for 180 days. 90 Tab 1   • carbidopa-levodopa SR (SINEMET CR)  MG per tablet Take 1 Tab by mouth every bedtime. 90 Tab 1   • Carbidopa-Levodopa ER (RYTARY) 61. MG Cap CR Take 1 Cap by mouth 3 times a day. 270 Cap 1   • amantadine (SYMMETREL) 100 MG Cap Take 1 Cap by mouth 2 times a day. 180 Cap 1   • Sennosides-Docusate Sodium (SENNA PLUS PO) Take  by mouth.     • triazolam (HALCION) 0.125 MG tablet Take 1 Tab by mouth at bedtime as needed for up to 30 days. 30 Tab 5   • citalopram (CELEXA) 20 MG Tab Take 1 Tab by mouth every day. 30 Tab 11     No current facility-administered medications for this visit.      Family History   Problem Relation Age of Onset   • Heart Disease Mother    • Lung Disease Mother    • Cancer Father      pancreatic     Past Medical History:   Diagnosis Date   • Compression fracture of spine (CMS-HCC)    • Hemorrhagic disorder (CMS-HCC) 7-    bruises easily   • MDS (myelodysplastic syndrome) (CMS-HCC)    • Parkinson's disease (CMS-[facility])        Review of Systems   Neurological: Positive for tremors and weakness.   Psychiatric/Behavioral: The patient has insomnia.    All other systems reviewed and are negative.         Objective:     /66   Pulse 72   Temp 36.2 °C (97.2 °F)   Resp 16   Ht 1.829 m (6')   Wt  79.4 kg (175 lb)   SpO2 98%   BMI 23.73 kg/m²      Physical Exam   Constitutional: He is oriented to person, place, and time. He appears well-developed and well-nourished. No distress.   HENT:   Head: Normocephalic and atraumatic.   Right Ear: External ear normal.   Left Ear: External ear normal.   Nose: Nose normal.   Mouth/Throat: Oropharynx is clear and moist.   Eyes: Conjunctivae are normal. Right eye exhibits no discharge. Left eye exhibits no discharge.   Neck: Normal range of motion. Neck supple. No tracheal deviation present. No thyromegaly present.   Cardiovascular: Normal rate, regular rhythm and normal heart sounds.    No murmur heard.  Pulmonary/Chest: Effort normal and breath sounds normal. No respiratory distress. He has no wheezes. He has no rales.   Musculoskeletal:   Patient is wheelchair bound and has difficulty with standing and transfer.   Lymphadenopathy:     He has no cervical adenopathy.   Neurological: He is alert and oriented to person, place, and time. Coordination normal.   Skin: Skin is warm and dry. No rash noted. He is not diaphoretic. No erythema.   Psychiatric: He has a normal mood and affect. His behavior is normal. Judgment and thought content normal.   Patient able to carry on conversation and able to answer some questions although wife has to provide most of the information today.   Nursing note and vitals reviewed.              Assessment/Plan:     1. Myelodysplastic syndrome (CMS-HCC)  I will do a CBC and if low with platelets and white blood cell count as expected, I will recommend he follow back with his hematologist.    2. Parkinson's disease (CMS-HCC)  Patient will continue to follow with neurology which is prescribing his Parkinson's medicines.  - TSH; Future  - COMP METABOLIC PANEL; Future    3. Leukocytosis, unspecified type    - CBC WITH DIFFERENTIAL; Future    4. Urinary retention  Patient will continue to follow with his urologist in his home town who is checking his  urine. He is currently with Hurst catheter.    5. Insomnia, unspecified type  Patient will stop tramadol because he wishes to stay on Halcion. I advised not using it every night and be careful for falls. His wife does not want him to stop the medicine.  - triazolam (HALCION) 0.125 MG tablet; Take 1 Tab by mouth at bedtime as needed for up to 30 days.  Dispense: 30 Tab; Refill: 5    6. Recurrent major depressive disorder, in full remission (CMS-HCC)  Patient will continue on his SSRI.    7. Risk for falls    - Patient identified as fall risk.  Appropriate orders and counseling given.    8. Influenza vaccine needed  I have placed the below orders and discussed them with an approved delegating provider. The MA is performing the below orders under the direction of Dr. Reyes    - INFLUENZA VACCINE, HIGH DOSE (65+ ONLY)    9. Screening cholesterol level    - LIPID PROFILE; Future

## 2018-01-26 NOTE — PROGRESS NOTES
NEUROLOGY NOTE    Referring Physician  Wild Sherman      CHIEF COMPLAINT:    Since the last visit, the patient's conditions improved since he is getting nursing care from his wife at home    Retired at the age of 65YO  Noticed to have cognitive problems for the 5 years  Chief Complaint   Patient presents with   • Follow-Up     Parkinson's       PRESENT ILLNESS:           Popeye Ferguson M.D.  Hannah Pacheco, Med Ass't   Caller: Unspecified (Yesterday,  2:09 PM)             traditional medication for anxiety could make him sleepy   Anyway, I will give them Klonopin 0.5mg QD PRN maximum 20# per month     Medical Marijuana might be another option too( the patient is in the terminal stage of degeneration)     Order Klonopin given     Spoke to wife gave above information, and script has been fx'd to [facility]. Wife will call with any concerns.      Since the last visit, the patient's conditions improved since he is getting nursing care from his wife at home    It took 1+ hour to come to [facility]j      Retired at the age of 65YO  Noticed to have cognitive problems for the 5 years    Used to teach in college     He was diagnosed as parkinson disease in [facility]-- diagnosed 5 years ago-- initially in [place]--- was put on Sinemet for 5 years      Could not tolerate low dose Keppra   Keppra 125mg before sleep for one week, then 125mg two times per day      PAST MEDICAL HISTORY:  Past Medical History:   Diagnosis Date   • Compression fracture of spine (CMS-[facility])    • Hemorrhagic disorder (CMS-[facility]) 7-    bruises easily   • MDS (myelodysplastic syndrome) (CMS-[facility])    • Parkinson's disease (CMS-[facility])        PAST SURGICAL HISTORY:  Past Surgical History:   Procedure Laterality Date   • CYSTOSCOPY  8/27/2017    Procedure: CYSTOSCOPY, COMPLEX CATHETERIZATION;  Surgeon: Wild Almeida M.D.;  Location: SURGERY [facility];  Service: Urology   • EVACUATION OF HEMATOMA  8/27/2017    Procedure: EVACUATION OF HEMATOMA, TUR OLD BLADDER  CLOTS, DILATION OF URETHRAL STRICTURE;  Surgeon: Wild Almeida M.D.;  Location: SURGERY [facility];  Service: Urology   • TRANS URETHRAL RESECTION BLADDER  8/27/2017    Procedure: TRANS URETHRAL RESECTION BLADDER;  Surgeon: Wild Almeida M.D.;  Location: SURGERY [facility];  Service: Urology   • KYPHOPLASTY  8/6/2017    Procedure: KYPHOPLASTY T11, T12, L1 ;  Surgeon: Niels Gallardo M.D.;  Location: SURGERY [facility];  Service:    • CATARACT EXTRACTION WITH IOL     • KNEE ARTHROSCOPY     • PROSTATECTOMY, RADIAL      subtotal   • ROTATOR CUFF REPAIR      right   • SPLENECTOMY         FAMILY HISTORY:  Family History   Problem Relation Age of Onset   • Heart Disease Mother    • Lung Disease Mother    • Cancer Father      pancreatic       SOCIAL HISTORY:  Social History     Social History   • Marital status:      Spouse name: N/A   • Number of children: N/A   • Years of education: N/A     Occupational History   • Not on file.     Social History Main Topics   • Smoking status: Never Smoker   • Smokeless tobacco: Never Used   • Alcohol use Yes      Comment: 1 per day   • Drug use: No   • Sexual activity: Not on file     Other Topics Concern   • Not on file     Social History Narrative   • No narrative on file     ALLERGIES:  Allergies   Allergen Reactions   • Diflucan [Fluconazole]      Reaction: Convulsions per wife     TOBHX  History   Smoking Status   • Never Smoker   Smokeless Tobacco   • Never Used     ALCHX  History   Alcohol Use   • Yes     Comment: 1 per day     DRUGHX  History   Drug Use No           MEDICATIONS:  Current Outpatient Prescriptions   Medication   • tramadol (ULTRAM) 50 MG Tab   • modafinil (PROVIGIL) 200 MG Tab   • memantine (NAMENDA) 10 MG Tab   • amantadine (SYMMETREL) 100 MG Cap   • citalopram (CELEXA) 20 MG Tab   • carbidopa-levodopa SR (SINEMET CR)  MG per tablet   • levofloxacin (LEVAQUIN) 500 MG tablet   • clonazepam (KLONOPIN) 0.5 MG Tab   • ciprofloxacin  (CIPRO) 500 MG Tab   • doxazosin (CARDURA) 1 MG Tab   • ferrous sulfate (FEOSOL) 220 (44 Fe) MG/5ML Elixir   • Carbidopa-Levodopa ER (RYTARY) 61. MG Cap CR   • triazolam (HALCION) 0.125 MG tablet     No current facility-administered medications for this visit.        REVIEW OF SYSTEM:    Constitutional: Denies fevers, Denies weight changes   Eyes: Denies changes in vision, no eye pain   Ears/Nose/Throat/Mouth: Denies nasal congestion or sore throat   Cardiovascular: Denies chest pain or palpitations   Respiratory: Denies SOB.   Gastrointestinal/Hepatic: Denies abdominal pain, nausea, vomiting, diarrhea, constipation or GI bleeding   Genitourinary: Denies bladder dysfunction, dysuria or frequency   Musculoskeletal/Rheum: Denies joint pain and swelling   Skin/Breast: Denies rash, denies breast lumps or discharge   Neurological: Parkinson Disease, Dementia  Psychiatric: denies mood disorder   Endocrine: denies hx of diabetes or thyroid dysfunction   Heme/Oncology/Lymph Nodes: Denies enlarged lymph nodes, denies brusing or known bleeding disorder   Allergic/Immunologic: Denies hx of allergies         PHYSICAL AND NEUROLOGICAL EXMAINATIONS:  VITAL SIGNS: /68   Pulse 92   Temp 36.4 °C (97.6 °F)   Resp 16   Ht 1.829 m (6')   Wt 78.9 kg (174 lb)   SpO2 93%   BMI 23.60 kg/m²   CURRENT WEIGHT:   BMI: Body mass index is 23.6 kg/m².  PREVIOUS WEIGHTS:  Wt Readings from Last 25 Encounters:   01/26/18 78.9 kg (174 lb)   10/30/17 72.6 kg (160 lb)   10/06/17 72.6 kg (160 lb)   09/24/17 70.9 kg (156 lb 4.9 oz)   08/16/17 97.5 kg (214 lb 15.2 oz)   08/06/17 87.2 kg (192 lb 3.9 oz)   06/13/17 86.6 kg (191 lb)   05/03/17 88 kg (194 lb)   02/01/17 88 kg (194 lb)   12/14/16 89.2 kg (196 lb 9.6 oz)   09/28/16 88.9 kg (196 lb)   07/20/16 85.7 kg (189 lb)       General appearance of patient: WDWN(+) NAD(+)    EYES  o Fundus : Papilledem(-) Exudates(-) Hemorrhage(-)  Nervous System  Orientation to time, place and person(+)  today, the patient could ask for water  Memory normal(-)  Language: aphasia(-)  Knowledge: past(+) Current(+)  Attention(+)  Cranial Nerves  • Nerve 2: intact  • Nerve 3,4,6: intact  • Nerve 5 : intact  • Nerve 7: intact  • Nerve 8: intact  • Nerve 9 & 10: intact  • Nerve 11: intact  • Nerve 12: intact  Muscle Power and muscle tone: general weak   Sensory System: Pin sensation intact(+)  Gait : Steady(-) on wheelchair today --   Heart and Vascular  Peripheral Vasucular system : Edema (-) Swelling(-)  RHB, Breathing sound clear  abdomen bowel sound normoactive  Extremities freely moveable  Joints no contracture       NEUROIMAGING: I reviewed the MRI/CT of brain     Registration 3/3  Recall 3/3       Pt is sending this message via [employer] e-mail. Please advise. Thank you. KA      Dr. Ferguson - I have been taking the increased strength of Rytary since you prescribed it but feel worse than ever. I have no energy and my balance is not good. I think I should revert to the original strength but the literature from the pharmacy indicates that lowering the dosage should be done slowly. What do you recommend?  I've discontinued Azilect as it doesn't seem to help at all.     Jamie Felix              In the past, CARBIDOPA-LEVODOPA ER 48. MG PO CPCR three times per day did not make him feel good  Now he is on Carbidopa-Levodopa ER 36. MG Cap CR  But now he is OK with Carbidopa 48.75     recall 3/3 today      IMPRESSION:    1. Parkinson Disease Stage III- IV, Dementia  2. Myelodysplastic syndrome  3. Lack of energy for 3 years-- good days and bad days-- worsened in the last year-- in fact, patient developed one episode of lack of energy in front of me  4. Long hospital admission since last visit-- at times, confused, agitated -- during the hospital  5. Swallowing problems due to 1    PLAN/RECOMMENDATIONS:      ________________________________________________________________________    Explained to the wife that   1. I  prefer not to change medication for parkinson now-- since the patient is fragile now  2. It is fine to crush the long acting sinemet if the patient could not swallow the medicine at times ( understanding that , the long acting sinemet will lose its lone acting effecting)  3. Advise the following      ________________________________________________________________________    Multiple vitamin daily  Thiamine 200mg daily  Fish Oil -- Omega 3 1000mg 3# daily  Vit D-3 4000 unit daily  ________________________________________________________________________            SIGNATURE:  Popeye Ferguson      CC:  MERI Casarez M.D.  Hannah Pacheco, Med Ass't   Caller: Unspecified (Yesterday,  2:09 PM)             traditional medication for anxiety could make him sleepy   Anyway, I will give them Klonopin 0.5mg QD PRN maximum 20# per month     Medical Marijuana might be another option too( the patient is in the terminal stage of degeneration)     Order Klonopin given     Spoke to wife gave above information, and script has been fx'd to [facility]. Wife will call with any concerns.

## 2018-01-26 NOTE — PATIENT INSTRUCTIONS
recall 3/3 today      IMPRESSION:    1. Parkinson Disease Stage III- IV, Dementia  2. Myelodysplastic syndrome  3. Lack of energy for 3 years-- good days and bad days-- worsened in the last year-- in fact, patient developed one episode of lack of energy in front of me  4. Long hospital admission since last visit-- at times, confused, agitated -- during the hospital  5. Swallowing problems due to 1    PLAN/RECOMMENDATIONS:      ________________________________________________________________________    Explained to the wife that   1. I prefer not to change medication for parkinson now-- since the patient is fragile now  2. It is fine to crush the long acting sinemet if the patient could not swallow the medicine at times ( understanding that , the long acting sinemet will lose its lone acting effecting)  3. Advise the following      ________________________________________________________________________    Multiple vitamin daily  Thiamine 200mg daily  Fish Oil -- Omega 3 1000mg 3# daily  Vit D-3 4000 unit daily  ________________________________________________________________________      Advise exercise      SIGNATURE:  Popeye Ferguson

## 2018-02-01 ENCOUNTER — TELEPHONE (OUTPATIENT)
Dept: NEUROLOGY | Facility: MEDICAL CENTER | Age: 78
End: 2018-02-01

## 2018-06-12 DIAGNOSIS — Z01.812 PRE-OPERATIVE LABORATORY EXAMINATION: ICD-10-CM

## 2018-06-12 LAB
ALBUMIN SERPL BCP-MCNC: 4.2 G/DL (ref 3.2–4.9)
ALBUMIN/GLOB SERPL: 0.9 G/DL
ALP SERPL-CCNC: 83 U/L (ref 30–99)
ALT SERPL-CCNC: 6 U/L (ref 2–50)
ANION GAP SERPL CALC-SCNC: 8 MMOL/L (ref 0–11.9)
AST SERPL-CCNC: 13 U/L (ref 12–45)
BILIRUB SERPL-MCNC: 0.8 MG/DL (ref 0.1–1.5)
BUN SERPL-MCNC: 20 MG/DL (ref 8–22)
CALCIUM SERPL-MCNC: 9.4 MG/DL (ref 8.5–10.5)
CHLORIDE SERPL-SCNC: 102 MMOL/L (ref 96–112)
CO2 SERPL-SCNC: 25 MMOL/L (ref 20–33)
CREAT SERPL-MCNC: 1.36 MG/DL (ref 0.5–1.4)
GLOBULIN SER CALC-MCNC: 4.5 G/DL (ref 1.9–3.5)
GLUCOSE SERPL-MCNC: 85 MG/DL (ref 65–99)
POTASSIUM SERPL-SCNC: 3.9 MMOL/L (ref 3.6–5.5)
PROT SERPL-MCNC: 8.7 G/DL (ref 6–8.2)
SODIUM SERPL-SCNC: 135 MMOL/L (ref 135–145)

## 2018-06-12 PROCEDURE — 87086 URINE CULTURE/COLONY COUNT: CPT

## 2018-06-12 PROCEDURE — 85055 RETICULATED PLATELET ASSAY: CPT

## 2018-06-12 PROCEDURE — 36415 COLL VENOUS BLD VENIPUNCTURE: CPT

## 2018-06-12 PROCEDURE — 85027 COMPLETE CBC AUTOMATED: CPT

## 2018-06-12 PROCEDURE — 80053 COMPREHEN METABOLIC PANEL: CPT

## 2018-06-12 PROCEDURE — 81001 URINALYSIS AUTO W/SCOPE: CPT

## 2018-06-12 RX ORDER — LEVODOPA AND CARBIDOPA 245; 61.25 MG/1; MG/1
CAPSULE, EXTENDED RELEASE ORAL
Qty: 270 CAP | Refills: 1 | Status: SHIPPED | OUTPATIENT
Start: 2018-06-12

## 2018-06-13 LAB
AMORPH CRY #/AREA URNS HPF: PRESENT /HPF
APPEARANCE UR: ABNORMAL
BACTERIA #/AREA URNS HPF: ABNORMAL /HPF
BILIRUB UR QL STRIP.AUTO: NEGATIVE
COLOR UR: YELLOW
EPI CELLS #/AREA URNS HPF: NEGATIVE /HPF
ERYTHROCYTE [DISTWIDTH] IN BLOOD BY AUTOMATED COUNT: 61 FL (ref 35.9–50)
GLUCOSE UR STRIP.AUTO-MCNC: NEGATIVE MG/DL
HCT VFR BLD AUTO: 42 % (ref 42–52)
HGB BLD-MCNC: 13.6 G/DL (ref 14–18)
HYALINE CASTS #/AREA URNS LPF: ABNORMAL /LPF
KETONES UR STRIP.AUTO-MCNC: ABNORMAL MG/DL
LEUKOCYTE ESTERASE UR QL STRIP.AUTO: ABNORMAL
MCH RBC QN AUTO: 29.5 PG (ref 27–33)
MCHC RBC AUTO-ENTMCNC: 32.4 G/DL (ref 33.7–35.3)
MCV RBC AUTO: 91.1 FL (ref 81.4–97.8)
MICRO URNS: ABNORMAL
MORPHOLOGY BLD-IMP: NORMAL
NITRITE UR QL STRIP.AUTO: POSITIVE
PH UR STRIP.AUTO: 6.5 [PH]
PLATELET # BLD AUTO: 49 K/UL (ref 164–446)
PLATELETS.RETICULATED NFR BLD AUTO: 45.2 K/UL (ref 0.6–13.1)
PROT UR QL STRIP: 100 MG/DL
RBC # BLD AUTO: 4.61 M/UL (ref 4.7–6.1)
RBC # URNS HPF: ABNORMAL /HPF
RBC UR QL AUTO: ABNORMAL
SP GR UR STRIP.AUTO: 1.02
UROBILINOGEN UR STRIP.AUTO-MCNC: 0.2 MG/DL
WBC # BLD AUTO: 8 K/UL (ref 4.8–10.8)
WBC #/AREA URNS HPF: ABNORMAL /HPF

## 2018-06-15 LAB
BACTERIA UR CULT: NORMAL
SIGNIFICANT IND 70042: NORMAL
SITE SITE: NORMAL
SOURCE SOURCE: NORMAL

## 2018-06-20 NOTE — OR NURSING
MD called this RN back. Verbal order received from Dr. Joe to transfuse 1 unit platelet with CBC post transfusion prior to procedure tomorrow 6/21/18 2092.

## 2018-06-20 NOTE — OR NURSING
"1030 Called MD office to clairify order for \"platelets 6 units.\" Spoke to Ro, surgery scheduler she states Heath PENA \"Myda\" confirmed MD order is correct.     1045 Blood bank informed of MD order. Per blood bank this is too large of volume for pt to receive. Called MD office back to confirm with MD. Spoke to Ro again asked to leave a message for Heath to call back to confirm order, was informed he out \"out of the office today.\" Ro states order is correct and that a voicemail was left on pre-admit RN Chano's voicemail regarding this order.       1055 Informed manager of [facility] of situation regarding the order. Instructed to call MD office again to clarify order as 1 unit is equal to a 6 pack of platelets. The MD order for 6 units would equal a 36 pack which is an excessive volume for this type of transfusion.    1100 Called MD office again explained excessive volume ordered and left message for Haeth PENA \"Myda\" to call back and to inform MD.     1448 Still no call back from Heath PENA \"Myda\" Called office again and was informed the MA will fax over correct order for 1 unit platelets before office closes at 1700.      "

## 2018-06-20 NOTE — OR NURSING
9919 Still no new order received via fax. Left voicemail on MD cell phone for verbal order/clarification on platelet order so this does not interfere with case start time.

## 2018-06-21 ENCOUNTER — HOSPITAL ENCOUNTER (INPATIENT)
Facility: MEDICAL CENTER | Age: 78
LOS: 19 days | DRG: 662 | End: 2018-07-11
Attending: UROLOGY | Admitting: UROLOGY
Payer: MEDICARE

## 2018-06-21 DIAGNOSIS — D46.9 MYELODYSPLASTIC SYNDROME (HCC): ICD-10-CM

## 2018-06-21 DIAGNOSIS — D69.6 THROMBOCYTOPENIA (HCC): ICD-10-CM

## 2018-06-21 DIAGNOSIS — G20.A1 PARKINSON'S DISEASE: ICD-10-CM

## 2018-06-21 PROBLEM — R03.0 ELEVATED BLOOD PRESSURE READING WITHOUT DIAGNOSIS OF HYPERTENSION: Status: ACTIVE | Noted: 2018-06-21

## 2018-06-21 PROBLEM — N18.30 CKD (CHRONIC KIDNEY DISEASE), STAGE III (HCC): Status: ACTIVE | Noted: 2018-06-21

## 2018-06-21 LAB
BARCODED ABORH UBTYP: 6200
BARCODED PRD CODE UBPRD: NORMAL
BARCODED UNIT NUM UBUNT: NORMAL
COMPONENT P 8504P: NORMAL
ERYTHROCYTE [DISTWIDTH] IN BLOOD BY AUTOMATED COUNT: 59.7 FL (ref 35.9–50)
ERYTHROCYTE [DISTWIDTH] IN BLOOD BY AUTOMATED COUNT: 59.7 FL (ref 35.9–50)
HCT VFR BLD AUTO: 32.9 % (ref 42–52)
HCT VFR BLD AUTO: 34 % (ref 42–52)
HGB BLD-MCNC: 10.6 G/DL (ref 14–18)
HGB BLD-MCNC: 11 G/DL (ref 14–18)
MCH RBC QN AUTO: 29.5 PG (ref 27–33)
MCH RBC QN AUTO: 29.6 PG (ref 27–33)
MCHC RBC AUTO-ENTMCNC: 32.2 G/DL (ref 33.7–35.3)
MCHC RBC AUTO-ENTMCNC: 32.4 G/DL (ref 33.7–35.3)
MCV RBC AUTO: 91.2 FL (ref 81.4–97.8)
MCV RBC AUTO: 91.9 FL (ref 81.4–97.8)
PLATELET # BLD AUTO: 69 K/UL (ref 164–446)
PLATELET # BLD AUTO: 76 K/UL (ref 164–446)
PMV BLD AUTO: 11.6 FL (ref 9–12.9)
PMV BLD AUTO: 13.4 FL (ref 9–12.9)
PRODUCT TYPE UPROD: NORMAL
RBC # BLD AUTO: 3.58 M/UL (ref 4.7–6.1)
RBC # BLD AUTO: 3.73 M/UL (ref 4.7–6.1)
UNIT STATUS USTAT: NORMAL
WBC # BLD AUTO: 9.5 K/UL (ref 4.8–10.8)
WBC # BLD AUTO: 9.6 K/UL (ref 4.8–10.8)

## 2018-06-21 PROCEDURE — 94760 N-INVAS EAR/PLS OXIMETRY 1: CPT

## 2018-06-21 PROCEDURE — 160039 HCHG SURGERY MINUTES - EA ADDL 1 MIN LEVEL 3: Performed by: UROLOGY

## 2018-06-21 PROCEDURE — 500042 HCHG BAG, URINARY DRAINAGE (CLOSED): Performed by: UROLOGY

## 2018-06-21 PROCEDURE — 160048 HCHG OR STATISTICAL LEVEL 1-5: Performed by: UROLOGY

## 2018-06-21 PROCEDURE — 99220 PR INITIAL OBSERVATION CARE,LEVL III: CPT | Mod: GW | Performed by: INTERNAL MEDICINE

## 2018-06-21 PROCEDURE — A4338 INDWELLING CATHETER LATEX: HCPCS | Performed by: UROLOGY

## 2018-06-21 PROCEDURE — A4358 URINARY LEG OR ABDOMEN BAG: HCPCS | Performed by: UROLOGY

## 2018-06-21 PROCEDURE — P9034 PLATELETS, PHERESIS: HCPCS

## 2018-06-21 PROCEDURE — 700102 HCHG RX REV CODE 250 W/ 637 OVERRIDE(OP): Performed by: UROLOGY

## 2018-06-21 PROCEDURE — 501838 HCHG SUTURE GENERAL: Performed by: UROLOGY

## 2018-06-21 PROCEDURE — 30233R1 TRANSFUSION OF NONAUTOLOGOUS PLATELETS INTO PERIPHERAL VEIN, PERCUTANEOUS APPROACH: ICD-10-PCS | Performed by: UROLOGY

## 2018-06-21 PROCEDURE — 160009 HCHG ANES TIME/MIN: Performed by: UROLOGY

## 2018-06-21 PROCEDURE — 160002 HCHG RECOVERY MINUTES (STAT): Performed by: UROLOGY

## 2018-06-21 PROCEDURE — 700105 HCHG RX REV CODE 258: Performed by: INTERNAL MEDICINE

## 2018-06-21 PROCEDURE — 160035 HCHG PACU - 1ST 60 MINS PHASE I: Performed by: UROLOGY

## 2018-06-21 PROCEDURE — A6402 STERILE GAUZE <= 16 SQ IN: HCPCS | Performed by: UROLOGY

## 2018-06-21 PROCEDURE — 0T9B30Z DRAINAGE OF BLADDER WITH DRAINAGE DEVICE, PERCUTANEOUS APPROACH: ICD-10-PCS | Performed by: UROLOGY

## 2018-06-21 PROCEDURE — 700102 HCHG RX REV CODE 250 W/ 637 OVERRIDE(OP)

## 2018-06-21 PROCEDURE — 700111 HCHG RX REV CODE 636 W/ 250 OVERRIDE (IP)

## 2018-06-21 PROCEDURE — 36430 TRANSFUSION BLD/BLD COMPNT: CPT

## 2018-06-21 PROCEDURE — A9270 NON-COVERED ITEM OR SERVICE: HCPCS | Performed by: INTERNAL MEDICINE

## 2018-06-21 PROCEDURE — G0378 HOSPITAL OBSERVATION PER HR: HCPCS

## 2018-06-21 PROCEDURE — 160036 HCHG PACU - EA ADDL 30 MINS PHASE I: Performed by: UROLOGY

## 2018-06-21 PROCEDURE — 700101 HCHG RX REV CODE 250

## 2018-06-21 PROCEDURE — 700111 HCHG RX REV CODE 636 W/ 250 OVERRIDE (IP): Performed by: INTERNAL MEDICINE

## 2018-06-21 PROCEDURE — 500880 HCHG PACK, CYSTO W/SEP LEGGINGS: Performed by: UROLOGY

## 2018-06-21 PROCEDURE — 700102 HCHG RX REV CODE 250 W/ 637 OVERRIDE(OP): Performed by: INTERNAL MEDICINE

## 2018-06-21 PROCEDURE — A9270 NON-COVERED ITEM OR SERVICE: HCPCS | Performed by: UROLOGY

## 2018-06-21 PROCEDURE — 700111 HCHG RX REV CODE 636 W/ 250 OVERRIDE (IP): Performed by: UROLOGY

## 2018-06-21 PROCEDURE — A9270 NON-COVERED ITEM OR SERVICE: HCPCS

## 2018-06-21 PROCEDURE — 85027 COMPLETE CBC AUTOMATED: CPT

## 2018-06-21 PROCEDURE — 160028 HCHG SURGERY MINUTES - 1ST 30 MINS LEVEL 3: Performed by: UROLOGY

## 2018-06-21 PROCEDURE — 36415 COLL VENOUS BLD VENIPUNCTURE: CPT

## 2018-06-21 PROCEDURE — 501329 HCHG SET, CYSTO IRRIG Y TUR: Performed by: UROLOGY

## 2018-06-21 RX ORDER — SODIUM CHLORIDE, SODIUM LACTATE, POTASSIUM CHLORIDE, CALCIUM CHLORIDE 600; 310; 30; 20 MG/100ML; MG/100ML; MG/100ML; MG/100ML
INJECTION, SOLUTION INTRAVENOUS
Status: DISCONTINUED | OUTPATIENT
Start: 2018-06-21 | End: 2018-06-24

## 2018-06-21 RX ORDER — HYDROMORPHONE HYDROCHLORIDE 2 MG/ML
0.25 INJECTION, SOLUTION INTRAMUSCULAR; INTRAVENOUS; SUBCUTANEOUS
Status: DISCONTINUED | OUTPATIENT
Start: 2018-06-21 | End: 2018-07-05

## 2018-06-21 RX ORDER — CARBIDOPA AND LEVODOPA 50; 200 MG/1; MG/1
1 TABLET, EXTENDED RELEASE ORAL
Status: DISCONTINUED | OUTPATIENT
Start: 2018-06-21 | End: 2018-07-11 | Stop reason: HOSPADM

## 2018-06-21 RX ORDER — CITALOPRAM 20 MG/1
20 TABLET ORAL DAILY
Status: DISCONTINUED | OUTPATIENT
Start: 2018-06-22 | End: 2018-07-11 | Stop reason: HOSPADM

## 2018-06-21 RX ORDER — OXYCODONE HYDROCHLORIDE 5 MG/1
2.5 TABLET ORAL
Status: DISCONTINUED | OUTPATIENT
Start: 2018-06-21 | End: 2018-06-21

## 2018-06-21 RX ORDER — SODIUM CHLORIDE 9 MG/ML
1000 INJECTION, SOLUTION INTRAVENOUS
Status: DISCONTINUED | OUTPATIENT
Start: 2018-06-21 | End: 2018-07-08

## 2018-06-21 RX ORDER — HYDRALAZINE HYDROCHLORIDE 20 MG/ML
2.5 INJECTION INTRAMUSCULAR; INTRAVENOUS
Status: DISCONTINUED | OUTPATIENT
Start: 2018-06-21 | End: 2018-06-21

## 2018-06-21 RX ORDER — ACETAMINOPHEN 325 MG/1
650 TABLET ORAL EVERY 6 HOURS
Status: DISPENSED | OUTPATIENT
Start: 2018-06-21 | End: 2018-06-26

## 2018-06-21 RX ORDER — AMANTADINE HYDROCHLORIDE 100 MG/1
100 CAPSULE, GELATIN COATED ORAL 2 TIMES DAILY
Status: DISCONTINUED | OUTPATIENT
Start: 2018-06-21 | End: 2018-07-11 | Stop reason: HOSPADM

## 2018-06-21 RX ORDER — TRAMADOL HYDROCHLORIDE 50 MG/1
50 TABLET ORAL EVERY 6 HOURS PRN
Status: DISCONTINUED | OUTPATIENT
Start: 2018-06-21 | End: 2018-07-11 | Stop reason: HOSPADM

## 2018-06-21 RX ORDER — ATROPA BELLADONNA AND OPIUM 16.2; 6 MG/1; MG/1
60 SUPPOSITORY RECTAL
Status: ACTIVE | OUTPATIENT
Start: 2018-06-21 | End: 2018-06-22

## 2018-06-21 RX ORDER — BUPIVACAINE HYDROCHLORIDE AND EPINEPHRINE 2.5; 5 MG/ML; UG/ML
INJECTION, SOLUTION EPIDURAL; INFILTRATION; INTRACAUDAL; PERINEURAL
Status: DISCONTINUED | OUTPATIENT
Start: 2018-06-21 | End: 2018-06-21 | Stop reason: HOSPADM

## 2018-06-21 RX ORDER — AMLODIPINE BESYLATE 5 MG/1
5 TABLET ORAL
Status: DISCONTINUED | OUTPATIENT
Start: 2018-06-21 | End: 2018-06-25

## 2018-06-21 RX ORDER — ONDANSETRON 2 MG/ML
4 INJECTION INTRAMUSCULAR; INTRAVENOUS EVERY 6 HOURS PRN
Status: DISCONTINUED | OUTPATIENT
Start: 2018-06-21 | End: 2018-07-11 | Stop reason: HOSPADM

## 2018-06-21 RX ORDER — CIPROFLOXACIN 500 MG/1
500 TABLET, FILM COATED ORAL 2 TIMES DAILY
Qty: 10 TAB | Refills: 0 | Status: SHIPPED | OUTPATIENT
Start: 2018-06-21

## 2018-06-21 RX ORDER — HYDRALAZINE HYDROCHLORIDE 20 MG/ML
INJECTION INTRAMUSCULAR; INTRAVENOUS
Status: COMPLETED
Start: 2018-06-21 | End: 2018-06-21

## 2018-06-21 RX ORDER — AMOXICILLIN 250 MG
1 CAPSULE ORAL DAILY
COMMUNITY

## 2018-06-21 RX ORDER — OXYCODONE HYDROCHLORIDE 5 MG/1
5 TABLET ORAL
Status: DISCONTINUED | OUTPATIENT
Start: 2018-06-21 | End: 2018-06-21

## 2018-06-21 RX ORDER — CIPROFLOXACIN 500 MG/1
500 TABLET, FILM COATED ORAL 2 TIMES DAILY
COMMUNITY
Start: 2018-06-18

## 2018-06-21 RX ORDER — SODIUM CHLORIDE, SODIUM LACTATE, POTASSIUM CHLORIDE, CALCIUM CHLORIDE 600; 310; 30; 20 MG/100ML; MG/100ML; MG/100ML; MG/100ML
INJECTION, SOLUTION INTRAVENOUS CONTINUOUS
Status: DISCONTINUED | OUTPATIENT
Start: 2018-06-21 | End: 2018-07-11 | Stop reason: HOSPADM

## 2018-06-21 RX ORDER — HYDRALAZINE HYDROCHLORIDE 20 MG/ML
10 INJECTION INTRAMUSCULAR; INTRAVENOUS EVERY 4 HOURS PRN
Status: DISCONTINUED | OUTPATIENT
Start: 2018-06-21 | End: 2018-07-11 | Stop reason: HOSPADM

## 2018-06-21 RX ORDER — MEMANTINE HYDROCHLORIDE 10 MG/1
10 TABLET ORAL 2 TIMES DAILY
Status: DISCONTINUED | OUTPATIENT
Start: 2018-06-21 | End: 2018-07-11 | Stop reason: HOSPADM

## 2018-06-21 RX ORDER — ATROPA BELLADONNA AND OPIUM 16.2; 6 MG/1; MG/1
60 SUPPOSITORY RECTAL EVERY 12 HOURS PRN
Status: DISCONTINUED | OUTPATIENT
Start: 2018-06-21 | End: 2018-07-11 | Stop reason: HOSPADM

## 2018-06-21 RX ADMIN — AMLODIPINE BESYLATE 5 MG: 5 TABLET ORAL at 13:09

## 2018-06-21 RX ADMIN — HYDRALAZINE HYDROCHLORIDE 2.5 MG: 20 INJECTION, SOLUTION INTRAMUSCULAR; INTRAVENOUS at 10:45

## 2018-06-21 RX ADMIN — ACETAMINOPHEN 650 MG: 325 TABLET, FILM COATED ORAL at 16:49

## 2018-06-21 RX ADMIN — SODIUM CHLORIDE, POTASSIUM CHLORIDE, SODIUM LACTATE AND CALCIUM CHLORIDE: 600; 310; 30; 20 INJECTION, SOLUTION INTRAVENOUS at 10:00

## 2018-06-21 RX ADMIN — SODIUM CHLORIDE 1000 ML: 9 INJECTION, SOLUTION INTRAVENOUS at 06:15

## 2018-06-21 RX ADMIN — MEMANTINE HYDROCHLORIDE 10 MG: 10 TABLET ORAL at 22:06

## 2018-06-21 RX ADMIN — FENTANYL CITRATE 25 MCG: 50 INJECTION, SOLUTION INTRAMUSCULAR; INTRAVENOUS at 13:07

## 2018-06-21 RX ADMIN — HYDRALAZINE HYDROCHLORIDE 2.5 MG: 20 INJECTION INTRAMUSCULAR; INTRAVENOUS at 10:45

## 2018-06-21 RX ADMIN — TRAMADOL HYDROCHLORIDE 50 MG: 50 TABLET, COATED ORAL at 18:07

## 2018-06-21 RX ADMIN — CEFTRIAXONE SODIUM 2 G: 2 INJECTION, POWDER, FOR SOLUTION INTRAMUSCULAR; INTRAVENOUS at 21:37

## 2018-06-21 RX ADMIN — SODIUM CHLORIDE, SODIUM LACTATE, POTASSIUM CHLORIDE, CALCIUM CHLORIDE: 600; 310; 30; 20 INJECTION, SOLUTION INTRAVENOUS at 07:24

## 2018-06-21 RX ADMIN — HYDROMORPHONE HYDROCHLORIDE 0.25 MG: 2 INJECTION INTRAMUSCULAR; INTRAVENOUS; SUBCUTANEOUS at 21:41

## 2018-06-21 RX ADMIN — ATROPA BELLADONNA AND OPIUM 16.2 MG: 16.2; 3 SUPPOSITORY RECTAL at 13:44

## 2018-06-21 RX ADMIN — AMANTADINE HYDROCHLORIDE 100 MG: 100 CAPSULE ORAL at 16:48

## 2018-06-21 RX ADMIN — HYDRALAZINE HYDROCHLORIDE 2.5 MG: 20 INJECTION, SOLUTION INTRAMUSCULAR; INTRAVENOUS at 11:45

## 2018-06-21 RX ADMIN — HYDROMORPHONE HYDROCHLORIDE 0.25 MG: 2 INJECTION INTRAMUSCULAR; INTRAVENOUS; SUBCUTANEOUS at 17:29

## 2018-06-21 RX ADMIN — FENTANYL CITRATE 25 MCG: 50 INJECTION, SOLUTION INTRAMUSCULAR; INTRAVENOUS at 12:53

## 2018-06-21 RX ADMIN — CARBIDOPA AND LEVODOPA 1 TABLET: 50; 200 TABLET, EXTENDED RELEASE ORAL at 22:05

## 2018-06-21 RX ADMIN — FENTANYL CITRATE 25 MCG: 50 INJECTION, SOLUTION INTRAMUSCULAR; INTRAVENOUS at 13:24

## 2018-06-21 RX ADMIN — FENTANYL CITRATE 25 MCG: 50 INJECTION, SOLUTION INTRAMUSCULAR; INTRAVENOUS at 12:25

## 2018-06-21 ASSESSMENT — PAIN SCALES - GENERAL
PAINLEVEL_OUTOF10: 0
PAINLEVEL_OUTOF10: ASSUMED PAIN PRESENT
PAINLEVEL_OUTOF10: 0
PAINLEVEL_OUTOF10: ASSUMED PAIN PRESENT
PAINLEVEL_OUTOF10: 0
PAINLEVEL_OUTOF10: ASSUMED PAIN PRESENT
PAINLEVEL_OUTOF10: 0
PAINLEVEL_OUTOF10: 0
PAINLEVEL_OUTOF10: ASSUMED PAIN PRESENT
PAINLEVEL_OUTOF10: 0
PAINLEVEL_OUTOF10: ASSUMED PAIN PRESENT
PAINLEVEL_OUTOF10: 0
PAINLEVEL_OUTOF10: ASSUMED PAIN PRESENT
PAINLEVEL_OUTOF10: ASSUMED PAIN PRESENT
PAINLEVEL_OUTOF10: 0
PAINLEVEL_OUTOF10: 3
PAINLEVEL_OUTOF10: 0
PAINLEVEL_OUTOF10: ASSUMED PAIN PRESENT
PAINLEVEL_OUTOF10: 0
PAINLEVEL_OUTOF10: 0

## 2018-06-21 NOTE — PROGRESS NOTES
Urine output remains bright red, color has not decreased in 2 hours, +volume output. Suprapubic and urethral catheter both remain patent, small clots seen in output. Call placed to Mitch Matute to clarify orders. PRN irrigation if clots are seen and obstructing urine flow.

## 2018-06-21 NOTE — OP REPORT
DATE OF SERVICE:  06/21/2018    PREOPERATIVE DIAGNOSES:  1.  Urinary retention.  2.  Neurogenic bladder.    POSTOPERATIVE DIAGNOSES:  1.  Urinary retention.  2.  Neurogenic bladder.    PROCEDURE:  Suprapubic pull-through cystotomy.    ANESTHESIA:  General.    ANESTHESIOLOGIST:  Asael Patel MD    SURGEON:  Aditya Joe MD    BRIEF HISTORY:  This 77-year-old male has a neurogenic bladder with urinary   retention.  He has had chronic indwelling Hurst catheters.  He also has a   thrombocytopenia with platelets in the 50,000-60,000 range.  He now because of   the ongoing retention and need for catheter drainage, he presents for   suprapubic cystotomy.  He did get 1 unit of platelets preop.    REPORT OF OPERATION:  Under general anesthesia with the patient in lithotomy   position, the lower abdomen shaved, prepped and draped in sterile manner along   with the genitalia were prepped and draped.  A 22-St Helenian cystoscope was   passed into the bladder.  Urethra and prostatic urethra unremarkable.  Bladder   had quite a bit of trabeculation, but no evidence of any tumor stones or   other abnormalities.  At this point, the bladder was left full and the   cystoscope removed.  The Lowsley retractor was then passed into the bladder   without difficulty.  It was then pushed up against the anterior abdominal   wall.  The patient was fairly thin.  The area to place the catheter was then   injected with 0.25% Marcaine with epinephrine.  Approximately, 1.5 cm   transverse incision was made in this area.  Electrocautery was then used to   cauterize the subcutaneous tissue to prevent bleeding.  The Lowsley was then   pushed up against the anterior bladder wall.  The electrocautery was then used   to cauterize down onto the Lowsley.  Eventually this get fairly close and I   finished it with a 15 blade to allow the Lowsley to pop through.  Once done,   the wings were opened up.  A 24-St Helenian catheter was then attached to the    Lowsley with a #1 silk.  This was then pulled all the way through the tract   and out the urethra.  Lowsley was removed.  Under direct vision, the catheter   was pulled back into the bladder.  A 10 mL of water were placed in the   balloon.  The catheter was then brought up against the anterior bladder wall.    A 0 silk ties were then placed on each side of the catheter and this was then   used to fix the catheter in place.  There was no bleeding at the incision   line.  Next, the cystoscope was reintroduced into the bladder.  Complete   examination showed no real active bleeding.  There were several very small   clots in the bladder, but these were insignificant.  I did not see any active   bleeding going on and I did run the water through the bladder for several   minutes to make sure everything is well irrigated.  At this point, the   cystoscope was removed.  The patient was cleaned up.  A tube sponge was then   placed around the catheter and taped and the patient was transferred to the   PACU in stable condition.       ____________________________________     MD ARBEN VELAZQUEZ / ABISAI    DD:  06/21/2018 09:11:18  DT:  06/21/2018 10:18:21    D#:  [id number]  Job#:  [id number]

## 2018-06-21 NOTE — CONSULTS
" Hospital Medicine Consultation    Date of Service  6/21/2018    Reason for consult: Management of medical issues.    Provider requesting for consult: Dr. Danilo Amador (urology)    History of Presenting Illness  77 y.o. male with myelodysplastic syndrome, CKD stage III, Parkinson's disease with dementia, with issues with urinary retention due to neurogenic bladder, and follows Dr. stage of urology.  He is admitted following suprapubic catheter placement.  Perioperatively, he had stable thrombocytopenia, and had platelet transfusion.  However, he had gross hematuria postoperatively.  He was also noted to have elevated blood pressure postoperatively, and required IV hydralazine.    Patient is a poor historian due to his dementia, but answers questions appropriately.  He appears comfortable.  Patient states that he does not take any blood pressure medications at home.  He denies any other symptoms such as chest pain, shortness of breath, nausea, vomiting, abdominal pain.  He states that \"I am fine\".    The hospitalist service is now being consulted for continued management of his medical issues.      Primary Care Physician  SHERRIE Casarez.P.[email or web address].    Consultants  Hospitalist    Code Status  Full    Review of Systems  ROS     Pertinent positives/negatives as mentioned above.     A complete review of systems was done. All other systems were negative.        Past Medical History  Past Medical History:   Diagnosis Date   • Blood clotting disorder (HCC) 06/12/2018    blood clot in back in 2017   • Cataract 06/12/2018    bilateral IOLI   • Urinary incontinence 06/12/2018    indwelling catheter   • Hemorrhagic disorder (Formerly [facility]) 7-    bruises easily   • Compression fracture of spine (Formerly [facility])    • MDS (myelodysplastic syndrome) (Formerly [facility])    • Parkinson's disease (Formerly [facility])    • Urinary bladder disorder     patient has indwelling catheter       Surgical History  Past Surgical History:   Procedure Laterality Date   • CYSTOSCOPY  8/27/2017    " Procedure: CYSTOSCOPY, COMPLEX CATHETERIZATION;  Surgeon: Wild Almeida M.D.;  Location: SURGERY [facility];  Service: Urology   • EVACUATION OF HEMATOMA  8/27/2017    Procedure: EVACUATION OF HEMATOMA, TUR OLD BLADDER CLOTS, DILATION OF URETHRAL STRICTURE;  Surgeon: Wild Almeida M.D.;  Location: SURGERY [facility];  Service: Urology   • TRANS URETHRAL RESECTION BLADDER  8/27/2017    Procedure: TRANS URETHRAL RESECTION BLADDER;  Surgeon: Wild Almeida M.D.;  Location: SURGERY [facility];  Service: Urology   • KYPHOPLASTY  8/6/2017    Procedure: KYPHOPLASTY T11, T12, L1 ;  Surgeon: Niels Gallardo M.D.;  Location: SURGERY [facility];  Service:    • ANKLE ARTHROSCOPY  1996   • CATARACT EXTRACTION WITH IOL     • KNEE ARTHROSCOPY     • PROSTATECTOMY, RADIAL      subtotal   • ROTATOR CUFF REPAIR      right   • SPLENECTOMY         Medications  No current facility-administered medications on file prior to encounter.      Current Outpatient Prescriptions on File Prior to Encounter   Medication Sig Dispense Refill   • memantine (NAMENDA) 10 MG Tab Take 1 Tab by mouth 2 times a day. 180 Tab 1   • carbidopa-levodopa SR (SINEMET CR)  MG per tablet Take 1 Tab by mouth every bedtime. 90 Tab 1   • amantadine (SYMMETREL) 100 MG Cap Take 1 Cap by mouth 2 times a day. 180 Cap 1   • Sennosides-Docusate Sodium (SENNA PLUS PO) Take  by mouth as needed.     • citalopram (CELEXA) 20 MG Tab Take 1 Tab by mouth every day. 30 Tab 11       Family History  Family History   Problem Relation Age of Onset   • Heart Disease Mother    • Lung Disease Mother    • Cancer Father      pancreatic       Social History  Social History   Substance Use Topics   • Smoking status: Never Smoker   • Smokeless tobacco: Never Used   • Alcohol use Yes      Comment: 1 per day       Allergies  Allergies   Allergen Reactions   • Diflucan [Fluconazole]      Reaction: Convulsions per wife        Physical Exam  Laboratory    Hemodynamics  Temp (24hrs), Av.4 °C (97.6 °F), Min:36.1 °C (97 °F), Max:36.8 °C (98.2 °F)   Temperature: 36.2 °C (97.2 °F)  Pulse  Av.3  Min: 47  Max: 59 Heart Rate (Monitored): (!) 45  Blood Pressure : (!) 206/95      Respiratory      Respiration: 16, Pulse Oximetry: 94 %             Physical Exam   Constitutional: He appears well-developed. No distress.   HENT:   Head: Normocephalic and atraumatic.   Mouth/Throat: Oropharynx is clear and moist. No oropharyngeal exudate.   Eyes: EOM are normal. Pupils are equal, round, and reactive to light. Right eye exhibits no discharge. Left eye exhibits no discharge. No scleral icterus.   Neck: Normal range of motion. Neck supple. No thyromegaly present.   Cardiovascular: Regular rhythm.  Exam reveals no gallop and no friction rub.    No murmur heard.  Mild bradycardia   Pulmonary/Chest: Effort normal and breath sounds normal. He has no wheezes. He has no rales. He exhibits no tenderness.   Abdominal: Soft. Bowel sounds are normal. There is no tenderness. There is no rebound and no guarding.   (+) Suprapubic catheter in place, with gross hematuria   Musculoskeletal: Normal range of motion. He exhibits no edema or tenderness.   Lymphadenopathy:     He has no cervical adenopathy.   Neurological: He is alert. No cranial nerve deficit.   (+) Dementia, but answers questions appropriate   Skin: Skin is warm and dry. No rash noted. He is not diaphoretic. No erythema.   Psychiatric: His behavior is normal. Thought content normal.   Flat and blunted affect   Vitals reviewed.      Recent Labs      18   0715   WBC  9.5   RBC  3.73*   HEMOGLOBIN  11.0*   HEMATOCRIT  34.0*   MCV  91.2   MCH  29.5   MCHC  32.4*   RDW  59.7*   PLATELETCT  69*   MPV  11.6         No results for input(s): ALTSGPT, ASTSGOT, ALKPHOSPHAT, TBILIRUBIN, DBILIRUBIN, GAMMAGT, AMYLASE, LIPASE, ALB, PREALBUMIN, GLUCOSE in the last 72 hours.              Lab Results   Component Value Date    TROPONINI  0.03 10/30/2017     Urinalysis:    Lab Results  Component Value Date/Time   SPECGRAVITY 1.025 06/12/2018 1540   GLUCOSEUR Negative 06/12/2018 1540   KETONES Trace (A) 06/12/2018 1540   NITRITE Positive (A) 06/12/2018 1540   WBCURINE Packed (A) 06/12/2018 1540   RBCURINE 5-10 (A) 06/12/2018 1540   BACTERIA Many (A) 06/12/2018 1540   EPITHELCELL Negative 06/12/2018 1540        Imaging  Reviewed.    Assessment/Plan       * Urinary retention- (present on admission)   Assessment & Plan    - s/p suprapubic catheter placement by urology.  A Hurst catheter will also be placed, and CBI initiated.  -Defer further management per urology.        Elevated blood pressure reading without diagnosis of hypertension- (present on admission)   Assessment & Plan    -No known history of hypertension, could be related to stress of surgery especially in setting of Parkinson's disease.  -I will start him on Norvasc for now, 5 mg daily, and continue to monitor blood pressure trend with as needed IV hydralazine for significant hypertension.  Unable to start beta-blocker given relative bradycardia.        Chronic thrombocytopenia due to MDS (HCC)- (present on admission)   Assessment & Plan    -His platelets as low since last year, and has been stable in the 30,000. s/p plateletpheresis transfusion perioperatively.  -Monitor platelet count, especially with gross hematuria.  If he continues to have hematuria, may need another platelet transfusion.   -CBC in the morning.        CKD (chronic kidney disease), stage III- (present on admission)   Assessment & Plan    -Appears stable.  Continue to monitor.  - avoid nephrotoxins, and continue to renally dose all medications.        Myelodysplastic syndrome (HCC)- (present on admission)   Assessment & Plan    -Continue to monitor blood counts, especially platelets in the setting of gross hematuria.  Management as above.  -Continue outpatient follow-up.        Parkinson's disease with dementia (HCC)-  (present on admission)   Assessment & Plan    -Resume home dose Sinemet, along with Namenda and amantadine.  -High risk for delirium given dementia. Frequent re-orientation, reestablish circadian rhythm, encourage familiar faces/family in room, avoid or minimize narcotics/sedatives.  Avoid typical antipsychotics as high risk for EPS.            VTE prophylaxis: high risk for bleeding.

## 2018-06-21 NOTE — OR SURGEON
Immediate Post OP Note    PreOp Diagnosis: urine retention;    PostOp Diagnosis: same    Procedure(s):  SUPRAPUBIC CATH PLACEMENT - PULL THROUGH CYSTOTOMY    Surgeon(s):  Aditya Joe M.D.    Anesthesiologist/Type of Anesthesia:  Anesthesiologist: Asael Patel M.D./* No anesthesia type entered *    Surgical Staff:  Circulator: Stefanie Martinez R.N.  Scrub Person: Kim Encarnacion    Specimens removed if any:  * No specimens in log *    Estimated Blood Loss: none    Findings: trabeculated bladder    Complications: none.  Stable to PACU        6/21/2018 9:04 AM Aditya Joe M.D.

## 2018-06-21 NOTE — ASSESSMENT & PLAN NOTE
Due to neurogenic bladder s/p suprapubic catheter placement by urology on 6/21. Irrigation and clot retrival 7/3  Urology following.  No signs of hematuria

## 2018-06-21 NOTE — ED NOTES
Med rec complete per Pt's SO(yeimi) via phone  SO will bring in Pt's Rytary  Allergies reviewed  Pt completed a 3 day course(6 tablets) of Ciprofloxacin on 3/21

## 2018-06-21 NOTE — OR NURSING
Pt allergies and NPO status verified, home meds reconcilled. Belongings secured. Pt verbalizes understanding of the pain scale, expected course of stay, and plan of care.  Surgical site verified with pt.  IV access established.  Sequentials placed on legs. Platelets infusion completed. Lab to redraw CBC at 0715, lab was called at 0645 and notified.

## 2018-06-21 NOTE — DISCHARGE INSTRUCTIONS
ACTIVITY: Rest and take it easy for the first 24 hours.  A responsible adult is recommended to remain with you during that time.  It is normal to feel sleepy.  We encourage you to not do anything that requires balance, judgment or coordination.    MILD FLU-LIKE SYMPTOMS ARE NORMAL. YOU MAY EXPERIENCE GENERALIZED MUSCLE ACHES, THROAT IRRITATION, HEADACHE AND/OR SOME NAUSEA.    FOR 24 HOURS DO NOT:  Drive, operate machinery or run household appliances.  Drink beer or alcoholic beverages.   Make important decisions or sign legal documents.    SPECIAL INSTRUCTIONS: ***    DIET: To avoid nausea, slowly advance diet as tolerated, avoiding spicy or greasy foods for the first day.  Add more substantial food to your diet according to your physician's instructions. INCREASE FLUIDS AND FIBER TO AVOID CONSTIPATION.    SURGICAL DRESSING/BATHING: ***    FOLLOW-UP APPOINTMENT:  A follow-up appointment should be arranged with your doctor in office as instructed; call to schedule.    You should CALL YOUR PHYSICIAN if you develop:  Fever greater than 101 degrees F.  Pain not relieved by medication, or persistent nausea or vomiting.  Excessive bleeding (blood soaking through dressing) or unexpected drainage from the wound.  Extreme redness or swelling around the incision site, drainage of pus or foul smelling drainage.  Inability to urinate or empty your bladder within 8 hours.  Problems with breathing or chest pain.    You should call 911 if you develop problems with breathing or chest pain.  If you are unable to contact your doctor or surgical center, you should go to the nearest emergency room or urgent care center.  Dr Joe's telephone #: [phone]    If any questions arise, call your doctor.  If your doctor is not available, please feel free to call the Surgical Center at [phone].  The Center is open Monday through Friday from 7AM to 7PM.  You can also call the [facility] HOTLINE open 24 hours/day, 7 days/week and speak  to a nurse at [phone], or toll free at [phone].    A registered nurse may call you a few days after your surgery to see how you are doing after your procedure.    MEDICATIONS: Resume taking daily medication.  Take prescribed pain medication with food.  If no medication is prescribed, you may take non-aspirin pain medication if needed.  PAIN MEDICATION CAN BE VERY CONSTIPATING.  Take a stool softener or laxative such as senokot, pericolace, or milk of magnesia if needed.    Prescription given for ***.  Last pain medication given at ***.    If your physician has prescribed pain medication that includes Acetaminophen (Tylenol), do not take additional Acetaminophen (Tylenol) while taking the prescribed medication.    Depression / Suicide Risk    As you are discharged from this [facility] facility, it is important to learn how to keep safe from harming yourself.    Recognize the warning signs:  · Abrupt changes in personality, positive or negative- including increase in energy   · Giving away possessions  · Change in eating patterns- significant weight changes-  positive or negative  · Change in sleeping patterns- unable to sleep or sleeping all the time   · Unwillingness or inability to communicate  · Depression  · Unusual sadness, discouragement and loneliness  · Talk of wanting to die  · Neglect of personal appearance   · Rebelliousness- reckless behavior  · Withdrawal from people/activities they love  · Confusion- inability to concentrate     If you or a loved one observes any of these behaviors or has concerns about self-harm, here's what you can do:  · Talk about it- your feelings and reasons for harming yourself  · Remove any means that you might use to hurt yourself (examples: pills, rope, extension cords, firearm)  · Get professional help from the community (Mental Health, Substance Abuse, psychological counseling)  · Do not be alone:Call your Safe Contact- someone whom you trust who will be  there for you.  · Call your local CRISIS HOTLINE 902-2320 or 717-521-6455  · Call your local Children's Mobile Crisis Response Team [place] (576) 953-3691 or www.[employer]  · Call the toll free National Suicide Prevention Hotlines   · National Suicide Prevention Lifeline 760-344-TLKL (8127)  · National [employer] Line Network 800-SUICIDE (991-6866)

## 2018-06-22 LAB
ANION GAP SERPL CALC-SCNC: 5 MMOL/L (ref 0–11.9)
BASOPHILS # BLD AUTO: 0 % (ref 0–1.8)
BASOPHILS # BLD AUTO: 0.5 % (ref 0–1.8)
BASOPHILS # BLD: 0 K/UL (ref 0–0.12)
BASOPHILS # BLD: 0.05 K/UL (ref 0–0.12)
BUN SERPL-MCNC: 11 MG/DL (ref 8–22)
CALCIUM SERPL-MCNC: 8.4 MG/DL (ref 8.4–10.2)
CHLORIDE SERPL-SCNC: 106 MMOL/L (ref 96–112)
CO2 SERPL-SCNC: 27 MMOL/L (ref 20–33)
COMMENT 1642: NORMAL
CREAT SERPL-MCNC: 1.1 MG/DL (ref 0.5–1.4)
EOSINOPHIL # BLD AUTO: 0.4 K/UL (ref 0–0.51)
EOSINOPHIL # BLD AUTO: 0.48 K/UL (ref 0–0.51)
EOSINOPHIL NFR BLD: 3.9 % (ref 0–6.9)
EOSINOPHIL NFR BLD: 5 % (ref 0–6.9)
ERYTHROCYTE [DISTWIDTH] IN BLOOD BY AUTOMATED COUNT: 59.5 FL (ref 35.9–50)
ERYTHROCYTE [DISTWIDTH] IN BLOOD BY AUTOMATED COUNT: 60.5 FL (ref 35.9–50)
GIANT PLATELETS BLD QL SMEAR: NORMAL
GLUCOSE SERPL-MCNC: 90 MG/DL (ref 65–99)
HCT VFR BLD AUTO: 31.2 % (ref 42–52)
HCT VFR BLD AUTO: 32.7 % (ref 42–52)
HGB BLD-MCNC: 10 G/DL (ref 14–18)
HGB BLD-MCNC: 10.5 G/DL (ref 14–18)
IMM GRANULOCYTES # BLD AUTO: 0.03 K/UL (ref 0–0.11)
IMM GRANULOCYTES NFR BLD AUTO: 0.3 % (ref 0–0.9)
LG PLATELETS BLD QL SMEAR: NORMAL
LYMPHOCYTES # BLD AUTO: 0.84 K/UL (ref 1–4.8)
LYMPHOCYTES # BLD AUTO: 1.92 K/UL (ref 1–4.8)
LYMPHOCYTES NFR BLD: 20 % (ref 22–41)
LYMPHOCYTES NFR BLD: 8.3 % (ref 22–41)
MANUAL DIFF BLD: NORMAL
MCH RBC QN AUTO: 29.4 PG (ref 27–33)
MCH RBC QN AUTO: 29.8 PG (ref 27–33)
MCHC RBC AUTO-ENTMCNC: 32.1 G/DL (ref 33.7–35.3)
MCHC RBC AUTO-ENTMCNC: 32.1 G/DL (ref 33.7–35.3)
MCV RBC AUTO: 91.8 FL (ref 81.4–97.8)
MCV RBC AUTO: 92.9 FL (ref 81.4–97.8)
MONOCYTES # BLD AUTO: 4.61 K/UL (ref 0–0.85)
MONOCYTES # BLD AUTO: 6.75 K/UL (ref 0–0.85)
MONOCYTES NFR BLD AUTO: 48 % (ref 0–13.4)
MONOCYTES NFR BLD AUTO: 66.5 % (ref 0–13.4)
NEUTROPHILS # BLD AUTO: 2.08 K/UL (ref 1.82–7.42)
NEUTROPHILS # BLD AUTO: 2.59 K/UL (ref 1.82–7.42)
NEUTROPHILS NFR BLD: 20.5 % (ref 44–72)
NEUTROPHILS NFR BLD: 27 % (ref 44–72)
NRBC # BLD AUTO: 0.03 K/UL
NRBC # BLD AUTO: 0.04 K/UL
NRBC BLD-RTO: 0.3 /100 WBC
NRBC BLD-RTO: 0.4 /100 WBC
PLATELET # BLD AUTO: 77 K/UL (ref 164–446)
PLATELET # BLD AUTO: 78 K/UL (ref 164–446)
PLATELET BLD QL SMEAR: NORMAL
PMV BLD AUTO: 13.6 FL (ref 9–12.9)
PMV BLD AUTO: 13.7 FL (ref 9–12.9)
POTASSIUM SERPL-SCNC: 3.8 MMOL/L (ref 3.6–5.5)
RBC # BLD AUTO: 3.4 M/UL (ref 4.7–6.1)
RBC # BLD AUTO: 3.52 M/UL (ref 4.7–6.1)
RBC BLD AUTO: PRESENT
ROULEAUX BLD QL SMEAR: SLIGHT
SODIUM SERPL-SCNC: 138 MMOL/L (ref 135–145)
VARIANT LYMPHS BLD QL SMEAR: NORMAL
WBC # BLD AUTO: 10.2 K/UL (ref 4.8–10.8)
WBC # BLD AUTO: 9.6 K/UL (ref 4.8–10.8)

## 2018-06-22 PROCEDURE — 770006 HCHG ROOM/CARE - MED/SURG/GYN SEMI*

## 2018-06-22 PROCEDURE — 85007 BL SMEAR W/DIFF WBC COUNT: CPT

## 2018-06-22 PROCEDURE — 85025 COMPLETE CBC W/AUTO DIFF WBC: CPT

## 2018-06-22 PROCEDURE — A9270 NON-COVERED ITEM OR SERVICE: HCPCS | Performed by: INTERNAL MEDICINE

## 2018-06-22 PROCEDURE — 700102 HCHG RX REV CODE 250 W/ 637 OVERRIDE(OP): Performed by: INTERNAL MEDICINE

## 2018-06-22 PROCEDURE — 700102 HCHG RX REV CODE 250 W/ 637 OVERRIDE(OP): Performed by: UROLOGY

## 2018-06-22 PROCEDURE — 80048 BASIC METABOLIC PNL TOTAL CA: CPT

## 2018-06-22 PROCEDURE — 700111 HCHG RX REV CODE 636 W/ 250 OVERRIDE (IP): Performed by: INTERNAL MEDICINE

## 2018-06-22 PROCEDURE — 85027 COMPLETE CBC AUTOMATED: CPT

## 2018-06-22 PROCEDURE — 700111 HCHG RX REV CODE 636 W/ 250 OVERRIDE (IP): Performed by: UROLOGY

## 2018-06-22 PROCEDURE — A9270 NON-COVERED ITEM OR SERVICE: HCPCS | Performed by: UROLOGY

## 2018-06-22 PROCEDURE — 36415 COLL VENOUS BLD VENIPUNCTURE: CPT

## 2018-06-22 PROCEDURE — 700105 HCHG RX REV CODE 258: Performed by: UROLOGY

## 2018-06-22 PROCEDURE — 99232 SBSQ HOSP IP/OBS MODERATE 35: CPT | Mod: GW | Performed by: HOSPITALIST

## 2018-06-22 PROCEDURE — 700105 HCHG RX REV CODE 258: Performed by: INTERNAL MEDICINE

## 2018-06-22 RX ORDER — LORAZEPAM 1 MG/1
1 TABLET ORAL ONCE
Status: COMPLETED | OUTPATIENT
Start: 2018-06-22 | End: 2018-06-22

## 2018-06-22 RX ADMIN — ACETAMINOPHEN 650 MG: 325 TABLET, FILM COATED ORAL at 06:53

## 2018-06-22 RX ADMIN — AMANTADINE HYDROCHLORIDE 100 MG: 100 CAPSULE ORAL at 20:23

## 2018-06-22 RX ADMIN — CEFTRIAXONE SODIUM 2 G: 2 INJECTION, POWDER, FOR SOLUTION INTRAMUSCULAR; INTRAVENOUS at 08:54

## 2018-06-22 RX ADMIN — MEMANTINE HYDROCHLORIDE 10 MG: 10 TABLET ORAL at 08:54

## 2018-06-22 RX ADMIN — TRAMADOL HYDROCHLORIDE 50 MG: 50 TABLET, COATED ORAL at 06:53

## 2018-06-22 RX ADMIN — TRAMADOL HYDROCHLORIDE 50 MG: 50 TABLET, COATED ORAL at 14:59

## 2018-06-22 RX ADMIN — AMANTADINE HYDROCHLORIDE 100 MG: 100 CAPSULE ORAL at 08:54

## 2018-06-22 RX ADMIN — HYDROMORPHONE HYDROCHLORIDE 0.25 MG: 2 INJECTION INTRAMUSCULAR; INTRAVENOUS; SUBCUTANEOUS at 18:59

## 2018-06-22 RX ADMIN — CITALOPRAM HYDROBROMIDE 20 MG: 20 TABLET ORAL at 08:53

## 2018-06-22 RX ADMIN — CARBIDOPA AND LEVODOPA 1 TABLET: 50; 200 TABLET, EXTENDED RELEASE ORAL at 20:23

## 2018-06-22 RX ADMIN — TRAMADOL HYDROCHLORIDE 50 MG: 50 TABLET, COATED ORAL at 21:09

## 2018-06-22 RX ADMIN — ATROPA BELLADONNA AND OPIUM 60 MG: 16.2; 6 SUPPOSITORY RECTAL at 18:59

## 2018-06-22 RX ADMIN — MEMANTINE HYDROCHLORIDE 10 MG: 10 TABLET ORAL at 20:23

## 2018-06-22 RX ADMIN — LORAZEPAM 1 MG: 1 TABLET ORAL at 20:57

## 2018-06-22 RX ADMIN — SODIUM CHLORIDE, POTASSIUM CHLORIDE, SODIUM LACTATE AND CALCIUM CHLORIDE: 600; 310; 30; 20 INJECTION, SOLUTION INTRAVENOUS at 21:30

## 2018-06-22 ASSESSMENT — ENCOUNTER SYMPTOMS
ABDOMINAL PAIN: 0
HEADACHES: 0
FLANK PAIN: 0
WEAKNESS: 1
LOSS OF CONSCIOUSNESS: 0
VOMITING: 0
FALLS: 0
PALPITATIONS: 0
SHORTNESS OF BREATH: 0
NAUSEA: 0
CHILLS: 0
PSYCHIATRIC NEGATIVE: 1
ABDOMINAL PAIN: 1
FEVER: 0

## 2018-06-22 ASSESSMENT — PAIN SCALES - GENERAL
PAINLEVEL_OUTOF10: 1
PAINLEVEL_OUTOF10: ASSUMED PAIN PRESENT

## 2018-06-22 NOTE — PROGRESS NOTES
Received report from day shift nurse. Assumed pt care at 1915. Pt is A&Ox4, resting comfortably in bed. Pt on r.a.. No signs of SOB/respiratory distress. Labs noted, VSS. Pt c/o pain to bladder, given Dilaudid 0.25mg per MAR. Pt had 3-way CBI in place - a few clots noted; pt also had suprapubic catheter in place and draining by gravity properly - jose armando bloody output noted. Fall precautions in place. Bed alarm on. Bed locked & at lowest position. Call light and personal belongings within reach. Continue to monitor

## 2018-06-22 NOTE — CARE PLAN
Problem: Knowledge Deficit  Goal: Knowledge of disease process/condition, treatment plan, diagnostic tests, and medications will improve  Outcome: PROGRESSING AS EXPECTED  Pt had CBI in place; still draining with jose armando bloody output with several clots noted. The output for throughout the shift is 1200ml.   Pt also had suprapubic catheter in place, a few clots noted as well. The total output is 2600ml    Problem: Pain Management  Goal: Pain level will decrease to patient's comfort goal  Outcome: PROGRESSING AS EXPECTED  Pt c/o pain to the lower abdomen and bladder; prn tramadol 50mg and Dilaudid 0.25mg given per MAR; pt also had scheduled Tylenol 650mg.   Pt understanding to report to the nurse if pain remains uncontrolled. Continue to monitor pt for pain.     Problem: Skin Integrity  Goal: Risk for impaired skin integrity will decrease  Outcome: PROGRESSING AS EXPECTED  Pt had skin abrasion/excoriation to L thigh & is covered with dry gauze and tape; picture taken and uploaded to pt's chart

## 2018-06-22 NOTE — PROGRESS NOTES
"Pt Rafael \"Jamie\" Jimbo admitted to room 213-2 via transport in bed from PACU at 1400.  Pt Alert, oriented to self and time. Pain reported at 1 on a scale of 0-10. Oriented to room call light and smoking policy.  Reviewed plan of care (equipment, incentive spirometer, sequential compression devices, medications, activity, diet, fall precautions, skin care, and pain) with patient..   "

## 2018-06-22 NOTE — PROGRESS NOTES
Pt continued to have jose armando bloody output from the CBI. Since last H&H this AM (at 0715)  is 11.0, and pt had 1 unit of platelet in the preop. Paged on-call hospitalist to recheck H&H.     3289: Dr. Astudillo returned call and agreed to recheck H&H.

## 2018-06-22 NOTE — PROGRESS NOTES
Urology    More clots irrigated.  Urine fairly bloody with CBI running at good flow.  Will observe overnight and if still bloody in am may need trip to OR for clot evacuation and fulguration of bleeding areas.    Aditya Joe MD

## 2018-06-22 NOTE — PROGRESS NOTES
Surgical Progress Note    Author: Mitch Matute Date & Time created: 2018   8:48 AM     Interval Events:   POD #1  Patient seen and examined.  Episodes of clot retention requiring hand irrigation and causing SP pain. At the moment doing better. Denies fevers.       Review of Systems   Constitutional: Negative for chills and fever.   Gastrointestinal: Negative for abdominal pain.   Genitourinary: Positive for hematuria and urgency.     Hemodynamics:  Temp (24hrs), Av.2 °C (97.2 °F), Min:36.1 °C (97 °F), Max:36.4 °C (97.6 °F)  Temperature: 36.2 °C (97.2 °F)  Pulse  Av.7  Min: 47  Max: 103Heart Rate (Monitored): 85  Blood Pressure : 105/59     Respiratory:    Respiration: 18, Pulse Oximetry: 93 %, O2 Daily Delivery Respiratory : Room Air with O2 Available        RUL Breath Sounds: Clear, RML Breath Sounds: Clear, RLL Breath Sounds: Diminished, AQUILINO Breath Sounds: Clear, LLL Breath Sounds: Diminished  Neuro:  GCS       Fluids:    Intake/Output Summary (Last 24 hours) at 18 0848  Last data filed at 18 0700   Gross per 24 hour   Intake             7600 ml   Output             7600 ml   Net                0 ml        Current Diet Order   Procedures   • DIET ORDER     Physical Exam   Constitutional: He appears well-developed and well-nourished. No distress.   Pulmonary/Chest: Effort normal.   Abdominal: Soft. He exhibits no distension. There is no tenderness.   SPT in place draining bloody urine with clots   Genitourinary:   Genitourinary Comments: Hurst in place draining bloody urine with clots   Skin: Skin is warm and dry.   Psychiatric: He has a normal mood and affect.   Nursing note and vitals reviewed.    Labs:  Recent Results (from the past 24 hour(s))   CBC WITHOUT DIFFERENTIAL    Collection Time: 18 11:42 PM   Result Value Ref Range    WBC 9.6 4.8 - 10.8 K/uL    RBC 3.58 (L) 4.70 - 6.10 M/uL    Hemoglobin 10.6 (L) 14.0 - 18.0 g/dL    Hematocrit 32.9 (L) 42.0 - 52.0 %    MCV 91.9  81.4 - 97.8 fL    MCH 29.6 27.0 - 33.0 pg    MCHC 32.2 (L) 33.7 - 35.3 g/dL    RDW 59.7 (H) 35.9 - 50.0 fL    Platelet Count 76 (L) 164 - 446 K/uL    MPV 13.4 (H) 9.0 - 12.9 fL   CBC WITH DIFFERENTIAL    Collection Time: 06/22/18  4:49 AM   Result Value Ref Range    WBC 10.2 4.8 - 10.8 K/uL    RBC 3.52 (L) 4.70 - 6.10 M/uL    Hemoglobin 10.5 (L) 14.0 - 18.0 g/dL    Hematocrit 32.7 (L) 42.0 - 52.0 %    MCV 92.9 81.4 - 97.8 fL    MCH 29.8 27.0 - 33.0 pg    MCHC 32.1 (L) 33.7 - 35.3 g/dL    RDW 60.5 (H) 35.9 - 50.0 fL    Platelet Count 77 (L) 164 - 446 K/uL    MPV 13.7 (H) 9.0 - 12.9 fL    Nucleated RBC 0.30 /100 WBC    NRBC (Absolute) 0.03 K/uL    Neutrophils-Polys 20.50 (L) 44.00 - 72.00 %    Lymphocytes 8.30 (L) 22.00 - 41.00 %    Monocytes 66.50 (H) 0.00 - 13.40 %    Eosinophils 3.90 0.00 - 6.90 %    Basophils 0.50 0.00 - 1.80 %    Immature Granulocytes 0.30 0.00 - 0.90 %    Lymphs (Absolute) 0.84 (L) 1.00 - 4.80 K/uL    Monos (Absolute) 6.75 (H) 0.00 - 0.85 K/uL    Eos (Absolute) 0.40 0.00 - 0.51 K/uL    Baso (Absolute) 0.05 0.00 - 0.12 K/uL    Immature Granulocytes (abs) 0.03 0.00 - 0.11 K/uL    Neutrophils (Absolute) 2.08 1.82 - 7.42 K/uL   BASIC METABOLIC PANEL    Collection Time: 06/22/18  4:49 AM   Result Value Ref Range    Sodium 138 135 - 145 mmol/L    Potassium 3.8 3.6 - 5.5 mmol/L    Chloride 106 96 - 112 mmol/L    Co2 27 20 - 33 mmol/L    Glucose 90 65 - 99 mg/dL    Bun 11 8 - 22 mg/dL    Creatinine 1.10 0.50 - 1.40 mg/dL    Calcium 8.4 8.4 - 10.2 mg/dL    Anion Gap 5.0 0.0 - 11.9   ESTIMATED GFR    Collection Time: 06/22/18  4:49 AM   Result Value Ref Range    GFR If African American >60 >60 mL/min/1.73 m 2    GFR If Non African American >60 >60 mL/min/1.73 m 2   DIFFERENTIAL COMMENT    Collection Time: 06/22/18  4:49 AM   Result Value Ref Range    Comments-Diff see below      Medical Decision Making, by Problem:  Active Hospital Problems    Diagnosis   • Urinary retention [R33.9]     Priority: High   •  Chronic thrombocytopenia due to MDS (HCC) [D69.6]     Priority: Medium   • Elevated blood pressure reading without diagnosis of hypertension [R03.0]     Priority: Medium   • CKD (chronic kidney disease), stage III [N18.3]     Priority: Low   • Myelodysplastic syndrome (HCC) [D46.9]     Priority: Low   • Parkinson's disease with dementia (HCC) [G20]     Priority: Low   Neurogenic bladder  Thrombocytopenia      Plan:  Follow labs closely and will transfuse additional platelets if continues to bleed  Keep CBI running at a rate to keep the urine light pink to colorless  Hand irrigate through SPT or urethral rinaldi q shift and prn clot obstruction      Quality Measures:  Quality-Core Measures   Reviewed items::  Labs reviewed and Medications reviewed  Rinaldi catheter::  Gross Hematuria with Clots      Discussed patient condition with RN, Patient and urology-Dr. Joe

## 2018-06-22 NOTE — PROGRESS NOTES
Medicated for pain, pain with bladder irrigation, CBI remains with bloody output, suprapubic catheter with small amount of output.

## 2018-06-22 NOTE — PROGRESS NOTES
Patient A&Ox2, states pain is better than yesterday, hand irrigation completed for urethral catheter, several small clots removed, CBI running wide open, drainage dark red without clots. Tolerating diet, -N/V

## 2018-06-22 NOTE — PROGRESS NOTES
"[facility]ist Progress Note    Date of Service: 2018    Chief Complaint  77 y.o. male admitted 2018 with myelodysplastic syndrome, CKD stage III, Parkinson's disease with dementia, with issues with urinary retention due to neurogenic bladder.  He is admitted following suprapubic catheter placement.  Perioperatively, he had stable thrombocytopenia, and had platelet transfusion.  However, he had gross hematuria and elevated BPs postoperatively.     Interval Problem Update  Son and wife at the bedside. Had significant \"bladder pain\" early this AM and had to have clots removed from his catheter. CBI has been running overnight and continues with significant hematuria.    Consultants/Specialty  Internal Medicine - management of medical issues     Disposition  Pending clearance with urology.        Review of Systems   Constitutional: Negative for chills and fever.   Respiratory: Negative for shortness of breath.    Cardiovascular: Negative for chest pain, palpitations and leg swelling.   Gastrointestinal: Positive for abdominal pain (suprapubic). Negative for nausea and vomiting.   Genitourinary: Positive for hematuria. Negative for flank pain.   Musculoskeletal: Negative for falls.   Neurological: Positive for weakness. Negative for loss of consciousness and headaches.   Psychiatric/Behavioral: Negative.    All other systems reviewed and are negative.     Physical Exam  Laboratory/Imaging   Hemodynamics  Temp (24hrs), Av.4 °C (97.6 °F), Min:36.2 °C (97.2 °F), Max:36.8 °C (98.2 °F)   Temperature: 36.8 °C (98.2 °F)  Pulse  Av.7  Min: 47  Max: 103    Blood Pressure : 125/78      Respiratory      Respiration: 18, Pulse Oximetry: 96 %        RUL Breath Sounds: Clear, RML Breath Sounds: Clear, RLL Breath Sounds: Diminished, AQUILINO Breath Sounds: Clear, LLL Breath Sounds: Diminished    Fluids    Intake/Output Summary (Last 24 hours) at 18 192  Last data filed at 18 1300   Gross per 24 hour "   Intake             1460 ml   Output             6300 ml   Net            -4840 ml       Nutrition  Orders Placed This Encounter   Procedures   • DIET ORDER     Standing Status:   Standing     Number of Occurrences:   1     Order Specific Question:   Diet:     Answer:   Regular [1]     Physical Exam   Constitutional: He is oriented to person, place, and time. He appears well-developed. No distress.   HENT:   Head: Normocephalic.   Mouth/Throat: Oropharynx is clear and moist.   Eyes: EOM are normal. Pupils are equal, round, and reactive to light. No scleral icterus.   Neck: Normal range of motion. No tracheal deviation present.   Cardiovascular: Normal rate, regular rhythm and intact distal pulses.    Pulmonary/Chest: Effort normal and breath sounds normal. No stridor. No respiratory distress. He has no rales.   Abdominal: Soft. Bowel sounds are normal. He exhibits no distension. There is no tenderness. There is no guarding.   Genitourinary:   Genitourinary Comments: Hurst and suprapubic catheter   Musculoskeletal: He exhibits no edema.   Neurological: He is alert and oriented to person, place, and time.   Skin: Skin is warm and dry. He is not diaphoretic.   Suprapubic cath in place with bandage surrounding it, no erythema    Psychiatric: He has a normal mood and affect. His behavior is normal. Cognition and memory are impaired.   Vitals reviewed.      Recent Labs      06/21/18   2342  06/22/18   0449  06/22/18   1650   WBC  9.6  10.2  9.6   RBC  3.58*  3.52*  3.40*   HEMOGLOBIN  10.6*  10.5*  10.0*   HEMATOCRIT  32.9*  32.7*  31.2*   MCV  91.9  92.9  91.8   MCH  29.6  29.8  29.4   MCHC  32.2*  32.1*  32.1*   RDW  59.7*  60.5*  59.5*   PLATELETCT  76*  77*  78*   MPV  13.4*  13.7*  13.6*     Recent Labs      06/22/18   0449   SODIUM  138   POTASSIUM  3.8   CHLORIDE  106   CO2  27   GLUCOSE  90   BUN  11   CREATININE  1.10   CALCIUM  8.4                      Assessment/Plan     * Urinary retention- (present on  admission)   Assessment & Plan    s/p suprapubic catheter placement by urology on 6/21. Now with rinaldi catheter and suprapubic catheter in place.   - CBI for hematuria  - management per primary team, urology         Elevated blood pressure reading without diagnosis of hypertension- (present on admission)   Assessment & Plan    No known history of hypertension, could be related to stress of surgery especially in setting of Parkinson's disease. Possible from stress of surgery and/or pain?  - started on Norvasc  - continue to trend and titrate medications as needed, expect that he will not need it longterm  - no beta blocker 2/2 borderline low HR        Chronic thrombocytopenia due to MDS (HCC)- (present on admission)   Assessment & Plan    Plts have been low since Sept 2017 and has been stable in the 30,000. s/p plateletpheresis transfusion perioperatively. Plts up to 78 today  - repeat in AM and continue to trend  - monitor for changes in hematuria or new evidence of bleeding        CKD (chronic kidney disease), stage III- (present on admission)   Assessment & Plan    Stable, at baseline.  - Continue to monitor  - avoid nephrotoxins, and renally dose all medications        Myelodysplastic syndrome (HCC)- (present on admission)   Assessment & Plan    - Continue to monitor blood counts, especially platelets in the setting of gross hematuria.    - Management as above.        Parkinson's disease with dementia (HCC)- (present on admission)   Assessment & Plan    High risk for delirium given dementia. Frequent re-orientation, reestablish circadian rhythm, encourage familiar faces/family in room, avoid or minimize narcotics/sedatives.  Avoid typical antipsychotics as high risk for EPS.  -Resume home dose Sinemet, along with Namenda and amantadine.            Quality-Core Measures   Reviewed items::  Radiology images reviewed, Labs reviewed and Medications reviewed  Rinaldi catheter::  Neurogenic Bladder  DVT prophylaxis -  mechanical:  SCDs  Ulcer Prophylaxis::  Not indicated

## 2018-06-22 NOTE — PROGRESS NOTES
Pt had a medium-sized abrasion to L thigh; dressing changed. Picture taken and uploaded in pt's chart

## 2018-06-22 NOTE — PROGRESS NOTES
Urethra catheter hand irrigated due to clot obstruction, ~25mL of clots removed.  Suprapubic cathrter and urethral catheter functioning.  Drainage remains bright red.

## 2018-06-23 LAB
BASOPHILS # BLD AUTO: 0 % (ref 0–1.8)
BASOPHILS # BLD AUTO: 0.3 % (ref 0–1.8)
BASOPHILS # BLD AUTO: 1 % (ref 0–1.8)
BASOPHILS # BLD: 0 K/UL (ref 0–0.12)
BASOPHILS # BLD: 0.03 K/UL (ref 0–0.12)
BASOPHILS # BLD: 0.1 K/UL (ref 0–0.12)
COMMENT 1642: NORMAL
EOSINOPHIL # BLD AUTO: 0.31 K/UL (ref 0–0.51)
EOSINOPHIL # BLD AUTO: 0.37 K/UL (ref 0–0.51)
EOSINOPHIL # BLD AUTO: 0.44 K/UL (ref 0–0.51)
EOSINOPHIL NFR BLD: 3 % (ref 0–6.9)
EOSINOPHIL NFR BLD: 3.9 % (ref 0–6.9)
EOSINOPHIL NFR BLD: 5 % (ref 0–6.9)
ERYTHROCYTE [DISTWIDTH] IN BLOOD BY AUTOMATED COUNT: 57.2 FL (ref 35.9–50)
ERYTHROCYTE [DISTWIDTH] IN BLOOD BY AUTOMATED COUNT: 59.4 FL (ref 35.9–50)
ERYTHROCYTE [DISTWIDTH] IN BLOOD BY AUTOMATED COUNT: 60.7 FL (ref 35.9–50)
GIANT PLATELETS BLD QL SMEAR: NORMAL
HCT VFR BLD AUTO: 24.6 % (ref 42–52)
HCT VFR BLD AUTO: 26.8 % (ref 42–52)
HCT VFR BLD AUTO: 27 % (ref 42–52)
HGB BLD-MCNC: 7.7 G/DL (ref 14–18)
HGB BLD-MCNC: 8.6 G/DL (ref 14–18)
HGB BLD-MCNC: 8.7 G/DL (ref 14–18)
HYPOCHROMIA BLD QL SMEAR: ABNORMAL
IMM GRANULOCYTES # BLD AUTO: 0.03 K/UL (ref 0–0.11)
IMM GRANULOCYTES NFR BLD AUTO: 0.3 % (ref 0–0.9)
LG PLATELETS BLD QL SMEAR: NORMAL
LG PLATELETS BLD QL SMEAR: NORMAL
LYMPHOCYTES # BLD AUTO: 0.59 K/UL (ref 1–4.8)
LYMPHOCYTES # BLD AUTO: 0.62 K/UL (ref 1–4.8)
LYMPHOCYTES # BLD AUTO: 1.13 K/UL (ref 1–4.8)
LYMPHOCYTES NFR BLD: 11 % (ref 22–41)
LYMPHOCYTES NFR BLD: 6.2 % (ref 22–41)
LYMPHOCYTES NFR BLD: 7 % (ref 22–41)
MANUAL DIFF BLD: NORMAL
MANUAL DIFF BLD: NORMAL
MCH RBC QN AUTO: 29.4 PG (ref 27–33)
MCH RBC QN AUTO: 29.4 PG (ref 27–33)
MCH RBC QN AUTO: 29.5 PG (ref 27–33)
MCHC RBC AUTO-ENTMCNC: 31.3 G/DL (ref 33.7–35.3)
MCHC RBC AUTO-ENTMCNC: 31.9 G/DL (ref 33.7–35.3)
MCHC RBC AUTO-ENTMCNC: 32.5 G/DL (ref 33.7–35.3)
MCV RBC AUTO: 90.5 FL (ref 81.4–97.8)
MCV RBC AUTO: 92.5 FL (ref 81.4–97.8)
MCV RBC AUTO: 93.9 FL (ref 81.4–97.8)
METAMYELOCYTES NFR BLD MANUAL: 2 %
MONOCYTES # BLD AUTO: 4.05 K/UL (ref 0–0.85)
MONOCYTES # BLD AUTO: 4.74 K/UL (ref 0–0.85)
MONOCYTES # BLD AUTO: 5.38 K/UL (ref 0–0.85)
MONOCYTES NFR BLD AUTO: 46 % (ref 0–13.4)
MONOCYTES NFR BLD AUTO: 46 % (ref 0–13.4)
MONOCYTES NFR BLD AUTO: 56.7 % (ref 0–13.4)
NEUTROPHILS # BLD AUTO: 3.09 K/UL (ref 1.82–7.42)
NEUTROPHILS # BLD AUTO: 3.52 K/UL (ref 1.82–7.42)
NEUTROPHILS # BLD AUTO: 4.02 K/UL (ref 1.82–7.42)
NEUTROPHILS NFR BLD: 32.6 % (ref 44–72)
NEUTROPHILS NFR BLD: 38 % (ref 44–72)
NEUTROPHILS NFR BLD: 39 % (ref 44–72)
NEUTS BAND NFR BLD MANUAL: 1 % (ref 0–10)
NEUTS BAND NFR BLD MANUAL: 1 % (ref 0–10)
NRBC # BLD AUTO: 0.03 K/UL
NRBC # BLD AUTO: 0.04 K/UL
NRBC # BLD AUTO: 0.04 K/UL
NRBC BLD-RTO: 0.3 /100 WBC
NRBC BLD-RTO: 0.4 /100 WBC
NRBC BLD-RTO: 0.4 /100 WBC
PLATELET # BLD AUTO: 67 K/UL (ref 164–446)
PLATELET # BLD AUTO: 72 K/UL (ref 164–446)
PLATELET # BLD AUTO: 77 K/UL (ref 164–446)
PLATELET BLD QL SMEAR: NORMAL
PLATELET BLD QL SMEAR: NORMAL
PMV BLD AUTO: 12.4 FL (ref 9–12.9)
PMV BLD AUTO: 13.7 FL (ref 9–12.9)
PMV BLD AUTO: 13.9 FL (ref 9–12.9)
POLYCHROMASIA BLD QL SMEAR: NORMAL
RBC # BLD AUTO: 2.62 M/UL (ref 4.7–6.1)
RBC # BLD AUTO: 2.92 M/UL (ref 4.7–6.1)
RBC # BLD AUTO: 2.96 M/UL (ref 4.7–6.1)
RBC BLD AUTO: PRESENT
RBC BLD AUTO: PRESENT
VARIANT LYMPHS BLD QL SMEAR: NORMAL
WBC # BLD AUTO: 10.3 K/UL (ref 4.8–10.8)
WBC # BLD AUTO: 8.8 K/UL (ref 4.8–10.8)
WBC # BLD AUTO: 9.5 K/UL (ref 4.8–10.8)

## 2018-06-23 PROCEDURE — 700111 HCHG RX REV CODE 636 W/ 250 OVERRIDE (IP): Performed by: INTERNAL MEDICINE

## 2018-06-23 PROCEDURE — 85007 BL SMEAR W/DIFF WBC COUNT: CPT

## 2018-06-23 PROCEDURE — 36415 COLL VENOUS BLD VENIPUNCTURE: CPT

## 2018-06-23 PROCEDURE — 500354 HCHG CUTTING LOOP: Performed by: UROLOGY

## 2018-06-23 PROCEDURE — 0W3R8ZZ CONTROL BLEEDING IN GENITOURINARY TRACT, VIA NATURAL OR ARTIFICIAL OPENING ENDOSCOPIC: ICD-10-PCS | Performed by: UROLOGY

## 2018-06-23 PROCEDURE — 160039 HCHG SURGERY MINUTES - EA ADDL 1 MIN LEVEL 3: Performed by: UROLOGY

## 2018-06-23 PROCEDURE — 85027 COMPLETE CBC AUTOMATED: CPT

## 2018-06-23 PROCEDURE — 501329 HCHG SET, CYSTO IRRIG Y TUR: Performed by: UROLOGY

## 2018-06-23 PROCEDURE — A9270 NON-COVERED ITEM OR SERVICE: HCPCS | Performed by: INTERNAL MEDICINE

## 2018-06-23 PROCEDURE — 160002 HCHG RECOVERY MINUTES (STAT): Performed by: UROLOGY

## 2018-06-23 PROCEDURE — 700102 HCHG RX REV CODE 250 W/ 637 OVERRIDE(OP): Performed by: INTERNAL MEDICINE

## 2018-06-23 PROCEDURE — 160036 HCHG PACU - EA ADDL 30 MINS PHASE I: Performed by: UROLOGY

## 2018-06-23 PROCEDURE — 99232 SBSQ HOSP IP/OBS MODERATE 35: CPT | Mod: GW | Performed by: HOSPITALIST

## 2018-06-23 PROCEDURE — 500880 HCHG PACK, CYSTO W/SEP LEGGINGS: Performed by: UROLOGY

## 2018-06-23 PROCEDURE — 700105 HCHG RX REV CODE 258: Performed by: INTERNAL MEDICINE

## 2018-06-23 PROCEDURE — 85025 COMPLETE CBC W/AUTO DIFF WBC: CPT

## 2018-06-23 PROCEDURE — A9270 NON-COVERED ITEM OR SERVICE: HCPCS | Performed by: UROLOGY

## 2018-06-23 PROCEDURE — 700102 HCHG RX REV CODE 250 W/ 637 OVERRIDE(OP): Performed by: UROLOGY

## 2018-06-23 PROCEDURE — 160028 HCHG SURGERY MINUTES - 1ST 30 MINS LEVEL 3: Performed by: UROLOGY

## 2018-06-23 PROCEDURE — 770006 HCHG ROOM/CARE - MED/SURG/GYN SEMI*

## 2018-06-23 PROCEDURE — 160035 HCHG PACU - 1ST 60 MINS PHASE I: Performed by: UROLOGY

## 2018-06-23 PROCEDURE — 160048 HCHG OR STATISTICAL LEVEL 1-5: Performed by: UROLOGY

## 2018-06-23 PROCEDURE — 160009 HCHG ANES TIME/MIN: Performed by: UROLOGY

## 2018-06-23 PROCEDURE — 700105 HCHG RX REV CODE 258: Performed by: UROLOGY

## 2018-06-23 PROCEDURE — A4338 INDWELLING CATHETER LATEX: HCPCS | Performed by: UROLOGY

## 2018-06-23 RX ORDER — SODIUM CHLORIDE, SODIUM LACTATE, POTASSIUM CHLORIDE, CALCIUM CHLORIDE 600; 310; 30; 20 MG/100ML; MG/100ML; MG/100ML; MG/100ML
INJECTION, SOLUTION INTRAVENOUS
Status: DISCONTINUED | OUTPATIENT
Start: 2018-06-23 | End: 2018-06-24

## 2018-06-23 RX ADMIN — CEFTRIAXONE SODIUM 2 G: 2 INJECTION, POWDER, FOR SOLUTION INTRAMUSCULAR; INTRAVENOUS at 08:48

## 2018-06-23 RX ADMIN — MEMANTINE HYDROCHLORIDE 10 MG: 10 TABLET ORAL at 23:07

## 2018-06-23 RX ADMIN — CARBIDOPA AND LEVODOPA 1 TABLET: 50; 200 TABLET, EXTENDED RELEASE ORAL at 23:07

## 2018-06-23 RX ADMIN — AMANTADINE HYDROCHLORIDE 100 MG: 100 CAPSULE ORAL at 23:07

## 2018-06-23 RX ADMIN — SODIUM CHLORIDE, SODIUM LACTATE, POTASSIUM CHLORIDE, CALCIUM CHLORIDE: 600; 310; 30; 20 INJECTION, SOLUTION INTRAVENOUS at 09:55

## 2018-06-23 RX ADMIN — AMANTADINE HYDROCHLORIDE 100 MG: 100 CAPSULE ORAL at 07:57

## 2018-06-23 RX ADMIN — TRAMADOL HYDROCHLORIDE 50 MG: 50 TABLET, COATED ORAL at 03:26

## 2018-06-23 RX ADMIN — AMLODIPINE BESYLATE 5 MG: 5 TABLET ORAL at 07:57

## 2018-06-23 RX ADMIN — SODIUM CHLORIDE, POTASSIUM CHLORIDE, SODIUM LACTATE AND CALCIUM CHLORIDE: 600; 310; 30; 20 INJECTION, SOLUTION INTRAVENOUS at 06:18

## 2018-06-23 RX ADMIN — SODIUM CHLORIDE, POTASSIUM CHLORIDE, SODIUM LACTATE AND CALCIUM CHLORIDE: 600; 310; 30; 20 INJECTION, SOLUTION INTRAVENOUS at 12:22

## 2018-06-23 RX ADMIN — CITALOPRAM HYDROBROMIDE 20 MG: 20 TABLET ORAL at 07:57

## 2018-06-23 RX ADMIN — MEMANTINE HYDROCHLORIDE 10 MG: 10 TABLET ORAL at 07:57

## 2018-06-23 ASSESSMENT — ENCOUNTER SYMPTOMS
PALPITATIONS: 0
WEAKNESS: 1
VOMITING: 0
ABDOMINAL PAIN: 1
FALLS: 0
FEVER: 0
FLANK PAIN: 0
NAUSEA: 0
CHILLS: 0
LOSS OF CONSCIOUSNESS: 0
SHORTNESS OF BREATH: 0
HEADACHES: 0
PSYCHIATRIC NEGATIVE: 1

## 2018-06-23 ASSESSMENT — PAIN SCALES - GENERAL
PAINLEVEL_OUTOF10: ASSUMED PAIN PRESENT
PAINLEVEL_OUTOF10: ASSUMED PAIN PRESENT
PAINLEVEL_OUTOF10: 0
PAINLEVEL_OUTOF10: ASSUMED PAIN PRESENT
PAINLEVEL_OUTOF10: ASSUMED PAIN PRESENT
PAINLEVEL_OUTOF10: 0

## 2018-06-23 ASSESSMENT — PATIENT HEALTH QUESTIONNAIRE - PHQ9
1. LITTLE INTEREST OR PLEASURE IN DOING THINGS: NOT AT ALL
SUM OF ALL RESPONSES TO PHQ9 QUESTIONS 1 AND 2: 0
2. FEELING DOWN, DEPRESSED, IRRITABLE, OR HOPELESS: NOT AT ALL

## 2018-06-23 NOTE — PROGRESS NOTES
Patient confused and agitated, trying to get up out of bed, pt is WC bound and does not walk, wife and son at bedside, pt sitting at edge of bed, continues to state he is going to get a tour of the building and attempting to get out of bed, wife reports to this RN that patient has taken Lorazepam in the past for anxiety. MD paged.

## 2018-06-23 NOTE — OR NURSING
1134: To PACU from OR via bed, respirations spontaneous and non-labored. CBI running, not clot, pink tinged output.  1145: No pain, no nausea, sleepy, but arouses to voice, CBI slowed a little, MD at bedside and he was happy with urinary output and CBI drip rate.  1200: Still sleepy, but arouses to voice, no pain, no nausea, tolerating decrease of supplemental O2.  1215: CBI still running well, no clots, pale pink output. Pt having no pain or nausea, tolerating more of a decrease in supplemental O2, will trial room air.   1225: Spoke with OR RN and asked how much fluid pt had in and what output was, she said they hung multiple bags and emptied rinaldi multiple times, but they did not keep track of the totals. I will put out output total in and will change the bags and notify RN on floor that what we bring will be all hers to log.  1230: No pain, no nausea, tolerating room air. Sleepy, but arouses to voice. Meets criteria to transfer to Stage 2, report called to Marcelina and pt readied for transfer.

## 2018-06-23 NOTE — PROGRESS NOTES
Paged MD to updated him on multiple clots and discomfort for pt. CBI running continuously at good flow.

## 2018-06-23 NOTE — OP REPORT
DATE OF SERVICE:  06/23/2018    PREOPERATIVE DIAGNOSIS:  Urinary clot retention, status post insertion of   suprapubic catheter and thrombocytopenia.    POSTOPERATIVE DIAGNOSES:  1.  Urinary clot retention, status post insertion of suprapubic catheter and   thrombocytopenia.  2.  Mild oozing at catheter insertion site.    PROCEDURES PERFORMED:  Cystoscopy, evacuation of clots and fulguration of   bleeding area at catheter insertion site.    ANESTHESIA:  General.    ANESTHESIOLOGIST:  Asael Patel MD    SURGEON:  Aditya Joe MD    BRIEF HISTORY:  This 77-year-old male who suffers from dementia and urinary   retention had a suprapubic pullthrough cystotomy 2 days ago.  At that time, he   did receive platelets and his platelet count has remained in approximately   70,000 range.  Postop, he had some mild bleeding, which progressed and now he   has significant bleeding requiring a large amount of irrigation.  He is   brought to the operating room for probable evacuation of clots from the   bladder with possible fulguration of bleeding areas in the bladder.    REPORT OF OPERATION:  Under general anesthesia with the patient in lithotomy   position, the genitalia were prepped and draped in a sterile manner.  The   26-[place] resectoscope was passed into the bladder.  There was a moderate   amount of clot in the bladder.  This was irrigated out using a Neel syringe.    Once this was irrigated out completely, which did take some time as it was   quite tenacious, examination did show that there was a moderate amount of clot   up around the catheter insertion site at the dome of the bladder.  This was   broken up and irrigated out.  This did require using the loop electrode of the   TUR set to help break these up.  There did appear to be some bleeding right   at the catheter insertion site and this area was cauterized.  There was a   small space between the balloon and the bladder wall.  At this point, most of    the clot was able to be removed from this area.  No other definite areas in   the bladder were seen to be bleeding.  At this point, once I felt all clots   were evacuated and no significant bleeding was going on, the resectoscope was   removed with the bladder full.  I then put a 20-Citizen of [place] 2-way catheter into the   bladder and started irrigation.  Irrigation was clear on full running CBI.    At this point, I then removed the old dressing.  I cut the sutures holding the   catheter in place.  We then pulled the catheter up with some traction and   using a 0 silk suture, I then replaced the retention suture around the   catheter with some traction on the catheter to help tamponade the area of   catheter insertion to minimize bleeding.  At this point, the drainage was   fairly clear and the patient was woken up and transferred to the PACU in   stable condition.    FINAL DIAGNOSIS:  Urinary clot retention.       ____________________________________     MD ARBEN VELAZQUEZ / ABISAI    DD:  06/23/2018 11:33:41  DT:  06/23/2018 11:46:55    D#:  [id number]  Job#:  [id number]

## 2018-06-23 NOTE — OR NURSING
0944: Rcvd report from ROSA Hewitt, pt will be brought over to pre-op/PACU area.  0947: Rcvd pt from room, 2 CNAs brought pt via bed, assumed care, pt tolerated it well.  0955: Patient allergies and NPO status verified, home medication reconciliation completed and belongings secured. Patient verbalizes understanding of pain scale, expected course of stay and plan of care. Surgical site verified with patient. IV access established. Sequentials placed on legs.

## 2018-06-23 NOTE — PROGRESS NOTES
Assumed care of pt, family bedside, aao, vss, discussed poc for shift, blod clots evacuated, CBI to urethra draining pink/ red urine, suprapubic catheter with blood clots evacuated draining red urine, CBI irrigation wide open.

## 2018-06-23 NOTE — PROGRESS NOTES
Received report from NOC RN; assumed pt care. Pt confused, slurred speech. CBI running, urine blood red with clots, frequent irrigation required. Pt complains of pain. Pt notified to call for assistance.

## 2018-06-23 NOTE — PROGRESS NOTES
CBI remains with bloody urine output, CBI running at a high flow throughout day.  Small clots evacuated PRN throughout day for blockage.

## 2018-06-23 NOTE — OR SURGEON
Immediate Post OP Note    PreOp Diagnosis: Urine clot retention; /P S/P tube placement 2 days ago    PostOp Diagnosis: same ; Mild oozing at cath insertion site    Procedure(s):  CYSTOSCOPY - Wound Class: Clean Contaminated  EVACUATION OF CLOTS WITH FULGURATION OF BLEEDING - Wound Class: Clean Contaminated    Surgeon(s):  Aditya Joe M.D.    Anesthesiologist/Type of Anesthesia:  Anesthesiologist: Asael Patel M.D./General    Surgical Staff:  Circulator: Tasha De Santiago R.N.  Scrub Person: Gregory Rader    Specimens removed if any:  * No specimens in log *    Estimated Blood Loss: 300 CC CLOTS    Findings: moderate amount of clot in bladder;  Mild oozing at cath insertion site    Complications: none.  To PACU stable  With CBI running with light pink.        6/23/2018 11:27 AM Aditya Joe M.D.

## 2018-06-23 NOTE — PROGRESS NOTES
Urology      Confused due to dementia.  Hematuria persists  with CBI running fast.  H/H dropped into 7 range and platelets stable in 70's.  Nursing continues to get clots with irrigation.  Will reevaluate in several hours and if no better then to Or later today.  Continue NPO.    J G-S    Addendum.  Will take to Or now for cystoscopy; Evacuation of clots and fulguration of bleeding bladder.  Need, risks and benefits discussed with spouse/Elizabeth Branch.  Consent obtained.  Will take to Or soon.    Aditya Joe MD

## 2018-06-23 NOTE — PROGRESS NOTES
Suprapubic catheter irrigation with large amount of clots evacuated, CBI at high flow, output from suprapubic catheter continues to flow post evacuation of multiple clots

## 2018-06-23 NOTE — PROGRESS NOTES
Patient experiencing bladder spasms, IV diaudid and B&O suppository given, small clots evacuated out of suprapubic catheter.

## 2018-06-23 NOTE — PROGRESS NOTES
CBI running wide open with no output to subrapubic catheter, irrigation performed with return of output and clots present

## 2018-06-23 NOTE — PROGRESS NOTES
[facility]ist Progress Note    Date of Service: 2018    Chief Complaint  77 y.o. male admitted 2018 with myelodysplastic syndrome, CKD stage III, Parkinson's disease with dementia, with issues with urinary retention due to neurogenic bladder.  He is admitted following suprapubic catheter placement.  Perioperatively, he had stable thrombocytopenia, and had platelet transfusion.  However, he had gross hematuria and elevated BPs postoperatively.     Interval Problem Update  Taken back to the OR this morning for retained clots in the bladder as he continued to have gross hematuria throughout the night.  The patient was listless postop.     Consultants/Specialty  Internal Medicine - management of medical issues     Disposition  Pending clearance with urology.        Review of Systems   Constitutional: Positive for malaise/fatigue. Negative for chills and fever.   Respiratory: Negative for shortness of breath.    Cardiovascular: Negative for chest pain, palpitations and leg swelling.   Gastrointestinal: Positive for abdominal pain (suprapubic). Negative for nausea and vomiting.   Genitourinary: Positive for hematuria. Negative for flank pain.   Musculoskeletal: Negative for falls.   Neurological: Positive for weakness. Negative for loss of consciousness and headaches.   Psychiatric/Behavioral: Negative.    All other systems reviewed and are negative.     Physical Exam  Laboratory/Imaging   Hemodynamics  Temp (24hrs), Av.5 °C (97.7 °F), Min:36.3 °C (97.4 °F), Max:36.8 °C (98.2 °F)   Temperature: 36.8 °C (98.2 °F)  Pulse  Av.9  Min: 47  Max: 103 Heart Rate (Monitored): 68  Blood Pressure : 124/65      Respiratory      Respiration: 18, Pulse Oximetry: 94 %        RUL Breath Sounds: Clear, RML Breath Sounds: Clear, RLL Breath Sounds: Diminished, AQUILINO Breath Sounds: Clear, LLL Breath Sounds: Diminished    Fluids    Intake/Output Summary (Last 24 hours) at 18 1937  Last data filed at 18 4042    Gross per 24 hour   Intake            88868 ml   Output            65753 ml   Net            -7400 ml       Nutrition  Orders Placed This Encounter   Procedures   • DIET NPO     Standing Status:   Standing     Number of Occurrences:   1     Order Specific Question:   Restrict to:     Answer:   Strict [1]     Physical Exam   Constitutional: He is oriented to person, place, and time. He appears well-developed. No distress.   HENT:   Head: Normocephalic.   Mouth/Throat: Oropharynx is clear and moist.   Eyes: EOM are normal. No scleral icterus.   Neck: Normal range of motion. No tracheal deviation present.   Cardiovascular: Normal rate, regular rhythm and intact distal pulses.    Pulmonary/Chest: Effort normal and breath sounds normal. No stridor. No respiratory distress. He has no rales.   Abdominal: Soft. Bowel sounds are normal. He exhibits no distension. There is no tenderness. There is no guarding.   Genitourinary:   Genitourinary Comments: Hurst and suprapubic catheter   Musculoskeletal: He exhibits no edema.   Neurological: He is alert and oriented to person, place, and time.   Skin: Skin is warm and dry. He is not diaphoretic.   Suprapubic cath in place with bandage surrounding it, no erythema    Psychiatric: He has a normal mood and affect. His behavior is normal. Cognition and memory are impaired.   Vitals reviewed.      Recent Labs      06/22/18   1650  06/23/18   0453  06/23/18   0842   WBC  9.6  8.8  10.3   RBC  3.40*  2.62*  2.92*   HEMOGLOBIN  10.0*  7.7*  8.6*   HEMATOCRIT  31.2*  24.6*  27.0*   MCV  91.8  93.9  92.5   MCH  29.4  29.4  29.5   MCHC  32.1*  31.3*  31.9*   RDW  59.5*  60.7*  59.4*   PLATELETCT  78*  67*  72*   MPV  13.6*  13.7*  12.4     Recent Labs      06/22/18   0449   SODIUM  138   POTASSIUM  3.8   CHLORIDE  106   CO2  27   GLUCOSE  90   BUN  11   CREATININE  1.10   CALCIUM  8.4                      Assessment/Plan     * Urinary retention- (present on admission)   Assessment & Plan     s/p suprapubic catheter placement by urology on 6/21. Now with rinaldi catheter and suprapubic catheter in place.   - CBI for hematuria  - management per primary team, urology         Elevated blood pressure reading without diagnosis of hypertension- (present on admission)   Assessment & Plan    No known history of hypertension, could be related to stress of surgery especially in setting of Parkinson's disease. Possible from stress of surgery and/or pain?  Blood pressure has been well controlled over the last 24 hours.  - Tolerating Norvasc  - continue to trend and titrate medications as needed  - no beta blocker 2/2 borderline low HR        Chronic thrombocytopenia due to MDS (HCC)- (present on admission)   Assessment & Plan    Plts have been low since Sept 2017 and has been stable in the 30's. s/p plateletpheresis transfusion perioperatively. Plts up to 72 today  - repeat in AM and continue to trend  - monitor for changes in hematuria or new evidence of bleeding        CKD (chronic kidney disease), stage III- (present on admission)   Assessment & Plan    Stable, at baseline.  - Continue to monitor  - avoid nephrotoxins, and renally dose all medications        Myelodysplastic syndrome (HCC)- (present on admission)   Assessment & Plan    - Continue to monitor blood counts, especially platelets in the setting of gross hematuria.    - Management as above.        Parkinson's disease with dementia (HCC)- (present on admission)   Assessment & Plan    High risk for delirium given dementia. Frequent re-orientation, reestablish circadian rhythm, encourage familiar faces/family in room, avoid or minimize narcotics/sedatives.  Avoid typical antipsychotics as high risk for EPS.  -Resume home dose Sinemet, along with Namenda and amantadine.          Quality-Core Measures   Reviewed items::  Labs reviewed and Medications reviewed  Rinaldi catheter::  Neurogenic Bladder  DVT prophylaxis - mechanical:  SCDs  Ulcer Prophylaxis::  Not  indicated

## 2018-06-24 PROBLEM — J96.01 ACUTE HYPOXEMIC RESPIRATORY FAILURE (HCC): Status: ACTIVE | Noted: 2018-06-24

## 2018-06-24 LAB
ANION GAP SERPL CALC-SCNC: 6 MMOL/L (ref 0–11.9)
BUN SERPL-MCNC: 6 MG/DL (ref 8–22)
CALCIUM SERPL-MCNC: 8.3 MG/DL (ref 8.4–10.2)
CHLORIDE SERPL-SCNC: 104 MMOL/L (ref 96–112)
CO2 SERPL-SCNC: 28 MMOL/L (ref 20–33)
CREAT SERPL-MCNC: 0.97 MG/DL (ref 0.5–1.4)
ERYTHROCYTE [DISTWIDTH] IN BLOOD BY AUTOMATED COUNT: 58.2 FL (ref 35.9–50)
GLUCOSE SERPL-MCNC: 92 MG/DL (ref 65–99)
HCT VFR BLD AUTO: 25.5 % (ref 42–52)
HGB BLD-MCNC: 8.2 G/DL (ref 14–18)
MCH RBC QN AUTO: 29.3 PG (ref 27–33)
MCHC RBC AUTO-ENTMCNC: 32.2 G/DL (ref 33.7–35.3)
MCV RBC AUTO: 91.1 FL (ref 81.4–97.8)
PLATELET # BLD AUTO: 77 K/UL (ref 164–446)
PMV BLD AUTO: 13.9 FL (ref 9–12.9)
POTASSIUM SERPL-SCNC: 3.5 MMOL/L (ref 3.6–5.5)
RBC # BLD AUTO: 2.8 M/UL (ref 4.7–6.1)
SODIUM SERPL-SCNC: 138 MMOL/L (ref 135–145)
WBC # BLD AUTO: 9.2 K/UL (ref 4.8–10.8)

## 2018-06-24 PROCEDURE — 770006 HCHG ROOM/CARE - MED/SURG/GYN SEMI*

## 2018-06-24 PROCEDURE — 85027 COMPLETE CBC AUTOMATED: CPT

## 2018-06-24 PROCEDURE — 80048 BASIC METABOLIC PNL TOTAL CA: CPT

## 2018-06-24 PROCEDURE — 700102 HCHG RX REV CODE 250 W/ 637 OVERRIDE(OP): Performed by: INTERNAL MEDICINE

## 2018-06-24 PROCEDURE — 99233 SBSQ HOSP IP/OBS HIGH 50: CPT | Mod: GW | Performed by: HOSPITALIST

## 2018-06-24 PROCEDURE — A9270 NON-COVERED ITEM OR SERVICE: HCPCS | Performed by: HOSPITALIST

## 2018-06-24 PROCEDURE — 700105 HCHG RX REV CODE 258: Performed by: UROLOGY

## 2018-06-24 PROCEDURE — 700102 HCHG RX REV CODE 250 W/ 637 OVERRIDE(OP): Performed by: UROLOGY

## 2018-06-24 PROCEDURE — A9270 NON-COVERED ITEM OR SERVICE: HCPCS | Performed by: UROLOGY

## 2018-06-24 PROCEDURE — 700102 HCHG RX REV CODE 250 W/ 637 OVERRIDE(OP): Performed by: HOSPITALIST

## 2018-06-24 PROCEDURE — 36415 COLL VENOUS BLD VENIPUNCTURE: CPT

## 2018-06-24 PROCEDURE — A9270 NON-COVERED ITEM OR SERVICE: HCPCS | Performed by: INTERNAL MEDICINE

## 2018-06-24 RX ORDER — CEFUROXIME AXETIL 250 MG/1
250 TABLET ORAL EVERY 12 HOURS
Status: DISCONTINUED | OUTPATIENT
Start: 2018-06-24 | End: 2018-07-06

## 2018-06-24 RX ORDER — POTASSIUM CHLORIDE 20 MEQ/1
40 TABLET, EXTENDED RELEASE ORAL ONCE
Status: COMPLETED | OUTPATIENT
Start: 2018-06-24 | End: 2018-06-24

## 2018-06-24 RX ORDER — OXYBUTYNIN CHLORIDE 5 MG/1
5 TABLET ORAL 3 TIMES DAILY
Status: DISCONTINUED | OUTPATIENT
Start: 2018-06-24 | End: 2018-07-11 | Stop reason: HOSPADM

## 2018-06-24 RX ADMIN — OXYBUTYNIN CHLORIDE 5 MG: 5 TABLET ORAL at 14:35

## 2018-06-24 RX ADMIN — OXYBUTYNIN CHLORIDE 5 MG: 5 TABLET ORAL at 21:52

## 2018-06-24 RX ADMIN — CITALOPRAM HYDROBROMIDE 20 MG: 20 TABLET ORAL at 09:46

## 2018-06-24 RX ADMIN — POTASSIUM CHLORIDE 40 MEQ: 1500 TABLET, EXTENDED RELEASE ORAL at 09:46

## 2018-06-24 RX ADMIN — MEMANTINE HYDROCHLORIDE 10 MG: 10 TABLET ORAL at 09:46

## 2018-06-24 RX ADMIN — CEFUROXIME AXETIL 250 MG: 250 TABLET ORAL at 09:59

## 2018-06-24 RX ADMIN — AMLODIPINE BESYLATE 5 MG: 5 TABLET ORAL at 09:46

## 2018-06-24 RX ADMIN — MEMANTINE HYDROCHLORIDE 10 MG: 10 TABLET ORAL at 21:52

## 2018-06-24 RX ADMIN — CARBIDOPA AND LEVODOPA 0.5 TABLET: 50; 200 TABLET, EXTENDED RELEASE ORAL at 21:52

## 2018-06-24 RX ADMIN — AMANTADINE HYDROCHLORIDE 100 MG: 100 CAPSULE ORAL at 09:46

## 2018-06-24 RX ADMIN — SODIUM CHLORIDE, POTASSIUM CHLORIDE, SODIUM LACTATE AND CALCIUM CHLORIDE: 600; 310; 30; 20 INJECTION, SOLUTION INTRAVENOUS at 09:46

## 2018-06-24 RX ADMIN — SODIUM CHLORIDE, POTASSIUM CHLORIDE, SODIUM LACTATE AND CALCIUM CHLORIDE: 600; 310; 30; 20 INJECTION, SOLUTION INTRAVENOUS at 21:49

## 2018-06-24 RX ADMIN — ACETAMINOPHEN 325 MG: 325 TABLET, FILM COATED ORAL at 07:28

## 2018-06-24 ASSESSMENT — PAIN SCALES - GENERAL
PAINLEVEL_OUTOF10: 0

## 2018-06-24 ASSESSMENT — ENCOUNTER SYMPTOMS
FALLS: 0
MEMORY LOSS: 1
NAUSEA: 0
FLANK PAIN: 0
PALPITATIONS: 0
FEVER: 0
SHORTNESS OF BREATH: 0
HEADACHES: 0
CHILLS: 0
ABDOMINAL PAIN: 1
TREMORS: 1
WEAKNESS: 1
VOMITING: 0
LOSS OF CONSCIOUSNESS: 0

## 2018-06-24 NOTE — PROGRESS NOTES
"Patient increasingly confused, attempting to get legs out of bed and \"find the boys\". RN has reoriented patient several times and will watch closely.  "

## 2018-06-24 NOTE — PROGRESS NOTES
Patient has been sleeping with no sing of discomfort, keeps removing either nasal canula or mask. Wife has been in and out of the room.

## 2018-06-24 NOTE — PROGRESS NOTES
Assumed care of patient. Bedside report received. Assessment completed. Patient slow in speech but oriented x 4. CBI in place, clear yellow urine noted in rinaldi, dripped slowed down. Patient complains of no pain at this time. Patient is 2 HCW assist. All needs are met at this time. Fall precautions are in place, bed is locked and in low position, call light is within reach, and bed alarm is on. Will continue to monitor.

## 2018-06-24 NOTE — ASSESSMENT & PLAN NOTE
Now on Room air.  Continue RT protocol, duo nebs, Pep therapy if warranted, and incentive spirometry.

## 2018-06-24 NOTE — PROGRESS NOTES
[facility] Hospitalist Progress Note    Date of Service: 2018    Chief Complaint  77 y.o. male admitted 2018 with myelodysplastic syndrome, CKD stage III, Parkinson's disease with dementia, with issues with urinary retention due to neurogenic bladder.  He is admitted following suprapubic catheter placement.  Perioperatively, he had stable thrombocytopenia, and had platelet transfusion.  However, he had gross hematuria and elevated BPs postoperatively.     Interval Problem Update  Taken back to the OR this morning for retained clots in the bladder as he continued to have gross hematuria throughout the night.  The patient was listless postop.     Consultants/Specialty  Internal Medicine - management of medical issues     Disposition  Pending clearance with urology.        Review of Systems   Constitutional: Positive for malaise/fatigue. Negative for chills and fever.   Respiratory: Negative for shortness of breath.    Cardiovascular: Negative for chest pain, palpitations and leg swelling.   Gastrointestinal: Positive for abdominal pain (suprapubic). Negative for nausea and vomiting.   Genitourinary: Negative for flank pain and hematuria.   Musculoskeletal: Negative for falls.   Neurological: Positive for tremors and weakness. Negative for loss of consciousness and headaches.   Psychiatric/Behavioral: Positive for memory loss.   All other systems reviewed and are negative.     Physical Exam  Laboratory/Imaging   Hemodynamics  Temp (24hrs), Av.6 °C (97.8 °F), Min:36.3 °C (97.4 °F), Max:36.8 °C (98.2 °F)   Temperature: 36.6 °C (97.8 °F)  Pulse  Av.8  Min: 47  Max: 103    Blood Pressure : 127/67      Respiratory      Respiration: 18, Pulse Oximetry: 90 %        RUL Breath Sounds: Clear, RML Breath Sounds: Diminished, RLL Breath Sounds: Diminished, AQUILINO Breath Sounds: Clear, LLL Breath Sounds: Diminished    Fluids    Intake/Output Summary (Last 24 hours) at 18 3971  Last data filed at 18 0900    Gross per 24 hour   Intake             7890 ml   Output             3700 ml   Net             4190 ml       Nutrition  Orders Placed This Encounter   Procedures   • Diet Order Regular     Standing Status:   Standing     Number of Occurrences:   1     Order Specific Question:   Diet:     Answer:   Regular [1]     Physical Exam   Constitutional: He appears well-developed. He appears listless. No distress.   HENT:   Head: Normocephalic.   Mouth/Throat: Oropharynx is clear and moist.   Eyes: EOM are normal. No scleral icterus.   Neck: Normal range of motion. No tracheal deviation present.   Cardiovascular: Normal rate, regular rhythm and intact distal pulses.    Pulmonary/Chest: Effort normal. No stridor. No respiratory distress.   Abdominal: Soft. Bowel sounds are normal. He exhibits no distension. There is no tenderness. There is no guarding.   Genitourinary:   Genitourinary Comments: Hurst and suprapubic catheter   Musculoskeletal: He exhibits no edema.   Neurological: He appears listless.   Currently oriented ×2-3, confused about situation   Skin: Skin is warm and dry. He is not diaphoretic.   Suprapubic cath in place with bandage surrounding it, no erythema    Psychiatric: He has a normal mood and affect. His behavior is normal. Cognition and memory are impaired.   Vitals reviewed.      Recent Labs      06/23/18   0842  06/23/18   2055  06/24/18   0510   WBC  10.3  9.5  9.2   RBC  2.92*  2.96*  2.80*   HEMOGLOBIN  8.6*  8.7*  8.2*   HEMATOCRIT  27.0*  26.8*  25.5*   MCV  92.5  90.5  91.1   MCH  29.5  29.4  29.3   MCHC  31.9*  32.5*  32.2*   RDW  59.4*  57.2*  58.2*   PLATELETCT  72*  77*  77*   MPV  12.4  13.9*  13.9*     Recent Labs      06/22/18   0449  06/24/18   0510   SODIUM  138  138   POTASSIUM  3.8  3.5*   CHLORIDE  106  104   CO2  27  28   GLUCOSE  90  92   BUN  11  6*   CREATININE  1.10  0.97   CALCIUM  8.4  8.3*                      Assessment/Plan     * Urinary retention- (present on admission)    Assessment & Plan    s/p suprapubic catheter placement by urology on 6/21. Now with rinaldi catheter and suprapubic catheter in place. Rinaldi catheter to be clamped and possible removal tomorrow. Hematuria has resolved!  - CBI for hematuria  - management per primary team, urology         Acute hypoxemic respiratory failure (HCC)   Assessment & Plan    Patient requiring oxygen supplementation to maintain his saturation starting after his surgery on 6/23. Believe that this is multifactorial from anesthesia, atelectasis, and pain.  - Incentive spirometer  - RT to follow  - Wean oxygen as able        Elevated blood pressure reading without diagnosis of hypertension- (present on admission)   Assessment & Plan    No known history of hypertension, could be related to stress of surgery especially in setting of Parkinson's disease. Possible from stress of surgery and/or pain?  Blood pressure has been well controlled.  - Tolerating Norvasc  - continue to trend and titrate medications as needed        Chronic thrombocytopenia due to MDS (HCC)- (present on admission)   Assessment & Plan    Plts have been low since Sept 2017 and has been stable in the 30's. s/p plateletpheresis transfusion perioperatively. Plts up to 77 today  - repeat in AM and continue to trend  - Monitor for evidence of active bleeding        Hypokalemia- (present on admission)   Assessment & Plan    K3.5.  - Monitor and replete  - Magnesium level pending        CKD (chronic kidney disease), stage III- (present on admission)   Assessment & Plan    Stable, at baseline.  - continue to monitor  - avoid nephrotoxins, and renally dose all medications        Myelodysplastic syndrome (HCC)- (present on admission)   Assessment & Plan    - Continue to monitor blood counts, especially platelets in the setting of his recent gross hematuria.    - Management as above.        Parkinson's disease with dementia (HCC)- (present on admission)   Assessment & Plan    High risk  for delirium given dementia. Frequent re-orientation, reestablish circadian rhythm, encourage familiar faces/family in room, avoid or minimize narcotics/sedatives.  Avoid typical antipsychotics as high risk for EPS.  - Resume home Sinemet  - Continue Namenda and amantadine          Quality-Core Measures   Reviewed items::  Labs reviewed and Medications reviewed  Hurst catheter::  Neurogenic Bladder  DVT prophylaxis - mechanical:  SCDs  Ulcer Prophylaxis::  Not indicated

## 2018-06-24 NOTE — PROGRESS NOTES
Received report from day shift nurse. Assumed pt care at 1915. Pt is A&Ox4, resting comfortably in bed. Pt on 3L of oxygen via nasal cannula. No signs of SOB/respiratory distress. Labs noted, VSS. Pt c/o no pain at this moment. Assessment completed, pt had suprapubic catheter in place and CBI running with clear light pink urine-color noted. Fall precautions in place. Bed locked &  at lowest position. Call light and personal belongings within reach. Continue to monitor

## 2018-06-24 NOTE — PROGRESS NOTES
CBI stopped at 0930. Disconnected from patient and rinaldi catheter plugged. Per MD, rinaldi catheter will more than likely be removed tomorrow.

## 2018-06-25 PROBLEM — D64.9 ANEMIA: Status: ACTIVE | Noted: 2018-06-25

## 2018-06-25 LAB
ANION GAP SERPL CALC-SCNC: 7 MMOL/L (ref 0–11.9)
BUN SERPL-MCNC: 6 MG/DL (ref 8–22)
CALCIUM SERPL-MCNC: 8.5 MG/DL (ref 8.4–10.2)
CHLORIDE SERPL-SCNC: 102 MMOL/L (ref 96–112)
CO2 SERPL-SCNC: 29 MMOL/L (ref 20–33)
CREAT SERPL-MCNC: 0.99 MG/DL (ref 0.5–1.4)
ERYTHROCYTE [DISTWIDTH] IN BLOOD BY AUTOMATED COUNT: 57.3 FL (ref 35.9–50)
GLUCOSE SERPL-MCNC: 88 MG/DL (ref 65–99)
HCT VFR BLD AUTO: 23.3 % (ref 42–52)
HGB BLD-MCNC: 7.4 G/DL (ref 14–18)
MAGNESIUM SERPL-MCNC: 1.6 MG/DL (ref 1.5–2.5)
MCH RBC QN AUTO: 29 PG (ref 27–33)
MCHC RBC AUTO-ENTMCNC: 31.8 G/DL (ref 33.7–35.3)
MCV RBC AUTO: 91.4 FL (ref 81.4–97.8)
PLATELET # BLD AUTO: 68 K/UL (ref 164–446)
PMV BLD AUTO: 13.5 FL (ref 9–12.9)
POTASSIUM SERPL-SCNC: 3.3 MMOL/L (ref 3.6–5.5)
RBC # BLD AUTO: 2.55 M/UL (ref 4.7–6.1)
SODIUM SERPL-SCNC: 138 MMOL/L (ref 135–145)
WBC # BLD AUTO: 8.2 K/UL (ref 4.8–10.8)

## 2018-06-25 PROCEDURE — 85027 COMPLETE CBC AUTOMATED: CPT

## 2018-06-25 PROCEDURE — 700111 HCHG RX REV CODE 636 W/ 250 OVERRIDE (IP): Performed by: HOSPITALIST

## 2018-06-25 PROCEDURE — 99232 SBSQ HOSP IP/OBS MODERATE 35: CPT | Mod: GW | Performed by: HOSPITALIST

## 2018-06-25 PROCEDURE — 36415 COLL VENOUS BLD VENIPUNCTURE: CPT

## 2018-06-25 PROCEDURE — 700102 HCHG RX REV CODE 250 W/ 637 OVERRIDE(OP): Performed by: HOSPITALIST

## 2018-06-25 PROCEDURE — 97167 OT EVAL HIGH COMPLEX 60 MIN: CPT

## 2018-06-25 PROCEDURE — 83735 ASSAY OF MAGNESIUM: CPT

## 2018-06-25 PROCEDURE — A9270 NON-COVERED ITEM OR SERVICE: HCPCS | Performed by: UROLOGY

## 2018-06-25 PROCEDURE — 770006 HCHG ROOM/CARE - MED/SURG/GYN SEMI*

## 2018-06-25 PROCEDURE — 80048 BASIC METABOLIC PNL TOTAL CA: CPT

## 2018-06-25 PROCEDURE — G8988 SELF CARE GOAL STATUS: HCPCS | Mod: CK

## 2018-06-25 PROCEDURE — A9270 NON-COVERED ITEM OR SERVICE: HCPCS | Performed by: HOSPITALIST

## 2018-06-25 PROCEDURE — 700102 HCHG RX REV CODE 250 W/ 637 OVERRIDE(OP): Performed by: UROLOGY

## 2018-06-25 PROCEDURE — G8978 MOBILITY CURRENT STATUS: HCPCS | Mod: CL

## 2018-06-25 PROCEDURE — A9270 NON-COVERED ITEM OR SERVICE: HCPCS | Performed by: INTERNAL MEDICINE

## 2018-06-25 PROCEDURE — G8987 SELF CARE CURRENT STATUS: HCPCS | Mod: CL

## 2018-06-25 PROCEDURE — 97162 PT EVAL MOD COMPLEX 30 MIN: CPT

## 2018-06-25 PROCEDURE — G8979 MOBILITY GOAL STATUS: HCPCS | Mod: CJ

## 2018-06-25 PROCEDURE — 700102 HCHG RX REV CODE 250 W/ 637 OVERRIDE(OP): Performed by: INTERNAL MEDICINE

## 2018-06-25 RX ORDER — POTASSIUM CHLORIDE 20 MEQ/1
40 TABLET, EXTENDED RELEASE ORAL ONCE
Status: COMPLETED | OUTPATIENT
Start: 2018-06-25 | End: 2018-06-25

## 2018-06-25 RX ORDER — MAGNESIUM SULFATE HEPTAHYDRATE 40 MG/ML
2 INJECTION, SOLUTION INTRAVENOUS ONCE
Status: COMPLETED | OUTPATIENT
Start: 2018-06-25 | End: 2018-06-25

## 2018-06-25 RX ADMIN — POTASSIUM CHLORIDE 40 MEQ: 1500 TABLET, EXTENDED RELEASE ORAL at 10:11

## 2018-06-25 RX ADMIN — AMLODIPINE BESYLATE 5 MG: 5 TABLET ORAL at 10:11

## 2018-06-25 RX ADMIN — MEMANTINE HYDROCHLORIDE 10 MG: 10 TABLET ORAL at 10:11

## 2018-06-25 RX ADMIN — MAGNESIUM SULFATE HEPTAHYDRATE 2 G: 40 INJECTION, SOLUTION INTRAVENOUS at 17:57

## 2018-06-25 RX ADMIN — CARBIDOPA AND LEVODOPA 1 TABLET: 50; 200 TABLET, EXTENDED RELEASE ORAL at 21:44

## 2018-06-25 RX ADMIN — CITALOPRAM HYDROBROMIDE 20 MG: 20 TABLET ORAL at 10:11

## 2018-06-25 RX ADMIN — CEFUROXIME AXETIL 250 MG: 250 TABLET ORAL at 10:11

## 2018-06-25 RX ADMIN — AMANTADINE HYDROCHLORIDE 100 MG: 100 CAPSULE ORAL at 21:43

## 2018-06-25 RX ADMIN — AMANTADINE HYDROCHLORIDE 100 MG: 100 CAPSULE ORAL at 10:11

## 2018-06-25 RX ADMIN — CEFUROXIME AXETIL 250 MG: 250 TABLET ORAL at 21:43

## 2018-06-25 RX ADMIN — OXYBUTYNIN CHLORIDE 5 MG: 5 TABLET ORAL at 10:11

## 2018-06-25 RX ADMIN — ACETAMINOPHEN 650 MG: 325 TABLET, FILM COATED ORAL at 18:00

## 2018-06-25 RX ADMIN — MEMANTINE HYDROCHLORIDE 10 MG: 10 TABLET ORAL at 21:43

## 2018-06-25 RX ADMIN — OXYBUTYNIN CHLORIDE 5 MG: 5 TABLET ORAL at 16:18

## 2018-06-25 ASSESSMENT — GAIT ASSESSMENTS
ASSISTIVE DEVICE: FRONT WHEEL WALKER
GAIT LEVEL OF ASSIST: MINIMAL ASSIST
DEVIATION: SHUFFLED GAIT

## 2018-06-25 ASSESSMENT — COGNITIVE AND FUNCTIONAL STATUS - GENERAL
DRESSING REGULAR LOWER BODY CLOTHING: TOTAL
PERSONAL GROOMING: A LOT
DRESSING REGULAR UPPER BODY CLOTHING: A LOT
MOBILITY SCORE: 12
WALKING IN HOSPITAL ROOM: A LOT
HELP NEEDED FOR BATHING: TOTAL
CLIMB 3 TO 5 STEPS WITH RAILING: TOTAL
MOVING FROM LYING ON BACK TO SITTING ON SIDE OF FLAT BED: A LOT
EATING MEALS: A LOT
TOILETING: TOTAL
SUGGESTED CMS G CODE MODIFIER DAILY ACTIVITY: CL
SUGGESTED CMS G CODE MODIFIER MOBILITY: CL
TURNING FROM BACK TO SIDE WHILE IN FLAT BAD: A LOT
DAILY ACTIVITIY SCORE: 9
STANDING UP FROM CHAIR USING ARMS: A LITTLE
MOVING TO AND FROM BED TO CHAIR: A LOT

## 2018-06-25 ASSESSMENT — ENCOUNTER SYMPTOMS
NAUSEA: 0
HEADACHES: 0
TREMORS: 1
PALPITATIONS: 0
SHORTNESS OF BREATH: 0
ABDOMINAL PAIN: 0
FLANK PAIN: 0
VOMITING: 0
CHILLS: 0
WEAKNESS: 1
LOSS OF CONSCIOUSNESS: 0
MEMORY LOSS: 1
FALLS: 0
FEVER: 0

## 2018-06-25 ASSESSMENT — PAIN SCALES - GENERAL: PAINLEVEL_OUTOF10: 0

## 2018-06-25 ASSESSMENT — ACTIVITIES OF DAILY LIVING (ADL): TOILETING: REQUIRES ASSIST

## 2018-06-25 ASSESSMENT — PAIN SCALES - WONG BAKER: WONGBAKER_NUMERICALRESPONSE: HURTS A LITTLE MORE

## 2018-06-25 NOTE — PROGRESS NOTES
Received report from day shift nurse. Assumed pt care at 1915. Pt is A&Ox2, pt became confused and verbally wanted to get out of bed and to take a walk; wife at bedside.. Pt on 1.5L of oxygen via nasal cannula. No signs of SOB/respiratory distress. Labs noted, VSS. Pt c/o no pain at this moment. Assessment completed, Hurst being plugged; suprapubic catheter in place and draining by gravity with zeyad urine noted. IVF- LR running at 75ml/hr. Fall precautions in place. Bed alarm on. Bed locked & at lowest position. Call light and personal belongings within reach. Continue to monitor

## 2018-06-25 NOTE — THERAPY
"Physical Therapy Evaluation completed.   Bed Mobility:  Supine to Sit: Moderate Assist  Transfers: Sit to Stand: Moderate Assist  Gait: Level Of Assist: Minimal Assist with Front-Wheel Walker few steps to chair      Plan of Care: Will benefit from Physical Therapy 3 times per week  Discharge Recommendations: Equipment: Will Continue to Assess for Equipment Needs. Post-acute therapy Discharge to a transitional care facility for continued skilled therapy services.    Pt is a 78 yo male with history of PD presenting with weakness and debility, has not been OOB since Wed of last week per family. Recommend continued acute PT to improve function, most likely will need SNF placement.    See \"Rehab Therapy-Acute\" Patient Summary Report for complete documentation.     "

## 2018-06-25 NOTE — DISCHARGE PLANNING
Received Choice form at 0091  Agency/Facility Name: [facility]  Referral sent per Choice form @ [id number]

## 2018-06-25 NOTE — DISCHARGE PLANNING
Care Transition Team Assessment    SW met with this patient's spouse/POA Elizabeth to complete assessment and choice for SNF.   Patient was living at home with his spouse most recently.  SW was informed that he has been to [facility] prior however she wanted to see if we could get him to [facility] as it would be an easier drive from [place] for her.  Choice form was completed and faxed to [facility] for referral follow up.      Information Source  Orientation : Disoriented to Place  Information Given By: Spouse  Informant's Name: Elizabeth  Who is responsible for making decisions for patient? : Spouse    Elopement Risk  Legal Hold: No  Ambulatory or Self Mobile in Wheelchair: No-Not an Elopement Risk  Elopement Risk: Not at Risk for Elopement    Interdisciplinary Discharge Planning  Lives with - Patient's Self Care Capacity: Spouse  Patient or legal guardian wants to designate a caregiver (see row info): No    Discharge Preparedness  What is your plan after discharge?: Skilled nursing facility  What are your discharge supports?: Spouse  Prior Functional Level: Needs Assist with Activities of Daily Living    Functional Assesment  Prior Functional Level: Needs Assist with Activities of Daily Living    Finances  Financial Barriers to Discharge: No  Prescription Coverage: Yes    Vision / Hearing Impairment  Right Eye Vision: Impaired, Wears Glasses  Left Eye Vision: Impaired, Wears Glasses  Hearing Impairment: Both Ears, Hearing Device(s) Available    Values / Beliefs / Concerns  Spiritual Requests During Hospitalization: No    Advance Directive  Advance Directive?: DPOA for Health Care  Durable Power of  Name and Contact : elizabeth    Domestic Abuse  Have you ever been the victim of abuse or violence?: No  Possible Abuse Reported to:: Not Applicable    Psychological Assessment  History of Substance Abuse: None    Discharge Risks or Barriers  Discharge risks or barriers?: Complex medical needs    Anticipated  Discharge Information  Anticipated discharge disposition: SNF

## 2018-06-25 NOTE — ASSESSMENT & PLAN NOTE
In setting of CKD, MDS with bone marrow suppression and gross hematuria.   Hemoglobin 7.8 today  Transfuse if hgb<7g/dl

## 2018-06-25 NOTE — PROGRESS NOTES
"Urology Progress Note    Post op Day # 1     Cystoscopy, evacuation of clots and fulguration of   bleeding area at catheter insertion site secondary to:    1.  Urinary clot retention, status post insertion of suprapubic catheter and   thrombocytopenia.       Interval Events:Urine clear    S: Pt confused and unable to answer questions appropriate-  No reports of fevers, chills, nausea or vomiting.  Report of + flatus.  NO pain reported.      O:   Blood pressure 104/48, pulse 83, temperature 36.7 °C (98 °F), resp. rate 18, height 1.905 m (6' 3\"), weight 78.3 kg (172 lb 9.9 oz), SpO2 93 %.  Recent Labs      06/24/18   0510  06/25/18   0506   SODIUM  138  138   POTASSIUM  3.5*  3.3*   CHLORIDE  104  102   CO2  28  29   GLUCOSE  92  88   BUN  6*  6*   CREATININE  0.97  0.99   CALCIUM  8.3*  8.5     Recent Labs      06/23/18 2055  06/24/18   0510  06/25/18   0506   WBC  9.5  9.2  8.2   RBC  2.96*  2.80*  2.55*   HEMOGLOBIN  8.7*  8.2*  7.4*   HEMATOCRIT  26.8*  25.5*  23.3*   MCV  90.5  91.1  91.4   MCH  29.4  29.3  29.0   MCHC  32.5*  32.2*  31.8*   RDW  57.2*  58.2*  57.3*   PLATELETCT  77*  77*  68*   MPV  13.9*  13.9*  13.5*         Intake/Output Summary (Last 24 hours) at 06/25/18 0923  Last data filed at 06/25/18 0638   Gross per 24 hour   Intake             2054 ml   Output             2800 ml   Net             -746 ml      Physical Exam   Constitutional: He is well-developed, well-nourished, and in no distress.   confused   HENT:   Head: Normocephalic and atraumatic.   Eyes: Pupils are equal, round, and reactive to light.   Neck: Normal range of motion. Neck supple. No tracheal deviation present.   Cardiovascular: Normal rate.    Pulmonary/Chest: Effort normal and breath sounds normal.   Abdominal: Soft. Bowel sounds are normal. He exhibits no distension. There is tenderness. There is no rebound and no guarding.   Genitourinary:   Genitourinary Comments: Supra pubic cath in place draining clear yellow urine. "   Musculoskeletal: Normal range of motion.   Neurological:   Confused.  Oriented to self.  MAEE and strong   Skin: Skin is warm and dry. He is not diaphoretic.       A/P:    Active Hospital Problems    Diagnosis   • Acute hypoxemic respiratory failure (HCC) [J96.01]     Priority: High   • Urinary retention [R33.9]     Priority: High   • Chronic thrombocytopenia due to MDS (HCC) [D69.6]     Priority: Medium   • Elevated blood pressure reading without diagnosis of hypertension [R03.0]     Priority: Medium   • CKD (chronic kidney disease), stage III [N18.3]     Priority: Low   • Myelodysplastic syndrome (HCC) [D46.9]     Priority: Low   • Parkinson's disease with dementia (HCC) [G20]     Priority: Low   • Hypokalemia [E87.6]       Consult for SCN, PT and OT.   Hurst cath dcd and supra pubic to remain.  Urine clear.  Pt will need to follow up in 1 month with [facility] to have Supra pubic catheter exchanged.  [facility] will contact Pt for follow up appointment.   [facility] will be signing off.  The hospitalist team will be managing patient and discharge process.  Please let us know if we can assist further.       Discussed plan of Care with MD Joe.  All plan of care directed by MD Joe.  Discussed case with RN, MD.        NINA Mcknight.

## 2018-06-25 NOTE — RESPIRATORY CARE
COPD EDUCATION by COPD CLINICAL EDUCATOR  6/25/2018 at 8:04 AM by Haylee Stack     Patient reviewed by COPD education team. Patient does not qualify for COPD program.

## 2018-06-25 NOTE — PROGRESS NOTES
"[facility] Hospitalist Progress Note    Date of Service: 2018    Chief Complaint  77 y.o. male admitted 2018 with myelodysplastic syndrome, CKD stage III, Parkinson's disease with dementia, with issues with urinary retention due to neurogenic bladder.  He is admitted following suprapubic catheter placement.  Perioperatively, he had stable thrombocytopenia, and had platelet transfusion.  However, he had gross hematuria and elevated BPs postoperatively.     Interval Problem Update  : Taken back to the OR this morning for retained clots in the bladder as he continued to have gross hematuria throughout the night.  The patient was listless postop.     : Restless in bed, dystonia noted. Wife states that sometimes he will \"chew up his mouth\". Hurst clamped today.    : Wife at the bedside. Wakes more easily but still confused. Hurst removed this AM. Asked by urology to take over as primary.    Consultants/Specialty  Internal Medicine - management of medical issues     Disposition  Pending PT/OT, anticipate SNF.        Review of Systems   Constitutional: Positive for malaise/fatigue. Negative for chills and fever.   Respiratory: Negative for shortness of breath.    Cardiovascular: Negative for chest pain, palpitations and leg swelling.   Gastrointestinal: Negative for abdominal pain (suprapubic spasms improved), nausea and vomiting.   Genitourinary: Negative for flank pain and hematuria.   Musculoskeletal: Negative for falls.   Neurological: Positive for tremors and weakness. Negative for loss of consciousness and headaches.   Psychiatric/Behavioral: Positive for memory loss.   All other systems reviewed and are negative.     Physical Exam  Laboratory/Imaging   Hemodynamics  Temp (24hrs), Av.8 °C (98.3 °F), Min:36.7 °C (98 °F), Max:36.9 °C (98.5 °F)   Temperature: 36.8 °C (98.3 °F)  Pulse  Av.4  Min: 47  Max: 103    Blood Pressure : (!) 98/58      Respiratory      Respiration: 18, Pulse Oximetry: " 93 %        RUL Breath Sounds: Clear, RML Breath Sounds: Clear;Diminished, RLL Breath Sounds: Diminished, AQUILINO Breath Sounds: Clear, LLL Breath Sounds: Diminished    Fluids    Intake/Output Summary (Last 24 hours) at 06/25/18 1620  Last data filed at 06/25/18 1238   Gross per 24 hour   Intake             2274 ml   Output             1900 ml   Net              374 ml       Nutrition  Orders Placed This Encounter   Procedures   • Diet Order Regular     Standing Status:   Standing     Number of Occurrences:   1     Order Specific Question:   Diet:     Answer:   Regular [1]     Physical Exam   Constitutional: He appears well-developed. He appears listless. No distress.   HENT:   Head: Normocephalic.   Mouth/Throat: Oropharynx is clear and moist.   Eyes: EOM are normal. No scleral icterus.   Neck: Normal range of motion.   Cardiovascular: Normal rate, regular rhythm and intact distal pulses.    Pulmonary/Chest: Effort normal. No stridor. No respiratory distress.   Abdominal: Soft. Bowel sounds are normal. He exhibits no distension. There is no tenderness. There is no guarding.   Genitourinary:   Genitourinary Comments: Hurst has been removed, only with suprapubic catheter   Musculoskeletal: He exhibits no edema.   Neurological: He appears listless.   Currently oriented ×2-3, confused about situation   Skin: Skin is warm and dry. He is not diaphoretic.   Suprapubic cath in place with bandage surrounding it, no erythema    Psychiatric: He has a normal mood and affect. His behavior is normal. Cognition and memory are impaired.   Vitals reviewed.      Recent Labs      06/23/18 2055  06/24/18   0510  06/25/18   0506   WBC  9.5  9.2  8.2   RBC  2.96*  2.80*  2.55*   HEMOGLOBIN  8.7*  8.2*  7.4*   HEMATOCRIT  26.8*  25.5*  23.3*   MCV  90.5  91.1  91.4   MCH  29.4  29.3  29.0   MCHC  32.5*  32.2*  31.8*   RDW  57.2*  58.2*  57.3*   PLATELETCT  77*  77*  68*   MPV  13.9*  13.9*  13.5*     Recent Labs      06/24/18   0510   06/25/18   0506   SODIUM  138  138   POTASSIUM  3.5*  3.3*   CHLORIDE  104  102   CO2  28  29   GLUCOSE  92  88   BUN  6*  6*   CREATININE  0.97  0.99   CALCIUM  8.3*  8.5                      Assessment/Plan     * Urinary retention- (present on admission)   Assessment & Plan    s/p suprapubic catheter placement by urology on 6/21. Temporarily with rinaldi rinaldi catheter and suprapubic catheter, tolerated rinaldi clamp trial, removed today. Hematuria has resolved on 6/24!  - s/p CBI for hematuria  - urology following, greatly appreciate (IM now the primary team)        Acute hypoxemic respiratory failure (HCC)   Assessment & Plan    Patient requiring oxygen supplementation to maintain his saturation starting after his surgery on 6/23. Believe that this is multifactorial from anesthesia, atelectasis, and pain from bladder spasms.  - Incentive spirometer  - RT to follow  - Wean oxygen as able        Anemia- (present on admission)   Assessment & Plan    In setting of CKD, MDS with bone marrow suppression and gross hematuria. Has slowly been trending down, currently at 7.4. Hematuria has resolved.  - continue to trend and monitor clinically  - transfuse if hemoglobin < 7        Elevated blood pressure reading without diagnosis of hypertension- (present on admission)   Assessment & Plan    Resolved. No known history of hypertension, could be related to stress of surgery especially in setting of Parkinson's disease. Possible from stress of surgery and/or pain?  Had put on norvasc for several days, now with BP trending down.  - d/c norvasc  - continue to trend and titrate medications as needed        Chronic thrombocytopenia due to MDS (HCC)- (present on admission)   Assessment & Plan    Plts have been low since Sept 2017 and has been stable in the 30's. s/p plateletpheresis transfusion perioperatively. Plts at 68 today  - repeat in AM and continue to trend  - Monitor for evidence of active bleeding, hematuria has resolved.         Hypokalemia- (present on admission)   Assessment & Plan    K down to 3.3 today, in setting of hypomagnesemia.  - Monitor and replete Mg and K        CKD (chronic kidney disease), stage III- (present on admission)   Assessment & Plan    Stable, at baseline.  - continue to monitor  - avoid nephrotoxins, and renally dose all medications        Myelodysplastic syndrome (HCC)- (present on admission)   Assessment & Plan    - Continue to monitor blood counts, especially platelets in the setting of his recent gross hematuria.    - Management as above.        Parkinson's disease with dementia (HCC)- (present on admission)   Assessment & Plan    High risk for delirium given dementia. Frequent re-orientation, avoid or minimize narcotics/sedatives.    - Resume home Sinemet  - Continue Namenda and amantadine          Quality-Core Measures   Reviewed items::  Labs reviewed and Medications reviewed  Hurst catheter::  Neurogenic Bladder  DVT: held 2/2 low plts and anemia.  DVT prophylaxis - mechanical:  SCDs  Ulcer Prophylaxis::  Not indicated

## 2018-06-25 NOTE — DISCHARGE PLANNING
Agency/Facility Name: [facility]  Outcome: faxed updated notes per MARCUS Guaman's request

## 2018-06-25 NOTE — DISCHARGE PLANNING
VALERIO called [facility] Ext. Care and left a message for [facility] requesting update on patient referral.

## 2018-06-26 ENCOUNTER — APPOINTMENT (OUTPATIENT)
Dept: RADIOLOGY | Facility: MEDICAL CENTER | Age: 78
DRG: 662 | End: 2018-06-26
Attending: HOSPITALIST
Payer: MEDICARE

## 2018-06-26 LAB
ABO GROUP BLD: NORMAL
ANION GAP SERPL CALC-SCNC: 7 MMOL/L (ref 0–11.9)
BARCODED ABORH UBTYP: 5100
BARCODED PRD CODE UBPRD: NORMAL
BARCODED UNIT NUM UBUNT: NORMAL
BLD GP AB SCN SERPL QL: NORMAL
BUN SERPL-MCNC: 7 MG/DL (ref 8–22)
CALCIUM SERPL-MCNC: 8 MG/DL (ref 8.4–10.2)
CHLORIDE SERPL-SCNC: 105 MMOL/L (ref 96–112)
CO2 SERPL-SCNC: 26 MMOL/L (ref 20–33)
COMPONENT P 8504P: NORMAL
CREAT SERPL-MCNC: 0.98 MG/DL (ref 0.5–1.4)
ERYTHROCYTE [DISTWIDTH] IN BLOOD BY AUTOMATED COUNT: 57 FL (ref 35.9–50)
ERYTHROCYTE [DISTWIDTH] IN BLOOD BY AUTOMATED COUNT: 58.1 FL (ref 35.9–50)
GLUCOSE SERPL-MCNC: 92 MG/DL (ref 65–99)
HCT VFR BLD AUTO: 22.4 % (ref 42–52)
HCT VFR BLD AUTO: 22.7 % (ref 42–52)
HCT VFR BLD AUTO: 22.9 % (ref 42–52)
HGB BLD-MCNC: 7.1 G/DL (ref 14–18)
HGB BLD-MCNC: 7.2 G/DL (ref 14–18)
HGB BLD-MCNC: 7.4 G/DL (ref 14–18)
INR PPP: 1.46 (ref 0.87–1.13)
MAGNESIUM SERPL-MCNC: 1.9 MG/DL (ref 1.5–2.5)
MCH RBC QN AUTO: 28.9 PG (ref 27–33)
MCH RBC QN AUTO: 29.5 PG (ref 27–33)
MCHC RBC AUTO-ENTMCNC: 31.3 G/DL (ref 33.7–35.3)
MCHC RBC AUTO-ENTMCNC: 32.1 G/DL (ref 33.7–35.3)
MCV RBC AUTO: 91.8 FL (ref 81.4–97.8)
MCV RBC AUTO: 92.3 FL (ref 81.4–97.8)
PLATELET # BLD AUTO: 69 K/UL (ref 164–446)
PLATELET # BLD AUTO: 81 K/UL (ref 164–446)
PMV BLD AUTO: 13.2 FL (ref 9–12.9)
PMV BLD AUTO: 14.6 FL (ref 9–12.9)
POTASSIUM SERPL-SCNC: 3.3 MMOL/L (ref 3.6–5.5)
PRODUCT TYPE UPROD: NORMAL
PROTHROMBIN TIME: 17.6 SEC (ref 12–14.6)
RBC # BLD AUTO: 2.44 M/UL (ref 4.7–6.1)
RBC # BLD AUTO: 2.46 M/UL (ref 4.7–6.1)
RH BLD: NORMAL
SODIUM SERPL-SCNC: 138 MMOL/L (ref 135–145)
UNIT STATUS USTAT: NORMAL
WBC # BLD AUTO: 12.5 K/UL (ref 4.8–10.8)
WBC # BLD AUTO: 9.4 K/UL (ref 4.8–10.8)

## 2018-06-26 PROCEDURE — 36430 TRANSFUSION BLD/BLD COMPNT: CPT

## 2018-06-26 PROCEDURE — 99233 SBSQ HOSP IP/OBS HIGH 50: CPT | Mod: GW | Performed by: HOSPITALIST

## 2018-06-26 PROCEDURE — A9270 NON-COVERED ITEM OR SERVICE: HCPCS | Performed by: INTERNAL MEDICINE

## 2018-06-26 PROCEDURE — 80048 BASIC METABOLIC PNL TOTAL CA: CPT

## 2018-06-26 PROCEDURE — 85018 HEMOGLOBIN: CPT

## 2018-06-26 PROCEDURE — A9270 NON-COVERED ITEM OR SERVICE: HCPCS | Performed by: HOSPITALIST

## 2018-06-26 PROCEDURE — 71045 X-RAY EXAM CHEST 1 VIEW: CPT

## 2018-06-26 PROCEDURE — 86850 RBC ANTIBODY SCREEN: CPT

## 2018-06-26 PROCEDURE — 83735 ASSAY OF MAGNESIUM: CPT

## 2018-06-26 PROCEDURE — 700105 HCHG RX REV CODE 258: Performed by: UROLOGY

## 2018-06-26 PROCEDURE — 85610 PROTHROMBIN TIME: CPT

## 2018-06-26 PROCEDURE — 85027 COMPLETE CBC AUTOMATED: CPT

## 2018-06-26 PROCEDURE — 85014 HEMATOCRIT: CPT

## 2018-06-26 PROCEDURE — 700102 HCHG RX REV CODE 250 W/ 637 OVERRIDE(OP): Performed by: HOSPITALIST

## 2018-06-26 PROCEDURE — 86923 COMPATIBILITY TEST ELECTRIC: CPT

## 2018-06-26 PROCEDURE — 86900 BLOOD TYPING SEROLOGIC ABO: CPT

## 2018-06-26 PROCEDURE — P9034 PLATELETS, PHERESIS: HCPCS

## 2018-06-26 PROCEDURE — 770006 HCHG ROOM/CARE - MED/SURG/GYN SEMI*

## 2018-06-26 PROCEDURE — 700102 HCHG RX REV CODE 250 W/ 637 OVERRIDE(OP): Performed by: UROLOGY

## 2018-06-26 PROCEDURE — 700102 HCHG RX REV CODE 250 W/ 637 OVERRIDE(OP): Performed by: INTERNAL MEDICINE

## 2018-06-26 PROCEDURE — A9270 NON-COVERED ITEM OR SERVICE: HCPCS | Performed by: UROLOGY

## 2018-06-26 PROCEDURE — 86901 BLOOD TYPING SEROLOGIC RH(D): CPT

## 2018-06-26 RX ADMIN — SODIUM CHLORIDE, POTASSIUM CHLORIDE, SODIUM LACTATE AND CALCIUM CHLORIDE: 600; 310; 30; 20 INJECTION, SOLUTION INTRAVENOUS at 02:27

## 2018-06-26 RX ADMIN — ATROPA BELLADONNA AND OPIUM 60 MG: 16.2; 6 SUPPOSITORY RECTAL at 14:32

## 2018-06-26 RX ADMIN — MEMANTINE HYDROCHLORIDE 10 MG: 10 TABLET ORAL at 23:16

## 2018-06-26 RX ADMIN — AMANTADINE HYDROCHLORIDE 100 MG: 100 CAPSULE ORAL at 23:16

## 2018-06-26 RX ADMIN — TRAMADOL HYDROCHLORIDE 50 MG: 50 TABLET, COATED ORAL at 15:42

## 2018-06-26 RX ADMIN — CARBIDOPA AND LEVODOPA 1 TABLET: 50; 200 TABLET, EXTENDED RELEASE ORAL at 14:38

## 2018-06-26 RX ADMIN — ATROPA BELLADONNA AND OPIUM 60 MG: 16.2; 6 SUPPOSITORY RECTAL at 02:14

## 2018-06-26 RX ADMIN — OXYBUTYNIN CHLORIDE 5 MG: 5 TABLET ORAL at 14:34

## 2018-06-26 RX ADMIN — CEFUROXIME AXETIL 250 MG: 250 TABLET ORAL at 14:34

## 2018-06-26 RX ADMIN — OXYBUTYNIN CHLORIDE 5 MG: 5 TABLET ORAL at 23:16

## 2018-06-26 RX ADMIN — OXYBUTYNIN CHLORIDE 5 MG: 5 TABLET ORAL at 02:11

## 2018-06-26 RX ADMIN — CITALOPRAM HYDROBROMIDE 20 MG: 20 TABLET ORAL at 14:35

## 2018-06-26 RX ADMIN — CEFUROXIME AXETIL 250 MG: 250 TABLET ORAL at 23:17

## 2018-06-26 ASSESSMENT — ENCOUNTER SYMPTOMS
FLANK PAIN: 0
VOMITING: 0
HEADACHES: 0
SHORTNESS OF BREATH: 0
LOSS OF CONSCIOUSNESS: 0
CHILLS: 0
FEVER: 0
ABDOMINAL PAIN: 0
TREMORS: 1
MEMORY LOSS: 1
WEAKNESS: 1
PALPITATIONS: 0
FALLS: 0
NAUSEA: 0

## 2018-06-26 ASSESSMENT — PAIN SCALES - WONG BAKER
WONGBAKER_NUMERICALRESPONSE: DOESN'T HURT AT ALL
WONGBAKER_NUMERICALRESPONSE: HURTS A LITTLE MORE

## 2018-06-26 ASSESSMENT — PAIN SCALES - GENERAL: PAINLEVEL_OUTOF10: ASSUMED PAIN PRESENT

## 2018-06-26 NOTE — PROGRESS NOTES
[facility] Hospitalist Progress Note    Date of Service: 2018    Chief Complaint  77 y.o. male admitted 2018 with myelodysplastic syndrome, CKD stage III, Parkinson's disease with dementia, with issues with urinary retention due to neurogenic bladder.  He is admitted following suprapubic catheter placement.  Perioperatively, he had stable thrombocytopenia, and had platelet transfusion.  However, he had gross hematuria and elevated BPs postoperatively.     Interval Problem Update  Back with more hematuria today; urology signed back on, rinaldi replaced and CBI in place. More alert this afternoon.    Consultants/Specialty  Internal Medicine - management of medical issues     Disposition  Pending PT/OT, anticipate SNF once acute issues are stabilized.        Review of Systems   Constitutional: Positive for malaise/fatigue. Negative for chills and fever.   Respiratory: Negative for shortness of breath.    Cardiovascular: Negative for chest pain, palpitations and leg swelling.   Gastrointestinal: Negative for abdominal pain (suprapubic spasms improved), nausea and vomiting.   Genitourinary: Positive for hematuria. Negative for flank pain.   Musculoskeletal: Negative for falls.   Neurological: Positive for tremors and weakness. Negative for loss of consciousness and headaches.   Psychiatric/Behavioral: Positive for memory loss.   All other systems reviewed and are negative.     Physical Exam  Laboratory/Imaging   Hemodynamics  Temp (24hrs), Av.7 °C (98 °F), Min:36.3 °C (97.4 °F), Max:36.9 °C (98.4 °F)   Temperature: 36.9 °C (98.4 °F)  Pulse  Av.8  Min: 47  Max: 103    Blood Pressure : 143/72      Respiratory      Respiration: 18, Pulse Oximetry: 93 %        RUL Breath Sounds: Rhonchi, RML Breath Sounds: Rhonchi, RLL Breath Sounds: Diminished, AQUILINO Breath Sounds: Rhonchi, LLL Breath Sounds: Diminished    Fluids    Intake/Output Summary (Last 24 hours) at 18 8967  Last data filed at 18 0683   Gross  per 24 hour   Intake           126.25 ml   Output             1300 ml   Net         -1173.75 ml       Nutrition  Orders Placed This Encounter   Procedures   • Diet Order Regular     Standing Status:   Standing     Number of Occurrences:   1     Order Specific Question:   Diet:     Answer:   Regular [1]     Physical Exam   Constitutional: He appears well-developed. He appears listless. No distress.   HENT:   Head: Normocephalic.   Mouth/Throat: Oropharynx is clear and moist.   Eyes: Conjunctivae are normal. Right eye exhibits no discharge. Left eye exhibits no discharge. No scleral icterus.   Neck: No JVD present.   Cardiovascular: Normal rate, regular rhythm and intact distal pulses.    Pulmonary/Chest: Effort normal. No stridor. No respiratory distress.   Abdominal: Soft. Bowel sounds are normal. He exhibits no distension. There is no tenderness. There is no guarding.   Genitourinary:   Genitourinary Comments: Hurst and suprapubic catheter with hematuria   Musculoskeletal: He exhibits no edema.   Neurological: He appears listless.   Currently oriented ×2-3, confused about situation   Skin: Skin is warm and dry. He is not diaphoretic.   Suprapubic cath in place with bandage surrounding it, no erythema    Psychiatric: He has a normal mood and affect. His behavior is normal. Cognition and memory are impaired.   Nursing note and vitals reviewed.      Recent Labs      06/24/18   0510  06/25/18   0506  06/26/18   0451  06/26/18   0918   WBC  9.2  8.2  9.4   --    RBC  2.80*  2.55*  2.46*   --    HEMOGLOBIN  8.2*  7.4*  7.1*  7.4*   HEMATOCRIT  25.5*  23.3*  22.7*  22.9*   MCV  91.1  91.4  92.3   --    MCH  29.3  29.0  28.9   --    MCHC  32.2*  31.8*  31.3*   --    RDW  58.2*  57.3*  58.1*   --    PLATELETCT  77*  68*  69*   --    MPV  13.9*  13.5*  14.6*   --      Recent Labs      06/24/18   0510  06/25/18   0506  06/26/18   0451   SODIUM  138  138  138   POTASSIUM  3.5*  3.3*  3.3*   CHLORIDE  104  102  105   CO2  28   29  26   GLUCOSE  92  88  92   BUN  6*  6*  7*   CREATININE  0.97  0.99  0.98   CALCIUM  8.3*  8.5  8.0*                      Assessment/Plan     * Urinary retention- (present on admission)   Assessment & Plan    s/p suprapubic catheter placement by urology on 6/21. Temporarily with rinaldi rinaldi catheter and suprapubic catheter, tolerated rinaldi clamp trial, removed today. Hematuria has resolved on 6/24 but now has recurred  Again needing CBI for hematuria  Prognosis is quite guarded  - urology following, greatly appreciate (IM now the primary team) discussed with SEDA Hunt.        Acute hypoxemic respiratory failure (HCC)   Assessment & Plan    Patient requiring oxygen supplementation to maintain his saturation starting after his surgery on 6/23.  Check chest xray  - Incentive spirometer  - RT to follow  - Wean oxygen as able        Anemia- (present on admission)   Assessment & Plan    In setting of CKD, MDS with bone marrow suppression and gross hematuria. Has slowly been trending down, currently at 7.4. Hematuria has recurred  - continue to trend and monitor clinically  - transfuse if hemoglobin < 7        Elevated blood pressure reading without diagnosis of hypertension- (present on admission)   Assessment & Plan    Resolved. No known history of hypertension, could be related to stress of surgery especially in setting of Parkinson's disease. Possible from stress of surgery and/or pain?  Had put on norvasc for several days, now with BP trending down.  - d/c norvasc  - continue to trend and titrate medications as needed        Chronic thrombocytopenia due to MDS (HCC)- (present on admission)   Assessment & Plan    Plts have been low since Sept 2017 and has been stable in the 30's. s/p plateletpheresis transfusion perioperatively.   Platelets in the 60s still with hematuria  - repeat in AM and continue to trend  Conservative transfusion strategy        Hypokalemia- (present on admission)   Assessment & Plan    K down  to 3.3 again in setting of hypomagnesemia.  Replace po and recheck        DNR (do not resuscitate)- (present on admission)   Assessment & Plan    Affirmed on admission.  POA and advanced directive reviewed.        CKD (chronic kidney disease), stage III- (present on admission)   Assessment & Plan    Stable, at baseline.  - continue to monitor  - avoid nephrotoxins, and renally dose all medications        Myelodysplastic syndrome (HCC)- (present on admission)   Assessment & Plan    - Continue to monitor blood counts, especially platelets in the setting of his recent gross hematuria.    - Management as above.        Parkinson's disease with dementia (HCC)- (present on admission)   Assessment & Plan    High risk for delirium given dementia. Frequent re-orientation, avoid or minimize narcotics/sedatives.    continue home Sinemet  - Continue Namenda and amantadine          Quality-Core Measures   Reviewed items::  Labs reviewed and Medications reviewed  Hurst catheter::  Neurogenic Bladder  DVT: held 2/2 low plts and anemia.  DVT prophylaxis - mechanical:  SCDs  Ulcer Prophylaxis::  Not indicated

## 2018-06-26 NOTE — DISCHARGE PLANNING
Agency/Facility Name: [facility]   Spoke To: Fax notification   Outcome: Declined no open beds

## 2018-06-26 NOTE — PROGRESS NOTES
"Urology Progress Note    Post op Day # 2    Post op Day # 1      Cystoscopy, evacuation of clots and fulguration of   bleeding area at catheter insertion site secondary to:     Urinary clot retention, status post insertion of suprapubic catheter and   thrombocytopenia.    Interval Events: Pt S/p tube not draining last night . Urine draining from penis.  0200 S/p tube draining red urine with clots. Pt no longer draining from penis    ROS- Unable due to pt LOC    S: No fevers, chills, nausea or vomiting+ flatus.    O:   Blood pressure 105/58, pulse (!) 52, temperature 36.3 °C (97.4 °F), resp. rate 20, height 1.905 m (6' 3\"), weight 78.3 kg (172 lb 9.9 oz), SpO2 98 %.  Recent Labs      06/24/18   0510  06/25/18   0506  06/26/18   0451   SODIUM  138  138  138   POTASSIUM  3.5*  3.3*  3.3*   CHLORIDE  104  102  105   CO2  28  29  26   GLUCOSE  92  88  92   BUN  6*  6*  7*   CREATININE  0.97  0.99  0.98   CALCIUM  8.3*  8.5  8.0*     Recent Labs      06/24/18   0510  06/25/18   0506  06/26/18   0451  06/26/18   0918   WBC  9.2  8.2  9.4   --    RBC  2.80*  2.55*  2.46*   --    HEMOGLOBIN  8.2*  7.4*  7.1*  7.4*   HEMATOCRIT  25.5*  23.3*  22.7*  22.9*   MCV  91.1  91.4  92.3   --    MCH  29.3  29.0  28.9   --    MCHC  32.2*  31.8*  31.3*   --    RDW  58.2*  57.3*  58.1*   --    PLATELETCT  77*  68*  69*   --    MPV  13.9*  13.5*  14.6*   --          Intake/Output Summary (Last 24 hours) at 06/26/18 1103  Last data filed at 06/26/18 0656   Gross per 24 hour   Intake           226.25 ml   Output             1300 ml   Net         -1073.75 ml       Physical Exam   Constitutional: He is well-developed, well-nourished, and in no distress.   confused -non verbal today  HENT:   Head: Normocephalic and atraumatic.   Eyes: Pupils are equal, round, and reactive to light.   Neck: Normal range of motion. Neck supple. No tracheal deviation present.   Cardiovascular: Normal rate.    Pulmonary/Chest: Effort normal.  Decreased breath " sounds bilateral   Abdominal: Soft. Bowel sounds are normal. He exhibits no distension. There is tenderness upon palp. There is no    Genitourinary Comments: Supra pubic cath in place draining bloody urine.   Musculoskeletal: Normal range of motion.   Neurological:   Confused.  Oriented to self.  MAEE   Skin: Skin is warm and dry. He is not diaphoretic.  A/P:    Active Hospital Problems    Diagnosis   • Acute hypoxemic respiratory failure (HCC) [J96.01]     Priority: High   • Urinary retention [R33.9]     Priority: High   • Anemia [D64.9]     Priority: Medium   • Chronic thrombocytopenia due to MDS (HCC) [D69.6]     Priority: Medium   • Elevated blood pressure reading without diagnosis of hypertension [R03.0]     Priority: Medium   • CKD (chronic kidney disease), stage III [N18.3]     Priority: Low   • Myelodysplastic syndrome (HCC) [D46.9]     Priority: Low   • Parkinson's disease with dementia (HCC) [G20]     Priority: Low   • Hypokalemia [E87.6]     I flushed  S/P tube with 1.5 liters of sterile saline until urine clear of clots.  Several large and small clots evacuated.    We will restart CBI due to hermaturia  Discussed plan of Care with MD Joe.  All plan of care directed by MD Joe.  Discussed case with RN, MD.      NINA Mcknight.

## 2018-06-26 NOTE — PROGRESS NOTES
Assisted patient to BSC with assistance from Megha. Patient had small BM. Assisted back to bed. Patient linen changed. Observed suprapubic catheter leaking. Dressing removed. Redness observed around insertion site. Fluid appears clear on gauze. Page to Urology [phone]. Spoke with call center who will page. CBI turned off until further instruction received from urology. Primary RN updated.  1704: Return call from [facility] will let Dr. Joe or Marilee know. Await return call.  1730: Dr. Joe instructed to IRRIGATE penile rinaldi catheter to clear and may replace with larger rinaldi catheter is unable to irrigate. Orders placed. Instructed to ensure that CBI is following through penile catheter and draining from suprapubic catheter. Charge RN updated with POC.

## 2018-06-26 NOTE — PROGRESS NOTES
While 2am vitals were being obtained, this RN noted bloody urinary discharge from pt's penis, clot noted, no output on suprapubic catheter.    0202 Dr. Gonzalez, hospitalist paged,notified about situation. Md okay with giving B&O suppository and ditropan together, try to flush again, bladder scan pt and if there is retention, call urologist.    0215 Pt seen getting restless, says it's painful and is trying to go to the bathroom.  B&O suppository and p.o. Ditropan given. Bladder scan performed, showing 0cc, could not get an accurate reading because pt has a dressing on bladder. Pt still with clots and bloody urinary drainage from penis, no output still on suprapubic catheter.     2nd attempt to flush pt's suprapubic catheter performed, this time was able to get a yellow tinged return. Flushed 20cc more, and draw back was zeyad in color. Catheter reconnected to drainage bag, and total of 1000cc urine output was measured. Output color started off as zeyad and into blood-tinged.    Pt is now calm, not restless anymore.

## 2018-06-26 NOTE — PROGRESS NOTES
Dr. Jenny Gonzalez paged, waiting for call back.     Pt suprapubic output is bloody urine, pt appears more sleepy, h&h now down to 7.1/22.7. Still with some bloody drainage from penis, small clot present.    0712 Call back received from Dr. Gonzalez, pt may need CBI again, will let incoming day MD aware of it.

## 2018-06-26 NOTE — PROGRESS NOTES
Dr. Joe and Marilee STACK paged [phone]. Iris, took information.  Elizabeth Isidro would like to speak with care team regarding POC. Will update primary RN.

## 2018-06-26 NOTE — PROGRESS NOTES
Report received from day RN. Was told by day RN that pt's suprapubic catheter is flushing but has no output and clear urine is coming out from pt's penis. Was told from report that MD is aware of the situation.    2015 Able to talk with pt's wife, was informed that pt seems more confused than he normally is. Pt is currently only oriented to self, w/ slurred speech to which the wife says is baseline from time to time because of his Parkinsons. Wife is concerned about pt's cough, which sounds congested and moist, states that pt has had some sputum output. Lungs sounds diminished in all lobes, no sputum output at this time.

## 2018-06-26 NOTE — PROGRESS NOTES
Wife called to notify about updates, per her request.    Wife requesting if Urologist can call her for updates regarding treatment goal as she is very anxious.

## 2018-06-27 PROBLEM — R31.0 GROSS HEMATURIA: Status: ACTIVE | Noted: 2018-06-27

## 2018-06-27 LAB
ANION GAP SERPL CALC-SCNC: 4 MMOL/L (ref 0–11.9)
ANISOCYTOSIS BLD QL SMEAR: ABNORMAL
BARCODED ABORH UBTYP: 6200
BARCODED PRD CODE UBPRD: NORMAL
BARCODED UNIT NUM UBUNT: NORMAL
BASO STIPL BLD QL SMEAR: NORMAL
BASOPHILS # BLD AUTO: 0.2 % (ref 0–1.8)
BASOPHILS # BLD: 0.02 K/UL (ref 0–0.12)
BUN SERPL-MCNC: 11 MG/DL (ref 8–22)
CALCIUM SERPL-MCNC: 8.1 MG/DL (ref 8.4–10.2)
CHLORIDE SERPL-SCNC: 105 MMOL/L (ref 96–112)
CO2 SERPL-SCNC: 27 MMOL/L (ref 20–33)
COMMENT 1642: NORMAL
COMPONENT R 8504R: NORMAL
CREAT SERPL-MCNC: 1.07 MG/DL (ref 0.5–1.4)
EOSINOPHIL # BLD AUTO: 0.47 K/UL (ref 0–0.51)
EOSINOPHIL NFR BLD: 4.4 % (ref 0–6.9)
ERYTHROCYTE [DISTWIDTH] IN BLOOD BY AUTOMATED COUNT: 53.2 FL (ref 35.9–50)
ERYTHROCYTE [DISTWIDTH] IN BLOOD BY AUTOMATED COUNT: 56.6 FL (ref 35.9–50)
GIANT PLATELETS BLD QL SMEAR: NORMAL
GLUCOSE SERPL-MCNC: 120 MG/DL (ref 65–99)
HCT VFR BLD AUTO: 20.9 % (ref 42–52)
HCT VFR BLD AUTO: 22.2 % (ref 42–52)
HCT VFR BLD AUTO: 22.4 % (ref 42–52)
HCT VFR BLD AUTO: 22.6 % (ref 42–52)
HGB BLD-MCNC: 6.7 G/DL (ref 14–18)
HGB BLD-MCNC: 7.2 G/DL (ref 14–18)
HGB BLD-MCNC: 7.5 G/DL (ref 14–18)
HGB BLD-MCNC: 7.6 G/DL (ref 14–18)
IMM GRANULOCYTES # BLD AUTO: 0.08 K/UL (ref 0–0.11)
IMM GRANULOCYTES NFR BLD AUTO: 0.7 % (ref 0–0.9)
LG PLATELETS BLD QL SMEAR: NORMAL
LYMPHOCYTES # BLD AUTO: 1.06 K/UL (ref 1–4.8)
LYMPHOCYTES NFR BLD: 9.9 % (ref 22–41)
MCH RBC QN AUTO: 29.4 PG (ref 27–33)
MCH RBC QN AUTO: 30.4 PG (ref 27–33)
MCHC RBC AUTO-ENTMCNC: 32.1 G/DL (ref 33.7–35.3)
MCHC RBC AUTO-ENTMCNC: 33.6 G/DL (ref 33.7–35.3)
MCV RBC AUTO: 90.4 FL (ref 81.4–97.8)
MCV RBC AUTO: 91.7 FL (ref 81.4–97.8)
MONOCYTES # BLD AUTO: 5.07 K/UL (ref 0–0.85)
MONOCYTES NFR BLD AUTO: 47.3 % (ref 0–13.4)
NEUTROPHILS # BLD AUTO: 4.02 K/UL (ref 1.82–7.42)
NEUTROPHILS NFR BLD: 37.5 % (ref 44–72)
NRBC # BLD AUTO: 0.04 K/UL
NRBC BLD-RTO: 0.4 /100 WBC
PLATELET # BLD AUTO: 104 K/UL (ref 164–446)
PLATELET # BLD AUTO: 118 K/UL (ref 164–446)
PLATELET BLD QL SMEAR: NORMAL
PMV BLD AUTO: 12.3 FL (ref 9–12.9)
PMV BLD AUTO: 13.1 FL (ref 9–12.9)
POIKILOCYTOSIS BLD QL SMEAR: NORMAL
POLYCHROMASIA BLD QL SMEAR: NORMAL
POTASSIUM SERPL-SCNC: 3.5 MMOL/L (ref 3.6–5.5)
PRODUCT TYPE UPROD: NORMAL
RBC # BLD AUTO: 2.28 M/UL (ref 4.7–6.1)
RBC # BLD AUTO: 2.5 M/UL (ref 4.7–6.1)
RBC BLD AUTO: PRESENT
SODIUM SERPL-SCNC: 136 MMOL/L (ref 135–145)
UNIT STATUS USTAT: NORMAL
WBC # BLD AUTO: 10.7 K/UL (ref 4.8–10.8)
WBC # BLD AUTO: 14.3 K/UL (ref 4.8–10.8)

## 2018-06-27 PROCEDURE — 160002 HCHG RECOVERY MINUTES (STAT): Performed by: UROLOGY

## 2018-06-27 PROCEDURE — 80048 BASIC METABOLIC PNL TOTAL CA: CPT

## 2018-06-27 PROCEDURE — 700111 HCHG RX REV CODE 636 W/ 250 OVERRIDE (IP)

## 2018-06-27 PROCEDURE — 501329 HCHG SET, CYSTO IRRIG Y TUR: Performed by: UROLOGY

## 2018-06-27 PROCEDURE — 700117 HCHG RX CONTRAST REV CODE 255

## 2018-06-27 PROCEDURE — 160039 HCHG SURGERY MINUTES - EA ADDL 1 MIN LEVEL 3: Performed by: UROLOGY

## 2018-06-27 PROCEDURE — 85018 HEMOGLOBIN: CPT

## 2018-06-27 PROCEDURE — A9270 NON-COVERED ITEM OR SERVICE: HCPCS | Performed by: UROLOGY

## 2018-06-27 PROCEDURE — 85014 HEMATOCRIT: CPT

## 2018-06-27 PROCEDURE — 700102 HCHG RX REV CODE 250 W/ 637 OVERRIDE(OP): Performed by: UROLOGY

## 2018-06-27 PROCEDURE — 770022 HCHG ROOM/CARE - ICU (200)

## 2018-06-27 PROCEDURE — 160048 HCHG OR STATISTICAL LEVEL 1-5: Performed by: UROLOGY

## 2018-06-27 PROCEDURE — 0TWBX0Z REVISION OF DRAINAGE DEVICE IN BLADDER, EXTERNAL APPROACH: ICD-10-PCS | Performed by: UROLOGY

## 2018-06-27 PROCEDURE — 30233N1 TRANSFUSION OF NONAUTOLOGOUS RED BLOOD CELLS INTO PERIPHERAL VEIN, PERCUTANEOUS APPROACH: ICD-10-PCS | Performed by: HOSPITALIST

## 2018-06-27 PROCEDURE — 160009 HCHG ANES TIME/MIN: Performed by: UROLOGY

## 2018-06-27 PROCEDURE — 94760 N-INVAS EAR/PLS OXIMETRY 1: CPT

## 2018-06-27 PROCEDURE — 85027 COMPLETE CBC AUTOMATED: CPT

## 2018-06-27 PROCEDURE — 160028 HCHG SURGERY MINUTES - 1ST 30 MINS LEVEL 3: Performed by: UROLOGY

## 2018-06-27 PROCEDURE — 86923 COMPATIBILITY TEST ELECTRIC: CPT | Mod: 91

## 2018-06-27 PROCEDURE — 85025 COMPLETE CBC W/AUTO DIFF WBC: CPT

## 2018-06-27 PROCEDURE — 160035 HCHG PACU - 1ST 60 MINS PHASE I: Performed by: UROLOGY

## 2018-06-27 PROCEDURE — 700101 HCHG RX REV CODE 250

## 2018-06-27 PROCEDURE — A9270 NON-COVERED ITEM OR SERVICE: HCPCS | Performed by: HOSPITALIST

## 2018-06-27 PROCEDURE — 80500 HCHG CLINICAL PATH CONSULT-LIMITED: CPT

## 2018-06-27 PROCEDURE — A4357 BEDSIDE DRAINAGE BAG: HCPCS | Performed by: UROLOGY

## 2018-06-27 PROCEDURE — 700102 HCHG RX REV CODE 250 W/ 637 OVERRIDE(OP): Performed by: INTERNAL MEDICINE

## 2018-06-27 PROCEDURE — A9270 NON-COVERED ITEM OR SERVICE: HCPCS | Performed by: INTERNAL MEDICINE

## 2018-06-27 PROCEDURE — 500042 HCHG BAG, URINARY DRAINAGE (CLOSED): Performed by: UROLOGY

## 2018-06-27 PROCEDURE — 30233R1 TRANSFUSION OF NONAUTOLOGOUS PLATELETS INTO PERIPHERAL VEIN, PERCUTANEOUS APPROACH: ICD-10-PCS | Performed by: HOSPITALIST

## 2018-06-27 PROCEDURE — 99233 SBSQ HOSP IP/OBS HIGH 50: CPT | Mod: GW | Performed by: HOSPITALIST

## 2018-06-27 PROCEDURE — 0W3R8ZZ CONTROL BLEEDING IN GENITOURINARY TRACT, VIA NATURAL OR ARTIFICIAL OPENING ENDOSCOPIC: ICD-10-PCS | Performed by: UROLOGY

## 2018-06-27 PROCEDURE — P9016 RBC LEUKOCYTES REDUCED: HCPCS

## 2018-06-27 PROCEDURE — A4338 INDWELLING CATHETER LATEX: HCPCS | Performed by: UROLOGY

## 2018-06-27 PROCEDURE — 500880 HCHG PACK, CYSTO W/SEP LEGGINGS: Performed by: UROLOGY

## 2018-06-27 PROCEDURE — 36430 TRANSFUSION BLD/BLD COMPNT: CPT

## 2018-06-27 PROCEDURE — 500354 HCHG CUTTING LOOP: Performed by: UROLOGY

## 2018-06-27 PROCEDURE — 700102 HCHG RX REV CODE 250 W/ 637 OVERRIDE(OP): Performed by: HOSPITALIST

## 2018-06-27 RX ORDER — SODIUM CHLORIDE, SODIUM LACTATE, POTASSIUM CHLORIDE, CALCIUM CHLORIDE 600; 310; 30; 20 MG/100ML; MG/100ML; MG/100ML; MG/100ML
INJECTION, SOLUTION INTRAVENOUS
Status: DISCONTINUED | OUTPATIENT
Start: 2018-06-27 | End: 2018-06-28

## 2018-06-27 ASSESSMENT — PAIN SCALES - GENERAL
PAINLEVEL_OUTOF10: 0

## 2018-06-27 NOTE — PROGRESS NOTES
Unable to calculate pts total urine output. Pts suprapubic cath stoma has leak and pts pad was wet.

## 2018-06-27 NOTE — ASSESSMENT & PLAN NOTE
Sequelae of placement of suprapubic catheter with MDS history and low platelets.  Persistent hematuria with clots   Multiple surgical attempts, patient underwent irrigation and clot extraction 7/2  Formalin bladder instillation done 6/29  No noted hematuria for past 4 days, urology ordering catheter removal today; anticipate transfer to [facility] tomorrow.

## 2018-06-27 NOTE — PROGRESS NOTES
[facility] Hospitalist Progress Note    Date of Service: 2018    Chief Complaint  77 y.o. male admitted 2018 with myelodysplastic syndrome, CKD stage III, Parkinson's disease with dementia, with issues with urinary retention due to neurogenic bladder.  He is admitted following suprapubic catheter placement.  Perioperatively, he had stable thrombocytopenia, and had platelet transfusion.  However, he had gross hematuria and elevated BPs postoperatively.     Interval Problem Update  Transferred to ICU overnight for nursing ratio, continued hematuria.  Patient seen by urology last night and today; going back to OR today  Platelets and blood being given this morning  Patient is lethargic can't give any more information; wife at bedside, updates given and questions answered.  She states that if he develops dysphagia he does not wish to get a coretrack based on prior experience    Consultants/Specialty  Internal Medicine - management of medical issues     Disposition  Pending PT/OT, anticipate SNF once acute issues are stabilized.        Review of Systems   Unable to perform ROS: Acuity of condition      Physical Exam  Laboratory/Imaging   Hemodynamics  Temp (24hrs), Av.7 °C (98.1 °F), Min:36.4 °C (97.5 °F), Max:37.1 °C (98.7 °F)   Temperature: 36.5 °C (97.7 °F)  Pulse  Av.5  Min: 47  Max: 103 Heart Rate (Monitored): (!) 54  Blood Pressure : 113/62, NIBP: 121/62      Respiratory      Respiration: (!) 10, Pulse Oximetry: 100 %, O2 Daily Delivery Respiratory : Room Air with O2 Available        RUL Breath Sounds:  (coarse), RML Breath Sounds:  (coarse), RLL Breath Sounds: Diminished, AQUILINO Breath Sounds:  (coarse), LLL Breath Sounds: Diminished    Fluids    Intake/Output Summary (Last 24 hours) at 18 0958  Last data filed at 18 0630   Gross per 24 hour   Intake         99189.33 ml   Output            06770 ml   Net         -3791.67 ml       Nutrition  Orders Placed This Encounter   Procedures   • DIET  NPO     Standing Status:   Standing     Number of Occurrences:   1     Order Specific Question:   Restrict to:     Answer:   Strict [1]     Physical Exam   Constitutional: He appears well-developed. He appears listless. No distress.   HENT:   Head: Normocephalic.   Mouth/Throat: Oropharynx is clear and moist.   Eyes: Conjunctivae are normal. Right eye exhibits no discharge. Left eye exhibits no discharge. No scleral icterus.   Neck: No JVD present.   Cardiovascular: Normal rate, regular rhythm and intact distal pulses.    Pulmonary/Chest: Effort normal. No stridor. No respiratory distress.   Abdominal: Soft. Bowel sounds are normal. He exhibits no distension. There is no tenderness.   Genitourinary:   Genitourinary Comments: Hurst and suprapubic catheter with hematuria bright red   Musculoskeletal: He exhibits no edema.   Neurological: He appears listless. He exhibits abnormal muscle tone.   Lethargic, can nod yes/no   Skin: Skin is warm and dry. He is not diaphoretic. There is pallor.   Suprapubic cath in place with bandage surrounding it, no erythema    Psychiatric: Cognition and memory are impaired.   Nursing note and vitals reviewed.      Recent Labs      06/26/18   0451   06/26/18   2223  06/27/18   0325  06/27/18   0850   WBC  9.4   --   12.5*  14.3*   --    RBC  2.46*   --   2.44*  2.28*   --    HEMOGLOBIN  7.1*   < >  7.2*  6.7*  7.2*   HEMATOCRIT  22.7*   < >  22.4*  20.9*  22.2*   MCV  92.3   --   91.8  91.7   --    MCH  28.9   --   29.5  29.4   --    MCHC  31.3*   --   32.1*  32.1*   --    RDW  58.1*   --   57.0*  56.6*   --    PLATELETCT  69*   --   81*  118*   --    MPV  14.6*   --   13.2*  13.1*   --     < > = values in this interval not displayed.     Recent Labs      06/25/18   0506  06/26/18   0451  06/27/18   0325   SODIUM  138  138  136   POTASSIUM  3.3*  3.3*  3.5*   CHLORIDE  102  105  105   CO2  29  26  27   GLUCOSE  88  92  120*   BUN  6*  7*  11   CREATININE  0.99  0.98  1.07   CALCIUM  8.5   8.0*  8.1*     Recent Labs      06/26/18   2249   INR  1.46*                  Assessment/Plan     * Urinary retention- (present on admission)   Assessment & Plan    Due to neurogenic bladder s/p suprapubic catheter placement by urology on 6/21.        Acute hypoxemic respiratory failure (HCC)   Assessment & Plan    Patient requiring oxygen supplementation to maintain his saturation starting after his surgery on 6/23.  Chest xray consistent with atelectasis, supplemental oxgen, RT as needed  Improve clinical status and activity as able, when alert enough encourage IS use        Anemia- (present on admission)   Assessment & Plan    In setting of CKD, MDS with bone marrow suppression and gross hematuria.   Under 7 at 6.7 this morning, transfuse one unit PRBC, recheck hemoglobin  Surgical treatment for ongoing hematuria.        Elevated blood pressure reading without diagnosis of hypertension- (present on admission)   Assessment & Plan    Blood pressures on low side now, continue to monitor.        Chronic thrombocytopenia due to MDS (HCC)- (present on admission)   Assessment & Plan    Plts have been low since Sept 2017 and has been stable in the 30's.   Continued hematuria; more platelets given overnight now up to 118.  Patient going to OR, post op recheck CBC and give more platelets if under 100.  Consider using DDAVP if he has more post op bleeding would not use pre op for blood pressure.        Gross hematuria   Assessment & Plan    Sequelae of placement of suprapubic catheter with MDS history and low platelets.   Temporarily with rinaldi rinaldi catheter and suprapubic catheter, tolerated rinaldi clamp trial, removed then hematuria recurred.  Rinaldi replaced, CBI resumed overnight  Patient in ICU for close monitoring and nursing ratio  Going back to OR today with Dr. Joe          Hypokalemia- (present on admission)   Assessment & Plan    K down to 3.3 again in setting of hypomagnesemia.  Replace po and recheck         DNR (do not resuscitate)- (present on admission)   Assessment & Plan    Affirmed on admission.  POA and advanced directive reviewed.        CKD (chronic kidney disease), stage III- (present on admission)   Assessment & Plan    Stable, at baseline.  - continue to monitor  - avoid nephrotoxins, and renally dose all medications        Myelodysplastic syndrome (HCC)- (present on admission)   Assessment & Plan    - Continue to monitor blood counts, especially platelets in the setting of his recent gross hematuria.    - Management as above.        Parkinson's disease with dementia (HCC)- (present on admission)   Assessment & Plan    High risk for delirium given dementia. Frequent re-orientation, avoid or minimize narcotics/sedatives.    continue home Sinemet  - Continue Namenda and amantadine          Quality-Core Measures   Reviewed items::  Labs reviewed and Medications reviewed  Hurst catheter::  Neurogenic Bladder  DVT: held 2/2 low plts and anemia.  DVT prophylaxis - mechanical:  SCDs  Ulcer Prophylaxis::  Not indicated

## 2018-06-27 NOTE — PROGRESS NOTES
Urology    Continued bleed from bladder with high flow irrigation.  No clots irrigated.    Will take back to OR later today for  Cysto, clot evacuation and fulguration of bleeding.    Will discuss with wife.    Aditya Curry MD

## 2018-06-27 NOTE — PROGRESS NOTES
0700: Report received from night RN, POC discussed. CBI irrigation in place.    0800: Assessment completed. Lines and gtts verified. Dr. Joe, urology, at bedside, irrigated bladder. Per MD, NPO now and will schedule surgery for today. Cell phone number provided for wife to call him when she gets in. Pt turned and repositioned. Bed alarm on. NPO now.     0845: Pt's wife Elizabeth at bedside. Updated on care. Dr. Joe contact number provided to wife, wife spoke with MD about surgery.     0900: Dr. Solano at bedside, updated on care. Surgery today at noon. Per  when pt returns from surgery repeat CBC.     0940: Report given to Myriam in Pre-op. Pt scheduled for surgery today at noon. Consent signed by wife and on chart.

## 2018-06-27 NOTE — THERAPY
Occupational therapy-  Pt currently on service with OT.  Will hold today 2/2 pt getting transfusion and plans to go to OR today for surgery.  Will monitor and resume therapy as appropriate.

## 2018-06-27 NOTE — OR NURSING
Pt to Pre op from ICU.  Consent for surgery signed.  Wife at bedside.  CBI running, red urine in rinaldi.

## 2018-06-27 NOTE — CARE PLAN
Problem: Safety  Goal: Will remain free from falls    Intervention: Implement fall precautions  Bed in low position, pt near nurses station, hourly rounds, call light in reach and bed alarm on. Pt will remain free from falls.       Problem: Knowledge Deficit  Goal: Knowledge of disease process/condition, treatment plan, diagnostic tests, and medications will improve    Intervention: Explain information regarding disease process/condition, treatment plan, diagnostic tests, and medications and document in education  POC discussed with pt and pt's wife Elizabeth. Pt scheduled for surgery today at 1200. Wife verbalizes understanding.       Problem: Skin Integrity  Goal: Risk for impaired skin integrity will decrease    Intervention: Implement precautions to protect skin integrity in collaboration with the interdisciplinary team  Low airloss mattress in place, pt turned and repositioned every 2 hours and as needed, pillows for positioning, and silicone nasal canula in place.

## 2018-06-27 NOTE — PROGRESS NOTES
Transport pt to ICU via bed accompanied by 2 RN AND Pts wife. Continuous CBI, IVF and platelet on going. All pts belongings in pts bed with awareness of Elizabeth(pts wife).

## 2018-06-27 NOTE — PROGRESS NOTES
Pt down to Pre-op by bed with transport and RN Vanesa. Updated Myriam in pre-op that Dr. Solano had ordered x 1 unit PRBC that had been released but not started at this time. Pre-op to start blood. Chart with pt. Consents on chart. CBI irrigation in place. MIVF infusing.

## 2018-06-27 NOTE — PROGRESS NOTES
I was called in to evaluate for ongoing severe hematuria.    Labs stable, though PLT=69 earlier today.  Transfused platelets  INR pending    Hand irrigated 3L water, obtaining moderate clot via 24fr SPT.    16 rinaldi replaced with 20fr rinaldi.    CBI irrigation through SPT, out rinaldi, after SPT placed on traction.  Still intermittently cherry on brisk CBI.    Plan:  Relocate to ICU for higher level care  Transfuse PRBCs for sagging blood pressure, dropping HCT  Continue CBI for now, as cautery has not been successful in recent past, and platelets are relatively low.

## 2018-06-27 NOTE — PROGRESS NOTES
Irrigated catheter, suprapubic catheter flowing at time. Pt feels better at this time. CBI flowing with good flow

## 2018-06-27 NOTE — PROGRESS NOTES
Charge RN  9:50 PM - Dr. Temple contacted by primary ROSA Ruiz regarding continued bleeding and clots from catheter. Spoke with Dr. Temple and new orders received to pull and tape suprapubic catheter to gentle traction, hand irrigate catheter, obtain a stat CBC, and transfuse 1 unit platelets stat.     10:00 PM - Blood bank contacted for platelet transfusion. Lab called to bedside for stat draw.     10:30 PM - Dr. Temple at bedside. Platelet transfusion initiated. Pt's blood pressure prior to initiating transfusion 89/46. Catheter hand irrigated by Dr. Temple and rinaldi replaced. Traction re-taped by Dr. Temple. Orders for INR and to transfuse 1 unit PRBCs received if blood pressure remains low.     11:00 PM - Blood pressure improved to 129/75. Per Dr. Temple, hold RBC transfusion at this time unless blood pressure drops. CBI on full flow, catheter output pink tinged.     11:25 PM - ICU contacted for room assignment, pt going to room 318 with ROSA Núñez. Joseph to call and give report.

## 2018-06-28 LAB
ALBUMIN SERPL BCP-MCNC: 3.2 G/DL (ref 3.2–4.9)
ALBUMIN/GLOB SERPL: 1.2 G/DL
ALP SERPL-CCNC: 63 U/L (ref 30–99)
ALT SERPL-CCNC: <5 U/L (ref 2–50)
ANION GAP SERPL CALC-SCNC: 7 MMOL/L (ref 0–11.9)
AST SERPL-CCNC: 20 U/L (ref 12–45)
BASOPHILS # BLD AUTO: 0.3 % (ref 0–1.8)
BASOPHILS # BLD: 0.04 K/UL (ref 0–0.12)
BILIRUB SERPL-MCNC: 0.9 MG/DL (ref 0.1–1.5)
BUN SERPL-MCNC: 10 MG/DL (ref 8–22)
CALCIUM SERPL-MCNC: 7.3 MG/DL (ref 8.4–10.2)
CHLORIDE SERPL-SCNC: 105 MMOL/L (ref 96–112)
CO2 SERPL-SCNC: 24 MMOL/L (ref 20–33)
COMMENT 1642: NORMAL
CREAT SERPL-MCNC: 0.95 MG/DL (ref 0.5–1.4)
EOSINOPHIL # BLD AUTO: 0.38 K/UL (ref 0–0.51)
EOSINOPHIL NFR BLD: 3.3 % (ref 0–6.9)
ERYTHROCYTE [DISTWIDTH] IN BLOOD BY AUTOMATED COUNT: 50.9 FL (ref 35.9–50)
GLOBULIN SER CALC-MCNC: 2.6 G/DL (ref 1.9–3.5)
GLUCOSE SERPL-MCNC: 86 MG/DL (ref 65–99)
HCT VFR BLD AUTO: 29.2 % (ref 42–52)
HGB BLD-MCNC: 9.9 G/DL (ref 14–18)
IMM GRANULOCYTES # BLD AUTO: 0.11 K/UL (ref 0–0.11)
IMM GRANULOCYTES NFR BLD AUTO: 1 % (ref 0–0.9)
LYMPHOCYTES # BLD AUTO: 1.07 K/UL (ref 1–4.8)
LYMPHOCYTES NFR BLD: 9.3 % (ref 22–41)
MAGNESIUM SERPL-MCNC: 1.8 MG/DL (ref 1.5–2.5)
MCH RBC QN AUTO: 30.1 PG (ref 27–33)
MCHC RBC AUTO-ENTMCNC: 33.9 G/DL (ref 33.7–35.3)
MCV RBC AUTO: 88.8 FL (ref 81.4–97.8)
MONOCYTES # BLD AUTO: 4.13 K/UL (ref 0–0.85)
MONOCYTES NFR BLD AUTO: 36 % (ref 0–13.4)
NEUTROPHILS # BLD AUTO: 5.75 K/UL (ref 1.82–7.42)
NEUTROPHILS NFR BLD: 50.1 % (ref 44–72)
NRBC # BLD AUTO: 0.05 K/UL
NRBC BLD-RTO: 0.4 /100 WBC
PATH REV: NORMAL
PATH REV: NORMAL
PHOSPHATE SERPL-MCNC: 3.2 MG/DL (ref 2.5–4.5)
PLATELET # BLD AUTO: 136 K/UL (ref 164–446)
PMV BLD AUTO: 12.9 FL (ref 9–12.9)
POTASSIUM SERPL-SCNC: 3.6 MMOL/L (ref 3.6–5.5)
PROT SERPL-MCNC: 5.8 G/DL (ref 6–8.2)
RBC # BLD AUTO: 3.29 M/UL (ref 4.7–6.1)
SODIUM SERPL-SCNC: 136 MMOL/L (ref 135–145)
WBC # BLD AUTO: 11.5 K/UL (ref 4.8–10.8)

## 2018-06-28 PROCEDURE — 700102 HCHG RX REV CODE 250 W/ 637 OVERRIDE(OP): Performed by: HOSPITALIST

## 2018-06-28 PROCEDURE — 85025 COMPLETE CBC W/AUTO DIFF WBC: CPT

## 2018-06-28 PROCEDURE — 99233 SBSQ HOSP IP/OBS HIGH 50: CPT | Mod: GW | Performed by: HOSPITALIST

## 2018-06-28 PROCEDURE — 80053 COMPREHEN METABOLIC PANEL: CPT

## 2018-06-28 PROCEDURE — 770020 HCHG ROOM/CARE - TELE (206)

## 2018-06-28 PROCEDURE — 700102 HCHG RX REV CODE 250 W/ 637 OVERRIDE(OP): Performed by: INTERNAL MEDICINE

## 2018-06-28 PROCEDURE — 84100 ASSAY OF PHOSPHORUS: CPT

## 2018-06-28 PROCEDURE — 700105 HCHG RX REV CODE 258: Performed by: UROLOGY

## 2018-06-28 PROCEDURE — A9270 NON-COVERED ITEM OR SERVICE: HCPCS | Performed by: HOSPITALIST

## 2018-06-28 PROCEDURE — 83735 ASSAY OF MAGNESIUM: CPT

## 2018-06-28 PROCEDURE — 700102 HCHG RX REV CODE 250 W/ 637 OVERRIDE(OP): Performed by: UROLOGY

## 2018-06-28 PROCEDURE — A9270 NON-COVERED ITEM OR SERVICE: HCPCS | Performed by: UROLOGY

## 2018-06-28 PROCEDURE — A9270 NON-COVERED ITEM OR SERVICE: HCPCS | Performed by: INTERNAL MEDICINE

## 2018-06-28 RX ORDER — POLYETHYLENE GLYCOL 3350 17 G/17G
1 POWDER, FOR SOLUTION ORAL
Status: DISCONTINUED | OUTPATIENT
Start: 2018-06-28 | End: 2018-07-11 | Stop reason: HOSPADM

## 2018-06-28 RX ORDER — AMOXICILLIN 250 MG
2 CAPSULE ORAL 2 TIMES DAILY
Status: DISCONTINUED | OUTPATIENT
Start: 2018-06-28 | End: 2018-07-11 | Stop reason: HOSPADM

## 2018-06-28 RX ORDER — BISACODYL 10 MG
10 SUPPOSITORY, RECTAL RECTAL
Status: DISCONTINUED | OUTPATIENT
Start: 2018-06-28 | End: 2018-07-11 | Stop reason: HOSPADM

## 2018-06-28 RX ADMIN — SODIUM CHLORIDE, POTASSIUM CHLORIDE, SODIUM LACTATE AND CALCIUM CHLORIDE: 600; 310; 30; 20 INJECTION, SOLUTION INTRAVENOUS at 00:08

## 2018-06-28 RX ADMIN — OXYBUTYNIN CHLORIDE 5 MG: 5 TABLET ORAL at 09:12

## 2018-06-28 RX ADMIN — SODIUM CHLORIDE 1000 ML: 9 INJECTION, SOLUTION INTRAVENOUS at 13:19

## 2018-06-28 RX ADMIN — CITALOPRAM HYDROBROMIDE 20 MG: 20 TABLET ORAL at 09:12

## 2018-06-28 RX ADMIN — OXYBUTYNIN CHLORIDE 5 MG: 5 TABLET ORAL at 15:22

## 2018-06-28 RX ADMIN — MEMANTINE HYDROCHLORIDE 10 MG: 10 TABLET ORAL at 09:12

## 2018-06-28 RX ADMIN — CEFUROXIME AXETIL 250 MG: 250 TABLET ORAL at 09:12

## 2018-06-28 RX ADMIN — AMANTADINE HYDROCHLORIDE 100 MG: 100 CAPSULE ORAL at 09:12

## 2018-06-28 RX ADMIN — SENNOSIDES AND DOCUSATE SODIUM 2 TABLET: 8.6; 5 TABLET ORAL at 09:12

## 2018-06-28 ASSESSMENT — PAIN SCALES - GENERAL: PAINLEVEL_OUTOF10: 0

## 2018-06-28 NOTE — OR NURSING
Dr. Joe called and given update on most recent H&H result, urine color. No new orders received.  Dr Randi Amador to be here in 20min.

## 2018-06-28 NOTE — OR SURGEON
Immediate Post OP Note    PreOp Diagnosis: urinary clot retention    PostOp Diagnosis: same    Procedure(s):  CYSTOSCOPY, Evacuation of clots, fulgeration of bladder - Wound Class: Clean    Surgeon(s):  Aditya Joe M.D.    Anesthesiologist/Type of Anesthesia:  Anesthesiologist: Dionisio Rai M.D./General    Surgical Staff:  Circulator: Teddy Duran R.N.  Relief Circulator: Galilea Beltran R.N.  Relief Scrub: Calixto Sanchez  Scrub Person: Handy Chong    Specimens removed if any:  * No specimens in log *    Estimated Blood Loss: 300 cc clots    Findings: Large amount of clot.  Bleeding at dome of bladder from dome    Complications: none.  No bleeding at end of procedure  Drainage clear.        6/27/2018 8:09 PM Aditya Joe M.D.

## 2018-06-28 NOTE — OR NURSING
1803- Dr Rai in pre op at bedside, see order for RBC's. Pt's wife at bedside. Report off to Mehdi LEE.

## 2018-06-28 NOTE — OR NURSING
2005 To PACU from OR via bed, sleeping, respirations spontaneous and non-labored via OPA. Suprapubic and penile catheter in place, CBI infusing, output clear.  2020 No changes. Lab draw performed.  2026 Pt rouses to verbal stimuli, OPA removed. Pt's wife observed pt, then left for the night.  2035 No changes. Dr Rai notified of pt's H&H, 1 unit of RBCs ordered.  2050 No changes.  2100 Report to ROSA Welch.

## 2018-06-28 NOTE — PROGRESS NOTES
Report received from ROSA Welch. Pt resting comfortably in bed, in no apparent distress.   Assisted to chair x 2 assist for breakfast. Very weak with transfers. Call light within reach. CBI in place, yellow urine without any visible blood in collection bag. Pt given AM meds and instructed to call RN with any further needs. Will continue with care.

## 2018-06-28 NOTE — PROGRESS NOTES
"Urology Progress Note    Post op Day # 1    Cystoscopy with evacuation of clots and fulguration of areas of   bleeding and bladder- Clot retention    Interval Events: pt urine is clear today.  Pt looks comfortable    S: No fevers, chills, nausea or vomiting + flatus. Pt nods his head no to chest pain or sob    O:   Blood pressure 140/90, pulse 89, temperature 36.7 °C (98 °F), resp. rate 12, height 1.905 m (6' 3\"), weight 81.1 kg (178 lb 12.7 oz), SpO2 92 %.  Recent Labs      06/26/18   0451  06/27/18   0325  06/28/18   0310   SODIUM  138  136  136   POTASSIUM  3.3*  3.5*  3.6   CHLORIDE  105  105  105   CO2  26 27  24   GLUCOSE  92  120*  86   BUN  7*  11  10   CREATININE  0.98  1.07  0.95   CALCIUM  8.0*  8.1*  7.3*     Recent Labs      06/26/18   2223  06/27/18   0325  06/27/18   0850  06/27/18   1615  06/27/18 2015   WBC  12.5*  14.3*   --   10.7   --    RBC  2.44*  2.28*   --   2.50*   --    HEMOGLOBIN  7.2*  6.7*  7.2*  7.6*  7.5*   HEMATOCRIT  22.4*  20.9*  22.2*  22.6*  22.4*   MCV  91.8  91.7   --   90.4   --    MCH  29.5  29.4   --   30.4   --    MCHC  32.1*  32.1*   --   33.6*   --    RDW  57.0*  56.6*   --   53.2*   --    PLATELETCT  81*  118*   --   104*   --    MPV  13.2*  13.1*   --   12.3   --          Intake/Output Summary (Last 24 hours) at 06/28/18 0817  Last data filed at 06/28/18 0800   Gross per 24 hour   Intake         58500.33 ml   Output            29169 ml   Net          2370.33 ml       Physical Exam   Constitutional: He appears well-developed. He appears more awake today.  No distress.   HENT:   Head: Normocephalic.   Mouth/Throat: Oropharynx is clear and moist.   Eyes: Conjunctivae are normal. Right eye exhibits no discharge. Left eye exhibits no discharge. No scleral icterus.   Neck: No JVD present.   Cardiovascular: Normal rate, regular rhythm and intact distal pulses.    Pulmonary/Chest: Effort normal. No stridor. No respiratory distress.   Abdominal: Soft. Bowel sounds are normal. " He exhibits no distension. There is no tenderness.   Genitourinary:   Genitourinary Comments: Rinaldi and suprapubic catheter in place with clear yellow urine.  CBI infusing via rinaldi catheter and draining from SP tube.   Musculoskeletal: He exhibits no edema.   Neurological: He appears more awake today. Follows simple commands, weak voice. He exhibits abnormal muscle tone. Lethargic, can nod yes/no   Skin: Skin is warm and dry. He is not diaphoretic. There is pallor.   Suprapubic cath in place with bandage surrounding it, no erythema    Psychiatric: Cognition and memory are impaired.   Nursing note and vitals reviewed.     A/P:    Active Hospital Problems    Diagnosis   • Acute hypoxemic respiratory failure (HCC) [J96.01]     Priority: High   • Urinary retention [R33.9]     Priority: High   • Anemia [D64.9]     Priority: Medium   • Chronic thrombocytopenia due to MDS (HCC) [D69.6]     Priority: Medium   • Elevated blood pressure reading without diagnosis of hypertension [R03.0]     Priority: Medium   • CKD (chronic kidney disease), stage III [N18.3]     Priority: Low   • Myelodysplastic syndrome (HCC) [D46.9]     Priority: Low   • Parkinson's disease with dementia (HCC) [G20]     Priority: Low   • Gross hematuria [R31.0]   • Hypokalemia [E87.6]   • DNR (do not resuscitate) [Z66]       Continue CBI at a slow rate until tomorrow.  If pt has any sings of hematuria we will start Alum bladder irrigation.  We will reevaluate tomorrow am.  Plan for pt to dc home/SNF with Rinaldi cath and S/P tube.  Plan will be to dc S/P tube in one month in our office.      Discussed plan of Care with MD Joe.  All plan of care directed by MD Joe.  Discussed case with RN, MD.    NINA Mcknight.

## 2018-06-28 NOTE — PROGRESS NOTES
Pt's wife at bedside; POC discussed. Wife assisting patient to eat snack. Pt remains resting comfortably. Will continue with care.

## 2018-06-28 NOTE — CARE PLAN
Problem: Urinary Elimination:  Goal: Ability to reestablish a normal urinary elimination pattern will improve  Outcome: PROGRESSING SLOWER THAN EXPECTED  Patient has CBI with urethral inlet and suprapubic outlet.  CBI flow is pale in color and patent.  No clots observed throughout shift.  Will continue to monitor closely as patient has multiple procedures to evacuate clots.    Problem: Skin Integrity  Goal: Skin Integrity is maintained or improved  Outcome: PROGRESSING AS EXPECTED  Patient as tear / abrasion to left upper thigh.  Wound is open to air and appears to be healing.  Jett Score of 13 remains a concern.  Will discuss potential for nutrition consult with AM RN.    Problem: Risk of Aspiration  Goal: Absence of aspiration    Intervention: NPO until medically cleared  Patient is not alert enough post-op to swallow medications.      Problem: Hemodynamic Status  Goal: Vital Signs and Fluid Balance Management  Patient hypertensive throughout shift (see vitals).  PRN hydralazine not needed as SBP < 180.  ICU monitoring in place.

## 2018-06-28 NOTE — PROGRESS NOTES
Patient transferred to ICU.  Catheters inspected - verified suprapubic output, urethral input.  CBI - pale in color with no evidence of blood / blood clots.  VS stable.  Patient is AO x 0 and lethargic.  Will hold PO medications until patient is alert enough to swallow them.  Received information of additional unit of PRBCs ordered from PACU ROSA Hall.  Will continue to monitor and coordinate care.

## 2018-06-29 LAB
ANION GAP SERPL CALC-SCNC: 6 MMOL/L (ref 0–11.9)
ANISOCYTOSIS BLD QL SMEAR: ABNORMAL
ANISOCYTOSIS BLD QL SMEAR: ABNORMAL
BASOPHILS # BLD AUTO: 0 % (ref 0–1.8)
BASOPHILS # BLD AUTO: 0 % (ref 0–1.8)
BASOPHILS # BLD: 0 K/UL (ref 0–0.12)
BASOPHILS # BLD: 0 K/UL (ref 0–0.12)
BLASTS NFR BLD MANUAL: 1 %
BLASTS NFR BLD MANUAL: 2 %
BUN SERPL-MCNC: 8 MG/DL (ref 8–22)
CALCIUM SERPL-MCNC: 7.7 MG/DL (ref 8.4–10.2)
CFT BLD TEG: 5 MIN (ref 5–10)
CHLORIDE SERPL-SCNC: 106 MMOL/L (ref 96–112)
CLOT ANGLE BLD TEG: 72.8 DEGREES (ref 53–72)
CLOT LYSIS 30M P MA LENFR BLD TEG: 0 % (ref 0–8)
CO2 SERPL-SCNC: 26 MMOL/L (ref 20–33)
CREAT SERPL-MCNC: 1.07 MG/DL (ref 0.5–1.4)
CT.EXTRINSIC BLD ROTEM: 1.2 MIN (ref 1–3)
EKG IMPRESSION: NORMAL
EOSINOPHIL # BLD AUTO: 0.28 K/UL (ref 0–0.51)
EOSINOPHIL # BLD AUTO: 0.38 K/UL (ref 0–0.51)
EOSINOPHIL NFR BLD: 3 % (ref 0–6.9)
EOSINOPHIL NFR BLD: 4 % (ref 0–6.9)
ERYTHROCYTE [DISTWIDTH] IN BLOOD BY AUTOMATED COUNT: 52.4 FL (ref 35.9–50)
ERYTHROCYTE [DISTWIDTH] IN BLOOD BY AUTOMATED COUNT: 53.6 FL (ref 35.9–50)
GIANT PLATELETS BLD QL SMEAR: NORMAL
GLUCOSE SERPL-MCNC: 86 MG/DL (ref 65–99)
HCT VFR BLD AUTO: 25.1 % (ref 42–52)
HCT VFR BLD AUTO: 25.2 % (ref 42–52)
HGB BLD-MCNC: 8.3 G/DL (ref 14–18)
HGB BLD-MCNC: 8.6 G/DL (ref 14–18)
INR PPP: 1.37 (ref 0.87–1.13)
LYMPHOCYTES # BLD AUTO: 0.96 K/UL (ref 1–4.8)
LYMPHOCYTES # BLD AUTO: 1.21 K/UL (ref 1–4.8)
LYMPHOCYTES NFR BLD: 10 % (ref 22–41)
LYMPHOCYTES NFR BLD: 13 % (ref 22–41)
MACROCYTES BLD QL SMEAR: ABNORMAL
MANUAL DIFF BLD: NORMAL
MANUAL DIFF BLD: NORMAL
MCF BLD TEG: 70.4 MM (ref 50–70)
MCH RBC QN AUTO: 29.7 PG (ref 27–33)
MCH RBC QN AUTO: 30.5 PG (ref 27–33)
MCHC RBC AUTO-ENTMCNC: 33.1 G/DL (ref 33.7–35.3)
MCHC RBC AUTO-ENTMCNC: 34.1 G/DL (ref 33.7–35.3)
MCV RBC AUTO: 89.4 FL (ref 81.4–97.8)
MCV RBC AUTO: 90 FL (ref 81.4–97.8)
METAMYELOCYTES NFR BLD MANUAL: 1 %
MONOCYTES # BLD AUTO: 2.23 K/UL (ref 0–0.85)
MONOCYTES # BLD AUTO: 3.07 K/UL (ref 0–0.85)
MONOCYTES NFR BLD AUTO: 24 % (ref 0–13.4)
MONOCYTES NFR BLD AUTO: 32 % (ref 0–13.4)
NEUTROPHILS # BLD AUTO: 4.99 K/UL (ref 1.82–7.42)
NEUTROPHILS # BLD AUTO: 5.39 K/UL (ref 1.82–7.42)
NEUTROPHILS NFR BLD: 52 % (ref 44–72)
NEUTROPHILS NFR BLD: 58 % (ref 44–72)
NRBC # BLD AUTO: 0.03 K/UL
NRBC # BLD AUTO: 0.03 K/UL
NRBC BLD-RTO: 0.3 /100 WBC
NRBC BLD-RTO: 0.3 /100 WBC
PLATELET # BLD AUTO: 128 K/UL (ref 164–446)
PLATELET # BLD AUTO: 137 K/UL (ref 164–446)
PLATELET BLD QL SMEAR: NORMAL
PLATELET BLD QL SMEAR: NORMAL
PMV BLD AUTO: 12.4 FL (ref 9–12.9)
PMV BLD AUTO: 13.3 FL (ref 9–12.9)
POLYCHROMASIA BLD QL SMEAR: NORMAL
POLYCHROMASIA BLD QL SMEAR: NORMAL
POTASSIUM SERPL-SCNC: 3.3 MMOL/L (ref 3.6–5.5)
PROTHROMBIN TIME: 16.7 SEC (ref 12–14.6)
RBC # BLD AUTO: 2.79 M/UL (ref 4.7–6.1)
RBC # BLD AUTO: 2.82 M/UL (ref 4.7–6.1)
RBC BLD AUTO: PRESENT
RBC BLD AUTO: PRESENT
SMUDGE CELLS BLD QL SMEAR: NORMAL
SODIUM SERPL-SCNC: 138 MMOL/L (ref 135–145)
TEG ALGORITHM TGALG: ABNORMAL
WBC # BLD AUTO: 9.3 K/UL (ref 4.8–10.8)
WBC # BLD AUTO: 9.6 K/UL (ref 4.8–10.8)

## 2018-06-29 PROCEDURE — 85610 PROTHROMBIN TIME: CPT

## 2018-06-29 PROCEDURE — 160028 HCHG SURGERY MINUTES - 1ST 30 MINS LEVEL 3: Performed by: UROLOGY

## 2018-06-29 PROCEDURE — 93005 ELECTROCARDIOGRAM TRACING: CPT | Performed by: UROLOGY

## 2018-06-29 PROCEDURE — A9270 NON-COVERED ITEM OR SERVICE: HCPCS | Performed by: INTERNAL MEDICINE

## 2018-06-29 PROCEDURE — A9270 NON-COVERED ITEM OR SERVICE: HCPCS | Performed by: HOSPITALIST

## 2018-06-29 PROCEDURE — 700111 HCHG RX REV CODE 636 W/ 250 OVERRIDE (IP)

## 2018-06-29 PROCEDURE — 700105 HCHG RX REV CODE 258: Performed by: HOSPITALIST

## 2018-06-29 PROCEDURE — 93010 ELECTROCARDIOGRAM REPORT: CPT | Performed by: INTERNAL MEDICINE

## 2018-06-29 PROCEDURE — 700102 HCHG RX REV CODE 250 W/ 637 OVERRIDE(OP): Performed by: INTERNAL MEDICINE

## 2018-06-29 PROCEDURE — 500880 HCHG PACK, CYSTO W/SEP LEGGINGS: Performed by: UROLOGY

## 2018-06-29 PROCEDURE — 80048 BASIC METABOLIC PNL TOTAL CA: CPT

## 2018-06-29 PROCEDURE — 85347 COAGULATION TIME ACTIVATED: CPT

## 2018-06-29 PROCEDURE — 0W3R8ZZ CONTROL BLEEDING IN GENITOURINARY TRACT, VIA NATURAL OR ARTIFICIAL OPENING ENDOSCOPIC: ICD-10-PCS | Performed by: UROLOGY

## 2018-06-29 PROCEDURE — A4338 INDWELLING CATHETER LATEX: HCPCS | Performed by: UROLOGY

## 2018-06-29 PROCEDURE — 160039 HCHG SURGERY MINUTES - EA ADDL 1 MIN LEVEL 3: Performed by: UROLOGY

## 2018-06-29 PROCEDURE — 500509 HCHG ELECTRODE, ROLLER: Performed by: UROLOGY

## 2018-06-29 PROCEDURE — A9270 NON-COVERED ITEM OR SERVICE: HCPCS | Performed by: UROLOGY

## 2018-06-29 PROCEDURE — 770020 HCHG ROOM/CARE - TELE (206)

## 2018-06-29 PROCEDURE — 160048 HCHG OR STATISTICAL LEVEL 1-5: Performed by: UROLOGY

## 2018-06-29 PROCEDURE — 501329 HCHG SET, CYSTO IRRIG Y TUR: Performed by: UROLOGY

## 2018-06-29 PROCEDURE — 160036 HCHG PACU - EA ADDL 30 MINS PHASE I: Performed by: UROLOGY

## 2018-06-29 PROCEDURE — 160002 HCHG RECOVERY MINUTES (STAT): Performed by: UROLOGY

## 2018-06-29 PROCEDURE — 85007 BL SMEAR W/DIFF WBC COUNT: CPT

## 2018-06-29 PROCEDURE — 85384 FIBRINOGEN ACTIVITY: CPT

## 2018-06-29 PROCEDURE — 160035 HCHG PACU - 1ST 60 MINS PHASE I: Performed by: UROLOGY

## 2018-06-29 PROCEDURE — 160009 HCHG ANES TIME/MIN: Performed by: UROLOGY

## 2018-06-29 PROCEDURE — 99233 SBSQ HOSP IP/OBS HIGH 50: CPT | Mod: GW | Performed by: HOSPITALIST

## 2018-06-29 PROCEDURE — 700102 HCHG RX REV CODE 250 W/ 637 OVERRIDE(OP): Performed by: HOSPITALIST

## 2018-06-29 PROCEDURE — 85576 BLOOD PLATELET AGGREGATION: CPT

## 2018-06-29 PROCEDURE — 3E0K3GC INTRODUCTION OF OTHER THERAPEUTIC SUBSTANCE INTO GENITOURINARY TRACT, PERCUTANEOUS APPROACH: ICD-10-PCS | Performed by: UROLOGY

## 2018-06-29 PROCEDURE — 700111 HCHG RX REV CODE 636 W/ 250 OVERRIDE (IP): Performed by: HOSPITALIST

## 2018-06-29 PROCEDURE — 700102 HCHG RX REV CODE 250 W/ 637 OVERRIDE(OP): Performed by: UROLOGY

## 2018-06-29 PROCEDURE — 700111 HCHG RX REV CODE 636 W/ 250 OVERRIDE (IP): Performed by: INTERNAL MEDICINE

## 2018-06-29 PROCEDURE — 85027 COMPLETE CBC AUTOMATED: CPT

## 2018-06-29 RX ORDER — HYDROCODONE BITARTRATE AND ACETAMINOPHEN 5; 325 MG/1; MG/1
1-2 TABLET ORAL EVERY 6 HOURS PRN
Status: DISCONTINUED | OUTPATIENT
Start: 2018-06-29 | End: 2018-07-05

## 2018-06-29 RX ORDER — LORAZEPAM 0.5 MG/1
0.5 TABLET ORAL EVERY 6 HOURS PRN
Status: DISCONTINUED | OUTPATIENT
Start: 2018-06-29 | End: 2018-07-05

## 2018-06-29 RX ORDER — BISACODYL 10 MG
10 SUPPOSITORY, RECTAL RECTAL
Status: DISCONTINUED | OUTPATIENT
Start: 2018-06-29 | End: 2018-07-08

## 2018-06-29 RX ADMIN — CEFUROXIME AXETIL 250 MG: 250 TABLET ORAL at 19:28

## 2018-06-29 RX ADMIN — SENNOSIDES AND DOCUSATE SODIUM 2 TABLET: 8.6; 5 TABLET ORAL at 19:34

## 2018-06-29 RX ADMIN — OXYBUTYNIN CHLORIDE 5 MG: 5 TABLET ORAL at 19:33

## 2018-06-29 RX ADMIN — DESMOPRESSIN ACETATE 8.11 MCG: 4 INJECTION INTRAVENOUS at 17:01

## 2018-06-29 RX ADMIN — MAGNESIUM HYDROXIDE 30 ML: 400 SUSPENSION ORAL at 19:33

## 2018-06-29 RX ADMIN — HYDRALAZINE HYDROCHLORIDE 10 MG: 20 INJECTION INTRAMUSCULAR; INTRAVENOUS at 07:53

## 2018-06-29 RX ADMIN — DESMOPRESSIN ACETATE 8.11 MCG: 4 INJECTION INTRAVENOUS at 08:23

## 2018-06-29 RX ADMIN — MEMANTINE HYDROCHLORIDE 10 MG: 10 TABLET ORAL at 19:32

## 2018-06-29 RX ADMIN — AMANTADINE HYDROCHLORIDE 100 MG: 100 CAPSULE ORAL at 19:33

## 2018-06-29 RX ADMIN — CARBIDOPA AND LEVODOPA 1 TABLET: 50; 200 TABLET, EXTENDED RELEASE ORAL at 19:30

## 2018-06-29 ASSESSMENT — PAIN SCALES - GENERAL
PAINLEVEL_OUTOF10: 0
PAINLEVEL_OUTOF10: 0
PAINLEVEL_OUTOF10: ASSUMED PAIN PRESENT
PAINLEVEL_OUTOF10: 0
PAINLEVEL_OUTOF10: ASSUMED PAIN PRESENT
PAINLEVEL_OUTOF10: ASSUMED PAIN PRESENT

## 2018-06-29 NOTE — PROGRESS NOTES
Urology    Recurrent hematuria despite irrigation.  Will take back to OR for cysto, clot evacuation, fulguration of bladder and formalin instillation.  Discussed with spouse and she agrees to procedure.  Risks and benefits discussed.    Aditya Curry MD

## 2018-06-29 NOTE — PROGRESS NOTES
Received bedside report from Ladonna LEE.  Patient resting in bed with wife at bedside and does not appear to be in distress or discomfort at this time.  Questions / concerns addressed.  POC reviewed, gtts verified, and safety protocols in place.  CBI output appears pale yellow without evidence of clots.

## 2018-06-29 NOTE — PROGRESS NOTES
Patient has been oriented to self only throughout beginning of shift with significant fatigue / lethargy.  Despite several attempts made at different times, patient was unable to safely swallow any PO medications.  Received information in report that patient passed RN swallow evaluation during the daytime.  Patient was gurgling even small amounts of water (5 mL) given during this shift.  This is the second night in a row the patient has not had any nighttime meds for the same reason.  Will discuss with AM RN and suggest rescheduling PM meds to earlier in the evening to avoid this issue.

## 2018-06-29 NOTE — PROGRESS NOTES
Report received from Yuri at 0700. APN at bedside to see patient. Updates given. CBI flowing briskly with light yellow pinkish urine outflow. Pt alert and oriented to self only. Medicated per MAR PRN hydralazine for hypertension. Pt remains resting comfortably and in no apparent distress at this time. Will continue with care.

## 2018-06-29 NOTE — PROGRESS NOTES
Communicated with Dr. Joe over the telephone about change in CBI output (flow maxed out to maintain patency and jose armando red blood).  Orders to make NPO and communicate with hospitalist regarding Hgb.  Will monitor closely.

## 2018-06-29 NOTE — OR SURGEON
Immediate Post OP Note    PreOp Diagnosis: Recurrent bleeding from baldder    PostOp Diagnosis: same     Procedure(s):  CYSTOSCOPY-EVACUATION OF CLOTS, INSTILATION OF FORAMLIN, FULGERATION OF BLADDER - Wound Class: Contaminated    Surgeon(s):  Aditya Joe M.D.    Anesthesiologist/Type of Anesthesia:  Anesthesiologist: Asael Lee M.D./General    Surgical Staff:  Circulator: Meredith Mcmillan R.N.  Scrub Person: Calixto Sanchez    Specimens removed if any:  * No specimens in log *    Estimated Blood Loss: minimal    Findings: Minimal clot in bladder.  Minor ooze from open area at dome.  bleeding from small area L lateral wall.. No bleeding after formalin instillation    Complications: none  Stable to PACU        6/29/2018 1:46 PM Aditya Joe M.D.

## 2018-06-29 NOTE — PROGRESS NOTES
Pt back to room from OR via hospital bed. CBI draining clear straw urine without any blood or obstruction of outflow of urine. VSS and pt in no apparent distress. Will continue with care.

## 2018-06-29 NOTE — PROGRESS NOTES
"Urology Progress Note    Post op Day # 2     Cystoscopy with evacuation of clots and fulguration of areas of   bleeding and bladder- Clot retention     Interval Events: pt urine is bloody  clear today (pink tinged)  CBI restarted.  RN stated pt did have some clots that needed irrigation.  Currently Pt looks comfortable. Unable to  [place] with pt due to pt illness     S: RN reports no fevers, chills, nausea or vomiting. +  flatus.    O:   Blood pressure 140/90, pulse (!) 49, temperature 36.1 °C (97 °F), resp. rate 12, height 1.905 m (6' 3\"), weight 81.1 kg (178 lb 12.7 oz), SpO2 94 %.  Recent Labs      06/27/18   0325  06/28/18   0310  06/29/18   0430   SODIUM  136  136  138   POTASSIUM  3.5*  3.6  3.3*   CHLORIDE  105  105  106   CO2  27  24  26   GLUCOSE  120*  86  86   BUN  11  10  8   CREATININE  1.07  0.95  1.07   CALCIUM  8.1*  7.3*  7.7*     Recent Labs      06/27/18   1615  06/27/18 2015 06/28/18   0927  06/29/18   0430   WBC  10.7   --   11.5*  9.3   RBC  2.50*   --   3.29*  2.79*   HEMOGLOBIN  7.6*  7.5*  9.9*  8.3*   HEMATOCRIT  22.6*  22.4*  29.2*  25.1*   MCV  90.4   --   88.8  90.0   MCH  30.4   --   30.1  29.7   MCHC  33.6*   --   33.9  33.1*   RDW  53.2*   --   50.9*  53.6*   PLATELETCT  104*   --   136*  128*   MPV  12.3   --   12.9  13.3*         Intake/Output Summary (Last 24 hours) at 06/29/18 0733  Last data filed at 06/29/18 0600   Gross per 24 hour   Intake            12670 ml   Output            99592 ml   Net            -2630 ml     Physical Exam   Constitutional: He appears well-developed. He appears more awake today No distress.   HENT:   Head: Normocephalic.   Mouth/Throat: Oropharynx is clear and moist.   Eyes: Conjunctivae are normal. Right eye exhibits no discharge. Left eye exhibits no discharge. No scleral icterus.   Neck: No JVD present.   Cardiovascular: Normal rate, regular rhythm and intact distal pulses.    Pulmonary/Chest: Effort normal. No stridor. No respiratory distress. "   Abdominal: Soft. Bowel sounds are normal. He exhibits no distension. There is no tenderness.   Genitourinary:   Genitourinary Comments: Hurst and suprapubic catheter with CBI infusing.  Urine clear light pink   Musculoskeletal: He exhibits no edema.   Neurological: He appears  He exhibits abnormal muscle tone.   Lethargic, can nod yes/no   Skin: Skin is warm and dry. He is not diaphoretic. There is pallor.   Suprapubic cath in place with bandage surrounding it, no erythema    Psychiatric: Cognition and memory are impaired.   Nursing note and vitals reviewed.    A/P:    Active Hospital Problems    Diagnosis   • Acute hypoxemic respiratory failure (HCC) [J96.01]     Priority: High   • Urinary retention [R33.9]     Priority: High   • Anemia [D64.9]     Priority: Medium   • Chronic thrombocytopenia due to MDS (HCC) [D69.6]     Priority: Medium   • Elevated blood pressure reading without diagnosis of hypertension [R03.0]     Priority: Medium   • CKD (chronic kidney disease), stage III [N18.3]     Priority: Low   • Myelodysplastic syndrome (HCC) [D46.9]     Priority: Low   • Parkinson's disease with dementia (HCC) [G20]     Priority: Low   • Gross hematuria [R31.0]   • Hypokalemia [E87.6]   • DNR (do not resuscitate) [Z66]     NPO for surgery.    OR today at 1700 with MD Joe for clot evacuation bladder instillation of formaldehyde.    Continue CBI at a slow rate.     Discussed plan of Care with MD Joe.  All plan of care directed by MD Joe.  Discussed case with RN, MD.  FELIZ Mcknight

## 2018-06-29 NOTE — OR NURSING
1351 received from or  Oral airway dc'd  resp spont suprapubic catheter intact  Dressing c/d/I  cbi infusing  Very light pink  Dr Amador here to check on pt    cbi slowed per instructions

## 2018-06-29 NOTE — PROGRESS NOTES
Bright, bloody urine outflow from suprapubic catheter CBI. Bag of CBI NS titrated to allow for greater flow of bloody urine. Large amount of thick, clotted bloody drainage leaking from rinaldi penile catheter site.  Dr. Solano notified; lab orders received. Call to Dr. Joe's answering service to notify, awaiting callback.

## 2018-06-29 NOTE — OP REPORT
DATE OF SERVICE:  06/29/2018    PREOPERATIVE DIAGNOSIS:  Recurrent bleeding from bladder.    POSTOPERATIVE DIAGNOSIS:  Recurrent bleeding from bladder.    PROCEDURE:  Cystoscopy with evacuation of clots, fulguration of areas of   bleeding and instillation of formalin 5%.    ANESTHESIA:  General.    ANESTHESIOLOGIST:  Asael Lee MD    SURGEON:  Aditya Joe MD    BRIEF HISTORY:  This is a 77-year-old male who suffers from dementia and   parkinsonism has had a Hurst catheter in place.  Approximately a little over a   week ago, I placed a suprapubic catheter with through technique.  He is known   to have low platelets and at that time we got 1 unit of platelets preop.    Immediately afterwards, he developed bleeding and we started on irrigation.    Unfortunately, he developed more and more clots.  He was taken to the   operating room over the weekend for clot away evacuation and fulguration.    This problem recurred and he was taken to the operating room 2 days ago.    Postop, he was perfectly clear yesterday, but received a call this morning, he   started bleeding again in the middle of the night.  He now presents for   cystoscopy, fulguration of any venous bleeding with clot evacuation and   instillation of formalin into bladder.    REPORT OF OPERATION:  Under general anesthesia with the patient in the   lithotomy position on the operating room table, the genitalia were prepped and   draped in a sterile manner.  The old catheter had been removed.  The   resectoscope was passed into the bladder without difficulty.  Examination of   the bladder did show a few clots.  There were some new clots, but also some   old clots up at the dome where it had prior bleeding.  These were irrigated   out of the bladder with a Neel syringe.  At this point, examination showed   really minimal oozing from this area.  However, I did find 1 spot on the left   lateral wall that was oozing a little more briskly.  This was  cauterized.  The   edges of this raw area at the dome were cauterized again and at this point,   there was no real obvious bleeding seen.  We looked in the prostatic urethra,   and there was minor oozing from the areas of the prostatic urethra.  At this   point, after all clots had been irrigated out and I did not see any other   areas to cauterize, I went ahead and drained his bladder.  The resectoscope   was removed.  A 22-Ethiopian 3-way catheter was passed into the bladder and about   14 mL placed in the balloon.  The bladder was totally drained at this point.    The suprapubic catheter had been clamped.  At this point, a solution of 75 mL   of 10% formalin mixed with approximately 105 mL of sterile water was then   mixed together and this was then dripped into the bladder through the   suprapubic using a Neel syringe.  This was then held in place for 20   minutes.  At this point, after 20 minutes the formalin was then drained and   disposed properly.  The bladder was then irrigated several times with   irrigation fluid and this was drained and placed in the disposal bag.  At this   point, the catheter was removed.  The cystoscope was reintroduced into the   bladder.  At this point, there were really no obvious areas of bleeding.  All   areas looked okay.  At this point, a 22-Ethiopian 3-way catheter was replaced   into the bladder.  The suprapubic catheter was unclamped, and at this point,   the continuous irrigation was restarted.  The patient was then cleaned up,   woken up, and transferred to the PACU in stable condition.  A new dressing was   placed around the suprapubic tube catheter.       ____________________________________     MD ARBEN VELAZQUEZ / ABISAI    DD:  06/29/2018 13:53:14  DT:  06/29/2018 14:07:55    D#:  [id number]  Job#:  [id number]

## 2018-06-29 NOTE — PROGRESS NOTES
Report to pre-op RN. Pt to go to OR this afternoon and OR staff is working on moving up procedure. Left message for pt's wife Elizabeth to call RN back at #[phone].

## 2018-06-29 NOTE — CARE PLAN
Problem: Safety  Goal: Will remain free from injury  Outcome: PROGRESSING AS EXPECTED      Problem: Urinary Elimination:  Goal: Ability to reestablish a normal urinary elimination pattern will improve  Outcome: PROGRESSING SLOWER THAN EXPECTED  Multiple trips to OR for urological procedures.  CBI output is pale and without evidence of clots.  ICU I/Os in place.

## 2018-06-29 NOTE — THERAPY
SPEECH THERAPY NOTE  Hold bedside swallow evaluation today per RN recommendation. Patient was on Dysphagia 3/thin liquid diet, but failed dysphagia screen last night per RN note. Patient is currently NPO for surgery scheduled for [facility]. Speech therapy will follow up with patient and RN Saturday to check plan of care and determine if patient can participate in swallow evaluation. Thank you.

## 2018-06-29 NOTE — OR NURSING
1425: Report rec'd. Pt is sleeping. Breathing is spontaneous and unlabored. CBI output is clear with only the slightest tinge of blood.  1450: Pt stable. No indication of pain. Meets criteria for transfer to room.

## 2018-06-29 NOTE — PROGRESS NOTES
Multiple attempts to get a hold of pt's wife. RN unable to. Reported to Pre-op RN. OR staff at bedside to transfer pt. Consents printed and on front of chart.

## 2018-06-30 LAB
BASOPHILS # BLD AUTO: 0.2 % (ref 0–1.8)
BASOPHILS # BLD: 0.02 K/UL (ref 0–0.12)
EOSINOPHIL # BLD AUTO: 0.49 K/UL (ref 0–0.51)
EOSINOPHIL NFR BLD: 4.7 % (ref 0–6.9)
ERYTHROCYTE [DISTWIDTH] IN BLOOD BY AUTOMATED COUNT: 54.5 FL (ref 35.9–50)
HCT VFR BLD AUTO: 24.5 % (ref 42–52)
HGB BLD-MCNC: 8.1 G/DL (ref 14–18)
IMM GRANULOCYTES # BLD AUTO: 0.09 K/UL (ref 0–0.11)
IMM GRANULOCYTES NFR BLD AUTO: 0.9 % (ref 0–0.9)
LYMPHOCYTES # BLD AUTO: 0.98 K/UL (ref 1–4.8)
LYMPHOCYTES NFR BLD: 9.4 % (ref 22–41)
MCH RBC QN AUTO: 30 PG (ref 27–33)
MCHC RBC AUTO-ENTMCNC: 33.1 G/DL (ref 33.7–35.3)
MCV RBC AUTO: 90.7 FL (ref 81.4–97.8)
MONOCYTES # BLD AUTO: 4.8 K/UL (ref 0–0.85)
MONOCYTES NFR BLD AUTO: 46.1 % (ref 0–13.4)
NEUTROPHILS # BLD AUTO: 4.04 K/UL (ref 1.82–7.42)
NEUTROPHILS NFR BLD: 38.7 % (ref 44–72)
NRBC # BLD AUTO: 0.04 K/UL
NRBC BLD-RTO: 0.4 /100 WBC
PLATELET # BLD AUTO: 138 K/UL (ref 164–446)
PMV BLD AUTO: 13 FL (ref 9–12.9)
RBC # BLD AUTO: 2.7 M/UL (ref 4.7–6.1)
WBC # BLD AUTO: 10.4 K/UL (ref 4.8–10.8)

## 2018-06-30 PROCEDURE — G8997 SWALLOW GOAL STATUS: HCPCS | Mod: CI

## 2018-06-30 PROCEDURE — 770022 HCHG ROOM/CARE - ICU (200)

## 2018-06-30 PROCEDURE — 94760 N-INVAS EAR/PLS OXIMETRY 1: CPT

## 2018-06-30 PROCEDURE — A9270 NON-COVERED ITEM OR SERVICE: HCPCS | Performed by: UROLOGY

## 2018-06-30 PROCEDURE — A9270 NON-COVERED ITEM OR SERVICE: HCPCS | Performed by: HOSPITALIST

## 2018-06-30 PROCEDURE — 97110 THERAPEUTIC EXERCISES: CPT

## 2018-06-30 PROCEDURE — 700102 HCHG RX REV CODE 250 W/ 637 OVERRIDE(OP): Performed by: NURSE PRACTITIONER

## 2018-06-30 PROCEDURE — 700101 HCHG RX REV CODE 250: Performed by: HOSPITALIST

## 2018-06-30 PROCEDURE — 700102 HCHG RX REV CODE 250 W/ 637 OVERRIDE(OP): Performed by: INTERNAL MEDICINE

## 2018-06-30 PROCEDURE — 85025 COMPLETE CBC W/AUTO DIFF WBC: CPT

## 2018-06-30 PROCEDURE — 700105 HCHG RX REV CODE 258: Performed by: UROLOGY

## 2018-06-30 PROCEDURE — 700101 HCHG RX REV CODE 250: Performed by: NURSE PRACTITIONER

## 2018-06-30 PROCEDURE — 700102 HCHG RX REV CODE 250 W/ 637 OVERRIDE(OP): Performed by: HOSPITALIST

## 2018-06-30 PROCEDURE — 99233 SBSQ HOSP IP/OBS HIGH 50: CPT | Mod: GW | Performed by: HOSPITALIST

## 2018-06-30 PROCEDURE — 700111 HCHG RX REV CODE 636 W/ 250 OVERRIDE (IP): Performed by: INTERNAL MEDICINE

## 2018-06-30 PROCEDURE — A9270 NON-COVERED ITEM OR SERVICE: HCPCS | Performed by: INTERNAL MEDICINE

## 2018-06-30 PROCEDURE — 92610 EVALUATE SWALLOWING FUNCTION: CPT

## 2018-06-30 PROCEDURE — G8996 SWALLOW CURRENT STATUS: HCPCS | Mod: CI

## 2018-06-30 PROCEDURE — 700102 HCHG RX REV CODE 250 W/ 637 OVERRIDE(OP): Performed by: UROLOGY

## 2018-06-30 PROCEDURE — 97535 SELF CARE MNGMENT TRAINING: CPT

## 2018-06-30 RX ORDER — POTASSIUM CHLORIDE 20 MEQ/1
40 TABLET, EXTENDED RELEASE ORAL ONCE
Status: COMPLETED | OUTPATIENT
Start: 2018-06-30 | End: 2018-06-30

## 2018-06-30 RX ORDER — LORAZEPAM 1 MG/1
0.5 TABLET ORAL EVERY 6 HOURS PRN
COMMUNITY

## 2018-06-30 RX ORDER — QUETIAPINE FUMARATE 25 MG/1
25 TABLET, FILM COATED ORAL
COMMUNITY

## 2018-06-30 RX ADMIN — SENNOSIDES AND DOCUSATE SODIUM 2 TABLET: 8.6; 5 TABLET ORAL at 08:56

## 2018-06-30 RX ADMIN — MEMANTINE HYDROCHLORIDE 10 MG: 10 TABLET ORAL at 20:51

## 2018-06-30 RX ADMIN — OXYBUTYNIN CHLORIDE 5 MG: 5 TABLET ORAL at 14:36

## 2018-06-30 RX ADMIN — Medication 30 G: at 12:25

## 2018-06-30 RX ADMIN — OXYBUTYNIN CHLORIDE 5 MG: 5 TABLET ORAL at 20:51

## 2018-06-30 RX ADMIN — OXYBUTYNIN CHLORIDE 5 MG: 5 TABLET ORAL at 08:56

## 2018-06-30 RX ADMIN — Medication 30 G: at 10:45

## 2018-06-30 RX ADMIN — SENNOSIDES AND DOCUSATE SODIUM 2 TABLET: 8.6; 5 TABLET ORAL at 20:51

## 2018-06-30 RX ADMIN — Medication 30 G: at 16:02

## 2018-06-30 RX ADMIN — CEFUROXIME AXETIL 250 MG: 250 TABLET ORAL at 20:52

## 2018-06-30 RX ADMIN — SODIUM CHLORIDE, POTASSIUM CHLORIDE, SODIUM LACTATE AND CALCIUM CHLORIDE: 600; 310; 30; 20 INJECTION, SOLUTION INTRAVENOUS at 13:13

## 2018-06-30 RX ADMIN — MAGNESIUM HYDROXIDE 30 ML: 400 SUSPENSION ORAL at 21:00

## 2018-06-30 RX ADMIN — AMANTADINE HYDROCHLORIDE 100 MG: 100 CAPSULE ORAL at 20:51

## 2018-06-30 RX ADMIN — CEFUROXIME AXETIL 250 MG: 250 TABLET ORAL at 08:57

## 2018-06-30 RX ADMIN — HYDRALAZINE HYDROCHLORIDE 10 MG: 20 INJECTION INTRAMUSCULAR; INTRAVENOUS at 18:23

## 2018-06-30 RX ADMIN — CITALOPRAM HYDROBROMIDE 20 MG: 20 TABLET ORAL at 08:56

## 2018-06-30 RX ADMIN — Medication 30 G: at 20:45

## 2018-06-30 RX ADMIN — MEMANTINE HYDROCHLORIDE 10 MG: 10 TABLET ORAL at 08:56

## 2018-06-30 RX ADMIN — AMANTADINE HYDROCHLORIDE 100 MG: 100 CAPSULE ORAL at 08:57

## 2018-06-30 RX ADMIN — CARBIDOPA AND LEVODOPA 1 TABLET: 50; 200 TABLET, EXTENDED RELEASE ORAL at 20:52

## 2018-06-30 RX ADMIN — POTASSIUM CHLORIDE 40 MEQ: 1500 TABLET, EXTENDED RELEASE ORAL at 10:48

## 2018-06-30 ASSESSMENT — PAIN SCALES - GENERAL
PAINLEVEL_OUTOF10: 0

## 2018-06-30 ASSESSMENT — ENCOUNTER SYMPTOMS
ABDOMINAL PAIN: 0
FLANK PAIN: 0
FEVER: 0

## 2018-06-30 NOTE — CARE PLAN
Problem: Safety  Goal: Will remain free from falls    Intervention: Implement fall precautions  Treaded slipper socks, bed in low position, pt near nurses, bed alarm on and call light in reach. Pt will remain free falls.       Problem: Knowledge Deficit  Goal: Knowledge of disease process/condition, treatment plan, diagnostic tests, and medications will improve    Intervention: Explain information regarding disease process/condition, treatment plan, diagnostic tests, and medications and document in education  POC discussed with pt and pt's wife. CBI with alum.

## 2018-06-30 NOTE — PROGRESS NOTES
Surgical Progress Note    Author: Mitch Matute Date & Time created: 2018   8:33 AM     Interval Events:    Patient seen and examined.  Repeat clot evacuation/fulguration yesterday with Dr. Joe with instillation of Formalin in the bladder. Continues to require CBI although no clot obstruction. Denies pain. Plt stable. H/H slightly lower.     Review of Systems   Constitutional: Negative for fever.   Gastrointestinal: Negative for abdominal pain.   Genitourinary: Positive for hematuria. Negative for flank pain and urgency.     Hemodynamics:  Temp (24hrs), Av.9 °C (98.4 °F), Min:36.4 °C (97.5 °F), Max:37.5 °C (99.5 °F)  Temperature: 36.7 °C (98.1 °F)  Pulse  Av.6  Min: 47  Max: 103Heart Rate (Monitored): 71  Blood Pressure : 148/77, NIBP: 156/102     Respiratory:    Respiration: 12, Pulse Oximetry: 97 %        RUL Breath Sounds: Clear, RML Breath Sounds: Clear, RLL Breath Sounds: Diminished, AQUILINO Breath Sounds: Clear, LLL Breath Sounds: Diminished  Neuro:  GCS       Fluids:    Intake/Output Summary (Last 24 hours) at 18 0833  Last data filed at 18 0600   Gross per 24 hour   Intake            37811 ml   Output            50416 ml   Net              690 ml     Weight: 82.4 kg (181 lb 10.5 oz)  Current Diet Order   Procedures   • Diet Order Regular     Physical Exam   Constitutional: He is oriented to person, place, and time. He appears well-developed and well-nourished. No distress.   Pulmonary/Chest: Effort normal.   Abdominal: Soft. He exhibits no distension. There is no tenderness.   SP tube in place draining pink urine.    Genitourinary:   Genitourinary Comments: Hurst catheter in place with irrigation attached   Neurological: He is alert and oriented to person, place, and time.   Skin: Skin is warm and dry.   Psychiatric: He has a normal mood and affect.   Nursing note and vitals reviewed.    Labs:  Recent Results (from the past 24 hour(s))   BASIC TEG    Collection Time:  06/29/18  4:16 PM   Result Value Ref Range    Reaction Time Initial-R 5.0 5.0 - 10.0 min    Clot Kinetics-K 1.2 1.0 - 3.0 min    Clot Angle-Angle 72.8 (H) 53.0 - 72.0 degrees    Maximum Clot Strength-MA 70.4 (H) 50.0 - 70.0 mm    Lysis 30 minutes-LY30 0.0 0.0 - 8.0 %    TEG Algorithm Link Algorithm    CBC WITH DIFFERENTIAL    Collection Time: 06/29/18  4:16 PM   Result Value Ref Range    WBC 9.6 4.8 - 10.8 K/uL    RBC 2.82 (L) 4.70 - 6.10 M/uL    Hemoglobin 8.6 (L) 14.0 - 18.0 g/dL    Hematocrit 25.2 (L) 42.0 - 52.0 %    MCV 89.4 81.4 - 97.8 fL    MCH 30.5 27.0 - 33.0 pg    MCHC 34.1 33.7 - 35.3 g/dL    RDW 52.4 (H) 35.9 - 50.0 fL    Platelet Count 137 (L) 164 - 446 K/uL    MPV 12.4 9.0 - 12.9 fL    Nucleated RBC 0.30 /100 WBC    NRBC (Absolute) 0.03 K/uL    Neutrophils-Polys 52.00 44.00 - 72.00 %    Lymphocytes 10.00 (L) 22.00 - 41.00 %    Monocytes 32.00 (H) 0.00 - 13.40 %    Eosinophils 4.00 0.00 - 6.90 %    Basophils 0.00 0.00 - 1.80 %    Neutrophils (Absolute) 4.99 1.82 - 7.42 K/uL    Lymphs (Absolute) 0.96 (L) 1.00 - 4.80 K/uL    Monos (Absolute) 3.07 (H) 0.00 - 0.85 K/uL    Eos (Absolute) 0.38 0.00 - 0.51 K/uL    Baso (Absolute) 0.00 0.00 - 0.12 K/uL    Anisocytosis 1+     Macrocytosis 1+    DIFFERENTIAL MANUAL    Collection Time: 06/29/18  4:16 PM   Result Value Ref Range    Metamyelocytes 1.00 %    Blasts 1.00 %    Manual Diff Status PERFORMED    PLATELET ESTIMATE    Collection Time: 06/29/18  4:16 PM   Result Value Ref Range    Plt Estimation Decreased    MORPHOLOGY    Collection Time: 06/29/18  4:16 PM   Result Value Ref Range    RBC Morphology Present     Giant Platelets 2+     Polychromia 1+     Smudge Cells Few    CBC WITH DIFFERENTIAL    Collection Time: 06/30/18  4:30 AM   Result Value Ref Range    WBC 10.4 4.8 - 10.8 K/uL    RBC 2.70 (L) 4.70 - 6.10 M/uL    Hemoglobin 8.1 (L) 14.0 - 18.0 g/dL    Hematocrit 24.5 (L) 42.0 - 52.0 %    MCV 90.7 81.4 - 97.8 fL    MCH 30.0 27.0 - 33.0 pg    MCHC 33.1 (L)  33.7 - 35.3 g/dL    RDW 54.5 (H) 35.9 - 50.0 fL    Platelet Count 138 (L) 164 - 446 K/uL    MPV 13.0 (H) 9.0 - 12.9 fL    Nucleated RBC 0.40 /100 WBC    NRBC (Absolute) 0.04 K/uL    Neutrophils-Polys 38.70 (L) 44.00 - 72.00 %    Lymphocytes 9.40 (L) 22.00 - 41.00 %    Monocytes 46.10 (H) 0.00 - 13.40 %    Eosinophils 4.70 0.00 - 6.90 %    Basophils 0.20 0.00 - 1.80 %    Immature Granulocytes 0.90 0.00 - 0.90 %    Neutrophils (Absolute) 4.04 1.82 - 7.42 K/uL    Lymphs (Absolute) 0.98 (L) 1.00 - 4.80 K/uL    Monos (Absolute) 4.80 (H) 0.00 - 0.85 K/uL    Eos (Absolute) 0.49 0.00 - 0.51 K/uL    Baso (Absolute) 0.02 0.00 - 0.12 K/uL    Immature Granulocytes (abs) 0.09 0.00 - 0.11 K/uL     Medical Decision Making, by Problem:  Active Hospital Problems    Diagnosis   • Acute hypoxemic respiratory failure (HCC) [J96.01]     Priority: High   • Urinary retention [R33.9]     Priority: High   • Anemia [D64.9]     Priority: Medium   • 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 Please run at a rate to keep urine clear. Once urine is clear, will continue Alum CBI for an additional 24 hours.      Quality Measures:  Quality-Core Measures   Reviewed items::  Labs reviewed and Medications reviewed  Hurst catheter::  Gross Hematuria with Clots      Discussed patient condition with RN, Patient and urology-Dr. Mosquera

## 2018-06-30 NOTE — THERAPY
"Speech Language Therapy Clinical Swallow Evaluation completed.    Functional Status: Pt was moderately dysarthric, however vocal quality and cough were strong.  Pt had a slightly tremulous tongue and lower lip, and lingual ROM was limited.  He is currently on a dysphagia III diet per MD, and RN reports he has been tolerating it when he is awake, although he has been sleepy.  Pt consumed small bites of soft solids, puree and cup sips of thins without any overt s/sx of aspiration.  He had prolonged, but effective mastication, and no oral residue was noted.  Pt needs assistance with set up and feeding at times.  Pt was only willing to eat approx 25 percent of his meal due to lack of hunger.  Recommend to continue a dysphagia III diet with thin liquids, with direct supervision for all PO intake.  DISCONTINUE ALL PO WITH LETHARGY or s/sx of aspiration.       Recommendations - Diet:  Dysphagia III, Thin Liquids                          Strategies: Direct supervision during meals, Assistance needed for meal tray set-up, No Straws and Head of Bed at 90 Degrees                          Medication Administration:  Float in puree     Plan of Care: Will benefit from Speech Therapy 3 times per week    Post-Acute Therapy: Discharge to a transitional care facility for continued skilled therapy services.    See \"Rehab Therapy-Acute\" Patient Summary Report for complete documentation. Thank you for the consult.    "

## 2018-06-30 NOTE — PROGRESS NOTES
0700: Report received from night RN, POC discussed.     0745: Assessment completed. Lines and gtts verified. Pt oriented to self. POC discussed. CBI running. Bed alarm on and call lighti in reach.     0815: Wife Elizabeth called for updates. Requesting pt to have oatmeal for breakfast and supplements as needed.     0830: Urology PA at bedside. Updated on alum medication, pharmacy to mix and be available around 1300. Urology verbalized understanding and is ok. Urology to place instructions for alum administration, wants run 24 hours continuous and then an additional 24 hours once bleeding stops. Pharmacy updated.     0915: Dr. Solano at bedside, POC discussed.     1015: Physical therapy at bedside.     1100: Family member at bedside. Alum bladder irrigation initiated.

## 2018-06-30 NOTE — PROGRESS NOTES
Call received from pharmacist; if alum continuous bladder infusion must be used, pharmacist at [facility] needs notification at 0600 6/30. This medication will take a few hours to compound and will not be available for patient until later in the morning if needed as pharmacy staff is not available until day shift start. Reported to oncoming RNYuri.

## 2018-06-30 NOTE — PROGRESS NOTES
Spoke with BELKIS Peralta; given patient update. RN to continue with CBI and APN will call back if she has any further orders once she has spoke with Dr. Joe.   Urine output from CBI is now clearer in color and not as bloody. Pt resting comfortably and in no apparent distress. Will continue with care.

## 2018-06-30 NOTE — THERAPY
"Physical Therapy Treatment completed.   Bed Mobility:  Supine to Sit: Moderate Assist  Transfers: Sit to Stand: Moderate Assist  Gait: Level Of Assist: No Equipment Needed ,  No gait performed this date.  Per nursing pt to perform bed TE only.      Plan of Care: Will benefit from Physical Therapy 3 times per week  Discharge Recommendations: Equipment: Will Continue to Assess for Equipment Needs. Post-acute therapy Discharge to a transitional care facility for continued skilled therapy services.     See \"Rehab Therapy-Acute\" Patient Summary Report for complete documentation.     Pt is alert, pleasant and cooperative.  Pt demonstrated increasd tone bilat LE's right > left.  Per nursing PT to perform bed exercises only secondary to blood in catheter.  PT performed bilat LE PROM with stretches to decrease tone.  Gently hip rotation performed bilat.  Pt completed bilat LE exercises with assist as needed.  Pt had no c/o pain during therapy session.  Pt is motivated to participate.  Pt would benefit from cont skilled PT in acute care setting to promote increased strength and improve mobility.  "

## 2018-06-30 NOTE — PROGRESS NOTES
[facility] Hospitalist Progress Note    Date of Service: 2018    Chief Complaint  77 y.o. male admitted 2018 with myelodysplastic syndrome, CKD stage III, Parkinson's disease with dementia, with issues with urinary retention due to neurogenic bladder.  He is admitted following suprapubic catheter placement.  Perioperatively, he had stable thrombocytopenia, and had platelet transfusion.  However, he had gross hematuria and elevated BPs postoperatively.     Interval Problem Update  Doing well, more alert, talkative, getting up in a chair and eating  Wife is at the bedside as well  No hematuria, clear output of urine and CBI    Consultants/Specialty  Internal Medicine - management of medical issues     Disposition  Pending PT/OT who can now work with him, anticipate SNF once acute issues are stabilized.        Review of Systems   Unable to perform ROS: Acuity of condition      Physical Exam  Laboratory/Imaging   Hemodynamics  Temp (24hrs), Av.9 °C (98.4 °F), Min:36.1 °C (97 °F), Max:37.5 °C (99.5 °F)   Temperature: 36.8 °C (98.3 °F)  Pulse  Av.1  Min: 47  Max: 103 Heart Rate (Monitored): 71  Blood Pressure : 148/77, NIBP: (!) 165/78      Respiratory      Respiration: 12, Pulse Oximetry: 96 %        RUL Breath Sounds: Clear, RML Breath Sounds: Clear, RLL Breath Sounds: Diminished, AQUILINO Breath Sounds: Clear, LLL Breath Sounds: Diminished    Fluids    Intake/Output Summary (Last 24 hours) at 18 1940  Last data filed at 18 1830   Gross per 24 hour   Intake            98878 ml   Output            72969 ml   Net              650 ml       Nutrition  Orders Placed This Encounter   Procedures   • Diet Order Regular     Standing Status:   Standing     Number of Occurrences:   1     Order Specific Question:   Diet:     Answer:   Regular [1]     Order Specific Question:   Texture/Fiber modifications:     Answer:   Dysphagia 3(Mechanical Soft)specify fluid consistency(question 6) [3]     Order Specific  Question:   Consistency/Fluid modifications:     Answer:   Thin Liquids [3]     Physical Exam   Constitutional: He appears well-developed. He appears listless. No distress.   HENT:   Head: Normocephalic.   Mouth/Throat: Oropharynx is clear and moist.   Eyes: Conjunctivae are normal. Right eye exhibits no discharge. Left eye exhibits no discharge. No scleral icterus.   Neck: No JVD present.   Cardiovascular: Normal rate, regular rhythm and intact distal pulses.    Pulmonary/Chest: Effort normal. No stridor. No respiratory distress.   Abdominal: Soft. Bowel sounds are normal. He exhibits no distension. There is no tenderness.   Genitourinary:   Genitourinary Comments: Hurst and suprapubic catheter with hematuria bright red   Musculoskeletal: He exhibits no edema.   Neurological: He appears listless. He exhibits abnormal muscle tone.   Lethargic, can nod yes/no   Skin: Skin is warm and dry. He is not diaphoretic. There is pallor.   Suprapubic cath in place with bandage surrounding it, no erythema    Psychiatric: Cognition and memory are impaired.   Nursing note and vitals reviewed.      Recent Labs      06/28/18   0927  06/29/18   0430  06/29/18   1616   WBC  11.5*  9.3  9.6   RBC  3.29*  2.79*  2.82*   HEMOGLOBIN  9.9*  8.3*  8.6*   HEMATOCRIT  29.2*  25.1*  25.2*   MCV  88.8  90.0  89.4   MCH  30.1  29.7  30.5   MCHC  33.9  33.1*  34.1   RDW  50.9*  53.6*  52.4*   PLATELETCT  136*  128*  137*   MPV  12.9  13.3*  12.4     Recent Labs      06/27/18   0325  06/28/18   0310  06/29/18   0430   SODIUM  136  136  138   POTASSIUM  3.5*  3.6  3.3*   CHLORIDE  105  105  106   CO2  27  24  26   GLUCOSE  120*  86  86   BUN  11  10  8   CREATININE  1.07  0.95  1.07   CALCIUM  8.1*  7.3*  7.7*     Recent Labs      06/26/18   2249  06/29/18   0755   INR  1.46*  1.37*                  Assessment/Plan     * Urinary retention- (present on admission)   Assessment & Plan    Due to neurogenic bladder s/p suprapubic catheter placement by  urology on 6/21.        Acute hypoxemic respiratory failure (HCC)   Assessment & Plan    Patient requiring oxygen supplementation to maintain his saturation starting after his surgery on 6/23.  Chest xray consistent with atelectasis, supplemental oxgen, RT as needed improving down to 2L  Improve clinical status and activity as able, encourage IS use        Anemia- (present on admission)   Assessment & Plan    In setting of CKD, MDS with bone marrow suppression and gross hematuria.   Improved up to 8.6  S/p 2 units prbc   Recheck cbc        Elevated blood pressure reading without diagnosis of hypertension- (present on admission)   Assessment & Plan    Blood pressures on low side now, continue to monitor.        Chronic thrombocytopenia due to MDS (HCC)- (present on admission)   Assessment & Plan    Plts have been low since Sept 2017 and has been stable in the 30's.   Platelets improved since transfusion now up to 138  Hematuria has stopped  Continue to monitor        Gross hematuria   Assessment & Plan    Sequelae of placement of suprapubic catheter with MDS history and low platelets.   Temporarily with rinaldi rinaldi catheter and suprapubic catheter, tolerated rinaldi clamp trial, removed then hematuria recurred.  Rinaldi replaced, CBI resumed overnight  Patient in ICU for close monitoring and nursing ratio  Bladder split in dome, repaired by Dr. Bryan-Perry hematuria improved        Hypokalemia- (present on admission)   Assessment & Plan    Potassium improved now normal        DNR (do not resuscitate)- (present on admission)   Assessment & Plan    Affirmed on admission.  POA and advanced directive reviewed.        CKD (chronic kidney disease), stage III- (present on admission)   Assessment & Plan    Stable, at baseline.  - continue to monitor  - avoid nephrotoxins, and renally dose all medications        Myelodysplastic syndrome (HCC)- (present on admission)   Assessment & Plan    - Continue to monitor blood counts,  especially platelets in the setting of his recent gross hematuria.    - Management as above.        Parkinson's disease with dementia (HCC)- (present on admission)   Assessment & Plan    High risk for delirium given dementia. Frequent re-orientation, avoid or minimize narcotics/sedatives.    continue home Sinemet  - Continue Namenda and amantadine          Quality-Core Measures   Reviewed items::  Labs reviewed and Medications reviewed  Hurst catheter::  Neurogenic Bladder  DVT: held 2/2 low plts and anemia.  DVT prophylaxis - mechanical:  SCDs  Ulcer Prophylaxis::  Not indicated

## 2018-06-30 NOTE — CARE PLAN
Problem: Bowel/Gastric:  Goal: Normal bowel function is maintained or improved  Outcome: PROGRESSING SLOWER THAN EXPECTED  LBM > 7 days.  Milk of mag given.  MDs aware.    Problem: Skin Integrity  Goal: Risk for impaired skin integrity will decrease  Outcome: PROGRESSING AS EXPECTED  Lack of regular physical activity and questionable nutritional status.  Q2 turns in place.  Will pursue nutrition consult if patient's dietary routine continues to be disrupted.    Problem: Urinary Elimination:  Goal: Ability to reestablish a normal urinary elimination pattern will improve  Outcome: PROGRESSING SLOWER THAN EXPECTED  Patient has CBI with urethral inlet and suprapubic outlet.  High rate necessary to avoid clotting off.  3 x trips to OR for urological procedures.  Active  bleed remains a problem.    Frequent monitoring in place.  ICU I/Os.  MDs aware.

## 2018-06-30 NOTE — PROGRESS NOTES
Received bedside report from Ladonna LEE.  Patient awake and AO x 2 (disoriented to time and event).  Patient does not appear to be in distress and denies pain at this time.  CBI patency verified and pink tinged output noted.  VS stable - patient is telemetry status.  POC reviewed, gtts verified, and safety protocols in place.  Will continue to monitor closely.    PM medications given at this time successfully.  Patient passed RN swallow evaluation and was able to safely take medications.  PRN Milk of Magnesia given for LBM > 7 days ago.

## 2018-06-30 NOTE — PROGRESS NOTES
[facility] Hospitalist Progress Note    Date of Service: 2018    Chief Complaint  77 y.o. male admitted 2018 with myelodysplastic syndrome, CKD stage III, Parkinson's disease with dementia, with issues with urinary retention due to neurogenic bladder.  He is admitted following suprapubic catheter placement.  Perioperatively, he had stable thrombocytopenia, and had platelet transfusion.  However, he had gross hematuria and elevated BPs postoperatively.     Interval Problem Update  Doing well sitting up, eating, talking. No respiratory distress.    Consultants/Specialty  Internal Medicine - management of medical issues     Disposition   anticipate SNF once acute issues are stabilized.        Review of Systems   Unable to perform ROS: Dementia      Physical Exam  Laboratory/Imaging   Hemodynamics  Temp (24hrs), Av.9 °C (98.4 °F), Min:36.4 °C (97.5 °F), Max:37.5 °C (99.5 °F)   Temperature: 36.7 °C (98.1 °F)  Pulse  Av.6  Min: 47  Max: 103 Heart Rate (Monitored): 71  Blood Pressure : 148/77, NIBP: 156/102      Respiratory      Respiration: 12, Pulse Oximetry: 97 %        RUL Breath Sounds: Clear, RML Breath Sounds: Clear, RLL Breath Sounds: Diminished, AQUILINO Breath Sounds: Clear, LLL Breath Sounds: Diminished    Fluids    Intake/Output Summary (Last 24 hours) at 18 0928  Last data filed at 18 0900   Gross per 24 hour   Intake            88269 ml   Output            74418 ml   Net              190 ml       Nutrition  Orders Placed This Encounter   Procedures   • Diet Order Regular     Standing Status:   Standing     Number of Occurrences:   1     Order Specific Question:   Diet:     Answer:   Regular [1]     Order Specific Question:   Texture/Fiber modifications:     Answer:   Dysphagia 3(Mechanical Soft)specify fluid consistency(question 6) [3]     Order Specific Question:   Consistency/Fluid modifications:     Answer:   Thin Liquids [3]     Physical Exam   Constitutional: He appears  well-developed and well-nourished. He is cooperative. No distress.   HENT:   Mouth/Throat: Oropharynx is clear and moist. No oropharyngeal exudate.   Eyes: Conjunctivae are normal. Right eye exhibits no discharge. Left eye exhibits no discharge. No scleral icterus.   Neck: No JVD present.   Cardiovascular: Normal rate, regular rhythm and intact distal pulses.    Pulmonary/Chest: Effort normal. No stridor. No respiratory distress. He has no wheezes. He has no rales.   Abdominal: Soft. Bowel sounds are normal. He exhibits no distension. There is no tenderness. There is no rebound.   Genitourinary:   Genitourinary Comments: Hurst and suprapubic catheter with hematuria bright red   Musculoskeletal: He exhibits no edema.   Neurological: He is alert. He displays tremor. He exhibits abnormal muscle tone.   Alert and talkative   Skin: Skin is warm and dry. He is not diaphoretic. No pallor.   Suprapubic cath in place with bandage surrounding it, no erythema    Psychiatric: He has a normal mood and affect. His behavior is normal. Cognition and memory are impaired.   Nursing note and vitals reviewed.      Recent Labs      06/29/18   0430  06/29/18   1616  06/30/18   0430   WBC  9.3  9.6  10.4   RBC  2.79*  2.82*  2.70*   HEMOGLOBIN  8.3*  8.6*  8.1*   HEMATOCRIT  25.1*  25.2*  24.5*   MCV  90.0  89.4  90.7   MCH  29.7  30.5  30.0   MCHC  33.1*  34.1  33.1*   RDW  53.6*  52.4*  54.5*   PLATELETCT  128*  137*  138*   MPV  13.3*  12.4  13.0*     Recent Labs      06/28/18   0310  06/29/18   0430   SODIUM  136  138   POTASSIUM  3.6  3.3*   CHLORIDE  105  106   CO2  24  26   GLUCOSE  86  86   BUN  10  8   CREATININE  0.95  1.07   CALCIUM  7.3*  7.7*     Recent Labs      06/29/18   0755   INR  1.37*                  Assessment/Plan     * Urinary retention- (present on admission)   Assessment & Plan    Due to neurogenic bladder s/p suprapubic catheter placement by urology on 6/21.        Acute hypoxemic respiratory failure (HCC)    Assessment & Plan    Patient requiring oxygen supplementation to maintain his saturation starting after his surgery on 6/23.  Chest xray consistent with atelectasis, supplemental oxgen, RT as needed improving down to 2L          Anemia- (present on admission)   Assessment & Plan    In setting of CKD, MDS with bone marrow suppression and gross hematuria.   Stable hovering in 8-8.6 range  S/p 2 units prbc during this hospital stay  Daily CBC        Elevated blood pressure reading without diagnosis of hypertension- (present on admission)   Assessment & Plan    Blood pressures  Coming up, if persistently elevated will restart home anti hypertensives        Chronic thrombocytopenia due to MDS (HCC)- (present on admission)   Assessment & Plan    Plts have been low since Sept 2017 and had been stable in the 30's.   Platelets improved since transfusion staying above 100  Hematuria is persistent, TEG normal  Continue to monitor keep platelets over 100        Gross hematuria   Assessment & Plan    Sequelae of placement of suprapubic catheter with MDS history and low platelets.  Persistent hematuria with clots s/p two surgical attempts to correct a split in the dome of the bladder  Formalin bladder instillation done 6/29  Starting Alum instillation today when delivered from the main campus  Continue CBI  Recommendations per urology        Hypokalemia- (present on admission)   Assessment & Plan    Potassium 3.3 oral replacement, bmp in am        DNR (do not resuscitate)- (present on admission)   Assessment & Plan    Affirmed on admission.  POA and advanced directive reviewed.        CKD (chronic kidney disease), stage III- (present on admission)   Assessment & Plan    Stable, at baseline.  - continue to monitor  - avoid nephrotoxins, and renally dose all medications        Myelodysplastic syndrome (HCC)- (present on admission)   Assessment & Plan    - Continue to monitor blood counts, especially platelets  Keep platelets over  100 due to persistent hematuria  - Management as above.        Parkinson's disease with dementia (HCC)- (present on admission)   Assessment & Plan    High risk for delirium given dementia. Frequent re-orientation, avoid or minimize narcotics/sedatives.    continue home Sinemet  - Continue Namenda and amantadine          Quality-Core Measures   Reviewed items::  Labs reviewed and Medications reviewed  Hurst catheter::  Neurogenic Bladder  DVT: held 2/2 low plts and anemia.  DVT prophylaxis - mechanical:  SCDs  Ulcer Prophylaxis::  Not indicated

## 2018-06-30 NOTE — PROGRESS NOTES
1400: Informed by pharmacy that shortage of Alum. Pharmacy waiting for supply from [facility] and have reached out to [facility] for medication.     1415: Urology paged to update.     1430: Mitch Matute returned call. Informed Mitch that having difficulty obtaining Alum for administration as pt goes through 3 L bag in 2 hours when titrating for clear urine, and pharmacy anticipating having 7 bags of medication available for administration, approximately 14 hours. Mitch verbalized understanding. Continue with plan until medication not available.    1435: Alum medication bag completed. CBI with NS restarted. Await medication delivery from pharmacy.

## 2018-07-01 LAB
ANION GAP SERPL CALC-SCNC: 7 MMOL/L (ref 0–11.9)
BASOPHILS # BLD AUTO: 0.3 % (ref 0–1.8)
BASOPHILS # BLD: 0.04 K/UL (ref 0–0.12)
BUN SERPL-MCNC: 6 MG/DL (ref 8–22)
CALCIUM SERPL-MCNC: 7.8 MG/DL (ref 8.4–10.2)
CHLORIDE SERPL-SCNC: 102 MMOL/L (ref 96–112)
CO2 SERPL-SCNC: 24 MMOL/L (ref 20–33)
COMMENT 1642: NORMAL
CREAT SERPL-MCNC: 0.87 MG/DL (ref 0.5–1.4)
EOSINOPHIL # BLD AUTO: 0.51 K/UL (ref 0–0.51)
EOSINOPHIL NFR BLD: 4 % (ref 0–6.9)
ERYTHROCYTE [DISTWIDTH] IN BLOOD BY AUTOMATED COUNT: 54.1 FL (ref 35.9–50)
GLUCOSE SERPL-MCNC: 100 MG/DL (ref 65–99)
HCT VFR BLD AUTO: 24.1 % (ref 42–52)
HGB BLD-MCNC: 8.1 G/DL (ref 14–18)
IMM GRANULOCYTES # BLD AUTO: 0.09 K/UL (ref 0–0.11)
IMM GRANULOCYTES NFR BLD AUTO: 0.7 % (ref 0–0.9)
LYMPHOCYTES # BLD AUTO: 1.29 K/UL (ref 1–4.8)
LYMPHOCYTES NFR BLD: 10 % (ref 22–41)
MCH RBC QN AUTO: 30.1 PG (ref 27–33)
MCHC RBC AUTO-ENTMCNC: 33.6 G/DL (ref 33.7–35.3)
MCV RBC AUTO: 89.6 FL (ref 81.4–97.8)
MONOCYTES # BLD AUTO: 5.86 K/UL (ref 0–0.85)
MONOCYTES NFR BLD AUTO: 45.5 % (ref 0–13.4)
NEUTROPHILS # BLD AUTO: 5.1 K/UL (ref 1.82–7.42)
NEUTROPHILS NFR BLD: 39.5 % (ref 44–72)
NRBC # BLD AUTO: 0.05 K/UL
NRBC BLD-RTO: 0.4 /100 WBC
PLATELET # BLD AUTO: 149 K/UL (ref 164–446)
PMV BLD AUTO: 12.9 FL (ref 9–12.9)
POTASSIUM SERPL-SCNC: 3.4 MMOL/L (ref 3.6–5.5)
RBC # BLD AUTO: 2.69 M/UL (ref 4.7–6.1)
SODIUM SERPL-SCNC: 133 MMOL/L (ref 135–145)
WBC # BLD AUTO: 12.9 K/UL (ref 4.8–10.8)

## 2018-07-01 PROCEDURE — 700105 HCHG RX REV CODE 258: Performed by: UROLOGY

## 2018-07-01 PROCEDURE — 94760 N-INVAS EAR/PLS OXIMETRY 1: CPT

## 2018-07-01 PROCEDURE — 700102 HCHG RX REV CODE 250 W/ 637 OVERRIDE(OP): Performed by: INTERNAL MEDICINE

## 2018-07-01 PROCEDURE — 700111 HCHG RX REV CODE 636 W/ 250 OVERRIDE (IP): Performed by: HOSPITALIST

## 2018-07-01 PROCEDURE — 700101 HCHG RX REV CODE 250: Performed by: HOSPITALIST

## 2018-07-01 PROCEDURE — 770022 HCHG ROOM/CARE - ICU (200)

## 2018-07-01 PROCEDURE — A9270 NON-COVERED ITEM OR SERVICE: HCPCS | Performed by: HOSPITALIST

## 2018-07-01 PROCEDURE — 700102 HCHG RX REV CODE 250 W/ 637 OVERRIDE(OP): Performed by: UROLOGY

## 2018-07-01 PROCEDURE — 99233 SBSQ HOSP IP/OBS HIGH 50: CPT | Mod: GW | Performed by: HOSPITALIST

## 2018-07-01 PROCEDURE — A9270 NON-COVERED ITEM OR SERVICE: HCPCS | Performed by: INTERNAL MEDICINE

## 2018-07-01 PROCEDURE — A9270 NON-COVERED ITEM OR SERVICE: HCPCS | Performed by: UROLOGY

## 2018-07-01 PROCEDURE — 80048 BASIC METABOLIC PNL TOTAL CA: CPT

## 2018-07-01 PROCEDURE — 85025 COMPLETE CBC W/AUTO DIFF WBC: CPT

## 2018-07-01 PROCEDURE — 700102 HCHG RX REV CODE 250 W/ 637 OVERRIDE(OP): Performed by: HOSPITALIST

## 2018-07-01 RX ORDER — HALOPERIDOL 5 MG/ML
5 INJECTION INTRAMUSCULAR EVERY 4 HOURS PRN
Status: DISCONTINUED | OUTPATIENT
Start: 2018-07-01 | End: 2018-07-05

## 2018-07-01 RX ORDER — ACETAMINOPHEN 325 MG/1
650 TABLET ORAL EVERY 4 HOURS PRN
Status: DISCONTINUED | OUTPATIENT
Start: 2018-07-01 | End: 2018-07-11 | Stop reason: HOSPADM

## 2018-07-01 RX ORDER — ACETAMINOPHEN 650 MG/1
650 SUPPOSITORY RECTAL EVERY 4 HOURS PRN
Status: DISCONTINUED | OUTPATIENT
Start: 2018-07-01 | End: 2018-07-11 | Stop reason: HOSPADM

## 2018-07-01 RX ORDER — HALOPERIDOL 5 MG/ML
5 INJECTION INTRAMUSCULAR EVERY 4 HOURS PRN
Status: DISCONTINUED | OUTPATIENT
Start: 2018-07-01 | End: 2018-07-01

## 2018-07-01 RX ORDER — QUETIAPINE FUMARATE 25 MG/1
25 TABLET, FILM COATED ORAL EVERY 8 HOURS
Status: DISCONTINUED | OUTPATIENT
Start: 2018-07-01 | End: 2018-07-11 | Stop reason: HOSPADM

## 2018-07-01 RX ADMIN — SODIUM CHLORIDE, POTASSIUM CHLORIDE, SODIUM LACTATE AND CALCIUM CHLORIDE: 600; 310; 30; 20 INJECTION, SOLUTION INTRAVENOUS at 20:51

## 2018-07-01 RX ADMIN — HALOPERIDOL LACTATE 5 MG: 5 INJECTION, SOLUTION INTRAMUSCULAR at 12:44

## 2018-07-01 RX ADMIN — ACETAMINOPHEN 650 MG: 650 SUPPOSITORY RECTAL at 20:20

## 2018-07-01 RX ADMIN — OXYBUTYNIN CHLORIDE 5 MG: 5 TABLET ORAL at 08:26

## 2018-07-01 RX ADMIN — MEMANTINE HYDROCHLORIDE 10 MG: 10 TABLET ORAL at 08:26

## 2018-07-01 RX ADMIN — SODIUM CHLORIDE, POTASSIUM CHLORIDE, SODIUM LACTATE AND CALCIUM CHLORIDE: 600; 310; 30; 20 INJECTION, SOLUTION INTRAVENOUS at 02:35

## 2018-07-01 RX ADMIN — TRAMADOL HYDROCHLORIDE 50 MG: 50 TABLET, COATED ORAL at 11:15

## 2018-07-01 RX ADMIN — LORAZEPAM 0.5 MG: 0.5 TABLET ORAL at 15:36

## 2018-07-01 RX ADMIN — SENNOSIDES AND DOCUSATE SODIUM 2 TABLET: 8.6; 5 TABLET ORAL at 08:27

## 2018-07-01 RX ADMIN — OXYBUTYNIN CHLORIDE 5 MG: 5 TABLET ORAL at 15:36

## 2018-07-01 RX ADMIN — CITALOPRAM HYDROBROMIDE 20 MG: 20 TABLET ORAL at 08:27

## 2018-07-01 RX ADMIN — QUETIAPINE 25 MG: 25 TABLET ORAL at 13:19

## 2018-07-01 RX ADMIN — Medication 30 G: at 01:55

## 2018-07-01 RX ADMIN — CEFUROXIME AXETIL 250 MG: 250 TABLET ORAL at 08:27

## 2018-07-01 RX ADMIN — Medication 30 G: at 19:11

## 2018-07-01 RX ADMIN — HALOPERIDOL LACTATE 5 MG: 5 INJECTION, SOLUTION INTRAMUSCULAR at 17:14

## 2018-07-01 RX ADMIN — LORAZEPAM 0.5 MG: 0.5 TABLET ORAL at 09:34

## 2018-07-01 RX ADMIN — AMANTADINE HYDROCHLORIDE 100 MG: 100 CAPSULE ORAL at 08:27

## 2018-07-01 ASSESSMENT — PAIN SCALES - GENERAL
PAINLEVEL_OUTOF10: 0

## 2018-07-01 NOTE — PROGRESS NOTES
2000 - Report received. Assessment complete. Pt lines and gtts verified. Pt A&O x1, disoriented to place/time/event, pt declines pain at this time. Pt CBI in use, scant bloody drainage from urethra noted, kept clean appropriately, CBI ouput hazy/small clots noted, increased titration rate to keep clear, titrated per MD order to maintain clear urine. Pt q2h turns implemented, pillows in use for support/repositioning, barrier cream implemented. Milk of mag implemented per bowel protocol, pt passing flatus, normoactive bowel sounds noted. All safety precautions in place, bed alarm in use, side rails up x3, call light within reach at all times. Will continue to monitor.

## 2018-07-01 NOTE — PROGRESS NOTES
Pt had increasing agitation and pulling at rinaldi catheters. Dr. Solano notified. PRN haldol ordered as well bilateral wrist restraints. Pt restarted on Seroquel. Wife updated on POC.

## 2018-07-01 NOTE — CARE PLAN
Problem: Communication  Goal: The ability to communicate needs accurately and effectively will improve  Outcome: PROGRESSING AS EXPECTED  Communication will remain effective with patient. Updated on POC. Encouraged to notify RN when questions or concerns arise.     Problem: Safety  Goal: Will remain free from injury  Outcome: PROGRESSING AS EXPECTED  Patient in view of nursing station. Bed alarm on.

## 2018-07-01 NOTE — CARE PLAN
Problem: Bowel/Gastric:  Goal: Normal bowel function is maintained or improved  Outcome: PROGRESSING AS EXPECTED  Milk of mag implemented per bowel protocol, pt passing flatus, normoactive bowel sounds, PO intake encouraged    Problem: Skin Integrity  Goal: Risk for impaired skin integrity will decrease  Outcome: PROGRESSING AS EXPECTED  Pt q2h turns implemented, barrier cream, linens changed appropriately; suprapubic cath insertion site dressing changed for small drainage noted    Problem: Urinary Elimination:  Goal: Ability to reestablish a normal urinary elimination pattern will improve  Outcome: PROGRESSING AS EXPECTED  Alum CBI implemented per MAR and titrated appropriately to keep urine clear, pt urine yellow/hazy at this time, continuing to attempt to wean CBI down appropriately. Hurst care implemented appropriately for scant bloody drainage from urethra

## 2018-07-01 NOTE — THERAPY
"Occupational Therapy Treatment completed with focus on ADLs and upper extremity function.  Functional Status:  Pt is alert, pleasant and cooperative. O2 @2L NC. Hurst in place. RN advises no OOB today due to blood in urine. Pt agreeable to perform BUE ther ex's from bed. Grooms with set-up, Aneta.      Plan of Care: Will benefit from Occupational Therapy 3 times per week  Discharge Recommendations:  Equipment Will Continue to Assess for Equipment Needs. Post-acute therapy Discharge to a transitional care facility for continued skilled therapy services.  See \"Rehab Therapy-Acute\" Patient Summary Report for complete documentation.   "

## 2018-07-01 NOTE — PROGRESS NOTES
[facility] Hospitalist Progress Note    Date of Service: 2018    Chief Complaint  77 y.o. male admitted 2018 with myelodysplastic syndrome, CKD stage III, Parkinson's disease with dementia, with issues with urinary retention due to neurogenic bladder.  He is admitted following suprapubic catheter placement.  Perioperatively, he had stable thrombocytopenia, and had platelet transfusion.  However, he had gross hematuria and elevated BPs postoperatively.     Interval Problem Update  Doing well sitting up, eating, talking. No respiratory distress.    Consultants/Specialty  Internal Medicine - management of medical issues     Disposition   anticipate SNF once acute issues are stabilized.        Review of Systems   Unable to perform ROS: Dementia      Physical Exam  Laboratory/Imaging   Hemodynamics  Temp (24hrs), Av.2 °C (98.9 °F), Min:36.9 °C (98.5 °F), Max:37.5 °C (99.5 °F)   Temperature: 37.5 °C (99.5 °F)  Pulse  Av.6  Min: 47  Max: 103 Heart Rate (Monitored): 84  NIBP: (!) 181/80      Respiratory      Respiration: (!) 34, Pulse Oximetry: 99 %, O2 Daily Delivery Respiratory : Room Air with O2 Available        RUL Breath Sounds: Clear, RML Breath Sounds: Clear, RLL Breath Sounds: Diminished, AQUILINO Breath Sounds: Clear, LLL Breath Sounds: Diminished    Fluids    Intake/Output Summary (Last 24 hours) at 18 1347  Last data filed at 18 1200   Gross per 24 hour   Intake            59021 ml   Output            86960 ml   Net             2280 ml       Nutrition  Orders Placed This Encounter   Procedures   • Diet Order Regular     Standing Status:   Standing     Number of Occurrences:   1     Order Specific Question:   Diet:     Answer:   Regular [1]     Order Specific Question:   Texture/Fiber modifications:     Answer:   Dysphagia 3(Mechanical Soft)specify fluid consistency(question 6) [3]     Order Specific Question:   Consistency/Fluid modifications:     Answer:   Thin Liquids [3]     Physical Exam    Constitutional: He appears well-developed and well-nourished. He is cooperative. No distress.   HENT:   Mouth/Throat: Oropharynx is clear and moist. No oropharyngeal exudate.   Eyes: Conjunctivae are normal. Right eye exhibits no discharge. Left eye exhibits no discharge. No scleral icterus.   Neck: No JVD present.   Cardiovascular: Normal rate, regular rhythm and intact distal pulses.    Pulmonary/Chest: Effort normal. No stridor. No respiratory distress. He has no wheezes. He has no rales.   Abdominal: Soft. Bowel sounds are normal. He exhibits no distension. There is no tenderness. There is no rebound.   Genitourinary:   Genitourinary Comments: Hurst and suprapubic catheter with hematuria bright red   Musculoskeletal: He exhibits no edema.   Neurological: He is alert. He displays tremor. He exhibits abnormal muscle tone.   Alert and talkative   Skin: Skin is warm and dry. He is not diaphoretic. No pallor.   Suprapubic cath in place with bandage surrounding it, no erythema    Psychiatric: His affect is labile. His speech is tangential. He is agitated. Cognition and memory are impaired.   Nursing note and vitals reviewed.      Recent Labs      06/29/18   1616  06/30/18   0430  07/01/18   0330   WBC  9.6  10.4  12.9*   RBC  2.82*  2.70*  2.69*   HEMOGLOBIN  8.6*  8.1*  8.1*   HEMATOCRIT  25.2*  24.5*  24.1*   MCV  89.4  90.7  89.6   MCH  30.5  30.0  30.1   MCHC  34.1  33.1*  33.6*   RDW  52.4*  54.5*  54.1*   PLATELETCT  137*  138*  149*   MPV  12.4  13.0*  12.9     Recent Labs      06/29/18   0430  07/01/18   0330   SODIUM  138  133*   POTASSIUM  3.3*  3.4*   CHLORIDE  106  102   CO2  26  24   GLUCOSE  86  100*   BUN  8  6*   CREATININE  1.07  0.87   CALCIUM  7.7*  7.8*     Recent Labs      06/29/18   0755   INR  1.37*                  Assessment/Plan     * Urinary retention- (present on admission)   Assessment & Plan    Due to neurogenic bladder s/p suprapubic catheter placement by urology on 6/21.        Acute  hypoxemic respiratory failure (HCC)   Assessment & Plan    Improved now on room air.  Respiratory therapy following, incentive spirometry and increase activity as tolerated.          Anemia- (present on admission)   Assessment & Plan    In setting of CKD, MDS with bone marrow suppression and gross hematuria.   Stable hovering in 8-8.6 range, no change today  S/p 2 units prbc during this hospital stay  Daily CBC        Elevated blood pressure reading without diagnosis of hypertension- (present on admission)   Assessment & Plan    Blood pressures remain stable if persistently elevated will restart home anti hypertensives        Chronic thrombocytopenia due to MDS (HCC)- (present on admission)   Assessment & Plan    Plts have been low since Sept 2017 and had been stable in the 30's.   Platelets improved since transfusion staying above 100  Hematuria is persistent, TEG normal  Continue to monitor keep platelets over 100        Gross hematuria   Assessment & Plan    Sequelae of placement of suprapubic catheter with MDS history and low platelets.  Persistent hematuria with clots s/p two surgical attempts to correct a split in the dome of the bladder  Formalin bladder instillation done 6/29  Continue alum instillation  Continue CBI  Recommendations per urology        Hypokalemia- (present on admission)   Assessment & Plan    Mild continue oral replacement, bmp in am        DNR (do not resuscitate)- (present on admission)   Assessment & Plan    Affirmed on admission.  POA and advanced directive reviewed.        CKD (chronic kidney disease), stage III- (present on admission)   Assessment & Plan    Stable, remains at baseline.          Myelodysplastic syndrome (HCC)- (present on admission)   Assessment & Plan    - Continue to monitor blood counts, especially platelets  Keep platelets over 100 due to persistent hematuria  - Management as above.        Parkinson's disease with dementia (HCC)- (present on admission)   Assessment  & Plan    Now with delirium  continue home Sinemet  - Continue Namenda and amantadine and celexa  Patient is on seroquel at home, resume this start at higher dose 25 mg tid  Restraints to stop pulling out the CBI            Quality-Core Measures   Reviewed items::  Labs reviewed and Medications reviewed  Hurst catheter::  Neurogenic Bladder  DVT: held 2/2 low plts and anemia.  DVT prophylaxis - mechanical:  SCDs  Ulcer Prophylaxis::  Not indicated

## 2018-07-01 NOTE — PROGRESS NOTES
0700-Report from ROSA Vyas. POC reviewed. Pt sleeping but wakes to voice. CBI reviewed with off going nurse. Sodium chloride irrigation started through CBI as Alum irrigation bag completed.     0800-Report from pharmacy that Alum to be available around 1230 this afternoon.     0930-Pt growing increasingly agitated. Pulling at blankets and ICU monitoring. Trying to get out of bed. PRN ativan administered. Assisted with ADLs per CNA.

## 2018-07-02 LAB
ANION GAP SERPL CALC-SCNC: 6 MMOL/L (ref 0–11.9)
BASOPHILS # BLD AUTO: 0.2 % (ref 0–1.8)
BASOPHILS # BLD: 0.03 K/UL (ref 0–0.12)
BUN SERPL-MCNC: 6 MG/DL (ref 8–22)
CALCIUM SERPL-MCNC: 8 MG/DL (ref 8.4–10.2)
CHLORIDE SERPL-SCNC: 108 MMOL/L (ref 96–112)
CO2 SERPL-SCNC: 24 MMOL/L (ref 20–33)
COMMENT 1642: NORMAL
CREAT SERPL-MCNC: 1.09 MG/DL (ref 0.5–1.4)
EOSINOPHIL # BLD AUTO: 0.41 K/UL (ref 0–0.51)
EOSINOPHIL NFR BLD: 3.2 % (ref 0–6.9)
ERYTHROCYTE [DISTWIDTH] IN BLOOD BY AUTOMATED COUNT: 56.7 FL (ref 35.9–50)
GLUCOSE SERPL-MCNC: 90 MG/DL (ref 65–99)
HCT VFR BLD AUTO: 25 % (ref 42–52)
HGB BLD-MCNC: 8.1 G/DL (ref 14–18)
IMM GRANULOCYTES # BLD AUTO: 0.1 K/UL (ref 0–0.11)
IMM GRANULOCYTES NFR BLD AUTO: 0.8 % (ref 0–0.9)
LYMPHOCYTES # BLD AUTO: 1.12 K/UL (ref 1–4.8)
LYMPHOCYTES NFR BLD: 8.8 % (ref 22–41)
MCH RBC QN AUTO: 29.7 PG (ref 27–33)
MCHC RBC AUTO-ENTMCNC: 32.4 G/DL (ref 33.7–35.3)
MCV RBC AUTO: 91.6 FL (ref 81.4–97.8)
MONOCYTES # BLD AUTO: 7.2 K/UL (ref 0–0.85)
MONOCYTES NFR BLD AUTO: 56.7 % (ref 0–13.4)
NEUTROPHILS # BLD AUTO: 3.83 K/UL (ref 1.82–7.42)
NEUTROPHILS NFR BLD: 30.3 % (ref 44–72)
NRBC # BLD AUTO: 0.06 K/UL
NRBC BLD-RTO: 0.5 /100 WBC
PLATELET # BLD AUTO: 140 K/UL (ref 164–446)
PMV BLD AUTO: 12.8 FL (ref 9–12.9)
POTASSIUM SERPL-SCNC: 3.3 MMOL/L (ref 3.6–5.5)
RBC # BLD AUTO: 2.73 M/UL (ref 4.7–6.1)
SODIUM SERPL-SCNC: 138 MMOL/L (ref 135–145)
WBC # BLD AUTO: 12.7 K/UL (ref 4.8–10.8)

## 2018-07-02 PROCEDURE — 160009 HCHG ANES TIME/MIN: Performed by: UROLOGY

## 2018-07-02 PROCEDURE — 160048 HCHG OR STATISTICAL LEVEL 1-5: Performed by: UROLOGY

## 2018-07-02 PROCEDURE — 99233 SBSQ HOSP IP/OBS HIGH 50: CPT | Mod: GW | Performed by: HOSPITALIST

## 2018-07-02 PROCEDURE — A9270 NON-COVERED ITEM OR SERVICE: HCPCS | Performed by: UROLOGY

## 2018-07-02 PROCEDURE — A9270 NON-COVERED ITEM OR SERVICE: HCPCS

## 2018-07-02 PROCEDURE — 160002 HCHG RECOVERY MINUTES (STAT): Performed by: UROLOGY

## 2018-07-02 PROCEDURE — 700102 HCHG RX REV CODE 250 W/ 637 OVERRIDE(OP): Performed by: UROLOGY

## 2018-07-02 PROCEDURE — 85025 COMPLETE CBC W/AUTO DIFF WBC: CPT

## 2018-07-02 PROCEDURE — 700101 HCHG RX REV CODE 250: Performed by: HOSPITALIST

## 2018-07-02 PROCEDURE — A9270 NON-COVERED ITEM OR SERVICE: HCPCS | Performed by: INTERNAL MEDICINE

## 2018-07-02 PROCEDURE — 160035 HCHG PACU - 1ST 60 MINS PHASE I: Performed by: UROLOGY

## 2018-07-02 PROCEDURE — C1757 CATH, THROMBECTOMY/EMBOLECT: HCPCS | Performed by: UROLOGY

## 2018-07-02 PROCEDURE — 700102 HCHG RX REV CODE 250 W/ 637 OVERRIDE(OP): Performed by: INTERNAL MEDICINE

## 2018-07-02 PROCEDURE — 160039 HCHG SURGERY MINUTES - EA ADDL 1 MIN LEVEL 3: Performed by: UROLOGY

## 2018-07-02 PROCEDURE — 700102 HCHG RX REV CODE 250 W/ 637 OVERRIDE(OP): Performed by: HOSPITALIST

## 2018-07-02 PROCEDURE — 770022 HCHG ROOM/CARE - ICU (200)

## 2018-07-02 PROCEDURE — A4338 INDWELLING CATHETER LATEX: HCPCS | Performed by: UROLOGY

## 2018-07-02 PROCEDURE — 501329 HCHG SET, CYSTO IRRIG Y TUR: Performed by: UROLOGY

## 2018-07-02 PROCEDURE — 700102 HCHG RX REV CODE 250 W/ 637 OVERRIDE(OP)

## 2018-07-02 PROCEDURE — 80048 BASIC METABOLIC PNL TOTAL CA: CPT

## 2018-07-02 PROCEDURE — 0W3R8ZZ CONTROL BLEEDING IN GENITOURINARY TRACT, VIA NATURAL OR ARTIFICIAL OPENING ENDOSCOPIC: ICD-10-PCS | Performed by: UROLOGY

## 2018-07-02 PROCEDURE — A9270 NON-COVERED ITEM OR SERVICE: HCPCS | Performed by: HOSPITALIST

## 2018-07-02 PROCEDURE — 700105 HCHG RX REV CODE 258: Performed by: UROLOGY

## 2018-07-02 PROCEDURE — 700101 HCHG RX REV CODE 250: Performed by: UROLOGY

## 2018-07-02 PROCEDURE — 160028 HCHG SURGERY MINUTES - 1ST 30 MINS LEVEL 3: Performed by: UROLOGY

## 2018-07-02 PROCEDURE — 500880 HCHG PACK, CYSTO W/SEP LEGGINGS: Performed by: UROLOGY

## 2018-07-02 PROCEDURE — 3E0K8GC INTRODUCTION OF OTHER THERAPEUTIC SUBSTANCE INTO GENITOURINARY TRACT, VIA NATURAL OR ARTIFICIAL OPENING ENDOSCOPIC: ICD-10-PCS | Performed by: UROLOGY

## 2018-07-02 PROCEDURE — 160036 HCHG PACU - EA ADDL 30 MINS PHASE I: Performed by: UROLOGY

## 2018-07-02 PROCEDURE — 700111 HCHG RX REV CODE 636 W/ 250 OVERRIDE (IP)

## 2018-07-02 PROCEDURE — 700101 HCHG RX REV CODE 250

## 2018-07-02 RX ORDER — LISINOPRIL 20 MG/1
20 TABLET ORAL DAILY
Status: DISCONTINUED | OUTPATIENT
Start: 2018-07-02 | End: 2018-07-03

## 2018-07-02 RX ADMIN — SENNOSIDES AND DOCUSATE SODIUM 2 TABLET: 8.6; 5 TABLET ORAL at 08:14

## 2018-07-02 RX ADMIN — Medication 30 G: at 00:08

## 2018-07-02 RX ADMIN — MEMANTINE HYDROCHLORIDE 10 MG: 10 TABLET ORAL at 08:14

## 2018-07-02 RX ADMIN — BISACODYL 10 MG: 10 SUPPOSITORY RECTAL at 00:49

## 2018-07-02 RX ADMIN — CITALOPRAM HYDROBROMIDE 20 MG: 20 TABLET ORAL at 08:14

## 2018-07-02 RX ADMIN — Medication 30 G: at 08:55

## 2018-07-02 RX ADMIN — CEFUROXIME AXETIL 250 MG: 250 TABLET ORAL at 21:38

## 2018-07-02 RX ADMIN — QUETIAPINE 25 MG: 25 TABLET ORAL at 21:38

## 2018-07-02 RX ADMIN — Medication 10 G: at 22:14

## 2018-07-02 RX ADMIN — MEMANTINE HYDROCHLORIDE 10 MG: 10 TABLET ORAL at 21:38

## 2018-07-02 RX ADMIN — Medication 10 G: at 20:02

## 2018-07-02 RX ADMIN — SODIUM CHLORIDE, POTASSIUM CHLORIDE, SODIUM LACTATE AND CALCIUM CHLORIDE: 600; 310; 30; 20 INJECTION, SOLUTION INTRAVENOUS at 08:55

## 2018-07-02 RX ADMIN — Medication 30 G: at 12:50

## 2018-07-02 RX ADMIN — CEFUROXIME AXETIL 250 MG: 250 TABLET ORAL at 08:14

## 2018-07-02 RX ADMIN — AMANTADINE HYDROCHLORIDE 100 MG: 100 CAPSULE ORAL at 21:38

## 2018-07-02 RX ADMIN — OXYBUTYNIN CHLORIDE 5 MG: 5 TABLET ORAL at 21:38

## 2018-07-02 RX ADMIN — CARBIDOPA AND LEVODOPA 1 TABLET: 50; 200 TABLET, EXTENDED RELEASE ORAL at 21:38

## 2018-07-02 RX ADMIN — SODIUM CHLORIDE, POTASSIUM CHLORIDE, SODIUM LACTATE AND CALCIUM CHLORIDE: 600; 310; 30; 20 INJECTION, SOLUTION INTRAVENOUS at 23:30

## 2018-07-02 RX ADMIN — OXYBUTYNIN CHLORIDE 5 MG: 5 TABLET ORAL at 08:14

## 2018-07-02 RX ADMIN — AMANTADINE HYDROCHLORIDE 100 MG: 100 CAPSULE ORAL at 08:14

## 2018-07-02 ASSESSMENT — PAIN SCALES - GENERAL
PAINLEVEL_OUTOF10: 0

## 2018-07-02 NOTE — PROGRESS NOTES
1500: Report to ROSA Ness in pre-op.     1510: Pt down to pre-op by bed with 2 surgery RN, consents printed and on chart. Chart with pt. CBI with alum medication in use.

## 2018-07-02 NOTE — PROGRESS NOTES
Pt arousable, lethargic, maintained NPO at this time per RN discretion/pt safety and MD order (see previous note), will perform swallow eval appropriately. Q2h turns implemented. CBI monitored closely and titrated down appropriately, output yellow/clear with small sediment at this time. IV access reestablished and continuous fluids restarted per MAR. Tylenol suppository implemented for fever per MD order. All safety precautions in place, bed alarm in use, side rails up x3, call light within reach, restraints monitored frequently and per protocol for pt safety and prevention of skin breakdown. Will continue to monitor.

## 2018-07-02 NOTE — CARE PLAN
Problem: Safety  Goal: Will remain free from injury  Outcome: PROGRESSING AS EXPECTED  All safety precautions in place, bed alarm in use, side rails up x3, call light within reach, restraints monitored frequently and per protocol for pt safety    Problem: Infection  Goal: Will remain free from infection  Outcome: PROGRESSING AS EXPECTED  T-max 100.2, prn tylenol suppository administered and pt temp now 98.6, will continue to monitor and intervene appropriately    Problem: Bowel/Gastric:  Goal: Normal bowel function is maintained or improved  Outcome: PROGRESSING AS EXPECTED  Prn suppository implemented for promotion of pt bowel movement, bowel sounds hypoactive    Problem: Skin Integrity  Goal: Risk for impaired skin integrity will decrease  Outcome: PROGRESSING AS EXPECTED  Q2h turns implemented, pillows in use for support/repositioning, rinaldi care implemented appropriately for scant amount bloody drainage from urethra    Problem: Urinary Elimination:  Goal: Ability to reestablish a normal urinary elimination pattern will improve  Outcome: PROGRESSING AS EXPECTED  CBI titrated appropriately with CBI medication Alum implemented per MD order, CBI has been able to titrate to slower rate in small amounts through shift so far, monitored for complications appropriately, output yellow/small sediment at this time. Will continue to monitor.

## 2018-07-02 NOTE — PROGRESS NOTES
[facility] Hospitalist Progress Note    Date of Service: 2018    Chief Complaint  77 y.o. male admitted 2018 with myelodysplastic syndrome, CKD stage III, Parkinson's disease with dementia, with issues with urinary retention due to neurogenic bladder.  He is admitted following suprapubic catheter placement.  Perioperatively, he had stable thrombocytopenia, and had platelet transfusion.  However, he had gross hematuria and elevated BPs postoperatively.     Interval Problem Update  Agitated and confused overnight, had to be put in restraints due to pulling out the CBI. Better with use of IM haldol and resuming oral seroquel.  Discussed with RN still having hematuria, few clots.  Discussed with urology BELKIS Winn; going back to OR today.    Consultants/Specialty  Internal Medicine - management of medical issues     Disposition   anticipate SNF once acute issues are stabilized.        Review of Systems   Unable to perform ROS: Dementia      Physical Exam  Laboratory/Imaging   Hemodynamics  Temp (24hrs), Av.1 °C (98.8 °F), Min:36.7 °C (98 °F), Max:37.9 °C (100.2 °F)   Temperature: 36.8 °C (98.2 °F)  Pulse  Av.8  Min: 47  Max: 103 Heart Rate (Monitored): (!) 55  NIBP: (!) 168/79      Respiratory      Respiration: (!) 24, Pulse Oximetry: 94 %, O2 Daily Delivery Respiratory : Room Air with O2 Available        RUL Breath Sounds: Clear, RML Breath Sounds: Diminished, RLL Breath Sounds: Diminished, AQUILINO Breath Sounds: Clear, LLL Breath Sounds: Diminished    Fluids    Intake/Output Summary (Last 24 hours) at 18 1623  Last data filed at 18 1548   Gross per 24 hour   Intake            50365 ml   Output            23774 ml   Net            -4550 ml       Nutrition  Orders Placed This Encounter   Procedures   • DIET NPO     Standing Status:   Standing     Number of Occurrences:   1     Order Specific Question:   Restrict to:     Answer:   Strict [1]     Physical Exam   Constitutional: He appears  well-developed and well-nourished. He is cooperative. No distress.   HENT:   Mouth/Throat: Oropharynx is clear and moist. No oropharyngeal exudate.   Eyes: Conjunctivae are normal. Right eye exhibits no discharge. Left eye exhibits no discharge. No scleral icterus.   Neck: No JVD present.   Cardiovascular: Normal rate, regular rhythm and intact distal pulses.    Pulmonary/Chest: Effort normal. No stridor. No respiratory distress. He has no wheezes. He has no rales.   Abdominal: Soft. Bowel sounds are normal. He exhibits no distension. There is no tenderness. There is no rebound.   Genitourinary:   Genitourinary Comments: Hurst and suprapubic catheter with hematuria bright red   Musculoskeletal: He exhibits no edema.   Neurological: He is alert. He displays tremor. He exhibits abnormal muscle tone.   Alert and talkative   Skin: Skin is warm and dry. He is not diaphoretic. No pallor.   Suprapubic cath in place with bandage surrounding it, no erythema    Psychiatric: His affect is labile. His speech is tangential. He is agitated. Cognition and memory are impaired.   Nursing note and vitals reviewed.      Recent Labs      06/30/18   0430  07/01/18   0330  07/02/18   0308   WBC  10.4  12.9*  12.7*   RBC  2.70*  2.69*  2.73*   HEMOGLOBIN  8.1*  8.1*  8.1*   HEMATOCRIT  24.5*  24.1*  25.0*   MCV  90.7  89.6  91.6   MCH  30.0  30.1  29.7   MCHC  33.1*  33.6*  32.4*   RDW  54.5*  54.1*  56.7*   PLATELETCT  138*  149*  140*   MPV  13.0*  12.9  12.8     Recent Labs      07/01/18   0330  07/02/18   0308   SODIUM  133*  138   POTASSIUM  3.4*  3.3*   CHLORIDE  102  108   CO2  24  24   GLUCOSE  100*  90   BUN  6*  6*   CREATININE  0.87  1.09   CALCIUM  7.8*  8.0*                      Assessment/Plan     * Urinary retention- (present on admission)   Assessment & Plan    Due to neurogenic bladder s/p suprapubic catheter placement by urology on 6/21.        Acute hypoxemic respiratory failure (HCC)   Assessment & Plan    On 2L,  atelectasis on chest xray  Respiratory therapy following, incentive spirometry and increase activity as tolerated.          Anemia- (present on admission)   Assessment & Plan    In setting of CKD, MDS with bone marrow suppression and gross hematuria.   Stable hovering in 8-8.6 range, no change again today  S/p 2 units prbc during this hospital stay  Daily CBC        Elevated blood pressure reading without diagnosis of hypertension- (present on admission)   Assessment & Plan    Blood pressures increasing  Anti hypertensives were stopped on admit but none listed on home meds?  Start oral lisinopril and monitor        Chronic thrombocytopenia due to MDS (HCC)- (present on admission)   Assessment & Plan    Plts have been low since Sept 2017 and had been stable in the 30's.   Platelets improved since transfusion staying above 100  Hematuria is persistent, TEG normal  Continue to monitor keep platelets over 100        Gross hematuria   Assessment & Plan    Sequelae of placement of suprapubic catheter with MDS history and low platelets.  Persistent hematuria with clots s/p three surgical attempts to correct a split in the dome of the bladder, going back to OR again today for fulguration  Formalin bladder instillation done 6/29  Continue alum instillation unless stopped per urology recommendations  CBI per urology  Poor prognosis          Hypokalemia- (present on admission)   Assessment & Plan    Mild continue oral replacement, bmp in am        DNR (do not resuscitate)- (present on admission)   Assessment & Plan    Affirmed on admission.  POA and advanced directive reviewed.        CKD (chronic kidney disease), stage III- (present on admission)   Assessment & Plan    Stable, remains at baseline.          Myelodysplastic syndrome (HCC)- (present on admission)   Assessment & Plan    - Continue to monitor blood counts, especially platelets  Keep platelets over 100 due to persistent hematuria  - Management as above.         Parkinson's disease with dementia (HCC)- (present on admission)   Assessment & Plan    Now with delirium  continue home Sinemet  - Continue Namenda and amantadine and celexa  Patient is on seroquel at home, resume this start at higher dose 25 mg tid  Restraints to stop pulling out the CBI            Quality-Core Measures   Reviewed items::  Labs reviewed and Medications reviewed  Hurst catheter::  Neurogenic Bladder  DVT: held 2/2 low plts and anemia.  DVT prophylaxis - mechanical:  SCDs  Ulcer Prophylaxis::  Not indicated

## 2018-07-02 NOTE — PROGRESS NOTES
Report to Asael. Pt resting more comfortably at this time. In no apparent distress or discomfort. Bilateral soft wrist restraints in place.

## 2018-07-02 NOTE — PROGRESS NOTES
IV access lost on patient. Unable to obtain new IV due pt's agitation. Dr. Solano notified. Ok to keep IV out and encourage oral fluids. Will switch haldol to IM for agitation.

## 2018-07-02 NOTE — PROGRESS NOTES
0400 - Pt full bed bath implemented. Pt bowel movement after intervention with suppository, pt cleaned, barrier cream implemented. CBI running at very slow rate with straw/yellow output.    0445 - CBI stopped. Output straw/yellow with no sediment/clots noted. Output monitored very closely.    0530 - Red/pink with small clots/sediment noted, CBI restarted at slow rate, output back to straw/yellow with sediment. Will continue to attempt to stop CBI and monitor closely.

## 2018-07-02 NOTE — PROGRESS NOTES
"Urology Progress Note    S: Pt is sedated and in restraints because he was pulling out CBI last night.  Persistent hematuria despite several clot evacuation/fulguration procedures and Alum CBI.      O:   Blood pressure 148/77, pulse 65, temperature 37.2 °C (98.9 °F), resp. rate 12, height 1.905 m (6' 3\"), weight 82.4 kg (181 lb 10.5 oz), SpO2 97 %.  Recent Labs      07/01/18   0330  07/02/18   0308   SODIUM  133*  138   POTASSIUM  3.4*  3.3*   CHLORIDE  102  108   CO2  24  24   GLUCOSE  100*  90   BUN  6*  6*   CREATININE  0.87  1.09   CALCIUM  7.8*  8.0*     Recent Labs      06/30/18   0430  07/01/18   0330  07/02/18   0308   WBC  10.4  12.9*  12.7*   RBC  2.70*  2.69*  2.73*   HEMOGLOBIN  8.1*  8.1*  8.1*   HEMATOCRIT  24.5*  24.1*  25.0*   MCV  90.7  89.6  91.6   MCH  30.0  30.1  29.7   MCHC  33.1*  33.6*  32.4*   RDW  54.5*  54.1*  56.7*   PLATELETCT  138*  149*  140*   MPV  13.0*  12.9  12.8         Intake/Output Summary (Last 24 hours) at 07/02/18 1018  Last data filed at 07/02/18 1000   Gross per 24 hour   Intake            87935 ml   Output            56827 ml   Net            -4375 ml       Exam:  Urine: dark bloody urine with rinaldi on Alum CBI      A/P:    Active Hospital Problems    Diagnosis   • Acute hypoxemic respiratory failure (HCC) [J96.01]     Priority: High   • Urinary retention [R33.9]     Priority: High   • Anemia [D64.9]     Priority: Medium   • Chronic thrombocytopenia due to MDS (HCC) [D69.6]     Priority: Medium   • Elevated blood pressure reading without diagnosis of hypertension [R03.0]     Priority: Medium   • CKD (chronic kidney disease), stage III [N18.3]     Priority: Low   • Myelodysplastic syndrome (HCC) [D46.9]     Priority: Low   • Parkinson's disease with dementia (HCC) [G20]     Priority: Low   • Gross hematuria [R31.0]   • Hypokalemia [E87.6]   • DNR (do not resuscitate) [Z66]       PLAN:  NPO.  He is scheduled for cysto clot evacuation and fulguration this afternoon with Dr" Heath.      POC discussed with RN, Dr Solano and Dr Joe.

## 2018-07-02 NOTE — PROGRESS NOTES
Urology at bedside, POC discussed. Informed by hospitalist RN to make pt NPO and urology to take to surgery. Pt had <25% breakfast at 0800.

## 2018-07-02 NOTE — CARE PLAN
Problem: Safety  Goal: Will remain free from falls    Intervention: Implement fall precautions  Bed in low position, pt near nurses station, treaded slipper socks in place, pt near nurses station, call lightin reach and bed alarm on. Pt will remain fee from falls.       Problem: Bowel/Gastric:  Goal: Will not experience complications related to bowel motility    Intervention: Implement Bowel Protocol, if applicable  Bowel protocol in place. Pt had BM this AM. Medications PRN.       Problem: Urinary Elimination:  Goal: Ability to reestablish a normal urinary elimination pattern will improve    Intervention: Assess and monitor for signs and symptoms of urinary retention  CBI with Alum medication in use, titrate for clear urine, urology following.

## 2018-07-02 NOTE — PROGRESS NOTES
Report received from night RN this AM. POC discussed. Assessment completed. Lines and gtts verified. CBI with Alum infusing, titrate for clear urine per MD order, urine clear/hazy with small clots. Scant bloody drainage from urethra noted. Diet as tolerated. Pt resting in bed with eyes closed, b/l wrist restraints in place. Bed alarm on and call light tin reach.

## 2018-07-02 NOTE — PROGRESS NOTES
Urology    Hematuria continues despite multiple procedures with fulguration, irrigation, formalin instillation and alum irrigation.  Will take back to OR for another procedure.with clot evacuation, fulguration of bladder  and will instill higher dose of formalin.  Will discuss with spouse later today.     Aditya Joe MD

## 2018-07-02 NOTE — PROGRESS NOTES
Wife Elizabeth at bedside this afternoon, updated on care. Wife to run errands and return this afternoon, verbalizes understanding for surgery today at 1600. Pt remains NPO. CBI in use with Alum medication in use.

## 2018-07-02 NOTE — PROGRESS NOTES
Report received. Assessment complete. Pt lethargic, arouses to voice. This RN paged and notified Dr. Martines pt lethargic, not appropriate for swallow eval at this time, had received ativan/haldol discussed holding 2100 PO medications, will attempt to administer when pt more awake but orders okay to hold for now. Further discussed with Dr. Martines pt temp 100.2, discussed PO/suppository tylenol, MD notified this RN she will put orders in. CBI in use, alum medication restocked during day, implemented per MAR and titrated per MD order, CBI output yellow/hazy with small sediment at this time. Pt q2h turns implemented, pillows in use for support/repositioning. All safety precautions in place. Will continue to monitor and keep pt safe.

## 2018-07-03 LAB
ANION GAP SERPL CALC-SCNC: 8 MMOL/L (ref 0–11.9)
BASOPHILS # BLD AUTO: 0.1 % (ref 0–1.8)
BASOPHILS # BLD: 0.01 K/UL (ref 0–0.12)
BUN SERPL-MCNC: 10 MG/DL (ref 8–22)
CALCIUM SERPL-MCNC: 7.9 MG/DL (ref 8.4–10.2)
CHLORIDE SERPL-SCNC: 105 MMOL/L (ref 96–112)
CO2 SERPL-SCNC: 24 MMOL/L (ref 20–33)
CREAT SERPL-MCNC: 1.09 MG/DL (ref 0.5–1.4)
EOSINOPHIL # BLD AUTO: 0.06 K/UL (ref 0–0.51)
EOSINOPHIL NFR BLD: 0.5 % (ref 0–6.9)
ERYTHROCYTE [DISTWIDTH] IN BLOOD BY AUTOMATED COUNT: 58 FL (ref 35.9–50)
GLUCOSE SERPL-MCNC: 158 MG/DL (ref 65–99)
HCT VFR BLD AUTO: 28.6 % (ref 42–52)
HGB BLD-MCNC: 9.5 G/DL (ref 14–18)
IMM GRANULOCYTES # BLD AUTO: 0.18 K/UL (ref 0–0.11)
IMM GRANULOCYTES NFR BLD AUTO: 1.6 % (ref 0–0.9)
LYMPHOCYTES # BLD AUTO: 0.56 K/UL (ref 1–4.8)
LYMPHOCYTES NFR BLD: 5.1 % (ref 22–41)
MCH RBC QN AUTO: 30.4 PG (ref 27–33)
MCHC RBC AUTO-ENTMCNC: 33.2 G/DL (ref 33.7–35.3)
MCV RBC AUTO: 91.7 FL (ref 81.4–97.8)
MONOCYTES # BLD AUTO: 0.87 K/UL (ref 0–0.85)
MONOCYTES NFR BLD AUTO: 7.9 % (ref 0–13.4)
NEUTROPHILS # BLD AUTO: 9.28 K/UL (ref 1.82–7.42)
NEUTROPHILS NFR BLD: 84.8 % (ref 44–72)
NRBC # BLD AUTO: 0.04 K/UL
NRBC BLD-RTO: 0.4 /100 WBC
PLATELET # BLD AUTO: 144 K/UL (ref 164–446)
PMV BLD AUTO: 12.5 FL (ref 9–12.9)
POTASSIUM SERPL-SCNC: 3.8 MMOL/L (ref 3.6–5.5)
RBC # BLD AUTO: 3.12 M/UL (ref 4.7–6.1)
SODIUM SERPL-SCNC: 137 MMOL/L (ref 135–145)
WBC # BLD AUTO: 11 K/UL (ref 4.8–10.8)

## 2018-07-03 PROCEDURE — 85025 COMPLETE CBC W/AUTO DIFF WBC: CPT

## 2018-07-03 PROCEDURE — 80048 BASIC METABOLIC PNL TOTAL CA: CPT

## 2018-07-03 PROCEDURE — 700102 HCHG RX REV CODE 250 W/ 637 OVERRIDE(OP): Performed by: UROLOGY

## 2018-07-03 PROCEDURE — A9270 NON-COVERED ITEM OR SERVICE: HCPCS | Performed by: HOSPITALIST

## 2018-07-03 PROCEDURE — 700105 HCHG RX REV CODE 258: Performed by: UROLOGY

## 2018-07-03 PROCEDURE — 99233 SBSQ HOSP IP/OBS HIGH 50: CPT | Mod: GW | Performed by: HOSPITALIST

## 2018-07-03 PROCEDURE — 700102 HCHG RX REV CODE 250 W/ 637 OVERRIDE(OP): Performed by: INTERNAL MEDICINE

## 2018-07-03 PROCEDURE — 700101 HCHG RX REV CODE 250: Performed by: UROLOGY

## 2018-07-03 PROCEDURE — 700105 HCHG RX REV CODE 258: Performed by: HOSPITALIST

## 2018-07-03 PROCEDURE — 700102 HCHG RX REV CODE 250 W/ 637 OVERRIDE(OP): Performed by: HOSPITALIST

## 2018-07-03 PROCEDURE — A9270 NON-COVERED ITEM OR SERVICE: HCPCS | Performed by: INTERNAL MEDICINE

## 2018-07-03 PROCEDURE — 770022 HCHG ROOM/CARE - ICU (200)

## 2018-07-03 RX ORDER — SODIUM CHLORIDE 9 MG/ML
500 INJECTION, SOLUTION INTRAVENOUS ONCE
Status: COMPLETED | OUTPATIENT
Start: 2018-07-03 | End: 2018-07-03

## 2018-07-03 RX ADMIN — AMANTADINE HYDROCHLORIDE 100 MG: 100 CAPSULE ORAL at 08:42

## 2018-07-03 RX ADMIN — Medication 10 G: at 04:32

## 2018-07-03 RX ADMIN — SENNOSIDES AND DOCUSATE SODIUM 2 TABLET: 8.6; 5 TABLET ORAL at 08:43

## 2018-07-03 RX ADMIN — Medication 10 G: at 12:10

## 2018-07-03 RX ADMIN — AMANTADINE HYDROCHLORIDE 100 MG: 100 CAPSULE ORAL at 19:41

## 2018-07-03 RX ADMIN — OXYBUTYNIN CHLORIDE 5 MG: 5 TABLET ORAL at 15:31

## 2018-07-03 RX ADMIN — MEMANTINE HYDROCHLORIDE 10 MG: 10 TABLET ORAL at 19:41

## 2018-07-03 RX ADMIN — QUETIAPINE 25 MG: 25 TABLET ORAL at 19:41

## 2018-07-03 RX ADMIN — OXYBUTYNIN CHLORIDE 5 MG: 5 TABLET ORAL at 19:41

## 2018-07-03 RX ADMIN — CEFUROXIME AXETIL 250 MG: 250 TABLET ORAL at 19:41

## 2018-07-03 RX ADMIN — SODIUM CHLORIDE 500 ML: 9 INJECTION, SOLUTION INTRAVENOUS at 09:38

## 2018-07-03 RX ADMIN — OXYBUTYNIN CHLORIDE 5 MG: 5 TABLET ORAL at 08:42

## 2018-07-03 RX ADMIN — QUETIAPINE 25 MG: 25 TABLET ORAL at 06:05

## 2018-07-03 RX ADMIN — Medication 10 G: at 19:44

## 2018-07-03 RX ADMIN — CEFUROXIME AXETIL 250 MG: 250 TABLET ORAL at 08:43

## 2018-07-03 RX ADMIN — SODIUM CHLORIDE, POTASSIUM CHLORIDE, SODIUM LACTATE AND CALCIUM CHLORIDE: 600; 310; 30; 20 INJECTION, SOLUTION INTRAVENOUS at 12:10

## 2018-07-03 RX ADMIN — CARBIDOPA AND LEVODOPA 1 TABLET: 50; 200 TABLET, EXTENDED RELEASE ORAL at 19:41

## 2018-07-03 RX ADMIN — CITALOPRAM HYDROBROMIDE 20 MG: 20 TABLET ORAL at 08:42

## 2018-07-03 RX ADMIN — Medication 10 G: at 01:09

## 2018-07-03 RX ADMIN — SENNOSIDES AND DOCUSATE SODIUM 2 TABLET: 8.6; 5 TABLET ORAL at 19:41

## 2018-07-03 RX ADMIN — MEMANTINE HYDROCHLORIDE 10 MG: 10 TABLET ORAL at 08:43

## 2018-07-03 ASSESSMENT — PAIN SCALES - GENERAL: PAINLEVEL_OUTOF10: 0

## 2018-07-03 NOTE — DIETARY
"Nutrition services: Day 11 of admit.  Rafael Felix is a 77 y.o. male with admitting DX of Retention of urine.     Pt with diet fluctuations between NPO, Clear Liquid and Regular Dysphagia 3. Overall, pt with sub-optimal intake since 6/28 noting prior to today pt either NPO or intake 50% or less. Per MD progress note on 7/2, pt with recurrent hematuria and has had multiple cytoscopies for which his diet order is NPO. Noted on 7/2 restraints used the evening of 7/1 due to agitation and confusion, trying to pull out the CBI and IM haldol utilized.     This writer visited pt and nursing today. Nursing noted following pt's most recent cytoscopy he was advanced to Dysphagia III with thin liquids and supplements continued. Stated with full feed assist for breakfast the pt consumed 50-75%. Nursing did note, pt did not receive a boost this AM with his breakfast. Upon review, due to frequent NPO status boost was removed. This writer replaced boost plus vanilla in computrition TID with meals.     Assessment:  Height: 190.5 cm (6' 3\")  Weight: 82.4 kg (181 lb 10.5 oz)  Body mass index is 22.71 kg/m².   Diet/Intake: Regular Dysphagia III with thin liquids 7/3  2 meals with assistance (full for breakfast and partial for lunch) 50-75% 6/29-7/1 7 meals of 50% or less.     Labs, MAR, and chart reviewed.     Malnutrition Risk: age, recent suboptimal intake, recurrent urological procedures, BMI 22.71     Recommendations/Plan:  1. Diet as ordered. Supplements ordered and replaced in computrition TID with meals.   2. Encourage intake of 50% or greater.   3. Document intake of all meals/supplements  as % taken in ADL's to provide interdisciplinary communication across all shifts.   4. Monitor weight.  5. Nutrition rep will continue to see patient for ongoing meal and snack preferences.           "

## 2018-07-03 NOTE — CARE PLAN
Problem: Safety  Goal: Will remain free from falls    Intervention: Implement fall precautions  Treaded slipper socks in place, bed in low position, pt near nurses station, hourly rounds, call light in reach and bed alarm on. Pt will remain free from falls.       Problem: Knowledge Deficit  Goal: Knowledge of disease process/condition, treatment plan, diagnostic tests, and medications will improve    Intervention: Explain information regarding disease process/condition, treatment plan, diagnostic tests, and medications and document in education  POC discussed with pt. Discussed POC with pt's wife Elizabeth by telephone. Questions answered. Encourage PO intake. Pt ate breakfast this AM.       Problem: Skin Integrity  Goal: Skin Integrity is maintained or improved    Intervention: TURN EVERY 2 HOURS WHILE ON BEDREST  Turn and reposition pt every 2 hours, pillows for positioning and elevating heels.

## 2018-07-03 NOTE — CARE PLAN
"Problem: Pain Management  Goal: Pain level will decrease to patient's comfort goal  Outcome: PROGRESSING AS EXPECTED  Pt appears resting and calm, states \"no\" to pain, will continue to monitor and implement pain management appropriately.    Problem: Skin Integrity  Goal: Risk for impaired skin integrity will decrease  Outcome: PROGRESSING AS EXPECTED  Q2h turns implemented, pillows in use for support/repositioning; scant to moderate bleeding fom cath from urethra around cath, kept clean appropriately with rinaldi care implemented      "

## 2018-07-03 NOTE — PROGRESS NOTES
1940 - Report received from PACU Selam.     1955 -  Pt on unit. ICU monitoring implemented. Pt lines and gtts verified and in use appropriately. Pt A&O x1 to self, impulsive at times, frequent reeducation regarding not pulling at lines. Pt CBI in use and titrated appropriately, suprapubic cath kept to gentle tension per Dr. Joe orders. Q2h turns implemented, pillows in use for support/repositioning. Pt transitioned from 8L oxymask to 2L NC, O2 sats maintaining >92%. All safety precautions in place, bed alarm in use, side rails up x3, restraints monitored frequently and per protocol for pt safety, call light within reach. Will continue to monitor.

## 2018-07-03 NOTE — OR SURGEON
Immediate Post OP Note    PreOp Diagnosis: Persistent  gross hematuria from baldder    PostOp Diagnosis: same; Diffuse hemorrhagic cystitis    Procedure(s):  CYSTOSCOPY - EVACUATION OF CLOTS, FULGERATION OF BLADDER,  INSTITUTION OF FORMALIN - Wound Class: Clean    Surgeon(s):  Aditya Joe M.D.    Anesthesiologist/Type of Anesthesia:  Anesthesiologist: Patrice Olivas III, M.D./General    Surgical Staff:  Circulator: Teddy Duran R.N.  Relief Scrub: Marcia Wayne; Leona Armstrong  Scrub Person: Gregory Rader    Specimens removed if any:  * No specimens in log *    Estimated Blood Loss: minimal    Findings: organized clot and diffuse bleeding from bladder epithelium    Complications: none.  Good control of bladder bleeding at end of procedure.        7/2/2018 6:36 PM Aditya Joe M.D.

## 2018-07-03 NOTE — PROGRESS NOTES
0700: Report received from night RN, POC discussed. CBI with Alum medication in use. Urine clear, yellow, with small red/brown flakes.    0800:  Assessment completed. Lines and gtts verified. Dr. Joe, urology, at bedside. POC discussed. Continue to infuse Alum and alternate with NS, continue Alum today and stop tomorrow. Pt turned and repositioned.     0835: Pt's wife Elizabeth called for update. Informed wife that pt eating breakfast now. Wife verbalized understanding.     0900: Dr. Pak at bedside, POC discussed.     0930: BP 64/49 (54), HR 60's. Pt opens eyes to voice, following simple commands. Dr. Pak informed, order for 500 ml NS bolus x 1.     1045: BP remains 70's/40's, MAP 50's after NS bolus completed. HR 60's, pt continues to open eyes to voice and follow simple commands. Dr. Pak paged for orders.     1125: BP 80's/40-50's with MAP > 60. Pt awake visiting with family who is at bedside.    1230: Wife at bedside. Pt feeding self lunch. Denies dizziness or lightheadness. Oriented to self, HR 60's.      1445: Dr. Pak at bedside, updated on care. Manual BP 86/50, pt remains asymptomatic. HR 60's, remains alert and following commands, PEREZ, denies dizziness or lightheadness. Dr. Pak aware of SBP 70-80's/40-50's. Continue to monitor as pt asymptomatic.

## 2018-07-03 NOTE — OR NURSING
1804 To PACU from OR via bed, sleeping, respirations spontaneous and labored via LMA, anesthesia aware and at bedside. Output clear/yellowish tint.  1825 Pt rouses to verbal stimuli, LMA removed by anesthesia.  1847 Output bright red, CBI flow titrated.  1900 Dr Joe at bedside to evaluate pt. Pt discovered to be bleeding from around rinaldi catheter. Traction applied to penile catheter.   1915 Output clear/yellowish tint. Bedding changed, pt repositioned. Pt denies pain when asked.  1930 No changes. Report to ROSA Vyas.  1941 Pt transferred back to ICU.   1945 Pt's contact updated.

## 2018-07-03 NOTE — PROGRESS NOTES
[facility] Hospitalist Progress Note    Date of Service: 7/3/2018    Chief Complaint  77 y.o. male admitted 2018 with myelodysplastic syndrome, CKD stage III, Parkinson's disease with dementia, with issues with urinary retention due to neurogenic bladder.  He is admitted following suprapubic catheter placement.  Perioperatively, he had stable thrombocytopenia, and had platelet transfusion.  However, he had gross hematuria and elevated BPs postoperatively.     Interval Problem Update  Agitated and confused overnight, had to be put in restraints due to pulling out the CBI. Better with use of IM haldol and resuming oral seroquel.  Discussed with RN still having hematuria, few clots.  Discussed with urology BELKIS Winn; going back to OR today.    7/3  Patient is AAox2, very pleasant.  s/p evacuation and bladder irrigation yesterday  Continue continuous irrigation   Patient still on oxygen mask, oxygenation improving  Continue RT protocol, duo nebs, Pep therapy if warranted, and incentive spirometry.   Continue to monitor hgb/hct closely.       Consultants/Specialty  Internal Medicine    Disposition  TBD      Review of Systems   Unable to perform ROS: Dementia      Physical Exam  Laboratory/Imaging   Hemodynamics  Temp (24hrs), Av.4 °C (97.6 °F), Min:36.1 °C (97 °F), Max:37.2 °C (98.9 °F)   Temperature: 36.2 °C (97.2 °F)  Pulse  Av.3  Min: 47  Max: 103 Heart Rate (Monitored): 68  NIBP: 109/66      Respiratory      Respiration: 14, Pulse Oximetry: 98 %, O2 Daily Delivery Respiratory : Room Air with O2 Available     Work Of Breathing / Effort: Mild  RUL Breath Sounds: Clear, RML Breath Sounds: Diminished, RLL Breath Sounds: Diminished, AQUILINO Breath Sounds: Clear, LLL Breath Sounds: Diminished    Fluids    Intake/Output Summary (Last 24 hours) at 18 0729  Last data filed at 18 0600   Gross per 24 hour   Intake            07305 ml   Output            29236 ml   Net            -1285 ml        Nutrition  Orders Placed This Encounter   Procedures   • DIET NPO     Standing Status:   Standing     Number of Occurrences:   1     Order Specific Question:   Restrict to:     Answer:   Strict [1]     Physical Exam   Constitutional: He appears well-developed and well-nourished.   HENT:   Head: Normocephalic and atraumatic.   Mouth/Throat: No oropharyngeal exudate.   Eyes: Conjunctivae are normal. Pupils are equal, round, and reactive to light. Right eye exhibits no discharge. No scleral icterus.   Neck: Neck supple. No JVD present. No thyromegaly present.   Cardiovascular: Intact distal pulses.    No murmur heard.  Pulses:       Dorsalis pedis pulses are 2+ on the right side, and 2+ on the left side.   Cap refill < 3 s   Pulmonary/Chest: Effort normal and breath sounds normal. No stridor. No respiratory distress. He has no wheezes. He has no rales.   Abdominal: Soft. Bowel sounds are normal. He exhibits no distension. There is no tenderness. There is no rebound.   Genitourinary:   Genitourinary Comments: Suprapubic catheter placed   Musculoskeletal: Normal range of motion. He exhibits no edema.   Neurological: He is alert. He is disoriented.   Skin: Skin is warm and dry. No erythema.   Psychiatric: His behavior is normal. His affect is labile. His speech is tangential.       Recent Labs      07/01/18 0330 07/02/18 0308 07/03/18 0315   WBC  12.9*  12.7*  11.0*   RBC  2.69*  2.73*  3.12*   HEMOGLOBIN  8.1*  8.1*  9.5*   HEMATOCRIT  24.1*  25.0*  28.6*   MCV  89.6  91.6  91.7   MCH  30.1  29.7  30.4   MCHC  33.6*  32.4*  33.2*   RDW  54.1*  56.7*  58.0*   PLATELETCT  149*  140*  144*   MPV  12.9  12.8  12.5     Recent Labs      07/01/18 0330 07/02/18 0308 07/03/18   0315   SODIUM  133*  138  137   POTASSIUM  3.4*  3.3*  3.8   CHLORIDE  102  108  105   CO2  24  24  24   GLUCOSE  100*  90  158*   BUN  6*  6*  10   CREATININE  0.87  1.09  1.09   CALCIUM  7.8*  8.0*  7.9*                       Assessment/Plan     * Urinary retention- (present on admission)   Assessment & Plan    Due to neurogenic bladder s/p suprapubic catheter placement by urology on 6/21. Irrigation and clot retrival 7/3  Urology following.        Acute hypoxemic respiratory failure (HCC)   Assessment & Plan    Now on Room air.  Continue RT protocol, duo nebs, Pep therapy if warranted, and incentive spirometry.             Anemia- (present on admission)   Assessment & Plan    In setting of CKD, MDS with bone marrow suppression and gross hematuria.   Stable today's hemoglobin was 9.5 hematocrit 20.6  Thus far patient has received 2 units of PRBCs since admission          Elevated blood pressure reading without diagnosis of hypertension- (present on admission)   Assessment & Plan    Controlled.  Continue Lisinopril.  PRN IV labetalol if systolic blood pressure over 170        Chronic thrombocytopenia due to MDS (HCC)- (present on admission)   Assessment & Plan    Baseline PLT count in the 30,000s  Within normal limits now  Transfuse to keep >100,000  TEG normal.        Gross hematuria   Assessment & Plan    Sequelae of placement of suprapubic catheter with MDS history and low platelets.  Persistent hematuria with clots   Multiple surgical attempts, patient underwent irrigation and clot extraction 7/2  Formalin bladder instillation done 6/29  CTM.          Hypokalemia- (present on admission)   Assessment & Plan    Replenished  CTM        DNR (do not resuscitate)- (present on admission)   Assessment & Plan    DPOA and advanced directive reviewed.  DNAR/DNI/No cor track         CKD (chronic kidney disease), stage III- (present on admission)   Assessment & Plan    Stable, at baseline.        Myelodysplastic syndrome (HCC)- (present on admission)   Assessment & Plan    Continue to monitor cbc closely.   Maintain platelets >100        Parkinson's disease with dementia (HCC)- (present on admission)   Assessment & Plan    Now with  delirium  Continue home Sinemet  Continue Amantadine,Celexa and namenda.  Titrate seroquel              Quality-Core Measures   Reviewed items::  Labs reviewed, Medications reviewed and Radiology images reviewed  Hurst catheter::  Neurogenic Bladder  DVT prophylaxis pharmacological::  Contraindicated - High bleeding risk and Contraindicated - Anemia requiring blood transfusion (Low PLT and anemia and active bleeding, held as result)  DVT prophylaxis - mechanical:  SCDs  Ulcer Prophylaxis::  Not indicated        The total time spent face to face with this patient was about 40 mins of which 60% of time was spent on counseling, review of records including pertinent lab data and studies, as well as discussing diagnostic evaluation and work up, planned therapeutic interventions and future disposition of care. Where indicated, the assessment and plan reflect discussion of patient with consultants, other healthcare providers, family members, and additional research needed to obtain further information in formulating the plan of care of this patient.

## 2018-07-03 NOTE — PROGRESS NOTES
Urology    Urine running clear this am on slow drip.      VSS afebrile.    H/H  9.5/26.6    Imp hematuria much better after formalin instillation last night.    Plan. Run alum solution interchanging with NS irrigation today.   Run NS tomorrow over the holiday and if clear then stop CBI Thursday am.      Aditya Joe MD

## 2018-07-03 NOTE — OP REPORT
DATE OF SERVICE:  07/02/2018    PREOPERATIVE DIAGNOSIS:  Recurrent persistent gross hematuria.    POSTOPERATIVE DAGNOSIS:  Diffuse hemorrhagic cystitis.    BRIEF HISTORY:  This 77-year-old male, who suffers from Parkinson's disease   and myelodysplastic problem with low platelets.  He underwent transfusion of 1   unit of platelets prior to having a pull-through suprapubic cystotomy   approximately 11 days ago.  He started developing gross hematuria.  This did   not clear with continuous irrigation and required multiple trips to the   operating room for this persistent gross hematuria.  Last Friday, he came to   the operating room for another round and at that time, we instilled formalin   into the bladder.  He was intermittently clearing over the weekend.  We   started an alum irrigation and this morning he had persistent bleeding from   his bladder.  He is now brought back to the operating room for another   procedure for clot evacuation, fulguration of areas of bleeding and possible   formalin instillation.    ANESTHESIOLOGIST:  Patrice Olivas MD    SURGEON:  Aditya Joe MD    ANESTHESIA:  General.    REPORT OF OPERATION:  Under general anesthesia with the patient in lithotomy   position, the genitalia were prepped and draped in the usual manner.  The   26-Icelandic continuous flow resectoscope was passed into the bladder.  Urethra   and prostatic urethra unremarkable.  Going into the bladder, there is some new   clot, but also a lot of organized old clot that had been treated with the   alum.  This was quite tenacious.  There is also diffuse film on the surface of   the bladder that did appear like this alum type material.  At this point, I   then irrigated out a large amount of this newer clot plus his old alum type   clot.  It did require manual removal for a lot of it.  When fragments were   able to be irrigated out, I did with the Neel syringe.  After all this   material had been irrigated out again  reexamined the bladder.  Prior areas of   bleeding upon the dome really minimally oozing today and I did cauterize them   with the loop and resectoscope.  However, now there was more areas of oozing   from the bladder wall in general.  I did cauterize some of these areas, which   did seem to slow down, but overall to control this would have required   cauterization of the entire bladder wall, which is not feasible.  I did deal   with this for over an hour.  At this point, I made the decision to re-instill   formalin into the bladder.  At this point, I got Bella balloons and passed   them up both ureters. These were 5 mm balloons.  I then inflated each balloon   with approximately 1 mL of air.  These were left in place.  At this point, I   then drained the bladder out completely.  I then instilled 80 mL of 10%   formalin into the bladder.  I then ran in probably another 75 to 100 mL of   sterile water.  This was allowed to sit in the bladder for approximately 25   minutes.  It was then aspirated out and disposed in the proper container.  I   then irrigated the bladder several times and this fluid was also aspirated out   with a syringe and placed and this continued to be disposed by the pathology   department.  Once this was done, I then ran irrigation fluid through the   bladder extensively and surprisingly there was no more oozing from the bladder   wall.  At this point, the cystoscope was removed from the bladder.  I then   passed a 20-Yoruba catheter into the bladder and was put about 14 mL of water   in the balloon.  We then started continuous irrigation and it drained clear.    At this point, we did have half a bottle of alum that we did not ran in   afterwards.  He remained perfectly clear.  He was then woken up, and   transferred to the PACU in stable condition.       ____________________________________     MD ARBEN VELAZQUEZ / ABISAI    DD:  07/02/2018 18:58:05  DT:  07/02/2018 20:03:00    D#:   [id number]  Job#:  [id number]

## 2018-07-04 LAB
ANION GAP SERPL CALC-SCNC: 6 MMOL/L (ref 0–11.9)
ANION GAP SERPL CALC-SCNC: 6 MMOL/L (ref 0–11.9)
BASOPHILS # BLD AUTO: 0.1 % (ref 0–1.8)
BASOPHILS # BLD: 0.02 K/UL (ref 0–0.12)
BUN SERPL-MCNC: 20 MG/DL (ref 8–22)
BUN SERPL-MCNC: 23 MG/DL (ref 8–22)
CALCIUM SERPL-MCNC: 7.9 MG/DL (ref 8.4–10.2)
CALCIUM SERPL-MCNC: 8.1 MG/DL (ref 8.4–10.2)
CHLORIDE SERPL-SCNC: 103 MMOL/L (ref 96–112)
CHLORIDE SERPL-SCNC: 104 MMOL/L (ref 96–112)
CO2 SERPL-SCNC: 25 MMOL/L (ref 20–33)
CO2 SERPL-SCNC: 25 MMOL/L (ref 20–33)
COMMENT 1642: NORMAL
CREAT SERPL-MCNC: 1.68 MG/DL (ref 0.5–1.4)
CREAT SERPL-MCNC: 1.86 MG/DL (ref 0.5–1.4)
EOSINOPHIL # BLD AUTO: 0.6 K/UL (ref 0–0.51)
EOSINOPHIL NFR BLD: 3.8 % (ref 0–6.9)
ERYTHROCYTE [DISTWIDTH] IN BLOOD BY AUTOMATED COUNT: 57.6 FL (ref 35.9–50)
ERYTHROCYTE [DISTWIDTH] IN BLOOD BY AUTOMATED COUNT: 60.3 FL (ref 35.9–50)
GLUCOSE SERPL-MCNC: 115 MG/DL (ref 65–99)
GLUCOSE SERPL-MCNC: 140 MG/DL (ref 65–99)
HCT VFR BLD AUTO: 24.9 % (ref 42–52)
HCT VFR BLD AUTO: 25.8 % (ref 42–52)
HGB BLD-MCNC: 7.8 G/DL (ref 14–18)
HGB BLD-MCNC: 8.3 G/DL (ref 14–18)
IMM GRANULOCYTES # BLD AUTO: 0.12 K/UL (ref 0–0.11)
IMM GRANULOCYTES NFR BLD AUTO: 0.8 % (ref 0–0.9)
LYMPHOCYTES # BLD AUTO: 1.3 K/UL (ref 1–4.8)
LYMPHOCYTES NFR BLD: 8.1 % (ref 22–41)
MCH RBC QN AUTO: 29.5 PG (ref 27–33)
MCH RBC QN AUTO: 30 PG (ref 27–33)
MCHC RBC AUTO-ENTMCNC: 31.3 G/DL (ref 33.7–35.3)
MCHC RBC AUTO-ENTMCNC: 32.2 G/DL (ref 33.7–35.3)
MCV RBC AUTO: 93.1 FL (ref 81.4–97.8)
MCV RBC AUTO: 94.3 FL (ref 81.4–97.8)
MONOCYTES # BLD AUTO: 6.46 K/UL (ref 0–0.85)
MONOCYTES NFR BLD AUTO: 40.4 % (ref 0–13.4)
NEUTROPHILS # BLD AUTO: 7.5 K/UL (ref 1.82–7.42)
NEUTROPHILS NFR BLD: 46.8 % (ref 44–72)
NRBC # BLD AUTO: 0.16 K/UL
NRBC BLD-RTO: 1 /100 WBC
PLATELET # BLD AUTO: 130 K/UL (ref 164–446)
PLATELET # BLD AUTO: 139 K/UL (ref 164–446)
PMV BLD AUTO: 12.7 FL (ref 9–12.9)
PMV BLD AUTO: 13.3 FL (ref 9–12.9)
POTASSIUM SERPL-SCNC: 3.1 MMOL/L (ref 3.6–5.5)
POTASSIUM SERPL-SCNC: 3.5 MMOL/L (ref 3.6–5.5)
RBC # BLD AUTO: 2.64 M/UL (ref 4.7–6.1)
RBC # BLD AUTO: 2.77 M/UL (ref 4.7–6.1)
SODIUM SERPL-SCNC: 134 MMOL/L (ref 135–145)
SODIUM SERPL-SCNC: 135 MMOL/L (ref 135–145)
WBC # BLD AUTO: 15.2 K/UL (ref 4.8–10.8)
WBC # BLD AUTO: 16 K/UL (ref 4.8–10.8)

## 2018-07-04 PROCEDURE — 80048 BASIC METABOLIC PNL TOTAL CA: CPT

## 2018-07-04 PROCEDURE — A9270 NON-COVERED ITEM OR SERVICE: HCPCS | Performed by: INTERNAL MEDICINE

## 2018-07-04 PROCEDURE — 92526 ORAL FUNCTION THERAPY: CPT

## 2018-07-04 PROCEDURE — 770022 HCHG ROOM/CARE - ICU (200)

## 2018-07-04 PROCEDURE — 700105 HCHG RX REV CODE 258: Performed by: UROLOGY

## 2018-07-04 PROCEDURE — A9270 NON-COVERED ITEM OR SERVICE: HCPCS | Performed by: HOSPITALIST

## 2018-07-04 PROCEDURE — 85027 COMPLETE CBC AUTOMATED: CPT

## 2018-07-04 PROCEDURE — A9270 NON-COVERED ITEM OR SERVICE: HCPCS | Performed by: UROLOGY

## 2018-07-04 PROCEDURE — 700102 HCHG RX REV CODE 250 W/ 637 OVERRIDE(OP): Performed by: HOSPITALIST

## 2018-07-04 PROCEDURE — 700102 HCHG RX REV CODE 250 W/ 637 OVERRIDE(OP): Performed by: UROLOGY

## 2018-07-04 PROCEDURE — 99233 SBSQ HOSP IP/OBS HIGH 50: CPT | Mod: GW | Performed by: HOSPITALIST

## 2018-07-04 PROCEDURE — 700102 HCHG RX REV CODE 250 W/ 637 OVERRIDE(OP): Performed by: INTERNAL MEDICINE

## 2018-07-04 PROCEDURE — 85025 COMPLETE CBC W/AUTO DIFF WBC: CPT

## 2018-07-04 RX ADMIN — QUETIAPINE 25 MG: 25 TABLET ORAL at 06:11

## 2018-07-04 RX ADMIN — SODIUM CHLORIDE, POTASSIUM CHLORIDE, SODIUM LACTATE AND CALCIUM CHLORIDE: 600; 310; 30; 20 INJECTION, SOLUTION INTRAVENOUS at 06:11

## 2018-07-04 RX ADMIN — CEFUROXIME AXETIL 250 MG: 250 TABLET ORAL at 09:08

## 2018-07-04 RX ADMIN — CITALOPRAM HYDROBROMIDE 20 MG: 20 TABLET ORAL at 09:09

## 2018-07-04 RX ADMIN — SENNOSIDES AND DOCUSATE SODIUM 2 TABLET: 8.6; 5 TABLET ORAL at 19:24

## 2018-07-04 RX ADMIN — AMANTADINE HYDROCHLORIDE 100 MG: 100 CAPSULE ORAL at 19:24

## 2018-07-04 RX ADMIN — LORAZEPAM 0.5 MG: 0.5 TABLET ORAL at 19:24

## 2018-07-04 RX ADMIN — CARBIDOPA AND LEVODOPA 1 TABLET: 50; 200 TABLET, EXTENDED RELEASE ORAL at 19:26

## 2018-07-04 RX ADMIN — OXYBUTYNIN CHLORIDE 5 MG: 5 TABLET ORAL at 19:24

## 2018-07-04 RX ADMIN — QUETIAPINE 25 MG: 25 TABLET ORAL at 19:25

## 2018-07-04 RX ADMIN — MEMANTINE HYDROCHLORIDE 10 MG: 10 TABLET ORAL at 09:08

## 2018-07-04 RX ADMIN — OXYBUTYNIN CHLORIDE 5 MG: 5 TABLET ORAL at 14:35

## 2018-07-04 RX ADMIN — MEMANTINE HYDROCHLORIDE 10 MG: 10 TABLET ORAL at 19:26

## 2018-07-04 RX ADMIN — SODIUM CHLORIDE, POTASSIUM CHLORIDE, SODIUM LACTATE AND CALCIUM CHLORIDE: 600; 310; 30; 20 INJECTION, SOLUTION INTRAVENOUS at 12:57

## 2018-07-04 RX ADMIN — OXYBUTYNIN CHLORIDE 5 MG: 5 TABLET ORAL at 09:09

## 2018-07-04 RX ADMIN — AMANTADINE HYDROCHLORIDE 100 MG: 100 CAPSULE ORAL at 09:09

## 2018-07-04 RX ADMIN — QUETIAPINE 25 MG: 25 TABLET ORAL at 14:35

## 2018-07-04 RX ADMIN — SENNOSIDES AND DOCUSATE SODIUM 2 TABLET: 8.6; 5 TABLET ORAL at 09:08

## 2018-07-04 RX ADMIN — Medication 10 G: at 06:11

## 2018-07-04 RX ADMIN — CEFUROXIME AXETIL 250 MG: 250 TABLET ORAL at 19:25

## 2018-07-04 ASSESSMENT — PAIN SCALES - GENERAL
PAINLEVEL_OUTOF10: 0

## 2018-07-04 NOTE — PROGRESS NOTES
Received bedside report from Alyson LEE.  Patient resting in bed without signs/symptoms of discomfort or distress.  Wife at bedside.  POC reviewed, gtts verified, and safety protocols in place.  CBI output hazy and light yellow in color.  No evidence of bleeding or clotting at this time.  Alternating 1 bag of aluminum CBI with 1 bag of NS per MD order.    Night time medications given early to ensure administration as patient sun-downs.    Will continue to monitor and coordinate care.

## 2018-07-04 NOTE — CARE PLAN
Problem: Skin Integrity  Goal: Risk for impaired skin integrity will decrease  Outcome: PROGRESSING AS EXPECTED  Pressure ulcer prevention in place.  Nutrition being followed closely.  Moisture barrier wipes in use.    Problem: Urinary Elimination:  Goal: Ability to reestablish a normal urinary elimination pattern will improve  Outcome: PROGRESSING SLOWER THAN EXPECTED  Multiple urological procedures performed due to reoccurring  bleed after a suprapubic catheter insertion. Alternating NS and aluminum solution to decrease bleeding.  ICU I/Os in place.  Bed rest with Q2 turns to promote healing of catheter sites.

## 2018-07-04 NOTE — PROGRESS NOTES
"Urology Progress Note      Overnight Events: None    S: No fevers, chills, nausea or vomiting.  Urine clear alternating alum and NS. Increase in creatinine, WBC today. Slight decrease H/H.     O:   Blood pressure 148/77, pulse (!) 54, temperature 36.8 °C (98.2 °F), resp. rate (!) 8, height 1.905 m (6' 3\"), weight 82.4 kg (181 lb 10.5 oz), SpO2 92 %.  Recent Labs      07/02/18 0308 07/03/18 0315 07/04/18   0330   SODIUM  138  137  135   POTASSIUM  3.3*  3.8  3.5*   CHLORIDE  108  105  104   CO2  24  24  25   GLUCOSE  90  158*  115*   BUN  6*  10  23*   CREATININE  1.09  1.09  1.86*   CALCIUM  8.0*  7.9*  7.9*     Recent Labs      07/02/18 0308 07/03/18 0315 07/04/18   0330   WBC  12.7*  11.0*  16.0*   RBC  2.73*  3.12*  2.77*   HEMOGLOBIN  8.1*  9.5*  8.3*   HEMATOCRIT  25.0*  28.6*  25.8*   MCV  91.6  91.7  93.1   MCH  29.7  30.4  30.0   MCHC  32.4*  33.2*  32.2*   RDW  56.7*  58.0*  57.6*   PLATELETCT  140*  144*  139*   MPV  12.8  12.5  13.3*         Intake/Output Summary (Last 24 hours) at 07/04/18 0742  Last data filed at 07/04/18 0600   Gross per 24 hour   Intake            53850 ml   Output            35313 ml   Net              202 ml       Exam:  Abdomen soft, benign.     Urine: clear      A/P:    Active Hospital Problems    Diagnosis   • Acute hypoxemic respiratory failure (HCC) [J96.01]     Priority: High   • Urinary retention [R33.9]     Priority: High   • Anemia [D64.9]     Priority: Medium   • Chronic thrombocytopenia due to MDS (HCC) [D69.6]     Priority: Medium   • Elevated blood pressure reading without diagnosis of hypertension [R03.0]     Priority: Medium   • CKD (chronic kidney disease), stage III [N18.3]     Priority: Low   • Myelodysplastic syndrome (HCC) [D46.9]     Priority: Low   • Parkinson's disease with dementia ([facility]) [G20]     Priority: Low   • Gross hematuria [R31.0]   • Hypokalemia [E87.6]   • DNR (do not resuscitate) [Z66]       Urine clear  NS CBI only today  If clear in " AM will DC CBI  Discussed labs with hospitalist. Will monitor.

## 2018-07-04 NOTE — PROGRESS NOTES
Patient slept soundly throughout the night and was alert enough to take both PM and early AM medications.  Patient received 4.5L NS and 2L Alum through CBI.  Total corrected UOP of 1050 mL.  No evidence of bleeding through CBI or around catheter insertion points.  Will discuss AM labs (Hgb and Crea/BUN) with AM RN.

## 2018-07-04 NOTE — PROGRESS NOTES
[facility] Hospitalist Progress Note    Date of Service: 2018    Chief Complaint  77 y.o. male admitted 2018 with myelodysplastic syndrome, CKD stage III, Parkinson's disease with dementia, with issues with urinary retention due to neurogenic bladder.  He is admitted following suprapubic catheter placement.  Perioperatively, he had stable thrombocytopenia, and had platelet transfusion.  However, he had gross hematuria and elevated BPs postoperatively.     Interval Problem Update  Agitated and confused overnight, had to be put in restraints due to pulling out the CBI. Better with use of IM haldol and resuming oral seroquel.  Discussed with RN still having hematuria, few clots.  Discussed with urology BELKIS Winn; going back to OR today.    7/3  Patient is AAox2, very pleasant.  s/p evacuation and bladder irrigation yesterday  Continue continuous irrigation   Patient still on oxygen mask, oxygenation improving  Continue RT protocol, duo nebs, Pep therapy if warranted, and incentive spirometry.   Continue to monitor hgb/hct closely.       Patient doing well overnight, no acute issues except for low systolic pressures, no indication of sepsis, patient asymptomatic.  Denies dizziness/lightheadedness. However BP in the 120's today.  No evidence of urinary clotting or bleeding, urine yellow in color, still undergoing aluminum CBI  Leukocytosis increased, renal functions mildly worse but clinically stable, BP stable.  CTM    Consultants/Specialty  Internal Medicine    Disposition  TBD      Review of Systems   Unable to perform ROS: Dementia      Physical Exam  Laboratory/Imaging   Hemodynamics  Temp (24hrs), Av.6 °C (97.9 °F), Min:36.3 °C (97.3 °F), Max:36.9 °C (98.4 °F)   Temperature: 36.8 °C (98.2 °F)  Pulse  Av.4  Min: 47  Max: 103 Heart Rate (Monitored): (!) 54  NIBP: 119/66      Respiratory      Respiration: (!) 8, Pulse Oximetry: 92 %        RUL Breath Sounds: Clear, RML Breath Sounds: Diminished, RLL  Breath Sounds: Diminished, AQUILINO Breath Sounds: Clear, LLL Breath Sounds: Diminished    Fluids    Intake/Output Summary (Last 24 hours) at 07/04/18 0712  Last data filed at 07/04/18 0600   Gross per 24 hour   Intake            77282 ml   Output            90156 ml   Net              202 ml       Nutrition  Orders Placed This Encounter   Procedures   • Diet Order Regular     Standing Status:   Standing     Number of Occurrences:   1     Order Specific Question:   Diet:     Answer:   Regular [1]     Order Specific Question:   Texture/Fiber modifications:     Answer:   Dysphagia 3(Mechanical Soft)specify fluid consistency(question 6) [3]     Order Specific Question:   Consistency/Fluid modifications:     Answer:   Thin Liquids [3]     Physical Exam   Constitutional: He appears well-developed and well-nourished.   HENT:   Head: Normocephalic and atraumatic.   Right Ear: External ear normal.   Left Ear: External ear normal.   Mouth/Throat: No oropharyngeal exudate.   Eyes: Conjunctivae are normal. Pupils are equal, round, and reactive to light. Right eye exhibits no discharge. No scleral icterus.   Neck: Neck supple. No JVD present. No thyromegaly present.   Cardiovascular: Normal rate and intact distal pulses.    No murmur heard.  Pulses:       Dorsalis pedis pulses are 2+ on the right side, and 2+ on the left side.   Cap refill < 3 s   Pulmonary/Chest: Effort normal and breath sounds normal. No stridor. No respiratory distress. He has no wheezes. He has no rales.   Abdominal: Soft. Bowel sounds are normal. He exhibits no distension. There is no tenderness. There is no rebound.   Genitourinary:   Genitourinary Comments: Suprapubic catheter placed   Musculoskeletal: Normal range of motion. He exhibits no edema.   Neurological: He is alert. He is disoriented. No cranial nerve deficit.   Skin: Skin is warm and dry. No rash noted. No erythema.   Psychiatric: His behavior is normal. His affect is labile. His speech is  tangential.       Recent Labs      07/02/18 0308 07/03/18 0315 07/04/18   0330   WBC  12.7*  11.0*  16.0*   RBC  2.73*  3.12*  2.77*   HEMOGLOBIN  8.1*  9.5*  8.3*   HEMATOCRIT  25.0*  28.6*  25.8*   MCV  91.6  91.7  93.1   MCH  29.7  30.4  30.0   MCHC  32.4*  33.2*  32.2*   RDW  56.7*  58.0*  57.6*   PLATELETCT  140*  144*  139*   MPV  12.8  12.5  13.3*     Recent Labs      07/02/18 0308 07/03/18 0315  07/04/18   0330   SODIUM  138  137  135   POTASSIUM  3.3*  3.8  3.5*   CHLORIDE  108  105  104   CO2  24  24  25   GLUCOSE  90  158*  115*   BUN  6*  10  23*   CREATININE  1.09  1.09  1.86*   CALCIUM  8.0*  7.9*  7.9*                      Assessment/Plan     * Urinary retention- (present on admission)   Assessment & Plan    Due to neurogenic bladder s/p suprapubic catheter placement by urology on 6/21. Irrigation and clot retrival 7/3  Urology following.        Acute hypoxemic respiratory failure (HCC)   Assessment & Plan    Now on Room air.  Continue RT protocol, duo nebs, Pep therapy if warranted, and incentive spirometry.             Anemia- (present on admission)   Assessment & Plan    In setting of CKD, MDS with bone marrow suppression and gross hematuria.   Stable today's hemoglobin was 9.5 hematocrit 20.6  Thus far patient has received 2 units of PRBCs since admission          Elevated blood pressure reading without diagnosis of hypertension- (present on admission)   Assessment & Plan    Controlled.  Continue Lisinopril.  PRN IV labetalol if systolic blood pressure over 170        Chronic thrombocytopenia due to MDS (HCC)- (present on admission)   Assessment & Plan    Baseline PLT count in the 30,000s  Within normal limits now  Transfuse to keep >100,000  TEG normal.        Gross hematuria   Assessment & Plan    Sequelae of placement of suprapubic catheter with MDS history and low platelets.  Persistent hematuria with clots   Multiple surgical attempts, patient underwent irrigation and clot  extraction 7/2  Formalin bladder instillation done 6/29  CTM.          Hypokalemia- (present on admission)   Assessment & Plan    Replenished  CTM        DNR (do not resuscitate)- (present on admission)   Assessment & Plan    DPOA and advanced directive reviewed.  DNAR/DNI/No cor track         CKD (chronic kidney disease), stage III- (present on admission)   Assessment & Plan    Stable, at baseline.        Myelodysplastic syndrome (HCC)- (present on admission)   Assessment & Plan    Continue to monitor cbc closely.   Maintain platelets >100        Parkinson's disease with dementia (HCC)- (present on admission)   Assessment & Plan    Now with delirium  Continue home Sinemet  Continue Amantadine,Celexa and namenda.  Titrate seroquel         Acute renal failure -(present on admission)  Kidney functions creatinine 1.86 today from 1.09   Continue IV fluid boluses and recheck BMP in the morning     Leukocytosis   Of no significance no signs of SIRS, could be reactive.  I will repeat cbc today in the afternoon just to make sure, if still elevated will re-culture and perform CXR.     Quality-Core Measures   Reviewed items::  Labs reviewed, Medications reviewed and Radiology images reviewed  Hurst catheter::  Neurogenic Bladder  DVT prophylaxis pharmacological::  Contraindicated - High bleeding risk and Contraindicated - Anemia requiring blood transfusion (Low PLT and anemia and active bleeding, held as result)  DVT prophylaxis - mechanical:  SCDs  Ulcer Prophylaxis::  Not indicated        The total time spent face to face with this patient was about 40 mins of which 60% of time was spent on counseling, review of records including pertinent lab data and studies, as well as discussing diagnostic evaluation and work up, planned therapeutic interventions and future disposition of care. Where indicated, the assessment and plan reflect discussion of patient with consultants, other healthcare providers, family members, and  additional research needed to obtain further information in formulating the plan of care of this patient.

## 2018-07-04 NOTE — CARE PLAN
Problem: Safety  Goal: Will remain free from falls    Intervention: Implement fall precautions  Bed in low position, pt near nurses station, treaded slipper socks in place, call light in reach and bed alarm on. Pt will remain free from falls.       Problem: Knowledge Deficit  Goal: Knowledge of disease process/condition, treatment plan, diagnostic tests, and medications will improve    Intervention: Explain information regarding disease process/condition, treatment plan, diagnostic tests, and medications and document in education  POC discussed with pt and pt's wife Elizabeth.       Problem: Skin Integrity  Goal: Risk for impaired skin integrity will decrease    Intervention: Implement precautions to protect skin integrity in collaboration with the interdisciplinary team  Pt turned and repositioned every 2 hours, pillows for positioning, heels elevated on pillows, and pillows for positioning.

## 2018-07-04 NOTE — DISCHARGE PLANNING
Medical Social Work    Referral: SNF referral     Intervention: [facility] SNF can not accept the pt. Pt's wife reports she is planning on touring [facility].    Plan: Wait for pt's wife's SNF choice.

## 2018-07-04 NOTE — PROGRESS NOTES
"0700: Report received from night RN, POC discussed. Pt resting in bed with eyes closed, bed alarm on. Call light in reach. CBI infusing with NS, urine clear yellow.    0745: Urology NP at bedside, POC discussed. Continue CBI today with NS only and anticipate dc CBI tomorrow if no bleeding.     0800: Assessment completated. Lines and gtts verified. POC discussed with pt. No c/o pain. Call light in reach and bed alarm on.     0830: Dr. Pak at bedside, POC discussed. Repeat CBC/BMP today at 1300.     0920: Wife Elizabeth called for update. Wife requesting that pt remain on bedrest as she states \"I don't want him to bleed\". Pt feeding self breakfast.   "

## 2018-07-05 LAB
ANION GAP SERPL CALC-SCNC: 4 MMOL/L (ref 0–11.9)
BASOPHILS # BLD AUTO: 0.2 % (ref 0–1.8)
BASOPHILS # BLD: 0.03 K/UL (ref 0–0.12)
BUN SERPL-MCNC: 15 MG/DL (ref 8–22)
CALCIUM SERPL-MCNC: 8.2 MG/DL (ref 8.4–10.2)
CHLORIDE SERPL-SCNC: 107 MMOL/L (ref 96–112)
CO2 SERPL-SCNC: 28 MMOL/L (ref 20–33)
COMMENT 1642: NORMAL
CREAT SERPL-MCNC: 1.16 MG/DL (ref 0.5–1.4)
EOSINOPHIL # BLD AUTO: 0.41 K/UL (ref 0–0.51)
EOSINOPHIL NFR BLD: 3.1 % (ref 0–6.9)
ERYTHROCYTE [DISTWIDTH] IN BLOOD BY AUTOMATED COUNT: 59.5 FL (ref 35.9–50)
GLUCOSE SERPL-MCNC: 99 MG/DL (ref 65–99)
HCT VFR BLD AUTO: 26.5 % (ref 42–52)
HGB BLD-MCNC: 8.3 G/DL (ref 14–18)
IMM GRANULOCYTES # BLD AUTO: 0.12 K/UL (ref 0–0.11)
IMM GRANULOCYTES NFR BLD AUTO: 0.9 % (ref 0–0.9)
LYMPHOCYTES # BLD AUTO: 0.86 K/UL (ref 1–4.8)
LYMPHOCYTES NFR BLD: 6.5 % (ref 22–41)
MCH RBC QN AUTO: 29.4 PG (ref 27–33)
MCHC RBC AUTO-ENTMCNC: 31.3 G/DL (ref 33.7–35.3)
MCV RBC AUTO: 94 FL (ref 81.4–97.8)
MONOCYTES # BLD AUTO: 6.61 K/UL (ref 0–0.85)
MONOCYTES NFR BLD AUTO: 49.8 % (ref 0–13.4)
NEUTROPHILS # BLD AUTO: 5.24 K/UL (ref 1.82–7.42)
NEUTROPHILS NFR BLD: 39.5 % (ref 44–72)
NRBC # BLD AUTO: 0.09 K/UL
NRBC BLD-RTO: 0.7 /100 WBC
PLATELET # BLD AUTO: 138 K/UL (ref 164–446)
PMV BLD AUTO: 13.2 FL (ref 9–12.9)
POTASSIUM SERPL-SCNC: 3.4 MMOL/L (ref 3.6–5.5)
RBC # BLD AUTO: 2.82 M/UL (ref 4.7–6.1)
SODIUM SERPL-SCNC: 139 MMOL/L (ref 135–145)
WBC # BLD AUTO: 13.3 K/UL (ref 4.8–10.8)

## 2018-07-05 PROCEDURE — A9270 NON-COVERED ITEM OR SERVICE: HCPCS | Performed by: INTERNAL MEDICINE

## 2018-07-05 PROCEDURE — 700105 HCHG RX REV CODE 258: Performed by: UROLOGY

## 2018-07-05 PROCEDURE — 700111 HCHG RX REV CODE 636 W/ 250 OVERRIDE (IP): Performed by: HOSPITALIST

## 2018-07-05 PROCEDURE — 700102 HCHG RX REV CODE 250 W/ 637 OVERRIDE(OP): Performed by: HOSPITALIST

## 2018-07-05 PROCEDURE — 770022 HCHG ROOM/CARE - ICU (200)

## 2018-07-05 PROCEDURE — 700102 HCHG RX REV CODE 250 W/ 637 OVERRIDE(OP): Performed by: INTERNAL MEDICINE

## 2018-07-05 PROCEDURE — 80048 BASIC METABOLIC PNL TOTAL CA: CPT

## 2018-07-05 PROCEDURE — 700111 HCHG RX REV CODE 636 W/ 250 OVERRIDE (IP): Performed by: INTERNAL MEDICINE

## 2018-07-05 PROCEDURE — 99233 SBSQ HOSP IP/OBS HIGH 50: CPT | Mod: GW | Performed by: HOSPITALIST

## 2018-07-05 PROCEDURE — A9270 NON-COVERED ITEM OR SERVICE: HCPCS | Performed by: UROLOGY

## 2018-07-05 PROCEDURE — A9270 NON-COVERED ITEM OR SERVICE: HCPCS | Performed by: HOSPITALIST

## 2018-07-05 PROCEDURE — 85025 COMPLETE CBC W/AUTO DIFF WBC: CPT

## 2018-07-05 PROCEDURE — 700102 HCHG RX REV CODE 250 W/ 637 OVERRIDE(OP): Performed by: UROLOGY

## 2018-07-05 RX ORDER — HALOPERIDOL 5 MG/ML
2-5 INJECTION INTRAMUSCULAR EVERY 4 HOURS PRN
Status: DISCONTINUED | OUTPATIENT
Start: 2018-07-05 | End: 2018-07-11 | Stop reason: HOSPADM

## 2018-07-05 RX ADMIN — CARBIDOPA AND LEVODOPA 1 TABLET: 50; 200 TABLET, EXTENDED RELEASE ORAL at 19:41

## 2018-07-05 RX ADMIN — LORAZEPAM 0.5 MG: 0.5 TABLET ORAL at 10:26

## 2018-07-05 RX ADMIN — CEFUROXIME AXETIL 250 MG: 250 TABLET ORAL at 19:39

## 2018-07-05 RX ADMIN — AMANTADINE HYDROCHLORIDE 100 MG: 100 CAPSULE ORAL at 19:39

## 2018-07-05 RX ADMIN — TRAMADOL HYDROCHLORIDE 50 MG: 50 TABLET, COATED ORAL at 19:39

## 2018-07-05 RX ADMIN — SODIUM CHLORIDE, POTASSIUM CHLORIDE, SODIUM LACTATE AND CALCIUM CHLORIDE: 600; 310; 30; 20 INJECTION, SOLUTION INTRAVENOUS at 15:28

## 2018-07-05 RX ADMIN — QUETIAPINE 25 MG: 25 TABLET ORAL at 21:15

## 2018-07-05 RX ADMIN — QUETIAPINE 25 MG: 25 TABLET ORAL at 14:02

## 2018-07-05 RX ADMIN — OXYBUTYNIN CHLORIDE 5 MG: 5 TABLET ORAL at 19:39

## 2018-07-05 RX ADMIN — HALOPERIDOL LACTATE 5 MG: 5 INJECTION, SOLUTION INTRAMUSCULAR at 02:07

## 2018-07-05 RX ADMIN — HALOPERIDOL LACTATE 5 MG: 5 INJECTION, SOLUTION INTRAMUSCULAR at 19:38

## 2018-07-05 RX ADMIN — CEFUROXIME AXETIL 250 MG: 250 TABLET ORAL at 08:13

## 2018-07-05 RX ADMIN — SODIUM CHLORIDE, POTASSIUM CHLORIDE, SODIUM LACTATE AND CALCIUM CHLORIDE: 600; 310; 30; 20 INJECTION, SOLUTION INTRAVENOUS at 02:03

## 2018-07-05 RX ADMIN — AMANTADINE HYDROCHLORIDE 100 MG: 100 CAPSULE ORAL at 08:13

## 2018-07-05 RX ADMIN — SENNOSIDES AND DOCUSATE SODIUM 2 TABLET: 8.6; 5 TABLET ORAL at 08:13

## 2018-07-05 RX ADMIN — HYDRALAZINE HYDROCHLORIDE 10 MG: 20 INJECTION INTRAMUSCULAR; INTRAVENOUS at 10:35

## 2018-07-05 RX ADMIN — OXYBUTYNIN CHLORIDE 5 MG: 5 TABLET ORAL at 14:01

## 2018-07-05 RX ADMIN — SENNOSIDES AND DOCUSATE SODIUM 2 TABLET: 8.6; 5 TABLET ORAL at 19:39

## 2018-07-05 RX ADMIN — OXYBUTYNIN CHLORIDE 5 MG: 5 TABLET ORAL at 08:13

## 2018-07-05 RX ADMIN — MEMANTINE HYDROCHLORIDE 10 MG: 10 TABLET ORAL at 19:40

## 2018-07-05 RX ADMIN — CITALOPRAM HYDROBROMIDE 20 MG: 20 TABLET ORAL at 08:13

## 2018-07-05 RX ADMIN — MEMANTINE HYDROCHLORIDE 10 MG: 10 TABLET ORAL at 08:13

## 2018-07-05 ASSESSMENT — PAIN SCALES - PAIN ASSESSMENT IN ADVANCED DEMENTIA (PAINAD)
TOTALSCORE: 0
BODYLANGUAGE: RELAXED
TOTALSCORE: 0
BREATHING: NORMAL
FACIALEXPRESSION: SMILING OR INEXPRESSIVE
BREATHING: OCCASIONAL LABORED BREATHING, SHORT PERIOD OF HYPERVENTILATION
FACIALEXPRESSION: SMILING OR INEXPRESSIVE
FACIALEXPRESSION: SMILING OR INEXPRESSIVE
CONSOLABILITY: DISTRACTED OR REASSURED BY VOICE/TOUCH
BODYLANGUAGE: RELAXED
CONSOLABILITY: NO NEED TO CONSOLE
BODYLANGUAGE: RELAXED
BODYLANGUAGE: RELAXED
CONSOLABILITY: NO NEED TO CONSOLE
FACIALEXPRESSION: SMILING OR INEXPRESSIVE
CONSOLABILITY: NO NEED TO CONSOLE
BREATHING: NORMAL
BODYLANGUAGE: RELAXED
BODYLANGUAGE: RELAXED
FACIALEXPRESSION: SMILING OR INEXPRESSIVE
TOTALSCORE: 0
CONSOLABILITY: NO NEED TO CONSOLE
BREATHING: NORMAL
FACIALEXPRESSION: SMILING OR INEXPRESSIVE
TOTALSCORE: 0
BREATHING: NORMAL
TOTALSCORE: 0
FACIALEXPRESSION: SMILING OR INEXPRESSIVE
FACIALEXPRESSION: SMILING OR INEXPRESSIVE
CONSOLABILITY: NO NEED TO CONSOLE
CONSOLABILITY: NO NEED TO CONSOLE
BREATHING: NORMAL
TOTALSCORE: 0
TOTALSCORE: 3
BODYLANGUAGE: RELAXED
CONSOLABILITY: NO NEED TO CONSOLE
BODYLANGUAGE: RELAXED
BREATHING: NORMAL
BREATHING: NORMAL
TOTALSCORE: 0
NEGVOCALIZATION: OCCASIONAL MOAN/GROAN, LOW SPEECH, NEGATIVE/DISAPPROVING QUALITY

## 2018-07-05 ASSESSMENT — PAIN SCALES - GENERAL
PAINLEVEL_OUTOF10: 0

## 2018-07-05 NOTE — CARE PLAN
Problem: Skin Integrity  Goal: Risk for impaired skin integrity will decrease  Outcome: PROGRESSING AS EXPECTED  Pressure ulcer prevention in place.  Nutrition being followed closely.  Moisture barrier wipes in use.     Problem: Urinary Elimination:  Goal: Ability to reestablish a normal urinary elimination pattern will improve  Outcome: PROGRESSING SLOWER THAN EXPECTED  Multiple urological procedures performed due to reoccurring  bleed after a suprapubic catheter insertion. Attempting CBI with NS only and titrating rate down.  ICU I/Os in place.  Bed rest with Q2 turns to promote healing of catheter sites.

## 2018-07-05 NOTE — THERAPY
"Speech Language Therapy dysphagia treatment completed.   Functional Status:  Patient is pleasant but confused with poor attention to task. He is dysarthric but his syntax is correct. He appears to have a rigid and flat upper facial features. Most movement during talking is with bottom lip. Some nasal emission during speech is audible. Some simple oral motor movement exercises were completed. Saliva production was thick and drying. Ice chips helped. He consumed ice chips and thin liquid without overt signs of aspiration. Laryngeal elevation palpated as complete. RN reports patient has been tolerating diet but that due to confusion at night sometimes PO/meds have to be held. Spouse reports some foods on D3 meals are too tough. We discussed Dysphagia 2 textures and she wants to trial this diet to see if he will eat more meat and overall PO intake.    Recommendations: 1) Dysphagia 2, thin liquid diet ordered for breakfast tomorrow. If spouse wishes for diet to return to Dysphagia 3 this is okay by SLP.   He continues to need assistance and supervision with PO intake.   Patient may benefit from a dysarthria evaluation to assess and develop goals for speech/articulation.     Plan of Care: Will benefit from Speech Therapy 3 times per week  Post-Acute Therapy: Discharge to a transitional care facility for continued skilled therapy services.    See \"Rehab Therapy-Acute\" Patient Summary Report for complete documentation.     "

## 2018-07-05 NOTE — PROGRESS NOTES
Patient became slightly agitated (pulling off gown and running through lines/tubes with his hands) shortly after his wife left.  PM meds given early to ensure administration.  See MAR.  Patient is calm and comfortable at this time.  Will continue to monitor.

## 2018-07-05 NOTE — PROGRESS NOTES
Report received from NOC RN, POC discussed, walking rounds completed, pt resting in bed, repositioned, brushed teeth and washed face, all orders, medications, and lines verified, CBI assessed, clear yellow urine noted in output bag, no s/s distress, call light within reach and will continue to monitor.

## 2018-07-05 NOTE — CARE PLAN
Problem: Nutritional:  Goal: Achieve adequate nutritional intake  Patient will consume 50% or greater of meals/supplements   Outcome: PROGRESSING AS EXPECTED

## 2018-07-05 NOTE — PROGRESS NOTES
"Urology Progress Note    Overnight Events: None    S: No fevers, chills, nausea or vomiting.  Urine remains clear on drip CBI    O:   Blood pressure 148/77, pulse (!) 55, temperature 36.5 °C (97.7 °F), resp. rate 12, height 1.905 m (6' 3\"), weight 82.4 kg (181 lb 10.5 oz), SpO2 95 %.  Recent Labs      07/04/18 0330 07/04/18 1310 07/05/18   0400   SODIUM  135  134*  139   POTASSIUM  3.5*  3.1*  3.4*   CHLORIDE  104  103  107   CO2  25  25  28   GLUCOSE  115*  140*  99   BUN  23*  20  15   CREATININE  1.86*  1.68*  1.16   CALCIUM  7.9*  8.1*  8.2*     Recent Labs      07/04/18 0330 07/04/18   1310 07/05/18   0400   WBC  16.0*  15.2*  13.3*   RBC  2.77*  2.64*  2.82*   HEMOGLOBIN  8.3*  7.8*  8.3*   HEMATOCRIT  25.8*  24.9*  26.5*   MCV  93.1  94.3  94.0   MCH  30.0  29.5  29.4   MCHC  32.2*  31.3*  31.3*   RDW  57.6*  60.3*  59.5*   PLATELETCT  139*  130*  138*   MPV  13.3*  12.7  13.2*         Intake/Output Summary (Last 24 hours) at 07/05/18 0914  Last data filed at 07/05/18 0800   Gross per 24 hour   Intake             6950 ml   Output            44303 ml   Net            -5650 ml       Exam:  Abdomen soft, benign.    Urine: clear yellow      A/P:    Active Hospital Problems    Diagnosis   • Acute hypoxemic respiratory failure (HCC) [J96.01]     Priority: High   • Urinary retention [R33.9]     Priority: High   • Anemia [D64.9]     Priority: Medium   • Chronic thrombocytopenia due to MDS (HCC) [D69.6]     Priority: Medium   • Elevated blood pressure reading without diagnosis of hypertension [R03.0]     Priority: Medium   • CKD (chronic kidney disease), stage III [N18.3]     Priority: Low   • Myelodysplastic syndrome (HCC) [D46.9]     Priority: Low   • Parkinson's disease with dementia (HCC) [G20]     Priority: Low   • Gross hematuria [R31.0]   • Hypokalemia [E87.6]   • DNR (do not resuscitate) [Z66]       PLAN:  Clamp CBI, continue rinaldi.  If urine remains clear can plug CBI port.   "

## 2018-07-05 NOTE — PROGRESS NOTES
[facility] Hospitalist Progress Note    Date of Service: 7/5/2018    Chief Complaint  77 y.o. male admitted 6/21/2018 with myelodysplastic syndrome, CKD stage III, Parkinson's disease with dementia, with issues with urinary retention due to neurogenic bladder.  He is admitted following suprapubic catheter placement.  Perioperatively, he had stable thrombocytopenia, and had platelet transfusion.  However, he had gross hematuria and elevated BPs postoperatively.     Interval Problem Update  Agitated and confused overnight, had to be put in restraints due to pulling out the CBI. Better with use of IM haldol and resuming oral seroquel.  Discussed with RN still having hematuria, few clots.  Discussed with urology BELKIS Winn; going back to OR today.    7/3  Patient is AAox2, very pleasant.  s/p evacuation and bladder irrigation yesterday  Continue continuous irrigation   Patient still on oxygen mask, oxygenation improving  Continue RT protocol, duo nebs, Pep therapy if warranted, and incentive spirometry.   Continue to monitor hgb/hct closely.     7/4  Patient doing well overnight, no acute issues except for low systolic pressures, no indication of sepsis, patient asymptomatic.  Denies dizziness/lightheadedness. However BP in the 120's today.  No evidence of urinary clotting or bleeding, urine yellow in color, still undergoing aluminum CBI  Leukocytosis increased, renal functions mildly worse but clinically stable, BP stable.  CTM    7/5  Patient doing well, no acute complaints, is awake and alert, looks subjectively much better today.  BP in the 150s-160s  Renal functions normalizing.  Continue IV fluids  CBI clear, will awaiting for urology to stop CBI.  Cont to monitor Hgb closely.  PT/OT when cleared from surgical standpoint.     Consultants/Specialty  Internal Medicine    Disposition  TBD      Review of Systems   Unable to perform ROS: Dementia      Physical Exam  Laboratory/Imaging   Hemodynamics  Temp (24hrs),  Av.5 °C (97.7 °F), Min:36.3 °C (97.4 °F), Max:36.7 °C (98.1 °F)   Temperature: 36.5 °C (97.7 °F)  Pulse  Av.2  Min: 47  Max: 103 Heart Rate (Monitored): 60  NIBP: (!) 173/97      Respiratory      Respiration: (!) 9, Pulse Oximetry: 98 %        RUL Breath Sounds: Clear, RML Breath Sounds: Diminished, RLL Breath Sounds: Diminished, AQUILINO Breath Sounds: Clear, LLL Breath Sounds: Diminished    Fluids    Intake/Output Summary (Last 24 hours) at 18 0805  Last data filed at 18 0600   Gross per 24 hour   Intake             7060 ml   Output            48130 ml   Net            -5540 ml       Nutrition  Orders Placed This Encounter   Procedures   • Diet Order Regular (needs assistance with meals if wife is not at bedside)     Standing Status:   Standing     Number of Occurrences:   1     Order Specific Question:   Diet:     Answer:   Regular [1]     Comments:   needs assistance with meals if wife is not at bedside     Order Specific Question:   Texture/Fiber modifications:     Answer:   Dysphagia 2(Pureed/Chopped)specify fluid consistency(question 6) [2]     Order Specific Question:   Consistency/Fluid modifications:     Answer:   Thin Liquids [3]     Physical Exam   Constitutional: He appears well-developed and well-nourished. No distress.   HENT:   Head: Normocephalic and atraumatic.   Right Ear: External ear normal.   Left Ear: External ear normal.   Mouth/Throat: No oropharyngeal exudate.   Eyes: Conjunctivae are normal. Pupils are equal, round, and reactive to light. Right eye exhibits no discharge. No scleral icterus.   Neck: Neck supple. No JVD present. No thyromegaly present.   Cardiovascular: Normal rate and intact distal pulses.    No murmur heard.  Pulses:       Dorsalis pedis pulses are 2+ on the right side, and 2+ on the left side.   Cap refill < 3 s   Pulmonary/Chest: Effort normal and breath sounds normal. No stridor. No respiratory distress. He has no wheezes. He has no rales.   Abdominal:  Soft. Bowel sounds are normal. He exhibits no distension. There is no tenderness. There is no rebound.   Genitourinary:   Genitourinary Comments: Suprapubic catheter placed   Musculoskeletal: Normal range of motion. He exhibits no edema.   Neurological: He is alert. He is disoriented. No cranial nerve deficit.   Skin: Skin is warm and dry. No rash noted. He is not diaphoretic. No erythema.   Psychiatric: His behavior is normal. His affect is labile. His speech is tangential.   More confused today but pleasant       Recent Labs      07/04/18   0330  07/04/18   1310  07/05/18   0400   WBC  16.0*  15.2*  13.3*   RBC  2.77*  2.64*  2.82*   HEMOGLOBIN  8.3*  7.8*  8.3*   HEMATOCRIT  25.8*  24.9*  26.5*   MCV  93.1  94.3  94.0   MCH  30.0  29.5  29.4   MCHC  32.2*  31.3*  31.3*   RDW  57.6*  60.3*  59.5*   PLATELETCT  139*  130*  138*   MPV  13.3*  12.7  13.2*     Recent Labs      07/04/18   0330  07/04/18   1310  07/05/18   0400   SODIUM  135  134*  139   POTASSIUM  3.5*  3.1*  3.4*   CHLORIDE  104  103  107   CO2  25  25  28   GLUCOSE  115*  140*  99   BUN  23*  20  15   CREATININE  1.86*  1.68*  1.16   CALCIUM  7.9*  8.1*  8.2*                      Assessment/Plan     * Urinary retention- (present on admission)   Assessment & Plan    Due to neurogenic bladder s/p suprapubic catheter placement by urology on 6/21. Irrigation and clot retrival 7/3  Urology following.        Acute hypoxemic respiratory failure (HCC)   Assessment & Plan    Now on Room air.  Continue RT protocol, duo nebs, Pep therapy if warranted, and incentive spirometry.       Anemia- (present on admission)   Assessment & Plan    In setting of CKD, MDS with bone marrow suppression and gross hematuria.   Stable today's hemoglobin was8.3 hematocrit 26.5  Overall stable.   Thus far patient has received 2 units of PRBCs since admission, no transfusions for psat several days.           Elevated blood pressure reading without diagnosis of hypertension- (present  on admission)   Assessment & Plan    Controlled.  Continue Lisinopril.  PRN IV labetalol if systolic blood pressure over 170        Chronic thrombocytopenia due to MDS (HCC)- (present on admission)   Assessment & Plan    Baseline PLT count in the 30,000s  todays PLT count 138,000  Within normal limits now  Transfuse to keep >100,000  TEG normal.        Gross hematuria   Assessment & Plan    Sequelae of placement of suprapubic catheter with MDS history and low platelets.  Persistent hematuria with clots   Multiple surgical attempts, patient underwent irrigation and clot extraction 7/2  Formalin bladder instillation done 6/29  Resolved, CBI clear for psat 2 days.  D/C CBI per urology          Hypokalemia- (present on admission)   Assessment & Plan    K:3.4  Replenish lytes, will repeat bmp in the morning for any changes        DNR (do not resuscitate)- (present on admission)   Assessment & Plan    DPOA and advanced directive reviewed.  DNAR/DNI/No cor track         CKD (chronic kidney disease), stage III- (present on admission)   Assessment & Plan    Stable, at baseline.        Myelodysplastic syndrome (HCC)- (present on admission)   Assessment & Plan    Continue to monitor cbc closely.   Maintain platelets >100        Parkinson's disease with dementia (HCC)- (present on admission)   Assessment & Plan    Continue home Sinemet  Continue Amantadine,Celexa and namenda.  Titrate seroquel  At baseline mentation.         Acute renal failure -(present on admission)  Renal functions normalizing creatinine currently 1.16 creatinine yesterday 1.68  Continue IV fluid boluses and recheck BMP in the morning     Leukocytosis   Of no significance no signs of SIRS, could be reactive.  WBC count 13.3 today for 15.2 improving   Continue antibiotics by mouth cefuroxime      Quality-Core Measures   Reviewed items::  Labs reviewed, Medications reviewed and Radiology images reviewed  Hurst catheter::  Neurogenic Bladder  DVT prophylaxis  pharmacological::  Contraindicated - High bleeding risk and Contraindicated - Anemia requiring blood transfusion (Low PLT and anemia and active bleeding, held as result)  DVT prophylaxis - mechanical:  SCDs  Ulcer Prophylaxis::  Not indicated        The total time spent face to face with this patient was about 38 mins of which 60% of time was spent on counseling, review of records including pertinent lab data and studies, as well as discussing diagnostic evaluation and work up, planned therapeutic interventions and future disposition of care. Where indicated, the assessment and plan reflect discussion of patient with consultants, other healthcare providers, family members, and additional research needed to obtain further information in formulating the plan of care of this patient.

## 2018-07-05 NOTE — DISCHARGE PLANNING
VALERIO met with pt's spouse, Elizabeth.  She has toured [facility] and likes the facility.  Elizabeth stated that she is uncertain whether she wants the pt to go a SNF versus going home with her.  Pt resides in [place], per Elizabeth, there are limited care giving services there to help her.  Pt does have a nephew staying with her that could help provide care, but he will only be there temporarily.  Elizabeth stated that if the pt is in his current condition, she would not be able to take him home.  Elizabeth is open to the idea of the pt transitioning to SNF prior to going to home with her.  Per ROSA Ragsdale, pt is on bed rest today per MD.  PT/OT will reasess when the pt is appropriate and capable to work with them.

## 2018-07-05 NOTE — THERAPY
7/5/18  Nurse very busy with pt.  CNA stated pt very agitated and confused.  Pt not appropriate for PT at this time.  Will cont to monitor for appropriateness. sp

## 2018-07-05 NOTE — PROGRESS NOTES
Received bedside report from Alyson LEE.  Patient is resting in bed with wife at bedside.  Patient does not appear to be in distress or discomfort at this time.  CBI output is clear/yellow.  POC reviewed, gtts verified, and safety protocols in place.  Will continue to monitor and coordinate care.

## 2018-07-06 ENCOUNTER — APPOINTMENT (OUTPATIENT)
Dept: RADIOLOGY | Facility: MEDICAL CENTER | Age: 78
DRG: 662 | End: 2018-07-06
Attending: HOSPITALIST
Payer: MEDICARE

## 2018-07-06 PROBLEM — N18.30 CKD (CHRONIC KIDNEY DISEASE), STAGE III (HCC): Status: RESOLVED | Noted: 2018-06-21 | Resolved: 2018-07-06

## 2018-07-06 LAB
ANION GAP SERPL CALC-SCNC: 7 MMOL/L (ref 0–11.9)
APPEARANCE UR: ABNORMAL
BACTERIA #/AREA URNS HPF: ABNORMAL /HPF
BASOPHILS # BLD AUTO: 0 % (ref 0–1.8)
BASOPHILS # BLD: 0 K/UL (ref 0–0.12)
BILIRUB UR QL STRIP.AUTO: NEGATIVE
BUN SERPL-MCNC: 11 MG/DL (ref 8–22)
CALCIUM SERPL-MCNC: 8.3 MG/DL (ref 8.4–10.2)
CHLORIDE SERPL-SCNC: 108 MMOL/L (ref 96–112)
CO2 SERPL-SCNC: 25 MMOL/L (ref 20–33)
COLOR UR: YELLOW
CREAT SERPL-MCNC: 1.06 MG/DL (ref 0.5–1.4)
EOSINOPHIL # BLD AUTO: 0.73 K/UL (ref 0–0.51)
EOSINOPHIL NFR BLD: 5 % (ref 0–6.9)
ERYTHROCYTE [DISTWIDTH] IN BLOOD BY AUTOMATED COUNT: 56.7 FL (ref 35.9–50)
GIANT PLATELETS BLD QL SMEAR: NORMAL
GLUCOSE SERPL-MCNC: 88 MG/DL (ref 65–99)
GLUCOSE UR STRIP.AUTO-MCNC: NEGATIVE MG/DL
HCT VFR BLD AUTO: 24.3 % (ref 42–52)
HGB BLD-MCNC: 7.8 G/DL (ref 14–18)
KETONES UR STRIP.AUTO-MCNC: NEGATIVE MG/DL
LEUKOCYTE ESTERASE UR QL STRIP.AUTO: ABNORMAL
LYMPHOCYTES # BLD AUTO: 1.02 K/UL (ref 1–4.8)
LYMPHOCYTES NFR BLD: 7 % (ref 22–41)
MANUAL DIFF BLD: NORMAL
MCH RBC QN AUTO: 29.5 PG (ref 27–33)
MCHC RBC AUTO-ENTMCNC: 32.1 G/DL (ref 33.7–35.3)
MCV RBC AUTO: 92 FL (ref 81.4–97.8)
MICRO URNS: ABNORMAL
MONOCYTES # BLD AUTO: 6.82 K/UL (ref 0–0.85)
MONOCYTES NFR BLD AUTO: 47 % (ref 0–13.4)
NEUTROPHILS # BLD AUTO: 5.95 K/UL (ref 1.82–7.42)
NEUTROPHILS NFR BLD: 41 % (ref 44–72)
NITRITE UR QL STRIP.AUTO: NEGATIVE
NRBC # BLD AUTO: 0.12 K/UL
NRBC BLD-RTO: 0.8 /100 WBC
PH UR STRIP.AUTO: 8.5 [PH]
PLATELET # BLD AUTO: 119 K/UL (ref 164–446)
PLATELET BLD QL SMEAR: NORMAL
PMV BLD AUTO: 13.5 FL (ref 9–12.9)
POLYCHROMASIA BLD QL SMEAR: NORMAL
POTASSIUM SERPL-SCNC: 3.2 MMOL/L (ref 3.6–5.5)
PROT UR QL STRIP: 30 MG/DL
RBC # BLD AUTO: 2.64 M/UL (ref 4.7–6.1)
RBC # URNS HPF: ABNORMAL /HPF
RBC BLD AUTO: PRESENT
RBC UR QL AUTO: ABNORMAL
SODIUM SERPL-SCNC: 140 MMOL/L (ref 135–145)
SP GR UR STRIP.AUTO: 1.02
WBC # BLD AUTO: 14.5 K/UL (ref 4.8–10.8)
WBC #/AREA URNS HPF: ABNORMAL /HPF

## 2018-07-06 PROCEDURE — A9270 NON-COVERED ITEM OR SERVICE: HCPCS | Performed by: HOSPITALIST

## 2018-07-06 PROCEDURE — 99233 SBSQ HOSP IP/OBS HIGH 50: CPT | Mod: GW | Performed by: HOSPITALIST

## 2018-07-06 PROCEDURE — 700105 HCHG RX REV CODE 258: Performed by: UROLOGY

## 2018-07-06 PROCEDURE — A9270 NON-COVERED ITEM OR SERVICE: HCPCS | Performed by: INTERNAL MEDICINE

## 2018-07-06 PROCEDURE — 85027 COMPLETE CBC AUTOMATED: CPT

## 2018-07-06 PROCEDURE — 700102 HCHG RX REV CODE 250 W/ 637 OVERRIDE(OP): Performed by: HOSPITALIST

## 2018-07-06 PROCEDURE — 81001 URINALYSIS AUTO W/SCOPE: CPT

## 2018-07-06 PROCEDURE — 80048 BASIC METABOLIC PNL TOTAL CA: CPT

## 2018-07-06 PROCEDURE — 700105 HCHG RX REV CODE 258: Performed by: HOSPITALIST

## 2018-07-06 PROCEDURE — 700111 HCHG RX REV CODE 636 W/ 250 OVERRIDE (IP): Performed by: HOSPITALIST

## 2018-07-06 PROCEDURE — 85007 BL SMEAR W/DIFF WBC COUNT: CPT

## 2018-07-06 PROCEDURE — 700102 HCHG RX REV CODE 250 W/ 637 OVERRIDE(OP): Performed by: INTERNAL MEDICINE

## 2018-07-06 PROCEDURE — 770022 HCHG ROOM/CARE - ICU (200)

## 2018-07-06 PROCEDURE — 71045 X-RAY EXAM CHEST 1 VIEW: CPT

## 2018-07-06 RX ORDER — POTASSIUM CHLORIDE 20 MEQ/1
40 TABLET, EXTENDED RELEASE ORAL ONCE
Status: DISCONTINUED | OUTPATIENT
Start: 2018-07-06 | End: 2018-07-07

## 2018-07-06 RX ADMIN — SODIUM CHLORIDE, POTASSIUM CHLORIDE, SODIUM LACTATE AND CALCIUM CHLORIDE: 600; 310; 30; 20 INJECTION, SOLUTION INTRAVENOUS at 17:26

## 2018-07-06 RX ADMIN — AMANTADINE HYDROCHLORIDE 100 MG: 100 CAPSULE ORAL at 19:35

## 2018-07-06 RX ADMIN — HALOPERIDOL LACTATE 5 MG: 5 INJECTION, SOLUTION INTRAMUSCULAR at 22:08

## 2018-07-06 RX ADMIN — OXYBUTYNIN CHLORIDE 5 MG: 5 TABLET ORAL at 19:35

## 2018-07-06 RX ADMIN — PIPERACILLIN AND TAZOBACTAM 3.38 G: 3; .375 INJECTION, POWDER, LYOPHILIZED, FOR SOLUTION INTRAVENOUS; PARENTERAL at 09:00

## 2018-07-06 RX ADMIN — CARBIDOPA AND LEVODOPA 1 TABLET: 50; 200 TABLET, EXTENDED RELEASE ORAL at 19:35

## 2018-07-06 RX ADMIN — MEMANTINE HYDROCHLORIDE 10 MG: 10 TABLET ORAL at 19:35

## 2018-07-06 RX ADMIN — OXYBUTYNIN CHLORIDE 5 MG: 5 TABLET ORAL at 16:23

## 2018-07-06 RX ADMIN — QUETIAPINE 25 MG: 25 TABLET ORAL at 19:35

## 2018-07-06 RX ADMIN — PIPERACILLIN AND TAZOBACTAM 3.38 G: 3; .375 INJECTION, POWDER, LYOPHILIZED, FOR SOLUTION INTRAVENOUS; PARENTERAL at 13:06

## 2018-07-06 RX ADMIN — PIPERACILLIN AND TAZOBACTAM 3.38 G: 3; .375 INJECTION, POWDER, LYOPHILIZED, FOR SOLUTION INTRAVENOUS; PARENTERAL at 22:08

## 2018-07-06 RX ADMIN — SENNOSIDES AND DOCUSATE SODIUM 2 TABLET: 8.6; 5 TABLET ORAL at 19:34

## 2018-07-06 RX ADMIN — HALOPERIDOL LACTATE 5 MG: 5 INJECTION, SOLUTION INTRAMUSCULAR at 02:10

## 2018-07-06 RX ADMIN — SODIUM CHLORIDE, POTASSIUM CHLORIDE, SODIUM LACTATE AND CALCIUM CHLORIDE: 600; 310; 30; 20 INJECTION, SOLUTION INTRAVENOUS at 02:11

## 2018-07-06 ASSESSMENT — PAIN SCALES - PAIN ASSESSMENT IN ADVANCED DEMENTIA (PAINAD)
BREATHING: NORMAL
BREATHING: NORMAL
TOTALSCORE: 0
CONSOLABILITY: NO NEED TO CONSOLE
TOTALSCORE: 0
CONSOLABILITY: NO NEED TO CONSOLE
TOTALSCORE: 0
CONSOLABILITY: NO NEED TO CONSOLE
BODYLANGUAGE: RELAXED
CONSOLABILITY: NO NEED TO CONSOLE
TOTALSCORE: 0
BODYLANGUAGE: RELAXED
CONSOLABILITY: NO NEED TO CONSOLE
BREATHING: NORMAL
FACIALEXPRESSION: SMILING OR INEXPRESSIVE
FACIALEXPRESSION: SMILING OR INEXPRESSIVE
TOTALSCORE: 0
CONSOLABILITY: NO NEED TO CONSOLE
BODYLANGUAGE: RELAXED
FACIALEXPRESSION: SMILING OR INEXPRESSIVE
FACIALEXPRESSION: SMILING OR INEXPRESSIVE
BODYLANGUAGE: RELAXED
CONSOLABILITY: NO NEED TO CONSOLE
BREATHING: NORMAL
BODYLANGUAGE: RELAXED
BREATHING: NORMAL
TOTALSCORE: 0
FACIALEXPRESSION: SMILING OR INEXPRESSIVE
CONSOLABILITY: NO NEED TO CONSOLE
TOTALSCORE: 0
BODYLANGUAGE: RELAXED
FACIALEXPRESSION: SMILING OR INEXPRESSIVE
BREATHING: NORMAL
TOTALSCORE: 0
BREATHING: NORMAL
BODYLANGUAGE: RELAXED
FACIALEXPRESSION: SMILING OR INEXPRESSIVE
BREATHING: NORMAL
BREATHING: NORMAL
TOTALSCORE: 0
BODYLANGUAGE: RELAXED
CONSOLABILITY: NO NEED TO CONSOLE
FACIALEXPRESSION: SMILING OR INEXPRESSIVE
BODYLANGUAGE: RELAXED
BREATHING: NORMAL
BREATHING: NORMAL
FACIALEXPRESSION: SMILING OR INEXPRESSIVE
BREATHING: NORMAL
CONSOLABILITY: NO NEED TO CONSOLE
FACIALEXPRESSION: SMILING OR INEXPRESSIVE
CONSOLABILITY: NO NEED TO CONSOLE
CONSOLABILITY: NO NEED TO CONSOLE
BODYLANGUAGE: RELAXED
TOTALSCORE: 0

## 2018-07-06 ASSESSMENT — PAIN SCALES - GENERAL
PAINLEVEL_OUTOF10: 0

## 2018-07-06 NOTE — CARE PLAN
Problem: Communication  Goal: The ability to communicate needs accurately and effectively will improve    Intervention: Reorient patient to environment as needed  A/O self, requires reorientation.      Problem: Skin Integrity  Goal: Risk for impaired skin integrity will decrease    Intervention: Assess risk factors for impaired skin integrity and/or pressure ulcers  Repositioned every 2 hours, pillows used for support and positioning, heels floated.        Problem: Urinary Elimination:  Goal: Ability to reestablish a normal urinary elimination pattern will improve    Intervention: Evaluate need to continue indwelling urinary catheter  Supra pubic cath, draing yellow urine, small amount of sediment noted.  Rinaldi in penis for CBI, rinaldi is plugged,

## 2018-07-06 NOTE — DISCHARGE PLANNING
Received Choice form at 1549 from Elvira(MARCUS)  Agency/Facility Name: [facility]  Referral sent per Choice form @ [id number]. Catrina at [facility] notified.

## 2018-07-06 NOTE — PROGRESS NOTES
Assumed care of pt at 1900, received report from day RN, POC discussed  Pt is resting in bed, wife at bedside. Pt appears to be restless and confused. CBI is clamped. Clear urine noted in the rinaldi bag. No s/s of distress.  Safety check complete, bed in low position, call light within reach.   TM

## 2018-07-06 NOTE — DISCHARGE PLANNING
VALERIO received a call from ROSA Ballesteros with [facility] informing me that the spouse of the pt would like [facility].     VALERIO met with pt's spouse regarding hospice choice.  She would really like to take him home and try to provide care to him there.      VALERIO faxed choice to NAIF Metzger.

## 2018-07-06 NOTE — PROGRESS NOTES
Report received from NOC RN, POC discussed, walking rounds completed, pt resting in bed, medicated by NOC for restlessness and agitation. All orders, medications, and lines verified, no s/s distress, call light within reach and will continue to monitor.

## 2018-07-06 NOTE — PROGRESS NOTES
[facility] Hospitalist Progress Note    Date of Service: 7/6/2018    Chief Complaint  77 y.o. male admitted 6/21/2018 with myelodysplastic syndrome, CKD stage III, Parkinson's disease with dementia, with issues with urinary retention due to neurogenic bladder.  He is admitted following suprapubic catheter placement.  Perioperatively, he had stable thrombocytopenia, and had platelet transfusion.  However, he had gross hematuria and elevated BPs postoperatively.     Interval Problem Update  Agitated and confused overnight, had to be put in restraints due to pulling out the CBI. Better with use of IM haldol and resuming oral seroquel.  Discussed with RN still having hematuria, few clots.  Discussed with urology BELKIS Winn; going back to OR today.    7/3  Patient is AAox2, very pleasant.  s/p evacuation and bladder irrigation yesterday  Continue continuous irrigation   Patient still on oxygen mask, oxygenation improving  Continue RT protocol, duo nebs, Pep therapy if warranted, and incentive spirometry.   Continue to monitor hgb/hct closely.     7/4  Patient doing well overnight, no acute issues except for low systolic pressures, no indication of sepsis, patient asymptomatic.  Denies dizziness/lightheadedness. However BP in the 120's today.  No evidence of urinary clotting or bleeding, urine yellow in color, still undergoing aluminum CBI  Leukocytosis increased, renal functions mildly worse but clinically stable, BP stable.  CTM    7/5  Patient doing well, no acute complaints, is awake and alert, looks subjectively much better today.  BP in the 150s-160s  Renal functions normalizing.  Continue IV fluids  CBI clear, will awaiting for urology to stop CBI.  Cont to monitor Hgb closely.  PT/OT when cleared from surgical standpoint.     7/6  Patient very agitated and confused overnight  CBI clamped and urine clear  Continue supportive measures  Physical and occupational therapy evaluation today  Leukocytosis worsening at  14,000  Chest x-ray and urinalysis pending  Empiric IV antibiotics started, oral antibiotics discontinued.    Consultants/Specialty  Internal Medicine    Disposition  TBD      Review of Systems   Unable to perform ROS: Dementia      Physical Exam  Laboratory/Imaging   Hemodynamics  Temp (24hrs), Av.1 °C (98.7 °F), Min:36.5 °C (97.7 °F), Max:37.4 °C (99.4 °F)   Temperature: 37.3 °C (99.1 °F)  Pulse  Av  Min: 47  Max: 117 Heart Rate (Monitored): (!) 49  NIBP: 116/52      Respiratory      Respiration: 14, Pulse Oximetry: 97 %     Work Of Breathing / Effort: Mild  RUL Breath Sounds: Clear, RML Breath Sounds: Diminished, RLL Breath Sounds: Diminished, AQUILINO Breath Sounds: Clear, LLL Breath Sounds: Diminished    Fluids    Intake/Output Summary (Last 24 hours) at 18 0715  Last data filed at 18 0600   Gross per 24 hour   Intake             2460 ml   Output             3050 ml   Net             -590 ml       Nutrition  Orders Placed This Encounter   Procedures   • Diet Order Regular (needs assistance with meals if wife is not at bedside)     Standing Status:   Standing     Number of Occurrences:   1     Order Specific Question:   Diet:     Answer:   Regular [1]     Comments:   needs assistance with meals if wife is not at bedside     Order Specific Question:   Texture/Fiber modifications:     Answer:   Dysphagia 2(Pureed/Chopped)specify fluid consistency(question 6) [2]     Order Specific Question:   Consistency/Fluid modifications:     Answer:   Thin Liquids [3]     Physical Exam   Constitutional: He appears well-developed and well-nourished. No distress.   HENT:   Head: Normocephalic and atraumatic.   Right Ear: External ear normal.   Left Ear: External ear normal.   Mouth/Throat: No oropharyngeal exudate.   Eyes: Conjunctivae are normal. Pupils are equal, round, and reactive to light. Right eye exhibits no discharge. No scleral icterus.   Neck: Neck supple. No JVD present. No thyromegaly present.    Cardiovascular: Intact distal pulses.    No murmur heard.  Pulses:       Dorsalis pedis pulses are 2+ on the right side, and 2+ on the left side.   Cap refill < 3 s   Pulmonary/Chest: Effort normal and breath sounds normal. No stridor. No respiratory distress. He has no wheezes. He has no rales.   Abdominal: Soft. Bowel sounds are normal. He exhibits no distension. There is no tenderness. There is no rebound and no guarding.   Genitourinary:   Genitourinary Comments: Suprapubic catheter placed   Musculoskeletal: Normal range of motion. He exhibits no edema.   Neurological: He is alert. He is disoriented. No cranial nerve deficit.   Skin: Skin is warm and dry. No rash noted. He is not diaphoretic. No erythema.   Psychiatric: His behavior is normal. His affect is labile. His speech is tangential.   Confused.       Recent Labs      07/04/18   1310  07/05/18   0400  07/06/18   0322   WBC  15.2*  13.3*  14.5*   RBC  2.64*  2.82*  2.64*   HEMOGLOBIN  7.8*  8.3*  7.8*   HEMATOCRIT  24.9*  26.5*  24.3*   MCV  94.3  94.0  92.0   MCH  29.5  29.4  29.5   MCHC  31.3*  31.3*  32.1*   RDW  60.3*  59.5*  56.7*   PLATELETCT  130*  138*  119*   MPV  12.7  13.2*  13.5*     Recent Labs      07/04/18   1310  07/05/18   0400  07/06/18   0442   SODIUM  134*  139  140   POTASSIUM  3.1*  3.4*  3.2*   CHLORIDE  103  107  108   CO2  25  28  25   GLUCOSE  140*  99  88   BUN  20  15  11   CREATININE  1.68*  1.16  1.06   CALCIUM  8.1*  8.2*  8.3*                      Assessment/Plan     * Urinary retention- (present on admission)   Assessment & Plan    Due to neurogenic bladder s/p suprapubic catheter placement by urology on 6/21. Irrigation and clot retrival 7/3  Urology following.  CBI clamp Hurst in place output clear        Acute hypoxemic respiratory failure (HCC)   Assessment & Plan    Now on Room air.  Continue RT protocol, duo nebs, Pep therapy if warranted, and incentive spirometry.       Anemia- (present on admission)   Assessment &  Plan    In setting of CKD, MDS with bone marrow suppression and gross hematuria.   Mild fluctuations with stable hemoglobin today 7.8  Overall stable.   Thus far patient has received 2 units of PRBCs since admission, no transfusions for psat several days.   Continue to monitor daily CBCs          Elevated blood pressure reading without diagnosis of hypertension- (present on admission)   Assessment & Plan    Controlled.  Continue Lisinopril.  PRN IV labetalol if systolic blood pressure over 170        Chronic thrombocytopenia due to MDS (HCC)- (present on admission)   Assessment & Plan    Baseline PLT count in the 30,000s  todays PLT count 119,000  Within normal limits now  Transfuse to keep >100,000  TEG normal.        Gross hematuria   Assessment & Plan    Sequelae of placement of suprapubic catheter with MDS history and low platelets.  Persistent hematuria with clots   Multiple surgical attempts, patient underwent irrigation and clot extraction 7/2  Formalin bladder instillation done 6/29  Result past 2 days CBI clamped urine clear continue to monitor       Hypokalemia- (present on admission)   Assessment & Plan    K:3.2  Replenish lytes, will repeat bmp in the morning for any changes        DNR (do not resuscitate)- (present on admission)   Assessment & Plan    DPOA and advanced directive reviewed.  DNAR/DNI/No cor track         CKD (chronic kidney disease), stage III- (present on admission)   Assessment & Plan    Stable, at baseline.        Myelodysplastic syndrome (HCC)- (present on admission)   Assessment & Plan    Continue to monitor cbc closely.   Maintain platelets >100        Parkinson's disease with dementia (HCC)- (present on admission)   Assessment & Plan    Continue home Sinemet  Continue Amantadine,Celexa and namenda.  At baseline mentation.         Acute renal failure -(present on admission)  Renal functions normalizing creatinine currently 1.16 creatinine yesterday 1.68  Continue IV fluid boluses  and recheck BMP in the morning     Leukocytosis   Of no significance no signs of SIRS, could be reactive.  WBC count 14,000 mild worsening  In the setting of confusion we will perform a chest x-ray and a urinalysis  Discontinued cefuroxime   start IV Zosyn      Quality-Core Measures   Reviewed items::  Labs reviewed, Medications reviewed and Radiology images reviewed  Hurst catheter::  Neurogenic Bladder  DVT prophylaxis pharmacological::  Contraindicated - High bleeding risk and Contraindicated - Anemia requiring blood transfusion (Low PLT and anemia and active bleeding, held as result)  DVT prophylaxis - mechanical:  SCDs  Ulcer Prophylaxis::  Not indicated        The total time spent face to face with this patient was about 40 mins of which 60% of time was spent on counseling, review of records including pertinent lab data and studies, as well as discussing diagnostic evaluation and work up, planned therapeutic interventions and future disposition of care. Where indicated, the assessment and plan reflect discussion of patient with consultants, other healthcare providers, family members, and additional research needed to obtain further information in formulating the plan of care of this patient.

## 2018-07-06 NOTE — CARE PLAN
Problem: Safety  Goal: Will remain free from injury  Outcome: PROGRESSING AS EXPECTED    Intervention: Provide assistance with mobility  Pt repositioned q2hr, although he moves around in bed quite a bit, making pillow placement difficult.      Problem: Infection  Goal: Will remain free from infection  Outcome: PROGRESSING AS EXPECTED    Intervention: Assess signs and symptoms of infection  Pt does not show any s/s of infection at this time

## 2018-07-06 NOTE — PROGRESS NOTES
"Urology Progress Note    Overnight Events: pt very aggitated    S: Pt sedated     O:   Blood pressure 148/77, pulse (!) 49, temperature 37.2 °C (98.9 °F), resp. rate 12, height 1.905 m (6' 3\"), weight 90.8 kg (200 lb 2.8 oz), SpO2 99 %.  Recent Labs      07/04/18   1310  07/05/18   0400  07/06/18   0442   SODIUM  134*  139  140   POTASSIUM  3.1*  3.4*  3.2*   CHLORIDE  103  107  108   CO2  25  28  25   GLUCOSE  140*  99  88   BUN  20  15  11   CREATININE  1.68*  1.16  1.06   CALCIUM  8.1*  8.2*  8.3*     Recent Labs      07/04/18   1310  07/05/18   0400  07/06/18   0322   WBC  15.2*  13.3*  14.5*   RBC  2.64*  2.82*  2.64*   HEMOGLOBIN  7.8*  8.3*  7.8*   HEMATOCRIT  24.9*  26.5*  24.3*   MCV  94.3  94.0  92.0   MCH  29.5  29.4  29.5   MCHC  31.3*  31.3*  32.1*   RDW  60.3*  59.5*  56.7*   PLATELETCT  130*  138*  119*   MPV  12.7  13.2*  13.5*         Intake/Output Summary (Last 24 hours) at 07/06/18 1118  Last data filed at 07/06/18 1000   Gross per 24 hour   Intake             1860 ml   Output             3450 ml   Net            -1590 ml       Exam:  Abdomen soft, benign. SPT in place   Urine: clear with CBI clamped      A/P:    Active Hospital Problems    Diagnosis   • Acute hypoxemic respiratory failure (HCC) [J96.01]     Priority: High   • Urinary retention [R33.9]     Priority: High   • Anemia [D64.9]     Priority: Medium   • Chronic thrombocytopenia due to MDS (HCC) [D69.6]     Priority: Medium   • Elevated blood pressure reading without diagnosis of hypertension [R03.0]     Priority: Medium   • Myelodysplastic syndrome (HCC) [D46.9]     Priority: Low   • Parkinson's disease with dementia (HCC) [G20]     Priority: Low   • Gross hematuria [R31.0]   • Hypokalemia [E87.6]   • DNR (do not resuscitate) [Z66]       PLAN:  1.  Plug CBI port but keep SPT and rinaldi catheter for now  2.  Palliative care consulted  3.  Nothing further for urology at this time.  Call with questions.   "

## 2018-07-06 NOTE — CONSULTS
"Reason for PC Consult: Advance Care Planning    Assessment:  General:  77yr old male admitted 6/21/18 for a suprapubic cath placement.  Patient had post-op complication 2/2 \"gross hematuria and was rolled to inpatient.  PMH of myelodysplastic syndrome, CKD stage III, Parkinson's disease with dementia, with issues with urinary retention due to neurogenic bladder, spinal compression fractures. Palliative Care referral for [facility].     Social- Patient resides with his spouse, Elizabeth ([phone]) and grandson, Sriram([phone]) in [place], [place].  No history of tobacco or drug use.  Previous alcohol use of 1/day.    Dyspnea: No- 2L/NC 97%  Last BM: 07/02/18-    Pain: Unable to determine-    Depression: Unable to determine-    Dementia: Yes;       Spiritual:  Is Quaker or spirituality important for coping with this illness? No-    Has a  or spiritual provider visit been requested? No    Palliative Performance Scale: 10%    Advance Directive: Advance Directive, DPOA-    DPOA: Yes- Elizabeth Felix, spouse [phone]  POLST: No-      Code Status: DNR-      Outcome:  I met with the patient at bedside(3318/00. Patient is calm and resting.  Had been agitated and required Haldol last night.  PC to spouse, Elizabeth, who was distraught when she answered the phone.   I introduced myself, role of Palliative Care in POC and reason for PC.  Elizabeth explained that the patient was on [facility] \"for the last 6 months\" and they were encouraged to have the suprapubic cath placed for better skin care.  We discussed disease process, dying process, therapy vs compassionate care, benefit vs burden of medical care, Hospice and discharge.  Elizabeth would like to take the patient back home with [facility].      All questions were answered in full, stated understanding and agreed. Active listening, reflection, and empathic support utilized throughout this encounter. Contact for Palliative Care was provided and patient/family " is encourage to call for questions, concerns and/or support.    Updated: ROSA Ragsdale, Dr. Pak and Elvira LUO    Plan: Discharge home with Hospice when plan in place and patient cleared.      Recommendations: Hospice.      Thank you for allowing Palliative Care to participate in this patient's care. Please feel free to call [phone] with any questions or concerns.

## 2018-07-07 LAB
ALBUMIN SERPL BCP-MCNC: 2.1 G/DL (ref 3.2–4.9)
ALBUMIN/GLOB SERPL: 0.9 G/DL
ALP SERPL-CCNC: 60 U/L (ref 30–99)
ALT SERPL-CCNC: <5 U/L (ref 2–50)
ANION GAP SERPL CALC-SCNC: 8 MMOL/L (ref 0–11.9)
AST SERPL-CCNC: 19 U/L (ref 12–45)
BASOPHILS # BLD AUTO: 0.1 % (ref 0–1.8)
BASOPHILS # BLD: 0.01 K/UL (ref 0–0.12)
BILIRUB SERPL-MCNC: 0.7 MG/DL (ref 0.1–1.5)
BUN SERPL-MCNC: 9 MG/DL (ref 8–22)
CALCIUM SERPL-MCNC: 7.4 MG/DL (ref 8.4–10.2)
CHLORIDE SERPL-SCNC: 104 MMOL/L (ref 96–112)
CO2 SERPL-SCNC: 21 MMOL/L (ref 20–33)
COMMENT 1642: NORMAL
CREAT SERPL-MCNC: 0.93 MG/DL (ref 0.5–1.4)
EOSINOPHIL # BLD AUTO: 0.61 K/UL (ref 0–0.51)
EOSINOPHIL NFR BLD: 3.7 % (ref 0–6.9)
ERYTHROCYTE [DISTWIDTH] IN BLOOD BY AUTOMATED COUNT: 57.9 FL (ref 35.9–50)
GLOBULIN SER CALC-MCNC: 2.4 G/DL (ref 1.9–3.5)
GLUCOSE SERPL-MCNC: 86 MG/DL (ref 65–99)
HCT VFR BLD AUTO: 21.1 % (ref 42–52)
HGB BLD-MCNC: 6.7 G/DL (ref 14–18)
HGB BLD-MCNC: 7.6 G/DL (ref 14–18)
IMM GRANULOCYTES # BLD AUTO: 0.13 K/UL (ref 0–0.11)
IMM GRANULOCYTES NFR BLD AUTO: 0.8 % (ref 0–0.9)
LYMPHOCYTES # BLD AUTO: 1.12 K/UL (ref 1–4.8)
LYMPHOCYTES NFR BLD: 6.8 % (ref 22–41)
MAGNESIUM SERPL-MCNC: 1.2 MG/DL (ref 1.5–2.5)
MCH RBC QN AUTO: 29.8 PG (ref 27–33)
MCHC RBC AUTO-ENTMCNC: 31.8 G/DL (ref 33.7–35.3)
MCV RBC AUTO: 93.8 FL (ref 81.4–97.8)
MONOCYTES # BLD AUTO: 9.58 K/UL (ref 0–0.85)
MONOCYTES NFR BLD AUTO: 58.4 % (ref 0–13.4)
NEUTROPHILS # BLD AUTO: 4.95 K/UL (ref 1.82–7.42)
NEUTROPHILS NFR BLD: 30.2 % (ref 44–72)
NRBC # BLD AUTO: 0.07 K/UL
NRBC BLD-RTO: 0.4 /100 WBC
PLATELET # BLD AUTO: 85 K/UL (ref 164–446)
PMV BLD AUTO: 13.2 FL (ref 9–12.9)
POTASSIUM SERPL-SCNC: 3.3 MMOL/L (ref 3.6–5.5)
PROT SERPL-MCNC: 4.5 G/DL (ref 6–8.2)
RBC # BLD AUTO: 2.25 M/UL (ref 4.7–6.1)
SODIUM SERPL-SCNC: 133 MMOL/L (ref 135–145)
WBC # BLD AUTO: 16.4 K/UL (ref 4.8–10.8)

## 2018-07-07 PROCEDURE — 770022 HCHG ROOM/CARE - ICU (200)

## 2018-07-07 PROCEDURE — 700102 HCHG RX REV CODE 250 W/ 637 OVERRIDE(OP): Performed by: HOSPITALIST

## 2018-07-07 PROCEDURE — 700111 HCHG RX REV CODE 636 W/ 250 OVERRIDE (IP): Performed by: HOSPITALIST

## 2018-07-07 PROCEDURE — 99233 SBSQ HOSP IP/OBS HIGH 50: CPT | Mod: GW | Performed by: HOSPITALIST

## 2018-07-07 PROCEDURE — A9270 NON-COVERED ITEM OR SERVICE: HCPCS | Performed by: HOSPITALIST

## 2018-07-07 PROCEDURE — 83735 ASSAY OF MAGNESIUM: CPT

## 2018-07-07 PROCEDURE — 700105 HCHG RX REV CODE 258: Performed by: UROLOGY

## 2018-07-07 PROCEDURE — 700105 HCHG RX REV CODE 258: Performed by: HOSPITALIST

## 2018-07-07 PROCEDURE — 700102 HCHG RX REV CODE 250 W/ 637 OVERRIDE(OP): Performed by: INTERNAL MEDICINE

## 2018-07-07 PROCEDURE — A9270 NON-COVERED ITEM OR SERVICE: HCPCS | Performed by: INTERNAL MEDICINE

## 2018-07-07 PROCEDURE — 85025 COMPLETE CBC W/AUTO DIFF WBC: CPT

## 2018-07-07 PROCEDURE — 80053 COMPREHEN METABOLIC PANEL: CPT

## 2018-07-07 PROCEDURE — 85018 HEMOGLOBIN: CPT

## 2018-07-07 RX ORDER — MAGNESIUM SULFATE HEPTAHYDRATE 40 MG/ML
4 INJECTION, SOLUTION INTRAVENOUS ONCE
Status: COMPLETED | OUTPATIENT
Start: 2018-07-07 | End: 2018-07-07

## 2018-07-07 RX ORDER — POTASSIUM CHLORIDE 20 MEQ/1
40 TABLET, EXTENDED RELEASE ORAL ONCE
Status: COMPLETED | OUTPATIENT
Start: 2018-07-07 | End: 2018-07-07

## 2018-07-07 RX ADMIN — QUETIAPINE 25 MG: 25 TABLET ORAL at 20:02

## 2018-07-07 RX ADMIN — PIPERACILLIN AND TAZOBACTAM 3.38 G: 3; .375 INJECTION, POWDER, LYOPHILIZED, FOR SOLUTION INTRAVENOUS; PARENTERAL at 20:04

## 2018-07-07 RX ADMIN — CITALOPRAM HYDROBROMIDE 20 MG: 20 TABLET ORAL at 08:19

## 2018-07-07 RX ADMIN — PIPERACILLIN AND TAZOBACTAM 3.38 G: 3; .375 INJECTION, POWDER, LYOPHILIZED, FOR SOLUTION INTRAVENOUS; PARENTERAL at 04:26

## 2018-07-07 RX ADMIN — SODIUM CHLORIDE, POTASSIUM CHLORIDE, SODIUM LACTATE AND CALCIUM CHLORIDE: 600; 310; 30; 20 INJECTION, SOLUTION INTRAVENOUS at 16:11

## 2018-07-07 RX ADMIN — MEMANTINE HYDROCHLORIDE 10 MG: 10 TABLET ORAL at 20:02

## 2018-07-07 RX ADMIN — AMANTADINE HYDROCHLORIDE 100 MG: 100 CAPSULE ORAL at 08:19

## 2018-07-07 RX ADMIN — QUETIAPINE 25 MG: 25 TABLET ORAL at 13:05

## 2018-07-07 RX ADMIN — POTASSIUM CHLORIDE 40 MEQ: 1500 TABLET, EXTENDED RELEASE ORAL at 08:19

## 2018-07-07 RX ADMIN — SODIUM CHLORIDE, POTASSIUM CHLORIDE, SODIUM LACTATE AND CALCIUM CHLORIDE: 600; 310; 30; 20 INJECTION, SOLUTION INTRAVENOUS at 04:25

## 2018-07-07 RX ADMIN — PIPERACILLIN AND TAZOBACTAM 3.38 G: 3; .375 INJECTION, POWDER, LYOPHILIZED, FOR SOLUTION INTRAVENOUS; PARENTERAL at 13:03

## 2018-07-07 RX ADMIN — MEMANTINE HYDROCHLORIDE 10 MG: 10 TABLET ORAL at 08:19

## 2018-07-07 RX ADMIN — AMANTADINE HYDROCHLORIDE 100 MG: 100 CAPSULE ORAL at 20:03

## 2018-07-07 RX ADMIN — OXYBUTYNIN CHLORIDE 5 MG: 5 TABLET ORAL at 20:02

## 2018-07-07 RX ADMIN — HALOPERIDOL LACTATE 5 MG: 5 INJECTION, SOLUTION INTRAMUSCULAR at 21:33

## 2018-07-07 RX ADMIN — OXYBUTYNIN CHLORIDE 5 MG: 5 TABLET ORAL at 14:59

## 2018-07-07 RX ADMIN — OXYBUTYNIN CHLORIDE 5 MG: 5 TABLET ORAL at 08:19

## 2018-07-07 RX ADMIN — SENNOSIDES AND DOCUSATE SODIUM 2 TABLET: 8.6; 5 TABLET ORAL at 08:19

## 2018-07-07 RX ADMIN — MAGNESIUM SULFATE IN WATER 4 G: 40 INJECTION, SOLUTION INTRAVENOUS at 08:17

## 2018-07-07 RX ADMIN — CARBIDOPA AND LEVODOPA 1 TABLET: 50; 200 TABLET, EXTENDED RELEASE ORAL at 20:02

## 2018-07-07 RX ADMIN — SENNOSIDES AND DOCUSATE SODIUM 2 TABLET: 8.6; 5 TABLET ORAL at 20:02

## 2018-07-07 ASSESSMENT — PAIN SCALES - PAIN ASSESSMENT IN ADVANCED DEMENTIA (PAINAD)
FACIALEXPRESSION: SMILING OR INEXPRESSIVE
BREATHING: NORMAL
BREATHING: NORMAL
TOTALSCORE: 0
BREATHING: NORMAL
FACIALEXPRESSION: SMILING OR INEXPRESSIVE
CONSOLABILITY: NO NEED TO CONSOLE
BREATHING: NORMAL
CONSOLABILITY: NO NEED TO CONSOLE
BREATHING: NORMAL
BREATHING: NORMAL
CONSOLABILITY: NO NEED TO CONSOLE
BODYLANGUAGE: RELAXED
FACIALEXPRESSION: SMILING OR INEXPRESSIVE
TOTALSCORE: 0
CONSOLABILITY: NO NEED TO CONSOLE
TOTALSCORE: 0
TOTALSCORE: 0
CONSOLABILITY: NO NEED TO CONSOLE
TOTALSCORE: 0
BODYLANGUAGE: RELAXED
CONSOLABILITY: NO NEED TO CONSOLE
FACIALEXPRESSION: SMILING OR INEXPRESSIVE
BODYLANGUAGE: RELAXED
TOTALSCORE: 0

## 2018-07-07 ASSESSMENT — PAIN SCALES - GENERAL
PAINLEVEL_OUTOF10: 0

## 2018-07-07 NOTE — DISCHARGE PLANNING
Agency/Facility Name: [facility]  Spoke To: Catrina  Outcome: Per Catrina at [facility] they have declined patient as he not appropriate for hospice care.  Per Catrina she has spoken with the patients wife, and patient was to discharge home with home health.  Per Catrina she will speak with LSW on Monday, but unable to review this today due the lateness in the hour.

## 2018-07-07 NOTE — PROGRESS NOTES
Assumed care of pt at 1900, received report from day RN, POC discussed, lines verified.  Pt resting in bed with wife at bedside. Pt is far more alert and oriented this evening, able to state name and knew he was at [facility]. Denies pain and needs at this time. No s/s of distress.   Safety check complete, bed in low position, call light within reach.  [facility]

## 2018-07-07 NOTE — PROGRESS NOTES
0930 Pt sitting up in bed , visiting with family had sudden drop in BP to 60's systolic, HOB lowered, BP improved to 80's, Pt denies dizziness, asymptomatic, Dr Lizarraga notified in person, no new orders at this time, last BP 86/48, cycling BP Q 5 min.

## 2018-07-07 NOTE — CARE PLAN
Problem: Bowel/Gastric:  Goal: Normal bowel function is maintained or improved  Outcome: PROGRESSING AS EXPECTED      Problem: Pain Management  Goal: Pain level will decrease to patient's comfort goal  Outcome: PROGRESSING AS EXPECTED  Pt denies pain

## 2018-07-07 NOTE — PROGRESS NOTES
[facility] Hospitalist Progress Note    Date of Service: 7/7/2018    Chief Complaint  77 y.o. male admitted 6/21/2018 with myelodysplastic syndrome, CKD stage III, Parkinson's disease with dementia, with issues with urinary retention due to neurogenic bladder.  He is admitted following suprapubic catheter placement.  Perioperatively, he had stable thrombocytopenia, and had platelet transfusion.  However, he had gross hematuria and elevated BPs postoperatively.     Interval Problem Update  Agitated and confused overnight, had to be put in restraints due to pulling out the CBI. Better with use of IM haldol and resuming oral seroquel.  Discussed with RN still having hematuria, few clots.  Discussed with urology BELKIS Winn; going back to OR today.    7/3  Patient is AAox2, very pleasant.  s/p evacuation and bladder irrigation yesterday  Continue continuous irrigation   Patient still on oxygen mask, oxygenation improving  Continue RT protocol, duo nebs, Pep therapy if warranted, and incentive spirometry.   Continue to monitor hgb/hct closely.     7/4  Patient doing well overnight, no acute issues except for low systolic pressures, no indication of sepsis, patient asymptomatic.  Denies dizziness/lightheadedness. However BP in the 120's today.  No evidence of urinary clotting or bleeding, urine yellow in color, still undergoing aluminum CBI  Leukocytosis increased, renal functions mildly worse but clinically stable, BP stable.  CTM    7/5  Patient doing well, no acute complaints, is awake and alert, looks subjectively much better today.  BP in the 150s-160s  Renal functions normalizing.  Continue IV fluids  CBI clear, will awaiting for urology to stop CBI.  Cont to monitor Hgb closely.  PT/OT when cleared from surgical standpoint.     7/6  Patient very agitated and confused overnight  CBI clamped and urine clear  Continue supportive measures  Physical and occupational therapy evaluation today  Leukocytosis worsening at  14,000  Chest x-ray and urinalysis pending  Empiric IV antibiotics started, oral antibiotics discontinued.      No acute issues overnight patient much more awake and calm  Urinalysis was positive for urinary tract infection hence IV antibiotics were continued  Continue to replenish electrolytes  Hemoglobin did decrease to 6.7, 1 unit of PRBCs ordered continue to monitor  Patient also had mild drop in SBP which is a pattern for the patient, pt is stable and asymptomatic, actually clinically looks much better than previously days.  Continue close monitoring of BP    Consultants/Specialty  Internal Medicine    Disposition  TBD      Review of Systems   Unable to perform ROS: Dementia      Physical Exam  Laboratory/Imaging   Hemodynamics  Temp (24hrs), Av.9 °C (98.5 °F), Min:36.6 °C (97.9 °F), Max:37.3 °C (99.1 °F)   Temperature: 37.2 °C (99 °F)  Pulse  Av.1  Min: 46  Max: 117 Heart Rate (Monitored): (!) 53  NIBP: 130/59      Respiratory      Respiration: 13, Pulse Oximetry: 98 %        RUL Breath Sounds: Clear, RML Breath Sounds: Diminished, RLL Breath Sounds: Diminished, AQUILINO Breath Sounds: Clear, LLL Breath Sounds: Diminished    Fluids    Intake/Output Summary (Last 24 hours) at 18 0721  Last data filed at 18 0600   Gross per 24 hour   Intake             2900 ml   Output             2950 ml   Net              -50 ml       Nutrition  Orders Placed This Encounter   Procedures   • Diet Order Regular (needs assistance with meals if wife is not at bedside)     Standing Status:   Standing     Number of Occurrences:   1     Order Specific Question:   Diet:     Answer:   Regular [1]     Comments:   needs assistance with meals if wife is not at bedside     Order Specific Question:   Texture/Fiber modifications:     Answer:   Dysphagia 2(Pureed/Chopped)specify fluid consistency(question 6) [2]     Order Specific Question:   Consistency/Fluid modifications:     Answer:   Thin Liquids [3]     Physical Exam    Constitutional: He appears well-developed and well-nourished. No distress.   HENT:   Head: Normocephalic and atraumatic.   Right Ear: External ear normal.   Left Ear: External ear normal.   Mouth/Throat: No oropharyngeal exudate.   Eyes: Conjunctivae are normal. Pupils are equal, round, and reactive to light. Right eye exhibits no discharge. No scleral icterus.   Neck: Neck supple. No JVD present. No thyromegaly present.   Cardiovascular: Intact distal pulses.    No murmur heard.  Pulses:       Dorsalis pedis pulses are 2+ on the right side, and 2+ on the left side.   Cap refill < 3 s   Pulmonary/Chest: Effort normal and breath sounds normal. No stridor. No respiratory distress. He has no wheezes. He has no rales.   Abdominal: Soft. Bowel sounds are normal. He exhibits no distension. There is no tenderness. There is no rebound and no guarding.   Genitourinary:   Genitourinary Comments: Suprapubic catheter placed   Musculoskeletal: Normal range of motion. He exhibits no edema.   Neurological: He is alert. He is disoriented. No cranial nerve deficit.   Skin: Skin is warm and dry. No rash noted. He is not diaphoretic. No erythema.   Psychiatric: His behavior is normal. His affect is labile. His speech is tangential.   Confused.       Recent Labs      07/05/18 0400 07/06/18 0322 07/07/18 0333 07/07/18   0527   WBC  13.3*  14.5*  16.4*   --    RBC  2.82*  2.64*  2.25*   --    HEMOGLOBIN  8.3*  7.8*  6.7*  7.6*   HEMATOCRIT  26.5*  24.3*  21.1*   --    MCV  94.0  92.0  93.8   --    MCH  29.4  29.5  29.8   --    MCHC  31.3*  32.1*  31.8*   --    RDW  59.5*  56.7*  57.9*   --    PLATELETCT  138*  119*  85*   --    MPV  13.2*  13.5*  13.2*   --      Recent Labs      07/05/18   0400  07/06/18   0442  07/07/18   0333   SODIUM  139  140  133*   POTASSIUM  3.4*  3.2*  3.3*   CHLORIDE  107  108  104   CO2  28  25  21   GLUCOSE  99  88  86   BUN  15  11  9   CREATININE  1.16  1.06  0.93   CALCIUM  8.2*  8.3*  7.4*                       Assessment/Plan     * Urinary retention- (present on admission)   Assessment & Plan    Due to neurogenic bladder s/p suprapubic catheter placement by urology on 6/21. Irrigation and clot retrival 7/3  Urology following.  CBI clamped, no signs of hematuria.        Acute hypoxemic respiratory failure (HCC)   Assessment & Plan    Now on Room air.  Continue RT protocol, duo nebs, Pep therapy if warranted, and incentive spirometry.       Anemia- (present on admission)   Assessment & Plan    In setting of CKD, MDS with bone marrow suppression and gross hematuria.   Hemoglobin 6.7 ,1 unit PRBCs were ordered last night  Overall stable.   Thus far patient has received 2 units of PRBCs since admission, no transfusions for psat several days.   Continue to monitor daily CBCs          Elevated blood pressure reading without diagnosis of hypertension- (present on admission)   Assessment & Plan    Controlled.  Continue Lisinopril.  PRN IV labetalol if systolic blood pressure over 170        Chronic thrombocytopenia due to MDS (HCC)- (present on admission)   Assessment & Plan    Baseline PLT count in the 30,000s  todays PLT count 85,000  Monitor closely  TEG normal.        Gross hematuria   Assessment & Plan    Sequelae of placement of suprapubic catheter with MDS history and low platelets.  Persistent hematuria with clots   Multiple surgical attempts, patient underwent irrigation and clot extraction 7/2  Formalin bladder instillation done 6/29  Past 3 days CBI clamped urine clear continue to monitor       Hypokalemia- (present on admission)   Assessment & Plan    K:3.3  Replenish lytes, will repeat bmp in the morning for any changes        DNR (do not resuscitate)- (present on admission)   Assessment & Plan    DPOA and advanced directive reviewed.  DNAR/DNI/No cor track         CKD (chronic kidney disease), stage III- (present on admission)   Assessment & Plan    Stable, at baseline.        Myelodysplastic syndrome  (HCC)- (present on admission)   Assessment & Plan    Continue to monitor cbc closely.         Parkinson's disease with dementia (HCC)- (present on admission)   Assessment & Plan    Continue home Sinemet  Continue Amantadine,Celexa and namenda.  At baseline mentation.     Acute renal failure -(present on admission)  Renal functions within normal limits  Continue IV fluid boluses and recheck BMP in the morning     Leukocytosis   Of no significance no signs of SIRS, could be reactive.  WBC count 16,400 today  Positive urinalysis yesterday  Discontinued cefuroxime   Continue IV Zosyn     Hypotension  Asymptomatic, most likely from parkinson's diease.  This happened several days ago, patient then became hypertensive  CTM clinically.    Hypomagnesemia  Replenish lytes with 2g of Magnesium Sulfate  Recheck magnesium levels in the am for changes.        Quality-Core Measures   Reviewed items::  Labs reviewed, Medications reviewed and Radiology images reviewed  Hurst catheter::  Neurogenic Bladder  DVT prophylaxis pharmacological::  Contraindicated - High bleeding risk and Contraindicated - Anemia requiring blood transfusion (Low PLT and anemia and active bleeding, held as result)  DVT prophylaxis - mechanical:  SCDs  Ulcer Prophylaxis::  Not indicated        The total time spent face to face with this patient was about 37 mins of which 60% of time was spent on counseling, review of records including pertinent lab data and studies, as well as discussing diagnostic evaluation and work up, planned therapeutic interventions and future disposition of care. Where indicated, the assessment and plan reflect discussion of patient with consultants, other healthcare providers, family members, and additional research needed to obtain further information in formulating the plan of care of this patient.

## 2018-07-07 NOTE — PROGRESS NOTES
0700 Report received from NOC, Pt resting calmly in bed, opens eyes to voice, POC reviewed, Bed alarm on and Pt within vie w of nurisng astation.    0800 Full nursing assessment per chart, Pt follows simple commands, A/O X2, assisted with feeding for breakfast, tolerated well without s/s of aspiration.    0900 Family at bedside updated on POC

## 2018-07-07 NOTE — CARE PLAN
Problem: Safety  Goal: Will remain free from injury  Side rails up X3, bed locked and in lowest position, bed alarm on, all lines maintained per hospital policy, family participating in bedside care, Pt within view of nursing station.     Problem: Urinary Elimination:  Goal: Ability to reestablish a normal urinary elimination pattern will improve    Intervention: Evaluate need to continue indwelling urinary catheter  Urology following, CBI discontinued, suprapubic catheter monitored for output. Hurst catheter to be reassessed by Urology.

## 2018-07-07 NOTE — DIETARY
Nutrition Services - Brief Update:  Pt with variable PO intake recorded.  7/6 Breakfast and Lunch = <25%, Dinner = %.  Breakfast today 50-75%.  Pt requires feeding assistance with meals if wife not at bedside.  Per RN, pt does drink Boost and eats better with softer foods like oatmeal.  Pt is receiving a Dysphagia 2 Diet with thin liquids.  Per MD note, pt is DNAR/DNI and No Cortrak.  Continue to encourage PO intake.    Plan/Recommendation:  1.  Continue to provide diet as ordered and Boost Plus.  Offer soft foods as tolerated.  2.  Continue to encourage intake of 50% or greater.  3.  Nutrition Services to continue to work with pt for ongoing meal and snack preferences.

## 2018-07-07 NOTE — PROGRESS NOTES
No one from Lab called with critical result of 6.7 Hgb at N/A. Critical lab result read back to No one.   Dr. Garcia notified of critical lab result at 3455.  Critical lab result read back by Dr. Garcia. New orders received.  Repeat Hgb only, if still less than 7.0, transfuse 1 unit PRBC.

## 2018-07-08 LAB
ALBUMIN SERPL BCP-MCNC: 2.4 G/DL (ref 3.2–4.9)
ALBUMIN/GLOB SERPL: 0.9 G/DL
ALP SERPL-CCNC: 67 U/L (ref 30–99)
ALT SERPL-CCNC: 5 U/L (ref 2–50)
ANION GAP SERPL CALC-SCNC: 5 MMOL/L (ref 0–11.9)
AST SERPL-CCNC: 21 U/L (ref 12–45)
BASOPHILS # BLD AUTO: 0.3 % (ref 0–1.8)
BASOPHILS # BLD: 0.04 K/UL (ref 0–0.12)
BILIRUB SERPL-MCNC: 0.8 MG/DL (ref 0.1–1.5)
BUN SERPL-MCNC: 11 MG/DL (ref 8–22)
CALCIUM SERPL-MCNC: 7.8 MG/DL (ref 8.4–10.2)
CHLORIDE SERPL-SCNC: 109 MMOL/L (ref 96–112)
CO2 SERPL-SCNC: 22 MMOL/L (ref 20–33)
COMMENT 1642: NORMAL
CREAT SERPL-MCNC: 1.16 MG/DL (ref 0.5–1.4)
EOSINOPHIL # BLD AUTO: 0.54 K/UL (ref 0–0.51)
EOSINOPHIL NFR BLD: 3.6 % (ref 0–6.9)
ERYTHROCYTE [DISTWIDTH] IN BLOOD BY AUTOMATED COUNT: 59.1 FL (ref 35.9–50)
GLOBULIN SER CALC-MCNC: 2.8 G/DL (ref 1.9–3.5)
GLUCOSE SERPL-MCNC: 86 MG/DL (ref 65–99)
HCT VFR BLD AUTO: 23.1 % (ref 42–52)
HGB BLD-MCNC: 7.3 G/DL (ref 14–18)
IMM GRANULOCYTES # BLD AUTO: 0.14 K/UL (ref 0–0.11)
IMM GRANULOCYTES NFR BLD AUTO: 0.9 % (ref 0–0.9)
LYMPHOCYTES # BLD AUTO: 1.29 K/UL (ref 1–4.8)
LYMPHOCYTES NFR BLD: 8.5 % (ref 22–41)
MAGNESIUM SERPL-MCNC: 1.8 MG/DL (ref 1.5–2.5)
MCH RBC QN AUTO: 29.6 PG (ref 27–33)
MCHC RBC AUTO-ENTMCNC: 31.6 G/DL (ref 33.7–35.3)
MCV RBC AUTO: 93.5 FL (ref 81.4–97.8)
MONOCYTES # BLD AUTO: 9.23 K/UL (ref 0–0.85)
MONOCYTES NFR BLD AUTO: 61 % (ref 0–13.4)
NEUTROPHILS # BLD AUTO: 3.89 K/UL (ref 1.82–7.42)
NEUTROPHILS NFR BLD: 25.7 % (ref 44–72)
NRBC # BLD AUTO: 0.05 K/UL
NRBC BLD-RTO: 0.3 /100 WBC
PLATELET # BLD AUTO: 89 K/UL (ref 164–446)
PMV BLD AUTO: 13.9 FL (ref 9–12.9)
POTASSIUM SERPL-SCNC: 3.4 MMOL/L (ref 3.6–5.5)
PROT SERPL-MCNC: 5.2 G/DL (ref 6–8.2)
RBC # BLD AUTO: 2.47 M/UL (ref 4.7–6.1)
SODIUM SERPL-SCNC: 136 MMOL/L (ref 135–145)
WBC # BLD AUTO: 15.1 K/UL (ref 4.8–10.8)

## 2018-07-08 PROCEDURE — 80053 COMPREHEN METABOLIC PANEL: CPT

## 2018-07-08 PROCEDURE — 770022 HCHG ROOM/CARE - ICU (200)

## 2018-07-08 PROCEDURE — A9270 NON-COVERED ITEM OR SERVICE: HCPCS | Performed by: INTERNAL MEDICINE

## 2018-07-08 PROCEDURE — 700105 HCHG RX REV CODE 258: Performed by: HOSPITALIST

## 2018-07-08 PROCEDURE — 85025 COMPLETE CBC W/AUTO DIFF WBC: CPT

## 2018-07-08 PROCEDURE — 99233 SBSQ HOSP IP/OBS HIGH 50: CPT | Mod: GW | Performed by: HOSPITALIST

## 2018-07-08 PROCEDURE — 700102 HCHG RX REV CODE 250 W/ 637 OVERRIDE(OP): Performed by: INTERNAL MEDICINE

## 2018-07-08 PROCEDURE — 700102 HCHG RX REV CODE 250 W/ 637 OVERRIDE(OP): Performed by: HOSPITALIST

## 2018-07-08 PROCEDURE — 700105 HCHG RX REV CODE 258: Performed by: UROLOGY

## 2018-07-08 PROCEDURE — A9270 NON-COVERED ITEM OR SERVICE: HCPCS | Performed by: HOSPITALIST

## 2018-07-08 PROCEDURE — 700111 HCHG RX REV CODE 636 W/ 250 OVERRIDE (IP): Performed by: HOSPITALIST

## 2018-07-08 PROCEDURE — 83735 ASSAY OF MAGNESIUM: CPT

## 2018-07-08 RX ADMIN — MEMANTINE HYDROCHLORIDE 10 MG: 10 TABLET ORAL at 21:21

## 2018-07-08 RX ADMIN — CARBIDOPA AND LEVODOPA 1 TABLET: 50; 200 TABLET, EXTENDED RELEASE ORAL at 21:21

## 2018-07-08 RX ADMIN — MEMANTINE HYDROCHLORIDE 10 MG: 10 TABLET ORAL at 08:37

## 2018-07-08 RX ADMIN — OXYBUTYNIN CHLORIDE 5 MG: 5 TABLET ORAL at 15:07

## 2018-07-08 RX ADMIN — SENNOSIDES AND DOCUSATE SODIUM 2 TABLET: 8.6; 5 TABLET ORAL at 21:21

## 2018-07-08 RX ADMIN — SODIUM CHLORIDE, POTASSIUM CHLORIDE, SODIUM LACTATE AND CALCIUM CHLORIDE: 600; 310; 30; 20 INJECTION, SOLUTION INTRAVENOUS at 06:08

## 2018-07-08 RX ADMIN — SODIUM CHLORIDE, POTASSIUM CHLORIDE, SODIUM LACTATE AND CALCIUM CHLORIDE: 600; 310; 30; 20 INJECTION, SOLUTION INTRAVENOUS at 18:13

## 2018-07-08 RX ADMIN — PIPERACILLIN AND TAZOBACTAM 3.38 G: 3; .375 INJECTION, POWDER, LYOPHILIZED, FOR SOLUTION INTRAVENOUS; PARENTERAL at 21:21

## 2018-07-08 RX ADMIN — OXYBUTYNIN CHLORIDE 5 MG: 5 TABLET ORAL at 21:21

## 2018-07-08 RX ADMIN — QUETIAPINE 25 MG: 25 TABLET ORAL at 21:21

## 2018-07-08 RX ADMIN — QUETIAPINE 25 MG: 25 TABLET ORAL at 13:19

## 2018-07-08 RX ADMIN — AMANTADINE HYDROCHLORIDE 100 MG: 100 CAPSULE ORAL at 21:21

## 2018-07-08 RX ADMIN — SENNOSIDES AND DOCUSATE SODIUM 2 TABLET: 8.6; 5 TABLET ORAL at 08:37

## 2018-07-08 RX ADMIN — PIPERACILLIN AND TAZOBACTAM 3.38 G: 3; .375 INJECTION, POWDER, LYOPHILIZED, FOR SOLUTION INTRAVENOUS; PARENTERAL at 13:19

## 2018-07-08 RX ADMIN — CITALOPRAM HYDROBROMIDE 20 MG: 20 TABLET ORAL at 08:36

## 2018-07-08 RX ADMIN — PIPERACILLIN AND TAZOBACTAM 3.38 G: 3; .375 INJECTION, POWDER, LYOPHILIZED, FOR SOLUTION INTRAVENOUS; PARENTERAL at 05:11

## 2018-07-08 RX ADMIN — AMANTADINE HYDROCHLORIDE 100 MG: 100 CAPSULE ORAL at 08:36

## 2018-07-08 RX ADMIN — OXYBUTYNIN CHLORIDE 5 MG: 5 TABLET ORAL at 08:37

## 2018-07-08 ASSESSMENT — PAIN SCALES - PAIN ASSESSMENT IN ADVANCED DEMENTIA (PAINAD)
BODYLANGUAGE: RELAXED
BODYLANGUAGE: RELAXED
TOTALSCORE: 0
BREATHING: NORMAL
CONSOLABILITY: NO NEED TO CONSOLE
BODYLANGUAGE: RELAXED
CONSOLABILITY: NO NEED TO CONSOLE
FACIALEXPRESSION: SMILING OR INEXPRESSIVE
TOTALSCORE: 0
CONSOLABILITY: NO NEED TO CONSOLE
BREATHING: NORMAL
BREATHING: NORMAL
FACIALEXPRESSION: SMILING OR INEXPRESSIVE
FACIALEXPRESSION: SMILING OR INEXPRESSIVE
CONSOLABILITY: NO NEED TO CONSOLE
TOTALSCORE: 0
TOTALSCORE: 0
CONSOLABILITY: NO NEED TO CONSOLE
BODYLANGUAGE: RELAXED
BODYLANGUAGE: RELAXED
FACIALEXPRESSION: SMILING OR INEXPRESSIVE
BREATHING: NORMAL
FACIALEXPRESSION: SMILING OR INEXPRESSIVE
TOTALSCORE: 0
BREATHING: NORMAL

## 2018-07-08 ASSESSMENT — PAIN SCALES - GENERAL
PAINLEVEL_OUTOF10: 0

## 2018-07-08 NOTE — PROGRESS NOTES
[facility] Hospitalist Progress Note    Date of Service: 7/8/2018    Chief Complaint  77 y.o. male admitted 6/21/2018 with myelodysplastic syndrome, CKD stage III, Parkinson's disease with dementia, with issues with urinary retention due to neurogenic bladder.  He is admitted following suprapubic catheter placement.  Perioperatively, he had stable thrombocytopenia, and had platelet transfusion.  However, he had gross hematuria and elevated BPs postoperatively.     Interval Problem Update  Agitated and confused overnight, had to be put in restraints due to pulling out the CBI. Better with use of IM haldol and resuming oral seroquel.  Discussed with RN still having hematuria, few clots.  Discussed with urology BELKIS Winn; going back to OR today.    7/3  Patient is AAox2, very pleasant.  s/p evacuation and bladder irrigation yesterday  Continue continuous irrigation   Patient still on oxygen mask, oxygenation improving  Continue RT protocol, duo nebs, Pep therapy if warranted, and incentive spirometry.   Continue to monitor hgb/hct closely.     7/4  Patient doing well overnight, no acute issues except for low systolic pressures, no indication of sepsis, patient asymptomatic.  Denies dizziness/lightheadedness. However BP in the 120's today.  No evidence of urinary clotting or bleeding, urine yellow in color, still undergoing aluminum CBI  Leukocytosis increased, renal functions mildly worse but clinically stable, BP stable.  CTM    7/5  Patient doing well, no acute complaints, is awake and alert, looks subjectively much better today.  BP in the 150s-160s  Renal functions normalizing.  Continue IV fluids  CBI clear, will awaiting for urology to stop CBI.  Cont to monitor Hgb closely.  PT/OT when cleared from surgical standpoint.     7/6  Patient very agitated and confused overnight  CBI clamped and urine clear  Continue supportive measures  Physical and occupational therapy evaluation today  Leukocytosis worsening at  14,000  Chest x-ray and urinalysis pending  Empiric IV antibiotics started, oral antibiotics discontinued.      No acute issues overnight patient much more awake and calm  Urinalysis was positive for urinary tract infection hence IV antibiotics were continued  Continue to replenish electrolytes  Hemoglobin did decrease to 6.7, 1 unit of PRBCs ordered continue to monitor  Patient also had mild drop in SBP which is a pattern for the patient, pt is stable and asymptomatic, actually clinically looks much better than previously days.  Continue close monitoring of BP      Patient awake and alert, looks great today.  No noted hematuria.   Continue IV antibiotics for UTI  Will discuss with urology for rinaldi removal tomorrow?   H/H trending down, may need transfusion prior to d/c  Hospice to meet with family on Monday.    Consultants/Specialty  Internal Medicine    Disposition  TBD      Review of Systems   Unable to perform ROS: Dementia      Physical Exam  Laboratory/Imaging   Hemodynamics  Temp (24hrs), Av.1 °C (98.8 °F), Min:36.6 °C (97.9 °F), Max:37.4 °C (99.4 °F)   Temperature: 37.2 °C (98.9 °F)  Pulse  Av.9  Min: 46  Max: 117 Heart Rate (Monitored): (!) 57  NIBP: 145/77      Respiratory      Respiration: 18, Pulse Oximetry: 99 %        RUL Breath Sounds: Clear, RML Breath Sounds: Diminished, RLL Breath Sounds: Diminished, AQUILINO Breath Sounds: Clear, LLL Breath Sounds: Diminished    Fluids    Intake/Output Summary (Last 24 hours) at 18 1159  Last data filed at 18 1000   Gross per 24 hour   Intake             2580 ml   Output             3900 ml   Net            -1320 ml       Nutrition  Orders Placed This Encounter   Procedures   • Diet Order Regular (needs assistance with meals if wife is not at bedside)     Standing Status:   Standing     Number of Occurrences:   1     Order Specific Question:   Diet:     Answer:   Regular [1]     Comments:   needs assistance with meals if wife is not at  bedside     Order Specific Question:   Texture/Fiber modifications:     Answer:   Dysphagia 2(Pureed/Chopped)specify fluid consistency(question 6) [2]     Order Specific Question:   Consistency/Fluid modifications:     Answer:   Thin Liquids [3]     Physical Exam   Constitutional: He appears well-developed and well-nourished. No distress.   HENT:   Head: Normocephalic and atraumatic.   Right Ear: External ear normal.   Left Ear: External ear normal.   Mouth/Throat: No oropharyngeal exudate.   Eyes: Conjunctivae are normal. Pupils are equal, round, and reactive to light. Right eye exhibits no discharge. No scleral icterus.   Neck: Neck supple. No JVD present. No thyromegaly present.   Cardiovascular: Intact distal pulses.    No murmur heard.  Pulses:       Dorsalis pedis pulses are 2+ on the right side, and 2+ on the left side.   Cap refill < 3 s   Pulmonary/Chest: Effort normal and breath sounds normal. No stridor. No respiratory distress. He has no wheezes. He has no rales.   Abdominal: Soft. Bowel sounds are normal. He exhibits no distension. There is no tenderness. There is no rebound and no guarding.   Genitourinary:   Genitourinary Comments: Suprapubic catheter placed   Musculoskeletal: Normal range of motion. He exhibits no edema.   Neurological: He is alert. He is disoriented. No cranial nerve deficit.   Skin: Skin is warm and dry. No rash noted. He is not diaphoretic. No erythema.   Psychiatric: His behavior is normal. His affect is labile. His speech is tangential.   Confused.but awake and very pleasant        Recent Labs      07/06/18   0322  07/07/18   0333  07/07/18   0527  07/08/18   0430   WBC  14.5*  16.4*   --   15.1*   RBC  2.64*  2.25*   --   2.47*   HEMOGLOBIN  7.8*  6.7*  7.6*  7.3*   HEMATOCRIT  24.3*  21.1*   --   23.1*   MCV  92.0  93.8   --   93.5   MCH  29.5  29.8   --   29.6   MCHC  32.1*  31.8*   --   31.6*   RDW  56.7*  57.9*   --   59.1*   PLATELETCT  119*  85*   --   89*   MPV  13.5*   13.2*   --   13.9*     Recent Labs      07/06/18   0442  07/07/18   0333  07/08/18   0430   SODIUM  140  133*  136   POTASSIUM  3.2*  3.3*  3.4*   CHLORIDE  108  104  109   CO2  25  21  22   GLUCOSE  88  86  86   BUN  11  9  11   CREATININE  1.06  0.93  1.16   CALCIUM  8.3*  7.4*  7.8*                      Assessment/Plan     * Urinary retention- (present on admission)   Assessment & Plan    Due to neurogenic bladder s/p suprapubic catheter placement by urology on 6/21. Irrigation and clot retrival 7/3  Urology following.  No signs of hematuria for past several days        Acute hypoxemic respiratory failure (HCC)   Assessment & Plan    Now on Room air.  Continue RT protocol, duo nebs, Pep therapy if warranted, and incentive spirometry.       Anemia- (present on admission)   Assessment & Plan    In setting of CKD, MDS with bone marrow suppression and gross hematuria.   Hemoglobin 7.3 today  Transfuse if hgb<7g/dl        Elevated blood pressure reading without diagnosis of hypertension- (present on admission)   Assessment & Plan    Controlled.  Continue Lisinopril.  PRN IV labetalol if systolic blood pressure over 170        Chronic thrombocytopenia due to MDS (HCC)- (present on admission)   Assessment & Plan    Baseline PLT count in the 30,000s  todays PLT count 89,000  Monitor closely  TEG normal.        Gross hematuria   Assessment & Plan    Sequelae of placement of suprapubic catheter with MDS history and low platelets.  Persistent hematuria with clots   Multiple surgical attempts, patient underwent irrigation and clot extraction 7/2  Formalin bladder instillation done 6/29  No noted hematuria for past 3 days.      Hypokalemia- (present on admission)   Assessment & Plan    K:3.4  Replenish lytes, will repeat bmp in the morning for any changes        DNR (do not resuscitate)- (present on admission)   Assessment & Plan    DPOA and advanced directive reviewed.  DNAR/DNI/No cor track         CKD (chronic kidney  disease), stage III- (present on admission)   Assessment & Plan    Stable, at baseline.        Myelodysplastic syndrome (HCC)- (present on admission)   Assessment & Plan    Continue to monitor cbc closely.         Parkinson's disease with dementia (HCC)- (present on admission)   Assessment & Plan    Continue home Sinemet  Continue Amantadine,Celexa and namenda.  At baseline mentation.     Acute renal failure -(present on admission)  Renal functions within normal limits  Continue IV fluid boluses and recheck BMP in the morning     Leukocytosis   Of no significance no signs of SIRS, could be reactive.  WBC count 15.1 today  Positive urinalysis  Continue IV Zosyn x 5 days.    Hypotension  Asymptomatic, most likely from parkinson's diease.  This happened several days ago, patient then became hypertensive  CTM clinically.    Hypomagnesemia  Replenished, m.8 today.  CTM       Quality-Core Measures   Reviewed items::  Labs reviewed, Medications reviewed and Radiology images reviewed  Hurst catheter::  Neurogenic Bladder  DVT prophylaxis pharmacological::  Contraindicated - High bleeding risk and Contraindicated - Anemia requiring blood transfusion (Low PLT and anemia and active bleeding, held as result)  DVT prophylaxis - mechanical:  SCDs  Ulcer Prophylaxis::  Not indicated      Patient plan of care discussed at multidisplinary team rounds and with patient and R.N at beside.

## 2018-07-08 NOTE — CARE PLAN
Problem: Discharge Barriers/Planning  Goal: Patient's continuum of care needs will be met    Intervention: Involve patient and significant other/support system in setting and prioritizing goals for hospital stay and discharge  Discharge planning in place, Dr Castañeda discussed with son Filippo possible transfer to home with tomorrow, will communicate further with wife tomorrow when she is back.

## 2018-07-08 NOTE — CARE PLAN
Problem: Communication  Goal: The ability to communicate needs accurately and effectively will improve  Outcome: PROGRESSING SLOWER THAN EXPECTED    Intervention: Bromide patient and significant other/support system to call light to alert staff of needs  Pt is becoming for alert and oriented each day. Still need to re-orient pt from time to time, especially in the evening time.      Problem: Pain Management  Goal: Pain level will decrease to patient's comfort goal  Outcome: PROGRESSING AS EXPECTED  Pt denies pain

## 2018-07-08 NOTE — PROGRESS NOTES
Report received from NOC, Pt awake and alert, repositioned n bed, denies pain or discomfort, answers simple questions and follows commands. Pt on RA, Tele SR, bed alarm on Pt within view of nurisng station, safety maintained.

## 2018-07-09 PROBLEM — N17.9 ACUTE RENAL FAILURE (HCC): Status: ACTIVE | Noted: 2018-07-09

## 2018-07-09 PROBLEM — E83.42 HYPOMAGNESEMIA: Status: ACTIVE | Noted: 2018-07-09

## 2018-07-09 PROBLEM — I95.9 HYPOTENSION: Status: ACTIVE | Noted: 2018-07-09

## 2018-07-09 LAB
ALBUMIN SERPL BCP-MCNC: 2.9 G/DL (ref 3.2–4.9)
ALBUMIN/GLOB SERPL: 1 G/DL
ALP SERPL-CCNC: 76 U/L (ref 30–99)
ALT SERPL-CCNC: <5 U/L (ref 2–50)
ANION GAP SERPL CALC-SCNC: 7 MMOL/L (ref 0–11.9)
AST SERPL-CCNC: 22 U/L (ref 12–45)
BASOPHILS # BLD AUTO: 0.3 % (ref 0–1.8)
BASOPHILS # BLD: 0.05 K/UL (ref 0–0.12)
BILIRUB SERPL-MCNC: 0.8 MG/DL (ref 0.1–1.5)
BUN SERPL-MCNC: 11 MG/DL (ref 8–22)
CALCIUM SERPL-MCNC: 7.7 MG/DL (ref 8.4–10.2)
CHLORIDE SERPL-SCNC: 107 MMOL/L (ref 96–112)
CO2 SERPL-SCNC: 24 MMOL/L (ref 20–33)
COMMENT 1642: NORMAL
CREAT SERPL-MCNC: 1 MG/DL (ref 0.5–1.4)
EOSINOPHIL # BLD AUTO: 0.84 K/UL (ref 0–0.51)
EOSINOPHIL NFR BLD: 5.8 % (ref 0–6.9)
ERYTHROCYTE [DISTWIDTH] IN BLOOD BY AUTOMATED COUNT: 60.4 FL (ref 35.9–50)
GLOBULIN SER CALC-MCNC: 3 G/DL (ref 1.9–3.5)
GLUCOSE SERPL-MCNC: 117 MG/DL (ref 65–99)
HCT VFR BLD AUTO: 25 % (ref 42–52)
HGB BLD-MCNC: 7.8 G/DL (ref 14–18)
IMM GRANULOCYTES # BLD AUTO: 0.1 K/UL (ref 0–0.11)
IMM GRANULOCYTES NFR BLD AUTO: 0.7 % (ref 0–0.9)
LYMPHOCYTES # BLD AUTO: 1.49 K/UL (ref 1–4.8)
LYMPHOCYTES NFR BLD: 10.2 % (ref 22–41)
MAGNESIUM SERPL-MCNC: 1.7 MG/DL (ref 1.5–2.5)
MCH RBC QN AUTO: 29.7 PG (ref 27–33)
MCHC RBC AUTO-ENTMCNC: 31.2 G/DL (ref 33.7–35.3)
MCV RBC AUTO: 95.1 FL (ref 81.4–97.8)
MONOCYTES # BLD AUTO: 7.53 K/UL (ref 0–0.85)
MONOCYTES NFR BLD AUTO: 51.6 % (ref 0–13.4)
NEUTROPHILS # BLD AUTO: 4.59 K/UL (ref 1.82–7.42)
NEUTROPHILS NFR BLD: 31.4 % (ref 44–72)
NRBC # BLD AUTO: 0.04 K/UL
NRBC BLD-RTO: 0.3 /100 WBC
PLATELET # BLD AUTO: 90 K/UL (ref 164–446)
PMV BLD AUTO: 13.6 FL (ref 9–12.9)
POTASSIUM SERPL-SCNC: 3.3 MMOL/L (ref 3.6–5.5)
PROT SERPL-MCNC: 5.9 G/DL (ref 6–8.2)
RBC # BLD AUTO: 2.63 M/UL (ref 4.7–6.1)
SODIUM SERPL-SCNC: 138 MMOL/L (ref 135–145)
WBC # BLD AUTO: 14.6 K/UL (ref 4.8–10.8)

## 2018-07-09 PROCEDURE — 700102 HCHG RX REV CODE 250 W/ 637 OVERRIDE(OP): Performed by: HOSPITALIST

## 2018-07-09 PROCEDURE — 80053 COMPREHEN METABOLIC PANEL: CPT

## 2018-07-09 PROCEDURE — 99233 SBSQ HOSP IP/OBS HIGH 50: CPT | Mod: GW | Performed by: HOSPITALIST

## 2018-07-09 PROCEDURE — A9270 NON-COVERED ITEM OR SERVICE: HCPCS | Performed by: HOSPITALIST

## 2018-07-09 PROCEDURE — 700102 HCHG RX REV CODE 250 W/ 637 OVERRIDE(OP): Performed by: INTERNAL MEDICINE

## 2018-07-09 PROCEDURE — 700111 HCHG RX REV CODE 636 W/ 250 OVERRIDE (IP): Performed by: HOSPITALIST

## 2018-07-09 PROCEDURE — A9270 NON-COVERED ITEM OR SERVICE: HCPCS | Performed by: INTERNAL MEDICINE

## 2018-07-09 PROCEDURE — G8997 SWALLOW GOAL STATUS: HCPCS | Mod: CI

## 2018-07-09 PROCEDURE — G8998 SWALLOW D/C STATUS: HCPCS | Mod: CI

## 2018-07-09 PROCEDURE — 700105 HCHG RX REV CODE 258: Performed by: HOSPITALIST

## 2018-07-09 PROCEDURE — 770022 HCHG ROOM/CARE - ICU (200)

## 2018-07-09 PROCEDURE — 700105 HCHG RX REV CODE 258: Performed by: UROLOGY

## 2018-07-09 PROCEDURE — 85025 COMPLETE CBC W/AUTO DIFF WBC: CPT

## 2018-07-09 PROCEDURE — 92526 ORAL FUNCTION THERAPY: CPT

## 2018-07-09 PROCEDURE — 97530 THERAPEUTIC ACTIVITIES: CPT

## 2018-07-09 PROCEDURE — 83735 ASSAY OF MAGNESIUM: CPT

## 2018-07-09 RX ORDER — POTASSIUM CHLORIDE 20 MEQ/1
40 TABLET, EXTENDED RELEASE ORAL ONCE
Status: COMPLETED | OUTPATIENT
Start: 2018-07-09 | End: 2018-07-09

## 2018-07-09 RX ORDER — MAGNESIUM SULFATE HEPTAHYDRATE 40 MG/ML
2 INJECTION, SOLUTION INTRAVENOUS ONCE
Status: COMPLETED | OUTPATIENT
Start: 2018-07-09 | End: 2018-07-09

## 2018-07-09 RX ADMIN — AMANTADINE HYDROCHLORIDE 100 MG: 100 CAPSULE ORAL at 08:21

## 2018-07-09 RX ADMIN — MEMANTINE HYDROCHLORIDE 10 MG: 10 TABLET ORAL at 08:21

## 2018-07-09 RX ADMIN — AMANTADINE HYDROCHLORIDE 100 MG: 100 CAPSULE ORAL at 19:50

## 2018-07-09 RX ADMIN — PIPERACILLIN AND TAZOBACTAM 3.38 G: 3; .375 INJECTION, POWDER, LYOPHILIZED, FOR SOLUTION INTRAVENOUS; PARENTERAL at 05:14

## 2018-07-09 RX ADMIN — SENNOSIDES AND DOCUSATE SODIUM 2 TABLET: 8.6; 5 TABLET ORAL at 08:21

## 2018-07-09 RX ADMIN — OXYBUTYNIN CHLORIDE 5 MG: 5 TABLET ORAL at 16:19

## 2018-07-09 RX ADMIN — SODIUM CHLORIDE, POTASSIUM CHLORIDE, SODIUM LACTATE AND CALCIUM CHLORIDE: 600; 310; 30; 20 INJECTION, SOLUTION INTRAVENOUS at 08:09

## 2018-07-09 RX ADMIN — QUETIAPINE 25 MG: 25 TABLET ORAL at 13:25

## 2018-07-09 RX ADMIN — POTASSIUM CHLORIDE 40 MEQ: 1500 TABLET, EXTENDED RELEASE ORAL at 08:17

## 2018-07-09 RX ADMIN — CITALOPRAM HYDROBROMIDE 20 MG: 20 TABLET ORAL at 08:21

## 2018-07-09 RX ADMIN — PIPERACILLIN AND TAZOBACTAM 3.38 G: 3; .375 INJECTION, POWDER, LYOPHILIZED, FOR SOLUTION INTRAVENOUS; PARENTERAL at 13:09

## 2018-07-09 RX ADMIN — MEMANTINE HYDROCHLORIDE 10 MG: 10 TABLET ORAL at 19:51

## 2018-07-09 RX ADMIN — CARBIDOPA AND LEVODOPA 1 TABLET: 50; 200 TABLET, EXTENDED RELEASE ORAL at 19:51

## 2018-07-09 RX ADMIN — OXYBUTYNIN CHLORIDE 5 MG: 5 TABLET ORAL at 19:51

## 2018-07-09 RX ADMIN — OXYBUTYNIN CHLORIDE 5 MG: 5 TABLET ORAL at 08:21

## 2018-07-09 RX ADMIN — SODIUM CHLORIDE, POTASSIUM CHLORIDE, SODIUM LACTATE AND CALCIUM CHLORIDE: 600; 310; 30; 20 INJECTION, SOLUTION INTRAVENOUS at 19:56

## 2018-07-09 RX ADMIN — PIPERACILLIN AND TAZOBACTAM 3.38 G: 3; .375 INJECTION, POWDER, LYOPHILIZED, FOR SOLUTION INTRAVENOUS; PARENTERAL at 20:00

## 2018-07-09 RX ADMIN — QUETIAPINE 25 MG: 25 TABLET ORAL at 19:51

## 2018-07-09 RX ADMIN — MAGNESIUM SULFATE HEPTAHYDRATE 2 G: 40 INJECTION, SOLUTION INTRAVENOUS at 08:21

## 2018-07-09 RX ADMIN — SENNOSIDES AND DOCUSATE SODIUM 2 TABLET: 8.6; 5 TABLET ORAL at 19:51

## 2018-07-09 ASSESSMENT — PAIN SCALES - GENERAL
PAINLEVEL_OUTOF10: 0

## 2018-07-09 ASSESSMENT — GAIT ASSESSMENTS: GAIT LEVEL OF ASSIST: UNABLE TO PARTICIPATE

## 2018-07-09 NOTE — PROGRESS NOTES
1225: PT at bedside. Pt eating lunch. Wife requesting that pt finish lunch first. PT to return after lunch.     1255: PT at bedside, pt is finished with lunch, wife agreeable to PT eval.     1420: Dr. Mariano with [facility] at bedside.     1500: Speech therapy at bedside.     1645: SW at bedside, SW to send out choice form for SNF.     1800: Dr. Pak at bedside, POC discussed with wife Elizabeth.

## 2018-07-09 NOTE — PROGRESS NOTES
Wife at bedside, updated on care. Wife requesting for physical therapy to evaluate pt now. PT called, message left.

## 2018-07-09 NOTE — PROGRESS NOTES
Assessment completed. Pt awake and eating breakfast. No c/o pain. POC discussed with pt. Scheduled medications given, pt tolerated well. Magnesium and potassium replacement in process. Call light in reach and bed alarm on.

## 2018-07-09 NOTE — ASSESSMENT & PLAN NOTE
Of no significance no signs of SIRS, could be reactive.  WBC count 13.1   Positive urinalysis  Complete Zosyn today.

## 2018-07-09 NOTE — PROGRESS NOTES
[facility] Hospitalist Progress Note    Date of Service: 7/9/2018    Chief Complaint  77 y.o. male admitted 6/21/2018 with myelodysplastic syndrome, CKD stage III, Parkinson's disease with dementia, with issues with urinary retention due to neurogenic bladder.  He is admitted following suprapubic catheter placement.  Perioperatively, he had stable thrombocytopenia, and had platelet transfusion.  However, he had gross hematuria and elevated BPs postoperatively.     Interval Problem Update  Agitated and confused overnight, had to be put in restraints due to pulling out the CBI. Better with use of IM haldol and resuming oral seroquel.  Discussed with RN still having hematuria, few clots.  Discussed with urology BELKIS Winn; going back to OR today.    7/3  Patient is AAox2, very pleasant.  s/p evacuation and bladder irrigation yesterday  Continue continuous irrigation   Patient still on oxygen mask, oxygenation improving  Continue RT protocol, duo nebs, Pep therapy if warranted, and incentive spirometry.   Continue to monitor hgb/hct closely.     7/4  Patient doing well overnight, no acute issues except for low systolic pressures, no indication of sepsis, patient asymptomatic.  Denies dizziness/lightheadedness. However BP in the 120's today.  No evidence of urinary clotting or bleeding, urine yellow in color, still undergoing aluminum CBI  Leukocytosis increased, renal functions mildly worse but clinically stable, BP stable.  CTM    7/5  Patient doing well, no acute complaints, is awake and alert, looks subjectively much better today.  BP in the 150s-160s  Renal functions normalizing.  Continue IV fluids  CBI clear, will awaiting for urology to stop CBI.  Cont to monitor Hgb closely.  PT/OT when cleared from surgical standpoint.     7/6  Patient very agitated and confused overnight  CBI clamped and urine clear  Continue supportive measures  Physical and occupational therapy evaluation today  Leukocytosis worsening at  14,000  Chest x-ray and urinalysis pending  Empiric IV antibiotics started, oral antibiotics discontinued.      No acute issues overnight patient much more awake and calm  Urinalysis was positive for urinary tract infection hence IV antibiotics were continued  Continue to replenish electrolytes  Hemoglobin did decrease to 6.7, 1 unit of PRBCs ordered continue to monitor  Patient also had mild drop in SBP which is a pattern for the patient, pt is stable and asymptomatic, actually clinically looks much better than previously days.  Continue close monitoring of BP      Patient awake and alert, looks great today.  No noted hematuria.   Continue IV antibiotics for UTI  Will discuss with urology for rinaldi removal tomorrow?   H/H trending down, may need transfusion prior to d/c  Hospice to meet with family on Monday.      Patient awake and alert. At baseline.  Leukocytosis still persists, but improving.  Replenish magnesium and potassium.  Discuss with urology on status of rinaldi?   Family conference with wife, on plan? Home with hospice?     Consultants/Specialty  Internal Medicine    Disposition  TBD      Review of Systems   Unable to perform ROS: Dementia      Physical Exam  Laboratory/Imaging   Hemodynamics  Temp (24hrs), Av °C (98.6 °F), Min:36.7 °C (98.1 °F), Max:37.2 °C (98.9 °F)   Temperature: 37.1 °C (98.7 °F)  Pulse  Av.2  Min: 46  Max: 117 Heart Rate (Monitored): (!) 55  NIBP: (!) 166/78      Respiratory      Respiration: 15, Pulse Oximetry: 100 %     Work Of Breathing / Effort: Mild  RUL Breath Sounds: Clear, RML Breath Sounds: Diminished, RLL Breath Sounds: Diminished, AQUILINO Breath Sounds: Clear, LLL Breath Sounds: Diminished    Fluids    Intake/Output Summary (Last 24 hours) at 18 0738  Last data filed at 18 0600   Gross per 24 hour   Intake             3060 ml   Output             2675 ml   Net              385 ml       Nutrition  Orders Placed This Encounter   Procedures   • Diet  Order Regular (needs assistance with meals if wife is not at bedside)     Standing Status:   Standing     Number of Occurrences:   1     Order Specific Question:   Diet:     Answer:   Regular [1]     Comments:   needs assistance with meals if wife is not at bedside     Order Specific Question:   Texture/Fiber modifications:     Answer:   Dysphagia 2(Pureed/Chopped)specify fluid consistency(question 6) [2]     Order Specific Question:   Consistency/Fluid modifications:     Answer:   Thin Liquids [3]     Physical Exam   Constitutional: He appears well-developed and well-nourished. No distress.   HENT:   Head: Normocephalic and atraumatic.   Right Ear: External ear normal.   Left Ear: External ear normal.   Mouth/Throat: No oropharyngeal exudate.   Eyes: Conjunctivae are normal. Pupils are equal, round, and reactive to light. Right eye exhibits no discharge. No scleral icterus.   Neck: Neck supple. No JVD present. No thyromegaly present.   Cardiovascular: Intact distal pulses.    No murmur heard.  Pulses:       Dorsalis pedis pulses are 2+ on the right side, and 2+ on the left side.   Cap refill < 3 s   Pulmonary/Chest: Effort normal and breath sounds normal. No stridor. No respiratory distress. He has no wheezes. He has no rales.   Abdominal: Soft. Bowel sounds are normal. He exhibits no distension. There is no tenderness. There is no rebound and no guarding.   Genitourinary:   Genitourinary Comments: Suprapubic catheter in place draining well, clear yellow urine   Musculoskeletal: Normal range of motion. He exhibits no edema.   Neurological: He is alert. He is disoriented. No cranial nerve deficit.   Skin: Skin is warm and dry. No rash noted. He is not diaphoretic. No erythema.   Psychiatric: His behavior is normal. Thought content normal. His affect is labile. His speech is tangential.   AAox2-3 today         Recent Labs      07/07/18   0333  07/07/18   0527  07/08/18   0430  07/09/18   0347   WBC  16.4*   --    15.1*  14.6*   RBC  2.25*   --   2.47*  2.63*   HEMOGLOBIN  6.7*  7.6*  7.3*  7.8*   HEMATOCRIT  21.1*   --   23.1*  25.0*   MCV  93.8   --   93.5  95.1   MCH  29.8   --   29.6  29.7   MCHC  31.8*   --   31.6*  31.2*   RDW  57.9*   --   59.1*  60.4*   PLATELETCT  85*   --   89*  90*   MPV  13.2*   --   13.9*  13.6*     Recent Labs      07/07/18   0333  07/08/18   0430  07/09/18   0347   SODIUM  133*  136  138   POTASSIUM  3.3*  3.4*  3.3*   CHLORIDE  104  109  107   CO2  21  22  24   GLUCOSE  86  86  117*   BUN  9  11  11   CREATININE  0.93  1.16  1.00   CALCIUM  7.4*  7.8*  7.7*                      Assessment/Plan     Chronic thrombocytopenia due to MDS (HCC)  Baseline PLT count in the 30,000s  todays PLT count 90,000  Monitor closely  TEG normal.    CKD (chronic kidney disease), stage III  Stable, at baseline.      Myelodysplastic syndrome (HCC)  Continue to monitor cbc closely.     Parkinson's disease with dementia (HCC)  Continue home Sinemet  Continue Amantadine,Celexa and namenda.  At baseline mentation.      Urinary retention  Due to neurogenic bladder s/p suprapubic catheter placement by urology on 6/21. Irrigation and clot retrival 7/3  Urology following.  No signs of hematuria     Elevated blood pressure reading without diagnosis of hypertension  Controlled.  Continue Lisinopril.  PRN IV labetalol if systolic blood pressure over 170    Hypokalemia  K:3.3  Replenish lytes, will repeat bmp in the morning for any changes    Acute hypoxemic respiratory failure (HCC)  Now on Room air.  Continue RT protocol, duo nebs, Pep therapy if warranted, and incentive spirometry.       Anemia  In setting of CKD, MDS with bone marrow suppression and gross hematuria.   Hemoglobin 7.8 today  Transfuse if hgb<7g/dl    DNR (do not resuscitate)  DPOA and advanced directive reviewed.  DNAR/DNI/No cor track       Gross hematuria  Sequelae of placement of suprapubic catheter with MDS history and low platelets.  Persistent  hematuria with clots   Multiple surgical attempts, patient underwent irrigation and clot extraction   Formalin bladder instillation done   No noted hematuria for past 3 days.        Acute renal failure (HCC)  Renal functions within normal limits  Continue IV fluid boluses and recheck BMP in the morning     Leukocytosis  Of no significance no signs of SIRS, could be reactive.  WBC count 14.6 today  Positive urinalysis  Continue IV Zosyn x 5 days.      Hypotension  Asymptomatic, most likely from parkinson's diease.  This happened several days ago, patient then became hypertensive  Now normotensive   CTM clinically.    Hypomagnesemia  Replenished, m.7 today.  CTM      Quality-Core Measures   Reviewed items::  Labs reviewed, Medications reviewed and Radiology images reviewed  Hurst catheter::  Neurogenic Bladder  DVT prophylaxis pharmacological::  Contraindicated - High bleeding risk and Contraindicated - Anemia requiring blood transfusion (Low PLT and anemia and active bleeding, held as result)  DVT prophylaxis - mechanical:  SCDs  Ulcer Prophylaxis::  Not indicated      Patient plan of care discussed at multidisplinary team rounds and with patient and R.N at beside.

## 2018-07-09 NOTE — PROGRESS NOTES
Assumed pt care. AAO to self and place. Pt denied pain or SOB. Assessment completed. Repositioned pt in bed. Reminded pt to call for assistance. Call light within reach, bed in lowest position, bed alarm on, will continue to monitor.

## 2018-07-09 NOTE — ASSESSMENT & PLAN NOTE
Asymptomatic, most likely from parkinson's diease.  This happened several days ago, patient then became hypertensive  Now normotensive   CTM clinically.

## 2018-07-09 NOTE — THERAPY
"Physical Therapy Treatment completed.   Bed Mobility:  Supine to Sit: Maximal Assist  Transfers: Sit to Stand: Maximal Assist (x2 people, only able to tolerate partial stand)  Gait: Level Of Assist: Unable to Participate   Plan of Care: Will benefit from Physical Therapy 4 times per week  Discharge Recommendations: Equipment: Will Continue to Assess for Equipment Needs. Post-acute therapy Discharge to a transitional care facility for continued skilled therapy services.    Pt overall tolerated EOB activity well, no c/o dizziness, BP low 69/45 but pt asymptomatic and very alert and cooperative but limited EOB time due to low BP. Pt remains grossly weak and would benefit from additional PT 4x wk to assist with strengthening and improving function.      See \"Rehab Therapy-Acute\" Patient Summary Report for complete documentation.       "

## 2018-07-09 NOTE — CARE PLAN
Problem: Skin Integrity  Goal: Risk for impaired skin integrity will decrease    Intervention: Implement precautions to protect skin integrity in collaboration with the interdisciplinary team  Pt is on a low air loss mattress. Pt is being turned q2h. Pt's elbows and heels are floating on pillows.       Problem: Urinary Elimination:  Goal: Ability to reestablish a normal urinary elimination pattern will improve  Pt's urine remains clear. No blood or blood clots noted.

## 2018-07-09 NOTE — DISCHARGE PLANNING
Anticipated Discharge Disposition: HH vs Hospice    Action: Per chart notes, [facility] declined pt stating pt isn't hospice appropriate at this time. Notified MD of this and MD disagrees and states pt needs to be on hospice if he is discharged home. [facility] is likely the only Hospice agency that service's pt's.     SW to further discuss with [facility] tomorrow.      Barriers to Discharge: Hospice acceptance    Plan: Follow up with [facility]

## 2018-07-09 NOTE — CARE PLAN
Problem: Safety  Goal: Will remain free from falls    Intervention: Implement fall precautions  Treaded slipper socks in place, bed in low position, pt near nurses station, personal belongings in reach, call light in reach and bed alarm on. Pt will remain free from falls.       Problem: Knowledge Deficit  Goal: Knowledge of disease process/condition, treatment plan, diagnostic tests, and medications will improve    Intervention: Explain information regarding disease process/condition, treatment plan, diagnostic tests, and medications and document in education  POC discussed with pt. [facility] to see patient today.       Problem: Skin Integrity  Goal: Risk for impaired skin integrity will decrease    Intervention: Assess and monitor skin integrity, appearance and/or temperature  Turn and reposition pt every 2 hours, pillows for positioning, and heels elevated with pillows.       Problem: Urinary Elimination:  Goal: Ability to reestablish a normal urinary elimination pattern will improve    Intervention: Evaluate need to continue indwelling urinary catheter  Suprapubic catheter for output, rinaldi catheter clamped. Urine clear yellow, sediment at times. Urology following.

## 2018-07-10 LAB
ALBUMIN SERPL BCP-MCNC: 2.9 G/DL (ref 3.2–4.9)
ALBUMIN/GLOB SERPL: 0.9 G/DL
ALP SERPL-CCNC: 72 U/L (ref 30–99)
ALT SERPL-CCNC: <5 U/L (ref 2–50)
ANION GAP SERPL CALC-SCNC: 4 MMOL/L (ref 0–11.9)
ANISOCYTOSIS BLD QL SMEAR: ABNORMAL
AST SERPL-CCNC: 24 U/L (ref 12–45)
BASOPHILS # BLD AUTO: 0 % (ref 0–1.8)
BASOPHILS # BLD: 0 K/UL (ref 0–0.12)
BILIRUB SERPL-MCNC: 0.8 MG/DL (ref 0.1–1.5)
BUN SERPL-MCNC: 12 MG/DL (ref 8–22)
CALCIUM SERPL-MCNC: 8 MG/DL (ref 8.4–10.2)
CHLORIDE SERPL-SCNC: 111 MMOL/L (ref 96–112)
CO2 SERPL-SCNC: 24 MMOL/L (ref 20–33)
CREAT SERPL-MCNC: 0.99 MG/DL (ref 0.5–1.4)
EOSINOPHIL # BLD AUTO: 0.26 K/UL (ref 0–0.51)
EOSINOPHIL NFR BLD: 2 % (ref 0–6.9)
ERYTHROCYTE [DISTWIDTH] IN BLOOD BY AUTOMATED COUNT: 59.4 FL (ref 35.9–50)
GLOBULIN SER CALC-MCNC: 3.1 G/DL (ref 1.9–3.5)
GLUCOSE SERPL-MCNC: 88 MG/DL (ref 65–99)
HCT VFR BLD AUTO: 24.9 % (ref 42–52)
HGB BLD-MCNC: 7.9 G/DL (ref 14–18)
HYPOCHROMIA BLD QL SMEAR: ABNORMAL
LYMPHOCYTES # BLD AUTO: 1.83 K/UL (ref 1–4.8)
LYMPHOCYTES NFR BLD: 14 % (ref 22–41)
MAGNESIUM SERPL-MCNC: 1.9 MG/DL (ref 1.5–2.5)
MANUAL DIFF BLD: NORMAL
MCH RBC QN AUTO: 29.6 PG (ref 27–33)
MCHC RBC AUTO-ENTMCNC: 31.7 G/DL (ref 33.7–35.3)
MCV RBC AUTO: 93.3 FL (ref 81.4–97.8)
MONOCYTES # BLD AUTO: 6.03 K/UL (ref 0–0.85)
MONOCYTES NFR BLD AUTO: 46 % (ref 0–13.4)
NEUTROPHILS # BLD AUTO: 4.98 K/UL (ref 1.82–7.42)
NEUTROPHILS NFR BLD: 38 % (ref 44–72)
NRBC # BLD AUTO: 0.05 K/UL
NRBC BLD-RTO: 0.4 /100 WBC
PLATELET # BLD AUTO: 102 K/UL (ref 164–446)
PLATELET BLD QL SMEAR: NORMAL
PMV BLD AUTO: 14 FL (ref 9–12.9)
POTASSIUM SERPL-SCNC: 3.9 MMOL/L (ref 3.6–5.5)
PROT SERPL-MCNC: 6 G/DL (ref 6–8.2)
RBC # BLD AUTO: 2.67 M/UL (ref 4.7–6.1)
RBC BLD AUTO: PRESENT
SODIUM SERPL-SCNC: 139 MMOL/L (ref 135–145)
WBC # BLD AUTO: 13.1 K/UL (ref 4.8–10.8)

## 2018-07-10 PROCEDURE — 700102 HCHG RX REV CODE 250 W/ 637 OVERRIDE(OP): Performed by: HOSPITALIST

## 2018-07-10 PROCEDURE — 700111 HCHG RX REV CODE 636 W/ 250 OVERRIDE (IP): Performed by: HOSPITALIST

## 2018-07-10 PROCEDURE — A9270 NON-COVERED ITEM OR SERVICE: HCPCS | Performed by: HOSPITALIST

## 2018-07-10 PROCEDURE — 700102 HCHG RX REV CODE 250 W/ 637 OVERRIDE(OP): Performed by: INTERNAL MEDICINE

## 2018-07-10 PROCEDURE — 700111 HCHG RX REV CODE 636 W/ 250 OVERRIDE (IP): Performed by: INTERNAL MEDICINE

## 2018-07-10 PROCEDURE — 83735 ASSAY OF MAGNESIUM: CPT

## 2018-07-10 PROCEDURE — 85027 COMPLETE CBC AUTOMATED: CPT

## 2018-07-10 PROCEDURE — 85007 BL SMEAR W/DIFF WBC COUNT: CPT

## 2018-07-10 PROCEDURE — 700105 HCHG RX REV CODE 258: Performed by: HOSPITALIST

## 2018-07-10 PROCEDURE — 700105 HCHG RX REV CODE 258: Performed by: UROLOGY

## 2018-07-10 PROCEDURE — A9270 NON-COVERED ITEM OR SERVICE: HCPCS | Performed by: INTERNAL MEDICINE

## 2018-07-10 PROCEDURE — 80053 COMPREHEN METABOLIC PANEL: CPT

## 2018-07-10 PROCEDURE — 770006 HCHG ROOM/CARE - MED/SURG/GYN SEMI*

## 2018-07-10 PROCEDURE — 99233 SBSQ HOSP IP/OBS HIGH 50: CPT | Mod: GW | Performed by: HOSPITALIST

## 2018-07-10 RX ORDER — ALPRAZOLAM 0.25 MG/1
0.25 TABLET ORAL ONCE
Status: COMPLETED | OUTPATIENT
Start: 2018-07-10 | End: 2018-07-10

## 2018-07-10 RX ADMIN — CITALOPRAM HYDROBROMIDE 20 MG: 20 TABLET ORAL at 06:06

## 2018-07-10 RX ADMIN — SODIUM CHLORIDE, POTASSIUM CHLORIDE, SODIUM LACTATE AND CALCIUM CHLORIDE: 600; 310; 30; 20 INJECTION, SOLUTION INTRAVENOUS at 09:40

## 2018-07-10 RX ADMIN — OXYBUTYNIN CHLORIDE 5 MG: 5 TABLET ORAL at 13:36

## 2018-07-10 RX ADMIN — ALPRAZOLAM 0.25 MG: 0.25 TABLET ORAL at 15:55

## 2018-07-10 RX ADMIN — OXYBUTYNIN CHLORIDE 5 MG: 5 TABLET ORAL at 06:06

## 2018-07-10 RX ADMIN — QUETIAPINE 25 MG: 25 TABLET ORAL at 06:07

## 2018-07-10 RX ADMIN — PIPERACILLIN AND TAZOBACTAM 3.38 G: 3; .375 INJECTION, POWDER, LYOPHILIZED, FOR SOLUTION INTRAVENOUS; PARENTERAL at 04:10

## 2018-07-10 RX ADMIN — MEMANTINE HYDROCHLORIDE 10 MG: 10 TABLET ORAL at 06:04

## 2018-07-10 RX ADMIN — HALOPERIDOL LACTATE 5 MG: 5 INJECTION, SOLUTION INTRAMUSCULAR at 16:40

## 2018-07-10 RX ADMIN — QUETIAPINE 25 MG: 25 TABLET ORAL at 13:36

## 2018-07-10 RX ADMIN — HYDRALAZINE HYDROCHLORIDE 10 MG: 20 INJECTION INTRAMUSCULAR; INTRAVENOUS at 04:10

## 2018-07-10 RX ADMIN — SODIUM CHLORIDE, POTASSIUM CHLORIDE, SODIUM LACTATE AND CALCIUM CHLORIDE: 600; 310; 30; 20 INJECTION, SOLUTION INTRAVENOUS at 21:25

## 2018-07-10 RX ADMIN — AMANTADINE HYDROCHLORIDE 100 MG: 100 CAPSULE ORAL at 06:06

## 2018-07-10 RX ADMIN — SENNOSIDES AND DOCUSATE SODIUM 2 TABLET: 8.6; 5 TABLET ORAL at 06:04

## 2018-07-10 RX ADMIN — PIPERACILLIN AND TAZOBACTAM 3.38 G: 3; .375 INJECTION, POWDER, LYOPHILIZED, FOR SOLUTION INTRAVENOUS; PARENTERAL at 13:36

## 2018-07-10 RX ADMIN — HALOPERIDOL LACTATE 2.5 MG: 5 INJECTION, SOLUTION INTRAMUSCULAR at 23:13

## 2018-07-10 ASSESSMENT — PATIENT HEALTH QUESTIONNAIRE - PHQ9
1. LITTLE INTEREST OR PLEASURE IN DOING THINGS: NOT AT ALL
SUM OF ALL RESPONSES TO PHQ9 QUESTIONS 1 AND 2: 0

## 2018-07-10 ASSESSMENT — PAIN SCALES - GENERAL
PAINLEVEL_OUTOF10: 0

## 2018-07-10 ASSESSMENT — PAIN SCALES - PAIN ASSESSMENT IN ADVANCED DEMENTIA (PAINAD)
BODYLANGUAGE: RELAXED
TOTALSCORE: 0
BREATHING: NORMAL
FACIALEXPRESSION: SMILING OR INEXPRESSIVE
CONSOLABILITY: NO NEED TO CONSOLE

## 2018-07-10 NOTE — PROGRESS NOTES
1550-Pt mobilized at edge of bed and stood 3 times. Assisted back to bed. Xanax given.     1640-Pt has increased agitation. Haldol administered.

## 2018-07-10 NOTE — CARE PLAN
Problem: Nutritional:  Goal: Achieve adequate nutritional intake  Patient will consume 50% or greater of meals/supplements   Outcome: MET Date Met: 07/10/18

## 2018-07-10 NOTE — DISCHARGE PLANNING
SW spoke to pt's spouse, Elizabteh, she would like a referral sent to [facility].     VALERIO requested that [facility] send the referral.

## 2018-07-10 NOTE — DISCHARGE PLANNING
Per ROSA Rebolledo, pt's Dr. Simon with [facility] came to evaluate the pt.     VALERIO met with pt's spouse, she informed VALERIO that Dr. Simon is recommending that the pt transition to SNF prior to discharging home.  Per Elizabeth, Dr. Simon does not feel that the pt qualifies for hospice at this time.  Dr. Simon will continue to monitor pt's progress when he transitions home.  If the pt does qualify for hospice, then Dr. Simon will put him on that service.     VALERIO spoke to MD.  An order was previously placed for SNF.      Elizabeth would like the pt to go to [facility].  VALERIO faxed choice to [facility].

## 2018-07-10 NOTE — PROGRESS NOTES
Received bedside report from Alyson LEE.  Patient resting in bed without signs/symptoms of discomfort or distress.  Patient's wife, Elizabeth, at bedside.  POC reviewed - pt. pending placement at a facility in [place].  SNF ([facility]) as a last resort per wife.  VS stable, gtts verified, and safety protocols in place.  Will continue to monitor and coordinate care.

## 2018-07-10 NOTE — CARE PLAN
Problem: Safety  Goal: Will remain free from injury  Outcome: PROGRESSING AS EXPECTED      Problem: Discharge Barriers/Planning  Goal: Patient's continuum of care needs will be met  Outcome: PROGRESSING SLOWER THAN EXPECTED  POC reviewed with family see progress note.    Problem: Skin Integrity  Goal: Risk for impaired skin integrity will decrease  Outcome: PROGRESSING AS EXPECTED  Appropriate protocols in place.  Nutritional supplements given.

## 2018-07-10 NOTE — DISCHARGE PLANNING
Received Choice form at 0822  Agency/Facility Name: [facility]  Referral sent per Choice form at 0840.  Choice obtained by MARCUS Alexander

## 2018-07-10 NOTE — PROGRESS NOTES
Continue to await new room assignment. Wife has been at bedside and expressed anxiety about transfer and dc'ing rinaldi catheter. Agreeable to mobilizing patient and and then dc'ing catheter. No further needs.

## 2018-07-10 NOTE — PROGRESS NOTES
0720-Report from ROSA Welch. POC reviewed. Pt sitting up in bed. In no apparent distress or discomfort. Pt denies pain at this time. Will continue to monitor.     0830-Patient downgraded to medical status. Charge RN aware. Awaiting room assignment. Pt continues to sit up in bed. No needs at this time.

## 2018-07-10 NOTE — CARE PLAN
Problem: Safety  Goal: Will remain free from injury  Outcome: PROGRESSING AS EXPECTED  Pt will remain safe. Pt instructed to use call light but does not always demonstrate ability to use appropriately. Bed alarm on and pt in view of nursing station.     Problem: Skin Integrity  Goal: Risk for impaired skin integrity will decrease  Outcome: PROGRESSING AS EXPECTED  Patient has healing abrasion to left inner thigh from previous stat lock (per wife, POA.) Pt turned every 2 hours. Heels floated on pillow.

## 2018-07-10 NOTE — DISCHARGE PLANNING
Agency/Facility Name: [facility]  Outcome: Patient accepted. MARCUS Felix notified.

## 2018-07-10 NOTE — PROGRESS NOTES
Call placed to urology regarding status of rinaldi catheter. Call received back by Rigo SOUZA. Aware rinaldi has been clamped since 7/6 with yellow, clear urine. Per susan Sierra to dc rinaldi catheter.

## 2018-07-10 NOTE — THERAPY
"Speech Language Therapy dysphagia treatment completed.   Functional Status:  Dysphagia therapy completed today. Patient presents with mild dysarthria and mild oral dysphagia. He consumed soft chopped solids and thin liquids with no overt signs of aspiration. Per wife, patient is at his baseline. Recommendations:  Recommend patient continue Dysphagia 2/thin liquid diet. If patient or wife requests, he may have Dysphagia 3 diet. At this time, recommend discontinue speech therapy services as patient has met goals.    Plan of Care: Patient with no further skilled SLP needs in the acute care setting at this time  Post-Acute Therapy: Currently anticipate no further skilled therapy needs once patient is discharged from the inpatient setting.    See \"Rehab Therapy-Acute\" Patient Summary Report for complete documentation.     "

## 2018-07-10 NOTE — DISCHARGE PLANNING
Pt will transfer to [facility] tomorrow at 1200 via [facility].    SW contacted pt's spouse, Elizabeth and informed her of the above.     MD and RN are aware as well.

## 2018-07-11 VITALS
WEIGHT: 183.64 LBS | TEMPERATURE: 97.4 F | BODY MASS INDEX: 22.83 KG/M2 | RESPIRATION RATE: 18 BRPM | DIASTOLIC BLOOD PRESSURE: 63 MMHG | OXYGEN SATURATION: 99 % | HEART RATE: 50 BPM | HEIGHT: 75 IN | SYSTOLIC BLOOD PRESSURE: 137 MMHG

## 2018-07-11 LAB
ANION GAP SERPL CALC-SCNC: 4 MMOL/L (ref 0–11.9)
ANISOCYTOSIS BLD QL SMEAR: ABNORMAL
BASOPHILS # BLD AUTO: 0 % (ref 0–1.8)
BASOPHILS # BLD: 0 K/UL (ref 0–0.12)
BUN SERPL-MCNC: 10 MG/DL (ref 8–22)
CALCIUM SERPL-MCNC: 8 MG/DL (ref 8.4–10.2)
CHLORIDE SERPL-SCNC: 111 MMOL/L (ref 96–112)
CO2 SERPL-SCNC: 23 MMOL/L (ref 20–33)
CREAT SERPL-MCNC: 0.94 MG/DL (ref 0.5–1.4)
EOSINOPHIL # BLD AUTO: 0.98 K/UL (ref 0–0.51)
EOSINOPHIL NFR BLD: 7 % (ref 0–6.9)
ERYTHROCYTE [DISTWIDTH] IN BLOOD BY AUTOMATED COUNT: 61.1 FL (ref 35.9–50)
GLUCOSE SERPL-MCNC: 85 MG/DL (ref 65–99)
HCT VFR BLD AUTO: 24.4 % (ref 42–52)
HGB BLD-MCNC: 7.5 G/DL (ref 14–18)
HYPOCHROMIA BLD QL SMEAR: ABNORMAL
LG PLATELETS BLD QL SMEAR: NORMAL
LYMPHOCYTES # BLD AUTO: 1.4 K/UL (ref 1–4.8)
LYMPHOCYTES NFR BLD: 10 % (ref 22–41)
MANUAL DIFF BLD: NORMAL
MCH RBC QN AUTO: 29.3 PG (ref 27–33)
MCHC RBC AUTO-ENTMCNC: 30.7 G/DL (ref 33.7–35.3)
MCV RBC AUTO: 95.3 FL (ref 81.4–97.8)
MONOCYTES # BLD AUTO: 4.06 K/UL (ref 0–0.85)
MONOCYTES NFR BLD AUTO: 29 % (ref 0–13.4)
NEUTROPHILS # BLD AUTO: 7.56 K/UL (ref 1.82–7.42)
NEUTROPHILS NFR BLD: 54 % (ref 44–72)
NRBC # BLD AUTO: 0.04 K/UL
NRBC BLD-RTO: 0.3 /100 WBC
PLATELET # BLD AUTO: 111 K/UL (ref 164–446)
PLATELET BLD QL SMEAR: NORMAL
PMV BLD AUTO: 14.2 FL (ref 9–12.9)
POTASSIUM SERPL-SCNC: 3.4 MMOL/L (ref 3.6–5.5)
RBC # BLD AUTO: 2.56 M/UL (ref 4.7–6.1)
RBC BLD AUTO: PRESENT
SODIUM SERPL-SCNC: 138 MMOL/L (ref 135–145)
WBC # BLD AUTO: 14 K/UL (ref 4.8–10.8)

## 2018-07-11 PROCEDURE — A9270 NON-COVERED ITEM OR SERVICE: HCPCS | Performed by: HOSPITALIST

## 2018-07-11 PROCEDURE — 700102 HCHG RX REV CODE 250 W/ 637 OVERRIDE(OP): Performed by: INTERNAL MEDICINE

## 2018-07-11 PROCEDURE — A9270 NON-COVERED ITEM OR SERVICE: HCPCS | Performed by: INTERNAL MEDICINE

## 2018-07-11 PROCEDURE — 700105 HCHG RX REV CODE 258: Performed by: UROLOGY

## 2018-07-11 PROCEDURE — 700102 HCHG RX REV CODE 250 W/ 637 OVERRIDE(OP): Performed by: HOSPITALIST

## 2018-07-11 PROCEDURE — 99239 HOSP IP/OBS DSCHRG MGMT >30: CPT | Mod: GW | Performed by: HOSPITALIST

## 2018-07-11 PROCEDURE — 85027 COMPLETE CBC AUTOMATED: CPT

## 2018-07-11 PROCEDURE — 85007 BL SMEAR W/DIFF WBC COUNT: CPT

## 2018-07-11 PROCEDURE — 80048 BASIC METABOLIC PNL TOTAL CA: CPT

## 2018-07-11 RX ADMIN — CITALOPRAM HYDROBROMIDE 20 MG: 20 TABLET ORAL at 06:03

## 2018-07-11 RX ADMIN — SODIUM CHLORIDE, POTASSIUM CHLORIDE, SODIUM LACTATE AND CALCIUM CHLORIDE: 600; 310; 30; 20 INJECTION, SOLUTION INTRAVENOUS at 08:23

## 2018-07-11 RX ADMIN — AMANTADINE HYDROCHLORIDE 100 MG: 100 CAPSULE ORAL at 06:08

## 2018-07-11 RX ADMIN — QUETIAPINE 25 MG: 25 TABLET ORAL at 14:14

## 2018-07-11 RX ADMIN — MEMANTINE HYDROCHLORIDE 10 MG: 10 TABLET ORAL at 06:03

## 2018-07-11 RX ADMIN — OXYBUTYNIN CHLORIDE 5 MG: 5 TABLET ORAL at 06:03

## 2018-07-11 RX ADMIN — QUETIAPINE 25 MG: 25 TABLET ORAL at 06:03

## 2018-07-11 NOTE — DISCHARGE PLANNING
Transport changed to 1430.  ROSA Shoemaker is aware.     VALERIO requested that [facility] send the DC summary to [facility].

## 2018-07-11 NOTE — PROGRESS NOTES
[facility] Hospitalist Progress Note    Date of Service: 7/10/2018    Chief Complaint  77 y.o. male admitted 2018 with myelodysplastic syndrome, CKD stage III, Parkinson's disease with dementia, with issues with urinary retention due to neurogenic bladder.  He is admitted following suprapubic catheter placement.  Perioperatively, he had stable thrombocytopenia, and had platelet transfusion.  However, he had gross hematuria and elevated BPs postoperatively.     Interval Problem Update  Intermittently agitated but far more alert than he was.  Bleeding is stopped and the Hurst catheter is coming out today just to leave the suprapubic catheter in place.    Consultants/Specialty  Internal Medicine - management of medical issues     Disposition  Going to skilled tomorrow        Review of Systems   Unable to perform ROS: Dementia      Physical Exam  Laboratory/Imaging   Hemodynamics  Temp (24hrs), Av.9 °C (98.5 °F), Min:36.7 °C (98 °F), Max:37.2 °C (99 °F)   Temperature: 37 °C (98.6 °F)  Pulse  Av.7  Min: 46  Max: 117 Heart Rate (Monitored): 70  NIBP: 149/91      Respiratory      Respiration: 18, Pulse Oximetry: 96 %     Work Of Breathing / Effort: Mild  RUL Breath Sounds: Clear, RML Breath Sounds: Diminished, RLL Breath Sounds: Diminished, AQUILINO Breath Sounds: Clear, LLL Breath Sounds: Diminished    Fluids    Intake/Output Summary (Last 24 hours) at 07/10/18 1807  Last data filed at 07/10/18 1800   Gross per 24 hour   Intake             3130 ml   Output             3820 ml   Net             -690 ml       Nutrition  Orders Placed This Encounter   Procedures   • Diet Order Regular (needs assistance with meals if wife is not at bedside)     Standing Status:   Standing     Number of Occurrences:   1     Order Specific Question:   Diet:     Answer:   Regular [1]     Comments:   needs assistance with meals if wife is not at bedside     Order Specific Question:   Texture/Fiber modifications:     Answer:   Dysphagia  2(Pureed/Chopped)specify fluid consistency(question 6) [2]     Order Specific Question:   Consistency/Fluid modifications:     Answer:   Thin Liquids [3]     Physical Exam   Constitutional: He appears well-developed and well-nourished. He is cooperative. No distress.   HENT:   Mouth/Throat: Oropharynx is clear and moist. No oropharyngeal exudate.   Eyes: Conjunctivae are normal. Right eye exhibits no discharge. Left eye exhibits no discharge. No scleral icterus.   Neck: No JVD present.   Cardiovascular: Normal rate, regular rhythm and intact distal pulses.    Pulmonary/Chest: Effort normal. No stridor. No respiratory distress. He has no wheezes. He has no rales.   Abdominal: Soft. Bowel sounds are normal. He exhibits no distension. There is no tenderness. There is no rebound.   Genitourinary:   Genitourinary Comments: Clear urine, suprapubic catheter in place   Musculoskeletal: He exhibits no edema.   Neurological: He is alert. He displays tremor. He exhibits abnormal muscle tone.   Alert and talkative   Skin: Skin is warm and dry. He is not diaphoretic. No pallor.   Suprapubic cath in place with bandage surrounding it, no erythema    Psychiatric: His affect is labile. His speech is tangential. He is agitated. Cognition and memory are impaired.   Nursing note and vitals reviewed.      Recent Labs      07/08/18   0430  07/09/18   0347  07/10/18   0516   WBC  15.1*  14.6*  13.1*   RBC  2.47*  2.63*  2.67*   HEMOGLOBIN  7.3*  7.8*  7.9*   HEMATOCRIT  23.1*  25.0*  24.9*   MCV  93.5  95.1  93.3   MCH  29.6  29.7  29.6   MCHC  31.6*  31.2*  31.7*   RDW  59.1*  60.4*  59.4*   PLATELETCT  89*  90*  102*   MPV  13.9*  13.6*  14.0*     Recent Labs      07/08/18   0430  07/09/18   0347  07/10/18   0516   SODIUM  136  138  139   POTASSIUM  3.4*  3.3*  3.9   CHLORIDE  109  107  111   CO2  22  24  24   GLUCOSE  86  117*  88   BUN  11  11  12   CREATININE  1.16  1.00  0.99   CALCIUM  7.8*  7.7*  8.0*                       Assessment/Plan     * Urinary retention- (present on admission)   Assessment & Plan    Due to neurogenic bladder s/p suprapubic catheter placement by urology on . Irrigation and clot retrival 7/3  Urology following.  No signs of hematuria         Acute hypoxemic respiratory failure (HCC)   Assessment & Plan    Now on Room air.  Continue RT protocol, duo nebs, Pep therapy if warranted, and incentive spirometry.           Anemia- (present on admission)   Assessment & Plan    In setting of CKD, MDS with bone marrow suppression and gross hematuria.   Hemoglobin 7.8 today  Transfuse if hgb<7g/dl        Elevated blood pressure reading without diagnosis of hypertension- (present on admission)   Assessment & Plan    Controlled.  Continue Lisinopril.  PRN IV labetalol if systolic blood pressure over 170        Chronic thrombocytopenia due to MDS (HCC)- (present on admission)   Assessment & Plan    Baseline PLT count in the 30,000s  todays PLT count 102  Monitor closely  TEG normal.        Hypomagnesemia   Assessment & Plan    Replenished, m.9  CTM        Hypotension   Assessment & Plan    Asymptomatic, most likely from parkinson's diease.  This happened several days ago, patient then became hypertensive  Now normotensive   CTM clinically.        Acute renal failure (HCC)   Assessment & Plan    Renal functions within normal limits  Continue IV fluid boluses and recheck BMP in the morning         Gross hematuria   Assessment & Plan    Sequelae of placement of suprapubic catheter with MDS history and low platelets.  Persistent hematuria with clots   Multiple surgical attempts, patient underwent irrigation and clot extraction   Formalin bladder instillation done   No noted hematuria for past 4 days, urology ordering catheter removal today; anticipate transfer to [facility] tomorrow.            Leukocytosis- (present on admission)   Assessment & Plan    Of no significance no signs of SIRS, could be  reactive.  WBC count 13.1   Positive urinalysis  Complete Zosyn today.          Hypokalemia- (present on admission)   Assessment & Plan    Resolved with replacement        DNR (do not resuscitate)- (present on admission)   Assessment & Plan    DPOA and advanced directive reviewed.  DNAR/DNI/No cor track           Myelodysplastic syndrome (HCC)- (present on admission)   Assessment & Plan    Continue to monitor cbc closely.         Parkinson's disease with dementia (HCC)- (present on admission)   Assessment & Plan    Continue home Sinemet  Continue Amantadine,Celexa and namenda.  At baseline mentation.            Quality-Core Measures   Reviewed items::  Labs reviewed and Medications reviewed  Hurst catheter::  Neurogenic Bladder (Suprapubic catheter)  DVT: held 2/2 low plts and anemia.  DVT prophylaxis - mechanical:  SCDs  Ulcer Prophylaxis::  Not indicated

## 2018-07-11 NOTE — DISCHARGE SUMMARY
Discharge Summary    CHIEF COMPLAINT ON ADMISSION  No chief complaint on file.      Reason for Admission  RETENTION OF URINE     CODE STATUS  DNAR/DNI    HPI & HOSPITAL COURSE  This is a 77 y.o. male who underwent placement of a suprapubic catheter by Dr. Joe due to neurogenic bladder.  Patient has a long-standing history of myelodysplastic disorder with chronically low platelets but no history of bleeding issues.  Following placement of the suprapubic catheter he had persistent hematuria.  The bladder became quite distended and unfortunately there developed a split in the dome of the bladder.  He continued to have severe hematuria.  This progressed to the point where he needed a blood transfusion.  Platelets were transfused improved to over 100.  Despite this he continued to bleed, he required for additional operative procedures to stop the bleeding.  The first 2 procedures were cautery of the bladder, the third procedure with instillation of formalin into the bladder and the last procedure was again another rinsing and some cautery.  Now finally the urine is clear, Hurst catheter has been removed and the suprapubic catheter is functioning normally.  Patient has a long-standing history of severe Parkinson disease as well as recent spine surgery and is quite debilitated.  Due to his Parkinson related dementia he has had some agitation during his hospital stay and thus cognitive impairment below his baseline.  He is improving however and will go to skilled nursing transitional care prior to going home with his wife.  The patient has related to his wife in the past that he definitely wishes to be DO NOT RESUSCITATE and DO NOT INTUBATE status and never wishes to have an artificial feeding tube. I will provide a POLST at discharge.       Therefore, he is discharged in fair and stable condition to skilled nursing facility.    The patient met 2-midnight criteria for an inpatient stay at the time of  discharge.      FOLLOW UP ITEMS POST DISCHARGE  Skilled nursing care.    DISCHARGE DIAGNOSES  Principal Problem:    Urinary retention POA: Yes  Active Problems:    Acute hypoxemic respiratory failure (HCC) POA: No    Chronic thrombocytopenia due to MDS (HCC) POA: Yes    Elevated blood pressure reading without diagnosis of hypertension POA: Yes    Anemia POA: Yes    Parkinson's disease with dementia (HCC) POA: Yes    Myelodysplastic syndrome (HCC) POA: Yes    DNR (do not resuscitate) POA: Yes    Hypokalemia POA: Yes    Leukocytosis POA: Yes    Gross hematuria POA: Unknown    Acute renal failure (HCC) POA: Unknown    Hypotension POA: Unknown    Hypomagnesemia POA: Unknown  Resolved Problems:    CKD (chronic kidney disease), stage III POA: Yes      FOLLOW UP  Future Appointments  Date Time Provider Department Center   7/26/2018 11:40 AM Popeye Ferguson M.D. [facility] None     [facility] DP/SNF ([facility]M POS)  [address]  [facility] [id number]  [phone]          MEDICATIONS ON DISCHARGE     Medication List      CHANGE how you take these medications      Instructions   * ciprofloxacin 500 MG Tabs  What changed:  Another medication with the same name was added. Make sure you understand how and when to take each.  Commonly known as:  CIPRO   Take 500 mg by mouth 2 times a day. Pt started a 3 day course on 6/18  Dose:  500 mg     * ciprofloxacin 500 MG Tabs  What changed:  You were already taking a medication with the same name, and this prescription was added. Make sure you understand how and when to take each.  Commonly known as:  CIPRO   Take 1 Tab by mouth 2 times a day.  Dose:  500 mg        * This list has 2 medication(s) that are the same as other medications prescribed for you. Read the directions carefully, and ask your doctor or other care provider to review them with you.            CONTINUE taking these medications      Instructions   amantadine 100 MG Caps  Commonly known as:   SYMMETREL   Take 1 Cap by mouth 2 times a day.  Dose:  100 mg     * carbidopa-levodopa SR  MG per tablet  Commonly known as:  SINEMET CR   Take 1 Tab by mouth every bedtime.  Dose:  1 Tab     * RYTARY 61. MG Cpcr  Generic drug:  Carbidopa-Levodopa ER   TAKE 1 CAPSULE BY MOUTH 3TIMES A DAY     citalopram 20 MG Tabs  Commonly known as:  CELEXA   Take 1 Tab by mouth every day.  Dose:  20 mg     LORazepam 1 MG Tabs  Commonly known as:  ATIVAN   Take 0.5 mg by mouth every 6 hours as needed for Anxiety.  Dose:  0.5 mg     memantine 10 MG Tabs  Commonly known as:  NAMENDA   Take 1 Tab by mouth 2 times a day.  Dose:  10 mg     QUEtiapine 25 MG Tabs  Commonly known as:  SEROQUEL   Take 25 mg by mouth every bedtime.  Dose:  25 mg     senna-docusate 8.6-50 MG Tabs  Commonly known as:  PERICOLACE or SENOKOT S   Take 1 Tab by mouth every day.  Dose:  1 Tab        * This list has 2 medication(s) that are the same as other medications prescribed for you. Read the directions carefully, and ask your doctor or other care provider to review them with you.                Allergies  Allergies   Allergen Reactions   • Diflucan [Fluconazole]      Reaction: Convulsions per wife       DIET  Orders Placed This Encounter   Procedures   • Diet Order Regular (needs assistance with meals if wife is not at bedside)     Standing Status:   Standing     Number of Occurrences:   1     Order Specific Question:   Diet:     Answer:   Regular [1]     Comments:   needs assistance with meals if wife is not at bedside     Order Specific Question:   Texture/Fiber modifications:     Answer:   Dysphagia 2(Pureed/Chopped)specify fluid consistency(question 6) [2]     Order Specific Question:   Consistency/Fluid modifications:     Answer:   Thin Liquids [3]       ACTIVITY  As tolerated and directed by skilled nursing.  Weight bearing as tolerated    LINES, DRAINS, AND WOUNDS  This is an automated list. Peripheral IVs will be removed prior to  discharge.  PIV Group Right Hand 18g (Active)   Line Secured Taped;Transparent 7/10/2018 10:00 PM   Site Condition / Description Assessed;Patent;Clean;Dry;Intact 7/10/2018 10:00 PM   Dressing Type / Description Transparent;Clean;Dry;Intact 7/10/2018 10:00 PM   Dressing Status Observed 7/10/2018 10:00 PM   Infiltration Grading Used by [facility] 0 7/10/2018 10:00 PM   Phlebitis Scale (Used by [facility]) 0 7/10/2018 10:00 PM   Date Primary Tubing Changed 07/10/18 7/10/2018 10:00 PM   NEXT Primary Tubing Change  07/14/18 7/10/2018 10:00 PM       PIV Group Left Upper Arm 20g Flexible Catheter;Saline Lock (Active)   Line Secured Taped;Transparent 7/10/2018 10:00 PM   Site Condition / Description Assessed;Patent;Intact;Clean;Dry 7/10/2018 10:00 PM   Dressing Type / Description Transparent;Clean;Dry;Intact 7/10/2018 10:00 PM   Dressing Status Observed 7/10/2018 10:00 PM   Infiltration Grading Used by [facility] and [facility] 0 7/10/2018 10:00 PM   Phlebitis Scale (Used by [facility]) 0 7/10/2018 10:00 PM   Date Primary Tubing Changed 07/10/18 7/10/2018 10:00 PM   Date Secondary Tubing Changed 07/10/18 7/10/2018 10:00 PM   NEXT Primary Tubing Change  07/14/18 7/10/2018 10:00 PM   NEXT Secondary Tubing Change  07/11/18 7/10/2018 10:00 PM     Urinary Catheter Coudé;Indwelling Catheter 16FR (Active)       Urinary Catheter Suprapubic 24fr (Active)   Catheter State Polk 7/9/2018  4:00 PM   Skin Integrity Intact;Drainage 7/9/2018  4:00 PM   Catheter Secured Yes 7/9/2018  4:00 PM   Collection Device Changed No 7/9/2018  4:00 PM   Output (mL) 700 mL 7/11/2018  6:00 AM      Surgical Incision  Incision Back (Active)       Surgical Incision  Incision N/A Penis (Active)       Surgical Incision  Incision Anterior;Lower Abdomen (Active)   Wound Bed Pink;Red 7/10/2018 10:00 PM   Drainage  Scant;Yellow 7/10/2018 10:00 PM   Periwound Skin Pink 7/10/2018 10:00 PM   Daily - Wound Closure Sutures 7/10/2018 10:00 PM   Dressing Options Open to Air  7/10/2018 10:00 PM   Dressing Status / Change Observed 7/10/2018 10:00 PM   Daily - Dressing Change Observed 7/10/2018 10:00 PM   Dressing Change Frequency As Needed 7/10/2018 10:00 PM       Wound POA Abrasion Thigh Left Anterior;Proximal (Active)   Wound Bed Pink;Red 7/10/2018 10:00 PM   Drainage  None 7/10/2018 10:00 PM   Periwound Skin Pink;Intact 7/10/2018 10:00 PM   Cleansing Not Applicable 7/10/2018 10:00 PM   Dressing Options Open to Air 7/10/2018 10:00 PM   Dressing Status / Change Not Applicable 7/10/2018 10:00 PM   Dressing Cleansing/Solutions Not Applicable 7/10/2018 10:00 PM   Nutrition Consult Yes - Remember to Place Order 6/28/2018  8:00 PM   Protocol Orders Initiated N/A 6/27/2018  9:23 PM              Urinary Catheter Coudé;Indwelling Catheter 16FR (Active)       Urinary Catheter Suprapubic 24fr (Active)   Catheter State Grandy 7/9/2018  4:00 PM   Skin Integrity Intact;Drainage 7/9/2018  4:00 PM   Catheter Secured Yes 7/9/2018  4:00 PM   Collection Device Changed No 7/9/2018  4:00 PM   Output (mL) 700 mL 7/11/2018  6:00 AM        MENTAL STATUS ON TRANSFER  Level of Consciousness: Alert  Orientation : Disoriented to Event, Disoriented to Time, Disoriented to Place  Speech: Expressive Aphasia    CONSULTATIONS  Urology, Dr. Aditya Bryan-Perry    PROCEDURES  6/21/2018, suprapubic pull-through cystotomy.  6/23/2018, cystoscopy with evacuation of clots and fulguration of bleeding area at catheter insertion site  6/27/2019, cystoscopy with evacuation of clots and fulguration of areas of bleeding and bladder.  6/29/2018, cystoscopy with evacuation of clots, fulguration of areas of bleeding and instillation of formalin 5%.  7/2/2018, cystoscopy, clot evacuation, fulguration of areas of bleeding.    LABORATORY  Lab Results   Component Value Date    SODIUM 138 07/11/2018    POTASSIUM 3.4 (L) 07/11/2018    CHLORIDE 111 07/11/2018    CO2 23 07/11/2018    GLUCOSE 85 07/11/2018    BUN 10 07/11/2018    CREATININE  0.94 07/11/2018        Lab Results   Component Value Date    WBC 14.0 (H) 07/11/2018    HEMOGLOBIN 7.5 (L) 07/11/2018    HEMATOCRIT 24.4 (L) 07/11/2018    PLATELETCT 111 (L) 07/11/2018        Total time of the discharge process exceeds 45 minutes.

## 2018-07-11 NOTE — CARE PLAN
Problem: Safety  Goal: Will remain free from falls    Intervention: Implement fall precautions  Room/floor clear. Non skid socks on. Proper signs up. Bed alarm on, bed low and locked. Call light and belonging within reach. Hourly rounding in place to make sure needs are met.        Problem: Infection  Goal: Will remain free from infection    Intervention: Implement standard precautions and perform hand washing before and after patient contact  Hand washing every encounter. IV ports scrubbed with alcohol when hanging medicine. Patient watch for s/s of infection. Patient taught to report s/s of infection, no evidence of learning.       Problem: Venous Thromboembolism (VTW)/Deep Vein Thrombosis (DVT) Prevention:  Goal: Patient will participate in Venous Thrombosis (VTE)/Deep Vein Thrombosis (DVT)Prevention Measures    Intervention: Encourage patient to perform ankle flex, foot rotation, and knee flex exercises in addition to other prophylatic measures every hour while awake  Encouraged to perform flexion of the feet while in bed and awake, no evidence of learning.       Problem: Pain Management  Goal: Pain level will decrease to patient's comfort goal    Intervention: Follow pain managment plan developed in collaboration with patient and Interdisciplinary Team  Pain assessment Q4H or Q2H after medication intervention.

## 2018-07-11 NOTE — PROGRESS NOTES
0700 Report received from [facility] nurse, Basilia, at bedside. Pt is resting in bed & eyes closed.    1100 Pt's spouse is at bedside, questions are answered. Pt is sleeping comfortably, suprapubic cath present, clear & yellow urine obtained in indwelling bag.    1400 Home med (Cinemet) returned to pt's spouse & both hearing aids are in container.    1455 Pt transferred to [facility] by [facility]. Pt's spouse at bedside w pt.

## 2018-07-11 NOTE — PROGRESS NOTES
Patient transferred to Room 310-1 after report received from Yuri LEE. Patient is lethargic. No s/s of discomfort at this time. Bed low and locked, bed alarm on. Call light and belonging within reach and demonstrated use of call light. Fall precaution in effect. Hourly rounding in place.

## 2018-07-11 NOTE — PROGRESS NOTES
1800-Wife concerned about patient's transfer to floor. Dr. Solano and manager Stefanie in to talk to wife. Hurst catheter removed. Pt too lethargic to safely swallow PM meds at this time.     1900-Report to ROSA Welch. POC reviewed.

## 2018-07-16 NOTE — DOCUMENTATION QUERY
DOCUMENTATION QUERY    PROVIDERS: Please select “Cosign w/ note”to reply to query.    To better represent the severity of illness of your patient, please review the following information and exercise your independent professional judgment in responding to this query.     Patient was admitted for hematuria after suprapubic catheter insertion and subsequently taken to the OR on 6/23 to control the bleeding. Op note indicates bleeding from the catheter insertion site.  Based on the clinical findings, risk factors and treatment, can you identify a likely etiology for the bleeding:    * patient's thrombocytopenia  * complication of suprapubic catheter  * other specified condition (please document)  * unable to determine      The medical record reflects the following:   Clinical Findings  hematuria requiring transfusion, thrombocytopenia   Treatment  pre-operative platelet transfusion, multiple fulgurations of bladder   Risk Factors  MDS   Location within medical record  H&P, progress notes, operative reports     Thank you,     Myriam Bustillo  HIM Coder  [phone]

## 2018-07-16 NOTE — DOCUMENTATION QUERY
DOCUMENTATION QUERY    PROVIDERS: Please select “Cosign w/ note”to reply to query.    To better represent the severity of illness of your patient, please review the following information and exercise your independent professional judgment in responding to this query.     Patient admitted with hematuria after suprapubic catheter placement on 6/21.  Taken to OR 6/23 for fulguration at catheter insertion site.  Taken back on 6/29 with finding of a split in the bladder dome.  Based upon the clinical findings, risk factors, and treatment can this be further clarified as:    * incidental trauma   * a complication of the suprapubic catheter  * other likely etiology (please clarify)  * unable to determine          The medical record reflects the following:   Clinical Findings  bladder dome split   Treatment Multiple fulgurations, formalin instillation, PRBC and platelet transfusions   Risk Factors MDS, thrombocytopenia   Location within medical record  progress notes beginning 6/27, 6/29 operative report     Thank you,     Myriam Bustillo  HIM Coder  [phone]

## 2018-07-21 NOTE — ADDENDUM NOTE
Encounter addended by: Tasha Bustillo on: 7/21/2018  9:04 AM<BR>    Actions taken: Sign clinical note

## 2018-07-26 ENCOUNTER — OFFICE VISIT (OUTPATIENT)
Dept: NEUROLOGY | Facility: MEDICAL CENTER | Age: 78
End: 2018-07-26
Payer: MEDICARE

## 2018-07-26 VITALS
DIASTOLIC BLOOD PRESSURE: 62 MMHG | WEIGHT: 175 LBS | SYSTOLIC BLOOD PRESSURE: 102 MMHG | TEMPERATURE: 98 F | OXYGEN SATURATION: 98 % | HEIGHT: 72 IN | BODY MASS INDEX: 23.7 KG/M2 | HEART RATE: 68 BPM

## 2018-07-26 DIAGNOSIS — F51.01 PRIMARY INSOMNIA: ICD-10-CM

## 2018-07-26 DIAGNOSIS — G20.A1 PARKINSON'S DISEASE: ICD-10-CM

## 2018-07-26 DIAGNOSIS — D46.9 MYELODYSPLASTIC SYNDROME (HCC): ICD-10-CM

## 2018-07-26 PROCEDURE — 99214 OFFICE O/P EST MOD 30 MIN: CPT | Mod: GW | Performed by: PSYCHIATRY & NEUROLOGY

## 2018-07-26 RX ORDER — TRIAZOLAM 0.12 MG/1
0.12 TABLET ORAL NIGHTLY PRN
Qty: 90 TAB | Refills: 1 | Status: SHIPPED | OUTPATIENT
Start: 2018-07-26 | End: 2018-10-24

## 2018-07-26 RX ORDER — SENNOSIDES A AND B 8.6 MG/1
8.6 TABLET, FILM COATED ORAL
COMMUNITY

## 2018-07-26 RX ORDER — METHENAMINE HIPPURATE 1000 MG/1
1 TABLET ORAL 2 TIMES DAILY
COMMUNITY

## 2018-07-26 NOTE — PATIENT INSTRUCTIONS
"   recall 3/3 today      IMPRESSION:    1. Parkinson Disease Stage IV, Dementia, Frontal Gait Disorder  2. Myelodysplastic syndrome  3. Lack of energy for 3+ years-- good days and bad days-- worsened in the last year-- in fact, patient developed one episode of lack of energy in front of me  4. Long hospital admission since last visit-- at times, confused, agitated -- during the hospital  5. Swallowing problems due to 1  6. Bladder ( blood)  7. Orthostatic hypotension    PLAN/RECOMMENDATIONS:      It is fine to give the patient Halcion 0.125mg HS PRN for insomnia  Generally speaking, the less sedation, the better  But the patient is in the advanced stage of parkinson, some sedative medication to reduce the stress of the patient and his family-- fine with me      ________________________________________________________________________    Explained to the wife that   1. I prefer not to change medication for parkinson now-- since the patient is fragile now-- now, it is better to focus on nutrition, exercise, stabilization of medical issues  2. It is fine to crush the long acting sinemet if the patient could not swallow the medicine at times ( understanding that , the long acting sinemet will lose its lone acting effecting)  3. Advise the following      It is fine to try the following nutritional supplementation-- no guarantee these would help though  However, it is worth to try   ________________________________________________________________________    Fish Oil -- Omega 3 1000mg 3# daily  Try Daily Probiotic too  Vit D-3 4000 unit daily  ________________________________________________________________________  ________________________________________________________________________    Magnesium -L-Threonate Capsules (\"Magtein\") 2000mg daily  Vit B3 500mg daily  Vitamin B1( thiamine) 250mg daily  Multiple Vitamin " Daily  ________________________________________________________________________              SIGNATURE:  Popeye Ferguson      CC:  MERI Casarez   .

## 2018-07-26 NOTE — PROGRESS NOTES
NEUROLOGY NOTE    Referring Physician  Wild Sherman      CHIEF COMPLAINT:    Since the last visit, status post back surgery but complicated with bleeding  Bladder issue too  Orthostatic hypotension is bad    Retired at the age of 63YO  Noticed to have cognitive problems for the 5 years  Chief Complaint   Patient presents with   • Follow-Up     dementia associated with other underlying disease       PRESENT ILLNESS:     Since the last visit, status post back surgery but complicated with bleeding  Bladder issue too  Orthostatic hypotension is bad      Spoke to wife gave above information, and script has been fx'd to [facility]. Wife will call with any concerns.  It took 1+ hour to come to [place]    Retired at the age of 63YO  Noticed to have cognitive problems for the 5 years    Used to teach in college     He was diagnosed as parkinson disease in [facility]-- diagnosed 5 years ago-- initially in [place]--- was put on Sinemet for 5 years      Could not tolerate low dose Keppra   Keppra 125mg before sleep for one week, then 125mg two times per day      PAST MEDICAL HISTORY:  Past Medical History:   Diagnosis Date   • Blood clotting disorder ([facility]) 06/12/2018    blood clot in back in 2017   • Cataract 06/12/2018    bilateral IOLI   • Compression fracture of spine ([facility])    • Hemorrhagic disorder ([facility]) 7-    bruises easily   • MDS (myelodysplastic syndrome) ([facility])    • Parkinson's disease ([facility])    • Urinary bladder disorder     patient has indwelling catheter   • Urinary incontinence 06/12/2018    indwelling catheter       PAST SURGICAL HISTORY:  Past Surgical History:   Procedure Laterality Date   • CYSTOSCOPY  7/2/2018    Procedure: CYSTOSCOPY - EVACUATION OF CLOTS, FULGERATION OF BLADDER,  INSTITUTION OF FORMALIN;  Surgeon: Aditya Joe M.D.;  Location: SURGERY [facility];  Service: Urology   • CYSTOSCOPY  6/29/2018    Procedure: CYSTOSCOPY-EVACUATION OF CLOTS, INSTITUTION OF FORAMLIN, FULGERATION OF  BLADDER;  Surgeon: Aditya Joe M.D.;  Location: [facility];  Service: Urology   • CYSTOSCOPY N/A 6/27/2018    Procedure: CYSTOSCOPY, Evacuation of clots, fulgeration of bladder;  Surgeon: Aditya Joe M.D.;  Location: [facility];  Service: Urology   • CYSTOSCOPY  6/23/2018    Procedure: CYSTOSCOPY;  Surgeon: Aditya Joe M.D.;  Location: [facility];  Service: Urology   • EVACUATION OF HEMATOMA  6/23/2018    Procedure: EVACUATION OF HEMATOMA;  Surgeon: Aditya Joe M.D.;  Location: [facility];  Service: Urology   • SUPRAPUBIC CATH PLACEMENT N/A 6/21/2018    Procedure: SUPRAPUBIC CATH PLACEMENT - PULL THROUGH CYSTOTOMY;  Surgeon: Aditya Joe M.D.;  Location: [facility];  Service: Urology   • CYSTOSCOPY  8/27/2017    Procedure: CYSTOSCOPY, COMPLEX CATHETERIZATION;  Surgeon: Wild Almeida M.D.;  Location: [facility];  Service: Urology   • EVACUATION OF HEMATOMA  8/27/2017    Procedure: EVACUATION OF HEMATOMA, TUR OLD BLADDER CLOTS, DILATION OF URETHRAL STRICTURE;  Surgeon: Wild Almeida M.D.;  Location: [facility];  Service: Urology   • TRANS URETHRAL RESECTION BLADDER  8/27/2017    Procedure: TRANS URETHRAL RESECTION BLADDER;  Surgeon: Wild Almeida M.D.;  Location: [facility];  Service: Urology   • KYPHOPLASTY  8/6/2017    Procedure: KYPHOPLASTY T11, T12, L1 ;  Surgeon: Niels Gallardo M.D.;  Location: [facility];  Service:    • ANKLE ARTHROSCOPY  1996   • CATARACT EXTRACTION WITH IOL     • KNEE ARTHROSCOPY     • PROSTATECTOMY, RADIAL      subtotal   • ROTATOR CUFF REPAIR      right   • SPLENECTOMY         FAMILY HISTORY:  Family History   Problem Relation Age of Onset   • Heart Disease Mother    • Lung Disease Mother    • Cancer Father         pancreatic       SOCIAL HISTORY:  Social History     Social History   • Marital status:       Spouse name: N/A   • Number of children: N/A   • Years of education: N/A     Occupational History   • Not on file.     Social History Main Topics   • Smoking status: Never Smoker   • Smokeless tobacco: Never Used   • Alcohol use Yes      Comment: 1 per day   • Drug use: No   • Sexual activity: Not on file     Other Topics Concern   • Not on file     Social History Narrative   • No narrative on file     ALLERGIES:  Allergies   Allergen Reactions   • Diflucan [Fluconazole]      Reaction: Convulsions per wife     TOBHX  History   Smoking Status   • Never Smoker   Smokeless Tobacco   • Never Used     ALCHX  History   Alcohol Use   • Yes     Comment: 1 per day     DRUGHX  History   Drug Use No           MEDICATIONS:  Current Outpatient Prescriptions   Medication   • sennosides (SENOKOT) 8.6 MG Tab   • methenamine hip (HIPPREX) 1 GM Tab   • QUEtiapine (SEROQUEL) 25 MG Tab   • ciprofloxacin (CIPRO) 500 MG Tab   • RYTARY 61. MG Cap CR   • carbidopa-levodopa SR (SINEMET CR)  MG per tablet   • amantadine (SYMMETREL) 100 MG Cap   • LORazepam (ATIVAN) 1 MG Tab   • ciprofloxacin (CIPRO) 500 MG Tab   • senna-docusate (PERICOLACE OR SENOKOT S) 8.6-50 MG Tab   • memantine (NAMENDA) 10 MG Tab   • citalopram (CELEXA) 20 MG Tab     No current facility-administered medications for this visit.        REVIEW OF SYSTEM:    Constitutional: Denies fevers, Denies weight changes   Eyes: Denies changes in vision, no eye pain   Ears/Nose/Throat/Mouth: Denies nasal congestion or sore throat   Cardiovascular: Denies chest pain or palpitations   Respiratory: Denies SOB.   Gastrointestinal/Hepatic: Denies abdominal pain, nausea, vomiting, diarrhea, constipation or GI bleeding   Genitourinary: Denies bladder dysfunction, dysuria or frequency   Musculoskeletal/Rheum: Denies joint pain and swelling   Skin/Breast: Denies rash, denies breast lumps or discharge   Neurological: Parkinson Disease, Dementia  Psychiatric: denies mood disorder    Endocrine: denies hx of diabetes or thyroid dysfunction   Heme/Oncology/Lymph Nodes: Denies enlarged lymph nodes, denies brusing or known bleeding disorder   Allergic/Immunologic: Denies hx of allergies         PHYSICAL AND NEUROLOGICAL EXMAINATIONS:  VITAL SIGNS: /62   Pulse 68   Temp 36.7 °C (98 °F)   Ht 1.829 m (6')   Wt 79.4 kg (175 lb)   SpO2 98%   BMI 23.73 kg/m²   CURRENT WEIGHT:   BMI: Body mass index is 23.73 kg/m².  PREVIOUS WEIGHTS:  Wt Readings from Last 25 Encounters:   07/26/18 79.4 kg (175 lb)   07/11/18 83.3 kg (183 lb 10.3 oz)   01/26/18 79.4 kg (175 lb)   01/26/18 78.9 kg (174 lb)   10/30/17 72.6 kg (160 lb)   10/06/17 72.6 kg (160 lb)   09/24/17 70.9 kg (156 lb 4.9 oz)   08/16/17 97.5 kg (214 lb 15.2 oz)   08/06/17 87.2 kg (192 lb 3.9 oz)   06/13/17 86.6 kg (191 lb)   05/03/17 88 kg (194 lb)   02/01/17 88 kg (194 lb)   12/14/16 89.2 kg (196 lb 9.6 oz)   09/28/16 88.9 kg (196 lb)   07/20/16 85.7 kg (189 lb)       General appearance of patient: WDWN(+) NAD(+)    EYES  o Fundus : Papilledem(-) Exudates(-) Hemorrhage(-)  Nervous System  Orientation to time, place and person(+) today, the patient could ask for water  Memory normal(-)      ________________________________________________________________________      We also asked the patient to copy the pentagons, draw clocks,, writing  The patient's work will be scanned into the media section    ________________________________________________________________________    Language: aphasia(-)  Knowledge: past(+) Current(+)  Attention(+)  Cranial Nerves  • Nerve 2: intact  • Nerve 3,4,6: intact  • Nerve 5 : intact  • Nerve 7: intact  • Nerve 8: intact  • Nerve 9 & 10: intact  • Nerve 11: intact  • Nerve 12: intact  Muscle Power and muscle tone: general weak   Sensory System: Pin sensation intact(+)  Gait : on wheelchair today --   Heart and Vascular  Peripheral Vasucular system : Edema (-) Swelling(-)  RHB, Breathing sound clear  abdomen  bowel sound normoactive  Extremities freely moveable  Joints no contracture       NEUROIMAGING: I reviewed the MRI/CT of brain     Registration 3/3  Recall 3/3       Pt is sending this message via [employer] e-mail. Please advise. Thank you. CHRISTIN Ferguson - I have been taking the increased strength of Rytary since you prescribed it but feel worse than ever. I have no energy and my balance is not good. I think I should revert to the original strength but the literature from the pharmacy indicates that lowering the dosage should be done slowly. What do you recommend?  I've discontinued Azilect as it doesn't seem to help at all.     Jamie Barneyer              In the past, CARBIDOPA-LEVODOPA ER 48. MG PO CPCR three times per day did not make him feel good  Now he is on Carbidopa-Levodopa ER 36. MG Cap CR  But now he is OK with Carbidopa 48.75     recall 3/3 today      IMPRESSION:    1. Parkinson Disease Stage IV, Dementia, Frontal Gait Disorder  2. Myelodysplastic syndrome  3. Lack of energy for 3+ years-- good days and bad days-- worsened in the last year-- in fact, patient developed one episode of lack of energy in front of me  4. Long hospital admission since last visit-- at times, confused, agitated -- during the hospital  5. Swallowing problems due to 1  6. Bladder ( blood)  7. Orthostatic hypotension    PLAN/RECOMMENDATIONS:      It is fine to give the patient Halcion 0.125mg HS PRN for insomnia  Generally speaking, the less sedation, the better  But the patient is in the advanced stage of parkinson, some sedative medication to reduce the stress of the patient and his family-- fine with me      ________________________________________________________________________    Explained to the wife that   1. I prefer not to change medication for parkinson now-- since the patient is fragile now-- now, it is better to focus on nutrition, exercise, stabilization of medical issues  2. It is fine to crush the long  "acting sinemet if the patient could not swallow the medicine at times ( understanding that , the long acting sinemet will lose its lone acting effecting)  3. Advise the following      It is fine to try the following nutritional supplementation-- no guarantee these would help though  However, it is worth to try   ________________________________________________________________________    Fish Oil -- Omega 3 1000mg 3# daily  Try Daily Probiotic too  Vit D-3 4000 unit daily  ________________________________________________________________________  ________________________________________________________________________    Magnesium -L-Threonate Capsules (\"Magtein\") 2000mg daily  Vit B3 500mg daily  Vitamin B1( thiamine) 250mg daily  Multiple Vitamin Daily  ________________________________________________________________________              SIGNATURE:  Popeye Ferguson      CC:  MERI Casarez   .      "

## 2018-08-13 NOTE — ADDENDUM NOTE
Encounter addended by: Aditya Joe M.D. on: 8/13/2018  8:34 AM<BR>    Actions taken: Cosign clinical note with attestation

## 2018-08-14 NOTE — ADDENDUM NOTE
Encounter addended by: Abigail Bustos on: 8/14/2018  1:07 PM<BR>    Actions taken: Sign clinical note

## 2018-08-14 NOTE — DOCUMENTATION QUERY
DOCUMENTATION QUERY     DR. HAWKINS, please review this Documentation Query and reply with an addendum. You co-signed only, which does not address the needed clarification from you to be able to code and complete this account.       To better represent the severity of illness of your patient, please review the following information and exercise your independent professional judgment in responding to this query.      Patient admitted with hematuria after suprapubic catheter placement on 6/21.  Taken to OR 6/23 for fulguration at catheter insertion site.  Taken back on 6/29 with finding of a split in the bladder dome.  Based upon the clinical findings, risk factors, and treatment can this be further clarified as:     * incidental trauma   * a complication of the suprapubic catheter  * other likely etiology (please clarify)  * unable to determine              The medical record reflects the following:   Clinical Findings  bladder dome split   Treatment Multiple fulgurations, formalin instillation, PRBC and platelet transfusions   Risk Factors MDS, thrombocytopenia   Location within medical record  progress notes beginning 6/27, 6/29 operative report      Thank you,      Myriam GUALLPA Coder  [phone]

## 2018-08-17 ENCOUNTER — TELEPHONE (OUTPATIENT)
Dept: NEUROLOGY | Facility: MEDICAL CENTER | Age: 78
End: 2018-08-17

## 2018-08-17 DIAGNOSIS — F02.80 DEMENTIA ASSOCIATED WITH OTHER UNDERLYING DISEASE WITHOUT BEHAVIORAL DISTURBANCE (HCC): ICD-10-CM

## 2018-08-17 DIAGNOSIS — R53.83 LACK OF ENERGY: ICD-10-CM

## 2018-08-17 DIAGNOSIS — F33.42 RECURRENT MAJOR DEPRESSIVE DISORDER, IN FULL REMISSION (HCC): ICD-10-CM

## 2018-08-17 DIAGNOSIS — G20.A1 PARKINSON'S DISEASE: ICD-10-CM

## 2018-08-17 RX ORDER — CITALOPRAM 20 MG/1
TABLET ORAL
Qty: 30 TAB | Refills: 5 | Status: SHIPPED | OUTPATIENT
Start: 2018-08-17

## 2018-08-17 NOTE — TELEPHONE ENCOUNTER
[facility]   Phone Number: [phone]             Dr. Ferguson -  This is Elizabeth Felix, wife of Rafael. He is current ly a patient at [facility] in [place], [place]. Since you last saw him on July 26, he has exhibited a marked increase in tremors and involuntary movement. He's incoherent much of the time and very difficult to understand when he speaks. He has non-stop teeth chattering. The only change in his medication has been the addition of fludrocortisone 0.2mg. This is a very small hospital with no on-site neurologists. If you can prescribe anything that might help him, I'm sure the hospitalist(s) will appreciate it. If you need additional information, you can reach me at [phone].   Thank you for any help you can provide. He is quite miserable.      Spoke to wife patient still inpatient and they are telling her there is nothing more they could do for him. I told her Dr Ferguson is out of the office until next week. I explained the hospitalist here could talk to the hospitalist there they just need to call.  She stated she just is wanting them to give him something to stop the thrashing around he is doing.    They are talking about sending him home with Hospice.

## 2018-08-21 RX ORDER — MEMANTINE HYDROCHLORIDE 10 MG/1
TABLET ORAL
Qty: 180 TAB | Refills: 1 | Status: SHIPPED | OUTPATIENT
Start: 2018-08-21

## 2018-08-21 NOTE — PROGRESS NOTES
Addendum      Clarification to note.  Dated 8/21/2018    Bleeding was seen at the site of the suprapubic catheter and was attributed to the patient's thrombocytopenia and the trauma of placing the suprapubic catheter..    Aditya Joe MD..

## 2018-08-21 NOTE — ADDENDUM NOTE
Encounter addended by: Aditya Joe M.D. on: 8/21/2018  1:45 PM<BR>    Actions taken: Sign clinical note, Cosign clinical note with attestation

## 2019-02-24 NOTE — PROGRESS NOTES
Patient H&H this AM 6.7/20.9, per MD Grajeda will give 1 unit PRBCs and recheck H&H 2 hours after completion   none

## 2019-07-11 NOTE — PROGRESS NOTES
Bedside report given to day RN. CBI in use, output straw with small sediment draining appropriately. No signs/symptoms of distress noted. All safety precautions in place.    Lumbar Epidural Steroid Injection Instructions  Plan includes:  1.  Lumbar epidural steroid injections, up to 3, spaced 4 weeks apart.  If pain control is acceptable after 1 or 2 injections, it was discussed with the patient that they may return for the subsequent injections if and when their pain returns.  The risks were discussed with the patient including failure of relief, worsening pain, Headache (post dural puncture headache), bleeding (epidural hematoma) and infection (epidural abscess or skin infection).  2.  Physical therapy exercises at home as prescribed by physical therapy or from the pain clinic handout (given to the patient).  Continuation of these exercises every day, or multiple times per week, even when the patient has good pain relief, was stressed to the patient as a preventative measure to decrease the frequency and severity of future pain episodes.  3.  Continue pain medicines as already prescribed.  If patient not currently taking any, it is recommended to begin Acetaminophen 1000 mg po q 8 hours.  If other medicines containing Acetaminophen are currently prescribed, maintain daily dose at 3000 mg.    4.  If they can tolerate NSAIDS, it is recommended to take Ibuprofen 600 mg po q 6 hours for 7 days during pain exacerbations.  Alternatively, they may substitute an NSAID of their choice (e.g. Aleve).  This may be taken at the same time as Acetaminophen.  5.  Heat and ice to the affected area as tolerated for pain control.  It was discussed that heating pads can cause burns.  6.  Daily low impact exercise such as walking or water exercise was recommended to maintain overall health and aid in weight control.   7.  Follow up as needed for subsequent injections.  8.  Patient was counseled to abstain from tobacco products.

## 2020-10-26 NOTE — PROGRESS NOTES
[facility] Hospitalist Progress Note    Date of Service: 2018    Chief Complaint  77 y.o. male admitted 2018 with myelodysplastic syndrome, CKD stage III, Parkinson's disease with dementia, with issues with urinary retention due to neurogenic bladder.  He is admitted following suprapubic catheter placement.  Perioperatively, he had stable thrombocytopenia, and had platelet transfusion.  However, he had gross hematuria and elevated BPs postoperatively.     Interval Problem Update  Doing well, more alert, talkative, getting up in a chair and eating  Wife is at the bedside as well  No hematuria, clear output of urine and CBI    Consultants/Specialty  Internal Medicine - management of medical issues     Disposition  Pending PT/OT who can now work with him, anticipate SNF once acute issues are stabilized.        Review of Systems   Unable to perform ROS: Acuity of condition      Physical Exam  Laboratory/Imaging   Hemodynamics  Temp (24hrs), Av.5 °C (97.7 °F), Min:36.2 °C (97.1 °F), Max:36.9 °C (98.4 °F)   Temperature: 36.4 °C (97.5 °F)  Pulse  Av.6  Min: 47  Max: 103 Heart Rate (Monitored): 75  Blood Pressure : 140/90, NIBP: 149/81      Respiratory      Respiration: 12, Pulse Oximetry: 95 %        RUL Breath Sounds: Clear, RML Breath Sounds: Clear, RLL Breath Sounds: Diminished, AQUILINO Breath Sounds: Clear, LLL Breath Sounds: Diminished    Fluids    Intake/Output Summary (Last 24 hours) at 18 1754  Last data filed at 18 1600   Gross per 24 hour   Intake             9472 ml   Output            09076 ml   Net            -4103 ml       Nutrition  Orders Placed This Encounter   Procedures   • Diet Order Regular     Standing Status:   Standing     Number of Occurrences:   1     Order Specific Question:   Diet:     Answer:   Regular [1]     Order Specific Question:   Texture/Fiber modifications:     Answer:   Dysphagia 3(Mechanical Soft)specify fluid consistency(question 6) [3]     Order Specific  Question:   Consistency/Fluid modifications:     Answer:   Thin Liquids [3]     Physical Exam   Constitutional: He appears well-developed. He appears listless. No distress.   HENT:   Head: Normocephalic.   Mouth/Throat: Oropharynx is clear and moist.   Eyes: Conjunctivae are normal. Right eye exhibits no discharge. Left eye exhibits no discharge. No scleral icterus.   Neck: No JVD present.   Cardiovascular: Normal rate, regular rhythm and intact distal pulses.    Pulmonary/Chest: Effort normal. No stridor. No respiratory distress.   Abdominal: Soft. Bowel sounds are normal. He exhibits no distension. There is no tenderness.   Genitourinary:   Genitourinary Comments: Hurst and suprapubic catheter with hematuria bright red   Musculoskeletal: He exhibits no edema.   Neurological: He appears listless. He exhibits abnormal muscle tone.   Lethargic, can nod yes/no   Skin: Skin is warm and dry. He is not diaphoretic. There is pallor.   Suprapubic cath in place with bandage surrounding it, no erythema    Psychiatric: Cognition and memory are impaired.   Nursing note and vitals reviewed.      Recent Labs      06/27/18 0325 06/27/18   1615  06/27/18 2015 06/28/18   0927   WBC  14.3*   --   10.7   --   11.5*   RBC  2.28*   --   2.50*   --   3.29*   HEMOGLOBIN  6.7*   < >  7.6*  7.5*  9.9*   HEMATOCRIT  20.9*   < >  22.6*  22.4*  29.2*   MCV  91.7   --   90.4   --   88.8   MCH  29.4   --   30.4   --   30.1   MCHC  32.1*   --   33.6*   --   33.9   RDW  56.6*   --   53.2*   --   50.9*   PLATELETCT  118*   --   104*   --   136*   MPV  13.1*   --   12.3   --   12.9    < > = values in this interval not displayed.     Recent Labs      06/26/18   0451  06/27/18   0325  06/28/18   0310   SODIUM  138  136  136   POTASSIUM  3.3*  3.5*  3.6   CHLORIDE  105  105  105   CO2  26  27  24   GLUCOSE  92  120*  86   BUN  7*  11  10   CREATININE  0.98  1.07  0.95   CALCIUM  8.0*  8.1*  7.3*     Recent Labs      06/26/18   5931   INR  1.46*                   Assessment/Plan     * Urinary retention- (present on admission)   Assessment & Plan    Due to neurogenic bladder s/p suprapubic catheter placement by urology on 6/21.        Acute hypoxemic respiratory failure (HCC)   Assessment & Plan    Patient requiring oxygen supplementation to maintain his saturation starting after his surgery on 6/23.  Chest xray consistent with atelectasis, supplemental oxgen, RT as needed improving down to 2L  Improve clinical status and activity as able, encourage IS use        Anemia- (present on admission)   Assessment & Plan    In setting of CKD, MDS with bone marrow suppression and gross hematuria.   Improved up to 9.9 after 2 units yesterday and repair of bladder        Elevated blood pressure reading without diagnosis of hypertension- (present on admission)   Assessment & Plan    Blood pressures on low side now, continue to monitor.        Chronic thrombocytopenia due to MDS (HCC)- (present on admission)   Assessment & Plan    Plts have been low since Sept 2017 and has been stable in the 30's.   Platelets improved since transfusion now up to 138  Hematuria has stopped  Continue to monitor        Gross hematuria   Assessment & Plan    Sequelae of placement of suprapubic catheter with MDS history and low platelets.   Temporarily with rinaldi rinaldi catheter and suprapubic catheter, tolerated rinaldi clamp trial, removed then hematuria recurred.  Rinaldi replaced, CBI resumed overnight  Patient in ICU for close monitoring and nursing ratio  Bladder split in dome, repaired by Dr. Bryan-Perry hematuria improved        Hypokalemia- (present on admission)   Assessment & Plan    Potassium improved now normal        DNR (do not resuscitate)- (present on admission)   Assessment & Plan    Affirmed on admission.  POA and advanced directive reviewed.        CKD (chronic kidney disease), stage III- (present on admission)   Assessment & Plan    Stable, at baseline.  - continue to monitor  -  avoid nephrotoxins, and renally dose all medications        Myelodysplastic syndrome (HCC)- (present on admission)   Assessment & Plan    - Continue to monitor blood counts, especially platelets in the setting of his recent gross hematuria.    - Management as above.        Parkinson's disease with dementia (HCC)- (present on admission)   Assessment & Plan    High risk for delirium given dementia. Frequent re-orientation, avoid or minimize narcotics/sedatives.    continue home Sinemet  - Continue Namenda and amantadine          Quality-Core Measures   Reviewed items::  Labs reviewed and Medications reviewed  Hurst catheter::  Neurogenic Bladder  DVT: held 2/2 low plts and anemia.  DVT prophylaxis - mechanical:  SCDs  Ulcer Prophylaxis::  Not indicated         Detail Level: Detailed

## 2021-03-15 NOTE — PROGRESS NOTES
CBI output remained yellow and without evidence of bleeding / clotting overnight.  2100 mL NS IN.  Total corrected UOP was 3125 mL.    Patient became progressively more agitated throughout the night.  PRN Haldol given after he started to pull on lines and attempt to get out of bed multiple times.   88

## 2022-12-15 NOTE — PROGRESS NOTES
Received report from night shift RN. Assumed care of patient. Pt assessed and stable. Patient reports 7/10 pain at this time in left lower leg.  Administered medication for pain.  Discussed plan of care for day with patient and received verbal understanding. Call light within reach, strip alarm active, bed in low position.     Detail Level: Detailed Quality 110: Preventive Care And Screening: Influenza Immunization: Influenza Immunization Ordered or Recommended, but not Administered due to system reason

## 2023-11-06 NOTE — PROGRESS NOTES
Assumed care of pt at 1900, report given by chuck RN, POC discussed.  Pt is resting comfortably in bed with his son at bedside. Pt denies pain and needs at this time. No s/s of distress.   Safety check complete, bed in low position, call light within reach.  [facility]   What Type Of Note Output Would You Prefer (Optional)?: Standard Output Has Your Skin Lesion Been Treated?: not been treated Is This A New Presentation, Or A Follow-Up?: Skin Lesions Additional History: Rash on arm and back- given ABX and steroids. \\nJock itch treated with OTC never got better but then abx got better.

## 2023-12-13 NOTE — CARE PLAN
Problem: Nutritional:  Goal: Achieve adequate nutritional intake  Patient will consume 50% or greater of meals/supplements  Outcome: PROGRESSING AS EXPECTED         [Large Joint Injection] : Large joint injection [Right] : of the right [Knee] : knee [Pain] : pain [Inflammation] : inflammation [X-ray evidence of Osteoarthritis on this or prior visit] : x-ray evidence of Osteoarthritis on this or prior visit [Alcohol] : alcohol [Betadine] : betadine [Ethyl Chloride sprayed topically] : ethyl chloride sprayed topically [Sterile technique used] : sterile technique used [___ cc    6mg] :  Betamethasone (Celestone) ~Vcc of 6mg [___ cc    1%] : Lidocaine ~Vcc of 1%  [___ cc    0.5%] : Bupivacaine (Marcaine) ~Vcc of 0.5%  [Effusion] : effusion [Call if redness, pain or fever occur] : call if redness, pain or fever occur [Apply ice for 15min out of every hour for the next 12-24 hours as tolerated] : apply ice for 15 minutes out of every hour for the next 12-24 hours as tolerated [Previous OTC use and PT nontherapeutic] : patient has tried OTC's including aspirin, Ibuprofen, Aleve, etc or prescription NSAIDS, and/or exercises at home and/or physical therapy without satisfactory response [Patient had decreased mobility in the joint] : patient had decreased mobility in the joint [Risks, benefits, alternatives discussed / Verbal consent obtained] : the risks benefits, and alternatives have been discussed, and verbal consent was obtained [Prior failure or difficult injection] : prior failure or difficult injection [All ultrasound images have been permanently captured and stored accordingly in our picture archiving and communication system] : All ultrasound images have been permanently captured and stored accordingly in our picture archiving and communication system [Visualization of the needle and placement of injection was performed without complication] : visualization of the needle and placement of injection was performed without complication [de-identified] : 20cc [de-identified] : serous

## 2024-03-11 NOTE — PALLIATIVE CARE
Palliative Care follow-up  Met son, Filippo at bedside. Pt is doing much better today, he is alert and talking with his friend, Thai. Discussed having pt go to a SNF here in [place], he may be more susscessful with therapies. Filippo said that the MD earlier today said the same thing, he and his mother Nanda are in agreement to this.           Plan:   Continue to support pt and family.    Thank you for allowing Palliative Care to participate in this patient's care. Please feel free to call [phone] with any questions or concerns.   history, physical exam, documentation

## 2024-03-21 NOTE — PROGRESS NOTES
Paged Dr. Beckham regarding prior to arrival rinaldi and diet order.  Per MD ok to leave in rinaldi at this time as it was placed by urology.  Patient failed RN bedside swallow evaluation due to slurred or garbled speech but patient's spouse requesting MD diet order at this time even when educated regarding aspiration risk. Per Dr. Beckham patient to have clear liquid nectar thick diet  pending speech evaluation.   DISPLAY PLAN FREE TEXT

## 2025-04-21 NOTE — ADDENDUM NOTE
Encounter addended by: Abigail Bustos on: 10/5/2017 10:40 AM<BR>    Actions taken: Sign clinical note
Encounter addended by: Alessandra Hill on: 10/3/2017 12:28 PM<BR>    Actions taken: Pend clinical note
Encounter addended by: Alessandra Hill on: 10/4/2017  2:41 PM<BR>    Actions taken: Sign clinical note
Encounter addended by: Umang Astudillo M.D. on: 10/10/2017 10:54 AM<BR>    Actions taken: Cosign clinical note with attestation
Show Aperture Variable?: Yes
Detail Level: Detailed
Add 52 Modifier (Optional): no
Consent: The patient's consent was obtained including but not limited to risks of crusting, scabbing, blistering, scarring, darker or lighter pigmentary change, recurrence, incomplete removal and infection.
Duration Of Freeze Thaw-Cycle (Seconds): 3
Medical Necessity Clause: This procedure was medically necessary because the lesions that were treated were:
Number Of Freeze-Thaw Cycles: 3 freeze-thaw cycles
Spray Paint Text: The liquid nitrogen was applied to the skin utilizing a spray paint frosting technique.
Post-Care Instructions: I reviewed with the patient in detail post-care instructions. Patient is to wear sunprotection, and avoid picking at any of the treated lesions. Pt may apply Vaseline to crusted or scabbing areas.
Medical Necessity Information: It is in your best interest to select a reason for this procedure from the list below. All of these items fulfill various CMS LCD requirements except the new and changing color options.

## 2025-05-28 NOTE — PROGRESS NOTES
Platelet transfusion started at 0945, consent signed, pt educated on s/sx to watch for, wife at bedside.    show

## 2025-06-07 NOTE — PROGRESS NOTES
Pt down to surgery with Dr. Almeida. Spouse at bedside.    [TextEntry] : General: AAO, no significant distress Psychiatric: affect pleasant and within normal limits Eyes: relatively symmetric, no obvious nystagmus Skin: no significant lesions on face Nose: no significant lesions; patent. Oral Cavity & Oropharynx: no significant deformity or lesions Neck/Lymphatics: no significant masses or abnormal cervical nodes palpated Respiratory: breathing comfortably; no significant distress Neurologic: cranial nerves II-XII grossly intact; EOMI Facial function: symmetric   Ear examination performed under binocular otologic microscope: Left Ear External: Pinna and periauricular area is normal. Canal: Ear canal skin is not inflamed or edematous.  Tympanic Membrane: Intact and in good position. MYRINGOINCUDOPEXY, PRUSSAK RETRACTION   Right Ear External: Pinna and periauricular area is normal. Canal: Ear canal skin is not inflamed or edematous.  Tympanic Membrane: Intact and in good position.  Nasal Endoscopy:   Pre-operative Diagnosis: post-nasal drip Post-operative Diagnosis: post-nasal drip + chronic rhinitis Anesthesia: None Procedure: Bilateral nasal endoscopy Procedure Details:   The patient was placed in the seated position sitting straight up. The telescope was passed along the left nasal floor to the nasopharynx. It was then passed into the region of the middle meatus, middle turbinate, and the sphenoethmoid region. An identical procedure was performed on the right side.   Findings: Interior nasal cavity: normal bilaterally Middle and superior meatus: normal bilaterally Sphenoethmoidal recess: normal bilaterally Mucosa: normal bilaterally Nasal septum: normal Discharge: none bilaterally Turbinates: normal bilaterally Adenoid: normal bilaterally Posterior choanae: normal bilaterally Eustachian tubes: normal bilaterally Mucous stranding: normal bilaterally Lesions: Not present   Comments: secretions in choana   Condition: Stable. Patient tolerated procedure well.

## (undated) DEVICE — PROTECTOR ULNA NERVE - (36PR/CA)

## (undated) DEVICE — SENSOR SPO2 NEO LNCS ADHESIVE (20/BX) SEE USER NOTES

## (undated) DEVICE — TRAY BONE FRACTURE TAMP ADDITIONAL LEVEL KIT KPX153PB

## (undated) DEVICE — Device

## (undated) DEVICE — EVACUATOR BLADDER ELLIK - (10/BX)

## (undated) DEVICE — GLOVE BIOGEL PI ULTRATOUCH SZ 6.5 SURGICAL PF LF - (50/BX)

## (undated) DEVICE — SET LEADWIRE 5 LEAD BEDSIDE DISPOSABLE ECG (1SET OF 5/EA)

## (undated) DEVICE — SYRINGE 50ML CATHETER TIP (40EA/BX 4BX/CA)

## (undated) DEVICE — GLOVE SZ 7 BIOGEL PI MICRO - PF LF (50PR/BX 4BX/CA)

## (undated) DEVICE — NEEDLE, MAYO CATGUT 1826-4D TROCAR

## (undated) DEVICE — SUCTION INSTRUMENT YANKAUER BULBOUS TIP W/O VENT (50EA/CA)

## (undated) DEVICE — SUTURE 2-0 SILK FS (12EA/BX)

## (undated) DEVICE — BAG DRAINAGE LEG TWIST-VALVE STRAP LARGE 32OZ (48EA/CA)

## (undated) DEVICE — KIT ANESTHESIA W/CIRCUIT & 3/LT BAG W/FILTER (20EA/CA)

## (undated) DEVICE — SYRINGE TOOMEY (50EA/CA)

## (undated) DEVICE — KIT ROOM DECONTAMINATION

## (undated) DEVICE — STERI STRIP COMPOUND BENZOIN - TINCTURE 0.6ML WITH APPLICATOR (40EA/BX)

## (undated) DEVICE — CHLORAPREP 26 ML APPLICATOR - ORANGE TINT(25/CA)

## (undated) DEVICE — ELECTRODE DUAL RETURN W/ CORD - (50/PK)

## (undated) DEVICE — SET EXTENSION WITH 2 PORTS (48EA/CA) ***PART #2C8610 IS A SUBSTITUTE*****

## (undated) DEVICE — CEMENT KYPHON CDS CARTRIDGES

## (undated) DEVICE — JELLY, KY 2 0Z STERILE

## (undated) DEVICE — TRAY KYPHOPACK 15/3 ONE STEP

## (undated) DEVICE — MEDICINE CUP STERILE 2 OZ - (100/CA)

## (undated) DEVICE — MASK ANESTHESIA ADULT  - (100/CA)

## (undated) DEVICE — CONNECTOR HOSE NEPTUNE FOR CYSTO ROOM

## (undated) DEVICE — NEPTUNE 4 PORT MANIFOLD - (20/PK)

## (undated) DEVICE — GLOVE BIOGEL PI ULTRATOUCH SZ 7.0 SURGICAL PF LF- POWDER FREE (50/BX 4BX/CA)

## (undated) DEVICE — GOWN SURGEONS X-LARGE - DISP. (30/CA)

## (undated) DEVICE — GLOVE SZ 8 BIOGEL PI MICRO - PF LF (50PR/BX)

## (undated) DEVICE — CANISTER SUCTION 3000ML MECHANICAL FILTER AUTO SHUTOFF MEDI-VAC NONSTERILE LF DISP  (40EA/CA)

## (undated) DEVICE — GOWN SURGICAL X-LARGE ULTRA - FILM-REINFORCED (20/CA)

## (undated) DEVICE — ELECTRODE 850 FOAM ADHESIVE - HYDROGEL RADIOTRNSPRNT (50/PK)

## (undated) DEVICE — SPONGE DRAIN 4 X 4IN 6-PLY - (2/PK25PK/BX12BX/CS)

## (undated) DEVICE — CATHETER FOLEY 22FR 5CC

## (undated) DEVICE — SYRINGE INFLAT FOR KYPHO

## (undated) DEVICE — NEEDLE NON SAFETY HYPO 22 GA X 1 1/2 IN (100/BX)

## (undated) DEVICE — GLOVE BIOGEL SZ 6.5 SURGICAL PF LTX (50PR/BX 4BX/CA)

## (undated) DEVICE — DRAPE 36X28IN RAD CARM BND BG - (25/CA) O

## (undated) DEVICE — TOWELS CLOTH SURGICAL - (4/PK 20PK/CA)

## (undated) DEVICE — CATHETER FOLEY 22 FR 30 CC (12EA/CA)

## (undated) DEVICE — SYRINGE 30 ML LL (56/BX)

## (undated) DEVICE — GLOVE BIOGEL PI ULTRATOUCH SZ 8.5 SURGICAL PF LF - (200PR/CA)

## (undated) DEVICE — PACK CYSTOSCOPY III - (8/CA)

## (undated) DEVICE — BAG DRAINAGE URINARY CLOSED 2000ML (20EA/CA)

## (undated) DEVICE — GLOVE BIOGEL PI INDICATOR SZ 7.0 SURGICAL PF LF - (50/BX 4BX/CA)

## (undated) DEVICE — CLOSURE SKIN STRIP 1/2 X 4 IN - (STERI STRIP) (50/BX 4BX/CA)

## (undated) DEVICE — COVER MAYO STAND X-LG - (22EA/CA)

## (undated) DEVICE — LACTATED RINGERS INJ 1000 ML - (14EA/CA 60CA/PF)

## (undated) DEVICE — CORD ELECTROSURG. TUR DISP (50EA/CA)

## (undated) DEVICE — BAG DRAINAGE ANTIREFLUX TOWER SLIDE TAP 4000 ML (20EA/CA)

## (undated) DEVICE — CUTTING LOOP 24FR - 10/BX OLD #MLE-24-012

## (undated) DEVICE — WATER IRRIG. STER 3000 ML - (4/CA)

## (undated) DEVICE — HEADREST PRONEVIEW LARGE - (10/CA)

## (undated) DEVICE — CATHETER FOLEY 24 FR. 5CC - (12EA/CA)

## (undated) DEVICE — SYRINGE DISP. 12 CC LL - (100/BX)

## (undated) DEVICE — SPONGE GAUZESTER 4 X 4 4PLY - (128PK/CA)

## (undated) DEVICE — GOWN WARMING STANDARD FLEX - (30/CA)

## (undated) DEVICE — TUBING CLEARLINK DUO-VENT - C-FLO (48EA/CA)

## (undated) DEVICE — HEAD HOLDER JUNIOR/ADULT

## (undated) DEVICE — GLOVE BIOGEL PI INDICATOR SZ 8.0 SURGICAL PF LF -(50/BX 4BX/CA)

## (undated) DEVICE — PACK SINGLE BASIN - (6/CA)

## (undated) DEVICE — STOPCOCK 3-WAY W/SWIVEL LEVER LOCK (50EA/CA)

## (undated) DEVICE — SLEEVE, VASO, THIGH, MED

## (undated) DEVICE — SET IRRIGATION CYSTOSCOPY Y-TYPE L81 IN (20EA/CA)

## (undated) DEVICE — GLOVE BIOGEL SZ 7.5 SURGICAL PF LTX - (50PR/BX 4BX/CA)

## (undated) DEVICE — SUTURE GENERAL

## (undated) DEVICE — GLOVE BIOGEL SZ 8.5 SURGICAL PF LTX - (50PR/BX 4BX/CA)

## (undated) DEVICE — BLADE SURGICAL #15 - (50/BX 3BX/CA)

## (undated) DEVICE — BONE BIOPSY DEVICE

## (undated) DEVICE — CATHETER EMBOLECTOMY 5FR

## (undated) DEVICE — BAG URODRAIN WITH TUBING - (20/CA)

## (undated) DEVICE — WATER IRRIGATION STERILE 1000ML (12EA/CA)

## (undated) DEVICE — TUBE CONNECT SUCTION CLEAR 120 X 1/4" (50EA/CA)"

## (undated) DEVICE — GLOVE SZ 7.5 BIOGEL PI MICRO - PF LF (50PR/BX)

## (undated) DEVICE — GLOVE BIOGEL PI ULTRATOUCH SZ 7.5 SURGICAL PF LF -(50/BX 4BX/CA)

## (undated) DEVICE — DEVICE KYPHON CDS AND BONE FILLER

## (undated) DEVICE — BLADE SURGICAL #11 - (50/BX)

## (undated) DEVICE — MASK, LARYNGEAL AIRWAY #4

## (undated) DEVICE — CONTAINER SPECIMEN BAG OR - STERILE 4 OZ W/LID (100EA/CA)

## (undated) DEVICE — WATER IRRIG. STER. 1500 ML - (9/CA)

## (undated) DEVICE — CORD RESECTO DISP ACT DAC-1 FOR USA RESECTOSCOPE (12EA/BX)

## (undated) DEVICE — DRAPE LARGE 3 QUARTER - (20/CA)

## (undated) DEVICE — SHEET PEDIATRIC LAPAROTOMY - (10/CA)

## (undated) DEVICE — PACK CYSTOSCOPY - (14/CA)

## (undated) DEVICE — COVER FOOT UNIVERSAL DISP. - (25EA/CA)

## (undated) DEVICE — ARMREST CRADLE FOAM - (2PR/PK 12PR/CA)

## (undated) DEVICE — SODIUM CHL IRRIGATION 0.9% 1000ML (12EA/CA)

## (undated) DEVICE — SYRINGE 10 ML CONTROL LL (25EA/BX 4BX/CA)

## (undated) DEVICE — NEEDLE SPINAL NON-SAFETY 18 GA X 3 IN (25EA/BX)

## (undated) DEVICE — BANDAID X-LARGE 2 X 4 IN LF (50EA/BX)

## (undated) DEVICE — WIRE GUIDE SENSOR DUAL FLEX - 5/BX

## (undated) DEVICE — BLADE SURGICAL CLIPPER - (50EA/CA)

## (undated) DEVICE — GLOVE SURGICAL PROTEXIS PI 8.0 LF - (50PR/BX)

## (undated) DEVICE — ELECTRODE ROLLERBALL 3MM 24FR (6EA/PK)

## (undated) DEVICE — DRAPE STRLE REG TOWEL 18X24 - (10/BX 4BX/CA)"

## (undated) DEVICE — MASK AIRWAY SIZE 4 UNIQUE SILICON (10EA/BX)

## (undated) DEVICE — GLOVE BIOGEL PI INDICATOR SZ 7.5 SURGICAL PF LF -(50/BX 4BX/CA)

## (undated) DEVICE — SUTURE 0 SILK CT-1 (36PK/BX)

## (undated) DEVICE — DRESSING NON ADHERENT 3 X 4 - STERILE (100/BX 12BX/CA)

## (undated) DEVICE — PACK MINOR BASIN - (2EA/CA)

## (undated) DEVICE — CATHETER URETHRAL FOLEY SILICONE OD20 FR 10 ML (10EA/CA)